# Patient Record
Sex: FEMALE | Race: WHITE | NOT HISPANIC OR LATINO | Employment: OTHER | URBAN - NONMETROPOLITAN AREA
[De-identification: names, ages, dates, MRNs, and addresses within clinical notes are randomized per-mention and may not be internally consistent; named-entity substitution may affect disease eponyms.]

---

## 2017-01-18 ENCOUNTER — PROCEDURE VISIT (OUTPATIENT)
Dept: OTOLARYNGOLOGY | Facility: CLINIC | Age: 27
End: 2017-01-18

## 2017-01-18 ENCOUNTER — OFFICE VISIT (OUTPATIENT)
Dept: OTOLARYNGOLOGY | Facility: CLINIC | Age: 27
End: 2017-01-18

## 2017-01-18 VITALS
HEIGHT: 61 IN | SYSTOLIC BLOOD PRESSURE: 112 MMHG | TEMPERATURE: 97.9 F | BODY MASS INDEX: 24.7 KG/M2 | WEIGHT: 130.8 LBS | DIASTOLIC BLOOD PRESSURE: 84 MMHG

## 2017-01-18 DIAGNOSIS — H90.72 MIXED HEARING LOSS OF LEFT EAR: Chronic | ICD-10-CM

## 2017-01-18 DIAGNOSIS — H90.8 MIXED HEARING LOSS, UNILATERAL: Primary | ICD-10-CM

## 2017-01-18 DIAGNOSIS — H65.22 LEFT CHRONIC SEROUS OTITIS MEDIA: Primary | ICD-10-CM

## 2017-01-18 DIAGNOSIS — H69.82 ETD (EUSTACHIAN TUBE DYSFUNCTION), LEFT: ICD-10-CM

## 2017-01-18 PROCEDURE — 99213 OFFICE O/P EST LOW 20 MIN: CPT | Performed by: PHYSICIAN ASSISTANT

## 2017-01-18 NOTE — PROGRESS NOTES
Patient Care Team:  Ancelmo Mcmahan DO as PCP - General (Internal Medicine)    Chief Complaint   Patient presents with   • Follow-up     The pt is f/u for tubes and is having intermittent ear pain        Subjective     Ellen Rowan is a 26 y.o. female who presents for evaluation.  HPI Comments: Patient presents status-post insertion of left T-tube. She was doing well, but over the last several days she has had intermittent left ear pain and fullness. No other symptoms or concerns at this time.      Review of Systems  Review of Systems   Constitutional: Negative for activity change, appetite change, chills, diaphoresis, fatigue, fever and unexpected weight change.   HENT: Positive for ear pain and hearing loss. Negative for congestion, dental problem, drooling, ear discharge, facial swelling, mouth sores, nosebleeds, postnasal drip, rhinorrhea, sinus pressure, sneezing, sore throat, tinnitus, trouble swallowing and voice change.    Eyes: Negative.    Respiratory: Negative.    Cardiovascular: Negative.    Gastrointestinal: Negative.    Endocrine: Negative.    Skin: Negative.    Allergic/Immunologic: Negative for environmental allergies, food allergies and immunocompromised state.   Neurological: Negative.    Hematological: Negative.    Psychiatric/Behavioral: Negative.        History  Past Medical History   Diagnosis Date   • Haynes's esophagus    • Cholesteatoma    • Chronic otitis media    • Conductive hearing loss    • DDD (degenerative disc disease), lumbar    • Dysautonomia    • Eustachian tube dysfunction    • Gastroparesis    • GERD (gastroesophageal reflux disease)    • Hiatal hernia    • HPV (human papilloma virus) infection    • Hypotension    • Irritable bowel syndrome    • Mastoiditis    • Nasal vestibulitis    • Neuropathy    • PONV (postoperative nausea and vomiting)    • POTS (postural orthostatic tachycardia syndrome)    • Scoliosis    • Tachycardia      Past Surgical History   Procedure  Laterality Date   • Adenoidectomy     • Facial reconstruction surgery  2005   • Knee surgery     • Mastoidectomy  10/01/2014   • Myringotomy w/ tubes     • Myringotomy w/ tubes Left 06/09/2014     10/1/14   • Pharyngeal flap  2007   • Sinus surgery Left 10/2014     Left incus   • Tonsillectomy     • [place] tooth extraction     • Cholecystectomy  09/04/2016   • Gastric stimulator implant surgery  2013     PT STATES INEFFECTIVE   • Jejunostomy     • Venous access device (port) insertion Left 03/25/2016     LOT# [id number] POWER PORT   • Laparoscopic tubal ligation     • Myringotomy w/ tubes Left 10/19/2016     Procedure: LEFT MYRINGOTOMY WITH INSERTION OF EAR TUBE with RIGHT EUA;  Surgeon: George Severino MD;  Location:  [facility] OR;  Service:    • Laparoscopic tubal ligation  04/2016   • Myringotomy w/ tubes Left 12/19/2016     Procedure: MYRINGOTOMY WITH INSERTION OF LEFT EAR TUBES, EXAM OF RIGHT EAR UNDER ANESTHESIA;  Surgeon: George Severino MD;  Location:  [facility] OR;  Service:      Family History   Problem Relation Age of Onset   • Diabetes Other    • Hypertension Other      Social History   Substance Use Topics   • Smoking status: Never Smoker   • Smokeless tobacco: None   • Alcohol use No       Current Outpatient Prescriptions:   •  clotrimazole (LOTRIMIN) 1 % cream, Apply 1 application topically 2 (Two) Times a Day. At feeding tube site, Disp: , Rfl:   •  diphenhydrAMINE (BENADRYL) 25 mg capsule, Take 25 mg by mouth Every 4 (Four) Hours As Needed for itching., Disp: , Rfl:   •  famotidine (PEPCID) 20-0.9 MG/50ML-%, Infuse 50 mL into a venous catheter 2 (Two) Times a Day. Via port, Disp: , Rfl:   •  gabapentin (NEURONTIN) 100 MG capsule, Take 100 mg by mouth 2 (Two) Times a Day., Disp: , Rfl:   •  HEPARIN LOCK FLUSH IV, Infuse  into a venous catheter Daily As Needed., Disp: , Rfl:   •  ibuprofen (ADVIL,MOTRIN) 800 MG tablet, Take 800 mg by mouth Every 6 (Six) Hours As Needed for mild pain (1-3) or moderate  pain (4-6)., Disp: , Rfl:   •  KCl in Dextrose-NaCl (DEXTROSE 5 % AND SODIUM CHLORIDE 0.45 % WITH KCL 20 MEQ/L) 20-5-0.45 MEQ/L-%-% infusion, Infuse 1,000 mL into a venous catheter Daily. 250 ML/HR OVER 4 HOURS DAILY, Disp: , Rfl:   •  promethazine (PHENERGAN) 25 MG/ML injection, Infuse 25 mg into a venous catheter Every 3 (Three) Hours As Needed., Disp: , Rfl:   •  Sodium Chloride Flush (SODIUM CHLORIDE 0.9 % FLUSH) 0.9 % solution, Infuse 10 mL into a venous catheter 6 (Six) Times a Day. Before and after each port infusion, Disp: , Rfl:   Allergies:  Iodinated diagnostic agents; Adhesive tape; Bactrim [sulfamethoxazole-trimethoprim]; Citrus; Metoclopramide; Nsaids; Tramadol hcl; and Zofran [ondansetron hcl]    Objective     Vital Signs  Temp:  [97.9 °F (36.6 °C)] 97.9 °F (36.6 °C)  BP: (112)/(84) 112/84    Physical Exam:  Physical Exam   Constitutional: Vital signs are normal. She appears well-developed and well-nourished. No distress.   HENT:   Head: Normocephalic and atraumatic.   Right Ear: External ear normal.   Left Ear: External ear normal.   Ears:    Eyes: Conjunctivae and EOM are normal. Pupils are equal, round, and reactive to light. No scleral icterus.   Pulmonary/Chest: Effort normal.   Neurological: She is alert. No cranial nerve deficit.   Skin: Skin is warm and dry. No rash noted. She is not diaphoretic. No erythema. No pallor.   Psychiatric: She has a normal mood and affect. Her behavior is normal. Judgment and thought content normal.   Vitals reviewed.      Results Review:   I reviewed the patient's new clinical results.  Procedure visit     1/18/2017  [facility]    Mixed hearing loss, unilateral   Dx    Progress Notes   Desiree Cazares (Audiologist) • • Audiology                Assessment/Plan     Problems Addressed this Visit        Nervous and Auditory    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)    Left chronic serous otitis media - Primary    ETD (eustachian tube dysfunction)           My findings and recommendations were discussed and questions were answered. T-tube cleared with suction and pick, advised to use ear drops to help keep the tube open. Follow-up as directed to discuss further options if tube is plugged again.    Return in about 4 weeks (around 2/15/2017) for Recheck left PE tube when Dr. Severino is in office.    PAPA Maciel  01/18/17  2:32 PM

## 2017-01-18 NOTE — MR AVS SNAPSHOT
Ellen Rowan   2017 2:30 PM   Office Visit    Dept Phone:  [phone]   Encounter #:  [id number]    Provider:  PAPA Maciel   Department:  [facility]                Your Full Care Plan              Your Updated Medication List          This list is accurate as of: 17  2:21 PM.  Always use your most recent med list.                clotrimazole 1 % cream   Commonly known as:  LOTRIMIN       dextrose 5 % and sodium chloride 0.45 % with KCl 20 mEq/L 20-5-0.45 MEQ/L-%-% infusion       diphenhydrAMINE 25 mg capsule   Commonly known as:  BENADRYL       famotidine 20-0.9 MG/50ML-%   Commonly known as:  PEPCID       gabapentin 100 MG capsule   Commonly known as:  NEURONTIN       HEPARIN LOCK FLUSH IV       ibuprofen 800 MG tablet   Commonly known as:  ADVIL,MOTRIN       promethazine 25 MG/ML injection   Commonly known as:  PHENERGAN       sodium chloride 0.9 % flush 0.9 % solution               Instructions     None    Patient Instructions History      Upcoming Appointments     Visit Type Date Time Department    AUDIO 2017  2:00 PM [facility]    FOLLOW UP 2017  2:30 PM [facility]    FOLLOW UP 2017  3:30 PM [facility] [place]      [facility] Signup     [facility] allows you to send messages to your doctor, view your test results, renew your prescriptions, schedule appointments, and more. To sign up, go to [employer] and click on the Sign Up Now link in the New User? box. Enter your OpenLabel Activation Code exactly as it appears below along with the last four digits of your Social Security Number and your Date of Birth () to complete the sign-up process. If you do not sign up before the expiration date, you must request a new code.    OpenLabel Activation Code: [email or web address]  Expires: 2017  2:16 PM    If you have questions, you can email [email or web address] or call [phone] to talk to our  "MyChart staff. Remember, MyChart is NOT to be used for urgent needs. For medical emergencies, dial 911.               Other Info from Your Visit           Your Appointments     2017  2:30 PM CST   Follow Up with PAPA Maciel   [facility] (--)    [address] [place] [address] Av   [address]  [facility] [address] [phone]           Arrive 15 minutes prior to appointment.            2017  3:30 PM CST   Follow Up with George Severino MD   [facility] (--)    [address] [place] [address] Av   [address]  [place] [address] [phone]           Arrive 15 minutes prior to appointment.              Allergies     Iodinated Diagnostic Agents  Anaphylaxis    Adhesive Tape Intolerance Itching    tegaderm is ok    Bactrim [Sulfamethoxazole-trimethoprim]  Nausea And Vomiting    Citrus  Nausea And Vomiting    And artificial    Metoclopramide  Nausea Only    Nsaids  GI Intolerance    Tramadol Hcl Intolerance Other (See Comments)    \"SLEEPS FOR 3 DAYS\" PER PT    Zofran [Ondansetron Hcl]  Hives      Reason for Visit     Follow-up The pt is f/u for tubes and is having intermittent ear pain      Vital Signs     Blood Pressure Temperature Height Weight Body Mass Index Smoking Status    112/84 (Patient Position: Sitting) 97.9 °F (36.6 °C) 61\" (154.9 cm) 130 lb 12.8 oz (59.3 kg) 24.71 kg/m2 Never Smoker        "

## 2017-02-09 ENCOUNTER — APPOINTMENT (OUTPATIENT)
Dept: PREADMISSION TESTING | Facility: HOSPITAL | Age: 27
End: 2017-02-09

## 2017-02-09 ENCOUNTER — OFFICE VISIT (OUTPATIENT)
Dept: OTOLARYNGOLOGY | Facility: CLINIC | Age: 27
End: 2017-02-09

## 2017-02-09 VITALS
DIASTOLIC BLOOD PRESSURE: 70 MMHG | WEIGHT: 131 LBS | HEIGHT: 60 IN | HEART RATE: 83 BPM | SYSTOLIC BLOOD PRESSURE: 111 MMHG | OXYGEN SATURATION: 98 % | BODY MASS INDEX: 25.72 KG/M2

## 2017-02-09 VITALS
HEART RATE: 80 BPM | HEIGHT: 61 IN | BODY MASS INDEX: 24.55 KG/M2 | DIASTOLIC BLOOD PRESSURE: 80 MMHG | WEIGHT: 130 LBS | TEMPERATURE: 97.5 F | SYSTOLIC BLOOD PRESSURE: 120 MMHG

## 2017-02-09 DIAGNOSIS — H90.72 MIXED HEARING LOSS OF LEFT EAR: Chronic | ICD-10-CM

## 2017-02-09 DIAGNOSIS — H65.22 LEFT CHRONIC SEROUS OTITIS MEDIA: Primary | ICD-10-CM

## 2017-02-09 DIAGNOSIS — H69.82 ETD (EUSTACHIAN TUBE DYSFUNCTION), LEFT: ICD-10-CM

## 2017-02-09 LAB
ANION GAP SERPL CALCULATED.3IONS-SCNC: 9 MMOL/L (ref 4–13)
BUN BLD-MCNC: 18 MG/DL (ref 5–21)
BUN/CREAT SERPL: 25 (ref 7–25)
CALCIUM SPEC-SCNC: 9.7 MG/DL (ref 8.4–10.4)
CHLORIDE SERPL-SCNC: 103 MMOL/L (ref 98–110)
CO2 SERPL-SCNC: 29 MMOL/L (ref 24–31)
CREAT BLD-MCNC: 0.72 MG/DL (ref 0.5–1.4)
DEPRECATED RDW RBC AUTO: 39.5 FL (ref 40–54)
ERYTHROCYTE [DISTWIDTH] IN BLOOD BY AUTOMATED COUNT: 12.9 % (ref 12–15)
GFR SERPL CREATININE-BSD FRML MDRD: 97 ML/MIN/1.73
GLUCOSE BLD-MCNC: 95 MG/DL (ref 70–100)
HCT VFR BLD AUTO: 40.3 % (ref 37–47)
HGB BLD-MCNC: 13.4 G/DL (ref 12–16)
MCH RBC QN AUTO: 28.2 PG (ref 28–32)
MCHC RBC AUTO-ENTMCNC: 33.3 G/DL (ref 33–36)
MCV RBC AUTO: 84.7 FL (ref 82–98)
PLATELET # BLD AUTO: 295 10*3/MM3 (ref 130–400)
PMV BLD AUTO: 9.1 FL (ref 6–12)
POTASSIUM BLD-SCNC: 3.6 MMOL/L (ref 3.5–5.3)
RBC # BLD AUTO: 4.76 10*6/MM3 (ref 4.2–5.4)
SODIUM BLD-SCNC: 141 MMOL/L (ref 135–145)
WBC NRBC COR # BLD: 9.8 10*3/MM3 (ref 4.8–10.8)

## 2017-02-09 PROCEDURE — 93005 ELECTROCARDIOGRAM TRACING: CPT

## 2017-02-09 PROCEDURE — 80048 BASIC METABOLIC PNL TOTAL CA: CPT | Performed by: OTOLARYNGOLOGY

## 2017-02-09 PROCEDURE — 36415 COLL VENOUS BLD VENIPUNCTURE: CPT

## 2017-02-09 PROCEDURE — 93010 ELECTROCARDIOGRAM REPORT: CPT | Performed by: INTERNAL MEDICINE

## 2017-02-09 PROCEDURE — 85027 COMPLETE CBC AUTOMATED: CPT | Performed by: OTOLARYNGOLOGY

## 2017-02-09 PROCEDURE — 99214 OFFICE O/P EST MOD 30 MIN: CPT | Performed by: PHYSICIAN ASSISTANT

## 2017-02-09 RX ORDER — ACETAMINOPHEN AND CODEINE PHOSPHATE 300; 30 MG/1; MG/1
TABLET ORAL
Refills: 0 | COMMUNITY
Start: 2017-02-02 | End: 2017-08-07 | Stop reason: ALTCHOICE

## 2017-02-09 NOTE — PROGRESS NOTES
YOB: 1990  Location: [facility]  Location Address: [address] [place] [address], [facility], [address] [place] [address]  Location Phone: [phone]    Chief Complaint   Patient presents with   • Follow-up     Left ear, tube, imbalance       History of Present Illness  Patient presents status-post MYRINGOTOMY WITH INSERTION OF LEFT EAR TUBES, EXAM OF RIGHT EAR UNDER ANESTHESIA on 16. She is not doing well today. She complains of bilateral ear pain, dizziness, falling and lightheadedness. She denies nasal congestion, nasal drainage and otorrhea      Past Medical History   Diagnosis Date   • Haynes's esophagus    • Cholesteatoma    • Chronic otitis media    • Conductive hearing loss    • DDD (degenerative disc disease), lumbar    • Dysautonomia    • Eustachian tube dysfunction    • Gastroparesis    • GERD (gastroesophageal reflux disease)    • Hiatal hernia    • HPV (human papilloma virus) infection    • Hypotension    • Irritable bowel syndrome    • Mastoiditis    • Nasal vestibulitis    • Neuropathy    • PONV (postoperative nausea and vomiting)    • POTS (postural orthostatic tachycardia syndrome)    • Scoliosis    • Tachycardia        Past Surgical History   Procedure Laterality Date   • Adenoidectomy     • Facial reconstruction surgery     • Knee surgery     • Mastoidectomy  10/01/2014   • Myringotomy w/ tubes     • Myringotomy w/ tubes Left 2014     10/1/14   • Pharyngeal flap     • Sinus surgery Left 10/2014     Left incus   • Tonsillectomy     • [place] tooth extraction     • Cholecystectomy  2016   • Gastric stimulator implant surgery       PT STATES INEFFECTIVE   • Jejunostomy     • Venous access device (port) insertion Left 2016     LOT# [id number] POWER PORT   • Laparoscopic tubal ligation     • Myringotomy w/ tubes Left 10/19/2016     Procedure: LEFT MYRINGOTOMY WITH INSERTION OF EAR TUBE with RIGHT EUA;  Surgeon: George Severino MD;  Location: [facility] OR;  Service:     • Laparoscopic tubal ligation  04/2016   • Myringotomy w/ tubes Left 12/19/2016     Procedure: MYRINGOTOMY WITH INSERTION OF LEFT EAR TUBES, EXAM OF RIGHT EAR UNDER ANESTHESIA;  Surgeon: George Severino MD;  Location: [facility] OR;  Service:          Current Outpatient Prescriptions:   •  acetaminophen-codeine (TYLENOL #3) 300-30 MG per tablet, TK 1 T PO BID PRN, Disp: , Rfl: 0  •  clotrimazole (LOTRIMIN) 1 % cream, Apply 1 application topically 2 (Two) Times a Day. At feeding tube site, Disp: , Rfl:   •  diphenhydrAMINE (BENADRYL) 25 mg capsule, Take 25 mg by mouth Every 4 (Four) Hours As Needed for itching., Disp: , Rfl:   •  famotidine (PEPCID) 20-0.9 MG/50ML-%, Infuse 50 mL into a venous catheter 2 (Two) Times a Day. Via port, Disp: , Rfl:   •  gabapentin (NEURONTIN) 100 MG capsule, Take 100 mg by mouth 2 (Two) Times a Day., Disp: , Rfl:   •  HEPARIN LOCK FLUSH IV, Infuse  into a venous catheter Daily As Needed., Disp: , Rfl:   •  ibuprofen (ADVIL,MOTRIN) 800 MG tablet, Take 800 mg by mouth Every 6 (Six) Hours As Needed for mild pain (1-3) or moderate pain (4-6)., Disp: , Rfl:   •  KCl in Dextrose-NaCl (DEXTROSE 5 % AND SODIUM CHLORIDE 0.45 % WITH KCL 20 MEQ/L) 20-5-0.45 MEQ/L-%-% infusion, Infuse 1,000 mL into a venous catheter Daily. 250 ML/HR OVER 4 HOURS DAILY, Disp: , Rfl:   •  promethazine (PHENERGAN) 25 MG/ML injection, Infuse 25 mg into a venous catheter Every 3 (Three) Hours As Needed., Disp: , Rfl:   •  Sodium Chloride Flush (SODIUM CHLORIDE 0.9 % FLUSH) 0.9 % solution, Infuse 10 mL into a venous catheter 6 (Six) Times a Day. Before and after each port infusion, Disp: , Rfl:     Iodides; Iodinated diagnostic agents; Adhesive tape; Bactrim [sulfamethoxazole-trimethoprim]; Citrus; Metoclopramide; Nsaids; Tramadol hcl; and Zofran [ondansetron hcl]    Family History   Problem Relation Age of Onset   • Diabetes Other    • Hypertension Other        Social History     Social History   • Marital status:  Single     Spouse name: N/A   • Number of children: N/A   • Years of education: N/A     Occupational History   • Not on file.     Social History Main Topics   • Smoking status: Never Smoker   • Smokeless tobacco: Not on file   • Alcohol use No   • Drug use: No   • Sexual activity: Defer     Other Topics Concern   • Not on file     Social History Narrative       Review of Systems   Constitutional: Negative.    HENT: Positive for ear pain.    Eyes: Negative.    Respiratory: Negative.    Cardiovascular: Negative.    Gastrointestinal: Negative.    Endocrine: Negative.    Genitourinary: Negative.    Musculoskeletal: Negative.    Skin: Negative.    Allergic/Immunologic: Negative.    Neurological: Positive for dizziness and light-headedness.   Hematological: Negative.    Psychiatric/Behavioral: Negative.        Vitals:    02/09/17 1346   BP: 120/80   Pulse: 80   Temp: 97.5 °F (36.4 °C)       Objective     Physical Exam  CONSTITUTIONAL: well nourished, alert, oriented, in no acute distress     COMMUNICATION AND VOICE: able to communicate normally, normal voice quality    HEAD: normocephalic, no lesions, atraumatic, no tenderness, no masses     FACE: appearance normal, no lesions, no tenderness, no deformities, facial motion symmetric    SALIVARY GLANDS: parotid glands with no tenderness, no swelling, no masses, submandibular glands with normal size, nontender    EYES: ocular motility normal, eyelids normal, orbits normal, no proptosis, conjunctiva normal , pupils equal, round     EARS:  Hearing: response to conversational voice normal bilaterally   External Ears: auricles without lesions  Otoscopic: tympanic membrane appearance normal on the right, fluid and dull on the left. T-tube in place but obstructed.  NOSE:  External Nose: structure normal, no tenderness on palpation, no nasal discharge, no lesions, no evidence of trauma, nostrils patent   Intranasal Exam: nasal mucosa normal, vestibule within normal limits, inferior  turbinate normal, nasal septum midline   Nasopharynx:     ORAL:  Lips: upper and lower lips without lesion   Teeth: dentition within normal limits for age   Gums: gingivae healthy   Oral Mucosa: oral mucosa normal, no mucosal lesions   Floor of Mouth: Warthin’s duct patent, mucosa normal  Tongue: lingual mucosa normal without lesions, normal tongue mobility   Palate: soft and hard palates with normal mucosa and structure  Oropharynx: oropharyngeal mucosa normal    HYPOPHARYNX:   LARYNX: epiglottis and arytenoid cartilage within normal limits, vocal cord mucosa normal with normal mobility     NECK: neck appearance normal, no mass,  noted without erythema or tenderness    THYROID: no overt thyromegaly, no tenderness, nodules or mass present on palpation, position midline     LYMPH NODES: no lymphadenopathy    CHEST/RESPIRATORY: respiratory effort normal, normal breath sounds     CARDIOVASCULAR: rate and rhythm normal, extremities without cyanosis or edema      NEUROLOGIC/PSYCHIATRIC: oriented to time, place and person, mood normal, affect appropriate, CN II-XII intact grossly    Assessment/Plan   Problems Addressed this Visit        Nervous and Auditory    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)    Left chronic serous otitis media - Primary    Relevant Orders    Case Request    ETD (eustachian tube dysfunction)    Relevant Orders    Case Request        MYRINGOTOMY WITH INSERTION OF EAR TUBE on left EAR EXAM UNDER ANESTHESIA right ear /REMOVAL OF myringotomy of tube of left ear (Left), EAR EXAM UNDER ANESTHESIA right ear /REMOVAL OF myringotomy of tubes (Left)  Orders Placed This Encounter   Procedures   • Obtain informed consent     Order Specific Question:   Informed consent given for:     Answer:   MYRINGOTOMY WITH INSERTION OF EAR TUBE on left EAR EXAM UNDER ANESTHESIA right ear /REMOVAL OF myringotomy of tube of left ear   • Follow Anesthesia Guidelines / Standing Orders     Standing Status:   Future   • Provide  instructions to patient on NPO status     Return for Follow-up post-operatively as directed.       Patient Instructions   MYRINGOTOMY TUBE INSERTION: The risks and benefits of myringotomy tube insertion were explained including but not limited to pain, aural fullness, bleeding, infection, risks of the anesthesia, persistent tympanic membrane perforation, chronic otorrhea, early and late extrusion, and the possibility for the need of reinsertion after extrusion. Alternatives were discussed. The patient/parents demonstrated understanding of these risks. Questions were asked appropriately answered.      Dr. Severino examined patient and agrees with plan.

## 2017-02-09 NOTE — DISCHARGE INSTRUCTIONS
DAY OF SURGERY INSTRUCTIONS        YOUR SURGEON: ESTELLE JUSTICE    PROCEDURE: MYRINGOTOMY WITH INSERTION OF EAR TUBE on left EAR EXAM UNDER ANESTHESIA right ear/REMOVAL OF MYRINGOTOMY of tube of left ear    DATE OF SURGERY: 2/13/17    ARRIVAL TIME: AS DIRECTED BY OFFICE    DAY OF SURGERY TAKE ONLY THESE MEDICATIONS: NONE            BEFORE YOU COME TO THE HOSPITAL  (Pre-op instructions)  • Do not eat, drink, smoke or chew gum after midnight the night before surgery.  This also includes no mints.  • Morning of surgery take only the medicines you have been instructed with a sip of water unless otherwise instructed  by your physician.  • Do not shave, wear makeup or dark nail polish.  • Remove all jewelry including rings.  • Leave anything you consider valuable at home.  • Leave your suitcase in the car until after your surgery.  • Bring the following with you if applicable:  o Picture ID and insurance, Medicare or Medicaid cards  o Co-pay/deductible required by insurance (cash, check, credit card)  o Medications (no narcotics) in original bottles (not a list) including all over-the-counter medications.  o Copy of advance directive, living will or power-of- documents if not brought to PAT  o CPAP or BIPAP mask and tubing  o Skin prep instruction sheet  o Relaxation aids (MP3 player, book, magazine)  • Confirm your arrival time with you surgeon the day before your surgery (surgery times are subject to change)  • On the day of surgery check in at registration located at the main entrance of the hospital.       Outpatient Surgery Guidelines, Adult  Outpatient procedures are those for which the person having the procedure is allowed to go home the same day as the procedure. Various procedures are done on an outpatient basis. You should follow some general guidelines if you will be having an outpatient procedure.  LET YOUR HEALTH CARE PROVIDER KNOW ABOUT:  · Any allergies you have.  · All medicines you are taking,  including vitamins, herbs, eye drops, creams, and over-the-counter medicines.  · Previous problems you or members of your family have had with the use of anesthetics.  · Any blood disorders you have.  · Previous surgeries you have had.  · Medical conditions you have.  RISKS AND COMPLICATIONS  Your health care provider will discuss possible risks and complications with you before surgery. Common risks and complications include:    · Problems due to the use of anesthetics.  · Blood loss and replacement (does not apply to minor surgical procedures).  · Temporary increase in pain due to surgery.  · Uncorrected pain or problems that the surgery was meant to correct.  · Infection.  · New damage.  BEFORE THE PROCEDURE  · Ask your health care provider about changing or stopping your regular medicines. You may need to stop taking certain medicines in the days or weeks before the procedure.  · Stop smoking at least 2 weeks before surgery. This lowers your risk for complications during and after surgery. Ask your health care provider for help with this if needed.  · Eat your usual meals and a light supper the day before surgery. Continue fluid intake. Do not drink alcohol.  · Do not eat or drink after midnight the night before your surgery.   · Arrange for someone to take you home and to stay with you for 24 hours after the procedure. Medicine given for your procedure may affect your ability to drive or to care for yourself.  · Call your health care provider's office if you develop an illness or problem that may prevent you from safely having your procedure.  AFTER THE PROCEDURE  After surgery, you will be taken to a recovery area, where your progress will be monitored. If there are no complications, you will be allowed to go home when you are awake, stable, and taking fluids well. You may have numbness around the surgical site. Healing will take some time. You will have tenderness at the surgical site and may have some  swelling and bruising. You may also have some nausea.  HOME CARE INSTRUCTIONS  · Do not drive for 24 hours, or as directed by your health care provider. Do not drive while taking prescription pain medicines.  · Do not drink alcohol for 24 hours.  · Do not make important decisions or sign legal documents for 24 hours.  · You may resume a normal diet and activities as directed.  · Do not lift anything heavier than 10 pounds (4.5 kg) or play contact sports until your health care provider says it is okay.  · Change your bandages (dressings) as directed.  · Only take over-the-counter or prescription medicines as directed by your health care provider.  · Follow up with your health care provider as directed.  SEEK MEDICAL CARE IF:  · You have increased bleeding (more than a small spot) from the surgical site.  · You have redness, swelling, or increasing pain in the wound.  · You see pus coming from the wound.  · You have a fever.  · You notice a bad smell coming from the wound or dressing.  · You feel lightheaded or faint.  · You develop a rash.  · You have trouble breathing.  · You develop allergies.  MAKE SURE YOU:  · Understand these instructions.  · Will watch your condition.  · Will get help right away if you are not doing well or get worse.     This information is not intended to replace advice given to you by your health care provider. Make sure you discuss any questions you have with your health care provider.     Document Released: 09/12/2002 Document Revised: 05/03/2016 Document Reviewed: 05/22/2014  [employer] Interactive Patient Education ©2016 [employer]       Fall Prevention in Hospitals, Adult  As a hospital patient, your condition and the treatments you receive can increase your risk for falls. Some additional risk factors for falls in a hospital include:  · Being in an unfamiliar environment.  · Being on bed rest.  · Your surgery.  · Taking certain medicines.  · Your tubing requirements, such as  intravenous (IV) therapy or catheters.  It is important that you learn how to decrease fall risks while at the hospital. Below are important tips that can help prevent falls.  SAFETY TIPS FOR PREVENTING FALLS  Talk about your risk of falling.  · Ask your health care provider why you are at risk for falling. Is it your medicine, illness, tubing placement, or something else?  · Make a plan with your health care provider to keep you safe from falls.  · Ask your health care provider or pharmacist about side effects of your medicines. Some medicines can make you dizzy or affect your coordination.  Ask for help.  · Ask for help before getting out of bed. You may need to press your call button.  · Ask for assistance in getting safely to the toilet.  · Ask for a walker or cane to be put at your bedside. Ask that most of the side rails on your bed be placed up before your health care provider leaves the room.  · Ask family or friends to sit with you.  · Ask for things that are out of your reach, such as your glasses, hearing aids, telephone, bedside table, or call button.  Follow these tips to avoid falling:  · Stay lying or seated, rather than standing, while waiting for help.  · Wear rubber-soled slippers or shoes whenever you walk in the hospital.  · Avoid quick, sudden movements.  ¨ Change positions slowly.  ¨ Sit on the side of your bed before standing.  ¨ Stand up slowly and wait before you start to walk.  · Let your health care provider know if there is a spill on the floor.  · Pay careful attention to the medical equipment, electrical cords, and tubes around you.  · When you need help, use your call button by your bed or in the bathroom. Wait for one of your health care providers to help you.  · If you feel dizzy or unsure of your footing, return to bed and wait for assistance.  · Avoid being distracted by the TV, telephone, or another person in your room.  · Do not lean or support yourself on rolling objects, such  as IV poles or bedside tables.     This information is not intended to replace advice given to you by your health care provider. Make sure you discuss any questions you have with your health care provider.     Document Released: 12/15/2001 Document Revised: 01/08/2016 Document Reviewed: 08/25/2013  [employer] Interactive Patient Education ©2016 [employer]       Surgical Site Infections FAQs  What is a Surgical Site Infection (SSI)?  A surgical site infection is an infection that occurs after surgery in the part of the body where the surgery took place. Most patients who have surgery do not develop an infection. However, infections develop in about 1 to 3 out of every 100 patients who have surgery.  Some of the common symptoms of a surgical site infection are:  · Redness and pain around the area where you had surgery  · Drainage of cloudy fluid from your surgical wound  · Fever  Can SSIs be treated?  Yes. Most surgical site infections can be treated with antibiotics. The antibiotic given to you depends on the bacteria (germs) causing the infection. Sometimes patients with SSIs also need another surgery to treat the infection.  What are some of the things that hospitals are doing to prevent SSIs?  To prevent SSIs, doctors, nurses, and other healthcare providers:  · Clean their hands and arms up to their elbows with an antiseptic agent just before the surgery.  · Clean their hands with soap and water or an alcohol-based hand rub before and after caring for each patient.  · May remove some of your hair immediately before your surgery using electric clippers if the hair is in the same area where the procedure will occur. They should not shave you with a razor.  · Wear special hair covers, masks, gowns, and gloves during surgery to keep the surgery area clean.  · Give you antibiotics before your surgery starts. In most cases, you should get antibiotics within 60 minutes before the surgery starts and the antibiotics  should be stopped within 24 hours after surgery.  · Clean the skin at the site of your surgery with a special soap that kills germs.  What can I do to help prevent SSIs?  Before your surgery:  · Tell your doctor about other medical problems you may have. Health problems such as allergies, diabetes, and obesity could affect your surgery and your treatment.  · Quit smoking. Patients who smoke get more infections. Talk to your doctor about how you can quit before your surgery.  · Do not shave near where you will have surgery. Shaving with a razor can irritate your skin and make it easier to develop an infection.  At the time of your surgery:  · Speak up if someone tries to shave you with a razor before surgery. Ask why you need to be shaved and talk with your surgeon if you have any concerns.  · Ask if you will get antibiotics before surgery.  After your surgery:  · Make sure that your healthcare providers clean their hands before examining you, either with soap and water or an alcohol-based hand rub.  · If you do not see your providers clean their hands, please ask them to do so.  · Family and friends who visit you should not touch the surgical wound or dressings.  · Family and friends should clean their hands with soap and water or an alcohol-based hand rub before and after visiting you. If you do not see them clean their hands, ask them to clean their hands.  What do I need to do when I go home from the hospital?  · Before you go home, your doctor or nurse should explain everything you need to know about taking care of your wound. Make sure you understand how to care for your wound before you leave the hospital.  · Always clean your hands before and after caring for your wound.  · Before you go home, make sure you know who to contact if you have questions or problems after you get home.  · If you have any symptoms of an infection, such as redness and pain at the surgery site, drainage, or fever, call your doctor  immediately.  If you have additional questions, please ask your doctor or nurse.  Developed and co-sponsored by The Society for Healthcare Epidemiology of [place] (SHEA); Infectious Diseases Society of [place] (IDSA); American Hospital Association; Association for Professionals in Infection Control and Epidemiology (APIC); Centers for Disease Control and Prevention (CDC); and The Joint Commission.     This information is not intended to replace advice given to you by your health care provider. Make sure you discuss any questions you have with your health care provider.     Document Released: 12/23/2014 Document Revised: 01/08/2016 Document Reviewed: 03/02/2016  [employer] Interactive Patient Education ©2016 [employer].

## 2017-02-09 NOTE — PATIENT INSTRUCTIONS
MYRINGOTOMY TUBE INSERTION: The risks and benefits of myringotomy tube insertion were explained including but not limited to pain, aural fullness, bleeding, infection, risks of the anesthesia, persistent tympanic membrane perforation, chronic otorrhea, early and late extrusion, and the possibility for the need of reinsertion after extrusion. Alternatives were discussed. The patient/parents demonstrated understanding of these risks. Questions were asked appropriately answered.

## 2017-02-12 NOTE — H&P (VIEW-ONLY)
YOB: 1990  Location: [facility]  Location Address: [address] [place] [address], [facility], [address] [place] [address]  Location Phone: [phone]    Chief Complaint   Patient presents with   • Follow-up     Left ear, tube, imbalance       History of Present Illness  Patient presents status-post MYRINGOTOMY WITH INSERTION OF LEFT EAR TUBES, EXAM OF RIGHT EAR UNDER ANESTHESIA on 16. She is not doing well today. She complains of bilateral ear pain, dizziness, falling and lightheadedness. She denies nasal congestion, nasal drainage and otorrhea      Past Medical History   Diagnosis Date   • Haynes's esophagus    • Cholesteatoma    • Chronic otitis media    • Conductive hearing loss    • DDD (degenerative disc disease), lumbar    • Dysautonomia    • Eustachian tube dysfunction    • Gastroparesis    • GERD (gastroesophageal reflux disease)    • Hiatal hernia    • HPV (human papilloma virus) infection    • Hypotension    • Irritable bowel syndrome    • Mastoiditis    • Nasal vestibulitis    • Neuropathy    • PONV (postoperative nausea and vomiting)    • POTS (postural orthostatic tachycardia syndrome)    • Scoliosis    • Tachycardia        Past Surgical History   Procedure Laterality Date   • Adenoidectomy     • Facial reconstruction surgery     • Knee surgery     • Mastoidectomy  10/01/2014   • Myringotomy w/ tubes     • Myringotomy w/ tubes Left 2014     10/1/14   • Pharyngeal flap     • Sinus surgery Left 10/2014     Left incus   • Tonsillectomy     • [place] tooth extraction     • Cholecystectomy  2016   • Gastric stimulator implant surgery       PT STATES INEFFECTIVE   • Jejunostomy     • Venous access device (port) insertion Left 2016     LOT# [id number] POWER PORT   • Laparoscopic tubal ligation     • Myringotomy w/ tubes Left 10/19/2016     Procedure: LEFT MYRINGOTOMY WITH INSERTION OF EAR TUBE with RIGHT EUA;  Surgeon: George Severino MD;  Location: [facility] OR;  Service:     • Laparoscopic tubal ligation  04/2016   • Myringotomy w/ tubes Left 12/19/2016     Procedure: MYRINGOTOMY WITH INSERTION OF LEFT EAR TUBES, EXAM OF RIGHT EAR UNDER ANESTHESIA;  Surgeon: George Severino MD;  Location: [facility] OR;  Service:          Current Outpatient Prescriptions:   •  acetaminophen-codeine (TYLENOL #3) 300-30 MG per tablet, TK 1 T PO BID PRN, Disp: , Rfl: 0  •  clotrimazole (LOTRIMIN) 1 % cream, Apply 1 application topically 2 (Two) Times a Day. At feeding tube site, Disp: , Rfl:   •  diphenhydrAMINE (BENADRYL) 25 mg capsule, Take 25 mg by mouth Every 4 (Four) Hours As Needed for itching., Disp: , Rfl:   •  famotidine (PEPCID) 20-0.9 MG/50ML-%, Infuse 50 mL into a venous catheter 2 (Two) Times a Day. Via port, Disp: , Rfl:   •  gabapentin (NEURONTIN) 100 MG capsule, Take 100 mg by mouth 2 (Two) Times a Day., Disp: , Rfl:   •  HEPARIN LOCK FLUSH IV, Infuse  into a venous catheter Daily As Needed., Disp: , Rfl:   •  ibuprofen (ADVIL,MOTRIN) 800 MG tablet, Take 800 mg by mouth Every 6 (Six) Hours As Needed for mild pain (1-3) or moderate pain (4-6)., Disp: , Rfl:   •  KCl in Dextrose-NaCl (DEXTROSE 5 % AND SODIUM CHLORIDE 0.45 % WITH KCL 20 MEQ/L) 20-5-0.45 MEQ/L-%-% infusion, Infuse 1,000 mL into a venous catheter Daily. 250 ML/HR OVER 4 HOURS DAILY, Disp: , Rfl:   •  promethazine (PHENERGAN) 25 MG/ML injection, Infuse 25 mg into a venous catheter Every 3 (Three) Hours As Needed., Disp: , Rfl:   •  Sodium Chloride Flush (SODIUM CHLORIDE 0.9 % FLUSH) 0.9 % solution, Infuse 10 mL into a venous catheter 6 (Six) Times a Day. Before and after each port infusion, Disp: , Rfl:     Iodides; Iodinated diagnostic agents; Adhesive tape; Bactrim [sulfamethoxazole-trimethoprim]; Citrus; Metoclopramide; Nsaids; Tramadol hcl; and Zofran [ondansetron hcl]    Family History   Problem Relation Age of Onset   • Diabetes Other    • Hypertension Other        Social History     Social History   • Marital status:  Single     Spouse name: N/A   • Number of children: N/A   • Years of education: N/A     Occupational History   • Not on file.     Social History Main Topics   • Smoking status: Never Smoker   • Smokeless tobacco: Not on file   • Alcohol use No   • Drug use: No   • Sexual activity: Defer     Other Topics Concern   • Not on file     Social History Narrative       Review of Systems   Constitutional: Negative.    HENT: Positive for ear pain.    Eyes: Negative.    Respiratory: Negative.    Cardiovascular: Negative.    Gastrointestinal: Negative.    Endocrine: Negative.    Genitourinary: Negative.    Musculoskeletal: Negative.    Skin: Negative.    Allergic/Immunologic: Negative.    Neurological: Positive for dizziness and light-headedness.   Hematological: Negative.    Psychiatric/Behavioral: Negative.        Vitals:    02/09/17 1346   BP: 120/80   Pulse: 80   Temp: 97.5 °F (36.4 °C)       Objective     Physical Exam  CONSTITUTIONAL: well nourished, alert, oriented, in no acute distress     COMMUNICATION AND VOICE: able to communicate normally, normal voice quality    HEAD: normocephalic, no lesions, atraumatic, no tenderness, no masses     FACE: appearance normal, no lesions, no tenderness, no deformities, facial motion symmetric    SALIVARY GLANDS: parotid glands with no tenderness, no swelling, no masses, submandibular glands with normal size, nontender    EYES: ocular motility normal, eyelids normal, orbits normal, no proptosis, conjunctiva normal , pupils equal, round     EARS:  Hearing: response to conversational voice normal bilaterally   External Ears: auricles without lesions  Otoscopic: tympanic membrane appearance normal on the right, fluid and dull on the left. T-tube in place but obstructed.  NOSE:  External Nose: structure normal, no tenderness on palpation, no nasal discharge, no lesions, no evidence of trauma, nostrils patent   Intranasal Exam: nasal mucosa normal, vestibule within normal limits, inferior  turbinate normal, nasal septum midline   Nasopharynx:     ORAL:  Lips: upper and lower lips without lesion   Teeth: dentition within normal limits for age   Gums: gingivae healthy   Oral Mucosa: oral mucosa normal, no mucosal lesions   Floor of Mouth: Warthin’s duct patent, mucosa normal  Tongue: lingual mucosa normal without lesions, normal tongue mobility   Palate: soft and hard palates with normal mucosa and structure  Oropharynx: oropharyngeal mucosa normal    HYPOPHARYNX:   LARYNX: epiglottis and arytenoid cartilage within normal limits, vocal cord mucosa normal with normal mobility     NECK: neck appearance normal, no mass,  noted without erythema or tenderness    THYROID: no overt thyromegaly, no tenderness, nodules or mass present on palpation, position midline     LYMPH NODES: no lymphadenopathy    CHEST/RESPIRATORY: respiratory effort normal, normal breath sounds     CARDIOVASCULAR: rate and rhythm normal, extremities without cyanosis or edema      NEUROLOGIC/PSYCHIATRIC: oriented to time, place and person, mood normal, affect appropriate, CN II-XII intact grossly    Assessment/Plan   Problems Addressed this Visit        Nervous and Auditory    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)    Left chronic serous otitis media - Primary    Relevant Orders    Case Request    ETD (eustachian tube dysfunction)    Relevant Orders    Case Request        MYRINGOTOMY WITH INSERTION OF EAR TUBE on left EAR EXAM UNDER ANESTHESIA right ear /REMOVAL OF myringotomy of tube of left ear (Left), EAR EXAM UNDER ANESTHESIA right ear /REMOVAL OF myringotomy of tubes (Left)  Orders Placed This Encounter   Procedures   • Obtain informed consent     Order Specific Question:   Informed consent given for:     Answer:   MYRINGOTOMY WITH INSERTION OF EAR TUBE on left EAR EXAM UNDER ANESTHESIA right ear /REMOVAL OF myringotomy of tube of left ear   • Follow Anesthesia Guidelines / Standing Orders     Standing Status:   Future   • Provide  instructions to patient on NPO status     Return for Follow-up post-operatively as directed.       Patient Instructions   MYRINGOTOMY TUBE INSERTION: The risks and benefits of myringotomy tube insertion were explained including but not limited to pain, aural fullness, bleeding, infection, risks of the anesthesia, persistent tympanic membrane perforation, chronic otorrhea, early and late extrusion, and the possibility for the need of reinsertion after extrusion. Alternatives were discussed. The patient/parents demonstrated understanding of these risks. Questions were asked appropriately answered.      Dr. Severino examined patient and agrees with plan.

## 2017-02-13 ENCOUNTER — ANESTHESIA EVENT (OUTPATIENT)
Dept: PERIOP | Facility: HOSPITAL | Age: 27
End: 2017-02-13

## 2017-02-13 ENCOUNTER — HOSPITAL ENCOUNTER (OUTPATIENT)
Facility: HOSPITAL | Age: 27
Setting detail: HOSPITAL OUTPATIENT SURGERY
Discharge: HOME | End: 2017-02-13
Attending: OTOLARYNGOLOGY | Admitting: OTOLARYNGOLOGY

## 2017-02-13 ENCOUNTER — ANESTHESIA (OUTPATIENT)
Dept: PERIOP | Facility: HOSPITAL | Age: 27
End: 2017-02-13

## 2017-02-13 VITALS
RESPIRATION RATE: 15 BRPM | SYSTOLIC BLOOD PRESSURE: 104 MMHG | OXYGEN SATURATION: 99 % | TEMPERATURE: 97.1 F | HEART RATE: 82 BPM | DIASTOLIC BLOOD PRESSURE: 65 MMHG

## 2017-02-13 LAB — B-HCG UR QL: NEGATIVE

## 2017-02-13 PROCEDURE — 25010000002 MIDAZOLAM PER 1 MG: Performed by: ANESTHESIOLOGY

## 2017-02-13 PROCEDURE — 25010000002 PROPOFOL 10 MG/ML EMULSION: Performed by: NURSE ANESTHETIST, CERTIFIED REGISTERED

## 2017-02-13 PROCEDURE — 81025 URINE PREGNANCY TEST: CPT | Performed by: OTOLARYNGOLOGY

## 2017-02-13 PROCEDURE — 92502 EAR AND THROAT EXAMINATION: CPT | Performed by: OTOLARYNGOLOGY

## 2017-02-13 PROCEDURE — 25010000002 KETOROLAC TROMETHAMINE PER 15 MG: Performed by: NURSE ANESTHETIST, CERTIFIED REGISTERED

## 2017-02-13 PROCEDURE — 25010000002 DEXAMETHASONE PER 1 MG: Performed by: ANESTHESIOLOGY

## 2017-02-13 PROCEDURE — 69436 CREATE EARDRUM OPENING: CPT | Performed by: OTOLARYNGOLOGY

## 2017-02-13 DEVICE — VENT TUBE 1025045 5PK PAPA NTCH/TAB 1.52
Type: IMPLANTABLE DEVICE | Status: FUNCTIONAL
Brand: PAPARELLA

## 2017-02-13 RX ORDER — FAMOTIDINE 10 MG/ML
20 INJECTION, SOLUTION INTRAVENOUS
Status: DISCONTINUED | OUTPATIENT
Start: 2017-02-13 | End: 2017-02-13 | Stop reason: HOSPADM

## 2017-02-13 RX ORDER — PROPOFOL 10 MG/ML
VIAL (ML) INTRAVENOUS AS NEEDED
Status: DISCONTINUED | OUTPATIENT
Start: 2017-02-13 | End: 2017-02-13 | Stop reason: SURG

## 2017-02-13 RX ORDER — NALOXONE HCL 0.4 MG/ML
0.04 VIAL (ML) INJECTION AS NEEDED
Status: DISCONTINUED | OUTPATIENT
Start: 2017-02-13 | End: 2017-02-13 | Stop reason: HOSPADM

## 2017-02-13 RX ORDER — FENTANYL CITRATE 50 UG/ML
25 INJECTION, SOLUTION INTRAMUSCULAR; INTRAVENOUS AS NEEDED
Status: DISCONTINUED | OUTPATIENT
Start: 2017-02-13 | End: 2017-02-13 | Stop reason: HOSPADM

## 2017-02-13 RX ORDER — CIPROFLOXACIN AND DEXAMETHASONE 3; 1 MG/ML; MG/ML
2 SUSPENSION/ DROPS AURICULAR (OTIC) 2 TIMES DAILY
Qty: 7.5 ML | Refills: 0 | Status: SHIPPED | OUTPATIENT
Start: 2017-02-13 | End: 2017-02-20

## 2017-02-13 RX ORDER — DEXAMETHASONE SODIUM PHOSPHATE 4 MG/ML
4 INJECTION, SOLUTION INTRA-ARTICULAR; INTRALESIONAL; INTRAMUSCULAR; INTRAVENOUS; SOFT TISSUE ONCE AS NEEDED
Status: COMPLETED | OUTPATIENT
Start: 2017-02-13 | End: 2017-02-13

## 2017-02-13 RX ORDER — KETOROLAC TROMETHAMINE 30 MG/ML
INJECTION, SOLUTION INTRAMUSCULAR; INTRAVENOUS AS NEEDED
Status: DISCONTINUED | OUTPATIENT
Start: 2017-02-13 | End: 2017-02-13 | Stop reason: SURG

## 2017-02-13 RX ORDER — HYDRALAZINE HYDROCHLORIDE 20 MG/ML
5 INJECTION INTRAMUSCULAR; INTRAVENOUS
Status: DISCONTINUED | OUTPATIENT
Start: 2017-02-13 | End: 2017-02-13 | Stop reason: HOSPADM

## 2017-02-13 RX ORDER — METOCLOPRAMIDE HYDROCHLORIDE 5 MG/ML
10 INJECTION INTRAMUSCULAR; INTRAVENOUS ONCE AS NEEDED
Status: DISCONTINUED | OUTPATIENT
Start: 2017-02-13 | End: 2017-02-13 | Stop reason: HOSPADM

## 2017-02-13 RX ORDER — SODIUM CHLORIDE 0.9 % (FLUSH) 0.9 %
1-10 SYRINGE (ML) INJECTION AS NEEDED
Status: DISCONTINUED | OUTPATIENT
Start: 2017-02-13 | End: 2017-02-13 | Stop reason: HOSPADM

## 2017-02-13 RX ORDER — METOCLOPRAMIDE HYDROCHLORIDE 5 MG/ML
5 INJECTION INTRAMUSCULAR; INTRAVENOUS
Status: DISCONTINUED | OUTPATIENT
Start: 2017-02-13 | End: 2017-02-13 | Stop reason: HOSPADM

## 2017-02-13 RX ORDER — MORPHINE SULFATE 2 MG/ML
2 INJECTION, SOLUTION INTRAMUSCULAR; INTRAVENOUS AS NEEDED
Status: DISCONTINUED | OUTPATIENT
Start: 2017-02-13 | End: 2017-02-13 | Stop reason: HOSPADM

## 2017-02-13 RX ORDER — FLUMAZENIL 0.1 MG/ML
0.2 INJECTION INTRAVENOUS AS NEEDED
Status: DISCONTINUED | OUTPATIENT
Start: 2017-02-13 | End: 2017-02-13 | Stop reason: HOSPADM

## 2017-02-13 RX ORDER — SODIUM CHLORIDE, SODIUM LACTATE, POTASSIUM CHLORIDE, CALCIUM CHLORIDE 600; 310; 30; 20 MG/100ML; MG/100ML; MG/100ML; MG/100ML
100 INJECTION, SOLUTION INTRAVENOUS CONTINUOUS
Status: DISCONTINUED | OUTPATIENT
Start: 2017-02-13 | End: 2017-02-13 | Stop reason: HOSPADM

## 2017-02-13 RX ORDER — MIDAZOLAM HYDROCHLORIDE 1 MG/ML
2 INJECTION INTRAMUSCULAR; INTRAVENOUS
Status: DISCONTINUED | OUTPATIENT
Start: 2017-02-13 | End: 2017-02-13 | Stop reason: HOSPADM

## 2017-02-13 RX ORDER — IPRATROPIUM BROMIDE AND ALBUTEROL SULFATE 2.5; .5 MG/3ML; MG/3ML
3 SOLUTION RESPIRATORY (INHALATION) ONCE AS NEEDED
Status: DISCONTINUED | OUTPATIENT
Start: 2017-02-13 | End: 2017-02-13 | Stop reason: HOSPADM

## 2017-02-13 RX ORDER — LABETALOL HYDROCHLORIDE 5 MG/ML
5 INJECTION, SOLUTION INTRAVENOUS
Status: DISCONTINUED | OUTPATIENT
Start: 2017-02-13 | End: 2017-02-13 | Stop reason: HOSPADM

## 2017-02-13 RX ORDER — CIPROFLOXACIN AND DEXAMETHASONE 3; 1 MG/ML; MG/ML
SUSPENSION/ DROPS AURICULAR (OTIC) AS NEEDED
Status: DISCONTINUED | OUTPATIENT
Start: 2017-02-13 | End: 2017-02-13 | Stop reason: HOSPADM

## 2017-02-13 RX ORDER — MIDAZOLAM HYDROCHLORIDE 1 MG/ML
1 INJECTION INTRAMUSCULAR; INTRAVENOUS
Status: DISCONTINUED | OUTPATIENT
Start: 2017-02-13 | End: 2017-02-13 | Stop reason: HOSPADM

## 2017-02-13 RX ORDER — MEPERIDINE HYDROCHLORIDE 25 MG/ML
12.5 INJECTION INTRAMUSCULAR; INTRAVENOUS; SUBCUTANEOUS
Status: DISCONTINUED | OUTPATIENT
Start: 2017-02-13 | End: 2017-02-13 | Stop reason: HOSPADM

## 2017-02-13 RX ADMIN — PROPOFOL 50 MG: 10 INJECTION, EMULSION INTRAVENOUS at 07:55

## 2017-02-13 RX ADMIN — PROPOFOL 50 MG: 10 INJECTION, EMULSION INTRAVENOUS at 07:58

## 2017-02-13 RX ADMIN — LIDOCAINE HYDROCHLORIDE 0.5 ML: 10 INJECTION, SOLUTION EPIDURAL; INFILTRATION; INTRACAUDAL; PERINEURAL at 07:24

## 2017-02-13 RX ADMIN — PROPOFOL 100 MG: 10 INJECTION, EMULSION INTRAVENOUS at 07:52

## 2017-02-13 RX ADMIN — KETOROLAC TROMETHAMINE 30 MG: 30 INJECTION, SOLUTION INTRAMUSCULAR at 07:48

## 2017-02-13 RX ADMIN — DEXAMETHASONE SODIUM PHOSPHATE 4 MG: 4 INJECTION, SOLUTION INTRA-ARTICULAR; INTRALESIONAL; INTRAMUSCULAR; INTRAVENOUS; SOFT TISSUE at 07:24

## 2017-02-13 RX ADMIN — SODIUM CHLORIDE, POTASSIUM CHLORIDE, SODIUM LACTATE AND CALCIUM CHLORIDE 100 ML/HR: 600; 310; 30; 20 INJECTION, SOLUTION INTRAVENOUS at 07:24

## 2017-02-13 RX ADMIN — MIDAZOLAM HYDROCHLORIDE 2 MG: 1 INJECTION, SOLUTION INTRAMUSCULAR; INTRAVENOUS at 07:23

## 2017-02-13 RX ADMIN — PROPOFOL 50 MG: 10 INJECTION, EMULSION INTRAVENOUS at 07:48

## 2017-02-13 RX ADMIN — FAMOTIDINE 20 MG: 10 INJECTION, SOLUTION INTRAVENOUS at 07:24

## 2017-02-13 NOTE — OP NOTE
[facility]  OPERATIVE REPORT    Ellen Rowan  2/13/2017    Pre-op Diagnosis:   Left chronic serous otitis media [H65.22]  ETD (eustachian tube dysfunction), left [H69.82]    Post-op Diagnosis:     Left chronic serous otitis media [H65.22]  ETD (eustachian tube dysfunction), left [H69.82]    Procedure/CPT® Codes:  LEFT MYRINGOTOMY WITH INSERTION OF EAR TUBE   RIGHT EAR EXAM UNDER ANESTHESIA     Surgeon(s):  George Severino MD    Anesthesia:   IV Sedation    Estimated Blood Loss:   None    Specimens:                * No specimens in log *      Drains:   None    Findings:  As below    Complications:  None    Procedure Description:  The patient was taken back to the operating room, placed in the supine position and under IV Sedation the patient was prepped and draped in the usual fashion.      A speculum was introduced in the left external auditory canal and visualization undertaken with the Leica operating microscope.  A moderate amount of cerumen was removed with a cerumen loop, a plugged T-tube removed without difficulty and an anterior inferior myringotomy incision was made with the myringotomy knife to enlarge the small perforation present.  A large mucoid effusion was suctioned from the middle ear space.  The middle ear mucosa appeared moderately edematous.  A Paparella II tube was placed into the myringotomy site without difficulty and Ciprodex Drops added to the external auditory canal.  A cotton ball was added to the meatus.  Attention was turned to the opposite ear where an intact TM was noted with a well ventilated middle ear space.    The procedure was subsequently terminated.  The patient tolerated the procedure well without complictions.   The patient subsequently was transported to the Post Anesthesia Care Unit in stable condition.       George Severino MD     Date: 2/13/2017  Time: 7:51 AM

## 2017-02-13 NOTE — PLAN OF CARE
Problem: Patient Care Overview (Adult)  Goal: Plan of Care Review  Outcome: Outcome(s) achieved Date Met:  02/13/17 02/13/17 0925   Coping/Psychosocial Response Interventions   Plan Of Care Reviewed With patient;caregiver   Patient Care Overview   Progress improving   Outcome Evaluation   Outcome Summary/Follow up Plan ready for dc         Problem: Perioperative Period (Adult)  Goal: Signs and Symptoms of Listed Potential Problems Will be Absent or Manageable (Perioperative Period)  Outcome: Outcome(s) achieved Date Met:  02/13/17 02/13/17 0925   Perioperative Period   Problems Assessed (Perioperative Period) all   Problems Present (Perioperative Period) none

## 2017-02-13 NOTE — ANESTHESIA PREPROCEDURE EVALUATION
Anesthesia Evaluation     Patient summary reviewed and Nursing notes reviewed   history of anesthetic complications (hypotension after surgery. ): PONV  NPO Status: > 8 hours   Airway   Mallampati: I  TM distance: <3 FB  Neck ROM: full  Dental - normal exam     Pulmonary - negative pulmonary ROS and normal exam   Cardiovascular - normal exam    ECG reviewed    (+) dysrhythmias Tachycardia,     ROS comment: POTS syndrome    Neuro/Psych- negative ROS  GI/Hepatic/Renal/Endo    (+)  hiatal hernia, GERD,     ROS Comment: Gastroparesis, has gastric stimulator implanted and J tube. Able to eat and drinks, supplements with tube feeds at night, did not do last night. Uses IV hydration, iv phenergan, pepcid, 1L with K and dextrose per day.     Musculoskeletal (-) negative ROS    Abdominal  - normal exam    Bowel sounds: normal.   Substance History - negative use     OB/GYN negative ob/gyn ROS         Other          Other Comment: Dysautonomia- postural orthostatic tachycardia syndrome. Reports problems with hypotension and tachcardia post-surgery.   Premature 22 weeks                            Anesthesia Plan    ASA 2     general     intravenous induction   Anesthetic plan and risks discussed with patient.

## 2017-03-13 ENCOUNTER — PROCEDURE VISIT (OUTPATIENT)
Dept: OTOLARYNGOLOGY | Facility: CLINIC | Age: 27
End: 2017-03-13

## 2017-03-13 ENCOUNTER — OFFICE VISIT (OUTPATIENT)
Dept: OTOLARYNGOLOGY | Facility: CLINIC | Age: 27
End: 2017-03-13

## 2017-03-13 VITALS
HEART RATE: 92 BPM | SYSTOLIC BLOOD PRESSURE: 131 MMHG | TEMPERATURE: 97.9 F | DIASTOLIC BLOOD PRESSURE: 94 MMHG | BODY MASS INDEX: 26.13 KG/M2 | HEIGHT: 61 IN | WEIGHT: 138.38 LBS

## 2017-03-13 DIAGNOSIS — H90.72 MIXED HEARING LOSS OF LEFT EAR: Primary | ICD-10-CM

## 2017-03-13 DIAGNOSIS — H65.32 CHRONIC MUCOID OTITIS MEDIA OF LEFT EAR: ICD-10-CM

## 2017-03-13 DIAGNOSIS — H69.82 ETD (EUSTACHIAN TUBE DYSFUNCTION), LEFT: Primary | ICD-10-CM

## 2017-03-13 DIAGNOSIS — H90.72 MIXED HEARING LOSS OF LEFT EAR: ICD-10-CM

## 2017-03-13 PROCEDURE — 99213 OFFICE O/P EST LOW 20 MIN: CPT | Performed by: NURSE PRACTITIONER

## 2017-03-13 NOTE — PROGRESS NOTES
YOB: 1990  Location: [facility]  Location Address: [address] [place] [address], [facility], [address] [place] [address]  Location Phone: [phone]    Chief Complaint   Patient presents with   • Ear Problem       History of Present Illness  Ellen Rowan is a 27 y.o. female.  Ellen Rowan is here for follow up of ENT complaints. The patient has had problems with otalgia  The symptoms are localized to the left ear. The patient has had moderate symptoms. The symptoms have been present for the last several weeks . The symptoms are aggravated by  no identifiable factors . The symptoms are improved by no identifieable factors.       Past Medical History   Diagnosis Date   • Anemia    • Haynes's esophagus    • Cholesteatoma    • Chronic otitis media    • Conductive hearing loss    • DDD (degenerative disc disease), lumbar    • Dysautonomia    • Eustachian tube dysfunction    • Gastroparesis    • GERD (gastroesophageal reflux disease)    • Hiatal hernia    • History of Holter monitoring      PT. HAS ON AT PRESENT- TO WEAR FOR 31 DAYS STARTED 2017   • HPV (human papilloma virus) infection    • Hypotension    • Irritable bowel syndrome    • Mastoiditis    • Nasal vestibulitis    • Neuropathy    • PONV (postoperative nausea and vomiting)    • POTS (postural orthostatic tachycardia syndrome)    • Scoliosis    • Tachycardia      PT. STATES IS WEARING HEART MONITOR FOR 31 DAYS DUE TO TACHYCARDIA- BEGAN 17       Past Surgical History   Procedure Laterality Date   • Adenoidectomy     • Facial reconstruction surgery     • Knee surgery     • Mastoidectomy  10/01/2014   • Myringotomy w/ tubes     • Myringotomy w/ tubes Left 2014     10/1/14   • Pharyngeal flap     • Sinus surgery Left 10/2014     Left incus   • Tonsillectomy     • Regan tooth extraction     • Cholecystectomy  2016   • Gastric stimulator implant surgery       PT STATES INEFFECTIVE   • Jejunostomy     • Venous access device  (port) insertion Left 03/25/2016     LOT# [id number] POWER PORT   • Myringotomy w/ tubes Left 10/19/2016     Procedure: LEFT MYRINGOTOMY WITH INSERTION OF EAR TUBE with RIGHT EUA;  Surgeon: George Severino MD;  Location: [facility] OR;  Service:    • Laparoscopic tubal ligation  04/2016   • Myringotomy w/ tubes Left 12/19/2016     Procedure: MYRINGOTOMY WITH INSERTION OF LEFT EAR TUBES, EXAM OF RIGHT EAR UNDER ANESTHESIA;  Surgeon: George Severino MD;  Location: [facility] OR;  Service:    • Myringotomy w/ tubes Bilateral 2/13/2017     Procedure: REMOVAL OF myringotomy of tube of left ear, MYRINGOTOMY WITH INSERTION OF EAR TUBE on left, EAR EXAM UNDER ANESTHESIA right ear ;  Surgeon: George Severino MD;  Location: [facility] OR;  Service:          Current Outpatient Prescriptions:   •  acetaminophen-codeine (TYLENOL #3) 300-30 MG per tablet, TK 1 T PO BID PRN, Disp: , Rfl: 0  •  clotrimazole (LOTRIMIN) 1 % cream, Apply 1 application topically 2 (Two) Times a Day. At feeding tube site, Disp: , Rfl:   •  diphenhydrAMINE (BENADRYL) 25 mg capsule, Take 25 mg by mouth Every 4 (Four) Hours As Needed for itching., Disp: , Rfl:   •  famotidine (PEPCID) 20-0.9 MG/50ML-%, Infuse 50 mL into a venous catheter 2 (Two) Times a Day. Via port, Disp: , Rfl:   •  gabapentin (NEURONTIN) 100 MG capsule, Take 100 mg by mouth 2 (Two) Times a Day., Disp: , Rfl:   •  HEPARIN LOCK FLUSH IV, Infuse  into a venous catheter Daily As Needed., Disp: , Rfl:   •  KCl in Dextrose-NaCl (DEXTROSE 5 % AND SODIUM CHLORIDE 0.45 % WITH KCL 20 MEQ/L) 20-5-0.45 MEQ/L-%-% infusion, Infuse 1,000 mL into a venous catheter Daily. 250 ML/HR OVER 4 HOURS DAILY, Disp: , Rfl:   •  promethazine (PHENERGAN) 25 MG/ML injection, Infuse 25 mg into a venous catheter Every 3 (Three) Hours As Needed., Disp: , Rfl:   •  Sodium Chloride Flush (SODIUM CHLORIDE 0.9 % FLUSH) 0.9 % solution, Infuse 10 mL into a venous catheter 6 (Six) Times a Day. Before and after each port  infusion, Disp: , Rfl:     Iodides; Iodinated diagnostic agents; Adhesive tape; Bactrim [sulfamethoxazole-trimethoprim]; Citrus; Metoclopramide; Nsaids; Tramadol hcl; and Zofran [ondansetron hcl]    Family History   Problem Relation Age of Onset   • Diabetes Other    • Hypertension Other        Social History     Social History   • Marital status: Single     Spouse name: N/A   • Number of children: N/A   • Years of education: N/A     Occupational History   • Not on file.     Social History Main Topics   • Smoking status: Never Smoker   • Smokeless tobacco: Not on file   • Alcohol use No   • Drug use: No   • Sexual activity: Defer     Other Topics Concern   • Not on file     Social History Narrative       Review of Systems   Constitutional: Negative.    HENT:        SEE HPI   Eyes: Negative.    Respiratory: Negative.    Cardiovascular: Negative.    Gastrointestinal: Negative.    Endocrine: Negative.    Genitourinary: Negative.    Musculoskeletal: Negative.    Skin: Negative.    Allergic/Immunologic: Negative.    Neurological: Negative.    Hematological: Negative.    Psychiatric/Behavioral: Negative.        Vitals:    03/13/17 0933   BP: 131/94   Pulse: 92   Temp: 97.9 °F (36.6 °C)       Objective     Physical Exam  CONSTITUTIONAL: well nourished, alert, oriented, in no acute distress     COMMUNICATION AND VOICE: able to communicate normally, normal voice quality    HEAD: normocephalic, no lesions, atraumatic, no tenderness, no masses     FACE: appearance normal, no lesions, no tenderness, no deformities, facial motion symmetric    SALIVARY GLANDS: parotid glands with no tenderness, no swelling, no masses, submandibular glands with normal size, nontender    EYES: ocular motility normal, eyelids normal, orbits normal, no proptosis, conjunctiva normal , pupils equal, round     EARS:  Hearing: response to conversational voice moderately impaired  External Ears: auricles without lesions  Otoscopic: left Tm with intact and  patent PET, right Tm intact, bilateral EACs clear    NOSE:  External Nose: structure normal, no tenderness on palpation, no nasal discharge, no lesions, no evidence of trauma, nostrils patent   Intranasal Exam: nasal mucosa normal, vestibule within normal limits, inferior turbinate normal, nasal septum midline     ORAL:  Lips: upper and lower lips without lesion   Teeth: dentition within normal limits for age   Gums: gingivae healthy   Oral Mucosa: oral mucosa normal, no mucosal lesions   Floor of Mouth: Warthin’s duct patent, mucosa normal  Tongue: lingual mucosa normal without lesions, normal tongue mobility   Palate: soft and hard palates with normal mucosa and structure  Oropharynx: oropharyngeal mucosa normal    NECK: neck appearance normal, no mass,  noted without erythema or tenderness    LYMPH NODES: no lymphadenopathy    CHEST/RESPIRATORY: respiratory effort normal,    CARDIOVASCULAR: , extremities without cyanosis or edema      NEUROLOGIC/PSYCHIATRIC: oriented to time, place and person, mood normal, affect appropriate, CN II-XII intact grossly    Assessment/Plan   Ellen was seen today for ear problem.    Diagnoses and all orders for this visit:    ETD (eustachian tube dysfunction), left    Mixed hearing loss of left ear    Chronic mucoid otitis media of left ear      * Surgery not found *  No orders of the defined types were placed in this encounter.    Return in about 4 months (around 7/13/2017).       Patient Instructions   Dry ear precautions

## 2017-03-15 ENCOUNTER — OFFICE VISIT (OUTPATIENT)
Dept: CARDIOLOGY | Age: 27
End: 2017-03-15
Payer: MEDICAID

## 2017-03-15 VITALS
WEIGHT: 137 LBS | DIASTOLIC BLOOD PRESSURE: 80 MMHG | HEIGHT: 61 IN | HEART RATE: 106 BPM | BODY MASS INDEX: 25.86 KG/M2 | SYSTOLIC BLOOD PRESSURE: 106 MMHG

## 2017-03-15 DIAGNOSIS — R00.0 TACHYCARDIA: Primary | ICD-10-CM

## 2017-03-15 DIAGNOSIS — G90.A POTS (POSTURAL ORTHOSTATIC TACHYCARDIA SYNDROME): ICD-10-CM

## 2017-03-15 DIAGNOSIS — R55 NEAR SYNCOPE: ICD-10-CM

## 2017-03-15 DIAGNOSIS — Z87.898 HISTORY OF SYNCOPE: ICD-10-CM

## 2017-03-15 PROCEDURE — 99214 OFFICE O/P EST MOD 30 MIN: CPT | Performed by: NURSE PRACTITIONER

## 2017-03-15 PROCEDURE — 93000 ELECTROCARDIOGRAM COMPLETE: CPT | Performed by: NURSE PRACTITIONER

## 2017-03-15 RX ORDER — PROMETHAZINE HYDROCHLORIDE 25 MG/ML
6.25 INJECTION, SOLUTION INTRAMUSCULAR; INTRAVENOUS EVERY 4 HOURS PRN
COMMUNITY
End: 2018-01-29

## 2017-03-15 RX ORDER — IBUPROFEN 800 MG/1
800 TABLET ORAL EVERY 6 HOURS PRN
COMMUNITY
End: 2021-05-08

## 2017-03-16 PROBLEM — R55 NEAR SYNCOPE: Status: ACTIVE | Noted: 2017-03-16

## 2017-03-16 PROBLEM — G90.A POTS (POSTURAL ORTHOSTATIC TACHYCARDIA SYNDROME): Status: ACTIVE | Noted: 2017-03-16

## 2017-03-16 PROBLEM — R00.0 TACHYCARDIA: Status: ACTIVE | Noted: 2017-03-16

## 2017-03-16 PROBLEM — Z87.898 HISTORY OF SYNCOPE: Status: ACTIVE | Noted: 2017-03-16

## 2017-03-20 ENCOUNTER — TELEPHONE (OUTPATIENT)
Dept: CARDIOLOGY | Age: 27
End: 2017-03-20

## 2017-03-21 ENCOUNTER — TELEPHONE (OUTPATIENT)
Dept: VASCULAR SURGERY | Age: 27
End: 2017-03-21

## 2017-03-21 RX ORDER — PREDNISONE 50 MG/1
TABLET ORAL
Qty: 3 TABLET | Refills: 0 | OUTPATIENT
Start: 2017-03-21 | End: 2017-04-28 | Stop reason: ALTCHOICE

## 2017-03-21 RX ORDER — DIPHENHYDRAMINE HCL 50 MG
CAPSULE ORAL
Qty: 1 CAPSULE | Refills: 0 | OUTPATIENT
Start: 2017-03-21 | End: 2021-05-08

## 2017-03-22 ENCOUNTER — PREP FOR PROCEDURE (OUTPATIENT)
Dept: VASCULAR SURGERY | Age: 27
End: 2017-03-22

## 2017-03-23 ENCOUNTER — HOSPITAL ENCOUNTER (OUTPATIENT)
Dept: INTERVENTIONAL RADIOLOGY/VASCULAR | Age: 27
Discharge: HOME | End: 2017-03-23
Payer: MEDICAID

## 2017-03-23 VITALS — HEART RATE: 114 BPM | RESPIRATION RATE: 22 BRPM | OXYGEN SATURATION: 96 %

## 2017-03-23 DIAGNOSIS — K58.9 IRRITABLE BOWEL SYNDROME, UNSPECIFIED TYPE: ICD-10-CM

## 2017-03-23 PROCEDURE — 6360000004 HC RX CONTRAST MEDICATION: Performed by: SURGERY

## 2017-03-23 PROCEDURE — 36569 INSJ PICC 5 YR+ W/O IMAGING: CPT | Performed by: SURGERY

## 2017-03-23 PROCEDURE — 6360000002 HC RX W HCPCS

## 2017-03-23 PROCEDURE — 36598 INJ W/FLUOR EVAL CV DEVICE: CPT | Performed by: SURGERY

## 2017-03-23 PROCEDURE — 2500000003 HC RX 250 WO HCPCS: Performed by: SURGERY

## 2017-03-23 PROCEDURE — 76937 US GUIDE VASCULAR ACCESS: CPT | Performed by: SURGERY

## 2017-03-23 PROCEDURE — 6360000002 HC RX W HCPCS: Performed by: SURGERY

## 2017-03-23 RX ORDER — ACETAMINOPHEN 325 MG/1
650 TABLET ORAL EVERY 4 HOURS PRN
Status: DISCONTINUED | OUTPATIENT
Start: 2017-03-23 | End: 2017-03-25 | Stop reason: HOSPADM

## 2017-03-23 RX ORDER — IODIXANOL 320 MG/ML
INJECTION, SOLUTION INTRAVASCULAR
Status: COMPLETED | OUTPATIENT
Start: 2017-03-23 | End: 2017-03-23

## 2017-03-23 RX ORDER — LIDOCAINE HYDROCHLORIDE 20 MG/ML
INJECTION, SOLUTION INFILTRATION; PERINEURAL
Status: COMPLETED | OUTPATIENT
Start: 2017-03-23 | End: 2017-03-23

## 2017-03-23 RX ORDER — HEPARIN SODIUM 5000 [USP'U]/ML
INJECTION, SOLUTION INTRAVENOUS; SUBCUTANEOUS
Status: COMPLETED | OUTPATIENT
Start: 2017-03-23 | End: 2017-03-23

## 2017-03-23 RX ORDER — DIPHENHYDRAMINE HYDROCHLORIDE 50 MG/ML
50 INJECTION INTRAMUSCULAR; INTRAVENOUS ONCE
Status: COMPLETED | OUTPATIENT
Start: 2017-03-23 | End: 2017-03-23

## 2017-03-23 RX ORDER — METHYLPREDNISOLONE SODIUM SUCCINATE 125 MG/2ML
250 INJECTION, POWDER, LYOPHILIZED, FOR SOLUTION INTRAMUSCULAR; INTRAVENOUS ONCE
Status: COMPLETED | OUTPATIENT
Start: 2017-03-23 | End: 2017-03-23

## 2017-03-23 RX ADMIN — DIPHENHYDRAMINE HYDROCHLORIDE 50 MG: 50 INJECTION, SOLUTION INTRAMUSCULAR; INTRAVENOUS at 07:16

## 2017-03-23 RX ADMIN — HEPARIN SODIUM 5000 UNITS: 5000 INJECTION, SOLUTION INTRAVENOUS; SUBCUTANEOUS at 07:36

## 2017-03-23 RX ADMIN — LIDOCAINE HYDROCHLORIDE 10 ML: 20 INJECTION, SOLUTION INFILTRATION; PERINEURAL at 07:46

## 2017-03-23 RX ADMIN — METHYLPREDNISOLONE SODIUM SUCCINATE 250 MG: 125 INJECTION, POWDER, FOR SOLUTION INTRAMUSCULAR; INTRAVENOUS at 07:16

## 2017-03-23 RX ADMIN — IODIXANOL 5 ML: 320 INJECTION, SOLUTION INTRAVASCULAR at 07:41

## 2017-03-24 PROCEDURE — 6360000002 HC RX W HCPCS

## 2017-03-30 ENCOUNTER — HOSPITAL ENCOUNTER (OUTPATIENT)
Dept: NON INVASIVE DIAGNOSTICS | Age: 27
Discharge: HOME | End: 2017-03-30
Payer: MEDICAID

## 2017-03-30 DIAGNOSIS — Z87.898 HISTORY OF SYNCOPE: ICD-10-CM

## 2017-03-30 DIAGNOSIS — R00.0 TACHYCARDIA: ICD-10-CM

## 2017-03-30 DIAGNOSIS — G90.A POTS (POSTURAL ORTHOSTATIC TACHYCARDIA SYNDROME): ICD-10-CM

## 2017-03-30 DIAGNOSIS — R55 NEAR SYNCOPE: ICD-10-CM

## 2017-03-30 PROCEDURE — 93306 TTE W/DOPPLER COMPLETE: CPT

## 2017-04-17 ENCOUNTER — TELEPHONE (OUTPATIENT)
Dept: CARDIOLOGY | Age: 27
End: 2017-04-17

## 2017-04-17 DIAGNOSIS — R00.0 TACHYCARDIA: Primary | ICD-10-CM

## 2017-04-21 ENCOUNTER — TELEPHONE (OUTPATIENT)
Dept: CARDIOLOGY | Age: 27
End: 2017-04-21

## 2017-04-28 ENCOUNTER — OFFICE VISIT (OUTPATIENT)
Dept: CARDIOLOGY | Age: 27
End: 2017-04-28
Payer: MEDICAID

## 2017-04-28 VITALS
HEIGHT: 61 IN | WEIGHT: 133 LBS | HEART RATE: 125 BPM | SYSTOLIC BLOOD PRESSURE: 118 MMHG | BODY MASS INDEX: 25.11 KG/M2 | DIASTOLIC BLOOD PRESSURE: 80 MMHG

## 2017-04-28 DIAGNOSIS — R55 NEAR SYNCOPE: Primary | ICD-10-CM

## 2017-04-28 DIAGNOSIS — G90.A POTS (POSTURAL ORTHOSTATIC TACHYCARDIA SYNDROME): ICD-10-CM

## 2017-04-28 PROCEDURE — 99245 OFF/OP CONSLTJ NEW/EST HI 55: CPT | Performed by: INTERNAL MEDICINE

## 2017-04-28 PROCEDURE — 93000 ELECTROCARDIOGRAM COMPLETE: CPT | Performed by: INTERNAL MEDICINE

## 2017-05-04 ENCOUNTER — TELEPHONE (OUTPATIENT)
Dept: VASCULAR SURGERY | Age: 27
End: 2017-05-04

## 2017-05-10 ENCOUNTER — OFFICE VISIT (OUTPATIENT)
Dept: VASCULAR SURGERY | Age: 27
End: 2017-05-10
Payer: MEDICAID

## 2017-05-10 VITALS
HEART RATE: 103 BPM | TEMPERATURE: 98.6 F | DIASTOLIC BLOOD PRESSURE: 87 MMHG | SYSTOLIC BLOOD PRESSURE: 124 MMHG | RESPIRATION RATE: 18 BRPM

## 2017-05-10 DIAGNOSIS — Z09 FOLLOW UP: Primary | ICD-10-CM

## 2017-05-10 PROCEDURE — 99212 OFFICE O/P EST SF 10 MIN: CPT | Performed by: PHYSICIAN ASSISTANT

## 2017-05-24 ENCOUNTER — TELEPHONE (OUTPATIENT)
Dept: VASCULAR SURGERY | Age: 27
End: 2017-05-24

## 2017-06-01 ENCOUNTER — OFFICE VISIT (OUTPATIENT)
Dept: VASCULAR SURGERY | Age: 27
End: 2017-06-01

## 2017-06-01 VITALS — DIASTOLIC BLOOD PRESSURE: 81 MMHG | TEMPERATURE: 98.2 F | HEART RATE: 121 BPM | SYSTOLIC BLOOD PRESSURE: 117 MMHG

## 2017-06-01 DIAGNOSIS — Z09 FOLLOW UP: Primary | ICD-10-CM

## 2017-06-01 PROCEDURE — 99024 POSTOP FOLLOW-UP VISIT: CPT | Performed by: PHYSICIAN ASSISTANT

## 2017-06-12 ENCOUNTER — TELEPHONE (OUTPATIENT)
Dept: VASCULAR SURGERY | Age: 27
End: 2017-06-12

## 2017-07-14 ENCOUNTER — TELEPHONE (OUTPATIENT)
Dept: VASCULAR SURGERY | Age: 27
End: 2017-07-14

## 2017-07-14 ENCOUNTER — PREP FOR PROCEDURE (OUTPATIENT)
Dept: VASCULAR SURGERY | Age: 27
End: 2017-07-14

## 2017-07-14 RX ORDER — SODIUM CHLORIDE 9 MG/ML
INJECTION, SOLUTION INTRAVENOUS CONTINUOUS
Status: CANCELLED | OUTPATIENT
Start: 2017-07-14

## 2017-07-14 RX ORDER — DIPHENHYDRAMINE HCL 50 MG
CAPSULE ORAL
Qty: 1 CAPSULE | Refills: 0 | OUTPATIENT
Start: 2017-07-14 | End: 2018-03-26

## 2017-07-14 RX ORDER — PREDNISONE 50 MG/1
TABLET ORAL
Qty: 3 TABLET | Refills: 0 | OUTPATIENT
Start: 2017-07-14 | End: 2018-01-29

## 2017-07-14 RX ORDER — SODIUM CHLORIDE 0.9 % (FLUSH) 0.9 %
10 SYRINGE (ML) INJECTION PRN
Status: CANCELLED | OUTPATIENT
Start: 2017-07-14

## 2017-07-17 ENCOUNTER — TELEPHONE (OUTPATIENT)
Dept: VASCULAR SURGERY | Age: 27
End: 2017-07-17

## 2017-08-07 ENCOUNTER — OFFICE VISIT (OUTPATIENT)
Dept: OTOLARYNGOLOGY | Facility: CLINIC | Age: 27
End: 2017-08-07

## 2017-08-07 VITALS
HEART RATE: 88 BPM | SYSTOLIC BLOOD PRESSURE: 141 MMHG | WEIGHT: 128.38 LBS | HEIGHT: 61 IN | TEMPERATURE: 97.3 F | DIASTOLIC BLOOD PRESSURE: 88 MMHG | BODY MASS INDEX: 24.24 KG/M2

## 2017-08-07 DIAGNOSIS — H69.82 ETD (EUSTACHIAN TUBE DYSFUNCTION), LEFT: Primary | ICD-10-CM

## 2017-08-07 DIAGNOSIS — H65.32 CHRONIC MUCOID OTITIS MEDIA OF LEFT EAR: ICD-10-CM

## 2017-08-07 DIAGNOSIS — B37.0 CANDIDA, ORAL: ICD-10-CM

## 2017-08-07 PROCEDURE — 99213 OFFICE O/P EST LOW 20 MIN: CPT | Performed by: NURSE PRACTITIONER

## 2017-08-07 RX ORDER — IBUPROFEN 800 MG/1
TABLET ORAL
COMMUNITY

## 2017-08-07 RX ORDER — DIPHENHYDRAMINE HCL 50 MG
CAPSULE ORAL
COMMUNITY
Start: 2017-03-21 | End: 2021-02-04

## 2017-08-07 RX ORDER — POTASSIUM CHLORIDE 2 MEQ/ML
INJECTION, SOLUTION, CONCENTRATE INTRAVENOUS
COMMUNITY
End: 2021-02-04

## 2017-08-07 NOTE — PROGRESS NOTES
YOB: 1990  Location: [facility]  Location Address: [address] [place] [address], [facility] [address], [address] [place] [address]  Location Phone: [phone]    Chief Complaint   Patient presents with   • Ear Problem       History of Present Illness  Ellen Rowan is a 27 y.o. female.  Ellen Rowan is here for follow up of ENT complaints. The patient has had problems with ear fullness and ear pressure  The symptoms are localized to the left ear. The patient has had moderate symptoms. The symptoms have been present for the last several weeks . The symptoms are aggravated by  no identifiable factors . The symptoms are improved by no identifieable factors.       Past Medical History:   Diagnosis Date   • Anemia    • Haynes's esophagus    • Cholesteatoma    • Chronic otitis media    • Conductive hearing loss    • DDD (degenerative disc disease), lumbar    • Dysautonomia    • Eustachian tube dysfunction    • Gastroparesis    • GERD (gastroesophageal reflux disease)    • Hiatal hernia    • History of Holter monitoring     PT. HAS ON AT PRESENT- TO WEAR FOR 31 DAYS STARTED 2017   • HPV (human papilloma virus) infection    • Hypotension    • Irritable bowel syndrome    • Mastoiditis    • Mixed hearing loss    • Nasal vestibulitis    • Neuropathy    • PONV (postoperative nausea and vomiting)    • POTS (postural orthostatic tachycardia syndrome)    • Scoliosis    • Tachycardia     PT. STATES IS WEARING HEART MONITOR FOR 31 DAYS DUE TO TACHYCARDIA- BEGAN 17       Past Surgical History:   Procedure Laterality Date   • ADENOIDECTOMY     • CHOLECYSTECTOMY  2016   • FACIAL RECONSTRUCTION SURGERY     • GASTRIC STIMULATOR IMPLANT SURGERY      PT STATES INEFFECTIVE   • GTUBE INSERTION     • JEJUNOSTOMY     • KNEE SURGERY     • LAPAROSCOPIC TUBAL LIGATION  2016   • MASTOIDECTOMY  10/01/2014   • MYRINGOTOMY W/ TUBES     • MYRINGOTOMY W/ TUBES Left 2014    10/1/14   • MYRINGOTOMY W/ TUBES Left 10/19/2016     Procedure: LEFT MYRINGOTOMY WITH INSERTION OF EAR TUBE with RIGHT EUA;  Surgeon: George Severino MD;  Location:  [facility] OR;  Service:    • MYRINGOTOMY W/ TUBES Left 12/19/2016    Procedure: MYRINGOTOMY WITH INSERTION OF LEFT EAR TUBES, EXAM OF RIGHT EAR UNDER ANESTHESIA;  Surgeon: George Severino MD;  Location:  [facility] OR;  Service:    • MYRINGOTOMY W/ TUBES Bilateral 2/13/2017    Procedure: REMOVAL OF myringotomy of tube of left ear, MYRINGOTOMY WITH INSERTION OF EAR TUBE on left, EAR EXAM UNDER ANESTHESIA right ear ;  Surgeon: George Severino MD;  Location:  [facility] OR;  Service:    • PHARYNGEAL FLAP  2007   • SINUS SURGERY Left 10/2014    Left incus   • TONSILLECTOMY     • VENOUS ACCESS DEVICE (PORT) INSERTION Left 03/25/2016    LOT# [id number] POWER PORT   • WISDOM TOOTH EXTRACTION           Current Outpatient Prescriptions:   •  diphenhydrAMINE (BENADRYL) 25 mg capsule, Take 25 mg by mouth Every 4 (Four) Hours As Needed for itching., Disp: , Rfl:   •  diphenhydrAMINE (BENADRYL) 50 MG capsule, Procedure scheduled for 3/23/17 at 7:30am. Benadryl 50mg one capsule by mouth 1 hour before procedure., Disp: , Rfl:   •  famotidine (PEPCID) 20-0.9 MG/50ML-%, Infuse 50 mL into a venous catheter 2 (Two) Times a Day. Via port, Disp: , Rfl:   •  HEPARIN LOCK FLUSH IV, Infuse  into a venous catheter Daily As Needed., Disp: , Rfl:   •  ibuprofen (ADVIL,MOTRIN) 800 MG tablet, Take  by mouth., Disp: , Rfl:   •  potassium chloride 2 MEQ/ML injection, Infuse  into a venous catheter., Disp: , Rfl:   •  promethazine (PHENERGAN) 25 MG/ML injection, Infuse 25 mg into a venous catheter Every 3 (Three) Hours As Needed., Disp: , Rfl:   •  Sodium Chloride Flush (SODIUM CHLORIDE 0.9 % FLUSH) 0.9 % solution, Infuse 10 mL into a venous catheter 6 (Six) Times a Day. Before and after each port infusion, Disp: , Rfl:   •  nystatin (MYCOSTATIN) [id number] UNIT/ML suspension, Take 5 mL by mouth 4 (Four) Times a Day., Disp: 200 mL, Rfl:  0    Iodides; Iodinated diagnostic agents; Adhesive tape; Bactrim [sulfamethoxazole-trimethoprim]; Citrus; Metoclopramide; Nsaids; Tramadol; Tramadol hcl; and Zofran [ondansetron hcl]    Family History   Problem Relation Age of Onset   • Diabetes Other    • Hypertension Other        Social History     Social History   • Marital status: Single     Spouse name: N/A   • Number of children: N/A   • Years of education: N/A     Occupational History   • Not on file.     Social History Main Topics   • Smoking status: Never Smoker   • Smokeless tobacco: Not on file   • Alcohol use No   • Drug use: No   • Sexual activity: Defer     Other Topics Concern   • Not on file     Social History Narrative       Review of Systems   Constitutional: Negative.    HENT:        SEE HPI   Eyes: Negative.    Respiratory: Negative.    Cardiovascular: Negative.    Gastrointestinal: Negative.    Endocrine: Negative.    Genitourinary: Negative.    Musculoskeletal: Negative.    Skin: Negative.    Allergic/Immunologic: Negative.    Neurological: Negative.    Hematological: Negative.    Psychiatric/Behavioral: Negative.        Vitals:    08/07/17 0932   BP: 141/88   Pulse: 88   Temp: 97.3 °F (36.3 °C)       Objective     Physical Exam  CONSTITUTIONAL: well nourished, alert, oriented, in no acute distress     COMMUNICATION AND VOICE: able to communicate normally, normal voice quality    HEAD: normocephalic, no lesions, atraumatic, no tenderness, no masses     FACE: appearance normal, no lesions, no tenderness, no deformities, facial motion symmetric    SALIVARY GLANDS: parotid glands with no tenderness, no swelling, no masses, submandibular glands with normal size, nontender    EYES: ocular motility normal, eyelids normal, orbits normal, no proptosis, conjunctiva normal , pupils equal, round     EARS:  Hearing: response to conversational voice moderately impaired  External Ears: auricles without lesions  Otoscopic: left Tm with intact with obstructed  PET cleared with suction, right Tm intact, bilateral EACs clear    NOSE:  External Nose: structure normal, no tenderness on palpation, no nasal discharge, no lesions, no evidence of trauma, nostrils patent   Intranasal Exam: nasal mucosa normal, vestibule within normal limits, inferior turbinate normal, nasal septum midline     ORAL:  Lips: upper and lower lips without lesion   Teeth: dentition within normal limits for age   Gums: gingivae healthy   Oral Mucosa: oral mucosa normal, no mucosal lesions   Floor of Mouth: Warthin’s duct patent, mucosa normal  Tongue: lingual mucosa normal without lesions, normal tongue mobility   Palate: soft and hard palates with normal mucosa and structure  Oropharynx: candida white patch to oropharynx    NECK: neck appearance normal, no mass,  noted without erythema or tenderness    LYMPH NODES: no lymphadenopathy    CHEST/RESPIRATORY: respiratory effort normal,    CARDIOVASCULAR: , extremities without cyanosis or edema      NEUROLOGIC/PSYCHIATRIC: oriented to time, place and person, mood normal, affect appropriate, CN II-XII intact grossly    Assessment/Plan   Ellen was seen today for ear problem.    Diagnoses and all orders for this visit:    ETD (eustachian tube dysfunction), left    Chronic mucoid otitis media of left ear    Candida, oral    Other orders  -     nystatin (MYCOSTATIN) [id number] UNIT/ML suspension; Take 5 mL by mouth 4 (Four) Times a Day.      * Surgery not found *  No orders of the defined types were placed in this encounter.    Return in about 6 months (around 2/7/2018).       Patient Instructions   Medical vs surgical options discussed    Call for problems or worsening symptoms

## 2017-09-11 ENCOUNTER — APPOINTMENT (OUTPATIENT)
Dept: GENERAL RADIOLOGY | Facility: HOSPITAL | Age: 27
End: 2017-09-11

## 2017-09-11 ENCOUNTER — HOSPITAL ENCOUNTER (EMERGENCY)
Facility: HOSPITAL | Age: 27
Discharge: HOME | End: 2017-09-11
Admitting: EMERGENCY MEDICINE

## 2017-09-11 VITALS
WEIGHT: 128 LBS | BODY MASS INDEX: 24.17 KG/M2 | RESPIRATION RATE: 16 BRPM | HEIGHT: 61 IN | SYSTOLIC BLOOD PRESSURE: 122 MMHG | DIASTOLIC BLOOD PRESSURE: 80 MMHG | OXYGEN SATURATION: 98 % | TEMPERATURE: 98 F | HEART RATE: 114 BPM

## 2017-09-11 DIAGNOSIS — G89.29 CHRONIC ABDOMINAL PAIN: ICD-10-CM

## 2017-09-11 DIAGNOSIS — R11.0 NAUSEA: Primary | ICD-10-CM

## 2017-09-11 DIAGNOSIS — R10.9 CHRONIC ABDOMINAL PAIN: ICD-10-CM

## 2017-09-11 LAB
ALBUMIN SERPL-MCNC: 4.7 G/DL (ref 3.5–5)
ALBUMIN/GLOB SERPL: 1.6 G/DL (ref 1.1–2.5)
ALP SERPL-CCNC: 74 U/L (ref 24–120)
ALT SERPL W P-5'-P-CCNC: 38 U/L (ref 0–54)
ANION GAP SERPL CALCULATED.3IONS-SCNC: 13 MMOL/L (ref 4–13)
AST SERPL-CCNC: 26 U/L (ref 7–45)
BACTERIA UR QL AUTO: ABNORMAL /HPF
BASOPHILS # BLD AUTO: 0.07 10*3/MM3 (ref 0–0.2)
BASOPHILS NFR BLD AUTO: 0.8 % (ref 0–2)
BILIRUB SERPL-MCNC: 0.6 MG/DL (ref 0.1–1)
BILIRUB UR QL STRIP: NEGATIVE
BUN BLD-MCNC: 14 MG/DL (ref 5–21)
BUN/CREAT SERPL: 17.9 (ref 7–25)
CALCIUM SPEC-SCNC: 9.6 MG/DL (ref 8.4–10.4)
CHLORIDE SERPL-SCNC: 107 MMOL/L (ref 98–110)
CLARITY UR: CLEAR
CO2 SERPL-SCNC: 22 MMOL/L (ref 24–31)
COLOR UR: ABNORMAL
CREAT BLD-MCNC: 0.78 MG/DL (ref 0.5–1.4)
DEPRECATED RDW RBC AUTO: 39.9 FL (ref 40–54)
EOSINOPHIL # BLD AUTO: 0.03 10*3/MM3 (ref 0–0.7)
EOSINOPHIL NFR BLD AUTO: 0.3 % (ref 0–4)
ERYTHROCYTE [DISTWIDTH] IN BLOOD BY AUTOMATED COUNT: 12.9 % (ref 12–15)
GFR SERPL CREATININE-BSD FRML MDRD: 89 ML/MIN/1.73
GLOBULIN UR ELPH-MCNC: 2.9 GM/DL
GLUCOSE BLD-MCNC: 106 MG/DL (ref 70–100)
GLUCOSE UR STRIP-MCNC: NEGATIVE MG/DL
HCG SERPL QL: NEGATIVE
HCT VFR BLD AUTO: 39.9 % (ref 37–47)
HGB BLD-MCNC: 13.1 G/DL (ref 12–16)
HGB UR QL STRIP.AUTO: NEGATIVE
HYALINE CASTS UR QL AUTO: ABNORMAL /LPF
IMM GRANULOCYTES # BLD: 0.03 10*3/MM3 (ref 0–0.03)
IMM GRANULOCYTES NFR BLD: 0.3 % (ref 0–5)
KETONES UR QL STRIP: NEGATIVE
LEUKOCYTE ESTERASE UR QL STRIP.AUTO: ABNORMAL
LYMPHOCYTES # BLD AUTO: 1.65 10*3/MM3 (ref 0.72–4.86)
LYMPHOCYTES NFR BLD AUTO: 18.2 % (ref 15–45)
MAGNESIUM SERPL-MCNC: 1.7 MG/DL (ref 1.4–2.2)
MCH RBC QN AUTO: 28 PG (ref 28–32)
MCHC RBC AUTO-ENTMCNC: 32.8 G/DL (ref 33–36)
MCV RBC AUTO: 85.3 FL (ref 82–98)
MONOCYTES # BLD AUTO: 0.55 10*3/MM3 (ref 0.19–1.3)
MONOCYTES NFR BLD AUTO: 6.1 % (ref 4–12)
NEUTROPHILS # BLD AUTO: 6.72 10*3/MM3 (ref 1.87–8.4)
NEUTROPHILS NFR BLD AUTO: 74.3 % (ref 39–78)
NITRITE UR QL STRIP: NEGATIVE
PH UR STRIP.AUTO: 6.5 [PH] (ref 5–8)
PLATELET # BLD AUTO: 348 10*3/MM3 (ref 130–400)
PMV BLD AUTO: 8.9 FL (ref 6–12)
POTASSIUM BLD-SCNC: 3.7 MMOL/L (ref 3.5–5.3)
PROT SERPL-MCNC: 7.6 G/DL (ref 6.3–8.7)
PROT UR QL STRIP: NEGATIVE
RBC # BLD AUTO: 4.68 10*6/MM3 (ref 4.2–5.4)
RBC # UR: ABNORMAL /HPF
REF LAB TEST METHOD: ABNORMAL
SODIUM BLD-SCNC: 142 MMOL/L (ref 135–145)
SP GR UR STRIP: 1.01 (ref 1–1.03)
SQUAMOUS #/AREA URNS HPF: ABNORMAL /HPF
UROBILINOGEN UR QL STRIP: ABNORMAL
WBC NRBC COR # BLD: 9.05 10*3/MM3 (ref 4.8–10.8)
WBC UR QL AUTO: ABNORMAL /HPF

## 2017-09-11 PROCEDURE — 99283 EMERGENCY DEPT VISIT LOW MDM: CPT

## 2017-09-11 PROCEDURE — 96360 HYDRATION IV INFUSION INIT: CPT

## 2017-09-11 PROCEDURE — 80053 COMPREHEN METABOLIC PANEL: CPT | Performed by: PHYSICIAN ASSISTANT

## 2017-09-11 PROCEDURE — 85025 COMPLETE CBC W/AUTO DIFF WBC: CPT | Performed by: PHYSICIAN ASSISTANT

## 2017-09-11 PROCEDURE — 84703 CHORIONIC GONADOTROPIN ASSAY: CPT | Performed by: PHYSICIAN ASSISTANT

## 2017-09-11 PROCEDURE — 83735 ASSAY OF MAGNESIUM: CPT | Performed by: PHYSICIAN ASSISTANT

## 2017-09-11 PROCEDURE — 87086 URINE CULTURE/COLONY COUNT: CPT | Performed by: PHYSICIAN ASSISTANT

## 2017-09-11 PROCEDURE — 74020 HC XR ABDOMEN FLAT & UPRIGHT: CPT

## 2017-09-11 PROCEDURE — 81001 URINALYSIS AUTO W/SCOPE: CPT | Performed by: PHYSICIAN ASSISTANT

## 2017-09-11 RX ADMIN — SODIUM CHLORIDE 1000 ML: 9 INJECTION, SOLUTION INTRAVENOUS at 11:15

## 2017-09-11 NOTE — ED PROVIDER NOTES
Subjective   Patient is a 27 y.o. female presenting with vomiting.   Vomiting   The primary symptoms include abdominal pain, nausea and vomiting. Primary symptoms do not include fever.   The illness does not include chills.     Patient is a 27-year-old  female presenting to the ED due to chief complaint of vomiting.  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 She has been seeing a specialist in [place] for this condition. She reports she had her J-tube replaced two months ago due to being blocked. She also had her G-tube placed for ventilation. She reports since she had the J-tube replaced she has been unable to tolerate her feedings through her J-tube. She complains of Nausea with dry heaving when she administers her feedings consistently for the past two months. She reports it is very rare that she actually vomits. Therefore, she has ceased using the feedings altogether. She has been seen in the ER in [place] 5 times over the past two weeks for the same symptoms. She was treated with IV fluids and phenergan each time. She was transferred from [place] to [place] by ambulance one week ago. She was told by the specialist she has been seeing that she would receive TPN while there, however there was reportedly some disagreement being physicians and she was not given the TPN. They gave her IV fluids and Phenergan and sent her home. She has been continuing to take Phenergan through her port with her last dose being 4 hours ago. She reports it has not helped with her nausea or dry heaving with feedings. She reports has been having to force herself to ingest fluids and food since July. She has been eating crackers with water orally. She complains of a 4lbs weight loss in 2 weeks. She denies any changes in her urinary output.  Per patient, her heartrate usually runs 100-120.    Review of Systems   Constitutional: Negative for chills, diaphoresis  "and fever.   HENT: Negative.    Eyes: Negative.    Respiratory: Negative.  Negative for cough, choking and shortness of breath.    Cardiovascular: Negative.  Negative for chest pain, palpitations and leg swelling.   Gastrointestinal: Positive for abdominal pain, nausea and vomiting.   Endocrine: Negative.    Genitourinary: Negative.    Musculoskeletal: Negative.    Skin: Negative.    Allergic/Immunologic: Negative.    Neurological: Negative.    Hematological: Negative.    Psychiatric/Behavioral: Negative.        Past Medical History:   Diagnosis Date   • Anemia    • Haynes's esophagus    • Cholesteatoma    • Chronic otitis media    • Conductive hearing loss    • DDD (degenerative disc disease), lumbar    • Dysautonomia    • Eustachian tube dysfunction    • Gastroparesis    • GERD (gastroesophageal reflux disease)    • Hiatal hernia    • History of Holter monitoring     PT. HAS ON AT PRESENT- TO WEAR FOR 31 DAYS STARTED 2/1/2017   • HPV (human papilloma virus) infection    • Hypotension    • Irritable bowel syndrome    • Mastoiditis    • Mixed hearing loss    • Nasal vestibulitis    • Neuropathy    • PONV (postoperative nausea and vomiting)    • POTS (postural orthostatic tachycardia syndrome)    • Scoliosis    • Tachycardia     PT. STATES IS WEARING HEART MONITOR FOR 31 DAYS DUE TO TACHYCARDIA- BEGAN 2/1/17       Allergies   Allergen Reactions   • Iodides Other (See Comments), Shortness Of Breath and Itching     IV 3000; SoA, dizziness  SoA, dizziness   • Iodinated Diagnostic Agents Anaphylaxis   • Adhesive Tape Itching     And tegaderm  tegaderm is ok   • Bactrim [Sulfamethoxazole-Trimethoprim] Nausea And Vomiting   • Citrus Nausea And Vomiting     And artificial  And artificial   • Metoclopramide Nausea Only   • Nsaids GI Intolerance   • Tramadol Other (See Comments)     Excessive sleeping x 3 days, pt takes 1/2 a pill.   • Tramadol Hcl Other (See Comments)     \"SLEEPS FOR 3 DAYS\" PER PT   • Zofran [Ondansetron " Hcl] Hives       Past Surgical History:   Procedure Laterality Date   • ADENOIDECTOMY     • CHOLECYSTECTOMY  09/04/2016   • FACIAL RECONSTRUCTION SURGERY  2005   • GASTRIC STIMULATOR IMPLANT SURGERY  2013    PT STATES INEFFECTIVE   • GTUBE INSERTION     • JEJUNOSTOMY     • KNEE SURGERY     • LAPAROSCOPIC TUBAL LIGATION  04/2016   • MASTOIDECTOMY  10/01/2014   • MYRINGOTOMY W/ TUBES     • MYRINGOTOMY W/ TUBES Left 06/09/2014    10/1/14   • MYRINGOTOMY W/ TUBES Left 10/19/2016    Procedure: LEFT MYRINGOTOMY WITH INSERTION OF EAR TUBE with RIGHT EUA;  Surgeon: George Severino MD;  Location: [facility];  Service:    • MYRINGOTOMY W/ TUBES Left 12/19/2016    Procedure: MYRINGOTOMY WITH INSERTION OF LEFT EAR TUBES, EXAM OF RIGHT EAR UNDER ANESTHESIA;  Surgeon: George Severino MD;  Location:  [facility] OR;  Service:    • MYRINGOTOMY W/ TUBES Bilateral 2/13/2017    Procedure: REMOVAL OF myringotomy of tube of left ear, MYRINGOTOMY WITH INSERTION OF EAR TUBE on left, EAR EXAM UNDER ANESTHESIA right ear ;  Surgeon: George Severino MD;  Location:  [facility] OR;  Service:    • PHARYNGEAL FLAP  2007   • SINUS SURGERY Left 10/2014    Left incus   • TONSILLECTOMY     • VENOUS ACCESS DEVICE (PORT) INSERTION Left 03/25/2016    LOT# [id number] POWER PORT   • WISDOM TOOTH EXTRACTION         Family History   Problem Relation Age of Onset   • Diabetes Other    • Hypertension Other        Social History     Social History   • Marital status: Single     Spouse name: N/A   • Number of children: N/A   • Years of education: N/A     Social History Main Topics   • Smoking status: Never Smoker   • Smokeless tobacco: Not on file   • Alcohol use No   • Drug use: No   • Sexual activity: Defer     Other Topics Concern   • Not on file     Social History Narrative       Prior to Admission medications    Medication Sig Start Date End Date Taking? Authorizing Provider   diphenhydrAMINE (BENADRYL) 25 mg capsule Take 25 mg by mouth Every 4 (Four) Hours  "As Needed for itching. 8/12/16   Historical Provider, MD   diphenhydrAMINE (BENADRYL) 50 MG capsule Procedure scheduled for 3/23/17 at 7:30am. Benadryl 50mg one capsule by mouth 1 hour before procedure. 3/21/17   Historical Provider, MD   famotidine (PEPCID) 20-0.9 MG/50ML-% Infuse 50 mL into a venous catheter 2 (Two) Times a Day. Via port 11/4/15   Historical Provider, MD   HEPARIN LOCK FLUSH IV Infuse  into a venous catheter Daily As Needed.    Historical Provider, MD   ibuprofen (ADVIL,MOTRIN) 800 MG tablet Take  by mouth.    Historical Provider, MD   nystatin (MYCOSTATIN) 435921 UNIT/ML suspension Take 5 mL by mouth 4 (Four) Times a Day. 8/7/17   LILLI Croft   potassium chloride 2 MEQ/ML injection Infuse  into a venous catheter.    Historical Provider, MD   promethazine (PHENERGAN) 25 MG/ML injection Infuse 25 mg into a venous catheter Every 3 (Three) Hours As Needed. 11/14/16   Historical Provider, MD   Sodium Chloride Flush (SODIUM CHLORIDE 0.9 % FLUSH) 0.9 % solution Infuse 10 mL into a venous catheter 6 (Six) Times a Day. Before and after each port infusion    Historical Provider, MD       Medications   sodium chloride 0.9 % bolus 1,000 mL (0 mL Intravenous Stopped 9/11/17 1231)       /80  Pulse 114  Temp 98 °F (36.7 °C) (Temporal Artery )   Resp 16  Ht 61\" (154.9 cm)  Wt 128 lb (58.1 kg)  SpO2 98%  BMI 24.19 kg/m2      Objective   Physical Exam   Constitutional: She is oriented to person, place, and time. She appears well-developed and well-nourished. No distress.   HENT:   Head: Normocephalic and atraumatic.   Mouth/Throat: Mucous membranes are dry.   Eyes: Conjunctivae and EOM are normal. Pupils are equal, round, and reactive to light.   Neck: Normal range of motion. Neck supple. No tracheal deviation present.   Cardiovascular: Regular rhythm, normal heart sounds and intact distal pulses.  Tachycardia present.    No murmur heard.  Pulmonary/Chest: Effort normal and breath sounds normal. "   Abdominal: Soft. She exhibits no distension and no mass. Bowel sounds are decreased. There is no tenderness. There is no rebound and no guarding.   Patient has diffuse abdominal pain not reproducible with palpation. Patient has mass in RLQ consistent with her gastric stimulator. She has a J- tube in place in LLQ.     Musculoskeletal: Normal range of motion. She exhibits no edema.   Neurological: She is alert and oriented to person, place, and time. She has normal reflexes.   Skin: Skin is warm and dry. She is not diaphoretic.   Psychiatric: She has a normal mood and affect. Her behavior is normal. Judgment and thought content normal.   Nursing note and vitals reviewed.      Procedures         Lab Results (last 24 hours)     Procedure Component Value Units Date/Time    CBC & Differential [[id number]] Collected:  09/11/17 1117    Specimen:  Blood Updated:  09/11/17 1126    Narrative:       The following orders were created for panel order CBC & Differential.  Procedure                               Abnormality         Status                     ---------                               -----------         ------                     CBC Auto Differential[[id number]]         Abnormal            Final result                 Please view results for these tests on the individual orders.    Comprehensive Metabolic Panel [[id number]]  (Abnormal) Collected:  09/11/17 1117    Specimen:  Blood Updated:  09/11/17 1144     Glucose 106 (H) mg/dL      BUN 14 mg/dL      Creatinine 0.78 mg/dL      Sodium 142 mmol/L      Potassium 3.7 mmol/L      Chloride 107 mmol/L      CO2 22.0 (L) mmol/L      Calcium 9.6 mg/dL      Total Protein 7.6 g/dL      Albumin 4.70 g/dL      ALT (SGPT) 38 U/L      AST (SGOT) 26 U/L      Alkaline Phosphatase 74 U/L      Total Bilirubin 0.6 mg/dL      eGFR Non African Amer 89 mL/min/1.73      Globulin 2.9 gm/dL      A/G Ratio 1.6 g/dL      BUN/Creatinine Ratio 17.9     Anion Gap 13.0 mmol/L     Magnesium [[id number]]   (Normal) Collected:  09/11/17 1117    Specimen:  Blood Updated:  09/11/17 1144     Magnesium 1.7 mg/dL     Urinalysis With / Culture If Indicated [[id number]]  (Abnormal) Collected:  09/11/17 1117    Specimen:  Urine from Urine, Clean Catch Updated:  09/11/17 1130     Color, UA [place] (A)     Appearance, UA Clear     pH, UA 6.5     Specific Gravity, UA 1.014     Glucose, UA Negative     Ketones, UA Negative     Bilirubin, UA Negative     Blood, UA Negative     Protein, UA Negative     Leuk Esterase, UA Small (1+) (A)     Nitrite, UA Negative     Urobilinogen, UA 0.2 E.U./dL    Narrative:       Dipstick results may be inaccurate due to color interference.    CBC Auto Differential [[id number]]  (Abnormal) Collected:  09/11/17 1117    Specimen:  Blood Updated:  09/11/17 1126     WBC 9.05 10*3/mm3      RBC 4.68 10*6/mm3      Hemoglobin 13.1 g/dL      Hematocrit 39.9 %      MCV 85.3 fL      MCH 28.0 pg      MCHC 32.8 (L) g/dL      RDW 12.9 %      RDW-SD 39.9 (L) fl      MPV 8.9 fL      Platelets 348 10*3/mm3      Neutrophil % 74.3 %      Lymphocyte % 18.2 %      Monocyte % 6.1 %      Eosinophil % 0.3 %      Basophil % 0.8 %      Immature Grans % 0.3 %      Neutrophils, Absolute 6.72 10*3/mm3      Lymphocytes, Absolute 1.65 10*3/mm3      Monocytes, Absolute 0.55 10*3/mm3      Eosinophils, Absolute 0.03 10*3/mm3      Basophils, Absolute 0.07 10*3/mm3      Immature Grans, Absolute 0.03 10*3/mm3     hCG, Serum, Qualitative [[id number]]  (Normal) Collected:  09/11/17 1117    Specimen:  Blood Updated:  09/11/17 1145     HCG Qualitative Negative    Urine Culture [[id number]] Collected:  09/11/17 1117    Specimen:  Urine from Urine, Clean Catch Updated:  09/11/17 1127    Urinalysis, Microscopic Only [[id number]]  (Abnormal) Collected:  09/11/17 1117    Specimen:  Urine from Urine, Clean Catch Updated:  09/11/17 1130     RBC, UA 3-5 (A) /HPF      WBC, UA 3-5 (A) /HPF      Bacteria, UA 1+ (A) /HPF      Squamous Epithelial Cells, UA 13-20  (A) /HPF      Hyaline Casts, UA 3-6 /LPF      Methodology Automated Microscopy          Xr Abdomen Flat & Upright    Result Date: 9/11/2017  Narrative: EXAMINATION: XR ABDOMEN FLAT AND UPRIGHT- 9/11/2017 12:18 PM CDT  HISTORY: Abdominal pain.  REPORT: Upright and supine views of the abdomen are compared with the previous study from 07/16/2013.  Contrast was with stool seen in the colon without distention. There is no evidence of bowel obstruction. Cholecystectomy clips are present. A percutaneous gastric tube is present. An electronic stimulator is now identified with leads extending along the greater curvature of the stomach. There is mild volume loss in the left base. There is mild dextroscoliosis of the lower thoracic spine.      Impression: Interval insertion of a neural stimulator with leads extending along the greater curvature of the stomach. No acute abnormalities identified. The percutaneous gastric tube appears to be in good position as before. This report was finalized on 09/11/2017 12:31 by Dr. Jose Miguel Hendrickson MD.      ED Course  ED Course        The patient has been educated she does not present with a medical emergency.  She has been advised follow-up with her primary care physician and specialists in [place].  She has been updated on test results.    MDM    Final diagnoses:   Nausea   Chronic abdominal pain          PPAA Bergman  09/11/17 2644

## 2017-09-13 LAB — BACTERIA SPEC AEROBE CULT: ABNORMAL

## 2017-09-14 NOTE — ED NOTES
"ED Call Back Questions    1. How are you doing since leaving the Emergency Department?    Feel better  2. Do you have any questions about your discharge instructions? No     3. Have you filled your new prescriptions yet? N/A  a. Do you have any questions about those medications? N/A    4. Were you able to make a follow-up appointment with the physician? Yes     5. Do you have a primary care physician? Yes   a. If No, would you like for me to set you up with one? N/A  i. If Yes, “I will have our ED  give you a call right back at this number to work with you on the best time for an appointment.”    6. We are always looking to get better at what we do. Do you have any suggestions for what we can do to be even better? N/A  a. If Yes, \"Thank you for sharing your concerns. I apologize. I will follow up with our manager and patient . Would you like someone to call you back?\" N/A    7. Is there anything else I can do for you? N/A  Visit was fine     Dale Ramos  09/14/17 1216    "

## 2018-01-15 ENCOUNTER — TELEPHONE (OUTPATIENT)
Dept: VASCULAR SURGERY | Age: 28
End: 2018-01-15

## 2018-01-15 RX ORDER — LIDOCAINE AND PRILOCAINE 25; 25 MG/G; MG/G
CREAM TOPICAL
Qty: 30 G | Refills: 0 | Status: CANCELLED | OUTPATIENT
Start: 2018-01-15

## 2018-01-29 ENCOUNTER — HOSPITAL ENCOUNTER (EMERGENCY)
Age: 28
Discharge: HOME | End: 2018-01-29
Attending: EMERGENCY MEDICINE
Payer: MEDICAID

## 2018-01-29 VITALS
SYSTOLIC BLOOD PRESSURE: 121 MMHG | BODY MASS INDEX: 25.68 KG/M2 | TEMPERATURE: 98.7 F | HEIGHT: 61 IN | WEIGHT: 136 LBS | RESPIRATION RATE: 17 BRPM | HEART RATE: 99 BPM | OXYGEN SATURATION: 96 % | DIASTOLIC BLOOD PRESSURE: 86 MMHG

## 2018-01-29 DIAGNOSIS — Z71.89 ENCOUNTER FOR OSTOMY CARE EDUCATION: Primary | ICD-10-CM

## 2018-01-29 PROCEDURE — 99282 EMERGENCY DEPT VISIT SF MDM: CPT

## 2018-01-29 PROCEDURE — 99283 EMERGENCY DEPT VISIT LOW MDM: CPT | Performed by: EMERGENCY MEDICINE

## 2018-01-29 RX ORDER — PROMETHAZINE HYDROCHLORIDE 25 MG/ML
25 INJECTION, SOLUTION INTRAMUSCULAR; INTRAVENOUS
COMMUNITY
Start: 2016-11-14 | End: 2021-07-09

## 2018-01-29 ASSESSMENT — ENCOUNTER SYMPTOMS
ABDOMINAL PAIN: 0
VOMITING: 0

## 2018-01-29 ASSESSMENT — PAIN DESCRIPTION - LOCATION: LOCATION: ABDOMEN

## 2018-01-29 ASSESSMENT — PAIN SCALES - GENERAL
PAINLEVEL_OUTOF10: 5
PAINLEVEL_OUTOF10: 6

## 2018-01-29 ASSESSMENT — PAIN DESCRIPTION - PAIN TYPE: TYPE: ACUTE PAIN

## 2018-01-30 NOTE — ED NOTES
Patient's J tube site dressing changed with abd pad and several 4x4 gauze sheets. Patient tolerated well. Patient given 3 packs of 4x4 gauze and abd pads for three dressing changes in next two days. Follow up with surgeon in 48 hours per MD order.      Dee Thompson RN  01/29/18 9315

## 2018-01-30 NOTE — ED PROVIDER NOTES
[facility]  eMERGENCY dEPARTMENT eNCOUnter      Pt Name: Vandana Bynum  MRN: [id number]  [place] 1990  Date of evaluation: 1/29/2018  Provider: Margaret Laureano MD    CHIEF COMPLAINT       Chief Complaint   Patient presents with   Albert Falter Tube Complications     Pt had j-tube purposefully removed today by her MD in [place]. States it has been leaking and had to change clothes several times today. HISTORY OF PRESENT ILLNESS   (Location/Symptom, Timing/Onset, Context/Setting, Quality, Duration, Modifying Factors, Severity)  Note limiting factors. Vandana Bynum is a 32 y.o. female who presents to the emergency department With J-tube complication. She states that she has a history of gastroparesis. The patient is a G-tube and a J-tube. The patient states that the J-tube was removed today in [place]. [place] and it leaked the whole way. Therefore she had a change close several times. The patient states that she is very concerned that its continued to leak out the ostomy hole now that the tube is removed. The history is provided by the patient. Nursing Notes were reviewed. REVIEW OF SYSTEMS    (2-9 systems for level 4, 10 or more for level 5)     Review of Systems   Constitutional: Negative for fever. Cardiovascular: Negative for chest pain. Gastrointestinal: Negative for abdominal pain and vomiting. Psychiatric/Behavioral: The patient is nervous/anxious. A complete review of systems was performed and is negative except as noted above in the HPI.        PAST MEDICAL HISTORY     Past Medical History:   Diagnosis Date    Anxiety and depression     Gastroparesis     Dr. Robby Cummins in [place] manages    GERD (gastroesophageal reflux disease)     Hiatal hernia     History of [place]os's esophagus     IBS (irritable bowel syndrome)     Jejunostomy tube present (HCC)     Neck pain     POTS (postural orthostatic tachycardia syndrome)     Premature Partners: Male     Other Topics Concern    None     Social History Narrative    None       SCREENINGS    [place] Coma Scale  Eye Opening: Spontaneous  Best Verbal Response: Oriented  Best Motor Response: Obeys commands  [place] Coma Scale Score: 15        PHYSICAL EXAM    (up to 7 for level 4, 8 or more for level 5)     ED Triage Vitals [01/29/18 2014]   BP Temp Temp Source Pulse Resp SpO2 Height Weight   (!) 120/91 98.7 °F (37.1 °C) Temporal 123 20 96 % 5' 1\" (1.549 m) 136 lb (61.7 kg)       Physical Exam   Constitutional: She is oriented to person, place, and time. She appears well-developed and well-nourished. Abdominal: Soft. There is no tenderness. There is a G-tube in place in the left upper quadrant, there is a J-tube ostomy that is leaking some serous fluid and drainage and some mild erythema around the actual ostomy itself. There is no pus there is no tenderness of the abdomen. Neurological: She is alert and oriented to person, place, and time. Psychiatric:   Anxious   Nursing note and vitals reviewed. DIAGNOSTIC RESULTS     EKG: All EKG's are interpreted by the Emergency Department Physician who either signs or Co-signs this chart in the absence of a cardiologist.        RADIOLOGY:   Non-plain film images such as CT, Ultrasound and MRI are read by the radiologist. Plain radiographic images are visualized and preliminarily interpreted by the emergency physician with the below findings:        Interpretation per the Radiologist below, if available at the time of this note:    No orders to display         ED BEDSIDE ULTRASOUND:   Performed by ED Physician - none    LABS:  Labs Reviewed - No data to display    All other labs were within normal range or not returned as of this dictation.     EMERGENCY DEPARTMENT COURSE and DIFFERENTIAL DIAGNOSIS/MDM:   Vitals:    Vitals:    01/29/18 2014 01/29/18 2101 01/29/18 2127 01/29/18 2133   BP: (!) 120/91 112/84 115/84 121/86   Pulse: 123 102 99    Resp: 20

## 2018-03-26 ENCOUNTER — OFFICE VISIT (OUTPATIENT)
Dept: GASTROENTEROLOGY | Age: 28
End: 2018-03-26
Payer: MEDICAID

## 2018-03-26 VITALS
HEIGHT: 61 IN | WEIGHT: 135 LBS | SYSTOLIC BLOOD PRESSURE: 100 MMHG | DIASTOLIC BLOOD PRESSURE: 60 MMHG | BODY MASS INDEX: 25.49 KG/M2 | HEART RATE: 134 BPM | OXYGEN SATURATION: 98 %

## 2018-03-26 DIAGNOSIS — K31.84 NONDIABETIC GASTROPARESIS: Primary | ICD-10-CM

## 2018-03-26 DIAGNOSIS — R11.2 NON-INTRACTABLE VOMITING WITH NAUSEA, UNSPECIFIED VOMITING TYPE: ICD-10-CM

## 2018-03-26 DIAGNOSIS — T85.598A FEEDING TUBE DYSFUNCTION, INITIAL ENCOUNTER: ICD-10-CM

## 2018-03-26 PROCEDURE — 1036F TOBACCO NON-USER: CPT | Performed by: INTERNAL MEDICINE

## 2018-03-26 PROCEDURE — G8427 DOCREV CUR MEDS BY ELIG CLIN: HCPCS | Performed by: INTERNAL MEDICINE

## 2018-03-26 PROCEDURE — G8417 CALC BMI ABV UP PARAM F/U: HCPCS | Performed by: INTERNAL MEDICINE

## 2018-03-26 PROCEDURE — G8482 FLU IMMUNIZE ORDER/ADMIN: HCPCS | Performed by: INTERNAL MEDICINE

## 2018-03-26 PROCEDURE — 99204 OFFICE O/P NEW MOD 45 MIN: CPT | Performed by: INTERNAL MEDICINE

## 2018-04-09 ENCOUNTER — TELEPHONE (OUTPATIENT)
Dept: OTOLARYNGOLOGY | Facility: CLINIC | Age: 28
End: 2018-04-09

## 2018-04-11 ENCOUNTER — OFFICE VISIT (OUTPATIENT)
Dept: OTOLARYNGOLOGY | Facility: CLINIC | Age: 28
End: 2018-04-11

## 2018-04-11 VITALS
WEIGHT: 134.5 LBS | SYSTOLIC BLOOD PRESSURE: 120 MMHG | HEIGHT: 61 IN | BODY MASS INDEX: 25.39 KG/M2 | DIASTOLIC BLOOD PRESSURE: 88 MMHG | TEMPERATURE: 98.9 F | HEART RATE: 128 BPM

## 2018-04-11 DIAGNOSIS — H90.72 MIXED CONDUCTIVE AND SENSORINEURAL HEARING LOSS OF LEFT EAR, UNSPECIFIED HEARING STATUS ON CONTRALATERAL SIDE: ICD-10-CM

## 2018-04-11 DIAGNOSIS — H69.82 ETD (EUSTACHIAN TUBE DYSFUNCTION), LEFT: Primary | ICD-10-CM

## 2018-04-11 DIAGNOSIS — H65.32 CHRONIC MUCOID OTITIS MEDIA OF LEFT EAR: ICD-10-CM

## 2018-04-11 PROCEDURE — 99214 OFFICE O/P EST MOD 30 MIN: CPT | Performed by: NURSE PRACTITIONER

## 2018-04-11 NOTE — PROGRESS NOTES
YOB: 1990  Location: [facility]  Location Address: [address] [place] [address], [facility], [address] [place] [address]  Location Phone: [phone]    Chief Complaint   Patient presents with   • Follow-up     ears, tube out       History of Present Illness  Ellen Rowan is a 28 y.o. female.  Ellen Rowan is here for evaluation of ENT complaints. The patient has had problems with fluid on the ear  The symptoms are localized to the left side. The patient has had moderate symptoms. The symptoms have been present for the last several weeks The symptoms are aggravated by  no identifiable factors. The symptoms are improved by no identifiable factors.  She reports her left ear tube fell out.  Had some mild left ear pain but nothing severe.  She denies grossly obvious hearing changes.         Past Medical History:   Diagnosis Date   • Anemia    • Haynes's esophagus    • Candidiasis    • Cholesteatoma    • Chronic otitis media    • Conductive hearing loss    • DDD (degenerative disc disease), lumbar    • Dysautonomia    • Eustachian tube dysfunction    • Gastroparesis    • GERD (gastroesophageal reflux disease)    • Hiatal hernia    • History of Holter monitoring     PT. HAS ON AT PRESENT- TO WEAR FOR 31 DAYS STARTED 2017   • HPV (human papilloma virus) infection    • Hypotension    • Irritable bowel syndrome    • Mastoiditis    • Mixed hearing loss    • Nasal vestibulitis    • Neuropathy    • PONV (postoperative nausea and vomiting)    • POTS (postural orthostatic tachycardia syndrome)    • Scoliosis    • Tachycardia     PT. STATES IS WEARING HEART MONITOR FOR 31 DAYS DUE TO TACHYCARDIA- BEGAN 17       Past Surgical History:   Procedure Laterality Date   • ADENOIDECTOMY     • CHOLECYSTECTOMY  2016   • FACIAL RECONSTRUCTION SURGERY     • GASTRIC STIMULATOR IMPLANT SURGERY      PT STATES INEFFECTIVE   • GTUBE INSERTION     • JEJUNOSTOMY     • KNEE SURGERY     • LAPAROSCOPIC TUBAL LIGATION   04/2016   • MASTOIDECTOMY  10/01/2014   • MYRINGOTOMY W/ TUBES     • MYRINGOTOMY W/ TUBES Left 06/09/2014    10/1/14   • MYRINGOTOMY W/ TUBES Left 10/19/2016    Procedure: LEFT MYRINGOTOMY WITH INSERTION OF EAR TUBE with RIGHT EUA;  Surgeon: George Severino MD;  Location:  [facility];  Service:    • MYRINGOTOMY W/ TUBES Left 12/19/2016    Procedure: MYRINGOTOMY WITH INSERTION OF LEFT EAR TUBES, EXAM OF RIGHT EAR UNDER ANESTHESIA;  Surgeon: George Severino MD;  Location:  [facility];  Service:    • MYRINGOTOMY W/ TUBES Bilateral 2/13/2017    Procedure: REMOVAL OF myringotomy of tube of left ear, MYRINGOTOMY WITH INSERTION OF EAR TUBE on left, EAR EXAM UNDER ANESTHESIA right ear ;  Surgeon: George Severino MD;  Location:  [facility];  Service:    • PHARYNGEAL FLAP  2007   • SINUS SURGERY Left 10/2014    Left incus   • TONSILLECTOMY     • VENOUS ACCESS DEVICE (PORT) INSERTION Left 03/25/2016    LOT# [id number] POWER PORT   • WISDOM TOOTH EXTRACTION         Outpatient Prescriptions Marked as Taking for the 4/11/18 encounter (Office Visit) with LILLI Croft   Medication Sig Dispense Refill   • diphenhydrAMINE (BENADRYL) 25 mg capsule Take 25 mg by mouth Every 4 (Four) Hours As Needed for itching.     • diphenhydrAMINE (BENADRYL) 50 MG capsule Procedure scheduled for 3/23/17 at 7:30am. Benadryl 50mg one capsule by mouth 1 hour before procedure.     • famotidine (PEPCID) 20-0.9 MG/50ML-% Infuse 50 mL into a venous catheter 2 (Two) Times a Day. Via port     • HEPARIN LOCK FLUSH IV Infuse  into a venous catheter Daily As Needed.     • ibuprofen (ADVIL,MOTRIN) 800 MG tablet Take  by mouth.     • nystatin (MYCOSTATIN) 908947 UNIT/ML suspension Take 5 mL by mouth 4 (Four) Times a Day. 200 mL 0   • potassium chloride 2 MEQ/ML injection Infuse  into a venous catheter.     • promethazine (PHENERGAN) 25 MG/ML injection Infuse 25 mg into a venous catheter Every 3 (Three) Hours As Needed.     • Sodium Chloride Flush (SODIUM  CHLORIDE 0.9 % FLUSH) 0.9 % solution Infuse 10 mL into a venous catheter 6 (Six) Times a Day. Before and after each port infusion         Iodides; Iodinated diagnostic agents; Adhesive tape; Bactrim [sulfamethoxazole-trimethoprim]; Citrus; Metoclopramide; Nsaids; Tramadol; Tramadol hcl; and Zofran [ondansetron hcl]    Family History   Problem Relation Age of Onset   • Diabetes Other    • Hypertension Other        Social History     Social History   • Marital status: Single     Spouse name: N/A   • Number of children: N/A   • Years of education: N/A     Occupational History   • Not on file.     Social History Main Topics   • Smoking status: Never Smoker   • Smokeless tobacco: Never Used   • Alcohol use No   • Drug use: No   • Sexual activity: Defer     Other Topics Concern   • Not on file     Social History Narrative   • No narrative on file       Review of Systems   Constitutional: Negative.    HENT:        SEE HPI   Eyes: Negative.    Respiratory: Negative.    Cardiovascular: Negative.    Gastrointestinal: Negative.    Endocrine: Negative.    Genitourinary: Negative.    Musculoskeletal: Negative.    Skin: Negative.    Allergic/Immunologic: Negative.    Neurological: Negative.    Hematological: Negative.    Psychiatric/Behavioral: Negative.        Vitals:    04/11/18 1502   BP: 120/88   Pulse: (!) 128   Temp: 98.9 °F (37.2 °C)       Body mass index is 25.41 kg/m².    Objective     Physical Exam  CONSTITUTIONAL: well nourished, alert, oriented, in no acute distress     COMMUNICATION AND VOICE: able to communicate normally, normal voice quality    HEAD: normocephalic, no lesions, atraumatic, no tenderness, no masses     FACE: appearance normal, no lesions, no tenderness, no deformities, facial motion symmetric    SALIVARY GLANDS: parotid glands with no tenderness, no swelling, no masses, submandibular glands with normal size, nontender    EYES: ocular motility normal, eyelids normal, orbits normal, no proptosis,  conjunctiva normal , pupils equal, round     EARS:  Hearing: response to conversational voice normal bilaterally   External Ears: auricles without lesions  Otoscopic: right TM normal, left TM with mild superior retraction Type C tympanogram, right Type A,  no lesions, no perforation, normal mobility, no fluid    NOSE:  External Nose: structure normal, no tenderness on palpation, no nasal discharge, no lesions, no evidence of trauma, nostrils patent   Intranasal Exam: nasal mucosa normal, vestibule within normal limits, inferior turbinate normal, nasal septum midline     ORAL:  Lips: upper and lower lips without lesion   Teeth: dentition within normal limits for age   Gums: gingivae healthy   Oral Mucosa: oral mucosa dry  Floor of Mouth: Warthin’s duct patent, mucosa normal  Tongue: lingual mucosa normal without lesions, normal tongue mobility   Palate: soft and hard palates with normal mucosa and structure  Oropharynx: oropharyngeal mucosa normal    NECK: neck appearance normal, no mass,  noted without erythema or tenderness    LYMPH NODES: no lymphadenopathy    CHEST/RESPIRATORY: respiratory effort normal,     CARDIOVASCULAR:  extremities without cyanosis or edema      NEUROLOGIC/PSYCHIATRIC: oriented to time, place and person, mood normal, affect appropriate, CN II-XII intact grossly    Assessment/Plan   Ellen was seen today for follow-up.    Diagnoses and all orders for this visit:    ETD (Eustachian tube dysfunction), left    Chronic mucoid otitis media of left ear    Mixed conductive and sensorineural hearing loss of left ear, unspecified hearing status on contralateral side      * Surgery not found *  No orders of the defined types were placed in this encounter.    Return in about 6 months (around 10/11/2018).       Patient Instructions   Medical vs surgical options discussed    Conservative management    Call for problems or worsening symptoms

## 2018-04-11 NOTE — PATIENT INSTRUCTIONS
Medical vs surgical options discussed    Conservative management    Call for problems or worsening symptoms

## 2018-05-13 ASSESSMENT — ENCOUNTER SYMPTOMS
BACK PAIN: 0
BLOOD IN STOOL: 0
TROUBLE SWALLOWING: 0
VOMITING: 0
COUGH: 0
RECTAL PAIN: 0
SORE THROAT: 0
EYES NEGATIVE: 1
ABDOMINAL PAIN: 1
SHORTNESS OF BREATH: 0
VOICE CHANGE: 0
DIARRHEA: 1
CHEST TIGHTNESS: 0
ALLERGIC/IMMUNOLOGIC NEGATIVE: 1
NAUSEA: 1
CONSTIPATION: 1

## 2018-05-30 ENCOUNTER — TELEPHONE (OUTPATIENT)
Dept: GASTROENTEROLOGY | Age: 28
End: 2018-05-30

## 2019-08-05 ENCOUNTER — TELEPHONE (OUTPATIENT)
Dept: VASCULAR SURGERY | Age: 29
End: 2019-08-05

## 2019-08-05 NOTE — TELEPHONE ENCOUNTER
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 I let the patient know that we would require a referral from the physician that would need to use the power port. Patient voiced understanding.  OB

## 2020-09-24 ENCOUNTER — TELEPHONE (OUTPATIENT)
Dept: CARDIOLOGY | Age: 30
End: 2020-09-24

## 2020-10-07 RX ORDER — WHEELCHAIR
EACH MISCELLANEOUS
Status: ON HOLD | COMMUNITY

## 2020-10-07 RX ORDER — DICYCLOMINE HYDROCHLORIDE 10 MG/1
10 CAPSULE ORAL EVERY 8 HOURS PRN
COMMUNITY
End: 2021-07-09

## 2020-10-07 RX ORDER — PROMETHAZINE HYDROCHLORIDE 25 MG/1
25 TABLET ORAL EVERY 4 HOURS PRN
COMMUNITY
End: 2021-05-08

## 2020-10-07 SDOH — HEALTH STABILITY: MENTAL HEALTH: HOW OFTEN DO YOU HAVE A DRINK CONTAINING ALCOHOL?: NEVER

## 2021-02-04 ENCOUNTER — OFFICE VISIT (OUTPATIENT)
Dept: CARDIOLOGY | Facility: CLINIC | Age: 31
End: 2021-02-04

## 2021-02-04 VITALS
BODY MASS INDEX: 24.17 KG/M2 | SYSTOLIC BLOOD PRESSURE: 80 MMHG | WEIGHT: 128 LBS | HEIGHT: 61 IN | HEART RATE: 121 BPM | DIASTOLIC BLOOD PRESSURE: 68 MMHG

## 2021-02-04 DIAGNOSIS — G90.A POTS (POSTURAL ORTHOSTATIC TACHYCARDIA SYNDROME): Primary | ICD-10-CM

## 2021-02-04 DIAGNOSIS — K31.84 GASTROPARESIS: ICD-10-CM

## 2021-02-04 DIAGNOSIS — Z93.4 JEJUNOSTOMY TUBE PRESENT (HCC): ICD-10-CM

## 2021-02-04 PROCEDURE — 99204 OFFICE O/P NEW MOD 45 MIN: CPT | Performed by: INTERNAL MEDICINE

## 2021-02-04 PROCEDURE — 93000 ELECTROCARDIOGRAM COMPLETE: CPT | Performed by: INTERNAL MEDICINE

## 2021-02-04 NOTE — PROGRESS NOTES
Subjective:     Encounter Date:02/04/2021      Patient ID: Ellen Rowan is a 31 y.o. female.    Chief Complaint:  History of Present Illness    This is a 31-year-old with a history of severe gastroparesis requiring jejunostomy tube and port placement for hydration and nutrition, postural orthostatic tachycardia, near syncope and syncope, acid reflux, who presents for evaluation of worsening near-syncope and syncope.    The patient reports that she has had issues with lightheadedness, near-syncope and syncope throughout her life.  She finally received a diagnosis of POTS in 2015.  It appears in the past she has been evaluated by different cardiologist in [place], [place].  Her last cardiac work-up appears to be an echocardiogram in 3/2017 that showed normal left ventricular systolic function wall motion with an EF of 60% and no significant valvular disease.  According to her last cardiac evaluation with Dr. Patel in 4/2017 the patient had been tried on propanolol and fludrocortisone in the past.  It was recommended that she try Corlanor at that time.  Staying adequately hydrated and getting into a supine position when feeling near syncopal.    The patient reports that following that visit she tried herself off the tube feeding and IV fluids for about a period of 2 years.  She reports that she generally thrive without the additional support.  She went ahead and had her port and jejunostomy tube replaced in 4/2020.  Over the last year she reports that she is had more issues with near syncope and syncope.  She has had about three or four episodes of syncope over the last year.  They usually occur when she is standing for a period of time.  She notes it occurs a lot while she is in [employer] shopping.  Usually when she feels like she is going to pass out she will get into a seated position.  It does not sound like she tries to get into a supine position when this occurs.  She administers 1 L of IV normal  saline on a daily basis.  She also receives feeding through her jejunostomy tube along with twenties milliliters of free water every couple of hours.  Regards to her oral intake she is limited by significant nausea.  She reports that she is unable to tolerate water in any form without getting nauseous.  She instead drinks a lot of iced tea and Dr. Pepper.  She also admits that she eats a lot of junk food.  She denies any palpitations, shortness of breath, lower extremity edema, or chest pain.  She reports that she tried working with a nutritionist when she was living in [place] but did not find that they were very helpful.  She has not looked into finding one in [place].    She reports that she did not tolerate the fludrocortisone.  She reports that it just made her feel crazy.  She does not recall what happened when she had been on beta-blockers in the past.  In fact she does not recall ever having been on beta-blockers.  She also does not recall ever having tried Corlanor.  She admits she has a lot of issues with taking oral medications.    Review of Systems   Constitution: Positive for malaise/fatigue.   HENT: Negative for hearing loss, hoarse voice, nosebleeds and sore throat.    Eyes: Negative for pain.   Cardiovascular: Positive for near-syncope and syncope. Negative for chest pain, claudication, cyanosis, dyspnea on exertion, irregular heartbeat, leg swelling, orthopnea, palpitations and paroxysmal nocturnal dyspnea.   Respiratory: Negative for shortness of breath and snoring.    Endocrine: Positive for cold intolerance and heat intolerance. Negative for polydipsia, polyphagia and polyuria.   Skin: Positive for itching. Negative for rash.   Musculoskeletal: Positive for joint pain and myalgias. Negative for arthritis, falls, joint swelling, muscle cramps and muscle weakness.   Gastrointestinal: Positive for abdominal pain, diarrhea and nausea. Negative for constipation, dysphagia, heartburn, hematemesis,  hematochezia, melena and vomiting.   Genitourinary: Negative for frequency, hematuria and hesitancy.   Neurological: Positive for excessive daytime sleepiness, dizziness, headaches, numbness and paresthesias. Negative for light-headedness and weakness.   Psychiatric/Behavioral: Negative for depression. The patient is nervous/anxious.          Current Outpatient Medications:   •  diphenhydrAMINE in sodium chloride 0.9 % 50 mL IVPB, Infuse  into a venous catheter 2 (two) times a day., Disp: , Rfl:   •  famotidine (PEPCID) 20-0.9 MG/50ML-%, Infuse 50 mL into a venous catheter 2 (Two) Times a Day. Via port, Disp: , Rfl:   •  HEPARIN LOCK FLUSH IV, Infuse  into a venous catheter Daily As Needed., Disp: , Rfl:   •  ibuprofen (ADVIL,MOTRIN) 800 MG tablet, Take  by mouth., Disp: , Rfl:   •  nystatin (MYCOSTATIN) 299201 UNIT/ML suspension, Take 5 mL by mouth 4 (Four) Times a Day., Disp: 200 mL, Rfl: 0  •  promethazine (PHENERGAN) 25 MG/ML injection, Infuse 25 mg into a venous catheter Every 3 (Three) Hours As Needed., Disp: , Rfl:   •  Sodium Chloride Flush (SODIUM CHLORIDE 0.9 % FLUSH) 0.9 % solution, Infuse 10 mL into a venous catheter 6 (Six) Times a Day. Before and after each port infusion, Disp: , Rfl:     Past Medical History:   Diagnosis Date   • Anemia    • Haynes's esophagus    • Candidiasis    • Cholesteatoma    • Chronic otitis media    • Conductive hearing loss    • DDD (degenerative disc disease), lumbar    • Dysautonomia (CMS/HCC)    • Eustachian tube dysfunction    • Falls    • Gastroparesis    • Gastroparesis     gastroparesis syndrome   • GERD (gastroesophageal reflux disease)    • Hiatal hernia    • History of Holter monitoring     PT. HAS ON AT PRESENT- TO WEAR FOR 31 DAYS STARTED 2/1/2017   • HPV (human papilloma virus) infection    • Hypotension    • Irritable bowel syndrome    • Mastoiditis    • Mixed hearing loss    • Nasal vestibulitis    • Neuropathy    • POTS (postural orthostatic tachycardia  syndrome)    • Scoliosis    • Tachycardia     PT. STATES IS WEARING HEART MONITOR FOR 31 DAYS DUE TO TACHYCARDIA- BEGAN 2/1/17       Past Surgical History:   Procedure Laterality Date   • ADENOIDECTOMY     • CHOLECYSTECTOMY  09/04/2016   • FACIAL RECONSTRUCTION SURGERY  2005   • GASTRIC STIMULATOR IMPLANT SURGERY  2013    PT STATES INEFFECTIVE   • GTUBE INSERTION     • JEJUNOSTOMY     • KNEE SURGERY     • LAPAROSCOPIC TUBAL LIGATION  04/2016   • MASTOIDECTOMY  10/01/2014   • MYRINGOTOMY W/ TUBES     • MYRINGOTOMY W/ TUBES Left 06/09/2014    10/1/14   • MYRINGOTOMY W/ TUBES Left 10/19/2016    Procedure: LEFT MYRINGOTOMY WITH INSERTION OF EAR TUBE with RIGHT EUA;  Surgeon: George Sveerino MD;  Location:  [facility];  Service:    • MYRINGOTOMY W/ TUBES Left 12/19/2016    Procedure: MYRINGOTOMY WITH INSERTION OF LEFT EAR TUBES, EXAM OF RIGHT EAR UNDER ANESTHESIA;  Surgeon: George Severino MD;  Location:  [facility];  Service:    • MYRINGOTOMY W/ TUBES Bilateral 2/13/2017    Procedure: REMOVAL OF myringotomy of tube of left ear, MYRINGOTOMY WITH INSERTION OF EAR TUBE on left, EAR EXAM UNDER ANESTHESIA right ear ;  Surgeon: George Severino MD;  Location:  [facility];  Service:    • PHARYNGEAL FLAP  2007   • SINUS SURGERY Left 10/2014    Left incus   • TONSILLECTOMY     • VENOUS ACCESS DEVICE (PORT) INSERTION Left 03/25/2016    LOT# [id number] POWER PORT   • WISDOM TOOTH EXTRACTION         Family History   Problem Relation Age of Onset   • Diabetes Other    • Hypertension Other    • Stroke Other         grandmother   • Endometriosis Mother    • Depression Father        Social History     Tobacco Use   • Smoking status: Never Smoker   • Smokeless tobacco: Never Used   Substance Use Topics   • Alcohol use: No   • Drug use: No         ECG 12 Lead    Date/Time: 2/4/2021 10:45 AM  Performed by: Sheila Pearson MD  Authorized by: Sheila Pearson MD   Comparison: compared with previous ECG   Comparison to previous ECG: Rate  "has increased  Rhythm: sinus tachycardia  T flattening: all                 Objective:     Visit Vitals  BP (!) 80/68   Pulse (!) 121   Ht 154.9 cm (61\")   Wt 58.1 kg (128 lb)   BMI 24.19 kg/m²         Constitutional:       Appearance: Normal appearance. Well-developed.   Eyes:      General: Lids are normal.      Conjunctiva/sclera: Conjunctivae normal.      Pupils: Pupils are equal, round, and reactive to light.   HENT:      Head: Normocephalic and atraumatic.   Neck:      Musculoskeletal: Full passive range of motion without pain, normal range of motion and neck supple.      Vascular: No carotid bruit or JVD.      Lymphadenopathy: No cervical adenopathy.   Pulmonary:      Effort: Pulmonary effort is normal.      Breath sounds: Normal breath sounds.   Cardiovascular:      Tachycardia present. Regular rhythm.      No gallop.   Pulses:     Radial: 2+ bilaterally.  Edema:     Peripheral edema absent.   Abdominal:      Palpations: Abdomen is soft.   Skin:     General: Skin is warm and dry.   Neurological:      Mental Status: Alert and oriented to person, place, and time.         Lab Review:       Assessment:          Diagnosis Plan   1. POTS (postural orthostatic tachycardia syndrome)     2. Gastroparesis     3. Jejunostomy tube present (CMS/[facility])            Plan:         1.  POTS.  I think we need to do better with lifestyle changes in order to reduce the frequency of her symptoms.  Because of her intolerance to oral medications I do not think this should be our first step at this point.  I recommended that she focus on protein intake when she does consume foods orally.  I also asked her to try increasing her free water intake to 40 mL every 2 hours in order to increase her hydration.  I also asked her to look into other water alternatives that she may be able to tolerate.  I asked her to get into a supine position and even raise her legs of possible when she is feeling near syncopal.  I also recommended that she look " into establishing her younger self with a nutritionist here to ensure she is getting adequate nutrition.  Patient reports that she has a follow-up appointment with her gastroenterologist later today I have asked her to discuss this with them.  If she is able to pursue these modifications that are any improvement then I think we could consider trying either midodrine or even Corlanor to help with her symptoms although I explained to her that they would not be able to reduce her symptoms without concurrent lifestyle triggers.  2.  Gastroparesis  3.  Jejunostomy tube    We will plan on seeing the patient back again in 3 months.

## 2021-03-23 NOTE — PLAN OF CARE
Problem: Perioperative Period (Adult)  Goal: Signs and Symptoms of Listed Potential Problems Will be Absent or Manageable (Perioperative Period)  Outcome: Ongoing (interventions implemented as appropriate)       (0) indicator not present

## 2021-05-08 ENCOUNTER — HOSPITAL ENCOUNTER (EMERGENCY)
Age: 31
Discharge: HOME | End: 2021-05-08
Attending: EMERGENCY MEDICINE
Payer: MEDICAID

## 2021-05-08 ENCOUNTER — APPOINTMENT (OUTPATIENT)
Dept: CT IMAGING | Age: 31
End: 2021-05-08
Payer: MEDICAID

## 2021-05-08 DIAGNOSIS — K31.84 GASTROPARESIS: Primary | ICD-10-CM

## 2021-05-08 DIAGNOSIS — R10.9 ABDOMINAL PAIN, UNSPECIFIED ABDOMINAL LOCATION: ICD-10-CM

## 2021-05-08 DIAGNOSIS — Z76.0 ENCOUNTER FOR MEDICATION REFILL: ICD-10-CM

## 2021-05-08 LAB
ALBUMIN SERPL-MCNC: 3.7 G/DL (ref 3.5–5.2)
ALP BLD-CCNC: 67 U/L (ref 35–104)
ALT SERPL-CCNC: 15 U/L (ref 5–33)
ANION GAP SERPL CALCULATED.3IONS-SCNC: 10 MMOL/L (ref 7–19)
AST SERPL-CCNC: 19 U/L (ref 5–32)
BACTERIA: NEGATIVE /HPF
BASOPHILS ABSOLUTE: 0.1 K/UL (ref 0–0.2)
BASOPHILS RELATIVE PERCENT: 0.9 % (ref 0–1)
BILIRUB SERPL-MCNC: <0.2 MG/DL (ref 0.2–1.2)
BILIRUBIN URINE: NEGATIVE
BLOOD, URINE: ABNORMAL
BUN BLDV-MCNC: 16 MG/DL (ref 6–20)
CALCIUM SERPL-MCNC: 9 MG/DL (ref 8.6–10)
CHLORIDE BLD-SCNC: 104 MMOL/L (ref 98–111)
CLARITY: ABNORMAL
CO2: 24 MMOL/L (ref 22–29)
COLOR: YELLOW
CREAT SERPL-MCNC: 0.6 MG/DL (ref 0.5–0.9)
CRYSTALS, UA: ABNORMAL /HPF
EOSINOPHILS ABSOLUTE: 0.1 K/UL (ref 0–0.6)
EOSINOPHILS RELATIVE PERCENT: 1 % (ref 0–5)
EPITHELIAL CELLS, UA: 3 /HPF (ref 0–5)
GFR AFRICAN AMERICAN: >59
GFR NON-AFRICAN AMERICAN: >60
GLUCOSE BLD-MCNC: 99 MG/DL (ref 74–109)
GLUCOSE URINE: NEGATIVE MG/DL
HCT VFR BLD CALC: 35.9 % (ref 37–47)
HEMOGLOBIN: 11.6 G/DL (ref 12–16)
HYALINE CASTS: 2 /HPF (ref 0–8)
IMMATURE GRANULOCYTES #: 0 K/UL
KETONES, URINE: NEGATIVE MG/DL
LEUKOCYTE ESTERASE, URINE: ABNORMAL
LIPASE: 73 U/L (ref 13–60)
LYMPHOCYTES ABSOLUTE: 2.8 K/UL (ref 1.1–4.5)
LYMPHOCYTES RELATIVE PERCENT: 29.2 % (ref 20–40)
MCH RBC QN AUTO: 29.4 PG (ref 27–31)
MCHC RBC AUTO-ENTMCNC: 32.3 G/DL (ref 33–37)
MCV RBC AUTO: 91.1 FL (ref 81–99)
MONOCYTES ABSOLUTE: 0.6 K/UL (ref 0–0.9)
MONOCYTES RELATIVE PERCENT: 5.7 % (ref 0–10)
NEUTROPHILS ABSOLUTE: 6 K/UL (ref 1.5–7.5)
NEUTROPHILS RELATIVE PERCENT: 62.8 % (ref 50–65)
NITRITE, URINE: NEGATIVE
PDW BLD-RTO: 12.7 % (ref 11.5–14.5)
PH UA: 7.5 (ref 5–8)
PLATELET # BLD: 324 K/UL (ref 130–400)
PMV BLD AUTO: 8.6 FL (ref 9.4–12.3)
POTASSIUM SERPL-SCNC: 3.6 MMOL/L (ref 3.5–5)
PROTEIN UA: NEGATIVE MG/DL
RBC # BLD: 3.94 M/UL (ref 4.2–5.4)
RBC UA: 1 /HPF (ref 0–4)
SODIUM BLD-SCNC: 138 MMOL/L (ref 136–145)
SPECIFIC GRAVITY UA: 1.02 (ref 1–1.03)
TOTAL PROTEIN: 6.5 G/DL (ref 6.6–8.7)
UROBILINOGEN, URINE: 0.2 E.U./DL
WBC # BLD: 9.6 K/UL (ref 4.8–10.8)
WBC UA: 4 /HPF (ref 0–5)

## 2021-05-08 PROCEDURE — 83690 ASSAY OF LIPASE: CPT

## 2021-05-08 PROCEDURE — 2580000003 HC RX 258: Performed by: EMERGENCY MEDICINE

## 2021-05-08 PROCEDURE — 80053 COMPREHEN METABOLIC PANEL: CPT

## 2021-05-08 PROCEDURE — 74176 CT ABD & PELVIS W/O CONTRAST: CPT

## 2021-05-08 PROCEDURE — 81001 URINALYSIS AUTO W/SCOPE: CPT

## 2021-05-08 PROCEDURE — 6360000002 HC RX W HCPCS: Performed by: EMERGENCY MEDICINE

## 2021-05-08 PROCEDURE — 85025 COMPLETE CBC W/AUTO DIFF WBC: CPT

## 2021-05-08 PROCEDURE — 36415 COLL VENOUS BLD VENIPUNCTURE: CPT

## 2021-05-08 PROCEDURE — 99284 EMERGENCY DEPT VISIT MOD MDM: CPT

## 2021-05-08 RX ORDER — IBUPROFEN 400 MG/1
500 TABLET ORAL EVERY 6 HOURS PRN
COMMUNITY
End: 2021-07-09

## 2021-05-08 RX ORDER — DIPHENHYDRAMINE HYDROCHLORIDE 50 MG/ML
25 INJECTION INTRAMUSCULAR; INTRAVENOUS ONCE
Status: COMPLETED | OUTPATIENT
Start: 2021-05-08 | End: 2021-05-08

## 2021-05-08 RX ORDER — 0.9 % SODIUM CHLORIDE 0.9 %
1000 INTRAVENOUS SOLUTION INTRAVENOUS DAILY
COMMUNITY
End: 2021-07-09

## 2021-05-08 RX ORDER — 0.9 % SODIUM CHLORIDE 0.9 %
1000 INTRAVENOUS SOLUTION INTRAVENOUS ONCE
Status: COMPLETED | OUTPATIENT
Start: 2021-05-08 | End: 2021-05-08

## 2021-05-08 RX ORDER — MORPHINE SULFATE 4 MG/ML
4 INJECTION, SOLUTION INTRAMUSCULAR; INTRAVENOUS ONCE
Status: COMPLETED | OUTPATIENT
Start: 2021-05-08 | End: 2021-05-08

## 2021-05-08 RX ORDER — PROMETHAZINE HYDROCHLORIDE 25 MG/ML
12.5 INJECTION, SOLUTION INTRAMUSCULAR; INTRAVENOUS ONCE
Status: COMPLETED | OUTPATIENT
Start: 2021-05-08 | End: 2021-05-08

## 2021-05-08 RX ADMIN — DIPHENHYDRAMINE HYDROCHLORIDE 25 MG: 50 INJECTION, SOLUTION INTRAMUSCULAR; INTRAVENOUS at 21:05

## 2021-05-08 RX ADMIN — PROMETHAZINE HYDROCHLORIDE 12.5 MG: 25 INJECTION INTRAMUSCULAR; INTRAVENOUS at 21:05

## 2021-05-08 RX ADMIN — MORPHINE SULFATE 4 MG: 4 INJECTION, SOLUTION INTRAMUSCULAR; INTRAVENOUS at 21:05

## 2021-05-08 RX ADMIN — SODIUM CHLORIDE 1000 ML: 9 INJECTION, SOLUTION INTRAVENOUS at 21:05

## 2021-05-08 ASSESSMENT — ENCOUNTER SYMPTOMS
RHINORRHEA: 0
BACK PAIN: 0
ABDOMINAL PAIN: 1
NAUSEA: 1
VOMITING: 1
SHORTNESS OF BREATH: 0
DIARRHEA: 0
SORE THROAT: 0

## 2021-05-08 ASSESSMENT — PAIN SCALES - GENERAL: PAINLEVEL_OUTOF10: 3

## 2021-05-08 ASSESSMENT — PAIN DESCRIPTION - DESCRIPTORS: DESCRIPTORS: ACHING

## 2021-05-09 VITALS
TEMPERATURE: 97.8 F | RESPIRATION RATE: 16 BRPM | DIASTOLIC BLOOD PRESSURE: 81 MMHG | SYSTOLIC BLOOD PRESSURE: 116 MMHG | WEIGHT: 120 LBS | HEIGHT: 61 IN | OXYGEN SATURATION: 98 % | HEART RATE: 77 BPM | BODY MASS INDEX: 22.66 KG/M2

## 2021-05-09 NOTE — ED TRIAGE NOTES
Patient arrive from [facility]. She has a [place] catheter in place for medication administration and IV fluids. Patient ran out of saline flushes this afternoon for her med administration.
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
## 2021-05-11 PROBLEM — M25.569 KNEE PAIN: Status: ACTIVE | Noted: 2021-05-11

## 2021-05-11 PROBLEM — G93.32 CHRONIC FATIGUE SYNDROME: Status: ACTIVE | Noted: 2017-01-24

## 2021-05-11 PROBLEM — M41.25 IDIOPATHIC SCOLIOSIS OF THORACOLUMBAR SPINE: Status: ACTIVE | Noted: 2017-03-22

## 2021-05-11 PROBLEM — E63.9 NUTRITIONAL DISORDER: Status: ACTIVE | Noted: 2020-02-18

## 2021-05-11 PROBLEM — G90.2 HORNER'S SYNDROME PUPIL: Status: ACTIVE | Noted: 2017-12-28

## 2021-05-11 PROBLEM — Z93.4 JEJUNOSTOMY TUBE PRESENT (HCC): Status: ACTIVE | Noted: 2021-02-04

## 2021-05-11 NOTE — CARE COORDINATION
SHAN reached out to Cheryl Spears RN at [facility]. Apparently, [facility] was able to work with Dr. Bradley Pathak and arrangement have been made for patient to have a separate refrigerator at [facility] for her medications and IVF.    Electronically signed by To Read RN on 5/11/2021 at 5:05 PM

## 2021-05-12 NOTE — CARE COORDINATION
Pt placed call to this SW re: need for IV antibiotics. See prior notes. Pt had an appointment yesterday that she did not show up for. Pt had another appointment today that she did not show up for. SW provided pt with the number to call to reschedule. SW informed pt of the importance of attending appointments. Pt was instructed to call this SW back if she has any issues.

## 2021-05-16 ENCOUNTER — HOSPITAL ENCOUNTER (EMERGENCY)
Age: 31
Discharge: HOME | End: 2021-05-17
Attending: EMERGENCY MEDICINE
Payer: MEDICAID

## 2021-05-16 DIAGNOSIS — K31.84 GASTROPARESIS: Primary | ICD-10-CM

## 2021-05-16 PROCEDURE — 96374 THER/PROPH/DIAG INJ IV PUSH: CPT

## 2021-05-16 PROCEDURE — 99283 EMERGENCY DEPT VISIT LOW MDM: CPT

## 2021-05-17 VITALS
SYSTOLIC BLOOD PRESSURE: 108 MMHG | RESPIRATION RATE: 16 BRPM | DIASTOLIC BLOOD PRESSURE: 68 MMHG | TEMPERATURE: 98.1 F | OXYGEN SATURATION: 98 % | HEART RATE: 68 BPM | WEIGHT: 125 LBS | HEIGHT: 61 IN | BODY MASS INDEX: 23.6 KG/M2

## 2021-05-17 LAB
ALBUMIN SERPL-MCNC: 4.2 G/DL (ref 3.5–5.2)
ALP BLD-CCNC: 74 U/L (ref 35–104)
ALT SERPL-CCNC: 24 U/L (ref 5–33)
ANION GAP SERPL CALCULATED.3IONS-SCNC: 9 MMOL/L (ref 7–19)
AST SERPL-CCNC: 22 U/L (ref 5–32)
BACTERIA: ABNORMAL /HPF
BASOPHILS ABSOLUTE: 0.1 K/UL (ref 0–0.2)
BASOPHILS RELATIVE PERCENT: 1.1 % (ref 0–1)
BILIRUB SERPL-MCNC: <0.2 MG/DL (ref 0.2–1.2)
BILIRUBIN URINE: NEGATIVE
BLOOD, URINE: ABNORMAL
BUN BLDV-MCNC: 18 MG/DL (ref 6–20)
CALCIUM SERPL-MCNC: 9 MG/DL (ref 8.6–10)
CHLORIDE BLD-SCNC: 104 MMOL/L (ref 98–111)
CLARITY: ABNORMAL
CO2: 26 MMOL/L (ref 22–29)
COLOR: YELLOW
CREAT SERPL-MCNC: 0.6 MG/DL (ref 0.5–0.9)
CRYSTALS, UA: ABNORMAL /HPF
EOSINOPHILS ABSOLUTE: 0.1 K/UL (ref 0–0.6)
EOSINOPHILS RELATIVE PERCENT: 1.2 % (ref 0–5)
EPITHELIAL CELLS, UA: 6 /HPF (ref 0–5)
GFR AFRICAN AMERICAN: >59
GFR NON-AFRICAN AMERICAN: >60
GLUCOSE BLD-MCNC: 128 MG/DL (ref 74–109)
GLUCOSE URINE: NEGATIVE MG/DL
HCT VFR BLD CALC: 34.5 % (ref 37–47)
HEMOGLOBIN: 11.8 G/DL (ref 12–16)
HYALINE CASTS: 31 /HPF (ref 0–8)
IMMATURE GRANULOCYTES #: 0 K/UL
KETONES, URINE: NEGATIVE MG/DL
LEUKOCYTE ESTERASE, URINE: ABNORMAL
LIPASE: 38 U/L (ref 13–60)
LYMPHOCYTES ABSOLUTE: 2.7 K/UL (ref 1.1–4.5)
LYMPHOCYTES RELATIVE PERCENT: 32.7 % (ref 20–40)
MCH RBC QN AUTO: 30 PG (ref 27–31)
MCHC RBC AUTO-ENTMCNC: 34.2 G/DL (ref 33–37)
MCV RBC AUTO: 87.8 FL (ref 81–99)
MONOCYTES ABSOLUTE: 0.6 K/UL (ref 0–0.9)
MONOCYTES RELATIVE PERCENT: 7.3 % (ref 0–10)
NEUTROPHILS ABSOLUTE: 4.7 K/UL (ref 1.5–7.5)
NEUTROPHILS RELATIVE PERCENT: 57.3 % (ref 50–65)
NITRITE, URINE: NEGATIVE
PDW BLD-RTO: 12.4 % (ref 11.5–14.5)
PH UA: 5.5 (ref 5–8)
PLATELET # BLD: 367 K/UL (ref 130–400)
PMV BLD AUTO: 9 FL (ref 9.4–12.3)
POTASSIUM SERPL-SCNC: 3.7 MMOL/L (ref 3.5–5)
PROTEIN UA: ABNORMAL MG/DL
RBC # BLD: 3.93 M/UL (ref 4.2–5.4)
RBC UA: 6 /HPF (ref 0–4)
SODIUM BLD-SCNC: 139 MMOL/L (ref 136–145)
SPECIFIC GRAVITY UA: 1.03 (ref 1–1.03)
TOTAL PROTEIN: 6.7 G/DL (ref 6.6–8.7)
UROBILINOGEN, URINE: 1 E.U./DL
WBC # BLD: 8.2 K/UL (ref 4.8–10.8)
WBC UA: 65 /HPF (ref 0–5)

## 2021-05-17 PROCEDURE — 85025 COMPLETE CBC W/AUTO DIFF WBC: CPT

## 2021-05-17 PROCEDURE — 83690 ASSAY OF LIPASE: CPT

## 2021-05-17 PROCEDURE — 6360000002 HC RX W HCPCS: Performed by: EMERGENCY MEDICINE

## 2021-05-17 PROCEDURE — 2580000003 HC RX 258: Performed by: EMERGENCY MEDICINE

## 2021-05-17 PROCEDURE — 81001 URINALYSIS AUTO W/SCOPE: CPT

## 2021-05-17 PROCEDURE — 87086 URINE CULTURE/COLONY COUNT: CPT

## 2021-05-17 PROCEDURE — 36415 COLL VENOUS BLD VENIPUNCTURE: CPT

## 2021-05-17 PROCEDURE — 80053 COMPREHEN METABOLIC PANEL: CPT

## 2021-05-17 RX ORDER — MORPHINE SULFATE 4 MG/ML
4 INJECTION, SOLUTION INTRAMUSCULAR; INTRAVENOUS ONCE
Status: DISCONTINUED | OUTPATIENT
Start: 2021-05-17 | End: 2021-05-17

## 2021-05-17 RX ORDER — 0.9 % SODIUM CHLORIDE 0.9 %
1000 INTRAVENOUS SOLUTION INTRAVENOUS ONCE
Status: COMPLETED | OUTPATIENT
Start: 2021-05-17 | End: 2021-05-17

## 2021-05-17 RX ORDER — PROMETHAZINE HYDROCHLORIDE 25 MG/ML
25 INJECTION, SOLUTION INTRAMUSCULAR; INTRAVENOUS ONCE
Status: COMPLETED | OUTPATIENT
Start: 2021-05-17 | End: 2021-05-17

## 2021-05-17 RX ORDER — DIPHENHYDRAMINE HYDROCHLORIDE 50 MG/ML
25 INJECTION INTRAMUSCULAR; INTRAVENOUS ONCE
Status: COMPLETED | OUTPATIENT
Start: 2021-05-17 | End: 2021-05-17

## 2021-05-17 RX ADMIN — SODIUM CHLORIDE 1000 ML: 9 INJECTION, SOLUTION INTRAVENOUS at 01:38

## 2021-05-17 RX ADMIN — PROMETHAZINE HYDROCHLORIDE 25 MG: 25 INJECTION INTRAMUSCULAR; INTRAVENOUS at 01:38

## 2021-05-17 RX ADMIN — DIPHENHYDRAMINE HYDROCHLORIDE 25 MG: 50 INJECTION, SOLUTION INTRAMUSCULAR; INTRAVENOUS at 01:38

## 2021-05-17 ASSESSMENT — ENCOUNTER SYMPTOMS
RHINORRHEA: 0
COUGH: 0
VOMITING: 0
BACK PAIN: 0
DIARRHEA: 0
ABDOMINAL PAIN: 1
SORE THROAT: 0
NAUSEA: 1
SHORTNESS OF BREATH: 0

## 2021-05-17 NOTE — ED PROVIDER NOTES
[facility]  eMERGENCY dEPARTMENT eNCOUnter      Pt Name: iNck Salomon  MRN: [id number]  [place] 1990  Date of evaluation: 5/16/2021  Provider: Yuliya Schaefer MD    [facility]       Chief Complaint   Patient presents with    Emesis     pt c/c nausea and dryheaving          HISTORY OF PRESENT ILLNESS   (Location/Symptom, Timing/Onset,Context/Setting, Quality, Duration, Modifying Factors, Severity)  Note limiting factors. Nick Salomon is a 32 y.o. female who presents to the emergency department for nausea and dry heaving. Patient has known history of gastroparesis and is followed at [facility]. She has a port in place which she typically medicates herself with antiemetics however she has been living at the [address] little over 2 weeks after splitting up with her ex. She states she tried to grab as much Phenergan before leaving the house but is having to ration it and also does not have another flushes. She has been calling the office in [place] but states she is not getting much response out of them. She states there is also some misunderstanding of the staff there thinking that the bedroom was not locked at the [address] She admits to some mild abdominal cramping but no different than usual.  She did have a recent laparoscopic hysterectomy at the end of April in [place] but is recovered well from this. Denies any fevers or chills. HPI    NursingNotes were reviewed. REVIEW OF SYSTEMS    (2-9 systems for level 4, 10 or more for level 5)     Review of Systems   Constitutional: Negative for chills and fever. HENT: Negative for rhinorrhea and sore throat. Respiratory: Negative for cough and shortness of breath. Cardiovascular: Negative for chest pain. Gastrointestinal: Positive for abdominal pain and nausea. Negative for diarrhea and vomiting. Genitourinary: Negative for dysuria, frequency and urgency.    Musculoskeletal: Negative for back pain and neck pain. Neurological: Negative for dizziness and headaches. All other systems reviewed and are negative.            PAST MEDICALHISTORY     Past Medical History:   Diagnosis Date    Allergic contact dermatitis due to adhesives     Allergic contact dermatitis due to plants, except food     Anemia complicating pregnancy, second trimester     Anemia complicating pregnancy, third trimester     Anxiety and depression     Anxiety disorder     unspecified    Bacteremia     Cellulitis of abdominal wall     Chronic fatigue, unspecified     Dysuria     Epigastric pain     Frequency of micturition     G tube feedings (HCC)     Gastroparesis     Dr. Anish Diggs in [place] manages    Gastroparesis     Gastrostomy malfunction ([facility] [place] [address]     Generalized abdominal pain     GERD (gastroesophageal reflux disease)     Heart murmur     Hiatal hernia     History of Hoyos's esophagus     Hypotension, unspecified     IBS (irritable bowel syndrome)     Insomnia, unspecified     Jejunostomy tube present (HCC)     Low back pain     Nausea with vomiting     unspecified    Neck pain     Orthostatic hypotension     POTS (postural orthostatic tachycardia syndrome)     Premature birth     delivered at 24-27 weeks    Scoliosis     Syncope and collapse     Tachycardia     Tachycardia, unspecified     Toxic gastroenteritis and colitis          SURGICAL HISTORY       Past Surgical History:   Procedure Laterality Date    ABDOMEN SURGERY  2014    gastric stimulator implanted    ADENOIDECTOMY      ADENOIDECTOMY  2007    CHOLECYSTECTOMY      CHOLECYSTECTOMY  2016    DENTAL SURGERY  2004    wisdom teeth    EAR SURGERY  2014    left    FACIAL SURGERY  2005    GASTRIC STIMULATOR IMPLANT SURGERY  11/2014    GASTROSTOMY TUBE PLACEMENT  04/07/2015    J tube-Removed in [place] 1/2018    GASTROSTOMY TUBE PLACEMENT      G tube 2017    HYSTERECTOMY      INSERTION / REMOVAL / REPLACEMENT VENOUS ACCESS CATHETER norethindrone-ethinyl estradiol (LOESTRIN FE 1/20) 1-20 MG-MCG per tablet norethindrone 1 mg-e. estradiol 20 mcg (24)-iron 75 mg (4) chew tablet   CHEW AND SWALLOW 1 TABLET BY MOUTH ONCE DAILYHistorical Med      Immune Globulin, Human,-ifas (PANZYGA) 1 GM/10ML SOLN Panzyga 10 % intravenous solutionHistorical Med      cetirizine (ZYRTEC) 10 MG tablet cetirizine 10 mg tabletHistorical Med      ciprofloxacin (CIPRO) 250 MG tablet ciprofloxacin 250 mg tabletHistorical Med      Ibuprofen-Acetaminophen 125-250 MG TABS Advil Dual Action 125 mg-250 mg tablet   Take 1 tablet every 8 hours by oral route for 30 days. Historical Med      KCl in Dextrose-NaCl (DEXTROSE 5% AND 0.45% NACL WITH KCL 20 MEQ) 20-5-0.45 MEQ/L-%-% infusion potassium chloride 20 mEq/L in dextrose 5 %-0.45 % sodium chloride IVHistorical Med      linaclotide (LINZESS) 145 MCG capsule Linzess 145 mcg capsule   TAKE 1 CAPSULE BY MOUTH ONCE DAILYHistorical Med      oxyCODONE-acetaminophen (PERCOCET) 5-325 MG per tablet oxycodone-acetaminophen 5 mg-325 mg tabletHistorical Med      silver nitrate applicators 99-86 % applicator silver nitrate applicators 75 %-74 % topical stick   APPLY 1 STRIP STICK TO GRANULATION TISSUE ONCE DAILY AS NEEDED, Historical Med      ibuprofen (ADVIL;MOTRIN) 400 MG tablet Take 500 mg by mouth every 6 hours as needed for PainHistorical Med      sodium chloride 0.9 % bolus Infuse 1,000 mLs intravenously daily With 20 meq of potassiumHistorical Med      !! HEPARIN LOCK FLUSH IV Infuse intravenouslyHistorical Med      famotidine (PEPCID) 20 MG/2ML injection Infuse 20 mg intravenously 2 times daily Indications: 2 milliliter(s) IVHistorical Med      dicyclomine (BENTYL) 10 MG capsule Take 10 mg by mouth every 8 hours as neededHistorical Med      Misc.  Devices [facility]) MISC Historical Med      Sodium Chloride (NORMAL SALINE FLUSH IJ) Inject as directed Indications: 2 liters infuse via portHistorical Med      diphenhydrAMINE HCl (BENADRYL ) Inject as directed Indications: 10mg/ml injection solutionHistorical Med      promethazine (PHENERGAN) 25 MG/ML injection Infuse 25 mg intravenouslyHistorical Med      !! HEPARIN LOCK FLUSH IV Infuse intravenouslyHistorical Med      famotidine (PEPCID) 20-0.9 MG/50ML-% Infuse 50 mLs intravenously 2 times daily, Disp-700 mL, R-24      Heparin Sod, Porcine, in D5W (HEPARIN, PORCINE,) 100 UNIT/ML SOLN infusion Called in per Dr. Allen Juarez to [address] in [facility]. Patient RX: Heparin 100u/ml Use 3ml after each use of port. #30 RFx2- per pharmacy these flushes only come in 5ml untis, Patient is to use 3ml and waste the remaining 2ml., Disp-3 mL, R-0       !! - Potential duplicate medications found. Please discuss with provider.           ALLERGIES     Iv dye [iodides], Iv dye [iodides], Adhesive tape, Fruit & vegetable daily [nutritional supplements], Metoprolol succinate [metoprolol], Nsaids, Orange fruit [citrus], Other, Reglan [metoclopramide], Reglan [metoclopramide], Tape [adhesive tape], Tramadol, Tramadol, Zofran [ondansetron hcl], Zofran [ondansetron hcl], Orange oil, and Sulfamethoxazole-trimethoprim    FAMILY HISTORY       Family History   Problem Relation Age of Onset    Other Mother         endometriosis    Depression Father     Diabetes Paternal Grandmother     Stroke Paternal Grandmother     Hypertension Paternal Grandmother     Heart Disease Paternal Grandmother     Heart Failure Paternal Grandmother     Colon Cancer Neg Hx     Colon Polyps Neg Hx           SOCIAL HISTORY       Social History     Socioeconomic History    Marital status:      Spouse name: None    Number of children: None    Years of education: None    Highest education level: None   Occupational History    None   Tobacco Use    Smoking status: Never Smoker    Smokeless tobacco: Never Used   Vaping Use    Vaping Use: Never used   Substance and Sexual Activity    Alcohol use: Never    Drug use: Never    Sexual activity: Yes     Partners: Male   Other Topics Concern    None   Social History Narrative    ** Merged History Encounter **          Social Determinants of Health     Financial Resource Strain:     Difficulty of Paying Living Expenses:    Food Insecurity:     Worried About Running Out of Food in the Last Year:     Ran Out of Food in the Last Year:    Transportation Needs:     Lack of Transportation (Medical):  Lack of Transportation (Non-Medical):    Physical Activity:     Days of Exercise per Week:     Minutes of Exercise per Session:    Stress:     Feeling of Stress :    Social Connections:     Frequency of Communication with Friends and Family:     Frequency of Social Gatherings with Friends and Family:     Attends Voodoo Services:     Active Member of Clubs or Organizations:     Attends Club or Organization Meetings:     Marital Status:    Intimate Partner Violence:     Fear of Current or Ex-Partner:     Emotionally Abused:     Physically Abused:     Sexually Abused:        SCREENINGS             PHYSICAL EXAM    (up to 7 for level 4, 8 or more for level 5)     ED Triage Vitals [05/16/21 2334]   BP Temp Temp Source Pulse Resp SpO2 Height Weight   116/81 97.3 °F (36.3 °C) Temporal 121 17 96 % 5' 1\" (1.549 m) 125 lb (56.7 kg)       Physical Exam  Vitals and nursing note reviewed. Constitutional:       General: She is not in acute distress. Appearance: Normal appearance. She is well-developed. She is not diaphoretic. HENT:      Head: Normocephalic and atraumatic. Right Ear: External ear normal.      Left Ear: External ear normal.   Eyes:      Conjunctiva/sclera: Conjunctivae normal.   Neck:      Trachea: No tracheal deviation. Cardiovascular:      Rate and Rhythm: Normal rate and regular rhythm. Heart sounds: Normal heart sounds. No murmur heard. Pulmonary:      Effort: No respiratory distress. Breath sounds: Normal breath sounds.  No wheezing or rales. Comments: Port to left upper chest  Abdominal:      Palpations: Abdomen is soft. There is no mass. Tenderness: There is no abdominal tenderness. Comments: Gastrostomy present no erythema    No abd pain on exam    Well healed laparoscopic incisions   Musculoskeletal:         General: Normal range of motion. Cervical back: Normal range of motion. Skin:     General: Skin is warm and dry. Neurological:      Mental Status: She is alert and oriented to person, place, and time. GCS: GCS eye subscore is 4. GCS verbal subscore is 5. GCS motor subscore is 6. DIAGNOSTIC RESULTS           No orders to display           LABS:  Labs Reviewed   CBC WITH AUTO DIFFERENTIAL - Abnormal; Notable for the following components:       Result Value    RBC 3.93 (*)     Hemoglobin 11.8 (*)     Hematocrit 34.5 (*)     MPV 9.0 (*)     Basophils % 1.1 (*)     All other components within normal limits   COMPREHENSIVE METABOLIC PANEL - Abnormal; Notable for the following components:    Glucose 128 (*)     All other components within normal limits   URINE RT REFLEX TO CULTURE - Abnormal; Notable for the following components:    Clarity, UA CLOUDY (*)     Blood, Urine MODERATE (*)     Protein, UA TRACE (*)     Leukocyte Esterase, Urine MODERATE (*)     All other components within normal limits   MICROSCOPIC URINALYSIS - Abnormal; Notable for the following components:    Bacteria, UA TRACE (*)     Crystals, UA NEG (*)     Hyaline Casts, UA 31 (*)     WBC, UA 65 (*)     RBC, UA 6 (*)     All other components within normal limits   CULTURE, URINE   LIPASE       All other labs were within normal range or not returned as of this dictation.     EMERGENCY DEPARTMENT COURSE and DIFFERENTIAL DIAGNOSIS/MDM:   Vitals:    Vitals:    05/16/21 2334 05/17/21 0144 05/17/21 0241 05/17/21 0329   BP: 116/81 115/78 116/81 108/68   Pulse: 121 119 76 68   Resp: 17 17 17 16   Temp: 97.3 °F (36.3 °C)   98.1 °F (36.7 °C)   TempSrc: Temporal      SpO2: 96% 98% 100% 98%   Weight: 125 lb (56.7 kg)      Height: 5' 1\" (1.549 m)          MDM  Number of Diagnoses or Management Options     Amount and/or Complexity of Data Reviewed  Clinical lab tests: ordered and reviewed      VSS, benign abd exam, hx of gastroparesis, hasnt been able to get as much of her usual meds due to recent leaving her spouse and staying at [facility] [address] now, reassuring labs, urine noted but no symptoms, culture pending,  pt feeling better now, stable for DC      CONSULTS:  None    PROCEDURES:  Unless otherwise noted below, none     Procedures    FINAL IMPRESSION      1.  Gastroparesis          DISPOSITION/PLAN   DISPOSITION Decision To Discharge 05/17/2021 03:12:02 AM      PATIENT REFERRED TO:  [facility] EM[facility]ENCY [facility]  Todd Johnson  [phone]    As needed, If symptoms worsen      DISCHARGE MEDICATIONS:  Discharge Medication List as of 5/17/2021  3:21 AM             (Please note that portions of this note were completed with a voice recognition program.  Efforts were made to edit thedictations but occasionally words are mis-transcribed.)    Anthony Duran MD (electronically signed)  Attending Emergency Physician        Benjamin English MD  05/17/21 1552

## 2021-05-19 ENCOUNTER — OFFICE VISIT (OUTPATIENT)
Dept: URGENT CARE | Age: 31
End: 2021-05-19
Payer: MEDICAID

## 2021-05-19 VITALS
RESPIRATION RATE: 20 BRPM | WEIGHT: 123.6 LBS | DIASTOLIC BLOOD PRESSURE: 90 MMHG | HEART RATE: 118 BPM | TEMPERATURE: 98.7 F | SYSTOLIC BLOOD PRESSURE: 134 MMHG | HEIGHT: 61 IN | OXYGEN SATURATION: 99 % | BODY MASS INDEX: 23.33 KG/M2

## 2021-05-19 DIAGNOSIS — N89.8 VAGINAL DISCHARGE: ICD-10-CM

## 2021-05-19 DIAGNOSIS — K31.84 GASTROPARESIS: Primary | ICD-10-CM

## 2021-05-19 DIAGNOSIS — N89.8 VAGINAL ITCHING: ICD-10-CM

## 2021-05-19 LAB — URINE CULTURE, ROUTINE: NORMAL

## 2021-05-19 PROCEDURE — 99203 OFFICE O/P NEW LOW 30 MIN: CPT | Performed by: NURSE PRACTITIONER

## 2021-05-19 PROCEDURE — 1036F TOBACCO NON-USER: CPT | Performed by: NURSE PRACTITIONER

## 2021-05-19 PROCEDURE — G8420 CALC BMI NORM PARAMETERS: HCPCS | Performed by: NURSE PRACTITIONER

## 2021-05-19 PROCEDURE — G8427 DOCREV CUR MEDS BY ELIG CLIN: HCPCS | Performed by: NURSE PRACTITIONER

## 2021-05-19 RX ORDER — FLUCONAZOLE 10 MG/ML
100 POWDER, FOR SUSPENSION ORAL DAILY
Qty: 15 ML | Refills: 0 | Status: SHIPPED | OUTPATIENT
Start: 2021-05-19 | End: 2021-05-19 | Stop reason: CLARIF

## 2021-05-19 RX ORDER — FLUCONAZOLE 10 MG/ML
POWDER, FOR SUSPENSION ORAL
Qty: 30 ML | Refills: 0 | Status: SHIPPED | OUTPATIENT
Start: 2021-05-19 | End: 2021-07-09

## 2021-05-19 ASSESSMENT — ENCOUNTER SYMPTOMS
NAUSEA: 1
DIARRHEA: 0
BACK PAIN: 0
VOMITING: 1
ABDOMINAL PAIN: 0

## 2021-05-19 ASSESSMENT — VISUAL ACUITY: OU: 1

## 2021-05-19 NOTE — PROGRESS NOTES
[facility]  [address] [address] [place] [address]  Dept: [phone]  Dept Fax: [phone]  Loc: [phone]    Zoya Duckworth is a 32 y.o. female who presents today for her medical conditions/complaintsas noted below. Zoya Duckworth is c/o of Vaginal Itching and Nausea        HPI:     Vaginal Itching  The patient's primary symptoms include genital itching and vaginal discharge. This is a new problem. The current episode started in the past 7 days. The problem occurs constantly. The problem has been unchanged. The patient is experiencing no pain. She is not pregnant. Associated symptoms include nausea and vomiting. Pertinent negatives include no abdominal pain, anorexia, back pain, chills, diarrhea, dysuria, fever, flank pain, frequency, rash or urgency. Associated symptoms comments: Pt has chronic gastroparesis. The vaginal discharge was thin and mucopurulent. There has been no bleeding. She has not been passing clots. Nothing aggravates the symptoms. She has tried nothing for the symptoms. The treatment provided no relief. She is not sexually active. Her menstrual history has been regular. Tom Rios has chronic gastroparesis and says she has been to ER twice recently for this. She has a port that she gives herself Phenergan injectable, but tells me her physician in [place] [address] send her in any medication because she is at the [facility] and she says they dont understand that her medication would be locked up. She is asking for oral phenergan to give thru her G-tube and tells me she is having a vaginal discharge and vaginal itching. She is not sexually active. She recently had a urine culture on 5-17-21 that showed only mixed kyra.   Past Medical History:   Diagnosis Date    Allergic contact dermatitis due to adhesives     Allergic contact dermatitis due to plants, except food     Anemia complicating pregnancy, second trimester     Anemia TUBAL LIGATION  2016    TUMOR REMOVAL Left     ear    TUNNELED VENOUS PORT PLACEMENT Right 05/29/15    TUNNELED VENOUS PORT PLACEMENT      UPPER GASTROINTESTINAL ENDOSCOPY  4-2-10    Dr Man Dietz ENDOSCOPY  9-25-13    Dr Nat Timmons  5/2015    Dr. Michelle Clements  5/29/2015 S    Ultrasound-guided cannulation, right internal jugular vein. Right internal jugular vein single-lumen bard powerport placement.  VASCULAR SURGERY  9/8/15 [facility]    Right internal jugular vein portograms. Revision of right internal jugular vein single lumen port.  VASCULAR SURGERY  11/3/15 [facility]    Ultrasound-guided cannulation, left upper arm basilic vein. Left upper arm basilic vein PICC line placement bard dual-lumen PICC line. Superior vena cava venograms.  VASCULAR SURGERY  03/23/2017    [facility]. Left IJV port angiogram.Ultrasound guided cannulation of right basilic vein,right upper extremity PICC line placement bard power PICC,dual lumen.     WISDOM TOOTH EXTRACTION         Family History   Problem Relation Age of Onset    Other Mother         endometriosis    Depression Father     Diabetes Paternal Grandmother     Stroke Paternal Grandmother     Hypertension Paternal Grandmother     Heart Disease Paternal Grandmother     Heart Failure Paternal Grandmother     Colon Cancer Neg Hx     Colon Polyps Neg Hx        Social History     Tobacco Use    Smoking status: Never Smoker    Smokeless tobacco: Never Used   Substance Use Topics    Alcohol use: Never      Current Outpatient Medications   Medication Sig Dispense Refill    fluconazole (DIFLUCAN) 10 MG/ML suspension 15 ml via G-tube today and repeat in 3 days 30 mL 0    sodium chloride 0.9 % SOLN 50 mL with diphenhydrAMINE 50 MG/ML SOLN 50 mg Infuse intravenously 2 times daily      EPINEPHrine (EPIPEN) 0.3 MG/0.3ML SOAJ injection epinephrine 0.3 mg/0.3 mL injection, auto-injector      Nutritional Supplements (JEVITY 1.5 ELLIOTT/FIBER PO) Jevity 1.5 Elliott 0.06 gram-1.5 kcal/mL oral liquid      norethindrone-ethinyl estradiol (LOESTRIN FE 1/20) 1-20 MG-MCG per tablet norethindrone 1 mg-e. estradiol 20 mcg (24)-iron 75 mg (4) chew tablet   CHEW AND SWALLOW 1 TABLET BY MOUTH ONCE DAILY      Immune Globulin, Human,-ifas (PANZYGA) 1 GM/10ML SOLN Panzyga 10 % intravenous solution      cetirizine (ZYRTEC) 10 MG tablet cetirizine 10 mg tablet      ciprofloxacin (CIPRO) 250 MG tablet ciprofloxacin 250 mg tablet      Ibuprofen-Acetaminophen 125-250 MG TABS Advil Dual Action 125 mg-250 mg tablet   Take 1 tablet every 8 hours by oral route for 30 days.  KCl in Dextrose-NaCl (DEXTROSE 5% AND 0.45% NACL WITH KCL 20 MEQ) 20-5-0.45 MEQ/L-%-% infusion potassium chloride 20 mEq/L in dextrose 5 %-0.45 % sodium chloride IV      linaclotide (LINZESS) 145 MCG capsule Linzess 145 mcg capsule   TAKE 1 CAPSULE BY MOUTH ONCE DAILY      oxyCODONE-acetaminophen (PERCOCET) 5-325 MG per tablet oxycodone-acetaminophen 5 mg-325 mg tablet      silver nitrate applicators 94-55 % applicator silver nitrate applicators 75 %-33 % topical stick   APPLY 1 STRIP STICK TO GRANULATION TISSUE ONCE DAILY AS NEEDED      ibuprofen (ADVIL;MOTRIN) 400 MG tablet Take 500 mg by mouth every 6 hours as needed for Pain      sodium chloride 0.9 % bolus Infuse 1,000 mLs intravenously daily With 20 meq of potassium      HEPARIN LOCK FLUSH IV Infuse intravenously      famotidine (PEPCID) 20 MG/2ML injection Infuse 20 mg intravenously 2 times daily Indications: 2 milliliter(s) IV      dicyclomine (BENTYL) 10 MG capsule Take 10 mg by mouth every 8 hours as needed      Misc.  Devices [facility]) MISC by Does not apply route Indications: custom wheel chair with smart drive      Sodium Chloride (NORMAL SALINE FLUSH IJ) Inject as directed Indications: 2 liters infuse via port      diphenhydrAMINE HCl (BENADRYL IJ) Inject as directed Indications: 10mg/ml injection solution      promethazine (PHENERGAN) 25 MG/ML injection Infuse 25 mg intravenously      HEPARIN LOCK FLUSH IV Infuse intravenously      famotidine (PEPCID) 20-0.9 MG/50ML-% Infuse 50 mLs intravenously 2 times daily (Patient taking differently: Infuse 40 mg intravenously 2 times daily ) 700 mL 24    Heparin Sod, Porcine, in D5W (HEPARIN, PORCINE,) 100 UNIT/ML SOLN infusion Called in per Dr. Annika Mckee to [address] in [facility]. Patient RX: Heparin 100u/ml Use 3ml after each use of port. #30 RFx2- per pharmacy these flushes only come in 5ml untis, Patient is to use 3ml and waste the remaining 2ml. 3 mL 0     No current facility-administered medications for this visit. Allergies   Allergen Reactions    Iv Dye [Iodides] Shortness Of Breath, Itching and Other (See Comments)     IV 3000; SoA, dizziness    Iv Dye [Iodides] Anaphylaxis     Iodine containing    Adhesive Tape Itching     And tegaderm    Fruit & Vegetable Daily [Nutritional Supplements]      Oranges-anything with orange color    Metoprolol Succinate [Metoprolol]      Excessive drowsiness/ Metoprolol tartrate-excessive drowsiness    Nsaids      Other reaction(s): GI Intolerance    Orange Fruit [Citrus]      And artificial    Other      Cloraprep    Reglan [Metoclopramide]     Reglan [Metoclopramide]      Nausea,vomiting    Tape [Adhesive Tape]      Adhesives-rash-itching    Tramadol Other (See Comments)     Excessive sleeping x 3 days, pt takes 1/2 a pill.     Tramadol      Excessive drowsiness    Zofran [Ondansetron Hcl] Hives    Zofran [Ondansetron Hcl] Hives    Orange Oil Nausea And Vomiting    Sulfamethoxazole-Trimethoprim Nausea And Vomiting       Health Maintenance   Topic Date Due    Hepatitis C screen  Never done    Varicella vaccine (1 of 2 - 2-dose childhood series) Never done    COVID-19 Vaccine (1) Never done    HIV screen  Never done    Cervical cancer screen  07/28/2029 (Originally 1/6/2017)   Dawson Rosenbaum Flu vaccine (Season Ended) 09/01/2021    DTaP/Tdap/Td vaccine (2 - Td) 07/03/2024    Hepatitis A vaccine  Aged Out    Hepatitis B vaccine  Aged Out    Hib vaccine  Aged Out    Meningococcal (ACWY) vaccine  Aged Out    Pneumococcal 0-64 years Vaccine  Aged Out       Subjective:     Review of Systems   Constitutional: Negative for activity change, appetite change, chills and fever. Cardiovascular: Negative. Gastrointestinal: Positive for nausea and vomiting. Negative for abdominal pain, anorexia and diarrhea. Chronic gastroparesis   Genitourinary: Positive for vaginal discharge. Negative for difficulty urinating, dysuria, flank pain, frequency, urgency and vaginal pain. Musculoskeletal: Negative for arthralgias, back pain and myalgias. Skin: Negative for rash.       :Objective      Physical Exam  Vitals and nursing note reviewed. Constitutional:       General: She is awake. She is not in acute distress. Appearance: Normal appearance. She is well-developed and normal weight. She is ill-appearing. Comments: Chronically ill appearing    HENT:      Head: Normocephalic. Right Ear: Hearing normal.      Left Ear: Hearing normal.      Nose: Nose normal.      Mouth/Throat:      Lips: Pink. Mouth: Mucous membranes are moist.   Eyes:      General: Vision grossly intact. Neck:      Trachea: Phonation normal.   Cardiovascular:      Rate and Rhythm: Regular rhythm. Tachycardia present. Heart sounds: Normal heart sounds, S1 normal and S2 normal. No murmur heard. No friction rub. No gallop. Pulmonary:      Effort: Pulmonary effort is normal. No respiratory distress. Breath sounds: Normal breath sounds and air entry. No wheezing, rhonchi or rales. Chest:       Abdominal:      General: Abdomen is flat. Bowel sounds are normal.      Palpations: Abdomen is soft. Tenderness: There is no abdominal tenderness. There is no right CVA tenderness or left CVA tenderness. repeat in 3 days     Dispense:  30 mL     Refill:  0        Patient Instructions     Plenty of fluids  Diflucan liquid through G-tube as directed  [facility]atherix sent to lab, we will call with results in 2-3 days  You will need to contact your PCP or GI physician in [place] for injectable phenergan  If symptoms worsen recommend you go to ER      Patient Education        Gastroparesis: Care Instructions  Overview     When you have gastroparesis, your stomach takes a lot longer to empty. This delay can cause belly pain, bloating, and belching. It also can cause hiccups, heartburn, nausea or vomiting. You may not feel like eating. These symptoms may come and go. They most often occur during and after meals. You may feel full after only a few bites of food. This condition occurs when the nerves or muscles to the stomach don't work properly. Diabetes is one of the most common causes of this nerve damage. Gastroparesis can make it harder to control your blood sugar levels. But keeping your blood sugar levels under control may help with your symptoms. Parkinson's disease, stroke, and some medicines can also cause this condition. Home treatment can often help. Follow-up care is a key part of your treatment and safety. Be sure to make and go to all appointments, and call your doctor if you are having problems. It's also a good idea to know your test results and keep a list of the medicines you take. How can you care for yourself at home? · Eat several small meals each day rather than three large meals. · Eat foods that are low in fiber and fat. · If your doctor suggests it, take medicines that help the stomach empty more quickly. These are called motility agents. When should you call for help? Call your doctor now or seek immediate medical care if:    · You are vomiting.     · You have new or worse belly pain.     · You have a fever.     · You cannot pass stools or gas.    Watch closely for changes in your health, and be sure to contact your doctor if you have any problems. Where can you learn more? Go to [email or web address] and sign in to your [employer] account. Enter M106 in the [facility] box to learn more about \"Gastroparesis: Care Instructions. \"     If you do not have an account, please click on the \"Sign Up Now\" link. Current as of: April 15, 2020               Content Version: 12.8  © 0009-1392 Healthwise, Incorporated. Care instructions adapted under license by [facility] ([facility]). If you have questions about a medical condition or this instruction, always ask your healthcare professional. [place] [address] any warranty or liability for your use of this information. Patient given educational materials- see patient instructions. Discussed use, benefit, and side effects of prescribedmedications. All patient questions answered. Pt voiced understanding.        Electronically signed by SHRUTHI Ruvalcaba CNP on 5/19/2021 at 11:49 AM

## 2021-05-19 NOTE — PATIENT INSTRUCTIONS
Plenty of fluids  Diflucan liquid through G-tube as directed  Diatherix sent to lab, we will call with results in 2-3 days  You will need to contact your PCP or GI physician in [place] for injectable phenergan  If symptoms worsen recommend you go to ER      Patient Education        Gastroparesis: Care Instructions  Overview     When you have gastroparesis, your stomach takes a lot longer to empty. This delay can cause belly pain, bloating, and belching. It also can cause hiccups, heartburn, nausea or vomiting. You may not feel like eating. These symptoms may come and go. They most often occur during and after meals. You may feel full after only a few bites of food. This condition occurs when the nerves or muscles to the stomach don't work properly. Diabetes is one of the most common causes of this nerve damage. Gastroparesis can make it harder to control your blood sugar levels. But keeping your blood sugar levels under control may help with your symptoms. Parkinson's disease, stroke, and some medicines can also cause this condition. Home treatment can often help. Follow-up care is a key part of your treatment and safety. Be sure to make and go to all appointments, and call your doctor if you are having problems. It's also a good idea to know your test results and keep a list of the medicines you take. How can you care for yourself at home? · Eat several small meals each day rather than three large meals. · Eat foods that are low in fiber and fat. · If your doctor suggests it, take medicines that help the stomach empty more quickly. These are called motility agents. When should you call for help? Call your doctor now or seek immediate medical care if:    · You are vomiting.     · You have new or worse belly pain.     · You have a fever.     · You cannot pass stools or gas. Watch closely for changes in your health, and be sure to contact your doctor if you have any problems.   Where can you learn more?  Go to [email or web address] and sign in to your [employer] account. Enter M106 in the [facility] box to learn more about \"Gastroparesis: Care Instructions. \"     If you do not have an account, please click on the \"Sign Up Now\" link. Current as of: April 15, 2020               Content Version: 12.8  © 2006-2021 Healthwise, [facility]. Care instructions adapted under license by [facility] ([facility]). If you have questions about a medical condition or this instruction, always ask your healthcare professional. [address] any warranty or liability for your use of this information.

## 2021-05-20 ENCOUNTER — TELEPHONE (OUTPATIENT)
Dept: ADMINISTRATIVE | Age: 31
End: 2021-05-20

## 2021-05-22 DIAGNOSIS — N34.1 NONGONOCOCCAL URETHRITIS DUE TO UREAPLASMA UREALYTICUM: ICD-10-CM

## 2021-05-22 DIAGNOSIS — A49.3 NONGONOCOCCAL URETHRITIS DUE TO UREAPLASMA UREALYTICUM: ICD-10-CM

## 2021-05-22 DIAGNOSIS — N34.1 NONGONOCOCCAL URETHRITIS DUE TO UREAPLASMA UREALYTICUM: Primary | ICD-10-CM

## 2021-05-22 DIAGNOSIS — A49.3 NONGONOCOCCAL URETHRITIS DUE TO UREAPLASMA UREALYTICUM: Primary | ICD-10-CM

## 2021-05-22 RX ORDER — DOXYCYCLINE 25 MG/5ML
100 POWDER, FOR SUSPENSION ORAL 2 TIMES DAILY
Qty: 400 ML | Refills: 0 | Status: SHIPPED | OUTPATIENT
Start: 2021-05-22 | End: 2021-06-01

## 2021-05-22 RX ORDER — DOXYCYCLINE 25 MG/5ML
100 POWDER, FOR SUSPENSION ORAL 2 TIMES DAILY
Qty: 400 ML | Refills: 0 | Status: SHIPPED | OUTPATIENT
Start: 2021-05-22 | End: 2021-05-22 | Stop reason: SDUPTHER

## 2021-05-22 NOTE — PROGRESS NOTES
Diatherix results came back. She has candida glabrata. It looks like she has been treated with diflucan for this. It also looks like she takes medication through her G tube. I am sending in oral liquid doxycycline to take care of the mycoplasma and ureaplasma in her urine as well.

## 2021-05-27 ENCOUNTER — HOSPITAL ENCOUNTER (EMERGENCY)
Age: 31
Discharge: HOME | End: 2021-05-27
Attending: EMERGENCY MEDICINE
Payer: MEDICAID

## 2021-05-27 VITALS
TEMPERATURE: 98 F | BODY MASS INDEX: 23.03 KG/M2 | OXYGEN SATURATION: 98 % | DIASTOLIC BLOOD PRESSURE: 69 MMHG | HEART RATE: 95 BPM | WEIGHT: 122 LBS | HEIGHT: 61 IN | SYSTOLIC BLOOD PRESSURE: 106 MMHG | RESPIRATION RATE: 20 BRPM

## 2021-05-27 DIAGNOSIS — K94.13 MALFUNCTIONING JEJUNOSTOMY TUBE (HCC): Primary | ICD-10-CM

## 2021-05-27 LAB
ALBUMIN SERPL-MCNC: 4.2 G/DL (ref 3.5–5.2)
ALP BLD-CCNC: 79 U/L (ref 35–104)
ALT SERPL-CCNC: 50 U/L (ref 5–33)
ANION GAP SERPL CALCULATED.3IONS-SCNC: 13 MMOL/L (ref 7–19)
AST SERPL-CCNC: 39 U/L (ref 5–32)
BASOPHILS ABSOLUTE: 0.1 K/UL (ref 0–0.2)
BASOPHILS RELATIVE PERCENT: 0.9 % (ref 0–1)
BILIRUB SERPL-MCNC: 0.4 MG/DL (ref 0.2–1.2)
BUN BLDV-MCNC: 15 MG/DL (ref 6–20)
CALCIUM SERPL-MCNC: 9 MG/DL (ref 8.6–10)
CHLORIDE BLD-SCNC: 102 MMOL/L (ref 98–111)
CO2: 22 MMOL/L (ref 22–29)
CREAT SERPL-MCNC: 0.6 MG/DL (ref 0.5–0.9)
EOSINOPHILS ABSOLUTE: 0.1 K/UL (ref 0–0.6)
EOSINOPHILS RELATIVE PERCENT: 0.9 % (ref 0–5)
GFR AFRICAN AMERICAN: >59
GFR NON-AFRICAN AMERICAN: >60
GLUCOSE BLD-MCNC: 96 MG/DL (ref 74–109)
HCT VFR BLD CALC: 35.6 % (ref 37–47)
HEMOGLOBIN: 12.2 G/DL (ref 12–16)
IMMATURE GRANULOCYTES #: 0 K/UL
LYMPHOCYTES ABSOLUTE: 1.8 K/UL (ref 1.1–4.5)
LYMPHOCYTES RELATIVE PERCENT: 25.6 % (ref 20–40)
MCH RBC QN AUTO: 29.7 PG (ref 27–31)
MCHC RBC AUTO-ENTMCNC: 34.3 G/DL (ref 33–37)
MCV RBC AUTO: 86.6 FL (ref 81–99)
MONOCYTES ABSOLUTE: 0.5 K/UL (ref 0–0.9)
MONOCYTES RELATIVE PERCENT: 7.6 % (ref 0–10)
NEUTROPHILS ABSOLUTE: 4.5 K/UL (ref 1.5–7.5)
NEUTROPHILS RELATIVE PERCENT: 64.9 % (ref 50–65)
PDW BLD-RTO: 12.8 % (ref 11.5–14.5)
PLATELET # BLD: 303 K/UL (ref 130–400)
PMV BLD AUTO: 8.7 FL (ref 9.4–12.3)
POTASSIUM SERPL-SCNC: 4.1 MMOL/L (ref 3.5–5)
RBC # BLD: 4.11 M/UL (ref 4.2–5.4)
SODIUM BLD-SCNC: 137 MMOL/L (ref 136–145)
TOTAL PROTEIN: 7.1 G/DL (ref 6.6–8.7)
WBC # BLD: 6.9 K/UL (ref 4.8–10.8)

## 2021-05-27 PROCEDURE — 85025 COMPLETE CBC W/AUTO DIFF WBC: CPT

## 2021-05-27 PROCEDURE — 96374 THER/PROPH/DIAG INJ IV PUSH: CPT

## 2021-05-27 PROCEDURE — 36415 COLL VENOUS BLD VENIPUNCTURE: CPT

## 2021-05-27 PROCEDURE — 80053 COMPREHEN METABOLIC PANEL: CPT

## 2021-05-27 PROCEDURE — 96375 TX/PRO/DX INJ NEW DRUG ADDON: CPT

## 2021-05-27 PROCEDURE — 99282 EMERGENCY DEPT VISIT SF MDM: CPT

## 2021-05-27 PROCEDURE — 2580000003 HC RX 258: Performed by: EMERGENCY MEDICINE

## 2021-05-27 PROCEDURE — 6360000002 HC RX W HCPCS: Performed by: EMERGENCY MEDICINE

## 2021-05-27 RX ORDER — DIPHENHYDRAMINE HYDROCHLORIDE 50 MG/ML
25 INJECTION INTRAMUSCULAR; INTRAVENOUS ONCE
Status: COMPLETED | OUTPATIENT
Start: 2021-05-27 | End: 2021-05-27

## 2021-05-27 RX ORDER — SODIUM CHLORIDE, SODIUM LACTATE, POTASSIUM CHLORIDE, AND CALCIUM CHLORIDE .6; .31; .03; .02 G/100ML; G/100ML; G/100ML; G/100ML
1000 INJECTION, SOLUTION INTRAVENOUS ONCE
Status: COMPLETED | OUTPATIENT
Start: 2021-05-27 | End: 2021-05-27

## 2021-05-27 RX ORDER — PROMETHAZINE HYDROCHLORIDE 25 MG/ML
25 INJECTION, SOLUTION INTRAMUSCULAR; INTRAVENOUS ONCE
Status: COMPLETED | OUTPATIENT
Start: 2021-05-27 | End: 2021-05-27

## 2021-05-27 RX ADMIN — SODIUM CHLORIDE, POTASSIUM CHLORIDE, SODIUM LACTATE AND CALCIUM CHLORIDE 1000 ML: 600; 310; 30; 20 INJECTION, SOLUTION INTRAVENOUS at 12:36

## 2021-05-27 RX ADMIN — PROMETHAZINE HYDROCHLORIDE 25 MG: 25 INJECTION INTRAMUSCULAR; INTRAVENOUS at 12:36

## 2021-05-27 RX ADMIN — DIPHENHYDRAMINE HYDROCHLORIDE 25 MG: 50 INJECTION, SOLUTION INTRAMUSCULAR; INTRAVENOUS at 13:28

## 2021-05-27 ASSESSMENT — ENCOUNTER SYMPTOMS
ABDOMINAL PAIN: 1
NAUSEA: 1
SHORTNESS OF BREATH: 0
ABDOMINAL DISTENTION: 0
COUGH: 0
VOMITING: 0

## 2021-05-27 ASSESSMENT — PAIN SCALES - GENERAL: PAINLEVEL_OUTOF10: 6

## 2021-05-27 NOTE — ED PROVIDER NOTES
[address] [facility] EMERGENCY DEPT  eMERGENCY dEPARTMENT eNCOUnter      Pt Name: Rohit Samayoa  MRN: [id number]  [place] 1990  Date of evaluation: 5/27/2021  Provider: Jacqueline Gutierrez MD    [address] [place] [address]       Chief Complaint   Patient presents with    J Tube Complications     clogged          HISTORY OF PRESENT ILLNESS   (Location/Symptom, Timing/Onset,Context/Setting, Quality, Duration, Modifying Factors, Severity)  Note limiting factors. Rohit Samayoa is a 32 y.o. female who presents to the emergency department for evaluation regarding possible clogged J-tube. Patient has a prior history of gastroparesis and states that for about the past 3 days she is not been able to flush anything through her J-tube. States that she uses it for tube feedings and she is concerned that she might be dehydrated. She has not really had any vomiting but does relate she has had some nausea. Patient denies any fevers or chills. She does follow with a gastroenterologist at the [facility]. The symptoms are described as mild in severity and without relieving factors. HPI    NursingNotes were reviewed. REVIEW OF SYSTEMS    (2-9 systems for level 4, 10 or more for level 5)     Review of Systems   Constitutional: Negative for fever. Respiratory: Negative for cough and shortness of breath. Cardiovascular: Negative for chest pain. Gastrointestinal: Positive for abdominal pain and nausea. Negative for abdominal distention and vomiting. Neurological: Negative for dizziness. All other systems reviewed and are negative.            PAST MEDICALHISTORY     Past Medical History:   Diagnosis Date    Allergic contact dermatitis due to adhesives     Allergic contact dermatitis due to plants, except food     Anemia complicating pregnancy, second trimester     Anemia complicating pregnancy, third trimester     Anxiety and depression     Anxiety disorder     unspecified    Bacteremia     Cellulitis of abdominal wall     Chronic fatigue, unspecified     Dysuria     Epigastric pain     Frequency of micturition     G tube feedings (HCC)     Gastroparesis     Dr. Anish Diggs in [place] manages    Gastroparesis     Gastrostomy malfunction ([address]     Generalized abdominal pain     GERD (gastroesophageal reflux disease)     Heart murmur     Hiatal hernia     History of Hoyos's esophagus     Hypotension, unspecified     IBS (irritable bowel syndrome)     Insomnia, unspecified     Jejunostomy tube present (HCC)     Low back pain     Nausea with vomiting     unspecified    Neck pain     Orthostatic hypotension     POTS (postural orthostatic tachycardia syndrome)     Premature birth     delivered at 24-27 weeks    Scoliosis     Syncope and collapse     Tachycardia     Tachycardia, unspecified     Toxic gastroenteritis and colitis          SURGICAL HISTORY       Past Surgical History:   Procedure Laterality Date    ABDOMEN SURGERY  2014    gastric stimulator implanted    ADENOIDECTOMY      ADENOIDECTOMY  2007    CHOLECYSTECTOMY      CHOLECYSTECTOMY  2016    DENTAL SURGERY  2004    wisdom teeth    EAR SURGERY  2014    left    FACIAL SURGERY  2005    GASTRIC STIMULATOR IMPLANT SURGERY  11/2014    GASTROSTOMY TUBE PLACEMENT  04/07/2015    J tube-Removed in [place] 1/2018    GASTROSTOMY TUBE PLACEMENT      G tube 2017    HYSTERECTOMY      INSERTION / REMOVAL / REPLACEMENT VENOUS ACCESS CATHETER N/A 3/25/2016    REMOVAL OF PORT AND PLACEMENT OF NEW PORT; REMOVAL OF PICC LINE  performed by Marleni Quintero MD at [address] [place] [address]  2014    left knee    KNEE SURGERY  2015    right knee    MANDIBLE SURGERY      double jaw    PHARYNGECTOMY      PORT SURGERY      port present 3-25-16    TONSILLECTOMY      TONSILLECTOMY  1997    TUBAL LIGATION      TUBAL LIGATION  2016    TUMOR REMOVAL Left     ear    TUNNELED VENOUS PORT PLACEMENT Right 05/29/15    TUNNELED VENOUS PORT PLACEMENT      UPPER GASTROINTESTINAL ENDOSCOPY  4-2-10    Dr Bhupendra Robins ENDOSCOPY  9-25-13    Dr Jose Collier  5/2015    Dr. Nadege Izaguirre  5/29/2015 S    Ultrasound-guided cannulation, right internal jugular vein. Right internal jugular vein single-lumen bard powerport placement.  VASCULAR SURGERY  9/8/15 [facility]    Right internal jugular vein portograms. Revision of right internal jugular vein single lumen port.  VASCULAR SURGERY  11/3/15 [facility]    Ultrasound-guided cannulation, left upper arm basilic vein. Left upper arm basilic vein PICC line placement bard dual-lumen PICC line. Superior vena cava venograms.  VASCULAR SURGERY  03/23/2017    [facility]. Left IJV port angiogram.Ultrasound guided cannulation of right basilic vein,right upper extremity PICC line placement bard power PICC,dual lumen.     WISDOM TOOTH EXTRACTION           CURRENT MEDICATIONS     Discharge Medication List as of 5/27/2021  2:09 PM      CONTINUE these medications which have NOT CHANGED    Details   diphenhydrAMINE (BENADRYL) 50 MG/ML injection Historical Med      famotidine (PEPCID) 40 MG/4ML injection Historical Med      sodium chloride flush 0.9 % injection Historical Med      doxycycline Monohydrate (VIBRAMYCIN) 25 MG/5ML SUSR suspension Take 20 mLs by mouth 2 times daily for 10 days, Disp-400 mL, R-0Normal      fluconazole (DIFLUCAN) 10 MG/ML suspension 15 ml via G-tube today and repeat in 3 days, Disp-30 mL, R-0Normal      sodium chloride 0.9 % SOLN 50 mL with diphenhydrAMINE 50 MG/ML SOLN 50 mg Infuse intravenously 2 times dailyHistorical Med      EPINEPHrine (EPIPEN) 0.3 MG/0.3ML SOAJ injection epinephrine 0.3 mg/0.3 mL injection, auto-injectorHistorical Med      Nutritional Supplements (JEVITY 1.5 SARAH/FIBER PO) Jevity 1.5 Sarah 0.06 gram-1.5 kcal/mL oral liquidHistorical Med      norethindrone-ethinyl estradiol (LOESTRIN FE 1/20) 1-20 MG-MCG per tablet norethindrone 1 mg-e. estradiol 20 mcg (24)-iron 75 mg (4) chew tablet   CHEW AND SWALLOW 1 TABLET BY MOUTH ONCE DAILYHistorical Med      Immune Globulin, Human,-ifas (PANZYGA) 1 GM/10ML SOLN Panzyga 10 % intravenous solutionHistorical Med      cetirizine (ZYRTEC) 10 MG tablet cetirizine 10 mg tabletHistorical Med      ciprofloxacin (CIPRO) 250 MG tablet ciprofloxacin 250 mg tabletHistorical Med      Ibuprofen-Acetaminophen 125-250 MG TABS Advil Dual Action 125 mg-250 mg tablet   Take 1 tablet every 8 hours by oral route for 30 days. Historical Med      KCl in Dextrose-NaCl (DEXTROSE 5% AND 0.45% NACL WITH KCL 20 MEQ) 20-5-0.45 MEQ/L-%-% infusion potassium chloride 20 mEq/L in dextrose 5 %-0.45 % sodium chloride IVHistorical Med      linaclotide (LINZESS) 145 MCG capsule Linzess 145 mcg capsule   TAKE 1 CAPSULE BY MOUTH ONCE DAILYHistorical Med      oxyCODONE-acetaminophen (PERCOCET) 5-325 MG per tablet oxycodone-acetaminophen 5 mg-325 mg tabletHistorical Med      silver nitrate applicators 35-38 % applicator silver nitrate applicators 75 %-20 % topical stick   APPLY 1 STRIP STICK TO GRANULATION TISSUE ONCE DAILY AS NEEDED, Historical Med      ibuprofen (ADVIL;MOTRIN) 400 MG tablet Take 500 mg by mouth every 6 hours as needed for PainHistorical Med      sodium chloride 0.9 % bolus Infuse 1,000 mLs intravenously daily With 20 meq of potassiumHistorical Med      !! HEPARIN LOCK FLUSH IV Infuse intravenouslyHistorical Med      dicyclomine (BENTYL) 10 MG capsule Take 10 mg by mouth every 8 hours as neededHistorical Med      Misc.  Devices [facility]) MISC Historical Med      Sodium Chloride (NORMAL SALINE FLUSH IJ) Inject as directed Indications: 2 liters infuse via portHistorical Med      promethazine (PHENERGAN) 25 MG/ML injection Infuse 25 mg intravenouslyHistorical Med      !! HEPARIN LOCK FLUSH IV Infuse intravenouslyHistorical Med      Heparin Sod, Porcine, in D5W (HEPARIN, PORCINE,) 100 UNIT/ML SOLN infusion Called in per  Johnny Dawson to [facility] in [address] [place] [address]. Patient RX: Heparin 100u/ml Use 3ml after each use of port. #30 RFx2- per pharmacy these flushes only come in 5ml untis, Patient is to use 3ml and waste the remaining 2ml., Disp-3 mL, R-0       !! - Potential duplicate medications found. Please discuss with provider.           ALLERGIES     Iv dye [iodides], Iv dye [iodides], Adhesive tape, Fruit & vegetable daily [nutritional supplements], Metoprolol succinate [metoprolol], Nsaids, Orange fruit [citrus], Other, Reglan [metoclopramide], Reglan [metoclopramide], Tape [adhesive tape], Tramadol, Tramadol, Zofran [ondansetron hcl], Zofran [ondansetron hcl], Orange oil, and Sulfamethoxazole-trimethoprim    FAMILY HISTORY       Family History   Problem Relation Age of Onset    Other Mother         endometriosis    Depression Father     Diabetes Paternal Grandmother     Stroke Paternal Grandmother     Hypertension Paternal Grandmother     Heart Disease Paternal Grandmother     Heart Failure Paternal Grandmother     Colon Cancer Neg Hx     Colon Polyps Neg Hx           SOCIAL HISTORY       Social History     Socioeconomic History    Marital status:      Spouse name: None    Number of children: None    Years of education: None    Highest education level: None   Occupational History    None   Tobacco Use    Smoking status: Never Smoker    Smokeless tobacco: Never Used   Vaping Use    Vaping Use: Never used   Substance and Sexual Activity    Alcohol use: Never    Drug use: Never    Sexual activity: Yes     Partners: Male   Other Topics Concern    None   Social History Narrative    ** Merged History Encounter **          Social Determinants of Health     Financial Resource Strain:     Difficulty of Paying Living Expenses:    Food Insecurity:     Worried About Running Out of Food in the Last Year:     Ran Out of Food in the Last Year:    Transportation Needs:     Lack of Transportation (Medical):  Lack of Transportation (Non-Medical):    Physical Activity:     Days of Exercise per Week:     Minutes of Exercise per Session:    Stress:     Feeling of Stress :    Social Connections:     Frequency of Communication with Friends and Family:     Frequency of Social Gatherings with Friends and Family:     Attends [employer] Services:     Active Member of Clubs or Organizations:     Attends Club or Organization Meetings:     Marital Status:    Intimate Partner Violence:     Fear of Current or Ex-Partner:     Emotionally Abused:     Physically Abused:     Sexually Abused:        SCREENINGS             PHYSICAL EXAM    (up to 7 for level 4, 8 or more for level 5)     ED Triage Vitals [05/27/21 1020]   BP Temp Temp src Pulse Resp SpO2 Height Weight   102/79 98 °F (36.7 °C) -- 115 20 97 % 5' 1\" (1.549 m) 122 lb (55.3 kg)       Physical Exam  Vitals and nursing note reviewed. HENT:      Head: Atraumatic. Mouth/Throat:      Mouth: Mucous membranes are not dry. Eyes:      General: No scleral icterus. Pupils: Pupils are equal, round, and reactive to light. Neck:      Trachea: No tracheal deviation. Cardiovascular:      Rate and Rhythm: Normal rate and regular rhythm. Heart sounds: Normal heart sounds. No murmur heard. Pulmonary:      Effort: Pulmonary effort is normal. No respiratory distress. Breath sounds: Normal breath sounds. No stridor. Abdominal:      General: There is no distension. Palpations: Abdomen is soft. Tenderness: There is no abdominal tenderness. There is no guarding. Musculoskeletal:      Right lower leg: No edema. Left lower leg: No edema. Skin:     Capillary Refill: Capillary refill takes less than 2 seconds. Coloration: Skin is not pale. Findings: No rash. Neurological:      Mental Status: She is alert and oriented to person, place, and time. Psychiatric:         Behavior: Behavior is cooperative.          DIAGNOSTIC RESULTS         LABS:  Labs Reviewed   COMPREHENSIVE METABOLIC PANEL - Abnormal; Notable for the following components:       Result Value    ALT 50 (*)     AST 39 (*)     All other components within normal limits   CBC WITH AUTO DIFFERENTIAL - Abnormal; Notable for the following components:    RBC 4.11 (*)     Hematocrit 35.6 (*)     MPV 8.7 (*)     All other components within normal limits       All other labs were within normal range or not returned as of this dictation. EMERGENCY DEPARTMENT COURSE and DIFFERENTIAL DIAGNOSIS/MDM:   Vitals:    Vitals:    05/27/21 1020 05/27/21 1240   BP: 102/79 106/69   Pulse: 115 95   Resp: 20 20   Temp: 98 °F (36.7 °C) 98 °F (36.7 °C)   SpO2: 97% 98%   Weight: 122 lb (55.3 kg)    Height: 5' 1\" (1.549 m)        MDM    Reassessment    RN flushed patient's J-tube without difficulty. PROCEDURES:  Unless otherwise noted below, none     Procedures    FINAL IMPRESSION      1.  Malfunctioning jejunostomy tube [facility])          DISPOSITION/PLAN   DISPOSITION Decision To Discharge 05/27/2021 01:51:37 PM      PATIENT REFERRED TO:  Delicia Norton MD  [address] [facility]  [phone]            DISCHARGE MEDICATIONS:  Discharge Medication List as of 5/27/2021  2:09 PM             (Please note that portions of this note were completed with a voice recognition program.  Efforts were made to edit thedictations but occasionally words are mis-transcribed.)    Benedicto Miles MD (electronically signed)  Attending Emergency Physician         Benedicto Miles MD  05/30/21 0418

## 2021-06-06 ENCOUNTER — HOSPITAL ENCOUNTER (EMERGENCY)
Age: 31
Discharge: HOME | End: 2021-06-06
Payer: MEDICAID

## 2021-06-06 ENCOUNTER — APPOINTMENT (OUTPATIENT)
Dept: CT IMAGING | Age: 31
End: 2021-06-06
Payer: MEDICAID

## 2021-06-06 VITALS
SYSTOLIC BLOOD PRESSURE: 87 MMHG | DIASTOLIC BLOOD PRESSURE: 46 MMHG | WEIGHT: 122 LBS | BODY MASS INDEX: 23.03 KG/M2 | RESPIRATION RATE: 18 BRPM | HEART RATE: 92 BPM | HEIGHT: 61 IN | TEMPERATURE: 98.4 F | OXYGEN SATURATION: 98 %

## 2021-06-06 DIAGNOSIS — R10.12 LEFT UPPER QUADRANT ABDOMINAL PAIN: Primary | ICD-10-CM

## 2021-06-06 DIAGNOSIS — N83.202 CYST OF LEFT OVARY: ICD-10-CM

## 2021-06-06 LAB
ALBUMIN SERPL-MCNC: 4 G/DL (ref 3.5–5.2)
ALP BLD-CCNC: 71 U/L (ref 35–104)
ALT SERPL-CCNC: 29 U/L (ref 5–33)
ANION GAP SERPL CALCULATED.3IONS-SCNC: 12 MMOL/L (ref 7–19)
AST SERPL-CCNC: 22 U/L (ref 5–32)
BACTERIA: NEGATIVE /HPF
BASOPHILS ABSOLUTE: 0 K/UL (ref 0–0.2)
BASOPHILS RELATIVE PERCENT: 0.9 % (ref 0–1)
BILIRUB SERPL-MCNC: <0.2 MG/DL (ref 0.2–1.2)
BILIRUBIN URINE: NEGATIVE
BLOOD, URINE: NEGATIVE
BUN BLDV-MCNC: 18 MG/DL (ref 6–20)
CALCIUM SERPL-MCNC: 8.8 MG/DL (ref 8.6–10)
CHLORIDE BLD-SCNC: 102 MMOL/L (ref 98–111)
CLARITY: CLEAR
CO2: 23 MMOL/L (ref 22–29)
COLOR: YELLOW
CREAT SERPL-MCNC: 0.6 MG/DL (ref 0.5–0.9)
CRYSTALS, UA: ABNORMAL /HPF
EOSINOPHILS ABSOLUTE: 0 K/UL (ref 0–0.6)
EOSINOPHILS RELATIVE PERCENT: 0.4 % (ref 0–5)
EPITHELIAL CELLS, UA: 5 /HPF (ref 0–5)
GFR AFRICAN AMERICAN: >59
GFR NON-AFRICAN AMERICAN: >60
GLUCOSE BLD-MCNC: 102 MG/DL (ref 74–109)
GLUCOSE URINE: NEGATIVE MG/DL
HCT VFR BLD CALC: 34.1 % (ref 37–47)
HEMOGLOBIN: 11.6 G/DL (ref 12–16)
HYALINE CASTS: 4 /HPF (ref 0–8)
IMMATURE GRANULOCYTES #: 0 K/UL
KETONES, URINE: 40 MG/DL
LEUKOCYTE ESTERASE, URINE: ABNORMAL
LYMPHOCYTES ABSOLUTE: 1.5 K/UL (ref 1.1–4.5)
LYMPHOCYTES RELATIVE PERCENT: 32.5 % (ref 20–40)
MCH RBC QN AUTO: 29.6 PG (ref 27–31)
MCHC RBC AUTO-ENTMCNC: 34 G/DL (ref 33–37)
MCV RBC AUTO: 87 FL (ref 81–99)
MONOCYTES ABSOLUTE: 0.4 K/UL (ref 0–0.9)
MONOCYTES RELATIVE PERCENT: 8.7 % (ref 0–10)
NEUTROPHILS ABSOLUTE: 2.7 K/UL (ref 1.5–7.5)
NEUTROPHILS RELATIVE PERCENT: 57.3 % (ref 50–65)
NITRITE, URINE: NEGATIVE
PDW BLD-RTO: 12.8 % (ref 11.5–14.5)
PH UA: 6 (ref 5–8)
PLATELET # BLD: 274 K/UL (ref 130–400)
PMV BLD AUTO: 9.2 FL (ref 9.4–12.3)
POTASSIUM REFLEX MAGNESIUM: 3.8 MMOL/L (ref 3.5–5)
PROTEIN UA: 30 MG/DL
RBC # BLD: 3.92 M/UL (ref 4.2–5.4)
RBC UA: 2 /HPF (ref 0–4)
SODIUM BLD-SCNC: 137 MMOL/L (ref 136–145)
SPECIFIC GRAVITY UA: 1.02 (ref 1–1.03)
TOTAL PROTEIN: 6.7 G/DL (ref 6.6–8.7)
UROBILINOGEN, URINE: 1 E.U./DL
WBC # BLD: 4.6 K/UL (ref 4.8–10.8)
WBC UA: 5 /HPF (ref 0–5)

## 2021-06-06 PROCEDURE — 80053 COMPREHEN METABOLIC PANEL: CPT

## 2021-06-06 PROCEDURE — 96375 TX/PRO/DX INJ NEW DRUG ADDON: CPT

## 2021-06-06 PROCEDURE — 2580000003 HC RX 258: Performed by: NURSE PRACTITIONER

## 2021-06-06 PROCEDURE — 2500000003 HC RX 250 WO HCPCS: Performed by: NURSE PRACTITIONER

## 2021-06-06 PROCEDURE — 81001 URINALYSIS AUTO W/SCOPE: CPT

## 2021-06-06 PROCEDURE — 74176 CT ABD & PELVIS W/O CONTRAST: CPT

## 2021-06-06 PROCEDURE — 96374 THER/PROPH/DIAG INJ IV PUSH: CPT

## 2021-06-06 PROCEDURE — 85025 COMPLETE CBC W/AUTO DIFF WBC: CPT

## 2021-06-06 PROCEDURE — 6360000002 HC RX W HCPCS: Performed by: NURSE PRACTITIONER

## 2021-06-06 PROCEDURE — 36415 COLL VENOUS BLD VENIPUNCTURE: CPT

## 2021-06-06 PROCEDURE — 99283 EMERGENCY DEPT VISIT LOW MDM: CPT

## 2021-06-06 RX ORDER — PROMETHAZINE HYDROCHLORIDE 25 MG/ML
25 INJECTION, SOLUTION INTRAMUSCULAR; INTRAVENOUS ONCE
Status: COMPLETED | OUTPATIENT
Start: 2021-06-06 | End: 2021-06-06

## 2021-06-06 RX ORDER — DIPHENHYDRAMINE HYDROCHLORIDE 50 MG/ML
25 INJECTION INTRAMUSCULAR; INTRAVENOUS ONCE
Status: COMPLETED | OUTPATIENT
Start: 2021-06-06 | End: 2021-06-06

## 2021-06-06 RX ORDER — MORPHINE SULFATE 4 MG/ML
2 INJECTION, SOLUTION INTRAMUSCULAR; INTRAVENOUS ONCE
Status: COMPLETED | OUTPATIENT
Start: 2021-06-06 | End: 2021-06-06

## 2021-06-06 RX ORDER — SODIUM CHLORIDE, SODIUM LACTATE, POTASSIUM CHLORIDE, CALCIUM CHLORIDE 600; 310; 30; 20 MG/100ML; MG/100ML; MG/100ML; MG/100ML
1000 INJECTION, SOLUTION INTRAVENOUS ONCE
Status: COMPLETED | OUTPATIENT
Start: 2021-06-06 | End: 2021-06-06

## 2021-06-06 RX ADMIN — FAMOTIDINE 20 MG: 10 INJECTION, SOLUTION INTRAVENOUS at 20:25

## 2021-06-06 RX ADMIN — DIPHENHYDRAMINE HYDROCHLORIDE 25 MG: 50 INJECTION INTRAMUSCULAR; INTRAVENOUS at 20:25

## 2021-06-06 RX ADMIN — MORPHINE SULFATE 2 MG: 4 INJECTION, SOLUTION INTRAMUSCULAR; INTRAVENOUS at 20:25

## 2021-06-06 RX ADMIN — SODIUM CHLORIDE, POTASSIUM CHLORIDE, SODIUM LACTATE AND CALCIUM CHLORIDE 1000 ML: 600; 310; 30; 20 INJECTION, SOLUTION INTRAVENOUS at 20:24

## 2021-06-06 RX ADMIN — PROMETHAZINE HYDROCHLORIDE 25 MG: 25 INJECTION INTRAMUSCULAR; INTRAVENOUS at 20:25

## 2021-06-06 ASSESSMENT — PAIN SCALES - GENERAL
PAINLEVEL_OUTOF10: 9
PAINLEVEL_OUTOF10: 8

## 2021-06-06 ASSESSMENT — ENCOUNTER SYMPTOMS
NAUSEA: 1
ABDOMINAL PAIN: 1

## 2021-06-07 NOTE — ED PROVIDER NOTES
abdominal pain     GERD (gastroesophageal reflux disease)     Heart murmur     Hiatal hernia     History of Hoyos's esophagus     Hypotension, unspecified     IBS (irritable bowel syndrome)     Insomnia, unspecified     Jejunostomy tube present (HCC)     Low back pain     Nausea with vomiting     unspecified    Neck pain     Orthostatic hypotension     POTS (postural orthostatic tachycardia syndrome)     Premature birth     delivered at 24-27 weeks    Scoliosis     Syncope and collapse     Tachycardia     Tachycardia, unspecified     Toxic gastroenteritis and colitis          SURGICAL HISTORY       Past Surgical History:   Procedure Laterality Date    ABDOMEN SURGERY  2014    gastric stimulator implanted    ADENOIDECTOMY      ADENOIDECTOMY  2007    CHOLECYSTECTOMY      CHOLECYSTECTOMY  2016    DENTAL SURGERY  2004    wisdom teeth    EAR SURGERY  2014    left    FACIAL SURGERY  2005    GASTRIC STIMULATOR IMPLANT SURGERY  11/2014    GASTROSTOMY TUBE PLACEMENT  04/07/2015    J tube-Removed in [place] 1/2018    GASTROSTOMY TUBE PLACEMENT      G tube 2017    HYSTERECTOMY      INSERTION / REMOVAL / REPLACEMENT VENOUS ACCESS CATHETER N/A 3/25/2016    REMOVAL OF PORT AND PLACEMENT OF NEW PORT; REMOVAL OF PICC LINE  performed by Sabra Prince MD at [address] [place] [address]  2014    left knee    KNEE SURGERY  2015    right knee    MANDIBLE SURGERY      double jaw    PHARYNGECTOMY      PORT SURGERY      port present 3-25-16    TONSILLECTOMY      TONSILLECTOMY  1997    TUBAL LIGATION      TUBAL LIGATION  2016    TUMOR REMOVAL Left     ear    TUNNELED VENOUS PORT PLACEMENT Right 05/29/15    TUNNELED VENOUS PORT PLACEMENT      UPPER GASTROINTESTINAL ENDOSCOPY  4-2-10    Dr Shepherd Abo ENDOSCOPY  9-25-13    Dr Mira Napoles  5/2015    Dr. Boyce Foot  5/29/2015 [facility]    Ultrasound-guided cannulation, right internal jugular vein. Right internal jugular vein single-lumen bard powerport placement.  VASCULAR SURGERY  9/8/15 S    Right internal jugular vein portograms. Revision of right internal jugular vein single lumen port.  VASCULAR SURGERY  11/3/15 S    Ultrasound-guided cannulation, left upper arm basilic vein. Left upper arm basilic vein PICC line placement bard dual-lumen PICC line. Superior vena cava venograms.  VASCULAR SURGERY  03/23/2017    [facility]. Left IJV port angiogram.Ultrasound guided cannulation of right basilic vein,right upper extremity PICC line placement bard power PICC,dual lumen.     WISDOM TOOTH EXTRACTION           CURRENT MEDICATIONS       Discharge Medication List as of 6/6/2021 10:53 PM      CONTINUE these medications which have NOT CHANGED    Details   diphenhydrAMINE (BENADRYL) 50 MG/ML injection Historical Med      famotidine (PEPCID) 40 MG/4ML injection Historical Med      sodium chloride flush 0.9 % injection Historical Med      fluconazole (DIFLUCAN) 10 MG/ML suspension 15 ml via G-tube today and repeat in 3 days, Disp-30 mL, R-0Normal      sodium chloride 0.9 % SOLN 50 mL with diphenhydrAMINE 50 MG/ML SOLN 50 mg Infuse intravenously 2 times dailyHistorical Med      EPINEPHrine (EPIPEN) 0.3 MG/0.3ML SOAJ injection epinephrine 0.3 mg/0.3 mL injection, auto-injectorHistorical Med      Nutritional Supplements (JEVITY 1.5 ELLIOTT/FIBER PO) Jevity 1.5 Elliott 0.06 gram-1.5 kcal/mL oral liquidHistorical Med      norethindrone-ethinyl estradiol (LOESTRIN FE 1/20) 1-20 MG-MCG per tablet norethindrone 1 mg-e. estradiol 20 mcg (24)-iron 75 mg (4) chew tablet   CHEW AND SWALLOW 1 TABLET BY MOUTH ONCE DAILYHistorical Med      Immune Globulin, Human,-ifas (PANZYGA) 1 GM/10ML SOLN Panzyga 10 % intravenous solutionHistorical Med      cetirizine (ZYRTEC) 10 MG tablet cetirizine 10 mg tabletHistorical Med      ciprofloxacin (CIPRO) 250 MG tablet ciprofloxacin 250 mg tabletHistorical Med Ibuprofen-Acetaminophen 125-250 MG TABS Advil Dual Action 125 mg-250 mg tablet   Take 1 tablet every 8 hours by oral route for 30 days. Historical Med      KCl in Dextrose-NaCl (DEXTROSE 5% AND 0.45% NACL WITH KCL 20 MEQ) 20-5-0.45 MEQ/L-%-% infusion potassium chloride 20 mEq/L in dextrose 5 %-0.45 % sodium chloride IVHistorical Med      linaclotide (LINZESS) 145 MCG capsule Linzess 145 mcg capsule   TAKE 1 CAPSULE BY MOUTH ONCE DAILYHistorical Med      oxyCODONE-acetaminophen (PERCOCET) 5-325 MG per tablet oxycodone-acetaminophen 5 mg-325 mg tabletHistorical Med      silver nitrate applicators 26-85 % applicator silver nitrate applicators 75 %-21 % topical stick   APPLY 1 STRIP STICK TO GRANULATION TISSUE ONCE DAILY AS NEEDED, Historical Med      ibuprofen (ADVIL;MOTRIN) 400 MG tablet Take 500 mg by mouth every 6 hours as needed for PainHistorical Med      sodium chloride 0.9 % bolus Infuse 1,000 mLs intravenously daily With 20 meq of potassiumHistorical Med      !! HEPARIN LOCK FLUSH IV Infuse intravenouslyHistorical Med      dicyclomine (BENTYL) 10 MG capsule Take 10 mg by mouth every 8 hours as neededHistorical Med      Misc. Devices [facility] MISC Historical Med      Sodium Chloride (NORMAL SALINE FLUSH IJ) Inject as directed Indications: 2 liters infuse via portHistorical Med      promethazine (PHENERGAN) 25 MG/ML injection Infuse 25 mg intravenouslyHistorical Med      !! HEPARIN LOCK FLUSH IV Infuse intravenouslyHistorical Med      Heparin Sod, Porcine, in D5W (HEPARIN, PORCINE,) 100 UNIT/ML SOLN infusion Called in per Dr. Ijeoma Araya to [address] Patient RX: Heparin 100u/ml Use 3ml after each use of port. #30 RFx2- per pharmacy these flushes only come in 5ml untis, Patient is to use 3ml and waste the remaining 2ml., Disp-3 mL, R-0       !! - Potential duplicate medications found. Please discuss with provider.           ALLERGIES     Iv dye [iodides], Iv dye [iodides], Adhesive tape, Fruit & vegetable daily [nutritional supplements], Metoprolol succinate [metoprolol], Nsaids, Orange fruit [citrus], Other, Reglan [metoclopramide], Reglan [metoclopramide], Tape [adhesive tape], Tramadol, Tramadol, Zofran [ondansetron hcl], Zofran [ondansetron hcl], Orange oil, and Sulfamethoxazole-trimethoprim    FAMILY HISTORY       Family History   Problem Relation Age of Onset    Other Mother         endometriosis    Depression Father     Diabetes Paternal Grandmother     Stroke Paternal Grandmother     Hypertension Paternal Grandmother     Heart Disease Paternal Grandmother     Heart Failure Paternal Grandmother     Colon Cancer Neg Hx     Colon Polyps Neg Hx           SOCIAL HISTORY       Social History     Socioeconomic History    Marital status:      Spouse name: Not on file    Number of children: Not on file    Years of education: Not on file    Highest education level: Not on file   Occupational History    Not on file   Tobacco Use    Smoking status: Never Smoker    Smokeless tobacco: Never Used   Vaping Use    Vaping Use: Never used   Substance and Sexual Activity    Alcohol use: Never    Drug use: Never    Sexual activity: Yes     Partners: Male   Other Topics Concern    Not on file   Social History Narrative    ** Merged History Encounter **          Social Determinants of Health     Financial Resource Strain:     Difficulty of Paying Living Expenses:    Food Insecurity:     Worried About Running Out of Food in the Last Year:     [address] in the Last Year:    Transportation Needs:     Lack of Transportation (Medical):      Lack of Transportation (Non-Medical):    Physical Activity:     Days of Exercise per Week:     Minutes of Exercise per Session:    Stress:     Feeling of Stress :    Social Connections:     Frequency of Communication with Friends and Family:     Frequency of Social Gatherings with Friends and Family:     Attends [employer] Services:     Active Member of Clubs or Organizations:     Attends Club or Organization Meetings:     Marital Status:    Intimate Partner Violence:     Fear of Current or Ex-Partner:     Emotionally Abused:     Physically Abused:     Sexually Abused:        SCREENINGS    Leandro Coma Scale  Eye Opening: Spontaneous  Best Verbal Response: Oriented  Best Motor Response: Obeys commands  Transfer Coma Scale Score: 15        PHYSICAL EXAM    (up to 7 for level 4, 8 or more for level 5)     ED Triage Vitals [06/06/21 1937]   BP Temp Temp src Pulse Resp SpO2 Height Weight   (!) 125/97 98.4 °F (36.9 °C) -- 67 18 97 % 5' 1\" (1.549 m) 122 lb (55.3 kg)       Physical Exam  Vitals reviewed. Constitutional:       Comments: Nontoxic, well hydrated appearing 32 yr old female   HENT:      Head: Normocephalic. Right Ear: External ear normal.      Left Ear: External ear normal.   Eyes:      Conjunctiva/sclera: Conjunctivae normal.      Pupils: Pupils are equal, round, and reactive to light. Cardiovascular:      Rate and Rhythm: Normal rate and regular rhythm. Heart sounds: Normal heart sounds. Pulmonary:      Effort: Pulmonary effort is normal.      Breath sounds: Normal breath sounds. Abdominal:      General: Bowel sounds are normal.      Palpations: Abdomen is soft. Tenderness: There is abdominal tenderness (Jtube LUQ) in the left upper quadrant. Musculoskeletal:         General: Normal range of motion. Cervical back: Normal range of motion. Skin:     General: Skin is warm and dry. Neurological:      Mental Status: She is alert and oriented to person, place, and time.          DIAGNOSTIC RESULTS     EKG: All EKG's are interpreted by the Emergency Department Physician who either signs or Co-signs this chart in the absence of acardiologist.        RADIOLOGY:   Non-plain film images such as CT, Ultrasound andMRI are read by the radiologist. Plain radiographic images are visualized and preliminarily interpreted by the emergency physician with the below findings:        Interpretation per the Radiologist below, if available at the time of this note:    CT ABDOMEN PELVIS WO CONTRAST Additional Contrast? None   Final Result   1. Prior cholecystectomy and hysterectomy. 2. Food material in the stomach. Gastric stimulator wires are noted. There is a jejunostomy tube. 3. There is no evidence of bowel obstruction. There is moderate stool   in the colon. 4. There is a 4 cm left ovarian cyst.   5. Other nonacute findings, as discussed above. Signed by Dr Valentin Collado on 6/6/2021 8:45 PM            ED BEDSIDE ULTRASOUND:   Performed by ED Physician - none    LABS:  Labs Reviewed   CBC WITH AUTO DIFFERENTIAL - Abnormal; Notable for the following components:       Result Value    WBC 4.6 (*)     RBC 3.92 (*)     Hemoglobin 11.6 (*)     Hematocrit 34.1 (*)     MPV 9.2 (*)     All other components within normal limits   URINE RT REFLEX TO CULTURE - Abnormal; Notable for the following components:    Ketones, Urine 40 (*)     Protein, UA 30 (*)     Leukocyte Esterase, Urine SMALL (*)     All other components within normal limits   MICROSCOPIC URINALYSIS - Abnormal; Notable for the following components:    Bacteria, UA NEGATIVE (*)     Crystals, UA NEG (*)     All other components within normal limits   COMPREHENSIVE METABOLIC PANEL W/ REFLEX TO MG FOR LOW K       All other labs were within normal range or not returned as of this dictation. RE-ASSESSMENT           EMERGENCY DEPARTMENT COURSE and DIFFERENTIALDIAGNOSIS/MDM:   Vitals:    Vitals:    06/06/21 1937 06/06/21 2301   BP: (!) 125/97 (!) 87/46   Pulse: 67 92   Resp: 18 18   Temp: 98.4 °F (36.9 °C)    SpO2: 97% 98%   Weight: 122 lb (55.3 kg)    Height: 5' 1\" (1.549 m)        MDM      CONSULTS:  None    PROCEDURES:  Unless otherwise notedbelow, none     Procedures    FINAL IMPRESSION     1. Left upper quadrant abdominal pain    2.  Cyst of left ovary sodium chloride flush 0.9 % injection     Wheelchair Misc         * This list has 4 medication(s) that are the same as other medications prescribed for you. Read the directions carefully, and ask your doctor or other care provider to review them with you.                   (Pleasenote that portions of this note were completed with a voice recognition program.  Efforts were made to edit the dictations but occasionally words are mis-transcribed.)              Isabella Reynolds, SHRUTHI  06/07/21 0207

## 2021-06-16 ENCOUNTER — TELEPHONE (OUTPATIENT)
Dept: ADMINISTRATIVE | Age: 31
End: 2021-06-16

## 2021-06-16 NOTE — TELEPHONE ENCOUNTER
Spoke with patient to inform her that Dr Jacqueline Phelps is not accepting new patients at this time. I asked her would she rather see a female provider she states it doesn't matter but she hasn't seen Dr Lupe Miguel yet. I informed her that I would leave her on Dr Lupe Miguel schedule for 7/9/21 @ 140. She verbalized understanding and said ok.

## 2021-06-16 NOTE — TELEPHONE ENCOUNTER
Gabriele Rosales called to request appointment with Carolynn Foss. She has scheduled appt on 7/9 new patient with Dr Michelle Richardson but would like to switch. If approved, please call her to schedule @ [phone]. Thank you.

## 2021-06-22 ENCOUNTER — HOSPITAL ENCOUNTER (EMERGENCY)
Age: 31
Discharge: HOME | End: 2021-06-22
Payer: MEDICAID

## 2021-06-22 VITALS
RESPIRATION RATE: 18 BRPM | BODY MASS INDEX: 23.05 KG/M2 | HEART RATE: 87 BPM | WEIGHT: 122 LBS | DIASTOLIC BLOOD PRESSURE: 70 MMHG | OXYGEN SATURATION: 97 % | SYSTOLIC BLOOD PRESSURE: 103 MMHG | TEMPERATURE: 98.4 F

## 2021-06-22 DIAGNOSIS — R10.84 GENERALIZED ABDOMINAL PAIN: ICD-10-CM

## 2021-06-22 DIAGNOSIS — T82.514A HICKMAN CATHETER DYSFUNCTION, INITIAL ENCOUNTER (HCC): Primary | ICD-10-CM

## 2021-06-22 LAB
ALBUMIN SERPL-MCNC: 4.2 G/DL (ref 3.5–5.2)
ALP BLD-CCNC: 65 U/L (ref 35–104)
ALT SERPL-CCNC: 15 U/L (ref 5–33)
ANION GAP SERPL CALCULATED.3IONS-SCNC: 10 MMOL/L (ref 7–19)
AST SERPL-CCNC: 20 U/L (ref 5–32)
BASOPHILS ABSOLUTE: 0.1 K/UL (ref 0–0.2)
BASOPHILS RELATIVE PERCENT: 0.7 % (ref 0–1)
BILIRUB SERPL-MCNC: <0.2 MG/DL (ref 0.2–1.2)
BUN BLDV-MCNC: 14 MG/DL (ref 6–20)
CALCIUM SERPL-MCNC: 9.3 MG/DL (ref 8.6–10)
CHLORIDE BLD-SCNC: 103 MMOL/L (ref 98–111)
CO2: 26 MMOL/L (ref 22–29)
CREAT SERPL-MCNC: 0.6 MG/DL (ref 0.5–0.9)
EOSINOPHILS ABSOLUTE: 0 K/UL (ref 0–0.6)
EOSINOPHILS RELATIVE PERCENT: 0.4 % (ref 0–5)
GFR AFRICAN AMERICAN: >59
GFR NON-AFRICAN AMERICAN: >60
GLUCOSE BLD-MCNC: 118 MG/DL (ref 74–109)
HCT VFR BLD CALC: 36.6 % (ref 37–47)
HEMOGLOBIN: 12.3 G/DL (ref 12–16)
IMMATURE GRANULOCYTES #: 0.1 K/UL
LIPASE: 43 U/L (ref 13–60)
LYMPHOCYTES ABSOLUTE: 1.6 K/UL (ref 1.1–4.5)
LYMPHOCYTES RELATIVE PERCENT: 21 % (ref 20–40)
MCH RBC QN AUTO: 29.2 PG (ref 27–31)
MCHC RBC AUTO-ENTMCNC: 33.6 G/DL (ref 33–37)
MCV RBC AUTO: 86.9 FL (ref 81–99)
MONOCYTES ABSOLUTE: 0.4 K/UL (ref 0–0.9)
MONOCYTES RELATIVE PERCENT: 5.6 % (ref 0–10)
NEUTROPHILS ABSOLUTE: 5.5 K/UL (ref 1.5–7.5)
NEUTROPHILS RELATIVE PERCENT: 71.6 % (ref 50–65)
PDW BLD-RTO: 13 % (ref 11.5–14.5)
PLATELET # BLD: 320 K/UL (ref 130–400)
PMV BLD AUTO: 8.9 FL (ref 9.4–12.3)
POTASSIUM REFLEX MAGNESIUM: 4.4 MMOL/L (ref 3.5–5)
RBC # BLD: 4.21 M/UL (ref 4.2–5.4)
SODIUM BLD-SCNC: 139 MMOL/L (ref 136–145)
TOTAL PROTEIN: 6.9 G/DL (ref 6.6–8.7)
WBC # BLD: 7.6 K/UL (ref 4.8–10.8)

## 2021-06-22 PROCEDURE — 83690 ASSAY OF LIPASE: CPT

## 2021-06-22 PROCEDURE — 96361 HYDRATE IV INFUSION ADD-ON: CPT

## 2021-06-22 PROCEDURE — 80053 COMPREHEN METABOLIC PANEL: CPT

## 2021-06-22 PROCEDURE — 99283 EMERGENCY DEPT VISIT LOW MDM: CPT

## 2021-06-22 PROCEDURE — 36415 COLL VENOUS BLD VENIPUNCTURE: CPT

## 2021-06-22 PROCEDURE — 96374 THER/PROPH/DIAG INJ IV PUSH: CPT

## 2021-06-22 PROCEDURE — 85025 COMPLETE CBC W/AUTO DIFF WBC: CPT

## 2021-06-22 PROCEDURE — 6360000002 HC RX W HCPCS: Performed by: NURSE PRACTITIONER

## 2021-06-22 PROCEDURE — 2580000003 HC RX 258: Performed by: NURSE PRACTITIONER

## 2021-06-22 RX ORDER — 0.9 % SODIUM CHLORIDE 0.9 %
1000 INTRAVENOUS SOLUTION INTRAVENOUS ONCE
Status: COMPLETED | OUTPATIENT
Start: 2021-06-22 | End: 2021-06-22

## 2021-06-22 RX ORDER — PROMETHAZINE HYDROCHLORIDE 25 MG/ML
25 INJECTION, SOLUTION INTRAMUSCULAR; INTRAVENOUS ONCE
Status: COMPLETED | OUTPATIENT
Start: 2021-06-22 | End: 2021-06-22

## 2021-06-22 RX ADMIN — ALTEPLASE 1 MG: 2.2 INJECTION, POWDER, LYOPHILIZED, FOR SOLUTION INTRAVENOUS at 11:59

## 2021-06-22 RX ADMIN — PROMETHAZINE HYDROCHLORIDE 25 MG: 25 INJECTION INTRAMUSCULAR; INTRAVENOUS at 11:56

## 2021-06-22 RX ADMIN — SODIUM CHLORIDE 1000 ML: 9 INJECTION, SOLUTION INTRAVENOUS at 11:56

## 2021-06-22 RX ADMIN — ALTEPLASE 2 MG: 2.2 INJECTION, POWDER, LYOPHILIZED, FOR SOLUTION INTRAVENOUS at 11:07

## 2021-06-22 ASSESSMENT — PAIN DESCRIPTION - LOCATION: LOCATION: ABDOMEN

## 2021-06-22 ASSESSMENT — ENCOUNTER SYMPTOMS
NAUSEA: 1
ABDOMINAL PAIN: 1
SHORTNESS OF BREATH: 0

## 2021-06-22 ASSESSMENT — PAIN SCALES - GENERAL: PAINLEVEL_OUTOF10: 7

## 2021-06-22 ASSESSMENT — PAIN DESCRIPTION - PAIN TYPE: TYPE: ACUTE PAIN

## 2021-06-22 NOTE — ED PROVIDER NOTES
depression     Anxiety disorder     unspecified    Bacteremia     Cellulitis of abdominal wall     Chronic fatigue, unspecified     Dysuria     Epigastric pain     Frequency of micturition     G tube feedings (HCC)     Gastroparesis     Dr. Tete Love in [place] manages    Gastroparesis     Gastrostomy malfunction ([address]     Generalized abdominal pain     GERD (gastroesophageal reflux disease)     Heart murmur     Hiatal hernia     History of Hoyos's esophagus     Hypotension, unspecified     IBS (irritable bowel syndrome)     Insomnia, unspecified     Jejunostomy tube present (HCC)     Low back pain     Nausea with vomiting     unspecified    Neck pain     Orthostatic hypotension     POTS (postural orthostatic tachycardia syndrome)     Premature birth     delivered at 24-27 weeks    Scoliosis     Syncope and collapse     Tachycardia     Tachycardia, unspecified     Toxic gastroenteritis and colitis          SURGICAL HISTORY       Past Surgical History:   Procedure Laterality Date    ABDOMEN SURGERY  2014    gastric stimulator implanted    ADENOIDECTOMY      ADENOIDECTOMY  2007    CHOLECYSTECTOMY      CHOLECYSTECTOMY  2016    DENTAL SURGERY  2004    wisdom teeth    EAR SURGERY  2014    left    FACIAL SURGERY  2005    GASTRIC STIMULATOR IMPLANT SURGERY  11/2014    GASTROSTOMY TUBE PLACEMENT  04/07/2015    J tube-Removed in [place] 1/2018    GASTROSTOMY TUBE PLACEMENT      G tube 2017    HYSTERECTOMY      INSERTION / REMOVAL / REPLACEMENT VENOUS ACCESS CATHETER N/A 3/25/2016    REMOVAL OF PORT AND PLACEMENT OF NEW PORT; REMOVAL OF PICC LINE  performed by Serina Tao MD at [facility]  2014    left knee    KNEE SURGERY  2015    right knee    MANDIBLE SURGERY      double jaw    PHARYNGECTOMY      PORT SURGERY      port present 3-25-16    TONSILLECTOMY      TONSILLECTOMY  1997    TUBAL LIGATION      TUBAL LIGATION  2016    TUMOR REMOVAL Left     ear    TUNNELED VENOUS PORT PLACEMENT Right 05/29/15    TUNNELED VENOUS PORT PLACEMENT      UPPER GASTROINTESTINAL ENDOSCOPY  4-2-10    Dr Sampson Daily ENDOSCOPY  9-25-13    Dr Baird March 5/2015    Dr. Hector Proper  5/29/2015 [facility]    Ultrasound-guided cannulation, right internal jugular vein. Right internal jugular vein single-lumen bard powerport placement.  VASCULAR SURGERY  9/8/15 [facility]    Right internal jugular vein portograms. Revision of right internal jugular vein single lumen port.  VASCULAR SURGERY  11/3/15 S    Ultrasound-guided cannulation, left upper arm basilic vein. Left upper arm basilic vein PICC line placement bard dual-lumen PICC line. Superior vena cava venograms.  VASCULAR SURGERY  03/23/2017    [facility]. Left IJV port angiogram.Ultrasound guided cannulation of right basilic vein,right upper extremity PICC line placement bard power PICC,dual lumen.     WISDOM TOOTH EXTRACTION           CURRENT MEDICATIONS       Discharge Medication List as of 6/22/2021  1:44 PM      CONTINUE these medications which have NOT CHANGED    Details   diphenhydrAMINE (BENADRYL) 50 MG/ML injection Historical Med      famotidine (PEPCID) 40 MG/4ML injection Historical Med      sodium chloride flush 0.9 % injection Historical Med      fluconazole (DIFLUCAN) 10 MG/ML suspension 15 ml via G-tube today and repeat in 3 days, Disp-30 mL, R-0Normal      sodium chloride 0.9 % SOLN 50 mL with diphenhydrAMINE 50 MG/ML SOLN 50 mg Infuse intravenously 2 times dailyHistorical Med      EPINEPHrine (EPIPEN) 0.3 MG/0.3ML SOAJ injection epinephrine 0.3 mg/0.3 mL injection, auto-injectorHistorical Med      Nutritional Supplements (JEVITY 1.5 ELLIOTT/FIBER PO) Jevity 1.5 Elliott 0.06 gram-1.5 kcal/mL oral liquidHistorical Med      norethindrone-ethinyl estradiol (LOESTRIN FE 1/20) 1-20 MG-MCG per tablet norethindrone 1 mg-e. estradiol 20 mcg (24)-iron 75 mg Carmen. Patient RX: Heparin 100u/ml Use 3ml after each use of port. #30 RFx2- per pharmacy these flushes only come in 5ml untis, Patient is to use 3ml and waste the remaining 2ml., Disp-3 mL, R-0       !! - Potential duplicate medications found. Please discuss with provider. ALLERGIES     Iv dye [iodides], Iv dye [iodides], Adhesive tape, Fruit & vegetable daily [nutritional supplements], Metoprolol succinate [metoprolol], Nsaids, Orange fruit [citrus], Other, Reglan [metoclopramide], Reglan [metoclopramide], Tape [adhesive tape], Tramadol, Tramadol, Zofran [ondansetron hcl], Zofran [ondansetron hcl], Orange oil, and Sulfamethoxazole-trimethoprim    FAMILY HISTORY       Family History   Problem Relation Age of Onset    Other Mother         endometriosis    Depression Father     Diabetes Paternal Grandmother     Stroke Paternal Grandmother     Hypertension Paternal Grandmother     Heart Disease Paternal Grandmother     Heart Failure Paternal Grandmother     Colon Cancer Neg Hx     Colon Polyps Neg Hx           SOCIAL HISTORY       Social History     Socioeconomic History    Marital status:      Spouse name: None    Number of children: None    Years of education: None    Highest education level: None   Occupational History    None   Tobacco Use    Smoking status: Never Smoker    Smokeless tobacco: Never Used   Vaping Use    Vaping Use: Never used   Substance and Sexual Activity    Alcohol use: Never    Drug use: Never    Sexual activity: Yes     Partners: Male   Other Topics Concern    None   Social History Narrative    ** Merged History Encounter **          Social Determinants of Health     Financial Resource Strain:     Difficulty of Paying Living Expenses:    Food Insecurity:     Worried About Running Out of Food in the Last Year:     Ran Out of Food in the Last Year:    Transportation Needs:     Lack of Transportation (Medical):      Lack of Transportation (Non-Medical):    Physical Activity:     Days of Exercise per Week:     Minutes of Exercise per Session:    Stress:     Feeling of Stress :    Social Connections:     Frequency of Communication with Friends and Family:     Frequency of Social Gatherings with Friends and Family:     Attends Jainism Services:     Active Member of Clubs or Organizations:     Attends Club or Organization Meetings:     Marital Status:    Intimate Partner Violence:     Fear of Current or Ex-Partner:     Emotionally Abused:     Physically Abused:     Sexually Abused:        SCREENINGS             PHYSICAL EXAM    (up to 7 for level 4, 8 or more for level 5)     ED Triage Vitals [06/22/21 0925]   BP Temp Temp src Pulse Resp SpO2 Height Weight   139/85 98.4 °F (36.9 °C) -- 114 18 100 % -- 122 lb (55.3 kg)       Physical Exam  Vitals reviewed. HENT:      Head: Normocephalic. Right Ear: External ear normal.      Left Ear: External ear normal.   Eyes:      Conjunctiva/sclera: Conjunctivae normal.      Pupils: Pupils are equal, round, and reactive to light. Cardiovascular:      Rate and Rhythm: Normal rate and regular rhythm. Heart sounds: Normal heart sounds. Pulmonary:      Effort: Pulmonary effort is normal.      Breath sounds: Normal breath sounds. Abdominal:      General: Abdomen is flat. Bowel sounds are normal.      Palpations: Abdomen is soft. Comments: Left side of abdomen with button type feeding tube   Musculoskeletal:         General: Normal range of motion. Cervical back: Normal range of motion. Skin:     General: Skin is warm and dry. Neurological:      Mental Status: She is alert and oriented to person, place, and time.          DIAGNOSTIC RESULTS     EKG: All EKG's are interpreted by the Emergency Department Physician who either signs or Co-signs this chart in the absence of acardiologist.        RADIOLOGY:   Non-plain film images such as CT, Ultrasound andMRI are read by the radiologist. Tyler Malo radiographic images are visualized and preliminarily interpreted by the emergency physician with the below findings:        Interpretation per the Radiologist below, if available at the time of this note:    No orders to display         ED BEDSIDE ULTRASOUND:   Performed by ED Physician - none    LABS:  Labs Reviewed   CBC WITH AUTO DIFFERENTIAL - Abnormal; Notable for the following components:       Result Value    Hematocrit 36.6 (*)     MPV 8.9 (*)     Neutrophils % 71.6 (*)     All other components within normal limits   COMPREHENSIVE METABOLIC PANEL W/ REFLEX TO MG FOR LOW K - Abnormal; Notable for the following components:    Glucose 118 (*)     All other components within normal limits   LIPASE       All other labs were within normal range or not returned as of this dictation. RE-ASSESSMENT     Pt has had alteplase (Cathflo) infection with Munguia cath reportedly now functioning correctly. Pt remains in no distress at discharge. EMERGENCY DEPARTMENT COURSE and DIFFERENTIALDIAGNOSIS/MDM:   Vitals:    Vitals:    06/22/21 0925 06/22/21 1109   BP: 139/85 103/70   Pulse: 114 87   Resp: 18 18   Temp: 98.4 °F (36.9 °C)    SpO2: 100% 97%   Weight: 122 lb (55.3 kg)        MDM      CONSULTS:  None    PROCEDURES:  Unless otherwise notedbelow, none     Procedures    FINAL IMPRESSION     1. Munguia catheter dysfunction, initial encounter ([facility] [address]    2.  Generalized abdominal pain          DISPOSITION/PLAN   DISPOSITION Decision To Discharge 06/22/2021 01:43:54 PM      PATIENT REFERRED TO:  Oli Coffman MD  [id number] [facility]  [phone]    Schedule an appointment as soon as possible for a visit   As needed      DISCHARGE MEDICATIONS:       Discharge Medication List as of 6/22/2021  1:44 PM          (Pleasenote that portions of this note were completed with a voice recognition program.  Efforts were made to edit the dictations but occasionally words are mis-transcribed.)              Austyn Kramer, APRN  06/22/21 4405

## 2021-06-22 NOTE — ED TRIAGE NOTES
Pt here with abd pain following a J tube change friday.  Pt also states that her Munguia is not giving blood return since Thursday

## 2021-07-08 PROBLEM — Z86.19 HISTORY OF INFECTION DUE TO HUMAN PAPILLOMA VIRUS (HPV): Status: ACTIVE | Noted: 2021-05-24

## 2021-07-08 PROBLEM — K44.9 HIATAL HERNIA: Status: ACTIVE | Noted: 2021-05-24

## 2021-07-08 PROBLEM — G44.009 CLUSTER HEADACHE: Status: ACTIVE | Noted: 2021-05-24

## 2021-07-08 PROBLEM — E55.9 VITAMIN D DEFICIENCY: Status: ACTIVE | Noted: 2018-01-29

## 2021-07-08 PROBLEM — Z90.710 HISTORY OF TOTAL HYSTERECTOMY: Status: ACTIVE | Noted: 2021-05-24

## 2021-07-08 PROBLEM — D80.1 COMMON VARIABLE AGAMMAGLOBULINEMIA (HCC): Status: ACTIVE | Noted: 2021-02-28

## 2021-07-08 PROBLEM — M35.9 AUTOIMMUNE DISEASE (HCC): Status: ACTIVE | Noted: 2021-02-28

## 2021-07-08 RX ORDER — SCOLOPAMINE TRANSDERMAL SYSTEM 1 MG/1
1 PATCH, EXTENDED RELEASE TRANSDERMAL
COMMUNITY
Start: 2021-07-07 | End: 2022-04-27

## 2021-07-08 RX ORDER — DOXYCYCLINE 25 MG/5ML
POWDER, FOR SUSPENSION ORAL
COMMUNITY
End: 2021-07-09

## 2021-07-08 RX ORDER — FLUDROCORTISONE ACETATE 0.1 MG/1
1 TABLET ORAL DAILY
COMMUNITY
Start: 2020-09-03 | End: 2021-07-09

## 2021-07-08 RX ORDER — GRANISETRON 3.1 MG/24H
1 PATCH TRANSDERMAL DAILY
COMMUNITY
Start: 2021-07-08 | End: 2021-08-07

## 2021-07-09 ENCOUNTER — OFFICE VISIT (OUTPATIENT)
Dept: FAMILY MEDICINE CLINIC | Age: 31
End: 2021-07-09
Payer: MEDICAID

## 2021-07-09 VITALS
HEIGHT: 61 IN | SYSTOLIC BLOOD PRESSURE: 106 MMHG | HEART RATE: 115 BPM | TEMPERATURE: 97 F | WEIGHT: 122 LBS | DIASTOLIC BLOOD PRESSURE: 66 MMHG | BODY MASS INDEX: 23.03 KG/M2 | OXYGEN SATURATION: 99 %

## 2021-07-09 DIAGNOSIS — R11.2 NON-INTRACTABLE VOMITING WITH NAUSEA, UNSPECIFIED VOMITING TYPE: Primary | ICD-10-CM

## 2021-07-09 DIAGNOSIS — G90.A POTS (POSTURAL ORTHOSTATIC TACHYCARDIA SYNDROME): ICD-10-CM

## 2021-07-09 DIAGNOSIS — H69.80 DYSFUNCTION OF EUSTACHIAN TUBE, UNSPECIFIED LATERALITY: ICD-10-CM

## 2021-07-09 DIAGNOSIS — H90.72 MIXED CONDUCTIVE AND SENSORINEURAL HEARING LOSS OF LEFT EAR, UNSPECIFIED HEARING STATUS ON CONTRALATERAL SIDE: ICD-10-CM

## 2021-07-09 DIAGNOSIS — K31.84 GASTROPARESIS: ICD-10-CM

## 2021-07-09 DIAGNOSIS — H65.22 CHRONIC SEROUS OTITIS MEDIA OF LEFT EAR: ICD-10-CM

## 2021-07-09 PROCEDURE — 99214 OFFICE O/P EST MOD 30 MIN: CPT | Performed by: FAMILY MEDICINE

## 2021-07-09 RX ORDER — DIPHENHYDRAMINE HYDROCHLORIDE 50 MG/ML
25 INJECTION INTRAMUSCULAR; INTRAVENOUS 2 TIMES DAILY PRN
COMMUNITY
End: 2021-07-09

## 2021-07-09 RX ORDER — PROCHLORPERAZINE EDISYLATE 5 MG/ML
10 INJECTION INTRAMUSCULAR; INTRAVENOUS EVERY 6 HOURS PRN
Qty: 240 ML | Refills: 0 | Status: SHIPPED | OUTPATIENT
Start: 2021-07-09 | End: 2022-04-27

## 2021-07-09 RX ORDER — HEPARIN SODIUM (PORCINE) LOCK FLUSH IV SOLN 100 UNIT/ML 100 UNIT/ML
100 SOLUTION INTRAVENOUS 2 TIMES DAILY
COMMUNITY
End: 2022-05-18 | Stop reason: SDUPTHER

## 2021-07-09 RX ORDER — PROMETHAZINE HYDROCHLORIDE 25 MG/ML
25 INJECTION, SOLUTION INTRAMUSCULAR; INTRAVENOUS
COMMUNITY
End: 2022-05-18 | Stop reason: SDUPTHER

## 2021-07-09 RX ORDER — ALTEPLASE 2.2 MG/2ML
INJECTION, POWDER, LYOPHILIZED, FOR SOLUTION INTRAVENOUS
COMMUNITY
Start: 2021-06-22 | End: 2021-07-09

## 2021-07-09 RX ORDER — SODIUM CHLORIDE 9 MG/ML
INJECTION INTRAVENOUS
COMMUNITY
Start: 2021-06-18 | End: 2021-07-09

## 2021-07-09 RX ORDER — SODIUM CHLORIDE 0.9 % (FLUSH) 0.9 %
5-40 SYRINGE (ML) INJECTION PRN
Status: ON HOLD | COMMUNITY

## 2021-07-09 RX ORDER — DICYCLOMINE HYDROCHLORIDE 10 MG/1
10 CAPSULE ORAL 2 TIMES DAILY
Status: ON HOLD | COMMUNITY

## 2021-07-09 RX ORDER — PROCHLORPERAZINE EDISYLATE 5 MG/ML
10 INJECTION INTRAMUSCULAR; INTRAVENOUS EVERY 6 HOURS PRN
Qty: 240 ML | Refills: 0 | Status: SHIPPED | OUTPATIENT
Start: 2021-07-09 | End: 2021-07-09

## 2021-07-09 ASSESSMENT — PATIENT HEALTH QUESTIONNAIRE - PHQ9
SUM OF ALL RESPONSES TO PHQ QUESTIONS 1-9: 0
2. FEELING DOWN, DEPRESSED OR HOPELESS: 0
1. LITTLE INTEREST OR PLEASURE IN DOING THINGS: 0
SUM OF ALL RESPONSES TO PHQ9 QUESTIONS 1 & 2: 0
SUM OF ALL RESPONSES TO PHQ QUESTIONS 1-9: 0
SUM OF ALL RESPONSES TO PHQ QUESTIONS 1-9: 0

## 2021-07-09 NOTE — PROGRESS NOTES
[address] [address]  [address] [address]  Dept: [phone]  Dept Fax: [phone]  Loc: [phone]    Subjective:     Delio Whalen is a 32 y.o. female who presents today for her medical conditions/complaints as noted below. Delio Whalen is c/o of [facility] (extreme nausea )        HPI:   Patient presents to [facility]. She was previously seen by Dr. Michele Connor. She has a rather complex medical history including gastroparesis for which she has a gastric stimulator \"but the batteries are dead\" and Munguia catheter. She is followed at the GI motility clinic in [facility], records reviewed. She has been to the ED frequently over the last 6 weeks or so, she states she was living at a women shelter at the time and they \"would not give me any of my supplies\" to maintain her catheter. She is now living on her own. Her main concern today is severe nausea and vomiting. She states she had allergic reaction to Zofran. She takes Phenergan through her IV, and was most recently prescribed granisetron it appears; but it has not yet been approved by her insurance he states. She was previously on Compazine with some effect, would like to try this again. She states all medications for her IV have to go through [facility] infusion in [facility], she provided me with the phone number, will fax prescription. Next appointment with her GI specialist is on August 12. He has 2 other concerns today. She is requesting referrals to ENT Dr. Parmjit Garcia who she has seen previously for her ears, and the neuro muscular clinic at [facility] for her POTS. Review shows she has [facility] previous [facility] but she states they \"did not do anything. \"      PHQ Scores 7/9/2021   PHQ2 Score 0   PHQ9 Score 0     Interpretation of Total Score Depression Severity: 1-4 = Minimal depression, 5-9 = Mild depression, 10-14 = Moderate depression, 15-19 = Moderately severe depression, 20-27 = Severe depression     No flowsheet data found. Interpretation of ROSALVA-7 score: 5-9 = mild anxiety, 10-14 = moderate anxiety, 15+ = severe anxiety. Recommend referral to behavioral health for scores 10 or greater.      Past Medical History:   Diagnosis Date    Allergic contact dermatitis due to adhesives     Allergic contact dermatitis due to plants, except food     Anemia complicating pregnancy, second trimester     Anemia complicating pregnancy, third trimester     Anxiety and depression     Anxiety disorder     unspecified    Bacteremia     Cellulitis of abdominal wall     Chronic fatigue, unspecified     Dysuria     Epigastric pain     Frequency of micturition     G tube feedings (HCC)     Gastroparesis     Dr. Indira Foster in [place] manages    Gastroparesis     Gastrostomy malfunction ([address]     Generalized abdominal pain     GERD (gastroesophageal reflux disease)     Heart murmur     Hiatal hernia     History of Hoyos's esophagus     Hypotension, unspecified     IBS (irritable bowel syndrome)     Insomnia, unspecified     Jejunostomy tube present (HCC)     Low back pain     Nausea with vomiting     unspecified    Neck pain     Orthostatic hypotension     POTS (postural orthostatic tachycardia syndrome)     Premature birth     delivered at 24-27 weeks    Scoliosis     Syncope and collapse     Tachycardia     Tachycardia, unspecified     Toxic gastroenteritis and colitis      Past Surgical History:   Procedure Laterality Date    ABDOMEN SURGERY  2014    gastric stimulator implanted    ADENOIDECTOMY      ADENOIDECTOMY  2007    CHOLECYSTECTOMY      CHOLECYSTECTOMY  2016    DENTAL SURGERY  2004    wisdom teeth    EAR SURGERY  2014    left    FACIAL SURGERY  2005    GASTRIC STIMULATOR IMPLANT SURGERY  11/2014    GASTROSTOMY TUBE PLACEMENT  04/07/2015    J tube-Removed in [place] 1/2018   [facility] G tube 2017    HYSTERECTOMY      INSERTION / REMOVAL / REPLACEMENT VENOUS ACCESS CATHETER N/A 3/25/2016    REMOVAL OF PORT AND PLACEMENT OF NEW PORT; REMOVAL OF PICC LINE  performed by Malia Nguyen MD at [address] [place] [address]  2014    left knee    KNEE SURGERY  2015    right knee    MANDIBLE SURGERY      double jaw    PHARYNGECTOMY      PORT SURGERY      port present 3-25-16    TONSILLECTOMY      TONSILLECTOMY  1997    TUBAL LIGATION      TUBAL LIGATION  2016    TUMOR REMOVAL Left     ear    TUNNELED VENOUS PORT PLACEMENT Right 05/29/15    TUNNELED VENOUS PORT PLACEMENT      UPPER GASTROINTESTINAL ENDOSCOPY  4-2-10    Dr Teto Mederos ENDOSCOPY  9-25-13    Dr Jayme Lee  5/2015    Dr. Valerio Common  5/29/2015 [facility]    Ultrasound-guided cannulation, right internal jugular vein. Right internal jugular vein single-lumen bard powerport placement.  VASCULAR SURGERY  9/8/15 [facility]    Right internal jugular vein portograms. Revision of right internal jugular vein single lumen port.  VASCULAR SURGERY  11/3/15 [facility]    Ultrasound-guided cannulation, left upper arm basilic vein. Left upper arm basilic vein PICC line placement bard dual-lumen PICC line. Superior vena cava venograms.  VASCULAR SURGERY  03/23/2017    [facility]. Left IJV port angiogram.Ultrasound guided cannulation of right basilic vein,right upper extremity PICC line placement bard power PICC,dual lumen.     WISDOM TOOTH EXTRACTION         Family History   Problem Relation Age of Onset    Other Mother         endometriosis    Depression Father     Diabetes Paternal Grandmother     Stroke Paternal Grandmother     Hypertension Paternal Grandmother     Heart Disease Paternal Grandmother     Heart Failure Paternal Grandmother     Colon Cancer Neg Hx     Colon Polyps Neg Hx        Social History     Tobacco Use    Smoking status: Never Smoker    Smokeless tobacco: Never Used   Substance Use Topics    Alcohol use: Never      Current Outpatient Medications   Medication Sig Dispense Refill    dicyclomine (BENTYL) 10 MG capsule Take 10 mg by mouth 4 times daily (before meals and nightly)      linaclotide (LINZESS) 145 MCG capsule Take 145 mcg by mouth every morning (before breakfast)      famotidine (PEPCID) 20 MG/2ML injection Infuse 20 mg intravenously 2 times daily      promethazine (PHENERGAN) 25 MG/ML injection Infuse 25 mg intravenously every 3 hours as needed      sodium chloride flush 0.9 % injection Infuse 5-40 mLs intravenously 2 times daily      heparin flush 100 UNIT/ML injection 100 Units by Intracatheter route 2 times daily      prochlorperazine (COMPAZINE) 10 MG/2ML SOLN injection Infuse 2 mLs intravenously every 6 hours as needed (nausea and vomiting) 240 mL 0    granisetron (SANCUSO) 3.1 MG/24HR PTCH Place 1 patch onto the skin daily      scopolamine (TRANSDERM-SCOP) transdermal patch Place 1 patch onto the skin      sodium chloride 0.9 % SOLN 50 mL with diphenhydrAMINE 50 MG/ML SOLN 50 mg Infuse intravenously 2 times daily      Nutritional Supplements (JEVITY 1.5 ELLIOTT/FIBER PO) Jevity 1.5 Elliott 0.06 gram-1.5 kcal/mL oral liquid      Misc. Devices [facility]) MISC by Does not apply route Indications: custom wheel chair with smart drive       No current facility-administered medications for this visit. Allergies   Allergen Reactions    Iv Dye [Iodides] Shortness Of Breath, Itching and Other (See Comments)     IV 3000;  SoA, dizziness    Iv Dye [Iodides] Anaphylaxis     Iodine containing    Adhesive Tape Itching     And tegaderm    Chlorhexidine      Other reaction(s): ITCHING    Fruit & Vegetable Daily [Nutritional Supplements]      Oranges-anything with orange color    Metoprolol Succinate [Metoprolol]      Excessive drowsiness/ Metoprolol tartrate-excessive drowsiness    Nsaids      Other reaction(s): GI Intolerance    Orange Fruit [Citrus]      And artificial    Other      Cloraprep    Reglan [Metoclopramide]     Reglan [Metoclopramide]      Nausea,vomiting    Tape [employer] Tape]      Adhesives-rash-itching    Tramadol Other (See Comments)     Excessive sleeping x 3 days, pt takes 1/2 a pill.  Tramadol      Excessive drowsiness    Zofran [Ondansetron Hcl] Hives    Zofran [Ondansetron Hcl] Hives    Orange Oil Nausea And Vomiting    Sulfamethoxazole-Trimethoprim Nausea And Vomiting       Review of Systems   Constitutional: Negative for chills and fever. HENT: Negative for facial swelling and mouth sores. Eyes: Negative for discharge and itching. Respiratory: Negative for apnea and stridor. Cardiovascular: Negative for chest pain and palpitations. Gastrointestinal: Positive for nausea and vomiting. Endocrine: Negative for cold intolerance and heat intolerance. Genitourinary: Negative for frequency and urgency. Musculoskeletal: Negative for arthralgias and back pain. Skin: Negative for color change and rash. See HPI for visit specific review of symptoms. All others negative      Objective:   /66   Pulse 115   Temp 97 °F (36.1 °C)   Ht 5' 1\" (1.549 m)   Wt 122 lb (55.3 kg)   LMP 01/14/2018 (Exact Date)   SpO2 99%   BMI 23.05 kg/m²   Physical Exam  Constitutional:       Appearance: She is well-developed. HENT:      Head: Normocephalic and atraumatic. Right Ear: External ear normal.      Left Ear: External ear normal.   Eyes:      Conjunctiva/sclera: Conjunctivae normal.      Pupils: Pupils are equal, round, and reactive to light. Cardiovascular:      Rate and Rhythm: Normal rate and regular rhythm. Heart sounds: Normal heart sounds. Pulmonary:      Effort: Pulmonary effort is normal. No respiratory distress. Breath sounds: Normal breath sounds. Chest:       Abdominal:      General: Bowel sounds are normal. There is no distension.       Palpations: Abdomen is soft.      Tenderness: There is no abdominal tenderness. Musculoskeletal:         General: Normal range of motion. Cervical back: Normal range of motion and neck supple. Skin:     General: Skin is warm. Capillary Refill: Capillary refill takes less than 2 seconds. Findings: No rash. Neurological:      Mental Status: She is alert and oriented to person, place, and time. Cranial Nerves: No cranial nerve deficit. Psychiatric:         Thought Content:  Thought content normal.           Lab Review   Recent Results (from the past 672 hour(s))   CBC Auto Differential    Collection Time: 06/22/21 11:54 AM   Result Value Ref Range    WBC 7.6 4.8 - 10.8 K/uL    RBC 4.21 4.20 - 5.40 M/uL    Hemoglobin 12.3 12.0 - 16.0 g/dL    Hematocrit 36.6 (L) 37.0 - 47.0 %    MCV 86.9 81.0 - 99.0 fL    MCH 29.2 27.0 - 31.0 pg    MCHC 33.6 33.0 - 37.0 g/dL    RDW 13.0 11.5 - 14.5 %    Platelets 680 646 - 033 K/uL    MPV 8.9 (L) 9.4 - 12.3 fL    Neutrophils % 71.6 (H) 50.0 - 65.0 %    Lymphocytes % 21.0 20.0 - 40.0 %    Monocytes % 5.6 0.0 - 10.0 %    Eosinophils % 0.4 0.0 - 5.0 %    Basophils % 0.7 0.0 - 1.0 %    Neutrophils Absolute 5.5 1.5 - 7.5 K/uL    Immature Granulocytes # 0.1 K/uL    Lymphocytes Absolute 1.6 1.1 - 4.5 K/uL    Monocytes Absolute 0.40 0.00 - 0.90 K/uL    Eosinophils Absolute 0.00 0.00 - 0.60 K/uL    Basophils Absolute 0.10 0.00 - 0.20 K/uL   Comprehensive Metabolic Panel w/ Reflex to MG    Collection Time: 06/22/21 11:54 AM   Result Value Ref Range    Sodium 139 136 - 145 mmol/L    Potassium reflex Magnesium 4.4 3.5 - 5.0 mmol/L    Chloride 103 98 - 111 mmol/L    CO2 26 22 - 29 mmol/L    Anion Gap 10 7 - 19 mmol/L    Glucose 118 (H) 74 - 109 mg/dL    BUN 14 6 - 20 mg/dL    CREATININE 0.6 0.5 - 0.9 mg/dL    GFR Non-African American >60 >60    GFR African American >59 >59    Calcium 9.3 8.6 - 10.0 mg/dL    Total Protein 6.9 6.6 - 8.7 g/dL    Albumin 4.2 3.5 - 5.2 g/dL    Total Bilirubin <0.2 0.2 - 1.2 mg/dL    Alkaline Phosphatase 65 35 - 104 U/L    ALT 15 5 - 33 U/L    AST 20 5 - 32 U/L   Lipase    Collection Time: 06/22/21 11:54 AM   Result Value Ref Range    Lipase 43 13 - 60 U/L               Assessment & Plan: The following diagnoses and conditions are stable with no further orders unless indicated:    Patient Active Problem List    Diagnosis Date Noted    Cluster headache 05/24/2021    Hiatal hernia 05/24/2021    History of infection due to human papilloma virus (HPV) 05/24/2021    History of total hysterectomy 05/24/2021    Knee pain 05/11/2021    Autoimmune disease ([facility] 02/28/2021    Common variable agammaglobulinemia ([facility] 02/28/2021    Jejunostomy tube present ([facility] 02/04/2021    Nutritional disorder 02/18/2020    Vitamin D deficiency 01/29/2018    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    Idiopathic scoliosis of thoracolumbar spine 03/22/2017    History of syncope 03/16/2017    POTS (postural orthostatic tachycardia syndrome) 03/16/2017    Tachycardia 03/16/2017    Near syncope 03/16/2017    Chronic fatigue syndrome 01/24/2017    ETD (eustachian tube dysfunction) 10/04/2016    Mixed hearing loss of left ear 09/09/2016    Poor venous access 05/27/2015    Learning disability 08/25/2014    Nausea & vomiting 03/11/2014    Constipation 03/11/2014    Gastroparesis 03/10/2014    Insomnia 03/10/2014    Irritable bowel syndrome 01/06/2014    Pelvic pain in female 01/06/2014    Dysmenorrhea 01/06/2014    Family history of endometriosis 01/06/2014    NSAID long-term use 11/06/2013    Heartburn 11/06/2013    Anxiety disorder 09/27/2013    Chronic serous otitis media of left ear 09/23/2013    Disc herniation 09/23/2013    Neck pain 09/23/2013    Back pain 09/23/2013    Scoliosis 09/23/2013    Hiccups 09/18/2013    Barretts esophagus 09/18/2013    Belching 09/18/2013        Charla Shen was seen today for establish care.     Diagnoses and all orders for this visit:    Non-intractable vomiting with nausea, unspecified vomiting type  -     Discontinue: prochlorperazine (COMPAZINE) 10 MG/2ML SOLN injection; Infuse 2 mLs intravenously every 6 hours as needed (nausea and vomiting)  -     prochlorperazine (COMPAZINE) 10 MG/2ML SOLN injection; Infuse 2 mLs intravenously every 6 hours as needed (nausea and vomiting)    Gastroparesis    POTS (postural orthostatic tachycardia syndrome)  -     External Referral To Neurology    Chronic serous otitis media of left ear  -     External Referral To ENT    Mixed conductive and sensorineural hearing loss of left ear, unspecified hearing status on contralateral side  -     External Referral To ENT    Dysfunction of Eustachian tube, unspecified laterality  -     External Referral To ENT      Over 50% of the total visit time of 45 minutes was spent on counseling and/or coordination of care of   1. Non-intractable vomiting with nausea, unspecified vomiting type    2. Gastroparesis    3. POTS (postural orthostatic tachycardia syndrome)    4. Chronic serous otitis media of left ear    5. Mixed conductive and sensorineural hearing loss of left ear, unspecified hearing status on contralateral side    6. Dysfunction of Eustachian tube, unspecified laterality         Health Maintenance   Topic Date Due    Pneumococcal 0-64 years Vaccine (1 of 4 - PCV13) Never done    COVID-19 Vaccine (1) Never done    Flu vaccine (1) 09/01/2021    DTaP/Tdap/Td vaccine (2 - Td or Tdap) 07/03/2024    Hepatitis A vaccine  Aged Out    Hepatitis B vaccine  Aged Out    Hib vaccine  Aged Out    Meningococcal (ACWY) vaccine  Aged Out    Varicella vaccine  Discontinued    Hepatitis C screen  Discontinued    HIV screen  Discontinued         Return in about 6 months (around 1/9/2022) for physical, in office. Discussed use, benefit, and side effects of prescribed medications. All patient questions answered. Pt voiced understanding.   Reviewed health maintenance. Instructed to continue current medications, diet and exercise. Patient agreedwith treatment plan. Follow up as directed. Old records reviewed, where available.     Baltazar Carter MD    Note:  dictated using Dragon software

## 2021-07-10 ASSESSMENT — ENCOUNTER SYMPTOMS
COLOR CHANGE: 0
BACK PAIN: 0
NAUSEA: 1
FACIAL SWELLING: 0
APNEA: 0
STRIDOR: 0
VOMITING: 1
EYE DISCHARGE: 0
EYE ITCHING: 0

## 2021-07-15 ENCOUNTER — TELEPHONE (OUTPATIENT)
Dept: FAMILY MEDICINE CLINIC | Age: 31
End: 2021-07-15

## 2021-07-19 ENCOUNTER — TELEPHONE (OUTPATIENT)
Dept: FAMILY MEDICINE CLINIC | Age: 31
End: 2021-07-19

## 2021-07-21 ENCOUNTER — OFFICE VISIT (OUTPATIENT)
Dept: URGENT CARE | Age: 31
End: 2021-07-21
Payer: MEDICAID

## 2021-07-21 VITALS
TEMPERATURE: 97.5 F | RESPIRATION RATE: 20 BRPM | WEIGHT: 118.6 LBS | BODY MASS INDEX: 22.39 KG/M2 | HEART RATE: 127 BPM | HEIGHT: 61 IN | SYSTOLIC BLOOD PRESSURE: 94 MMHG | OXYGEN SATURATION: 99 % | DIASTOLIC BLOOD PRESSURE: 81 MMHG

## 2021-07-21 DIAGNOSIS — K94.22 SKIN INFECTION AT GASTROSTOMY TUBE SITE (HCC): Primary | ICD-10-CM

## 2021-07-21 DIAGNOSIS — L08.9 SKIN INFECTION AT GASTROSTOMY TUBE SITE (HCC): Primary | ICD-10-CM

## 2021-07-21 PROCEDURE — 99213 OFFICE O/P EST LOW 20 MIN: CPT | Performed by: NURSE PRACTITIONER

## 2021-07-21 RX ORDER — CLINDAMYCIN PALMITATE HYDROCHLORIDE 75 MG/5ML
300 SOLUTION ORAL 3 TIMES DAILY
Qty: 600 ML | Refills: 0 | Status: SHIPPED | OUTPATIENT
Start: 2021-07-21 | End: 2021-07-31

## 2021-07-21 NOTE — PROGRESS NOTES
[address]  [address] [address] [place] [address]  Dept: [phone]  Dept Fax: [phone]  Loc: [phone]    Diya Nielsen is a 32 y.o. female who presents today for her medical conditions/complaintsas noted below. Diya Nielsen is c/o of Other (j tube infected or stoma in left side of abdomen)        HPI:     HPI  Harley Salas presents today with a complaint of infection at her stoma at the site of her J tube. Home health nurse advised her to get check out. It is draining. No fever. This was placed this month. Has had G tubes and J tubes. Has not had infection of stoma before. History MRSA.   Past Medical History:   Diagnosis Date    Allergic contact dermatitis due to adhesives     Allergic contact dermatitis due to plants, except food     Anemia complicating pregnancy, second trimester     Anemia complicating pregnancy, third trimester     Anxiety and depression     Anxiety disorder     unspecified    Bacteremia     Cellulitis of abdominal wall     Chronic fatigue, unspecified     Dysuria     Epigastric pain     Frequency of micturition     G tube feedings (HCC)     Gastroparesis     Dr. Nicolas Lux in [place] manages    Gastroparesis     Gastrostomy malfunction ([address]     Generalized abdominal pain     GERD (gastroesophageal reflux disease)     Heart murmur     Hiatal hernia     History of Hoyos's esophagus     Hypotension, unspecified     IBS (irritable bowel syndrome)     Insomnia, unspecified     Jejunostomy tube present (HCC)     Low back pain     Nausea with vomiting     unspecified    Neck pain     Orthostatic hypotension     POTS (postural orthostatic tachycardia syndrome)     Premature birth     delivered at 24-27 weeks    Scoliosis     Syncope and collapse     Tachycardia     Tachycardia, unspecified     Toxic gastroenteritis and colitis      Past Surgical History:   Procedure Laterality Date    ABDOMEN SURGERY 2014    gastric stimulator implanted    ADENOIDECTOMY      ADENOIDECTOMY  2007    CHOLECYSTECTOMY      CHOLECYSTECTOMY  2016   Shyanne Me DENTAL SURGERY  2004    wisdom teeth    EAR SURGERY  2014    left    FACIAL SURGERY  2005    GASTRIC STIMULATOR IMPLANT SURGERY  11/2014    GASTROSTOMY TUBE PLACEMENT  04/07/2015    J tube-Removed in [place] 1/2018    GASTROSTOMY TUBE PLACEMENT      G tube 2017    HYSTERECTOMY      INSERTION / REMOVAL / REPLACEMENT VENOUS ACCESS CATHETER N/A 3/25/2016    REMOVAL OF PORT AND PLACEMENT OF NEW PORT; REMOVAL OF PICC LINE  performed by Amanda Oliver MD at [address] [place] [address]  2014    left knee    KNEE SURGERY  2015    right knee    MANDIBLE SURGERY      double jaw    PHARYNGECTOMY      PORT SURGERY      port present 3-25-16    TONSILLECTOMY      TONSILLECTOMY  1997    TUBAL LIGATION      TUBAL LIGATION  2016    TUMOR REMOVAL Left     ear    TUNNELED VENOUS PORT PLACEMENT Right 05/29/15    TUNNELED VENOUS PORT PLACEMENT      UPPER GASTROINTESTINAL ENDOSCOPY  4-2-10    Dr Israel Olson ENDOSCOPY  9-25-13    Dr Krystle Trujillo  5/2015    Dr. Sandra Lund  5/29/2015 S    Ultrasound-guided cannulation, right internal jugular vein. Right internal jugular vein single-lumen bard powerport placement.  VASCULAR SURGERY  9/8/15 [facility]    Right internal jugular vein portograms. Revision of right internal jugular vein single lumen port.  VASCULAR SURGERY  11/3/15 [facility]    Ultrasound-guided cannulation, left upper arm basilic vein. Left upper arm basilic vein PICC line placement bard dual-lumen PICC line. Superior vena cava venograms.  VASCULAR SURGERY  03/23/2017    [facility]. Left IJV port angiogram.Ultrasound guided cannulation of right basilic vein,right upper extremity PICC line placement bard power PICC,dual lumen.     WISDOM TOOTH EXTRACTION         Family History   Problem Relation Age of Onset    Other Mother         endometriosis    Depression Father     Diabetes Paternal Grandmother     Stroke Paternal Grandmother     Hypertension Paternal Grandmother     Heart Disease Paternal Grandmother     Heart Failure Paternal Grandmother     Colon Cancer Neg Hx     Colon Polyps Neg Hx        Social History     Tobacco Use    Smoking status: Never Smoker    Smokeless tobacco: Never Used   Substance Use Topics    Alcohol use: Never      Current Outpatient Medications   Medication Sig Dispense Refill    clindamycin (CLEOCIN) 75 MG/5ML solution 20 mLs by Per G Tube route 3 times daily for 10 days 600 mL 0    dicyclomine (BENTYL) 10 MG capsule Take 10 mg by mouth 4 times daily (before meals and nightly)      linaclotide (LINZESS) 145 MCG capsule Take 145 mcg by mouth every morning (before breakfast)      famotidine (PEPCID) 20 MG/2ML injection Infuse 20 mg intravenously 2 times daily      promethazine (PHENERGAN) 25 MG/ML injection Infuse 25 mg intravenously every 3 hours as needed      sodium chloride flush 0.9 % injection Infuse 5-40 mLs intravenously 2 times daily      heparin flush 100 UNIT/ML injection 100 Units by Intracatheter route 2 times daily      prochlorperazine (COMPAZINE) 10 MG/2ML SOLN injection Infuse 2 mLs intravenously every 6 hours as needed (nausea and vomiting) 240 mL 0    granisetron (SANCUSO) 3.1 MG/24HR PTCH Place 1 patch onto the skin daily      scopolamine (TRANSDERM-SCOP) transdermal patch Place 1 patch onto the skin      sodium chloride 0.9 % SOLN 50 mL with diphenhydrAMINE 50 MG/ML SOLN 50 mg Infuse intravenously 2 times daily      Nutritional Supplements (JEVITY 1.5 ELLIOTT/FIBER PO) Jevity 1.5 Elliott 0.06 gram-1.5 kcal/mL oral liquid      Misc. Devices [facility]) MISC by Does not apply route Indications: custom wheel chair with smart drive       No current facility-administered medications for this visit.      Allergies   Allergen Reactions    Iv Dye [Iodides] Shortness Of Breath, Itching and Other (See Comments)     IV 3000; SoA, dizziness    Iv Dye [Iodides] Anaphylaxis     Iodine containing    Adhesive Tape Itching     And tegaderm    Chlorhexidine      Other reaction(s): ITCHING    Fruit & Vegetable Daily [Nutritional Supplements]      Oranges-anything with orange color    Metoprolol Succinate [Metoprolol]      Excessive drowsiness/ Metoprolol tartrate-excessive drowsiness    Nsaids      Other reaction(s): GI Intolerance    Orange Fruit [Citrus]      And artificial    Other      Cloraprep    Reglan [Metoclopramide]     Reglan [Metoclopramide]      Nausea,vomiting    Tape [Adhesive Tape]      Adhesives-rash-itching    Tramadol Other (See Comments)     Excessive sleeping x 3 days, pt takes 1/2 a pill.  Tramadol      Excessive drowsiness    Zofran [Ondansetron Hcl] Hives    Zofran [Ondansetron Hcl] Hives    Orange Oil Nausea And Vomiting    Sulfamethoxazole-Trimethoprim Nausea And Vomiting       Health Maintenance   Topic Date Due    Pneumococcal 0-64 years Vaccine (1 of 4 - PCV13) Never done    COVID-19 Vaccine (1) Never done    Flu vaccine (1) 09/01/2021    DTaP/Tdap/Td vaccine (2 - Td or Tdap) 07/03/2024    Hepatitis A vaccine  Aged Out    Hepatitis B vaccine  Aged Out    Hib vaccine  Aged Out    Meningococcal (ACWY) vaccine  Aged Out    Varicella vaccine  Discontinued    Hepatitis C screen  Discontinued    HIV screen  Discontinued       Subjective:     Review of Systems   Skin:        Redness and drainage at stoma site          :Objective      Physical Exam  Vitals and nursing note reviewed. Constitutional:       Appearance: Normal appearance. She is not ill-appearing. HENT:      Head: Normocephalic and atraumatic. Eyes:      Conjunctiva/sclera: Conjunctivae normal.      Pupils: Pupils are equal, round, and reactive to light. Skin:     General: Skin is warm and dry.           Neurological:      Mental Status: She is alert and oriented to person, place, and time. Psychiatric:         Mood and Affect: Mood normal.         Behavior: Behavior normal.       BP 94/81   Pulse 127   Temp 97.5 °F (36.4 °C)   Resp 20   Ht 5' 1\" (1.549 m)   Wt 118 lb 9.6 oz (53.8 kg)   LMP 2018 (Exact Date)   SpO2 99%   BMI 22.41 kg/m²     :Assessment       Diagnosis Orders   1. Skin infection at gastrostomy tube site [facility])  Culture, Wound    clindamycin (CLEOCIN) 75 MG/5ML solution       :Plan    Bactrim listed as allergy. Will treated with clindamycin for MRSA coverage. Wound culture and we will call with results. Pt to go to ER with worsening redness, swelling, fever, and worsening drainage. She voiced understanding. Orders Placed This Encounter   Procedures    Culture, Wound       No follow-ups on file. Orders Placed This Encounter   Medications    clindamycin (CLEOCIN) 75 MG/5ML solution     Si mLs by Per G Tube route 3 times daily for 10 days     Dispense:  600 mL     Refill:  0       Patient given educational materials- see patient instructions. Discussed use, benefit, and side effects of prescribedmedications. All patient questions answered. Pt voiced understanding. There are no Patient Instructions on file for this visit.       Electronically signed by SHRUTHI Isaac on 2021 at 7:22 PM

## 2021-07-24 LAB
GRAM STAIN RESULT: ABNORMAL
ORGANISM: ABNORMAL
WOUND/ABSCESS: ABNORMAL
WOUND/ABSCESS: ABNORMAL

## 2021-09-10 ENCOUNTER — OFFICE VISIT (OUTPATIENT)
Dept: OTOLARYNGOLOGY | Facility: CLINIC | Age: 31
End: 2021-09-10

## 2021-09-10 ENCOUNTER — PROCEDURE VISIT (OUTPATIENT)
Dept: OTOLARYNGOLOGY | Facility: CLINIC | Age: 31
End: 2021-09-10

## 2021-09-10 VITALS
BODY MASS INDEX: 21.9 KG/M2 | WEIGHT: 116 LBS | HEART RATE: 120 BPM | SYSTOLIC BLOOD PRESSURE: 104 MMHG | HEIGHT: 61 IN | TEMPERATURE: 98.2 F | DIASTOLIC BLOOD PRESSURE: 80 MMHG

## 2021-09-10 DIAGNOSIS — Z90.89 H/O MASTOIDECTOMY: ICD-10-CM

## 2021-09-10 DIAGNOSIS — Z93.4 JEJUNOSTOMY TUBE PRESENT (HCC): ICD-10-CM

## 2021-09-10 DIAGNOSIS — K31.84 GASTROPARESIS: ICD-10-CM

## 2021-09-10 DIAGNOSIS — H69.83 DYSFUNCTION OF BOTH EUSTACHIAN TUBES: ICD-10-CM

## 2021-09-10 DIAGNOSIS — H90.6 MIXED CONDUCTIVE AND SENSORINEURAL HEARING LOSS OF BOTH EARS: Primary | ICD-10-CM

## 2021-09-10 DIAGNOSIS — G90.A POTS (POSTURAL ORTHOSTATIC TACHYCARDIA SYNDROME): ICD-10-CM

## 2021-09-10 DIAGNOSIS — H90.72 MIXED CONDUCTIVE AND SENSORINEURAL HEARING LOSS OF LEFT EAR, UNSPECIFIED HEARING STATUS ON CONTRALATERAL SIDE: ICD-10-CM

## 2021-09-10 DIAGNOSIS — T50.8X5S ALLERGIC REACTION TO CONTRAST MATERIAL, SEQUELA: Primary | ICD-10-CM

## 2021-09-10 PROCEDURE — 92550 TYMPANOMETRY & REFLEX THRESH: CPT

## 2021-09-10 PROCEDURE — 99203 OFFICE O/P NEW LOW 30 MIN: CPT | Performed by: EMERGENCY MEDICINE

## 2021-09-10 PROCEDURE — 92557 COMPREHENSIVE HEARING TEST: CPT

## 2021-09-10 RX ORDER — DIPHENHYDRAMINE HYDROCHLORIDE 50 MG/ML
50 INJECTION INTRAMUSCULAR; INTRAVENOUS ONCE
Status: SHIPPED | OUTPATIENT
Start: 2021-09-10

## 2021-09-10 NOTE — PATIENT INSTRUCTIONS
CONTACT INFORMATION:  The main office phone number is [phone]. For emergencies after hours and on weekends, this number will convert over to our answering service and the on call provider will answer. Please try to keep non emergent phone calls/ questions to office hours 9am-5pm Monday through Friday.     [employer]Guide  As an alternative, you can sign up and use the Epic MyChart system for more direct and quicker access for non emergent questions/ problems.  [facility] [employer]Guide allows you to send messages to your doctor, view your test results, renew your prescriptions, schedule appointments, and more. To sign up, go to [employer] and click on the Sign Up Now link in the New User? box. Enter your FashionGuide Activation Code exactly as it appears below along with the last four digits of your Social Security Number and your Date of Birth () to complete the sign-up process. If you do not sign up before the expiration date, you must request a new code.    FashionGuide Activation Code: Activation code not generated  Current FashionGuide Status: Active    If you have questions, you can email [email or web address] or call [phone] to talk to our FashionGuide staff. Remember, FashionGuide is NOT to be used for urgent needs. For medical emergencies, dial 911.

## 2021-09-10 NOTE — PROGRESS NOTES
AUDIOMETRIC EVALUATION      Name:  Ellen Burrell  :  1990  Age:  31 y.o.  Date of Evaluation:  09/10/2021       History:  Reason for visit:  Ms. Burrell is seen today at the request of Myranda Lee DO for an evaluation of hearing. Patient's previous audio was in 2017. Pt reports she was a micropremie. She stated she has good hearing in the right ear, but not in the left ear. She states she has a profound hearing loss in the left ear and has history of many tubes. Pt reports Dr. Severino did a mastoidectomy in the left ear in . She was seeing an ENT in [place]. They said she has two fluid cysts in her left ear.     Tinnitus:  no both ears  Dizziness:  yes - patient has POTS  Noise Exposure: no  Aural Fullness:  no both ears  Otalgia:  Left ear once in a while  Family history of hearing loss:  no      EVALUATION        RESULTS:     Otoscopic Evaluation:       Bilateral: tympanic membrane visualized    Tympanometry:       Immittance Measures: 226 Hz       Right Ear: Type A- normal       Left Ear: Type C- negative pressure         Acoustic Reflexes (Ipsilateral):         Right Ear: Could not maintain seal.       Left Ear: Absent at all frequencies tested     Test technique:  Conventional Audiometry via inserts    Reliability:  good    Pure Tone Audiometry:         Right Ear: normal sloping to mild mixed hearing loss.       Left Ear: Profound relatively flat mixed hearing loss         Speech Audiometry:        Right Ear: Speech Reception Threshold (SRT) was obtained at 20 dBHL                        Word Recognition scores were excellent or within normal limits (90 - 100%) in noise (S/N=20 dB), when words were presented at 60 dBHL.        Left Ear:   Speech Awareness Threshold (SAT) was observed at 95 dBHL with masking in right ear.                       Could not test word recognition.      IMPRESSIONS:  Tympanometry showed normal middle ear pressure and static compliance, for the right  ear. Tympanometry showed significant negative middle ear pressure in the presence of normal static compliance, consistent with Eustachian tube dysfunction or middle ear pathology, for the left ear.  Pure tone thresholds show borderline normal hearing in the right ear, with a conductive component at 500 Hz and a sensorineural component at 2kHz. Pure tone threshold show a profound, relatively flat mixed hearing loss. Results suggest abnormal middle and inner ear function bilaterally. There were significant changes from audio in 2017. Patient was counseled with regard to the findings.      Diagnosis:   1. Mixed conductive and sensorineural hearing loss of both ears    2. Dysfunction of both eustachian tubes         RECOMMENDATIONS/PLAN:  Follow-up recommendations per LILLI De La Cruz AUD  Licensed Audiologist

## 2021-09-10 NOTE — PROGRESS NOTES
LILLI Linder     Chief Complaint   Patient presents with   • Ear Problem        HPI   Ellen Burrell is a  31 y.o. female who presents with a very complex medical history.  She had a mastoid surgery per Dr. Severino in 2014.  She moved to [place] and was gollowed by ENT there.  In 2018, she had a CT of the IACs performed which showed recurrence of cysts that she had previously had removed.  She reports the hearing in her left is ear is declining and she would like to further discuss BAHA with Dr. Severino if possible.         Past History:   Past Medical History:   Diagnosis Date   • Anemia    • Haynes's esophagus    • Candidiasis    • Cholesteatoma    • Chronic otitis media    • Conductive hearing loss    • DDD (degenerative disc disease), lumbar    • Dysautonomia (CMS/HCC)    • Eustachian tube dysfunction    • Falls    • Gastroparesis    • Gastroparesis     gastroparesis syndrome   • GERD (gastroesophageal reflux disease)    • Hiatal hernia    • History of Holter monitoring     PT. HAS ON AT PRESENT- TO WEAR FOR 31 DAYS STARTED 2/1/2017   • HPV (human papilloma virus) infection    • Hypotension    • Irritable bowel syndrome    • Mastoiditis    • Mixed hearing loss    • Nasal vestibulitis    • Neuropathy    • POTS (postural orthostatic tachycardia syndrome)    • S/P partial hysterectomy    • Scoliosis    • Tachycardia     PT. STATES IS WEARING HEART MONITOR FOR 31 DAYS DUE TO TACHYCARDIA- BEGAN 2/1/17     Past Surgical History:   Procedure Laterality Date   • ADENOIDECTOMY     • CHOLECYSTECTOMY  09/04/2016   • FACIAL RECONSTRUCTION SURGERY  2005   • GASTRIC STIMULATOR IMPLANT SURGERY  2013    PT STATES INEFFECTIVE   • GTUBE INSERTION     • JEJUNOSTOMY     • KNEE SURGERY     • LAPAROSCOPIC TUBAL LIGATION  04/2016   • MASTOIDECTOMY  10/01/2014   • MYRINGOTOMY W/ TUBES     • MYRINGOTOMY W/ TUBES Left 06/09/2014    10/1/14   • MYRINGOTOMY W/ TUBES Left 10/19/2016    Procedure: LEFT MYRINGOTOMY WITH  INSERTION OF EAR TUBE with RIGHT EUA;  Surgeon: George Severino MD;  Location:  [facility] OR;  Service:    • MYRINGOTOMY W/ TUBES Left 12/19/2016    Procedure: MYRINGOTOMY WITH INSERTION OF LEFT EAR TUBES, EXAM OF RIGHT EAR UNDER ANESTHESIA;  Surgeon: George Severino MD;  Location:  [facility] OR;  Service:    • MYRINGOTOMY W/ TUBES Bilateral 2/13/2017    Procedure: REMOVAL OF myringotomy of tube of left ear, MYRINGOTOMY WITH INSERTION OF EAR TUBE on left, EAR EXAM UNDER ANESTHESIA right ear ;  Surgeon: George Severino MD;  Location:  [facility] OR;  Service:    • PHARYNGEAL FLAP  2007   • SINUS SURGERY Left 10/2014    Left incus   • TONSILLECTOMY     • VENOUS ACCESS DEVICE (PORT) INSERTION Left 03/25/2016    LOT# [id number] POWER PORT   • WISDOM TOOTH EXTRACTION       Family History   Problem Relation Age of Onset   • Diabetes Other    • Hypertension Other    • Stroke Other         grandmother   • Endometriosis Mother    • Depression Father      Social History     Tobacco Use   • Smoking status: Never Smoker   • Smokeless tobacco: Never Used   Substance Use Topics   • Alcohol use: No   • Drug use: No     Outpatient Medications Marked as Taking for the 9/10/21 encounter (Office Visit) with Angeles Larsen APRN   Medication Sig Dispense Refill   • famotidine (PEPCID) 20-0.9 MG/50ML-% Infuse 50 mL into a venous catheter 2 (Two) Times a Day. Via port     • HEPARIN LOCK FLUSH IV Infuse  into a venous catheter Daily As Needed.     • ibuprofen (ADVIL,MOTRIN) 800 MG tablet Take  by mouth.     • promethazine (PHENERGAN) 25 MG/ML injection Infuse 25 mg into a venous catheter Every 3 (Three) Hours As Needed.     • Sodium Chloride Flush (SODIUM CHLORIDE 0.9 % FLUSH) 0.9 % solution Infuse 10 mL into a venous catheter 6 (Six) Times a Day. Before and after each port infusion     • [DISCONTINUED] diphenhydrAMINE in sodium chloride 0.9 % 50 mL IVPB Infuse  into a venous catheter 2 (two) times a day.       Current  Facility-Administered Medications for the 9/10/21 encounter (Office Visit) with Angeles Larsen APRN   Medication Dose Route Frequency Provider Last Rate Last Admin   • diphenhydrAMINE (BENADRYL) injection 50 mg  50 mg Intravenous Once Angeles Larsen APRN       • methylPREDNISolone sodium succinate (SOLU-Medrol) 40 mg in sodium chloride 0.9 % 100 mL IVPB  40 mg Intravenous Q4H Angeles Larsen APRN         Allergies:  Iodides, Iodinated diagnostic agents, Adhesive tape, Bactrim [sulfamethoxazole-trimethoprim], Citrus, Metoclopramide, Nsaids, Tramadol, Tramadol hcl, Zofran [ondansetron hcl], and Chlorhexidine gluconate        Vital Signs:   Temp:  [98.2 °F (36.8 °C)] 98.2 °F (36.8 °C)  Heart Rate:  [120] 120  BP: (104)/(80) 104/80    Physical Exam  Constitutional:       Appearance: She is ill-appearing.   HENT:      Head: Normocephalic.      Right Ear: Hearing, tympanic membrane, ear canal and external ear normal.      Left Ear: Ear canal and external ear normal. Decreased hearing noted. Tympanic membrane is retracted. Tympanic membrane has decreased mobility.   Cardiovascular:      Rate and Rhythm: Tachycardia present.   Pulmonary:      Effort: Pulmonary effort is normal.   Chest:       Neurological:      Mental Status: She is alert.                        Assessment:    1. Allergic reaction to contrast material, sequela    2. H/O mastoidectomy    3. Mixed conductive and sensorineural hearing loss of left ear, unspecified hearing status on contralateral side    4. POTS (postural orthostatic tachycardia syndrome)    5. Dysfunction of both eustachian tubes    6. Gastroparesis    7. Jejunostomy tube present (CMS/[facility])               Plan:        We will obtain CT and have patient follow up with Dr. Severino to discuss surgical need and/or BAHA evaluation.     New Medications Ordered This Visit   Medications   • methylPREDNISolone sodium succinate (SOLU-Medrol) 40 mg in sodium chloride 0.9 % 100 mL IVPB   •  diphenhydrAMINE (BENADRYL) injection 50 mg     Orders Placed This Encounter   Procedures   • CT Temporal Bones With & Without Contrast       Return for Follow up with Dr. Severino.      LILLI Linder  09/10/21  10:31 CDT

## 2021-09-14 ENCOUNTER — TELEPHONE (OUTPATIENT)
Dept: OTOLARYNGOLOGY | Facility: CLINIC | Age: 31
End: 2021-09-14

## 2021-09-14 RX ORDER — AZELASTINE 1 MG/ML
2 SPRAY, METERED NASAL 2 TIMES DAILY
Qty: 30 ML | Refills: 5 | Status: SHIPPED | OUTPATIENT
Start: 2021-09-14

## 2021-09-14 RX ORDER — FLUTICASONE PROPIONATE 50 MCG
2 SPRAY, SUSPENSION (ML) NASAL DAILY
Qty: 16 G | Refills: 5 | Status: SHIPPED | OUTPATIENT
Start: 2021-09-14

## 2021-09-14 NOTE — TELEPHONE ENCOUNTER
----- Message from LILLI Linder sent at 9/13/2021  5:18 PM CDT -----  Regarding: RE:  Yes absolutely!  ----- Message -----  From: Magnolia Her RN  Sent: 9/13/2021   5:16 PM CDT  To: LILLI Linder    Can she have flonase and astelin for her allergies or something else better. She thinks it is her cat

## 2021-09-21 ENCOUNTER — OFFICE VISIT (OUTPATIENT)
Dept: FAMILY MEDICINE CLINIC | Facility: CLINIC | Age: 31
End: 2021-09-21

## 2021-09-21 VITALS
DIASTOLIC BLOOD PRESSURE: 88 MMHG | WEIGHT: 114 LBS | BODY MASS INDEX: 21.52 KG/M2 | TEMPERATURE: 98.6 F | HEIGHT: 61 IN | SYSTOLIC BLOOD PRESSURE: 133 MMHG | HEART RATE: 105 BPM | OXYGEN SATURATION: 99 %

## 2021-09-21 DIAGNOSIS — M99.05 SOMATIC DYSFUNCTION OF PELVIC REGION: ICD-10-CM

## 2021-09-21 DIAGNOSIS — M99.00 SOMATIC DYSFUNCTION OF HEAD REGION: ICD-10-CM

## 2021-09-21 DIAGNOSIS — M99.09 SEGMENTAL AND SOMATIC DYSFUNCTION OF ABDOMEN AND OTHER REGIONS: ICD-10-CM

## 2021-09-21 DIAGNOSIS — K31.84 GASTROPARESIS: ICD-10-CM

## 2021-09-21 DIAGNOSIS — M99.08 SEGMENTAL AND SOMATIC DYSFUNCTION OF RIB CAGE: ICD-10-CM

## 2021-09-21 DIAGNOSIS — G90.A POTS (POSTURAL ORTHOSTATIC TACHYCARDIA SYNDROME): Primary | ICD-10-CM

## 2021-09-21 DIAGNOSIS — M99.06 SOMATIC DYSFUNCTION OF LOWER EXTREMITY: ICD-10-CM

## 2021-09-21 DIAGNOSIS — M99.01 SOMATIC DYSFUNCTION OF SPINE, CERVICAL: ICD-10-CM

## 2021-09-21 DIAGNOSIS — Z93.4 JEJUNOSTOMY TUBE PRESENT (HCC): ICD-10-CM

## 2021-09-21 DIAGNOSIS — H69.83 DYSFUNCTION OF BOTH EUSTACHIAN TUBES: ICD-10-CM

## 2021-09-21 DIAGNOSIS — M99.04 SOMATIC DYSFUNCTION OF SPINE, SACRAL: ICD-10-CM

## 2021-09-21 DIAGNOSIS — M99.02 SOMATIC DYSFUNCTION OF SPINE, THORACIC: ICD-10-CM

## 2021-09-21 PROCEDURE — 99203 OFFICE O/P NEW LOW 30 MIN: CPT | Performed by: FAMILY MEDICINE

## 2021-09-21 PROCEDURE — 98928 OSTEOPATH MANJ 7-8 REGIONS: CPT | Performed by: FAMILY MEDICINE

## 2021-09-21 RX ORDER — DIPHENHYDRAMINE HYDROCHLORIDE 50 MG/ML
INJECTION INTRAMUSCULAR; INTRAVENOUS
COMMUNITY
Start: 2021-09-07

## 2021-09-21 RX ORDER — ALTEPLASE 2.2 MG/2ML
INJECTION, POWDER, LYOPHILIZED, FOR SOLUTION INTRAVENOUS
COMMUNITY
Start: 2021-06-22

## 2021-09-21 RX ORDER — DEXTROSE, SODIUM CHLORIDE, AND POTASSIUM CHLORIDE 5; .45; .15 G/100ML; G/100ML; G/100ML
INJECTION INTRAVENOUS
COMMUNITY

## 2021-09-21 RX ORDER — OXYCODONE AND ACETAMINOPHEN 10; 325 MG/1; MG/1
1 TABLET ORAL EVERY 6 HOURS
COMMUNITY
Start: 2021-08-09 | End: 2021-09-21

## 2021-09-21 RX ORDER — IMMUNE GLOBULIN INTRAVENOUS (HUMAN) 100 MG/ML
SOLUTION INTRAVENOUS
COMMUNITY
Start: 2021-08-24

## 2021-09-21 RX ORDER — SODIUM CHLORIDE 9 MG/ML
INJECTION, SOLUTION INTRAVENOUS
COMMUNITY
Start: 2021-08-24

## 2021-09-21 RX ORDER — DOXYCYCLINE 25 MG/5ML
POWDER, FOR SUSPENSION ORAL
COMMUNITY
End: 2021-09-21

## 2021-09-24 DIAGNOSIS — H90.72 MIXED CONDUCTIVE AND SENSORINEURAL HEARING LOSS OF LEFT EAR, UNSPECIFIED HEARING STATUS ON CONTRALATERAL SIDE: Primary | ICD-10-CM

## 2021-09-30 ENCOUNTER — HOSPITAL ENCOUNTER (OUTPATIENT)
Dept: CT IMAGING | Facility: HOSPITAL | Age: 31
Discharge: HOME | End: 2021-09-30
Admitting: EMERGENCY MEDICINE

## 2021-09-30 DIAGNOSIS — H90.72 MIXED CONDUCTIVE AND SENSORINEURAL HEARING LOSS OF LEFT EAR, UNSPECIFIED HEARING STATUS ON CONTRALATERAL SIDE: ICD-10-CM

## 2021-09-30 PROCEDURE — 70480 CT ORBIT/EAR/FOSSA W/O DYE: CPT

## 2021-10-01 ENCOUNTER — TELEPHONE (OUTPATIENT)
Dept: OTOLARYNGOLOGY | Facility: CLINIC | Age: 31
End: 2021-10-01

## 2021-10-01 DIAGNOSIS — H71.90 CHOLESTEATOMA, UNSPECIFIED LATERALITY: Primary | ICD-10-CM

## 2021-10-01 NOTE — TELEPHONE ENCOUNTER
Patient had a recent CT and has a cholesteatoma. Dr. Severino would like patient to see Dr. Geraldo Jeffrey at the [facility]. I have spoken with patient and she is agreeable. Since she is [employer], she will need the pcp to provide referral for audiology and Dr. Jeffrey. The phone number to Dr. Jeffrey's office is [phone] if you do not care to do that for patient. Thank you.

## 2021-10-07 NOTE — PATIENT INSTRUCTIONS
What is Osteopathic Medicine  Osteopathic medicine provides all of the benefits of modern medicine including prescription drugs, surgery, and the use of technology to diagnose disease and evaluate injury. It also offers the added benefit of hands-on diagnosis and treatment through a system of therapy known as osteopathic manipulative medicine. Osteopathic medicine emphasizes helping each person achieve a high level of wellness by focusing on health promotion and disease prevention. ([email or web address])     What is a DO  Doctor's of Osteopathy (DO) are fully trained physicians, capable of practicing the entire scope of medicine. In addition to conventional medical practice, DO’s are trained to use their hands to palpate (feel) tissue function and dysfunction. Gentle manipulative techniques are applied, restoring optimal function (motion).     4 Principles define Osteopathic Medicine  • The body is a fully integrated being of body, mind and spirit  • The body is capable of self-regulation, self-healing, and health maintenance  • Structure and function are interrelated  • Rational treatment is based upon an understanding of the basic principles of body unity, self-regulation, and the relationship of structure and function     Osteopathic Manipulative Medicine (OMM)   OMM encompasses a wide range of techniques addressing problems in joints, ligaments, muscles, tendons, and fascia (tissue surrounding muscles and other organs) that may cause pain or interfere with the body’s function. When there are restrictions within the structure of the body it does not function properly, often times causing pain. Using different techniques (listed below) the restrictions in the body are relieved so the body can function at optimal health. Patients often experience a sense of deep relaxation, tingling, fluid flows and relief of pain. These changes may be experienced immediately as they occur or later after the treatment. OMM may be referred to  as Osteopathic Manipulative Treatment (OMT).     Common Treatment Modalities  Osteopathy in the Cranial Field- a system of treatment that utilizes the intrinsic motion of the cranial and neurological system to treat the whole body     Myofascial Release - used to treat restrictions of muscle and fascia     Counterstrain - focused on specific tender points on the body that are held in a position of comfort for 90 seconds, after which the tenderness is relieved     Muscle Energy - uses the relaxation after a muscle is contracted to stretch muscles and increase range of motion     Balanced Ligamentous Tension - ligaments or joints are placed into a state of balanced until the tension is relieved     Facilitated Positional Release - patient’s spine is placed at neutral position while the isolated segment for treatment is placed at ease. Compression or traction is then added to release the tension in the muscle, fascia, and/or joints     High Velocity Low Amplitude (HVLA)- use of fast, short thrusts through restricted joints; a technique with which most people are familiar (also known as the “cracking” or “popping” technique)     Who would benefit  Osteopathy treats the patient, not the disease. Our intention is to find and restore health as well as structural integrity and fluid continuity.      Some of the problems that typically respond to osteopathic treatment:  · SOMATIC PAIN  · Back, neck, and joint pain  · Sciatica  · Headaches/Migraines  · Temporal Mandibular Joint Dysfunction (TMJ)     · TRAUMATIC INJURIES  · Head trauma  · Post Concussion Syndrome  · Overuse Syndromes  · Whiplash Syndromes     · CHRONIC CONDITIONS UNRESPONSIVE TO CONVENTIAL TREATMENT  · Neurologic disorders  · Gastrointestinal disorders  · Genitourinary disorders  · Respiratory Disorders     · WOMEN DURING PREGNANCY can be made more comfortable, their labor and delivery eased considerably by providing freedom to the ligamentous support of the  uterus and pelvis.     ADDITIONAL RESOURCES  www.osteopathic.org  www.osteodoc.com  http://www.do[employer].com/aboutosteopathy/research/ (Research articles)  Book: Dr. Dyer’s Touch of Life by Ancelmo Dyer DO     Information gathered from osteodoc[employer],  aacom.org, A Brief Guide to Osteopathic Medicine.

## 2021-10-21 ENCOUNTER — OFFICE VISIT (OUTPATIENT)
Dept: FAMILY MEDICINE CLINIC | Facility: CLINIC | Age: 31
End: 2021-10-21

## 2021-10-21 VITALS
HEIGHT: 61 IN | HEART RATE: 99 BPM | WEIGHT: 113 LBS | TEMPERATURE: 97.9 F | DIASTOLIC BLOOD PRESSURE: 76 MMHG | OXYGEN SATURATION: 99 % | SYSTOLIC BLOOD PRESSURE: 124 MMHG | BODY MASS INDEX: 21.34 KG/M2

## 2021-10-21 DIAGNOSIS — M99.04 SOMATIC DYSFUNCTION OF SPINE, SACRAL: ICD-10-CM

## 2021-10-21 DIAGNOSIS — M99.02 SOMATIC DYSFUNCTION OF SPINE, THORACIC: ICD-10-CM

## 2021-10-21 DIAGNOSIS — M99.00 SOMATIC DYSFUNCTION OF HEAD REGION: ICD-10-CM

## 2021-10-21 DIAGNOSIS — G90.A POTS (POSTURAL ORTHOSTATIC TACHYCARDIA SYNDROME): Primary | ICD-10-CM

## 2021-10-21 DIAGNOSIS — K31.84 GASTROPARESIS: ICD-10-CM

## 2021-10-21 DIAGNOSIS — M99.05 SOMATIC DYSFUNCTION OF PELVIC REGION: ICD-10-CM

## 2021-10-21 PROCEDURE — 98926 OSTEOPATH MANJ 3-4 REGIONS: CPT | Performed by: FAMILY MEDICINE

## 2021-10-21 NOTE — PROGRESS NOTES
"Chief Complaint  Follow-up (OMT today, current pain 3/10)    Subjective          Ellen Burrell presents to [facility]  History of Present Illness  Pain improved since last visit  Some worsening POTS with social stressors over the last week, working on better diet. Otherwise feels well    Objective   Vital Signs:   /76 (BP Location: Left arm, Patient Position: Sitting, Cuff Size: Adult)   Pulse 99   Temp 97.9 °F (36.6 °C) (Temporal)   Ht 154.9 cm (61\")   Wt 51.3 kg (113 lb)   SpO2 99%   BMI 21.35 kg/m²     Physical Exam  Vitals and nursing note reviewed.   Constitutional:       General: She is not in acute distress.     Appearance: She is not diaphoretic.   HENT:      Head: Normocephalic and atraumatic.      Nose: Nose normal.   Eyes:      General: No scleral icterus.        Right eye: No discharge.         Left eye: No discharge.      Conjunctiva/sclera: Conjunctivae normal.   Neck:      Trachea: No tracheal deviation.   Pulmonary:      Effort: Pulmonary effort is normal.   Skin:     General: Skin is warm and dry.      Coloration: Skin is not pale.   Neurological:      Mental Status: She is alert and oriented to person, place, and time.   Psychiatric:         Behavior: Behavior normal.         Thought Content: Thought content normal.         Judgment: Judgment normal.      Osteopathic Structural Exam  Procedure Note for Osteopathic Manipulative Treatment    Pre-procedure diagnoses: Somatic dysfunctions as listed below.  Consent: Oral consent given for Osteopathic Treatment  Post-procedure diagnoses: same  Complications of procedure: none, patient tolerated procedure well    The evaluation including the history, physical exam and the management decisions, indicate than an appropriate intervention on this date of service is osteopathic manipulative treatment. Oral permission for the osteopathic procedure was obtained. The following treatment methods and the responses " for each body region are listed below.        Region Somatic Dysfunction Severity OMT technique Response      Head OA ESrRL Moderate Osteopathy in the cranial field Improved      Thoracic  Thoracic inlet FSrRr Moderate  Balanced ligamentous tension  Improved       Sacral B/l SI compression Moderate Balanced ligamentous tension Improved   Pelvic B/l posterior rotation Moderate Balanced ligamentous tension Improved       Result Review :                 Assessment and Plan    Diagnoses and all orders for this visit:    1. POTS (postural orthostatic tachycardia syndrome) (Primary)    2. Gastroparesis    3. Somatic dysfunction of head region    4. Somatic dysfunction of spine, thoracic    5. Somatic dysfunction of spine, sacral    6. Somatic dysfunction of pelvic region    OMT to balance autonomic tone, improve fascial symmetry and respiratory/circulatory/lymphatic compliance  F/u 4 weeks

## 2021-12-07 ENCOUNTER — APPOINTMENT (OUTPATIENT)
Dept: ONCOLOGY | Facility: HOSPITAL | Age: 31
End: 2021-12-07

## 2021-12-08 ENCOUNTER — LAB (OUTPATIENT)
Dept: LAB | Facility: HOSPITAL | Age: 31
End: 2021-12-08

## 2021-12-08 ENCOUNTER — TRANSCRIBE ORDERS (OUTPATIENT)
Dept: ADMINISTRATIVE | Facility: HOSPITAL | Age: 31
End: 2021-12-08

## 2021-12-08 ENCOUNTER — INFUSION (OUTPATIENT)
Dept: ONCOLOGY | Facility: HOSPITAL | Age: 31
End: 2021-12-08

## 2021-12-08 DIAGNOSIS — I87.8 POOR VENOUS ACCESS: ICD-10-CM

## 2021-12-08 DIAGNOSIS — E63.9 NUTRITIONAL DEFICIENCY DISORDER: Primary | ICD-10-CM

## 2021-12-08 LAB
25(OH)D3 SERPL-MCNC: 7.3 NG/ML
FOLATE SERPL-MCNC: 4.83 NG/ML (ref 4.78–24.2)
MAGNESIUM SERPL-MCNC: 1.8 MG/DL (ref 1.6–2.6)
VIT B12 BLD-MCNC: 286 PG/ML (ref 211–946)

## 2021-12-08 PROCEDURE — 84590 ASSAY OF VITAMIN A: CPT

## 2021-12-08 PROCEDURE — 84630 ASSAY OF ZINC: CPT

## 2021-12-08 PROCEDURE — 82746 ASSAY OF FOLIC ACID SERUM: CPT

## 2021-12-08 PROCEDURE — 36592 COLLECT BLOOD FROM PICC: CPT

## 2021-12-08 PROCEDURE — 82180 ASSAY OF ASCORBIC ACID: CPT

## 2021-12-08 PROCEDURE — 84252 ASSAY OF VITAMIN B-2: CPT

## 2021-12-08 PROCEDURE — 84207 ASSAY OF VITAMIN B-6: CPT

## 2021-12-08 PROCEDURE — 36591 DRAW BLOOD OFF VENOUS DEVICE: CPT

## 2021-12-08 PROCEDURE — 82306 VITAMIN D 25 HYDROXY: CPT

## 2021-12-08 PROCEDURE — 84597 ASSAY OF VITAMIN K: CPT

## 2021-12-08 PROCEDURE — 36415 COLL VENOUS BLD VENIPUNCTURE: CPT

## 2021-12-08 PROCEDURE — 83735 ASSAY OF MAGNESIUM: CPT

## 2021-12-08 PROCEDURE — 84425 ASSAY OF VITAMIN B-1: CPT

## 2021-12-08 PROCEDURE — 82607 VITAMIN B-12: CPT

## 2021-12-08 PROCEDURE — 84591 ASSAY OF NOS VITAMIN: CPT

## 2021-12-08 PROCEDURE — 84446 ASSAY OF VITAMIN E: CPT

## 2021-12-08 NOTE — PROGRESS NOTES
0863 Home health unable to drawn labs due to time sensitive, labs drawn via Hernandez L chest due to poor venous access flushed prior to with saline, 8 cc blood wasted, labs drawn and flushed with 20 cc NS and line capped. Patient to have her IGG at home and dressing changed today per home health. Patient does flushes routinely at home per home health instructions. Patient tolerated without problems.Nirmala HILARIO

## 2021-12-10 LAB — ZINC SERPL-MCNC: 70 UG/DL (ref 44–115)

## 2021-12-12 LAB — VIT C SERPL-MCNC: 0.1 MG/DL (ref 0.4–2)

## 2021-12-13 LAB
A-TOCOPHEROL VIT E SERPL-MCNC: 10.6 MG/L (ref 5.9–19.4)
GAMMA TOCOPHEROL SERPL-MCNC: 1.4 MG/L (ref 0.7–4.9)
VIT A SERPL-MCNC: 35.6 UG/DL (ref 18.9–57.3)
VIT B1 BLD-SCNC: 81.3 NMOL/L (ref 66.5–200)

## 2021-12-14 LAB
PHYTONADIONE SERPL-MCNC: 0.5 NG/ML (ref 0.1–2.2)
VIT B2 BLD-MCNC: 215 UG/L (ref 137–370)
VIT B6 SERPL-MCNC: 2.9 UG/L (ref 2–32.8)

## 2021-12-17 LAB — BIOTIN SERPL-MCNC: <0.05 NG/ML (ref 0.05–0.83)

## 2022-02-21 ENCOUNTER — TELEPHONE (OUTPATIENT)
Dept: OTOLARYNGOLOGY | Facility: CLINIC | Age: 32
End: 2022-02-21

## 2022-02-21 NOTE — TELEPHONE ENCOUNTER
Caller: TOMMY LOPEZ    Relationship to patient: SELF    Best call back number: [phone]    Patient is needing: PT CALLED HUB AND REQUESTED TO SPEAK WITH DR. JUSTICE NURSE/HOLLIS. SAID SHE'S HAVING EAR PAIN AND WOULD LIKE TO SEE HIM AND NOT SOMEONE IN [place]. CAN SOMEONE PLEASE CALL TO DISCUSS.     HUB UNABLE TO WARM TRANSFER.

## 2022-02-28 ENCOUNTER — HOSPITAL ENCOUNTER (EMERGENCY)
Age: 32
Discharge: HOME | End: 2022-02-28
Payer: MEDICAID

## 2022-02-28 ENCOUNTER — APPOINTMENT (OUTPATIENT)
Dept: GENERAL RADIOLOGY | Age: 32
End: 2022-02-28
Payer: MEDICAID

## 2022-02-28 VITALS
SYSTOLIC BLOOD PRESSURE: 129 MMHG | TEMPERATURE: 98.6 F | HEART RATE: 117 BPM | RESPIRATION RATE: 18 BRPM | DIASTOLIC BLOOD PRESSURE: 80 MMHG | OXYGEN SATURATION: 97 %

## 2022-02-28 DIAGNOSIS — M25.562 ACUTE PAIN OF LEFT KNEE: Primary | ICD-10-CM

## 2022-02-28 PROCEDURE — 99284 EMERGENCY DEPT VISIT MOD MDM: CPT

## 2022-02-28 PROCEDURE — 73560 X-RAY EXAM OF KNEE 1 OR 2: CPT

## 2022-02-28 ASSESSMENT — ENCOUNTER SYMPTOMS
VOMITING: 0
ABDOMINAL PAIN: 0
NAUSEA: 0
BACK PAIN: 0
COUGH: 0
SHORTNESS OF BREATH: 0

## 2022-02-28 NOTE — ED PROVIDER NOTES
[facility] EMERGENCY DEPT  EMERGENCY DEPARTMENT ENCOUNTER      Pt Name: All Christine  MRN: [id number]  S[place]gffarnaz 1990  Date of evaluation: 2/28/2022  Provider: SHRUTHI Gleason [id number]       Chief Complaint   Patient presents with    Knee Pain         HISTORY OF PRESENT ILLNESS   (Location/Symptom, Timing/Onset, Context/Setting, Quality, Duration, Modifying Factors, Severity)  Note limiting factors. All Christine is a 28 y.o. female who presents to the emergency department who comes to ER with concern for left knee pain after hitting it on coffee table several days ago. Since incident feels like knee has been giving out on her. She uses a power chair and walker r/t complications/falls from her POTS and feels like the knee giving out will cause her to fall more. HPI    Nursing Notes were reviewed. REVIEW OF SYSTEMS    (2-9 systems for level 4, 10 or more for level 5)     Review of Systems   Constitutional: Negative for chills and fever. HENT: Negative for congestion. Respiratory: Negative for cough and shortness of breath. Cardiovascular: Negative for chest pain and palpitations. Gastrointestinal: Negative for abdominal pain, nausea and vomiting. Musculoskeletal: Negative for back pain, joint swelling and neck pain. Skin: Positive for wound (bruise left lateral knee). Neurological: Negative for dizziness, syncope, weakness, light-headedness and headaches. Except as noted above the remainder of the review of systems was reviewed and negative.        PAST MEDICAL HISTORY     Past Medical History:   Diagnosis Date    Allergic contact dermatitis due to adhesives     Allergic contact dermatitis due to plants, except food     Anemia complicating pregnancy, second trimester     Anemia complicating pregnancy, third trimester     Anxiety and depression     Anxiety disorder     unspecified    Bacteremia     Cellulitis of abdominal wall     Chronic fatigue, unspecified     Dysuria     Epigastric pain     Frequency of micturition     G tube feedings (HCC)     Gastroparesis     Dr. Adair Prieto in [place] manages    Gastroparesis     Gastrostomy malfunction ([address]     Generalized abdominal pain     GERD (gastroesophageal reflux disease)     Heart murmur     Hiatal hernia     History of Hoyos's esophagus     Hypotension, unspecified     IBS (irritable bowel syndrome)     Insomnia, unspecified     Jejunostomy tube present (HCC)     Low back pain     Nausea with vomiting     unspecified    Neck pain     Orthostatic hypotension     POTS (postural orthostatic tachycardia syndrome)     Premature birth     delivered at 24-27 weeks    Scoliosis     Syncope and collapse     Tachycardia     Tachycardia, unspecified     Toxic gastroenteritis and colitis          SURGICAL HISTORY       Past Surgical History:   Procedure Laterality Date    ABDOMEN SURGERY  2014    gastric stimulator implanted    ADENOIDECTOMY      ADENOIDECTOMY  2007    CHOLECYSTECTOMY      CHOLECYSTECTOMY  2016    DENTAL SURGERY  2004    wisdom teeth    EAR SURGERY  2014    left    FACIAL SURGERY  2005    GASTRIC STIMULATOR IMPLANT SURGERY  11/2014    GASTROSTOMY TUBE PLACEMENT  04/07/2015    J tube-Removed in [place] 1/2018    GASTROSTOMY TUBE PLACEMENT      G tube 2017    HYSTERECTOMY      INSERTION / REMOVAL / REPLACEMENT VENOUS ACCESS CATHETER N/A 3/25/2016    REMOVAL OF PORT AND PLACEMENT OF NEW PORT; REMOVAL OF PICC LINE  performed by Quan Hankins MD at [address] [place] [address]  2014    left knee    KNEE SURGERY  2015    right knee    MANDIBLE SURGERY      double jaw    PHARYNGECTOMY      PORT SURGERY      port present 3-25-16    TONSILLECTOMY      TONSILLECTOMY  1997    TUBAL LIGATION      TUBAL LIGATION  2016    TUMOR REMOVAL Left     ear    TUNNELED VENOUS PORT PLACEMENT Right 05/29/15    TUNNELED VENOUS PORT PLACEMENT      UPPER GASTROINTESTINAL ENDOSCOPY  4-2-10    Dr Marianne Lopez ENDOSCOPY  9-25-13    Dr Rafiq Brooks  5/2015    Dr. Bimal Chacko  5/29/2015 [facility]    Ultrasound-guided cannulation, right internal jugular vein. Right internal jugular vein single-lumen bard powerport placement.  VASCULAR SURGERY  9/8/15 [facility]    Right internal jugular vein portograms. Revision of right internal jugular vein single lumen port.  VASCULAR SURGERY  11/3/15 [facility]    Ultrasound-guided cannulation, left upper arm basilic vein. Left upper arm basilic vein PICC line placement bard dual-lumen PICC line. Superior vena cava venograms.  VASCULAR SURGERY  03/23/2017    [facility]. Left IJV port angiogram.Ultrasound guided cannulation of right basilic vein,right upper extremity PICC line placement bard power PICC,dual lumen.  WISDOM TOOTH EXTRACTION           CURRENT MEDICATIONS       Previous Medications    DICYCLOMINE (BENTYL) 10 MG CAPSULE    Take 10 mg by mouth 4 times daily (before meals and nightly)    FAMOTIDINE (PEPCID) 20 MG/2ML INJECTION    Infuse 20 mg intravenously 2 times daily    HEPARIN FLUSH 100 UNIT/ML INJECTION    100 Units by Intracatheter route 2 times daily    LINACLOTIDE (LINZESS) 145 MCG CAPSULE    Take 145 mcg by mouth every morning (before breakfast)    MISC.  DEVICES (WHEELCHAIR) MISC    by Does not apply route Indications: custom wheel chair with smart drive    NUTRITIONAL SUPPLEMENTS (JEVITY 1.5 SARAH/FIBER PO)    Jevity 1.5 Sarah 0.06 gram-1.5 kcal/mL oral liquid    PROCHLORPERAZINE (COMPAZINE) 10 MG/2ML SOLN INJECTION    Infuse 2 mLs intravenously every 6 hours as needed (nausea and vomiting)    PROMETHAZINE (PHENERGAN) 25 MG/ML INJECTION    Infuse 25 mg intravenously every 3 hours as needed    SCOPOLAMINE (TRANSDERM-SCOP) TRANSDERMAL PATCH    Place 1 patch onto the skin    SODIUM CHLORIDE 0.9 % SOLN 50 ML WITH DIPHENHYDRAMINE 50 MG/ML SOLN 50 MG    Infuse intravenously 2 times daily    SODIUM CHLORIDE FLUSH 0.9 % INJECTION    Infuse 5-40 mLs intravenously 2 times daily       ALLERGIES     Iv dye [iodides], Iv dye [iodides], Adhesive tape, Chlorhexidine, Fruit & vegetable daily [nutritional supplements], Metoprolol succinate [metoprolol], Nsaids, Orange fruit [citrus], Other, Reglan [metoclopramide], Reglan [metoclopramide], Tape [adhesive tape], Tramadol, Tramadol, Zofran [ondansetron hcl], Zofran [ondansetron hcl], Orange oil, and Sulfamethoxazole-trimethoprim    FAMILY HISTORY       Family History   Problem Relation Age of Onset    Other Mother         endometriosis    Depression Father     Diabetes Paternal Grandmother     Stroke Paternal Grandmother     Hypertension Paternal Grandmother     Heart Disease Paternal Grandmother     Heart Failure Paternal Grandmother     Colon Cancer Neg Hx     Colon Polyps Neg Hx           SOCIAL HISTORY       Social History     Socioeconomic History    Marital status:      Spouse name: Not on file    Number of children: Not on file    Years of education: Not on file    Highest education level: Not on file   Occupational History    Not on file   Tobacco Use    Smoking status: Never Smoker    Smokeless tobacco: Never Used   Vaping Use    Vaping Use: Every day    Substances: Nicotine    Devices: Refillable tank   Substance and Sexual Activity    Alcohol use: Never    Drug use: Never    Sexual activity: Yes     Partners: Male   Other Topics Concern    Not on file   Social History Narrative    ** Merged History Encounter **          Social Determinants of Health     Financial Resource Strain:     Difficulty of Paying Living Expenses: Not on file   Food Insecurity:     Worried About Running Out of Food in the Last Year: Not on file    Sixto of Food in the Last Year: Not on file   Transportation Needs:     Lack of Transportation (Medical): Not on file    Lack of Transportation (Non-Medical):  Not on file   Physical Activity:     Days of Exercise per Week: Not on file    Minutes of Exercise per Session: Not on file   Stress:     Feeling of Stress : Not on file   Social Connections:     Frequency of Communication with Friends and Family: Not on file    Frequency of Social Gatherings with Friends and Family: Not on file    Attends Yarsanism Services: Not on file    Active Member of [address] [place] [address] [employer] or Organizations: Not on file    Attends Club or Organization Meetings: Not on file    Marital Status: Not on file   Intimate Partner Violence:     Fear of Current or Ex-Partner: Not on file    Emotionally Abused: Not on file    Physically Abused: Not on file    Sexually Abused: Not on file   Housing Stability:     Unable to Pay for Housing in the Last Year: Not on file    Number of [place] in the Last Year: Not on file    Unstable Housing in the Last Year: Not on file       SCREENINGS         [place] Coma Scale  Eye Opening: Spontaneous  Best Verbal Response: Oriented  Best Motor Response: Obeys commands  Leandro Coma Scale Score: 15                     CIWA Assessment  BP: 129/80  Pulse: 117                 PHYSICAL EXAM    (up to 7 for level 4, 8 or more for level 5)     ED Triage Vitals [02/28/22 1242]   BP Temp Temp Source Pulse Resp SpO2 Height Weight   98/70 98.6 °F (37 °C) Oral 121 16 95 % -- --       Physical Exam  Vitals reviewed. Constitutional:       General: She is not in acute distress. Appearance: Normal appearance. She is not ill-appearing or diaphoretic. HENT:      Head: Normocephalic and atraumatic. Nose: Nose normal.      Mouth/Throat:      Mouth: Mucous membranes are moist.      Pharynx: Oropharynx is clear. Eyes:      Extraocular Movements: Extraocular movements intact. Conjunctiva/sclera: Conjunctivae normal.      Pupils: Pupils are equal, round, and reactive to light. Cardiovascular:      Rate and Rhythm: Normal rate and regular rhythm. Pulses: Normal pulses.       Heart sounds: Normal heart sounds. Pulmonary:      Effort: Pulmonary effort is normal.      Breath sounds: Normal breath sounds. Abdominal:      General: Bowel sounds are normal. There is no distension. Palpations: Abdomen is soft. Tenderness: There is no abdominal tenderness. There is no right CVA tenderness, left CVA tenderness or guarding. Musculoskeletal:      Cervical back: Neck supple. No tenderness. Left knee: Ecchymosis present. No swelling, deformity, effusion, erythema, lacerations, bony tenderness or crepitus. Normal range of motion. Tenderness present. Normal alignment. Legs:       Comments: Old bruise at site noted above. Tenderness generalized. Distal neurovascular intact   Neurological:      Mental Status: She is alert. DIAGNOSTIC RESULTS     EKG: All EKG's are interpreted by the Emergency Department Physician who either signs or Co-signs this chart in the absence of a cardiologist.      RADIOLOGY:   Non-plain film images such as CT, Ultrasound and MRI are read by the radiologist. Plain radiographic images are visualized and preliminarily interpreted by the emergency physician with the below findings:      Interpretation per the Radiologist below, if available at the time of this note:    XR KNEE LEFT (1-2 VIEWS)   Final Result   Impression:    1. No acute osseous injury or malalignment at the knee. Signed by Dr Michelle Boyle            ED BEDSIDE ULTRASOUND:   Performed by ED Physician - none    LABS:  Labs Reviewed - No data to display    All other labs were within normal range or not returned as of this dictation. EMERGENCY DEPARTMENT COURSE and DIFFERENTIAL DIAGNOSIS/MDM:   Vitals:    Vitals:    02/28/22 1242 02/28/22 1308   BP: 98/70 129/80   Pulse: 121 117   Resp: 16 18   Temp: 98.6 °F (37 °C)    TempSrc: Oral    SpO2: 95% 97%         MDM      REASSESSMENT      Well appearing, nontoxic. Knee exam with contusion present.  Xray normal. Fitted for knee immobilizer and counseled to treatment plan, follow up and return parameters. CRITICAL CARE TIME       CONSULTS:  None    PROCEDURES:  Unless otherwise noted below, none     Procedures         FINAL IMPRESSION      1. Acute pain of left knee          DISPOSITION/PLAN   DISPOSITION Decision To Discharge 02/28/2022 01:54:27 PM      PATIENT REFERRED TO:  Cherie Moreno MD  [address]  [phone]    Call in 1 day        DISCHARGE MEDICATIONS:  New Prescriptions    No medications on file     Controlled Substances Monitoring:     No flowsheet data found.     (Please note that portions of this note were completed with a voice recognition program.  Efforts were made to edit the dictations but occasionally words are mis-transcribed.)    SHRUTHI Rodarte CNP (electronically signed)  Attending Emergency Physician         SHRUTHI Rodarte CNP  02/28/22 4732

## 2022-04-07 ENCOUNTER — HOSPITAL ENCOUNTER (EMERGENCY)
Age: 32
Discharge: HOME | End: 2022-04-07
Attending: EMERGENCY MEDICINE
Payer: MEDICAID

## 2022-04-07 VITALS
OXYGEN SATURATION: 95 % | TEMPERATURE: 98.2 F | DIASTOLIC BLOOD PRESSURE: 90 MMHG | HEART RATE: 104 BPM | SYSTOLIC BLOOD PRESSURE: 119 MMHG | RESPIRATION RATE: 18 BRPM

## 2022-04-07 DIAGNOSIS — Z78.9 PROBLEM WITH VASCULAR ACCESS: Primary | ICD-10-CM

## 2022-04-07 PROCEDURE — 6360000002 HC RX W HCPCS: Performed by: EMERGENCY MEDICINE

## 2022-04-07 PROCEDURE — 99284 EMERGENCY DEPT VISIT MOD MDM: CPT

## 2022-04-07 RX ADMIN — ALTEPLASE 1 MG: 2.2 INJECTION, POWDER, LYOPHILIZED, FOR SOLUTION INTRAVENOUS at 12:34

## 2022-04-07 ASSESSMENT — ENCOUNTER SYMPTOMS
BLOOD IN STOOL: 0
SINUS PRESSURE: 0
VOMITING: 0
SORE THROAT: 0
CONSTIPATION: 0
NAUSEA: 0
CHOKING: 0
DIARRHEA: 0
EYE DISCHARGE: 0
APNEA: 0
SHORTNESS OF BREATH: 0
FACIAL SWELLING: 0
VOICE CHANGE: 0

## 2022-04-07 NOTE — ED NOTES
ASSESSMENT:    PT ALERT/ORIENTED X4. PUPILS EQUAL/REACTIVE    SKIN:  WARM/DRY PINK CAPILLARY REFILL < 2SECS    CARDIAC:  S1 S2 NOTED     LUNGS: CLEAR UPPER AND LOWER LOBES, RESPIRATIONS EVEN/UNLABORED     ABDOMEN: BOWEL SOUNDS NOTED UPPER AND LOWER QUADRANTS                     SOFT AND NONTENDER. EXTREMITIES:  BILATERAL DP AND PT AND NO EDEMA NOTED. NO DISTRESS NOTED. SIDE RAILS UP AND CALL LIGHT IN REACH. WILL CONTINUE TO MONITOR.      Jen Corona RN  04/07/22 0821

## 2022-04-07 NOTE — ED NOTES
Port flushed after [employer]. Good blood return noted. Patient denies needs at this time. Will continue to monitor.      Beau Mullins RN  04/07/22 5506

## 2022-04-07 NOTE — ED PROVIDER NOTES
[facility] EMERGENCY DEPT  eMERGENCY dEPARTMENT eNCOUnter      Pt Name: Royal Abel  MRN: [id number]  [place] 1990  Date of evaluation: 4/7/2022  Provider: Papi Espinal MD    CHIEF COMPLAINT       Chief Complaint   Patient presents with    Vascular Access Problem     sent by [facility] nurse for cath pierre for central line         HISTORY OF PRESENT ILLNESS   (Location/Symptom, Timing/Onset,Context/Setting, Quality, Duration, Modifying Factors, Severity)  Note limiting factors. Royal Abel is a 28 y.o. female who presents to the emergency department problems with vascular access. 29-year-old female TPN dependent has a Groshong or Munguia catheter access. The past 2 days having difficulty with infusion and it will not draw. Home health unable to remedy the problem the patient is here to receive TPA in her catheter. She denies any acute need other than needs her catheter functioning so she can continue her medications and TPN. The history is provided by the patient. NursingNotes were reviewed. REVIEW OF SYSTEMS    (2-9 systems for level 4, 10 or more for level 5)     Review of Systems   Constitutional: Negative for chills and fever. HENT: Negative for congestion, drooling, facial swelling, nosebleeds, sinus pressure, sore throat and voice change. Eyes: Negative for discharge. Respiratory: Negative for apnea, choking and shortness of breath. Cardiovascular: Negative for chest pain and leg swelling. Gastrointestinal: Negative for blood in stool, constipation, diarrhea, nausea and vomiting. Genitourinary: Negative for dysuria and enuresis. Musculoskeletal: Negative for joint swelling. Skin: Negative for rash and wound. Neurological: Negative for seizures and syncope. Psychiatric/Behavioral: Negative for behavioral problems, hallucinations and suicidal ideas. All other systems reviewed and are negative.       A complete review of systems was performed and is negative except as noted above in the HPI.        PAST MEDICAL HISTORY     Past Medical History:   Diagnosis Date    Allergic contact dermatitis due to adhesives     Allergic contact dermatitis due to plants, except food     Anemia complicating pregnancy, second trimester     Anemia complicating pregnancy, third trimester     Anxiety and depression     Anxiety disorder     unspecified    Bacteremia     Cellulitis of abdominal wall     Chronic fatigue, unspecified     Dysuria     Epigastric pain     Frequency of micturition     G tube feedings (HCC)     Gastroparesis     Dr. Ilda Enrique in [place] manages    Gastroparesis     Gastrostomy malfunction ([address]     Generalized abdominal pain     GERD (gastroesophageal reflux disease)     Heart murmur     Hiatal hernia     History of Hoyos's esophagus     Hypotension, unspecified     IBS (irritable bowel syndrome)     Insomnia, unspecified     Jejunostomy tube present (HCC)     Low back pain     Nausea with vomiting     unspecified    Neck pain     Orthostatic hypotension     POTS (postural orthostatic tachycardia syndrome)     Premature birth     delivered at 24-27 weeks    Scoliosis     Syncope and collapse     Tachycardia     Tachycardia, unspecified     Toxic gastroenteritis and colitis          SURGICAL HISTORY       Past Surgical History:   Procedure Laterality Date    ABDOMEN SURGERY  2014    gastric stimulator implanted    ADENOIDECTOMY      ADENOIDECTOMY  2007    CHOLECYSTECTOMY      CHOLECYSTECTOMY  2016    DENTAL SURGERY  2004    wisdom teeth    EAR SURGERY  2014    left    FACIAL SURGERY  2005    GASTRIC STIMULATOR IMPLANT SURGERY  11/2014    GASTROSTOMY TUBE PLACEMENT  04/07/2015    J tube-Removed in [place] 1/2018    GASTROSTOMY TUBE PLACEMENT      G tube 2017    HYSTERECTOMY      INSERTION / REMOVAL / REPLACEMENT VENOUS ACCESS CATHETER N/A 3/25/2016    REMOVAL OF PORT AND PLACEMENT OF NEW PORT; REMOVAL OF PICC LINE  performed by Maegan Cross MD at [facility]  2014    left knee    KNEE SURGERY  2015    right knee    MANDIBLE SURGERY      double jaw    PHARYNGECTOMY      PORT SURGERY      port present 3-25-16    TONSILLECTOMY      TONSILLECTOMY  1997    TUBAL LIGATION      TUBAL LIGATION  2016    TUMOR REMOVAL Left     ear    TUNNELED VENOUS PORT PLACEMENT Right 05/29/15    TUNNELED VENOUS PORT PLACEMENT      UPPER GASTROINTESTINAL ENDOSCOPY  4-2-10    Dr Genaro Sánchez ENDOSCOPY  9-25-13    Dr Hannah Alvarenga  5/2015    Dr. Evelio Chacon  5/29/2015 [facility]    Ultrasound-guided cannulation, right internal jugular vein. Right internal jugular vein single-lumen bard powerport placement.  VASCULAR SURGERY  9/8/15 [facility]    Right internal jugular vein portograms. Revision of right internal jugular vein single lumen port.  VASCULAR SURGERY  11/3/15 [facility]    Ultrasound-guided cannulation, left upper arm basilic vein. Left upper arm basilic vein PICC line placement bard dual-lumen PICC line. Superior vena cava venograms.  VASCULAR SURGERY  03/23/2017    [facility]. Left IJV port angiogram.Ultrasound guided cannulation of right basilic vein,right upper extremity PICC line placement bard power PICC,dual lumen.  WISDOM TOOTH EXTRACTION           CURRENT MEDICATIONS       Previous Medications    DICYCLOMINE (BENTYL) 10 MG CAPSULE    Take 10 mg by mouth 4 times daily (before meals and nightly)    FAMOTIDINE (PEPCID) 20 MG/2ML INJECTION    Infuse 20 mg intravenously 2 times daily    HEPARIN FLUSH 100 UNIT/ML INJECTION    100 Units by Intracatheter route 2 times daily    LINACLOTIDE (LINZESS) 145 MCG CAPSULE    Take 145 mcg by mouth every morning (before breakfast)    MISC.  DEVICES (WHEELCHAIR) MISC    by Does not apply route Indications: custom wheel chair with smart drive    NUTRITIONAL SUPPLEMENTS (JEVITY 1.5 SARAH/FIBER PO)    Jevity 1.5 Sarah 0.06 gram-1.5 kcal/mL oral liquid    PROCHLORPERAZINE (COMPAZINE) 10 MG/2ML SOLN INJECTION    Infuse 2 mLs intravenously every 6 hours as needed (nausea and vomiting)    PROMETHAZINE (PHENERGAN) 25 MG/ML INJECTION    Infuse 25 mg intravenously every 3 hours as needed    SCOPOLAMINE (TRANSDERM-SCOP) TRANSDERMAL PATCH    Place 1 patch onto the skin    SODIUM CHLORIDE 0.9 % SOLN 50 ML WITH DIPHENHYDRAMINE 50 MG/ML SOLN 50 MG    Infuse intravenously 2 times daily    SODIUM CHLORIDE FLUSH 0.9 % INJECTION    Infuse 5-40 mLs intravenously 2 times daily       ALLERGIES     Iv dye [iodides], Iv dye [iodides], Adhesive tape, Chlorhexidine, Fruit & vegetable daily [nutritional supplements], Metoprolol succinate [metoprolol], Nsaids, Orange fruit [citrus], Other, Reglan [metoclopramide], Reglan [metoclopramide], Tape [adhesive tape], Tramadol, Tramadol, Zofran [ondansetron hcl], Zofran [ondansetron hcl], Orange oil, and Sulfamethoxazole-trimethoprim    FAMILY HISTORY       Family History   Problem Relation Age of Onset    Other Mother         endometriosis    Depression Father     Diabetes Paternal Grandmother     Stroke Paternal Grandmother     Hypertension Paternal Grandmother     Heart Disease Paternal Grandmother     Heart Failure Paternal Grandmother     Colon Cancer Neg Hx     Colon Polyps Neg Hx           SOCIAL HISTORY       Social History     Socioeconomic History    Marital status:      Spouse name: None    Number of children: None    Years of education: None    Highest education level: None   Occupational History    None   Tobacco Use    Smoking status: Never Smoker    Smokeless tobacco: Never Used   Vaping Use    Vaping Use: Every day    Substances: Nicotine    Devices: Refillable tank   Substance and Sexual Activity    Alcohol use: Never    Drug use: Never    Sexual activity: Yes     Partners: Male   Other Topics Concern    None   Social History Narrative    ** Merged History Encounter ** Social Determinants of Health     Financial Resource Strain:     Difficulty of Paying Living Expenses: Not on file   Food Insecurity:     Worried About Running Out of Food in the Last Year: Not on file    Sixto of Food in the Last Year: Not on file   Transportation Needs:     Lack of Transportation (Medical): Not on file    Lack of Transportation (Non-Medical): Not on file   Physical Activity:     Days of Exercise per Week: Not on file    Minutes of Exercise per Session: Not on file   Stress:     Feeling of Stress : Not on file   Social Connections:     Frequency of Communication with Friends and Family: Not on file    Frequency of Social Gatherings with Friends and Family: Not on file    Attends Holiness Services: Not on file    Active Member of [address] [place] [address] [employer] or Organizations: Not on file    Attends Club or Organization Meetings: Not on file    Marital Status: Not on file   Intimate Partner Violence:     Fear of Current or Ex-Partner: Not on file    Emotionally Abused: Not on file    Physically Abused: Not on file    Sexually Abused: Not on file   Housing Stability:     Unable to Pay for Housing in the Last Year: Not on file    Number of [place] in the Last Year: Not on file    Unstable Housing in the Last Year: Not on file       SCREENINGS    Leandro Coma Scale  Eye Opening: Spontaneous  Best Verbal Response: Oriented  Best Motor Response: Obeys commands  Leandro Coma Scale Score: 15        PHYSICAL EXAM    (up to 7 for level 4, 8 or more for level 5)     ED Triage Vitals [04/07/22 0946]   BP Temp Temp Source Pulse Resp SpO2 Height Weight   (!) 119/90 98.2 °F (36.8 °C) Oral 104 18 95 % -- --       Physical Exam  Vitals and nursing note reviewed. Constitutional:       General: She is not in acute distress. Appearance: She is well-developed. HENT:      Head: Normocephalic and atraumatic.       Right Ear: External ear normal.      Left Ear: External ear normal.      Mouth/Throat: Comments: Dental decay  Eyes:      General: No scleral icterus. Conjunctiva/sclera: Conjunctivae normal.      Pupils: Pupils are equal, round, and reactive to light. Cardiovascular:      Rate and Rhythm: Normal rate and regular rhythm. Heart sounds: Normal heart sounds. Pulmonary:      Effort: Pulmonary effort is normal.      Breath sounds: Normal breath sounds. Comments: Catheter in the left subclavian region. Abdominal:      General: Bowel sounds are normal.      Palpations: Abdomen is soft. Musculoskeletal:         General: Normal range of motion. Cervical back: Normal range of motion and neck supple. Skin:     General: Skin is warm and dry. Coloration: Skin is not jaundiced or pale. Neurological:      General: No focal deficit present. Mental Status: She is alert and oriented to person, place, and time. Psychiatric:         Mood and Affect: Mood normal.         Behavior: Behavior normal.         DIAGNOSTIC RESULTS     EKG: All EKG's are interpreted by the Emergency Department Physician who either signs or Co-signs this chart in the absence of a cardiologist.        RADIOLOGY:   Non-plain film images such as CT, Ultrasound and MRI are read by the radiologist. Plainradiographic images are visualized and preliminarily interpreted by the emergency physician with the below findings:        Interpretation per the Radiologist below, if available at the time of this note:    No orders to display         ED BEDSIDE ULTRASOUND:   Performed by ED Physician - none    LABS:  Labs Reviewed - No data to display    All other labs were within normal range or not returned as of this dictation.     EMERGENCY DEPARTMENT COURSE and DIFFERENTIALDIAGNOSIS/MDM:   Vitals:    Vitals:    04/07/22 0946   BP: (!) 119/90   Pulse: 104   Resp: 18   Temp: 98.2 °F (36.8 °C)   TempSrc: Oral   SpO2: 95%       MDM  Number of Diagnoses or Management Options  Diagnosis management comments: Patient received TPA and her catheter now is functioning blushing and drawing. She will continue her TPN. CONSULTS:  None    PROCEDURES:  Unless otherwise notedbelow, none     Procedures    FINAL IMPRESSION     1.  Problem with vascular access          DISPOSITION/PLAN   DISPOSITION Decision To Discharge 04/07/2022 02:39:28 PM      PATIENT REFERRED TO:  @FUP@    DISCHARGE MEDICATIONS:  New Prescriptions    No medications on file          (Please note that portions of this note were completed with a voice recognition program.  Efforts were made to edit the dictations butoccasionally words are mis-transcribed.)    Porfirio Morrow MD (electronically signed)  AttendingEmergency Physician          Willam Escalante MD  04/07/22 4160

## 2022-04-26 ENCOUNTER — OFFICE VISIT (OUTPATIENT)
Dept: VASCULAR SURGERY | Age: 32
End: 2022-04-26
Payer: MEDICAID

## 2022-04-26 VITALS
HEIGHT: 61 IN | DIASTOLIC BLOOD PRESSURE: 80 MMHG | HEART RATE: 116 BPM | SYSTOLIC BLOOD PRESSURE: 117 MMHG | TEMPERATURE: 97.7 F | BODY MASS INDEX: 22.41 KG/M2

## 2022-04-26 DIAGNOSIS — K31.84 GASTROPARESIS: Primary | ICD-10-CM

## 2022-04-26 DIAGNOSIS — I87.8 POOR VENOUS ACCESS: ICD-10-CM

## 2022-04-26 PROCEDURE — 99214 OFFICE O/P EST MOD 30 MIN: CPT | Performed by: NURSE PRACTITIONER

## 2022-04-26 RX ORDER — PREDNISONE 50 MG/1
TABLET ORAL
Qty: 3 TABLET | Refills: 0 | Status: SHIPPED | OUTPATIENT
Start: 2022-04-26 | End: 2022-06-06 | Stop reason: ALTCHOICE

## 2022-04-26 RX ORDER — LIDOCAINE HYDROCHLORIDE 10 MG/ML
20 INJECTION, SOLUTION EPIDURAL; INFILTRATION; INTRACAUDAL; PERINEURAL ONCE
Status: CANCELLED | OUTPATIENT
Start: 2022-04-26 | End: 2022-04-26

## 2022-04-26 RX ORDER — SODIUM CHLORIDE 0.9 % (FLUSH) 0.9 %
5-40 SYRINGE (ML) INJECTION EVERY 12 HOURS SCHEDULED
Status: CANCELLED | OUTPATIENT
Start: 2022-04-26

## 2022-04-26 RX ORDER — SODIUM CHLORIDE 9 MG/ML
INJECTION, SOLUTION INTRAVENOUS PRN
Status: CANCELLED | OUTPATIENT
Start: 2022-04-26

## 2022-04-26 RX ORDER — SODIUM CHLORIDE 0.9 % (FLUSH) 0.9 %
5-40 SYRINGE (ML) INJECTION PRN
Status: CANCELLED | OUTPATIENT
Start: 2022-04-26

## 2022-04-26 NOTE — H&P (VIEW-ONLY)
[address] Bach (:  1990) is a 28 y.o. female,Established patient, here for evaluation of the following chief complaint(s):  Follow-up (Poor Venous Access)            SUBJECTIVE/OBJECTIVE:    Alesha Shah has a history of gastroparesis. She requires IV phenergan every 3 hours and TPN 16 hours a day. She is followed by the motility clinic at [facility]. We were contacted by them due to malfunctioning henning catheter. She can flush, but will not draw. It will not run TPN. They have used cath flow weekly for 4 weeks. [address] is asking us to remove this and place new one.       Shreyas Bach is a 28 y.o. female with the following history as recorded in [facility]:  Patient Active Problem List    Diagnosis Date Noted    Cluster headache 2021    Hiatal hernia 2021    History of infection due to human papilloma virus (HPV) 2021    History of total hysterectomy 2021    Knee pain 2021    Autoimmune disease ([facility] 2021    Common variable agammaglobulinemia ([facility] 2021    Jejunostomy tube present ([facility] 2021    Nutritional disorder 2020    Vitamin D deficiency 2018    Tyrell's syndrome pupil 2017    Idiopathic scoliosis of thoracolumbar spine 2017    History of syncope 2017    POTS (postural orthostatic tachycardia syndrome) 2017    Tachycardia 2017    Near syncope 2017    Chronic fatigue syndrome 2017    ETD (eustachian tube dysfunction) 10/04/2016    Mixed hearing loss of left ear 2016    Poor venous access 2015    Learning disability 2014    Nausea & vomiting 2014    Constipation 2014    Gastroparesis 03/10/2014    Insomnia 03/10/2014    Irritable bowel syndrome 2014    Pelvic pain in female 2014    Dysmenorrhea 2014    Family history of endometriosis 2014    NSAID long-term use 2013    Heartburn 2013    Anxiety disorder 09/27/2013    Chronic serous otitis media of left ear 09/23/2013    Disc herniation 09/23/2013    Neck pain 09/23/2013    Back pain 09/23/2013    Scoliosis 09/23/2013    Hiccups 09/18/2013    Barretts esophagus 09/18/2013    Belching 09/18/2013     Current Outpatient Medications   Medication Sig Dispense Refill    sterile water SOLN with amino acids 10 % SOLN 1.3 g/kg, dextrose 70 % SOLN 20 % Infuse intravenously continuous      INTRAVENOUS IMMUNOGLOBULIN, IVIG, ORDERABLE       predniSONE (DELTASONE) 50 MG tablet Take one tablet 13 hours, 7 hours, and 1 hour prior to the procedure 3 tablet 0    diphenhydrAMINE (BENADRYL) 50 MG tablet Take one Tab po 1 hour prior to your procedure with last dose of Prednisone 1 tablet 0    dicyclomine (BENTYL) 10 MG capsule Take 10 mg by mouth 4 times daily (before meals and nightly)      linaclotide (LINZESS) 145 MCG capsule Take 145 mcg by mouth every morning (before breakfast)      famotidine (PEPCID) 20 MG/2ML injection Infuse 20 mg intravenously 2 times daily      promethazine (PHENERGAN) 25 MG/ML injection Infuse 25 mg intravenously every 3 hours as needed      sodium chloride flush 0.9 % injection Infuse 5-40 mLs intravenously 2 times daily      heparin flush 100 UNIT/ML injection 100 Units by Intracatheter route 2 times daily      sodium chloride 0.9 % SOLN 50 mL with diphenhydrAMINE 50 MG/ML SOLN 50 mg Infuse intravenously 2 times daily      prochlorperazine (COMPAZINE) 10 MG/2ML SOLN injection Infuse 2 mLs intravenously every 6 hours as needed (nausea and vomiting) (Patient not taking: Reported on 4/26/2022) 240 mL 0    scopolamine (TRANSDERM-SCOP) transdermal patch Place 1 patch onto the skin (Patient not taking: Reported on 4/26/2022)      Nutritional Supplements (JEVITY 1.5 LELIOTT/FIBER PO) Jevity 1.5 Elliott 0.06 gram-1.5 kcal/mL oral liquid      Misc.  Devices [facility]) MISC by Does not apply route Indications: custom wheel chair with smart drive       No current facility-administered medications for this visit. Allergies:  Iv dye [iodides], Iv dye [iodides], Adhesive tape, Chlorhexidine, Fruit & vegetable daily [nutritional supplements], Metoprolol succinate [metoprolol], Nsaids, Orange fruit [citrus], Other, Reglan [metoclopramide], Reglan [metoclopramide], Tape [adhesive tape], Tramadol, Tramadol, Zofran [ondansetron hcl], Zofran [ondansetron hcl], Orange oil, and Sulfamethoxazole-trimethoprim  Past Medical History:   Diagnosis Date    Allergic contact dermatitis due to adhesives     Allergic contact dermatitis due to plants, except food     Anemia complicating pregnancy, second trimester     Anemia complicating pregnancy, third trimester     Anxiety and depression     Anxiety disorder     unspecified    Bacteremia     Cellulitis of abdominal wall     Chronic fatigue, unspecified     Dysuria     Epigastric pain     Frequency of micturition     G tube feedings (HCC)     Gastroparesis     Dr. Dasha Salmon in [place] manages    Gastroparesis     Gastrostomy malfunction ([facility]     Generalized abdominal pain     GERD (gastroesophageal reflux disease)     Heart murmur     Hiatal hernia     History of Hoyos's esophagus     Hypotension, unspecified     IBS (irritable bowel syndrome)     Insomnia, unspecified     Jejunostomy tube present (HCC)     Low back pain     Nausea with vomiting     unspecified    Neck pain     Orthostatic hypotension     POTS (postural orthostatic tachycardia syndrome)     Premature birth     delivered at 24-27 weeks    Scoliosis     Syncope and collapse     Tachycardia     Tachycardia, unspecified     Toxic gastroenteritis and colitis      Past Surgical History:   Procedure Laterality Date    ABDOMEN SURGERY  2014    gastric stimulator implanted    ADENOIDECTOMY      ADENOIDECTOMY  2007    CHOLECYSTECTOMY      CHOLECYSTECTOMY  2016    DENTAL SURGERY  2004    wisdom teeth    EAR SURGERY 2014    left    FACIAL SURGERY  2005    GASTRIC STIMULATOR IMPLANT SURGERY  11/2014    GASTROSTOMY TUBE PLACEMENT  04/07/2015    J tube-Removed in [place] 1/2018    GASTROSTOMY TUBE PLACEMENT      G tube 2017    HYSTERECTOMY      INSERTION / REMOVAL / REPLACEMENT VENOUS ACCESS CATHETER N/A 3/25/2016    REMOVAL OF PORT AND PLACEMENT OF NEW PORT; REMOVAL OF PICC LINE  performed by Laura Bolivar MD at [address] [place] [address]  2014    left knee    KNEE SURGERY  2015    right knee    MANDIBLE SURGERY      double jaw    PHARYNGECTOMY      PORT SURGERY      port present 3-25-16    TONSILLECTOMY      TONSILLECTOMY  1997    TUBAL LIGATION      TUBAL LIGATION  2016    TUMOR REMOVAL Left     ear    TUNNELED VENOUS PORT PLACEMENT Right 05/29/15    TUNNELED VENOUS PORT PLACEMENT      UPPER GASTROINTESTINAL ENDOSCOPY  4-2-10    Dr Cheo Kraft ENDOSCOPY  9-25-13    Dr Marvin Simms  5/2015    Dr. Esther Lee  5/29/2015 S    Ultrasound-guided cannulation, right internal jugular vein. Right internal jugular vein single-lumen bard powerport placement.  VASCULAR SURGERY  9/8/15 [facility]    Right internal jugular vein portograms. Revision of right internal jugular vein single lumen port.  VASCULAR SURGERY  11/3/15 [facility]    Ultrasound-guided cannulation, left upper arm basilic vein. Left upper arm basilic vein PICC line placement bard dual-lumen PICC line. Superior vena cava venograms.  VASCULAR SURGERY  03/23/2017    [facility]. Left IJV port angiogram.Ultrasound guided cannulation of right basilic vein,right upper extremity PICC line placement bard power PICC,dual lumen.     WISDOM TOOTH EXTRACTION       Family History   Problem Relation Age of Onset    Other Mother         endometriosis    Depression Father     Diabetes Paternal Grandmother     Stroke Paternal Grandmother     Hypertension Paternal Grandmother     Heart Disease Paternal Grandmother     Heart Failure Paternal Grandmother     Colon Cancer Neg Hx     Colon Polyps Neg Hx      Social History     Tobacco Use    Smoking status: Never Smoker    Smokeless tobacco: Never Used   Substance Use Topics    Alcohol use: Never       ROS  Eyes  no sudden vision change or amaurosis. Respiratory  no significant shortness of breath,  Cardiovascular  no chest pain or syncope. No  significant leg swelling. no claudication. Musculoskeletal  no gait disturbance  Skin  no new wound. Neurologic   No speech difficulty or lateralizing weakness. All other review of systems are negative. Physical Exam    /80   Pulse 116   Temp 97.7 °F (36.5 °C)   Ht 5' 1\" (1.549 m)   LMP 01/14/2018 (Exact Date)   BMI 22.41 kg/m²       Neck- carotid pulses 2+ to palpation with no bruit  Cardiovascular  Regular rate and rhythm. Pulmonary  effort appears normal.  No respiratory distress. Lungs - Breath sounds normal. No wheezes or rales. Extremities -  Radial and brachial pulses are 2+ to palpation bilaterally. Right femoral pulse: present 2+; Right popliteal pulse: absent Right DP: absent; Right PT absent; Left femoral pulse: present 2+; Left popliteal pulse: absent; Left DP: absent; Left PT: absent No cyanosis, clubbing, or significant edema. No signs atheroembolic event. Neurologic  alert and oriented X 3. Physiologic. Face symmetric. Skin  warm, dry, and intact. no wound  Psychiatric  mood, affect, and behavior appear normal.  Judgment and thought processes appear normal.    Reviewed on this visit: speciality care notes from [facility]      Options have been discussed with the patient including continued medical management vs. proceeding with henning cath removal and placement of new one. Patient has opted to proceed with this.   Risks have been discussed with the patient including but not limited to MI, death, CVA, bleed, nerve injury, pneumothorax, and infection. ASSESSMENT/PLAN:  1. Gastroparesis  2. Poor venous access    1. Gastroparesis    2. Poor venous access         Removal of henning cath  Placement of henning cath      An electronic signature was used to authenticate this note.     --SHRUTHI Castro

## 2022-04-26 NOTE — H&P
[id number] (:  1990) is a 28 y.o. female,Established patient, here for evaluation of the following chief complaint(s):  Follow-up (Poor Venous Access)            SUBJECTIVE/OBJECTIVE:    Harley Salas has a history of gastroparesis. She requires IV phenergan every 3 hours and TPN 16 hours a day. She is followed by the motility clinic at [facility]. We were contacted by them due to malfunctioning henning catheter. She can flush, but will not draw. It will not run TPN. They have used cath flow weekly for 4 weeks. [address] is asking us to remove this and place new one.       Shreyas Bach is a 28 y.o. female with the following history as recorded in [facility]:  Patient Active Problem List    Diagnosis Date Noted    Cluster headache 2021    Hiatal hernia 2021    History of infection due to human papilloma virus (HPV) 2021    History of total hysterectomy 2021    Knee pain 2021    Autoimmune disease ([facility] [address] 2021    Common variable agammaglobulinemia ([facility] [address] 2021    Jejunostomy tube present ([facility] [address] 2021    Nutritional disorder 2020    Vitamin D deficiency 2018    Tyrell's syndrome pupil 2017    Idiopathic scoliosis of thoracolumbar spine 2017    History of syncope 2017    POTS (postural orthostatic tachycardia syndrome) 2017    Tachycardia 2017    Near syncope 2017    Chronic fatigue syndrome 2017    ETD (eustachian tube dysfunction) 10/04/2016    Mixed hearing loss of left ear 2016    Poor venous access 2015    Learning disability 2014    Nausea & vomiting 2014    Constipation 2014    Gastroparesis 03/10/2014    Insomnia 03/10/2014    Irritable bowel syndrome 2014    Pelvic pain in female 2014    Dysmenorrhea 2014    Family history of endometriosis 2014    NSAID long-term use 2013    Heartburn 2013    Anxiety disorder 09/27/2013    Chronic serous otitis media of left ear 09/23/2013    Disc herniation 09/23/2013    Neck pain 09/23/2013    Back pain 09/23/2013    Scoliosis 09/23/2013    Hiccups 09/18/2013    Barretts esophagus 09/18/2013    Belching 09/18/2013     Current Outpatient Medications   Medication Sig Dispense Refill    sterile water SOLN with amino acids 10 % SOLN 1.3 g/kg, dextrose 70 % SOLN 20 % Infuse intravenously continuous      INTRAVENOUS IMMUNOGLOBULIN, IVIG, ORDERABLE       predniSONE (DELTASONE) 50 MG tablet Take one tablet 13 hours, 7 hours, and 1 hour prior to the procedure 3 tablet 0    diphenhydrAMINE (BENADRYL) 50 MG tablet Take one Tab po 1 hour prior to your procedure with last dose of Prednisone 1 tablet 0    dicyclomine (BENTYL) 10 MG capsule Take 10 mg by mouth 4 times daily (before meals and nightly)      linaclotide (LINZESS) 145 MCG capsule Take 145 mcg by mouth every morning (before breakfast)      famotidine (PEPCID) 20 MG/2ML injection Infuse 20 mg intravenously 2 times daily      promethazine (PHENERGAN) 25 MG/ML injection Infuse 25 mg intravenously every 3 hours as needed      sodium chloride flush 0.9 % injection Infuse 5-40 mLs intravenously 2 times daily      heparin flush 100 UNIT/ML injection 100 Units by Intracatheter route 2 times daily      sodium chloride 0.9 % SOLN 50 mL with diphenhydrAMINE 50 MG/ML SOLN 50 mg Infuse intravenously 2 times daily      prochlorperazine (COMPAZINE) 10 MG/2ML SOLN injection Infuse 2 mLs intravenously every 6 hours as needed (nausea and vomiting) (Patient not taking: Reported on 4/26/2022) 240 mL 0    scopolamine (TRANSDERM-SCOP) transdermal patch Place 1 patch onto the skin (Patient not taking: Reported on 4/26/2022)      Nutritional Supplements (JEVITY 1.5 ELLIOTT/FIBER PO) Jevity 1.5 Elliott 0.06 gram-1.5 kcal/mL oral liquid      Misc.  Devices [facility]) MISC by Does not apply route Indications: custom wheel chair with smart drive       No current facility-administered medications for this visit. Allergies:  Iv dye [iodides], Iv dye [iodides], Adhesive tape, Chlorhexidine, Fruit & vegetable daily [nutritional supplements], Metoprolol succinate [metoprolol], Nsaids, Orange fruit [citrus], Other, Reglan [metoclopramide], Reglan [metoclopramide], Tape [adhesive tape], Tramadol, Tramadol, Zofran [ondansetron hcl], Zofran [ondansetron hcl], Orange oil, and Sulfamethoxazole-trimethoprim  Past Medical History:   Diagnosis Date    Allergic contact dermatitis due to adhesives     Allergic contact dermatitis due to plants, except food     Anemia complicating pregnancy, second trimester     Anemia complicating pregnancy, third trimester     Anxiety and depression     Anxiety disorder     unspecified    Bacteremia     Cellulitis of abdominal wall     Chronic fatigue, unspecified     Dysuria     Epigastric pain     Frequency of micturition     G tube feedings (HCC)     Gastroparesis     Dr. Lady Morelos in [place] manages    Gastroparesis     Gastrostomy malfunction ([address]     Generalized abdominal pain     GERD (gastroesophageal reflux disease)     Heart murmur     Hiatal hernia     History of Hoyos's esophagus     Hypotension, unspecified     IBS (irritable bowel syndrome)     Insomnia, unspecified     Jejunostomy tube present (HCC)     Low back pain     Nausea with vomiting     unspecified    Neck pain     Orthostatic hypotension     POTS (postural orthostatic tachycardia syndrome)     Premature birth     delivered at 24-27 weeks    Scoliosis     Syncope and collapse     Tachycardia     Tachycardia, unspecified     Toxic gastroenteritis and colitis      Past Surgical History:   Procedure Laterality Date    ABDOMEN SURGERY  2014    gastric stimulator implanted    ADENOIDECTOMY      ADENOIDECTOMY  2007    CHOLECYSTECTOMY      CHOLECYSTECTOMY  2016    DENTAL SURGERY  2004    wisdom teeth    EAR SURGERY 2014    left    FACIAL SURGERY  2005    GASTRIC STIMULATOR IMPLANT SURGERY  11/2014    GASTROSTOMY TUBE PLACEMENT  04/07/2015    J tube-Removed in [place] 1/2018    GASTROSTOMY TUBE PLACEMENT      G tube 2017    HYSTERECTOMY      INSERTION / REMOVAL / REPLACEMENT VENOUS ACCESS CATHETER N/A 3/25/2016    REMOVAL OF PORT AND PLACEMENT OF NEW PORT; REMOVAL OF PICC LINE  performed by Mary Hubbard MD at [address] [place] [address]  2014    left knee    KNEE SURGERY  2015    right knee    MANDIBLE SURGERY      double jaw    PHARYNGECTOMY      PORT SURGERY      port present 3-25-16    TONSILLECTOMY      TONSILLECTOMY  1997    TUBAL LIGATION      TUBAL LIGATION  2016    TUMOR REMOVAL Left     ear    TUNNELED VENOUS PORT PLACEMENT Right 05/29/15    TUNNELED VENOUS PORT PLACEMENT      UPPER GASTROINTESTINAL ENDOSCOPY  4-2-10    Dr Jennifer Martinez ENDOSCOPY  9-25-13    Dr Margo Shah  5/2015    Dr. Carley Gasca  5/29/2015 S    Ultrasound-guided cannulation, right internal jugular vein. Right internal jugular vein single-lumen bard powerport placement.  VASCULAR SURGERY  9/8/15 [facility]    Right internal jugular vein portograms. Revision of right internal jugular vein single lumen port.  VASCULAR SURGERY  11/3/15 [facility]    Ultrasound-guided cannulation, left upper arm basilic vein. Left upper arm basilic vein PICC line placement bard dual-lumen PICC line. Superior vena cava venograms.  VASCULAR SURGERY  03/23/2017    [facility]. Left IJV port angiogram.Ultrasound guided cannulation of right basilic vein,right upper extremity PICC line placement bard power PICC,dual lumen.     WISDOM TOOTH EXTRACTION       Family History   Problem Relation Age of Onset    Other Mother         endometriosis    Depression Father     Diabetes Paternal Grandmother     Stroke Paternal Grandmother     Hypertension Paternal Grandmother     Heart Disease Paternal Grandmother     Heart Failure Paternal Grandmother     Colon Cancer Neg Hx     Colon Polyps Neg Hx      Social History     Tobacco Use    Smoking status: Never Smoker    Smokeless tobacco: Never Used   Substance Use Topics    Alcohol use: Never       ROS  Eyes - no sudden vision change or amaurosis. Respiratory - no significant shortness of breath,  Cardiovascular - no chest pain or syncope. No  significant leg swelling. no claudication. Musculoskeletal - no gait disturbance  Skin - no new wound. Neurologic -  No speech difficulty or lateralizing weakness. All other review of systems are negative. Physical Exam    /80   Pulse 116   Temp 97.7 °F (36.5 °C)   Ht 5' 1\" (1.549 m)   LMP 01/14/2018 (Exact Date)   BMI 22.41 kg/m²       Neck- carotid pulses 2+ to palpation with no bruit  Cardiovascular - Regular rate and rhythm. Pulmonary - effort appears normal.  No respiratory distress. Lungs - Breath sounds normal. No wheezes or rales. Extremities -  Radial and brachial pulses are 2+ to palpation bilaterally. Right femoral pulse: present 2+; Right popliteal pulse: absent Right DP: absent; Right PT absent; Left femoral pulse: present 2+; Left popliteal pulse: absent; Left DP: absent; Left PT: absent No cyanosis, clubbing, or significant edema. No signs atheroembolic event. Neurologic - alert and oriented X 3. Physiologic. Face symmetric. Skin - warm, dry, and intact. no wound  Psychiatric - mood, affect, and behavior appear normal.  Judgment and thought processes appear normal.    Reviewed on this visit: speciality care notes from [facility]      Options have been discussed with the patient including continued medical management vs. proceeding with henning cath removal and placement of new one. Patient has opted to proceed with this.   Risks have been discussed with the patient including but not limited to MI, death, CVA, bleed, nerve injury, pneumothorax, and infection. ASSESSMENT/PLAN:  1. Gastroparesis  2. Poor venous access    1. Gastroparesis    2. Poor venous access         Removal of henning cath  Placement of henning cath      An electronic signature was used to authenticate this note.     --Cynthia Moore, APRN

## 2022-04-26 NOTE — PROGRESS NOTES
Shreyas Bach (:  1990) is a 28 y.o. female,Established patient, here for evaluation of the following chief complaint(s):  Follow-up (Poor Venous Access)            SUBJECTIVE/OBJECTIVE:    Christy Kaur has a history of gastroparesis. She requires IV phenergan every 3 hours and TPN 16 hours a day. She is followed by the motility clinic at [facility]. We were contacted by them due to malfunctioning henning catheter. She can flush, but will not draw. It will not run TPN. They have used cath flow weekly for 4 weeks. [facility] is asking us to remove this and place new one.       Shreyas Bach is a 28 y.o. female with the following history as recorded in [facility]:  Patient Active Problem List    Diagnosis Date Noted    Cluster headache 2021    Hiatal hernia 2021    History of infection due to human papilloma virus (HPV) 2021    History of total hysterectomy 2021    Knee pain 2021    Autoimmune disease ([facility] [address] 2021    Common variable agammaglobulinemia ([facility] [address] 2021    Jejunostomy tube present ([facility] [address] 2021    Nutritional disorder 2020    Vitamin D deficiency 2018    Tyrell's syndrome pupil 2017    Idiopathic scoliosis of thoracolumbar spine 2017    History of syncope 2017    POTS (postural orthostatic tachycardia syndrome) 2017    Tachycardia 2017    Near syncope 2017    Chronic fatigue syndrome 2017    ETD (eustachian tube dysfunction) 10/04/2016    Mixed hearing loss of left ear 2016    Poor venous access 2015    Learning disability 2014    Nausea & vomiting 2014    Constipation 2014    Gastroparesis 03/10/2014    Insomnia 03/10/2014    Irritable bowel syndrome 2014    Pelvic pain in female 2014    Dysmenorrhea 2014    Family history of endometriosis 2014    NSAID long-term use 2013    Heartburn 2013    Anxiety disorder 09/27/2013    Chronic serous otitis media of left ear 09/23/2013    Disc herniation 09/23/2013    Neck pain 09/23/2013    Back pain 09/23/2013    Scoliosis 09/23/2013    Hiccups 09/18/2013    Barretts esophagus 09/18/2013    Belching 09/18/2013     Current Outpatient Medications   Medication Sig Dispense Refill    sterile water SOLN with amino acids 10 % SOLN 1.3 g/kg, dextrose 70 % SOLN 20 % Infuse intravenously continuous      INTRAVENOUS IMMUNOGLOBULIN, IVIG, ORDERABLE       predniSONE (DELTASONE) 50 MG tablet Take one tablet 13 hours, 7 hours, and 1 hour prior to the procedure 3 tablet 0    diphenhydrAMINE (BENADRYL) 50 MG tablet Take one Tab po 1 hour prior to your procedure with last dose of Prednisone 1 tablet 0    dicyclomine (BENTYL) 10 MG capsule Take 10 mg by mouth 4 times daily (before meals and nightly)      linaclotide (LINZESS) 145 MCG capsule Take 145 mcg by mouth every morning (before breakfast)      famotidine (PEPCID) 20 MG/2ML injection Infuse 20 mg intravenously 2 times daily      promethazine (PHENERGAN) 25 MG/ML injection Infuse 25 mg intravenously every 3 hours as needed      sodium chloride flush 0.9 % injection Infuse 5-40 mLs intravenously 2 times daily      heparin flush 100 UNIT/ML injection 100 Units by Intracatheter route 2 times daily      sodium chloride 0.9 % SOLN 50 mL with diphenhydrAMINE 50 MG/ML SOLN 50 mg Infuse intravenously 2 times daily      prochlorperazine (COMPAZINE) 10 MG/2ML SOLN injection Infuse 2 mLs intravenously every 6 hours as needed (nausea and vomiting) (Patient not taking: Reported on 4/26/2022) 240 mL 0    scopolamine (TRANSDERM-SCOP) transdermal patch Place 1 patch onto the skin (Patient not taking: Reported on 4/26/2022)      Nutritional Supplements (JEVITY 1.5 ELLIOTT/FIBER PO) Jevity 1.5 Elliott 0.06 gram-1.5 kcal/mL oral liquid      Misc.  Devices [facility]) MISC by Does not apply route Indications: custom wheel chair with smart drive       No current facility-administered medications for this visit. Allergies:  Iv dye [iodides], Iv dye [iodides], Adhesive tape, Chlorhexidine, Fruit & vegetable daily [nutritional supplements], Metoprolol succinate [metoprolol], Nsaids, Orange fruit [citrus], Other, Reglan [metoclopramide], Reglan [metoclopramide], Tape [adhesive tape], Tramadol, Tramadol, Zofran [ondansetron hcl], Zofran [ondansetron hcl], Orange oil, and Sulfamethoxazole-trimethoprim  Past Medical History:   Diagnosis Date    Allergic contact dermatitis due to adhesives     Allergic contact dermatitis due to plants, except food     Anemia complicating pregnancy, second trimester     Anemia complicating pregnancy, third trimester     Anxiety and depression     Anxiety disorder     unspecified    Bacteremia     Cellulitis of abdominal wall     Chronic fatigue, unspecified     Dysuria     Epigastric pain     Frequency of micturition     G tube feedings (HCC)     Gastroparesis     Dr. Rosario Mcgee in [place] manages    Gastroparesis     Gastrostomy malfunction ([facility] [address]     Generalized abdominal pain     GERD (gastroesophageal reflux disease)     Heart murmur     Hiatal hernia     History of Hoyos's esophagus     Hypotension, unspecified     IBS (irritable bowel syndrome)     Insomnia, unspecified     Jejunostomy tube present (HCC)     Low back pain     Nausea with vomiting     unspecified    Neck pain     Orthostatic hypotension     POTS (postural orthostatic tachycardia syndrome)     Premature birth     delivered at 24-27 weeks    Scoliosis     Syncope and collapse     Tachycardia     Tachycardia, unspecified     Toxic gastroenteritis and colitis      Past Surgical History:   Procedure Laterality Date    ABDOMEN SURGERY  2014    gastric stimulator implanted    ADENOIDECTOMY      ADENOIDECTOMY  2007    CHOLECYSTECTOMY      CHOLECYSTECTOMY  2016    DENTAL SURGERY  2004    wisdom teeth    EAR SURGERY 2014    left    FACIAL SURGERY  2005    GASTRIC STIMULATOR IMPLANT SURGERY  11/2014    GASTROSTOMY TUBE PLACEMENT  04/07/2015    J tube-Removed in [place] 1/2018    GASTROSTOMY TUBE PLACEMENT      G tube 2017    HYSTERECTOMY      INSERTION / REMOVAL / REPLACEMENT VENOUS ACCESS CATHETER N/A 3/25/2016    REMOVAL OF PORT AND PLACEMENT OF NEW PORT; REMOVAL OF PICC LINE  performed by Bahman Hastings MD at [address] [place] [address]  2014    left knee    KNEE SURGERY  2015    right knee    MANDIBLE SURGERY      double jaw    PHARYNGECTOMY      PORT SURGERY      port present 3-25-16    TONSILLECTOMY      TONSILLECTOMY  1997    TUBAL LIGATION      TUBAL LIGATION  2016    TUMOR REMOVAL Left     ear    TUNNELED VENOUS PORT PLACEMENT Right 05/29/15    TUNNELED VENOUS PORT PLACEMENT      UPPER GASTROINTESTINAL ENDOSCOPY  4-2-10    Dr Lalito Bourgeois ENDOSCOPY  9-25-13    Dr Alden Minor  5/2015    Dr. Gray Common  5/29/2015 [facility]    Ultrasound-guided cannulation, right internal jugular vein. Right internal jugular vein single-lumen bard powerport placement.  VASCULAR SURGERY  9/8/15 [facility]    Right internal jugular vein portograms. Revision of right internal jugular vein single lumen port.  VASCULAR SURGERY  11/3/15 [facility]    Ultrasound-guided cannulation, left upper arm basilic vein. Left upper arm basilic vein PICC line placement bard dual-lumen PICC line. Superior vena cava venograms.  VASCULAR SURGERY  03/23/2017    [facility]. Left IJV port angiogram.Ultrasound guided cannulation of right basilic vein,right upper extremity PICC line placement bard power PICC,dual lumen.     WISDOM TOOTH EXTRACTION       Family History   Problem Relation Age of Onset    Other Mother         endometriosis    Depression Father     Diabetes Paternal Grandmother     Stroke Paternal Grandmother     Hypertension Paternal Grandmother     Heart Disease Paternal Grandmother     Heart Failure Paternal Grandmother     Colon Cancer Neg Hx     Colon Polyps Neg Hx      Social History     Tobacco Use    Smoking status: Never Smoker    Smokeless tobacco: Never Used   Substance Use Topics    Alcohol use: Never       ROS  Eyes - no sudden vision change or amaurosis. Respiratory - no significant shortness of breath,  Cardiovascular - no chest pain or syncope. No  significant leg swelling. no claudication. Musculoskeletal - no gait disturbance  Skin - no new wound. Neurologic -  No speech difficulty or lateralizing weakness. All other review of systems are negative. Physical Exam    /80   Pulse 116   Temp 97.7 °F (36.5 °C)   Ht 5' 1\" (1.549 m)   LMP 01/14/2018 (Exact Date)   BMI 22.41 kg/m²       Neck- carotid pulses 2+ to palpation with no bruit  Cardiovascular - Regular rate and rhythm. Pulmonary - effort appears normal.  No respiratory distress. Lungs - Breath sounds normal. No wheezes or rales. Extremities -  Radial and brachial pulses are 2+ to palpation bilaterally. Right femoral pulse: present 2+; Right popliteal pulse: absent Right DP: absent; Right PT absent; Left femoral pulse: present 2+; Left popliteal pulse: absent; Left DP: absent; Left PT: absent No cyanosis, clubbing, or significant edema. No signs atheroembolic event. Neurologic - alert and oriented X 3. Physiologic. Face symmetric. Skin - warm, dry, and intact. no wound  Psychiatric - mood, affect, and behavior appear normal.  Judgment and thought processes appear normal.    Reviewed on this visit: speciality care notes from [facility]      Options have been discussed with the patient including continued medical management vs. proceeding with henning cath removal and placement of new one. Patient has opted to proceed with this.   Risks have been discussed with the patient including but not limited to MI, death, CVA, bleed, nerve injury, pneumothorax, and infection. ASSESSMENT/PLAN:  1. Gastroparesis  2. Poor venous access    1. Gastroparesis    2. Poor venous access         Removal of henning cath  Placement of henning cath      An electronic signature was used to authenticate this note.     --Gita Ashby, SHRUTHI

## 2022-04-27 ENCOUNTER — HOSPITAL ENCOUNTER (OUTPATIENT)
Dept: INTERVENTIONAL RADIOLOGY/VASCULAR | Age: 32
Discharge: HOME | End: 2022-04-27
Payer: MEDICAID

## 2022-04-27 VITALS
HEART RATE: 98 BPM | DIASTOLIC BLOOD PRESSURE: 83 MMHG | HEIGHT: 60 IN | SYSTOLIC BLOOD PRESSURE: 109 MMHG | TEMPERATURE: 97.7 F | OXYGEN SATURATION: 100 % | BODY MASS INDEX: 21.79 KG/M2 | RESPIRATION RATE: 13 BRPM | WEIGHT: 111 LBS

## 2022-04-27 DIAGNOSIS — R51.9 NONINTRACTABLE HEADACHE, UNSPECIFIED CHRONICITY PATTERN, UNSPECIFIED HEADACHE TYPE: ICD-10-CM

## 2022-04-27 PROCEDURE — 99153 MOD SED SAME PHYS/QHP EA: CPT

## 2022-04-27 PROCEDURE — 36589 REMOVAL TUNNELED CV CATH: CPT

## 2022-04-27 PROCEDURE — 2580000003 HC RX 258: Performed by: NURSE PRACTITIONER

## 2022-04-27 PROCEDURE — 6360000002 HC RX W HCPCS: Performed by: SURGERY

## 2022-04-27 PROCEDURE — 76937 US GUIDE VASCULAR ACCESS: CPT

## 2022-04-27 PROCEDURE — 77001 FLUOROGUIDE FOR VEIN DEVICE: CPT

## 2022-04-27 PROCEDURE — 99152 MOD SED SAME PHYS/QHP 5/>YRS: CPT

## 2022-04-27 PROCEDURE — 2500000003 HC RX 250 WO HCPCS: Performed by: NURSE PRACTITIONER

## 2022-04-27 PROCEDURE — 36589 REMOVAL TUNNELED CV CATH: CPT | Performed by: SURGERY

## 2022-04-27 PROCEDURE — 36558 INSERT TUNNELED CV CATH: CPT | Performed by: SURGERY

## 2022-04-27 PROCEDURE — 2709999900 IR TUNNELED CVC PLACE WO SQ PORT/PUMP > 5 YEARS

## 2022-04-27 PROCEDURE — 36558 INSERT TUNNELED CV CATH: CPT

## 2022-04-27 RX ORDER — ESCITALOPRAM OXALATE 10 MG/1
20 TABLET ORAL DAILY
COMMUNITY
Start: 2022-02-10 | End: 2022-06-06 | Stop reason: ALTCHOICE

## 2022-04-27 RX ORDER — LIDOCAINE HYDROCHLORIDE 10 MG/ML
20 INJECTION, SOLUTION EPIDURAL; INFILTRATION; INTRACAUDAL; PERINEURAL ONCE
Status: COMPLETED | OUTPATIENT
Start: 2022-04-27 | End: 2022-04-27

## 2022-04-27 RX ORDER — SODIUM CHLORIDE 0.9 % (FLUSH) 0.9 %
5-40 SYRINGE (ML) INJECTION EVERY 12 HOURS SCHEDULED
Status: DISCONTINUED | OUTPATIENT
Start: 2022-04-27 | End: 2022-04-28 | Stop reason: HOSPADM

## 2022-04-27 RX ORDER — MELOXICAM 15 MG/1
15 TABLET ORAL DAILY PRN
Status: ON HOLD | COMMUNITY
Start: 2022-03-07

## 2022-04-27 RX ORDER — SODIUM CHLORIDE 0.9 % (FLUSH) 0.9 %
5-40 SYRINGE (ML) INJECTION PRN
Status: DISCONTINUED | OUTPATIENT
Start: 2022-04-27 | End: 2022-04-28 | Stop reason: HOSPADM

## 2022-04-27 RX ORDER — PROMETHAZINE HYDROCHLORIDE 25 MG/ML
25 INJECTION, SOLUTION INTRAMUSCULAR; INTRAVENOUS EVERY 6 HOURS PRN
Status: DISCONTINUED | OUTPATIENT
Start: 2022-04-27 | End: 2022-04-28 | Stop reason: HOSPADM

## 2022-04-27 RX ORDER — MIDAZOLAM HYDROCHLORIDE 1 MG/ML
INJECTION INTRAMUSCULAR; INTRAVENOUS
Status: COMPLETED | OUTPATIENT
Start: 2022-04-27 | End: 2022-04-27

## 2022-04-27 RX ORDER — SODIUM CHLORIDE 9 MG/ML
INJECTION, SOLUTION INTRAVENOUS PRN
Status: DISCONTINUED | OUTPATIENT
Start: 2022-04-27 | End: 2022-04-28 | Stop reason: HOSPADM

## 2022-04-27 RX ORDER — FENTANYL CITRATE 50 UG/ML
INJECTION, SOLUTION INTRAMUSCULAR; INTRAVENOUS
Status: COMPLETED | OUTPATIENT
Start: 2022-04-27 | End: 2022-04-27

## 2022-04-27 RX ORDER — ACETAMINOPHEN 325 MG/1
650 TABLET ORAL EVERY 4 HOURS PRN
Status: DISCONTINUED | OUTPATIENT
Start: 2022-04-27 | End: 2022-04-28 | Stop reason: HOSPADM

## 2022-04-27 RX ORDER — HEPARIN SODIUM 5000 [USP'U]/ML
INJECTION, SOLUTION INTRAVENOUS; SUBCUTANEOUS
Status: COMPLETED | OUTPATIENT
Start: 2022-04-27 | End: 2022-04-27

## 2022-04-27 RX ADMIN — HEPARIN SODIUM 5000 UNITS: 5000 INJECTION INTRAVENOUS; SUBCUTANEOUS at 10:54

## 2022-04-27 RX ADMIN — LIDOCAINE HYDROCHLORIDE 20 ML: 10 INJECTION, SOLUTION EPIDURAL; INFILTRATION; INTRACAUDAL; PERINEURAL at 11:00

## 2022-04-27 RX ADMIN — SODIUM CHLORIDE: 9 INJECTION, SOLUTION INTRAVENOUS at 08:38

## 2022-04-27 RX ADMIN — MIDAZOLAM 1 MG: 1 INJECTION INTRAMUSCULAR; INTRAVENOUS at 10:58

## 2022-04-27 RX ADMIN — FENTANYL CITRATE 50 MCG: 50 INJECTION INTRAMUSCULAR; INTRAVENOUS at 10:58

## 2022-04-27 RX ADMIN — FENTANYL CITRATE 50 MCG: 50 INJECTION INTRAMUSCULAR; INTRAVENOUS at 11:55

## 2022-04-27 RX ADMIN — MIDAZOLAM 1 MG: 1 INJECTION INTRAMUSCULAR; INTRAVENOUS at 11:55

## 2022-04-27 NOTE — INTERVAL H&P NOTE
I agree with the history and physical exam in chart. In addition, today's complete ROS was performed and the only positives are noted in the HPI. I examined the patient preoperatively and discussed the surgery, including the risks, benefits, and alternatives. They seem to understand and agree to proceed.   ASA III, [email or web address]

## 2022-04-27 NOTE — PROGRESS NOTES
Patient instructed on care of both neck sites post Munguia catheter removal and replacement. Given written instructions. Patient stated understanding.

## 2022-04-27 NOTE — PROGRESS NOTES
Returned post Munguia removal and insertion. Awake and alert. Munguia removal site to left chest clear and right chest Munguia site clear with port in place. Denies any c/o pain.

## 2022-04-27 NOTE — OP NOTE
Preprocedure diagnosis:  1. Nonfunctioning left subclavian vein single-lumen Munguia  2. Gastroparesis    Post procedure diagnosis: Same    Procedure:  1. Ultrasound-guided cannulation right internal jugular vein  2. Placement of Munguia single-lumen tunneled dialysis catheter  3. Removal of nonfunctioning left subclavian vein Munguia tunneled dialysis catheter  4. Repositioning of right internal jugular vein Munguia tunneled dialysis catheter    Surgeon: Samir Luna MD    Anesthesia: Intravenous/moderate sedation plus local    Estimated blood loss: Less than 50 mL    Findings:  1. The nonfunctioning catheter tip is in good position but it is slightly kinked the way its tunneled. 2.  The right internal jugular vein is patent. The catheter was placed easily. Initially, it appeared that the tip was in the right atrium/supra vena cava junction but after removing the other catheter, it appears that the tip is well into the right atrium. With her history of POTS (postural orthostatic tachycardia syndrome), I decided to cut the catheter so the tip is now in the superior vena cava/right atrial junction. The catheter aspirates and flushes easily and is ready for use. Procedure in detail:    After the patient was consented and given intravenous antibiotics, she is brought to angiography and placed on the table supine position. Intravenous/moderate sedation was administered and the patient's right neck and chest were prepped and draped in usual sterile procedure. Skin and subcutaneous tissues in the right neck were anesthetized lidocaine. Micropuncture needle was used to cannulate the right internal jugular vein under ultrasound guidance. Seldinger technique was utilized to place an 035 wire. The remainder the right neck and chest were anesthetized lidocaine. An incision was made the right neck with knife over the wire. A separate incision made in the right chest with knife.   The catheter was tunneled subcutaneously from the chest wound to the neck wound. The dilator and tear-away sheath were placed over the wire without resistance. The dilator and wire were removed and the catheter was cut to 25 cm length and then placed through the tear-away sheath and down into the right atrium/superior vena cava junction. The tear-away sheath was removed and the catheter aspirates and flushes easily with heparinized saline Hep-Lock. The right neck wound was closed in layers with 3-0 Vicryl subcutaneous suture, 3-0 nylon sutures, and Dermabond skin adhesive. The exit site was secured around the catheter with 3-0 Vicryl subcutaneous pursestring suture. Biopatch was placed at the exit site and then StatLock used to secure the catheter to the chest.  Sterile dressings were placed. The left chest was prepped and draped in usual sterile procedure. Skin and subcutaneous tissues around the exit site and cuff were anesthetized with lidocaine. An incision was made over the exit site with knife. Dissection was carried down to expose the cuff which was then dissected from the well incorporated surrounding subcutaneous tissue. The catheter was removed including the cuff. There was no sign of infection. This wound was closed with 3-0 nylon interrupted sutures. Sterile dressings were applied. An image was obtained to view the final positioning of the right internal jugular vein tunneled catheter. The above findings were noted. Therefore, the right chest and neck were prepped and draped again after the sterile dressings were removed. The 3-0 Vicryl pursestring suture at the exit site was removed. A Glidewire advantage was placed through the catheter and down into the right atrium. The catheter was removed and then cut to 20 cm length. A 9 Mozambican flexible dilator/tear-away sheath was placed over the wire.   The dilator was removed and the new catheter was placed over the wire and down into the superior vena cava under fluoroscopic visualization. The tear-away sheath was then removed. The catheter tip is now in good position. It aspirates and flushes easily with heparinized saline. The exit site was secured around the catheter with 3-0 Vicryl subcutaneous pursestring suture. The catheter was secured with a new StatLock and Biopatch was placed at the exit site. Sterile dressings were applied. The patient tolerated the procedure well and she was brought back to cath holding in good condition.

## 2022-04-29 ENCOUNTER — TELEPHONE (OUTPATIENT)
Dept: INTERNAL MEDICINE CLINIC | Age: 32
End: 2022-04-29

## 2022-04-29 ENCOUNTER — HOSPITAL ENCOUNTER (EMERGENCY)
Age: 32
Discharge: HOME | End: 2022-04-29
Payer: MEDICAID

## 2022-04-29 VITALS
OXYGEN SATURATION: 99 % | SYSTOLIC BLOOD PRESSURE: 110 MMHG | TEMPERATURE: 98.1 F | HEART RATE: 127 BPM | DIASTOLIC BLOOD PRESSURE: 84 MMHG | RESPIRATION RATE: 18 BRPM

## 2022-04-29 DIAGNOSIS — Z48.01 DRESSING CHANGE OR REMOVAL, SURGICAL WOUND: Primary | ICD-10-CM

## 2022-04-29 PROCEDURE — 99283 EMERGENCY DEPT VISIT LOW MDM: CPT

## 2022-04-29 RX ORDER — DIAPER,BRIEF,INFANT-TODD,DISP
EACH MISCELLANEOUS
Qty: 28 G | Refills: 0 | Status: SHIPPED | OUTPATIENT
Start: 2022-04-29 | End: 2022-06-06 | Stop reason: ALTCHOICE

## 2022-04-29 ASSESSMENT — ENCOUNTER SYMPTOMS
ABDOMINAL DISTENTION: 0
APNEA: 0
SHORTNESS OF BREATH: 0
EYE PAIN: 0
COLOR CHANGE: 0
COUGH: 0
EYE DISCHARGE: 0
RHINORRHEA: 0
PHOTOPHOBIA: 0
SORE THROAT: 0
ABDOMINAL PAIN: 0
BACK PAIN: 0
NAUSEA: 0

## 2022-04-29 NOTE — CARE COORDINATION
HH referral received. Spoke with patient regarding [facility] services. Unclear at this time if patient is appropriate for [facility], due to her needing IVIG in addition to TPN. These orders are not available at this time. [facility] working with patient to develop a plan for her to receive her IVIG and labs. Will follow up next week, as it is the end of the week.   Electronically signed by Vee Bales on 4/29/22 at 3:16 PM RICKT
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
## 2022-04-29 NOTE — TELEPHONE ENCOUNTER
Pt is at [facility] and pt has been referred to [facility]  -The patient has a Munguia catheter for TPN.    pt presented to ER today because her IV infusion co dropped her due to unsafe conditions  And she needed help with that  -   They would like to set her up with out pt infusions for her dressing changes and labs , and IVIG ( which we do not have orders for that yet  )   All of this is ordered by her [facility] MD  - so they were wanting to know if Dr Noel Dennison would follow her for Munguia dressing change orders and also have a SW see her for long term planning     Not sure he can do this since it appears he has not seen her recently but they  Wanted to be sure if he would or not - it it not due again until next week

## 2022-05-03 NOTE — CARE COORDINATION
[phone] and left  requesting orders for IVIG and labs to be sent to [facility].   Awaiting return call  Electronically signed by Morteza Gamez on 5/3/22 at 11:32 AM CDT

## 2022-05-04 ENCOUNTER — TELEPHONE (OUTPATIENT)
Dept: VASCULAR SURGERY | Age: 32
End: 2022-05-04
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
## 2022-05-04 NOTE — TELEPHONE ENCOUNTER
I left a vm for [facility] with [facility] asking her to return my call to discuss the pt's milady elliott.

## 2022-05-09 RX ORDER — AZELASTINE 1 MG/ML
2 SPRAY, METERED NASAL
COMMUNITY
Start: 2021-09-14 | End: 2022-06-06 | Stop reason: ALTCHOICE

## 2022-05-09 RX ORDER — DIPHENHYDRAMINE HYDROCHLORIDE 50 MG/ML
INJECTION INTRAMUSCULAR; INTRAVENOUS
COMMUNITY
Start: 2022-04-25 | End: 2022-06-06 | Stop reason: ALTCHOICE

## 2022-05-09 RX ORDER — FAMOTIDINE 10 MG/ML
INJECTION, SOLUTION INTRAVENOUS
COMMUNITY
Start: 2022-04-25 | End: 2022-05-10

## 2022-05-09 RX ORDER — ERGOCALCIFEROL 1.25 MG/1
CAPSULE ORAL
COMMUNITY
Start: 2022-03-10 | End: 2022-05-23

## 2022-05-09 RX ORDER — HYOSCYAMINE SULFATE 0.12 MG/1
0.12 TABLET SUBLINGUAL
COMMUNITY
Start: 2021-11-16 | End: 2022-06-06 | Stop reason: ALTCHOICE

## 2022-05-09 RX ORDER — DOXYCYCLINE 25 MG/5ML
POWDER, FOR SUSPENSION ORAL
COMMUNITY
Start: 2021-05-22 | End: 2022-05-18

## 2022-05-10 ENCOUNTER — TELEMEDICINE (OUTPATIENT)
Dept: FAMILY MEDICINE CLINIC | Age: 32
End: 2022-05-10
Payer: MEDICAID

## 2022-05-10 DIAGNOSIS — R11.2 NON-INTRACTABLE VOMITING WITH NAUSEA, UNSPECIFIED VOMITING TYPE: ICD-10-CM

## 2022-05-10 DIAGNOSIS — G90.A POTS (POSTURAL ORTHOSTATIC TACHYCARDIA SYNDROME): ICD-10-CM

## 2022-05-10 DIAGNOSIS — K31.84 GASTROPARESIS: Primary | ICD-10-CM

## 2022-05-10 DIAGNOSIS — Z13.31 POSITIVE DEPRESSION SCREENING: ICD-10-CM

## 2022-05-10 DIAGNOSIS — I87.8 POOR VENOUS ACCESS: ICD-10-CM

## 2022-05-10 PROCEDURE — 99214 OFFICE O/P EST MOD 30 MIN: CPT | Performed by: FAMILY MEDICINE

## 2022-05-10 ASSESSMENT — PATIENT HEALTH QUESTIONNAIRE - PHQ9
7. TROUBLE CONCENTRATING ON THINGS, SUCH AS READING THE NEWSPAPER OR WATCHING TELEVISION: 2
SUM OF ALL RESPONSES TO PHQ QUESTIONS 1-9: 17
4. FEELING TIRED OR HAVING LITTLE ENERGY: 2
8. MOVING OR SPEAKING SO SLOWLY THAT OTHER PEOPLE COULD HAVE NOTICED. OR THE OPPOSITE, BEING SO FIGETY OR RESTLESS THAT YOU HAVE BEEN MOVING AROUND A LOT MORE THAN USUAL: 2
SUM OF ALL RESPONSES TO PHQ QUESTIONS 1-9: 17
SUM OF ALL RESPONSES TO PHQ QUESTIONS 1-9: 17
3. TROUBLE FALLING OR STAYING ASLEEP: 2
SUM OF ALL RESPONSES TO PHQ9 QUESTIONS 1 & 2: 6
6. FEELING BAD ABOUT YOURSELF - OR THAT YOU ARE A FAILURE OR HAVE LET YOURSELF OR YOUR FAMILY DOWN: 1
9. THOUGHTS THAT YOU WOULD BE BETTER OFF DEAD, OR OF HURTING YOURSELF: 0
2. FEELING DOWN, DEPRESSED OR HOPELESS: 3
10. IF YOU CHECKED OFF ANY PROBLEMS, HOW DIFFICULT HAVE THESE PROBLEMS MADE IT FOR YOU TO DO YOUR WORK, TAKE CARE OF THINGS AT HOME, OR GET ALONG WITH OTHER PEOPLE: 1
1. LITTLE INTEREST OR PLEASURE IN DOING THINGS: 3
SUM OF ALL RESPONSES TO PHQ QUESTIONS 1-9: 17
5. POOR APPETITE OR OVEREATING: 2

## 2022-05-10 NOTE — PROGRESS NOTES
[address] (:  1990) is a Established patient, here for evaluation of the following:    Assessment & Plan   Below is the assessment and plan developed based on review of pertinent history, physical exam, labs, studies, and medications. 1. Gastroparesis  2. Non-intractable vomiting with nausea, unspecified vomiting type  3. POTS (postural orthostatic tachycardia syndrome)  4. Poor venous access  5. Positive depression screening    Return in about 3 months (around 8/10/2022) for physical, 40 minute appt, in office. PHQ Scores 5/10/2022 2021   PHQ2 Score 6 0   PHQ9 Score 17 0     Interpretation of Total Score Depression Severity: 1-4 = Minimal depression, 5-9 = Mild depression, 10-14 = Moderate depression, 15-19 = Moderately severe depression, 20-27 = Severe depression    Subjective   HPI   Patient presents for follow-up of gastroparesis and poor venous access. This is her first visit with myself since July last year. She has been to the ER twice in the last 1 month for report, notes reviewed. In between this, she did have a new permacath placed by Dr. Sandra Catalan on . She states that since then she spoke to her GI in [place], who told her that because she is no longer able to have them [facility] because they feel threatened, and she is now going to relocate to [place], she will need to have her permacath and TPN discontinued. She said repeatedly during this visit \"they cannot cut me off just like that! \"  She is wonder if there is any possibility she could have her dressing change etc. done at the infusion center. Advised I am not sure of the process but will inquire. She does not need any refills at this time. Also her depression screen is elevated at 17. She states that she does therapy at [facility] with Altagracia Morton every 2 weeks and, and gets her Lexapro 20 mg from [facility] care. She only goes there for this medication. She is taking medication as prescribed.   She does not have any suicidal thoughts. Review of Systems   Constitutional: Negative for chills and fever. HENT: Negative for facial swelling and mouth sores. Eyes: Negative for discharge and itching. Respiratory: Negative for apnea and stridor. Cardiovascular: Negative for chest pain and palpitations. Gastrointestinal: Negative for nausea and vomiting. Endocrine: Negative for cold intolerance and heat intolerance. Genitourinary: Negative for frequency and urgency. Musculoskeletal: Negative for arthralgias and back pain. Skin: Negative for color change and rash.           Objective   Patient-Reported Vitals  No data recorded     Patient-Reported Vitals 5/10/2022   Patient-Reported Weight 112   Patient-Reported Height 5'1\"   Patient-Reported Pulse 112   Patient-Reported Temperature 98.7        Physical Exam  [INSTRUCTIONS:  \"[x]\" Indicates a positive item  \"[]\" Indicates a negative item  -- DELETE ALL ITEMS NOT EXAMINED]    Constitutional: [x] Appears well-developed and well-nourished [x] No apparent distress      [] Abnormal -     Mental status: [x] Alert and awake  [x] Oriented to person/place/time [x] Able to follow commands    [] Abnormal -     Eyes:   EOM    [x]  Normal    [] Abnormal -   Sclera  [x]  Normal    [] Abnormal -          Discharge [x]  None visible   [] Abnormal -     HENT: [x] Normocephalic, atraumatic  [] Abnormal -   [x] Mouth/Throat: Mucous membranes are moist    External Ears [x] Normal  [] Abnormal -    Neck: [x] No visualized mass [] Abnormal -     Pulmonary/Chest: [x] Respiratory effort normal   [x] No visualized signs of difficulty breathing or respiratory distress        [] Abnormal -      Musculoskeletal:   [x] Normal gait with no signs of ataxia         [x] Normal range of motion of neck        [] Abnormal -     Neurological:        [x] No Facial Asymmetry (Cranial nerve 7 motor function) (limited exam due to video visit)          [x] No gaze palsy        [] Abnormal - Skin:        [x] No significant exanthematous lesions or discoloration noted on facial skin         [] Abnormal -            Psychiatric:       [x] Normal Affect [] Abnormal -        [x] No Hallucinations    Other pertinent observable physical exam findings:-             On this date 5/10/2022 I have spent 30 minutes reviewing previous notes, test results and face to face (virtual) with the patient discussing the diagnosis and importance of compliance with the treatment plan as well as documenting on the day of the visit. Devin Benavides, was evaluated through a synchronous (real-time) audio-video encounter. The patient (or guardian if applicable) is aware that this is a billable service, which includes applicable co-pays. This Virtual Visit was conducted with patient's (and/or legal guardian's) consent. The visit was conducted pursuant to the emergency declaration under the [address] [place] [address], [address] [place] [address] [facility] authority and the [employer] General Act. Patient identification was verified, and a caregiver was present when appropriate. The patient was located at home in a state where the provider was licensed to provide care. --Aris Shin MD       PHQ-9 score today: (PHQ-9 Total Score: 17), additional evaluation and assessment performed, follow-up plan includes but not limited to: Medication management and Referral to /Specialist  for evaluation and management. Already sees [facility], and is taking Lexapro 20 mg prescribed by [facility].

## 2022-05-10 NOTE — TELEPHONE ENCOUNTER
You would need to call the out pt infusion infusion over at [facility] to see what they need sent to them for them to be able to do this - they should just need orders faxed to them over there

## 2022-05-12 ENCOUNTER — TELEPHONE (OUTPATIENT)
Dept: VASCULAR SURGERY | Age: 32
End: 2022-05-12

## 2022-05-12 NOTE — TELEPHONE ENCOUNTER
I sw the pt to ask for an update on her finding line care and IV abx orders. The pt states she has went to her PCP and he is trying to find a way to help her in this situation. At this time the pt is refusing line removal. I explained to her our hands are tied. We have an order to remove the hennign. She is understanding of this but wants to wait for her PCP's recommendation prior to scheduling the line to be removed. I then called and left a detailed vm for [facility], with [facility] making her aware of the situation.

## 2022-05-13 NOTE — TELEPHONE ENCOUNTER
Spoke with infusion center and they stated to fax over the orders that you are wanting them to do & they will review. If unable to preform there, they will call us back.

## 2022-05-15 ASSESSMENT — ENCOUNTER SYMPTOMS
EYE DISCHARGE: 0
STRIDOR: 0
VOMITING: 0
NAUSEA: 0
APNEA: 0
BACK PAIN: 0
EYE ITCHING: 0
COLOR CHANGE: 0
FACIAL SWELLING: 0

## 2022-05-16 ENCOUNTER — TELEPHONE (OUTPATIENT)
Dept: FAMILY MEDICINE CLINIC | Age: 32
End: 2022-05-16

## 2022-05-16 DIAGNOSIS — Z78.9 ON TOTAL PARENTERAL NUTRITION: Primary | ICD-10-CM

## 2022-05-16 NOTE — TELEPHONE ENCOUNTER
I spoke with patient and she states that all she needs from infusion center are orders for TPN labs & dressing changes. Can you place these & I will get them faxed?   Thank you

## 2022-05-18 ENCOUNTER — PATIENT MESSAGE (OUTPATIENT)
Dept: FAMILY MEDICINE CLINIC | Age: 32
End: 2022-05-18

## 2022-05-18 DIAGNOSIS — Z78.9 ON TOTAL PARENTERAL NUTRITION: Primary | ICD-10-CM

## 2022-05-18 DIAGNOSIS — K31.84 GASTROPARESIS: Primary | ICD-10-CM

## 2022-05-18 DIAGNOSIS — I87.8 POOR VENOUS ACCESS: ICD-10-CM

## 2022-05-18 DIAGNOSIS — K31.84 GASTROPARESIS: ICD-10-CM

## 2022-05-18 DIAGNOSIS — R11.2 NON-INTRACTABLE VOMITING WITH NAUSEA, UNSPECIFIED VOMITING TYPE: ICD-10-CM

## 2022-05-18 RX ORDER — HEPARIN SODIUM (PORCINE) LOCK FLUSH IV SOLN 100 UNIT/ML 100 UNIT/ML
SOLUTION INTRAVENOUS
Qty: 15 ML | Refills: 2 | Status: SHIPPED | OUTPATIENT
Start: 2022-05-18 | End: 2022-05-24 | Stop reason: SDUPTHER

## 2022-05-18 RX ORDER — PROMETHAZINE HYDROCHLORIDE 25 MG/ML
INJECTION, SOLUTION INTRAMUSCULAR; INTRAVENOUS
Qty: 10 ML | Refills: 2 | Status: SHIPPED | OUTPATIENT
Start: 2022-05-18 | End: 2022-05-24 | Stop reason: SDUPTHER

## 2022-05-18 RX ORDER — DIPHENHYDRAMINE HYDROCHLORIDE 50 MG/ML
50 INJECTION INTRAMUSCULAR; INTRAVENOUS EVERY 6 HOURS PRN
Qty: 10 ML | Refills: 0 | Status: SHIPPED | OUTPATIENT
Start: 2022-05-18 | End: 2022-05-18

## 2022-05-18 RX ORDER — FAMOTIDINE 20 MG/50ML
20 INJECTION, SOLUTION INTRAVENOUS EVERY 12 HOURS SCHEDULED
Qty: 3000 ML | Refills: 1 | Status: SHIPPED | OUTPATIENT
Start: 2022-05-18 | End: 2022-05-24 | Stop reason: SDUPTHER

## 2022-05-18 RX ORDER — DIPHENHYDRAMINE HYDROCHLORIDE 50 MG/ML
50 INJECTION INTRAMUSCULAR; INTRAVENOUS EVERY 6 HOURS PRN
Qty: 10 ML | Refills: 2 | Status: SHIPPED | OUTPATIENT
Start: 2022-05-18 | End: 2022-05-24 | Stop reason: SDUPTHER

## 2022-05-18 RX ORDER — HEPARIN SODIUM (PORCINE) LOCK FLUSH IV SOLN 100 UNIT/ML 100 UNIT/ML
SOLUTION INTRAVENOUS
Qty: 15 ML | Refills: 2 | Status: SHIPPED | OUTPATIENT
Start: 2022-05-18 | End: 2022-05-18

## 2022-05-18 RX ORDER — PROMETHAZINE HYDROCHLORIDE 25 MG/ML
INJECTION, SOLUTION INTRAMUSCULAR; INTRAVENOUS
Qty: 10 ML | Refills: 2 | Status: SHIPPED | OUTPATIENT
Start: 2022-05-18 | End: 2022-05-18

## 2022-05-19 ENCOUNTER — HOSPITAL ENCOUNTER (OUTPATIENT)
Dept: INFUSION THERAPY | Age: 32
Setting detail: INFUSION SERIES
Discharge: HOME | End: 2022-05-19
Payer: MEDICAID

## 2022-05-19 ENCOUNTER — CARE COORDINATION (OUTPATIENT)
Dept: CARE COORDINATION | Age: 32
End: 2022-05-19

## 2022-05-19 VITALS
BODY MASS INDEX: 24.75 KG/M2 | RESPIRATION RATE: 17 BRPM | DIASTOLIC BLOOD PRESSURE: 64 MMHG | TEMPERATURE: 96.4 F | SYSTOLIC BLOOD PRESSURE: 96 MMHG | HEIGHT: 60 IN | OXYGEN SATURATION: 99 % | HEART RATE: 118 BPM | WEIGHT: 126.06 LBS

## 2022-05-19 DIAGNOSIS — Z45.2 ENCOUNTER FOR CARE RELATED TO VASCULAR ACCESS PORT: Primary | ICD-10-CM

## 2022-05-19 DIAGNOSIS — Z78.9 ON TOTAL PARENTERAL NUTRITION: ICD-10-CM

## 2022-05-19 LAB
ALBUMIN SERPL-MCNC: 4.1 G/DL (ref 3.5–5.2)
ALP BLD-CCNC: 75 U/L (ref 35–104)
ALT SERPL-CCNC: 17 U/L (ref 5–33)
ANION GAP SERPL CALCULATED.3IONS-SCNC: 12 MMOL/L (ref 7–19)
APTT: 26.4 SEC (ref 26–36.2)
AST SERPL-CCNC: 21 U/L (ref 5–32)
BASOPHILS ABSOLUTE: 0.1 K/UL (ref 0–0.2)
BASOPHILS RELATIVE PERCENT: 0.8 % (ref 0–1)
BILIRUB SERPL-MCNC: <0.2 MG/DL (ref 0.2–1.2)
BUN BLDV-MCNC: 13 MG/DL (ref 6–20)
CALCIUM SERPL-MCNC: 8.8 MG/DL (ref 8.6–10)
CHLORIDE BLD-SCNC: 104 MMOL/L (ref 98–111)
CO2: 23 MMOL/L (ref 22–29)
CREAT SERPL-MCNC: 0.7 MG/DL (ref 0.5–0.9)
EOSINOPHILS ABSOLUTE: 0.1 K/UL (ref 0–0.6)
EOSINOPHILS RELATIVE PERCENT: 0.6 % (ref 0–5)
GFR AFRICAN AMERICAN: >59
GFR NON-AFRICAN AMERICAN: >60
GLUCOSE BLD-MCNC: 91 MG/DL (ref 74–109)
HCT VFR BLD CALC: 32.5 % (ref 37–47)
HEMOGLOBIN: 10.3 G/DL (ref 12–16)
IMMATURE GRANULOCYTES #: 0 K/UL
INR BLD: 0.98 (ref 0.88–1.18)
LYMPHOCYTES ABSOLUTE: 2.1 K/UL (ref 1.1–4.5)
LYMPHOCYTES RELATIVE PERCENT: 20.4 % (ref 20–40)
MAGNESIUM: 1.7 MG/DL (ref 1.6–2.6)
MCH RBC QN AUTO: 26.6 PG (ref 27–31)
MCHC RBC AUTO-ENTMCNC: 31.7 G/DL (ref 33–37)
MCV RBC AUTO: 84 FL (ref 81–99)
MONOCYTES ABSOLUTE: 0.6 K/UL (ref 0–0.9)
MONOCYTES RELATIVE PERCENT: 5.7 % (ref 0–10)
NEUTROPHILS ABSOLUTE: 7.3 K/UL (ref 1.5–7.5)
NEUTROPHILS RELATIVE PERCENT: 72.1 % (ref 50–65)
PDW BLD-RTO: 14.6 % (ref 11.5–14.5)
PHOSPHORUS: 4.8 MG/DL (ref 2.5–4.5)
PLATELET # BLD: 326 K/UL (ref 130–400)
PMV BLD AUTO: 9.4 FL (ref 9.4–12.3)
POTASSIUM SERPL-SCNC: 4.5 MMOL/L (ref 3.5–5)
PROTHROMBIN TIME: 12.9 SEC (ref 12–14.6)
RBC # BLD: 3.87 M/UL (ref 4.2–5.4)
SODIUM BLD-SCNC: 139 MMOL/L (ref 136–145)
TOTAL PROTEIN: 6.7 G/DL (ref 6.6–8.7)
TRIGL SERPL-MCNC: 146 MG/DL (ref 0–149)
WBC # BLD: 10.1 K/UL (ref 4.8–10.8)

## 2022-05-19 PROCEDURE — 85025 COMPLETE CBC W/AUTO DIFF WBC: CPT

## 2022-05-19 PROCEDURE — 36593 DECLOT VASCULAR DEVICE: CPT

## 2022-05-19 PROCEDURE — 84478 ASSAY OF TRIGLYCERIDES: CPT

## 2022-05-19 PROCEDURE — 84100 ASSAY OF PHOSPHORUS: CPT

## 2022-05-19 PROCEDURE — 84466 ASSAY OF TRANSFERRIN: CPT

## 2022-05-19 PROCEDURE — 80053 COMPREHEN METABOLIC PANEL: CPT

## 2022-05-19 PROCEDURE — 85730 THROMBOPLASTIN TIME PARTIAL: CPT

## 2022-05-19 PROCEDURE — 85610 PROTHROMBIN TIME: CPT

## 2022-05-19 PROCEDURE — 83735 ASSAY OF MAGNESIUM: CPT

## 2022-05-19 PROCEDURE — 2580000003 HC RX 258: Performed by: FAMILY MEDICINE

## 2022-05-19 PROCEDURE — 6360000002 HC RX W HCPCS: Performed by: FAMILY MEDICINE

## 2022-05-19 PROCEDURE — 36592 COLLECT BLOOD FROM PICC: CPT

## 2022-05-19 RX ORDER — SODIUM CHLORIDE 0.9 % (FLUSH) 0.9 %
5-40 SYRINGE (ML) INJECTION PRN
Status: CANCELLED | OUTPATIENT
Start: 2022-05-26

## 2022-05-19 RX ORDER — SODIUM CHLORIDE 0.9 % (FLUSH) 0.9 %
5-40 SYRINGE (ML) INJECTION PRN
Status: CANCELLED | OUTPATIENT
Start: 2022-05-19

## 2022-05-19 RX ORDER — SODIUM CHLORIDE 0.9 % (FLUSH) 0.9 %
5-40 SYRINGE (ML) INJECTION PRN
Status: DISCONTINUED | OUTPATIENT
Start: 2022-05-19 | End: 2022-05-20 | Stop reason: HOSPADM

## 2022-05-19 RX ADMIN — SODIUM CHLORIDE, PRESERVATIVE FREE 30 ML: 5 INJECTION INTRAVENOUS at 16:20

## 2022-05-19 RX ADMIN — WATER 2 MG: 1 INJECTION INTRAMUSCULAR; INTRAVENOUS; SUBCUTANEOUS at 15:39

## 2022-05-19 NOTE — PROGRESS NOTES
PT ARRIVED FOR LAB DRAW AND ELLISON DRESSING CHANGE. ELLISON WITH BLOOD TINGED RETURN. DRESSING CHANGED USING STERILE TECHNIQUE. STILL WITH ONLY BLOOD TINGED RETURN. ACTIVASE 2MG INSTILLED IN CATHETER. 30 MINUTES AFTER ACTIVASE INSTILLED, EXCELLENT BLOOD RETURN NOTED. LABS DRAWN AS ORDERED. LINE FLUSHED WELL.

## 2022-05-19 NOTE — CARE COORDINATION
AUTHOR: Stas Kyle Health Coach [facility]) / Community Health Worker (CHW)    Dutch Benítez with [facility] Therapy Department called to state the patient needs transportation assistance for today. She asked if the Physician Patient [address] would pay for today's transportation. This HC stated it would. Collected patient information. Told Jayne this HC will call [employer] Howard to arrange the transportation, and this [facility]. will call the patient to let her know. Jayne verbalized understanding. Submitted by Stas Kyle Health Coach [facility]) / Community Health Worker (CHW)    Telephoned Liya Delcid with [employer] [facility]. Arranged for pick-up at 14:15 for the patient's 15:00 treatment. Cost is $2 per mile plus $1 service fee on total.    Evangeline Binning between patient's home and the [facility] is 4.2 miles. Rounded it to 5 miles. Will email a Round-Trip Voucher to Liya Delcid for a total of $21.00. Submitted by Sats Kyle Health Coach [facility]) / Community Health Worker (CHW)    Telephoned the patient. Informed her of transportation arrangement. Told her Lashonda Cintrono will be there at 14:15. Confirmed patient has [employer] phone number. Provided her with this HC's phone.number. Asked her to contact this [facility] if she has to cancel her trip. She verbalized understanding. Submitted by Stas Kyle Health Coach [facility]) / Community Health Worker (CHW)    Telephoned Jayne to let her know transportation has been arranged, and the voucher has been emailed to Nick Cortez, owner of [employer]. Submitted by Stas Kyle Health Coach [facility]) / Community Health Worker (CHW)      Voucher emailed to Liya Delcid. EMAIL / VOUCHER:    PATIENT:  JUAN CARLOS CATALAN  ADDRESS:  [address] [place] [address], [address]    This is a round-trip voucher. Please see below. Patient needs to be picked-up at 2:15pm this afternoon, and taken to the [facility] at [facility].  She will need to be picked-up after her medical treatment and returned home. Thank you. Have a great day. BROOKLYN Hatch, [address], [facility] Management Team  [address] [place] [address], [address] [place] [address] S, [facility] [place], [address] [place] [address]  Dylon Romano:  [phone]  C:  [phone]  F:  [phone]  Email:  [email or web address]    Visit [email or web address]        From: [email or web address]   Sent: Thursday, May 19, 2022 9:16 AM  To: [email or web address]  Subject: Patient Cochran Form submitted    Transportation    Reason for Assistance: PATIENT NEEDS ROUND TRIP SERVICE FOR A MEDICAL TREATMENT AT [facility]. PATIENT UNABLE TO AFFORD TAXI TRANSPORT. Date: 2022  Referring Provider: [place] [address] Practice Name: Brayden Mathews  Patient MRN: [id number]  [facility]  Patient : [phone]  Address:  [facility] Nn: [address]: [place]  Zip: [address]  Patient Phone Number: [phone]  [facility] Abebe Elaine    Associate Name : Kevin Floyd Title : Health   Department : [id number] [address] [place] [address] [employer]  Phone : [phone]  Date Of Transportation: [facility],   Vendor Representative: Imelda Troy  Transportation Provider: Noman Rajput  [phone]  Pick-Up Location: Patients Home  Destination: * THIS IS A ROUND TRIP VOUCHER. Pick-up Patient's Home; take to the [facility]; return patient home after treatment. Thank you. Mileage: 4.2 / Round Up 5 for one direction. Total mileage is 10 miles. Fare Amount (Max $50): 21.00 ($20 for round-trip plus $1 Service Fee.)  How does this request support our mission & encourage others to donate?: Providing service to a patient who does not have financial means to get to the hospital for medical treatment.      Submitted by Lucas Lagunas, Health  [facility]) / Community Health Worker (CHW)

## 2022-05-20 ENCOUNTER — TELEPHONE (OUTPATIENT)
Dept: VASCULAR SURGERY | Age: 32
End: 2022-05-20

## 2022-05-20 NOTE — TELEPHONE ENCOUNTER
From: Jovani Pinto  Sent: 5/20/2022 6:47 AM CDT  To: [facility] Clinical Staff  Subject: Question    Would it be ok to see him

## 2022-05-20 NOTE — TELEPHONE ENCOUNTER
Radha Pulliam called requesting return call from Myrna Oneill in regards to stitches and port placement.  Please return her call anytime @ [phone]    Thank you

## 2022-05-20 NOTE — TELEPHONE ENCOUNTER
Spoke with patient to discuss that when she originally called she was needing a line removed. She is now going to another physician Dr. Royal Pelletier and he has her doing infusion therapy every Thursday here at [facility] at 3:00 p.m. She is going to follow up with Dr. Saurav Ruth to have her stitches removed but needs a double lemon and is wanting to have that done by Dr. Denise Bravo. I told her I would speak with Gabrielle San on Monday and we would get back with her regarding her upcoming procedure.

## 2022-05-21 LAB — TRANSFERRIN: 265 MG/DL (ref 200–360)

## 2022-05-23 ENCOUNTER — TELEPHONE (OUTPATIENT)
Dept: FAMILY MEDICINE CLINIC | Age: 32
End: 2022-05-23

## 2022-05-23 ENCOUNTER — TELEPHONE (OUTPATIENT)
Dept: VASCULAR SURGERY | Age: 32
End: 2022-05-23

## 2022-05-23 NOTE — TELEPHONE ENCOUNTER
Meghan Dinero, she is asking about henning line orders & IVIG. That is stuff that GI would need to address, correct?

## 2022-05-23 NOTE — TELEPHONE ENCOUNTER
Per patient:  I was getting 56 iv phenagren a week and i was getting 30 iv Benydral a week a few years back when [facility] in [facility] past     [facility] has also called and left a message about this. Please advise?

## 2022-05-24 ENCOUNTER — TELEMEDICINE (OUTPATIENT)
Dept: FAMILY MEDICINE CLINIC | Age: 32
End: 2022-05-24
Payer: MEDICAID

## 2022-05-24 DIAGNOSIS — I87.8 POOR VENOUS ACCESS: ICD-10-CM

## 2022-05-24 DIAGNOSIS — Z78.9 ON TOTAL PARENTERAL NUTRITION: Primary | ICD-10-CM

## 2022-05-24 DIAGNOSIS — K31.84 GASTROPARESIS: ICD-10-CM

## 2022-05-24 PROCEDURE — 99214 OFFICE O/P EST MOD 30 MIN: CPT | Performed by: FAMILY MEDICINE

## 2022-05-24 RX ORDER — PROMETHAZINE HYDROCHLORIDE 25 MG/ML
INJECTION, SOLUTION INTRAMUSCULAR; INTRAVENOUS
Qty: 100 ML | Refills: 2 | Status: SHIPPED | OUTPATIENT
Start: 2022-05-24 | End: 2022-05-24 | Stop reason: SDUPTHER

## 2022-05-24 RX ORDER — FAMOTIDINE 20 MG/50ML
20 INJECTION, SOLUTION INTRAVENOUS EVERY 12 HOURS SCHEDULED
Qty: 3000 ML | Refills: 1 | Status: SHIPPED | OUTPATIENT
Start: 2022-05-24 | End: 2022-06-06 | Stop reason: ALTCHOICE

## 2022-05-24 RX ORDER — DIPHENHYDRAMINE HYDROCHLORIDE 50 MG/ML
50 INJECTION INTRAMUSCULAR; INTRAVENOUS EVERY 4 HOURS
Qty: 100 ML | Refills: 2 | Status: SHIPPED | OUTPATIENT
Start: 2022-05-24 | End: 2022-06-06 | Stop reason: ALTCHOICE

## 2022-05-24 RX ORDER — PROMETHAZINE HYDROCHLORIDE 25 MG/ML
INJECTION, SOLUTION INTRAMUSCULAR; INTRAVENOUS
Qty: 100 ML | Refills: 2 | Status: ON HOLD | OUTPATIENT
Start: 2022-05-24

## 2022-05-24 RX ORDER — HEPARIN SODIUM (PORCINE) LOCK FLUSH IV SOLN 100 UNIT/ML 100 UNIT/ML
SOLUTION INTRAVENOUS
Qty: 15 ML | Refills: 2 | Status: ON HOLD | OUTPATIENT
Start: 2022-05-24

## 2022-05-24 ASSESSMENT — ENCOUNTER SYMPTOMS
EYE ITCHING: 0
BACK PAIN: 0
APNEA: 0
VOMITING: 0
NAUSEA: 0
EYE DISCHARGE: 0
COLOR CHANGE: 0
STRIDOR: 0
FACIAL SWELLING: 0

## 2022-05-24 NOTE — PROGRESS NOTES
[address] Pari Bach (:  1990) is a Established patient, here for evaluation of the following:    Assessment & Plan   Below is the assessment and plan developed based on review of pertinent history, physical exam, labs, studies, and medications. 1. On total parenteral nutrition  -     TPN WITH LIPIDS, OUTPATIENT ONLY,; TPN PER DAWNA 1-2 LITERS  ADMINISTER TPN VIA IV ACCESS OVER 16 HOURS AS DIRECTED, Disp-1150 mL, R-5Print  2. Poor venous access  -     diphenhydrAMINE (BENADRYL) 50 MG/ML injection; Infuse 1 mL intravenously every 4 hours, Disp-100 mL, R-2Print  -     famotidine (PEPCID) 20-0.9 MG/50ML-% SOLN; Infuse 50 mLs intravenously every 12 hours, Disp-3000 mL, R-1Print  -     heparin flush 100 UNIT/ML injection; 100 Units by Intracatheter route 2 times daily, Disp-15 mL, R-2Print  -     promethazine (PHENERGAN) 25 MG/ML injection; Infuse 25 mg intravenously every 3 hours, Disp-100 mL, R-2Print  3. Gastroparesis    Return for already scheduled follow-up. Subjective   HPI   All Evom[employer]t messages reviewed, patient states she is needing all IV medications printed faxed to [facility]. She is using previously and they have \"better supply\" then [facility]. She is also requesting a letter for her landlord, for specific apartment, will leave letter at , she has appointments Thursday here with vascular surgery and to do labs. She is to establish with GI here in the next 2 weeks. Review of Systems   Constitutional: Negative for chills and fever. HENT: Negative for facial swelling and mouth sores. Eyes: Negative for discharge and itching. Respiratory: Negative for apnea and stridor. Cardiovascular: Negative for chest pain and palpitations. Gastrointestinal: Negative for nausea and vomiting. Endocrine: Negative for cold intolerance and heat intolerance. Genitourinary: Negative for frequency and urgency. Musculoskeletal: Negative for arthralgias and back pain.    Skin: includes applicable co-pays. This Virtual Visit was conducted with patient's (and/or legal guardian's) consent. The visit was conducted pursuant to the emergency declaration under the [address] [place] [address] authority and the [employer] General Act. Patient identification was verified, and a caregiver was present when appropriate. The patient was located at home in a state where the provider was licensed to provide care.        --Adonay Vogel MD

## 2022-05-24 NOTE — LETTER
[address] [place] [address]  Phone: [phone]  Fax: [phone]    Hannah Garcia MD        May 24, 2022     Patient: Santos Hays   YOB: 1990   Date of Visit: 5/24/2022       To Whom It May Concern: It is my medical opinion that Santos Hays requires a 2 bedroom unit, open concept, preferably a lower floor. She has a lot of medications and medical equipment that require extra space for her catheter/TPN.      Sincerely,        Hannah Garcia MD

## 2022-05-26 ENCOUNTER — NURSE ONLY (OUTPATIENT)
Dept: VASCULAR SURGERY | Age: 32
End: 2022-05-26

## 2022-05-26 ENCOUNTER — TELEPHONE (OUTPATIENT)
Dept: FAMILY MEDICINE CLINIC | Age: 32
End: 2022-05-26

## 2022-05-26 ENCOUNTER — HOSPITAL ENCOUNTER (OUTPATIENT)
Dept: INFUSION THERAPY | Age: 32
Setting detail: INFUSION SERIES
Discharge: HOME | End: 2022-05-26
Payer: MEDICAID

## 2022-05-26 ENCOUNTER — TELEPHONE (OUTPATIENT)
Dept: INFUSION THERAPY | Age: 32
End: 2022-05-26

## 2022-05-26 VITALS
OXYGEN SATURATION: 100 % | RESPIRATION RATE: 20 BRPM | SYSTOLIC BLOOD PRESSURE: 115 MMHG | DIASTOLIC BLOOD PRESSURE: 75 MMHG | HEART RATE: 102 BPM | TEMPERATURE: 97.7 F

## 2022-05-26 DIAGNOSIS — Z78.9 ON TOTAL PARENTERAL NUTRITION: ICD-10-CM

## 2022-05-26 LAB
ALBUMIN SERPL-MCNC: 4.1 G/DL (ref 3.5–5.2)
ALP BLD-CCNC: 80 U/L (ref 35–104)
ALT SERPL-CCNC: 16 U/L (ref 5–33)
ANION GAP SERPL CALCULATED.3IONS-SCNC: 9 MMOL/L (ref 7–19)
APTT: 25.8 SEC (ref 26–36.2)
AST SERPL-CCNC: 16 U/L (ref 5–32)
BASOPHILS ABSOLUTE: 0.1 K/UL (ref 0–0.2)
BASOPHILS RELATIVE PERCENT: 1 % (ref 0–1)
BILIRUB SERPL-MCNC: <0.2 MG/DL (ref 0.2–1.2)
BUN BLDV-MCNC: 9 MG/DL (ref 6–20)
CALCIUM SERPL-MCNC: 8.5 MG/DL (ref 8.6–10)
CHLORIDE BLD-SCNC: 105 MMOL/L (ref 98–111)
CO2: 24 MMOL/L (ref 22–29)
CREAT SERPL-MCNC: 0.6 MG/DL (ref 0.5–0.9)
EOSINOPHILS ABSOLUTE: 0.1 K/UL (ref 0–0.6)
EOSINOPHILS RELATIVE PERCENT: 0.9 % (ref 0–5)
GFR AFRICAN AMERICAN: >59
GFR NON-AFRICAN AMERICAN: >60
GLUCOSE BLD-MCNC: 113 MG/DL (ref 74–109)
HCT VFR BLD CALC: 33 % (ref 37–47)
HEMOGLOBIN: 10.2 G/DL (ref 12–16)
IMMATURE GRANULOCYTES #: 0 K/UL
INR BLD: 0.99 (ref 0.88–1.18)
LYMPHOCYTES ABSOLUTE: 2.1 K/UL (ref 1.1–4.5)
LYMPHOCYTES RELATIVE PERCENT: 23.1 % (ref 20–40)
MAGNESIUM: 1.9 MG/DL (ref 1.6–2.6)
MCH RBC QN AUTO: 26.5 PG (ref 27–31)
MCHC RBC AUTO-ENTMCNC: 30.9 G/DL (ref 33–37)
MCV RBC AUTO: 85.7 FL (ref 81–99)
MONOCYTES ABSOLUTE: 0.4 K/UL (ref 0–0.9)
MONOCYTES RELATIVE PERCENT: 4.5 % (ref 0–10)
NEUTROPHILS ABSOLUTE: 6.3 K/UL (ref 1.5–7.5)
NEUTROPHILS RELATIVE PERCENT: 70.1 % (ref 50–65)
PDW BLD-RTO: 15 % (ref 11.5–14.5)
PHOSPHORUS: 2.1 MG/DL (ref 2.5–4.5)
PLATELET # BLD: 360 K/UL (ref 130–400)
PMV BLD AUTO: 9.4 FL (ref 9.4–12.3)
POTASSIUM SERPL-SCNC: 4.4 MMOL/L (ref 3.5–5)
PROTHROMBIN TIME: 13 SEC (ref 12–14.6)
RBC # BLD: 3.85 M/UL (ref 4.2–5.4)
SODIUM BLD-SCNC: 138 MMOL/L (ref 136–145)
TOTAL PROTEIN: 6.4 G/DL (ref 6.6–8.7)
TRIGL SERPL-MCNC: 212 MG/DL (ref 0–149)
WBC # BLD: 9.1 K/UL (ref 4.8–10.8)

## 2022-05-26 PROCEDURE — 84478 ASSAY OF TRIGLYCERIDES: CPT

## 2022-05-26 PROCEDURE — 83735 ASSAY OF MAGNESIUM: CPT

## 2022-05-26 PROCEDURE — 85610 PROTHROMBIN TIME: CPT

## 2022-05-26 PROCEDURE — 85025 COMPLETE CBC W/AUTO DIFF WBC: CPT

## 2022-05-26 PROCEDURE — 84466 ASSAY OF TRANSFERRIN: CPT

## 2022-05-26 PROCEDURE — 84100 ASSAY OF PHOSPHORUS: CPT

## 2022-05-26 PROCEDURE — 80053 COMPREHEN METABOLIC PANEL: CPT

## 2022-05-26 PROCEDURE — 85730 THROMBOPLASTIN TIME PARTIAL: CPT

## 2022-05-26 NOTE — PROGRESS NOTES
Pt came to the office today for suture removal after henning removal.The suture was clean, dry,and intact. All suture was removed without issues. Pt was advised to contact the office with any signs or sx of infection. Pt voiced understanding and is aware.

## 2022-05-26 NOTE — TELEPHONE ENCOUNTER
[facility] nurse from [facility] called with an update. He states he was changing the dressing on the patient and noticed she had some crusting that was yellow/dark brown at the insertion site. He states she did not complain of any tenderness or soreness and there was no redness. He stated that he knew she has sensitivity/allergy to adhesives, but did not think it was related to that. He would like to know if we need to do a follow up with the patient for evaluation. Please advise.

## 2022-05-26 NOTE — PROGRESS NOTES
[facility] Vascular Access Team:  Consult Note    Eddie Pedro  Room/bed info not found   Admitted - 5/26/2022  2:22 PM  Admission Diagnosis -   Encounter for adjustment and management of vascular access device [Z45.2]    Attending Physician - No att. providers found  Ordering Physician - Nandini Arrieta MD    Active LDAs -        REASON FOR CONSULT:   [facility] vascular access team consulted for Right Henning Line Dressing Change. INDICATIONS:  Dressing due to be changed for weekly scheduled change. FINDINGS:  Vascular Access RN changed patient's henning dressing while patient was in [address]. Noted that there was a significant amount of crusting at insertion site and that the site was reddened internally (able to visualize subcutaneous tissue around insertion point). Dressing changed per facility policy/procedure and also following patient's specific requests to only use isopropyl alcohol swabs, SorbaView dressing, and no CHG or CHG Biopatch. Patient also required statlock to be placed over bottom portion of SorbaView dressing and the top portion to overlap. Site cleansed thoroughly and dressing fully adhered with all edges flush to skin. Attempted to call patient's PCP and left  for callback about patient's line status and concerns related to crusting.      Past Medical History:   Diagnosis Date    Allergic contact dermatitis due to adhesives     Allergic contact dermatitis due to plants, except food     Anemia complicating pregnancy, second trimester     Anemia complicating pregnancy, third trimester     Anxiety and depression     Anxiety disorder     unspecified    Bacteremia     Cellulitis of abdominal wall     Chronic fatigue, unspecified     Dysuria     Encounter for care related to vascular access port 5/19/2022    Epigastric pain     Frequency of micturition     G tube feedings (HCC)     Gastroparesis     Dr. Bud Fall in [place] manages    Gastroparesis     Gastrostomy malfunction ([facility] [address]    

## 2022-05-26 NOTE — TELEPHONE ENCOUNTER
Contacted Dr. Sera Barnard's office and spoke with [place]. Notified her of this nurse's findings of yellow/dark brown crusting to insertion site as dressing change was being performed. Also informed her that the insertion site appeared to be widened and could see dermal layer next to catheter. Patient denied any soreness to site and no obvious redness noted to skin around insertion site.

## 2022-05-27 ENCOUNTER — TELEPHONE (OUTPATIENT)
Dept: FAMILY MEDICINE CLINIC | Age: 32
End: 2022-05-27

## 2022-05-27 NOTE — TELEPHONE ENCOUNTER
Patient left voicemail saying the pic nurse said the incision site was widened and could see the dermal layer next to the catheter. There was no active drainage. It is just crusted around the site. Patient states it working fine, flushes and has blood return. She wanted to know if there was anything that needs to be done about this.

## 2022-05-28 LAB — TRANSFERRIN: 249 MG/DL (ref 200–360)

## 2022-06-02 ENCOUNTER — HOSPITAL ENCOUNTER (OUTPATIENT)
Dept: INFUSION THERAPY | Age: 32
Setting detail: INFUSION SERIES
Discharge: HOME | End: 2022-06-02
Payer: MEDICAID

## 2022-06-02 VITALS
HEART RATE: 104 BPM | RESPIRATION RATE: 20 BRPM | OXYGEN SATURATION: 100 % | DIASTOLIC BLOOD PRESSURE: 80 MMHG | SYSTOLIC BLOOD PRESSURE: 104 MMHG | TEMPERATURE: 98.2 F

## 2022-06-02 DIAGNOSIS — Z78.9 ON TOTAL PARENTERAL NUTRITION: Primary | ICD-10-CM

## 2022-06-02 DIAGNOSIS — Z45.2 ENCOUNTER FOR CARE RELATED TO VASCULAR ACCESS PORT: ICD-10-CM

## 2022-06-02 LAB
ALBUMIN SERPL-MCNC: 4.1 G/DL (ref 3.5–5.2)
ALP BLD-CCNC: 79 U/L (ref 35–104)
ALT SERPL-CCNC: 17 U/L (ref 5–33)
ANION GAP SERPL CALCULATED.3IONS-SCNC: 12 MMOL/L (ref 7–19)
APTT: 26.4 SEC (ref 26–36.2)
AST SERPL-CCNC: 18 U/L (ref 5–32)
BASOPHILS ABSOLUTE: 0.1 K/UL (ref 0–0.2)
BASOPHILS RELATIVE PERCENT: 1 % (ref 0–1)
BILIRUB SERPL-MCNC: <0.2 MG/DL (ref 0.2–1.2)
BUN BLDV-MCNC: 10 MG/DL (ref 6–20)
CALCIUM SERPL-MCNC: 8.4 MG/DL (ref 8.6–10)
CHLORIDE BLD-SCNC: 106 MMOL/L (ref 98–111)
CO2: 21 MMOL/L (ref 22–29)
CREAT SERPL-MCNC: 0.5 MG/DL (ref 0.5–0.9)
EOSINOPHILS ABSOLUTE: 0 K/UL (ref 0–0.6)
EOSINOPHILS RELATIVE PERCENT: 0.6 % (ref 0–5)
GFR AFRICAN AMERICAN: >59
GFR NON-AFRICAN AMERICAN: >60
GLUCOSE BLD-MCNC: 91 MG/DL (ref 74–109)
HCT VFR BLD CALC: 31.9 % (ref 37–47)
HEMOGLOBIN: 10 G/DL (ref 12–16)
IMMATURE GRANULOCYTES #: 0 K/UL
INR BLD: 0.97 (ref 0.88–1.18)
LYMPHOCYTES ABSOLUTE: 1.9 K/UL (ref 1.1–4.5)
LYMPHOCYTES RELATIVE PERCENT: 27.4 % (ref 20–40)
MAGNESIUM: 1.7 MG/DL (ref 1.6–2.6)
MCH RBC QN AUTO: 26.3 PG (ref 27–31)
MCHC RBC AUTO-ENTMCNC: 31.3 G/DL (ref 33–37)
MCV RBC AUTO: 83.9 FL (ref 81–99)
MONOCYTES ABSOLUTE: 0.5 K/UL (ref 0–0.9)
MONOCYTES RELATIVE PERCENT: 6.8 % (ref 0–10)
NEUTROPHILS ABSOLUTE: 4.5 K/UL (ref 1.5–7.5)
NEUTROPHILS RELATIVE PERCENT: 63.9 % (ref 50–65)
PDW BLD-RTO: 14.6 % (ref 11.5–14.5)
PHOSPHORUS: 2.6 MG/DL (ref 2.5–4.5)
PLATELET # BLD: 352 K/UL (ref 130–400)
PMV BLD AUTO: 9 FL (ref 9.4–12.3)
POTASSIUM SERPL-SCNC: 3.5 MMOL/L (ref 3.5–5)
PROTHROMBIN TIME: 12.8 SEC (ref 12–14.6)
RBC # BLD: 3.8 M/UL (ref 4.2–5.4)
SODIUM BLD-SCNC: 139 MMOL/L (ref 136–145)
TOTAL PROTEIN: 6.8 G/DL (ref 6.6–8.7)
TRIGL SERPL-MCNC: 117 MG/DL (ref 0–149)
WBC # BLD: 7 K/UL (ref 4.8–10.8)

## 2022-06-02 PROCEDURE — 85730 THROMBOPLASTIN TIME PARTIAL: CPT

## 2022-06-02 PROCEDURE — 2580000003 HC RX 258: Performed by: FAMILY MEDICINE

## 2022-06-02 PROCEDURE — 84100 ASSAY OF PHOSPHORUS: CPT

## 2022-06-02 PROCEDURE — 85025 COMPLETE CBC W/AUTO DIFF WBC: CPT

## 2022-06-02 PROCEDURE — 80053 COMPREHEN METABOLIC PANEL: CPT

## 2022-06-02 PROCEDURE — 85610 PROTHROMBIN TIME: CPT

## 2022-06-02 PROCEDURE — 84466 ASSAY OF TRANSFERRIN: CPT

## 2022-06-02 PROCEDURE — 83735 ASSAY OF MAGNESIUM: CPT

## 2022-06-02 PROCEDURE — 36592 COLLECT BLOOD FROM PICC: CPT

## 2022-06-02 PROCEDURE — 84478 ASSAY OF TRIGLYCERIDES: CPT

## 2022-06-02 RX ORDER — SODIUM CHLORIDE 0.9 % (FLUSH) 0.9 %
5-40 SYRINGE (ML) INJECTION PRN
Status: CANCELLED | OUTPATIENT
Start: 2022-06-09

## 2022-06-02 RX ORDER — SODIUM CHLORIDE 0.9 % (FLUSH) 0.9 %
5-40 SYRINGE (ML) INJECTION PRN
Status: DISCONTINUED | OUTPATIENT
Start: 2022-06-02 | End: 2022-06-03 | Stop reason: HOSPADM

## 2022-06-02 RX ADMIN — SODIUM CHLORIDE, PRESERVATIVE FREE 20 ML: 5 INJECTION INTRAVENOUS at 15:10

## 2022-06-02 NOTE — PROGRESS NOTES
[facility] Vascular Access Team:  Consult Note    Paul Forbes  Room/bed info not found   Admitted - 6/2/2022  2:33 PM  Admission Diagnosis -   Encounter for adjustment and management of vascular access device [Z45.2]    Attending Physician - No att. providers found  Ordering Physician - Nikkie Cardona MD    Active LDAs -   CVC Single Lumen 04/26/22 Right Subclavian (Active)   Number of days: 37       REASON FOR CONSULT:   [facility] vascular access team consulted for Right Munguia Line Dressing Change. INDICATIONS:  Weekly scheduled dressing change. FINDINGS:  Changed patient's Munguia dressing following policy/procedure. Patient tolerated well. Changed with alcohol and sorbaview dressing per patient instructions. No CHG used and no Biopatch per request. Noted that there was new crusting to insertion site, but no signs of redness or swelling.      Past Medical History:   Diagnosis Date    Allergic contact dermatitis due to adhesives     Allergic contact dermatitis due to plants, except food     Anemia complicating pregnancy, second trimester     Anemia complicating pregnancy, third trimester     Anxiety and depression     Anxiety disorder     unspecified    Bacteremia     Cellulitis of abdominal wall     Chronic fatigue, unspecified     Dysuria     Encounter for care related to vascular access port 5/19/2022    Epigastric pain     Frequency of micturition     G tube feedings (HCC)     Gastroparesis     Dr. Henri Kaye in [place] manages    Gastroparesis     Gastrostomy malfunction ([facility] [address]     Generalized abdominal pain     GERD (gastroesophageal reflux disease)     Heart murmur     Hiatal hernia     History of Hoyos's esophagus     History of blood transfusion     Hypotension, unspecified     IBS (irritable bowel syndrome)     Insomnia, unspecified     Jejunostomy tube present (HCC)     Low back pain     Nausea with vomiting     unspecified    Neck pain     Neuropathy     Orthostatic hypotension     POTS (postural orthostatic tachycardia syndrome)     Premature birth     delivered at 24-27 weeks    Scoliosis     Syncope and collapse     Tachycardia     Tachycardia, unspecified     Toxic gastroenteritis and colitis        No results for input(s): BC, BLOODCULT2, WBC, PLT, CREATININE in the last 72 hours. Invalid input(s): GFR NON-    IMPRESSION/PLAN:  1. Next scheduled dressing change 6/9/2022. Dressing changed today. Thank you for your time and consult.      Electronically Signed By: Criss Vale RN on 6/2/2022 at 3:11 PM

## 2022-06-02 NOTE — PROGRESS NOTES
LABS DRAWN AND SENT TO LAB ALONG WITH ELLISON DRESSING CHANGE DISCHARGE INSTRUCTIONS GIVEN TO PT WITH HIGHLIGHTED FUTURE APPOINTMENT

## 2022-06-03 ENCOUNTER — TELEPHONE (OUTPATIENT)
Dept: INFUSION THERAPY | Age: 32
End: 2022-06-03

## 2022-06-03 NOTE — TELEPHONE ENCOUNTER
Pt phoned [facility] stating that bottom part of henning dressing is \"coming off\". States she cannot come in today for new dressing. Pt states she will cover current dressing with a new dressing that she has at home. Offered to make appointment for Monday, but she states she will \"probably be in the hospital receiving TPN\"  Pt states she will call [facility] back on Monday  for new dressing change if she is not admitted to hospital.  Informed pt that site does not need to be exposed. Pt states understanding.

## 2022-06-05 LAB — TRANSFERRIN: 259 MG/DL (ref 200–360)

## 2022-06-06 ENCOUNTER — OFFICE VISIT (OUTPATIENT)
Dept: GASTROENTEROLOGY | Age: 32
End: 2022-06-06
Payer: MEDICAID

## 2022-06-06 ENCOUNTER — TELEPHONE (OUTPATIENT)
Dept: FAMILY MEDICINE CLINIC | Age: 32
End: 2022-06-06

## 2022-06-06 ENCOUNTER — TELEPHONE (OUTPATIENT)
Dept: GASTROENTEROLOGY | Age: 32
End: 2022-06-06

## 2022-06-06 VITALS
SYSTOLIC BLOOD PRESSURE: 112 MMHG | DIASTOLIC BLOOD PRESSURE: 62 MMHG | HEART RATE: 134 BPM | BODY MASS INDEX: 25.05 KG/M2 | OXYGEN SATURATION: 98 % | HEIGHT: 60 IN | WEIGHT: 127.6 LBS

## 2022-06-06 DIAGNOSIS — K31.84 GASTROPARESIS: Primary | ICD-10-CM

## 2022-06-06 DIAGNOSIS — I87.8 POOR VENOUS ACCESS: ICD-10-CM

## 2022-06-06 PROCEDURE — 99214 OFFICE O/P EST MOD 30 MIN: CPT | Performed by: NURSE PRACTITIONER

## 2022-06-06 ASSESSMENT — ENCOUNTER SYMPTOMS
TROUBLE SWALLOWING: 0
CONSTIPATION: 0
ABDOMINAL DISTENTION: 0
BLOOD IN STOOL: 0
ANAL BLEEDING: 0
DIARRHEA: 0
VOMITING: 0
SHORTNESS OF BREATH: 0
CHOKING: 0
COUGH: 0
RECTAL PAIN: 0

## 2022-06-06 NOTE — TELEPHONE ENCOUNTER
Patient came by and said [facility] needs you to call them and clarify dose amount for the infusion IV meds. She is out of meds but says you know that. Please call patient @ [phone].

## 2022-06-06 NOTE — TELEPHONE ENCOUNTER
----- Message from MEENA CONTRERAS [facility] sent at 6/6/2022  9:59 AM CDT -----  Regarding: checkout note 6/6/22  Refer to [address]  Vascular referral

## 2022-06-06 NOTE — PROGRESS NOTES
Subjective:     Patient ID: Kyrie Lucas is a 28 y.o. female  PCP: Rosie Tsang MD  Referring Provider: Wing George MD    HPI  Patient presents to the office today with the following complaints: New Patient      Pt seen today for hx gastroparesis. Previously followed by Dr. Jensen Jeffers in [place] since 2014. \"I am done with them. \"  Pt states she was receiving TPN, IVIG, and all medications through IV with home health agency. She states she has not had any TPN or IVIG since March. She states she has had issues with a neighbor and tells me the home health agency will not return due to it not being a safe environment. She states [facility] told her to call when home situation got better, but all treatments were stopped. She is requesting our office now handle the TPN and IVIG infusions. Pt has J tube. She is requesting us admit her to begin the TPN. She can tolerate po foods, \"I can only eat junk foods. \"  She reports being weak and frequent falls. Pt has single lumen Munguia in place and requesting referral to Vascular for placement of double or triple lumen. Last EGD 2015 - Dr. Kristina Le    Assessment:     1. Gastroparesis    2. Poor venous access            Plan:   - Long discussion with pt regarding gastroparesis as a complex disease and needs to follow with tertiary care center. I do not feel comfortable following her for TPN and IVIG as this needs to be handled by motility specialists.     - Due to pt not wanting to go to [facility], I will place referral to [facility] for evaluation  - Referral placed to Vascular at pt request as well      Orders  Orders Placed This Encounter   Procedures    External Referral To Gastroenterology     Referral Priority:   Routine     Referral Type:   Eval and Treat     Referral Reason:   Specialty Services Required     Requested Specialty:   Gastroenterology     Number of Visits Requested:   [facility] Vascular Surgery, [place]     Referral Priority:   Routine Referral Type:   Eval and Treat     Referral Reason:   Specialty Services Required     Requested Specialty:   Vascular Surgery     Number of Visits Requested:   1     Medications  No orders of the defined types were placed in this encounter.         Patient History:     Past Medical History:   Diagnosis Date    Allergic contact dermatitis due to adhesives     Allergic contact dermatitis due to plants, except food     Anemia complicating pregnancy, second trimester     Anemia complicating pregnancy, third trimester     Anxiety and depression     Anxiety disorder     unspecified    Bacteremia     Cellulitis of abdominal wall     Chronic fatigue, unspecified     Dysuria     Encounter for care related to vascular access port 5/19/2022    Epigastric pain     Frequency of micturition     G tube feedings (HCC)     Gastroparesis     Dr. Rosario Mcgee in [place] manages    Gastroparesis     Gastrostomy malfunction ([facility] [address]     Generalized abdominal pain     GERD (gastroesophageal reflux disease)     Heart murmur     Hiatal hernia     History of Hoyos's esophagus     History of blood transfusion     Hypotension, unspecified     IBS (irritable bowel syndrome)     Insomnia, unspecified     Jejunostomy tube present (HCC)     Low back pain     Nausea with vomiting     unspecified    Neck pain     Neuropathy     Orthostatic hypotension     POTS (postural orthostatic tachycardia syndrome)     Premature birth     delivered at 24-27 weeks    Scoliosis     Syncope and collapse     Tachycardia     Tachycardia, unspecified     Toxic gastroenteritis and colitis        Past Surgical History:   Procedure Laterality Date    ABDOMEN SURGERY  2014    gastric stimulator implanted    ADENOIDECTOMY      ADENOIDECTOMY  2007    CHOLECYSTECTOMY      CHOLECYSTECTOMY  2016    DENTAL SURGERY  2004    wisdom teeth    EAR SURGERY  2014    left    FACIAL SURGERY  2005    GASTRIC STIMULATOR IMPLANT SURGERY  11/2014  GASTROSTOMY TUBE PLACEMENT  04/07/2015    J tube-Removed in [place] 1/2018    GASTROSTOMY TUBE PLACEMENT      G tube 2017    HYSTERECTOMY      INSERTION / REMOVAL / REPLACEMENT VENOUS ACCESS CATHETER N/A 03/25/2016    REMOVAL OF PORT AND PLACEMENT OF NEW PORT; REMOVAL OF PICC LINE  performed by Aslheigh Alvarado MD at [address] [place] [address]  2014    left knee    KNEE SURGERY  2015    right knee    MANDIBLE SURGERY      double jaw    PHARYNGECTOMY      PORT SURGERY      port present 3-25-16    TONSILLECTOMY      TONSILLECTOMY  1997    TUBAL LIGATION      TUBAL LIGATION  2016    TUMOR REMOVAL Left     ear    TUNNELED VENOUS PORT PLACEMENT Right 05/29/2015    TUNNELED VENOUS PORT PLACEMENT      UPPER GASTROINTESTINAL ENDOSCOPY  04/02/2010    Dr Kebede Lanes ENDOSCOPY  09/25/2013    Dr Luciana Duncan  05/2015    Dr. Dena Villar  5/29/2015 [facility]    Ultrasound-guided cannulation, right internal jugular vein. Right internal jugular vein single-lumen bard powerport placement.  VASCULAR SURGERY  9/8/15 [facility]    Right internal jugular vein portograms. Revision of right internal jugular vein single lumen port.  VASCULAR SURGERY  11/3/15 S    Ultrasound-guided cannulation, left upper arm basilic vein. Left upper arm basilic vein PICC line placement bard dual-lumen PICC line. Superior vena cava venograms.  VASCULAR SURGERY  03/23/2017    [facility]. Left IJV port angiogram.Ultrasound guided cannulation of right basilic vein,right upper extremity PICC line placement bard power PICC,dual lumen.  VASCULAR SURGERY  04/27/2022    Ultrasound-guided cannulation right internal jugular vein. Placement of Munguia single-lumen tunneled dialysis catheter. Removal of nonfunctioning left subclavian vein Munguia tunneled dialysis catheter. Repositioning of right internal juglular vein Munguia tunneled dialylsis catheter.  Perfomed by  Chza Sosa at [facility] EXTRACTION         Family History   Problem Relation Age of Onset    Other Mother         endometriosis    Depression Father     Diabetes Paternal Grandmother     Stroke Paternal Grandmother     Hypertension Paternal Grandmother     Heart Disease Paternal Grandmother     Heart Failure Paternal Grandmother     Colon Cancer Neg Hx     Colon Polyps Neg Hx     Esophageal Cancer Neg Hx     Liver Cancer Neg Hx     Rectal Cancer Neg Hx     Stomach Cancer Neg Hx        Social History     Socioeconomic History    Marital status: Single     Spouse name: None    Number of children: None    Years of education: None    Highest education level: None   Occupational History    None   Tobacco Use    Smoking status: Never Smoker    Smokeless tobacco: Never Used    Tobacco comment: Vape    Vaping Use    Vaping Use: Every day    Substances: Nicotine    Devices: Refillable tank   Substance and Sexual Activity    Alcohol use: Never    Drug use: Never    Sexual activity: Yes     Partners: Male   Other Topics Concern    None   Social History Narrative    ** Merged History Encounter **          Social Determinants of Health     Financial Resource Strain:     Difficulty of Paying Living Expenses: Not on file   Food Insecurity:     Worried About Running Out of Food in the Last Year: Not on file    Sixto of Food in the Last Year: Not on file   Transportation Needs:     Lack of Transportation (Medical): Not on file    Lack of Transportation (Non-Medical):  Not on file   Physical Activity:     Days of Exercise per Week: Not on file    Minutes of Exercise per Session: Not on file   Stress:     Feeling of Stress : Not on file   Social Connections:     Frequency of Communication with Friends and Family: Not on file    Frequency of Social Gatherings with Friends and Family: Not on file    Attends Catholic Services: Not on file   [employer] of Clubs or Organizations: Not on file    Attends Club or Organization Meetings: Not on file    Marital Status: Not on file   Intimate Partner Violence:     Fear of Current or Ex-Partner: Not on file    Emotionally Abused: Not on file    Physically Abused: Not on file    Sexually Abused: Not on file   Housing Stability:     Unable to Pay for Housing in the Last Year: Not on file    Number of Carmen in the Last Year: Not on file    Unstable Housing in the Last Year: Not on file       Current Outpatient Medications   Medication Sig Dispense Refill    heparin flush 100 UNIT/ML injection 100 Units by Intracatheter route 2 times daily 15 mL 2    promethazine (PHENERGAN) 25 MG/ML injection Infuse 25 mg intravenously every 3 hours 100 mL 2    meloxicam (MOBIC) 15 MG tablet as needed       dicyclomine (BENTYL) 10 MG capsule Take 10 mg by mouth in the morning and at bedtime       linaclotide (LINZESS) 145 MCG capsule Take 145 mcg by mouth every morning (before breakfast)      sodium chloride flush 0.9 % injection Infuse 5-40 mLs intravenously as needed 6-12 times daily      Misc. Devices [facility]) MISC by Does not apply route Indications: custom wheel chair with smart drive       No current facility-administered medications for this visit. Allergies   Allergen Reactions    Iv Dye [Iodides] Shortness Of Breath, Itching and Other (See Comments)     IV 3000;  SoA, dizziness    Iv Dye [Iodides] Anaphylaxis     Iodine containing    Adhesive Tape Itching     And tegaderm    Bee Pollen Hives     Oranges-anything with orange color  Oranges-anything with orange color  Oranges-anything with orange color  Oranges-anything with orange color  Oranges-anything with orange color      Chlorhexidine      Other reaction(s): ITCHING    Fruit & Vegetable Daily [Nutritional Supplements]      Oranges-anything with orange color    Metoprolol Succinate [Metoprolol]      Excessive drowsiness/ Metoprolol tartrate-excessive drowsiness    Nsaids      Other reaction(s): GI Intolerance    Orange Itching and Other (See Comments)     Other reaction(s): Vomiting    Orange Fruit [Citrus]      And artificial    Other      Cloraprep    Reglan [Metoclopramide]     Reglan [Metoclopramide]      Nausea,vomiting    Tape [Adhesive Tape]      Adhesives-rash-itching    Tramadol Other (See Comments)     Excessive sleeping x 3 days, pt takes 1/2 a pill.  Tramadol      Excessive drowsiness    Zofran [Ondansetron Hcl] Hives    Zofran [Ondansetron Hcl] Hives    Chlorhexidine Gluconate Rash    Orange Oil Nausea And Vomiting    Sulfamethoxazole-Trimethoprim Nausea And Vomiting       Review of Systems   Constitutional: Positive for fatigue. Negative for activity change, appetite change, fever and unexpected weight change. HENT: Negative for trouble swallowing. Respiratory: Negative for cough, choking and shortness of breath. Cardiovascular: Negative for chest pain. Gastrointestinal: Positive for abdominal pain and nausea. Negative for abdominal distention, anal bleeding, blood in stool, constipation, diarrhea, rectal pain and vomiting. Allergic/Immunologic: Negative for food allergies. All other systems reviewed and are negative. Objective:     /62   Pulse (!) 134   Ht 5' (1.524 m)   Wt 127 lb 9.6 oz (57.9 kg)   LMP 01/14/2018 (Exact Date)   SpO2 98%   BMI 24.92 kg/m²     Physical Exam  Vitals reviewed. Constitutional:       General: She is not in acute distress. Appearance: She is well-developed. HENT:      Head: Normocephalic and atraumatic. Right Ear: External ear normal.      Left Ear: External ear normal.      Nose: Nose normal.      Comments: Mask on     Mouth/Throat:      Comments: Mask on  Eyes:      Conjunctiva/sclera: Conjunctivae normal.      Pupils: Pupils are equal, round, and reactive to light. Cardiovascular:      Rate and Rhythm: Normal rate. Heart sounds: No murmur heard.   No friction rub. No gallop. Pulmonary:      Effort: Pulmonary effort is normal. No respiratory distress. Abdominal:      General: There is no distension. Palpations: Abdomen is soft. Musculoskeletal:         General: Normal range of motion. Cervical back: Normal range of motion and neck supple. Skin:     General: Skin is warm and dry. Findings: No rash. Nails: There is no clubbing. Neurological:      Mental Status: She is alert and oriented to person, place, and time. Gait: Gait abnormal (uses walker). Psychiatric:         Behavior: Behavior normal.         Thought Content:  Thought content normal.

## 2022-06-06 NOTE — TELEPHONE ENCOUNTER
I spoke with Sandie Reid at [facility] who states that he does not feel comfortable dispensing phenergan IV for q3 hours as it can cause tissue damage. I gave verbal ok to change to q6 hours. Patient is aware of this change as well. Sandie Reid, pharmacist, would like to know how long you plan to prescribe these IV medications for her. He states that he likes to have these things on file for patients.   Please advise

## 2022-06-06 NOTE — TELEPHONE ENCOUNTER
06-07-22 [facility] [phone]  Spoke with Prashant Eduardo Aug 15th 840 am   Dr Chad Burgess Gastroenterology   [address]nd [address]     Dx Gastroparesis     Faxed records       Called and notified patient of apt

## 2022-06-07 ASSESSMENT — ENCOUNTER SYMPTOMS
NAUSEA: 1
ABDOMINAL PAIN: 1

## 2022-06-07 NOTE — TELEPHONE ENCOUNTER
Noted. Patient has appt scheduled today for med discussion. I did speak with [facility] multiple times yesterday in regards to medications. There is another TE reflecting this. Again, they will not prescribe TPN without patient having [place] or being admitted to hospital.  As known [place] will not see her & patient refused to go to ER for admittance for TPN.

## 2022-06-13 ENCOUNTER — HOSPITAL ENCOUNTER (OUTPATIENT)
Dept: INFUSION THERAPY | Age: 32
Setting detail: INFUSION SERIES
Discharge: HOME | End: 2022-06-13
Payer: MEDICAID

## 2022-06-13 VITALS
SYSTOLIC BLOOD PRESSURE: 100 MMHG | RESPIRATION RATE: 18 BRPM | OXYGEN SATURATION: 98 % | HEART RATE: 123 BPM | DIASTOLIC BLOOD PRESSURE: 78 MMHG | TEMPERATURE: 98 F

## 2022-06-13 DIAGNOSIS — Z78.9 ON TOTAL PARENTERAL NUTRITION: ICD-10-CM

## 2022-06-13 DIAGNOSIS — Z45.2 ENCOUNTER FOR CARE RELATED TO VASCULAR ACCESS PORT: Primary | ICD-10-CM

## 2022-06-13 LAB
ALBUMIN SERPL-MCNC: 4.3 G/DL (ref 3.5–5.2)
ALP BLD-CCNC: 98 U/L (ref 35–104)
ALT SERPL-CCNC: 14 U/L (ref 5–33)
ANION GAP SERPL CALCULATED.3IONS-SCNC: 11 MMOL/L (ref 7–19)
APTT: 26.9 SEC (ref 26–36.2)
AST SERPL-CCNC: 18 U/L (ref 5–32)
BASOPHILS ABSOLUTE: 0.1 K/UL (ref 0–0.2)
BASOPHILS RELATIVE PERCENT: 0.7 % (ref 0–1)
BILIRUB SERPL-MCNC: <0.2 MG/DL (ref 0.2–1.2)
BUN BLDV-MCNC: 15 MG/DL (ref 6–20)
CALCIUM SERPL-MCNC: 9.1 MG/DL (ref 8.6–10)
CHLORIDE BLD-SCNC: 104 MMOL/L (ref 98–111)
CO2: 25 MMOL/L (ref 22–29)
CREAT SERPL-MCNC: 0.9 MG/DL (ref 0.5–0.9)
EOSINOPHILS ABSOLUTE: 0 K/UL (ref 0–0.6)
EOSINOPHILS RELATIVE PERCENT: 0.4 % (ref 0–5)
GFR AFRICAN AMERICAN: >59
GFR NON-AFRICAN AMERICAN: >60
GLUCOSE BLD-MCNC: 112 MG/DL (ref 74–109)
HCT VFR BLD CALC: 36.3 % (ref 37–47)
HEMOGLOBIN: 11.2 G/DL (ref 12–16)
IMMATURE GRANULOCYTES #: 0 K/UL
INR BLD: 0.95 (ref 0.88–1.18)
LYMPHOCYTES ABSOLUTE: 1.8 K/UL (ref 1.1–4.5)
LYMPHOCYTES RELATIVE PERCENT: 17.2 % (ref 20–40)
MAGNESIUM: 1.7 MG/DL (ref 1.6–2.6)
MCH RBC QN AUTO: 25.9 PG (ref 27–31)
MCHC RBC AUTO-ENTMCNC: 30.9 G/DL (ref 33–37)
MCV RBC AUTO: 83.8 FL (ref 81–99)
MONOCYTES ABSOLUTE: 0.5 K/UL (ref 0–0.9)
MONOCYTES RELATIVE PERCENT: 4.8 % (ref 0–10)
NEUTROPHILS ABSOLUTE: 8.1 K/UL (ref 1.5–7.5)
NEUTROPHILS RELATIVE PERCENT: 76.5 % (ref 50–65)
PDW BLD-RTO: 14.4 % (ref 11.5–14.5)
PHOSPHORUS: 2.8 MG/DL (ref 2.5–4.5)
PLATELET # BLD: 353 K/UL (ref 130–400)
PMV BLD AUTO: 8.7 FL (ref 9.4–12.3)
POTASSIUM SERPL-SCNC: 4 MMOL/L (ref 3.5–5)
PROTHROMBIN TIME: 12.6 SEC (ref 12–14.6)
RBC # BLD: 4.33 M/UL (ref 4.2–5.4)
SODIUM BLD-SCNC: 140 MMOL/L (ref 136–145)
TOTAL PROTEIN: 7.2 G/DL (ref 6.6–8.7)
TRIGL SERPL-MCNC: 170 MG/DL (ref 0–149)
WBC # BLD: 10.6 K/UL (ref 4.8–10.8)

## 2022-06-13 PROCEDURE — 2580000003 HC RX 258: Performed by: FAMILY MEDICINE

## 2022-06-13 PROCEDURE — 85730 THROMBOPLASTIN TIME PARTIAL: CPT

## 2022-06-13 PROCEDURE — 80053 COMPREHEN METABOLIC PANEL: CPT

## 2022-06-13 PROCEDURE — 85025 COMPLETE CBC W/AUTO DIFF WBC: CPT

## 2022-06-13 PROCEDURE — 99211 OFF/OP EST MAY X REQ PHY/QHP: CPT

## 2022-06-13 PROCEDURE — 84100 ASSAY OF PHOSPHORUS: CPT

## 2022-06-13 PROCEDURE — 84478 ASSAY OF TRIGLYCERIDES: CPT

## 2022-06-13 PROCEDURE — 84466 ASSAY OF TRANSFERRIN: CPT

## 2022-06-13 PROCEDURE — 83735 ASSAY OF MAGNESIUM: CPT

## 2022-06-13 PROCEDURE — 36592 COLLECT BLOOD FROM PICC: CPT

## 2022-06-13 PROCEDURE — 85610 PROTHROMBIN TIME: CPT

## 2022-06-13 RX ORDER — SODIUM CHLORIDE 0.9 % (FLUSH) 0.9 %
5-40 SYRINGE (ML) INJECTION PRN
Status: CANCELLED | OUTPATIENT
Start: 2022-06-16

## 2022-06-13 RX ORDER — SODIUM CHLORIDE 0.9 % (FLUSH) 0.9 %
5-40 SYRINGE (ML) INJECTION PRN
Status: DISCONTINUED | OUTPATIENT
Start: 2022-06-13 | End: 2022-06-14 | Stop reason: HOSPADM

## 2022-06-13 RX ADMIN — Medication 40 ML: at 14:47

## 2022-06-15 LAB — TRANSFERRIN: 274 MG/DL (ref 200–360)

## 2022-06-16 ENCOUNTER — HOSPITAL ENCOUNTER (OUTPATIENT)
Dept: INFUSION THERAPY | Age: 32
Setting detail: INFUSION SERIES
Discharge: HOME | End: 2022-06-16
Payer: MEDICAID

## 2022-06-16 ENCOUNTER — OFFICE VISIT (OUTPATIENT)
Dept: VASCULAR SURGERY | Age: 32
End: 2022-06-16
Payer: MEDICAID

## 2022-06-16 VITALS
DIASTOLIC BLOOD PRESSURE: 60 MMHG | HEART RATE: 113 BPM | OXYGEN SATURATION: 99 % | TEMPERATURE: 98 F | SYSTOLIC BLOOD PRESSURE: 80 MMHG

## 2022-06-16 VITALS
HEART RATE: 108 BPM | SYSTOLIC BLOOD PRESSURE: 83 MMHG | RESPIRATION RATE: 18 BRPM | TEMPERATURE: 97.4 F | DIASTOLIC BLOOD PRESSURE: 63 MMHG

## 2022-06-16 DIAGNOSIS — Z45.2 ENCOUNTER FOR CARE RELATED TO VASCULAR ACCESS PORT: Primary | ICD-10-CM

## 2022-06-16 DIAGNOSIS — K31.84 GASTROPARESIS: ICD-10-CM

## 2022-06-16 DIAGNOSIS — I87.8 POOR VENOUS ACCESS: Primary | ICD-10-CM

## 2022-06-16 PROCEDURE — 2580000003 HC RX 258: Performed by: FAMILY MEDICINE

## 2022-06-16 PROCEDURE — 6360000002 HC RX W HCPCS: Performed by: FAMILY MEDICINE

## 2022-06-16 PROCEDURE — 36593 DECLOT VASCULAR DEVICE: CPT

## 2022-06-16 PROCEDURE — 99212 OFFICE O/P EST SF 10 MIN: CPT | Performed by: NURSE PRACTITIONER

## 2022-06-16 RX ORDER — SODIUM CHLORIDE 0.9 % (FLUSH) 0.9 %
5-40 SYRINGE (ML) INJECTION PRN
Status: CANCELLED | OUTPATIENT
Start: 2022-06-23

## 2022-06-16 RX ORDER — SODIUM CHLORIDE 0.9 % (FLUSH) 0.9 %
5-40 SYRINGE (ML) INJECTION PRN
Status: DISCONTINUED | OUTPATIENT
Start: 2022-06-16 | End: 2022-06-17 | Stop reason: HOSPADM

## 2022-06-16 RX ADMIN — WATER 2 MG: 1 INJECTION INTRAMUSCULAR; INTRAVENOUS; SUBCUTANEOUS at 15:44

## 2022-06-16 NOTE — PROGRESS NOTES
Shreyas Bach (:  1990) is a 28 y.o. female,Established patient, here for evaluation of the following chief complaint(s):  Follow-up (Poor venous access - possible replacement. )            SUBJECTIVE/OBJECTIVE:    Alesha Shah has a history of gastroparesis. She was followed in [place]. We were asked by them to place [facility]. Then, apparently, home health discharged her ? Due to a neighbor. Then [place] dismissed her. She has seen her pcp and is getting her fluids through them but her tpn has not yet been ordered. She came to us because she doesn't want line on right side because of difficulty with this. She also wants dual lumen for when she gets tpn it is 16 hours a day and she runs lipids seperate.   She has a history of innumerable lines on both sides chest.      Shreyas Bach is a 28 y.o. female with the following history as [facility] in [facility]:  Patient Active Problem List    Diagnosis Date Noted    Encounter for care related to vascular access port 2022    Nonintractable headache     Cluster headache 2021    Hiatal hernia 2021    History of infection due to human papilloma virus (HPV) 2021    History of total hysterectomy 2021    Knee pain 2021    Autoimmune disease ([facility] [address] 2021    Common variable agammaglobulinemia ([facility] [address] 2021    Jejunostomy tube present ([facility] [address] 2021    Nutritional disorder 2020    Vitamin D deficiency 2018    Tyrell's syndrome pupil 2017    Idiopathic scoliosis of thoracolumbar spine 2017    History of syncope 2017    POTS (postural orthostatic tachycardia syndrome) 2017    Tachycardia 2017    Near syncope 2017    Chronic fatigue syndrome 2017    ETD (eustachian tube dysfunction) 10/04/2016    Mixed hearing loss of left ear 2016    Poor venous access 2015    Learning disability 2014    Nausea & vomiting 2014    Constipation 03/11/2014    Gastroparesis 03/10/2014    Insomnia 03/10/2014    Irritable bowel syndrome 01/06/2014    Pelvic pain in female 01/06/2014    Dysmenorrhea 01/06/2014    Family history of endometriosis 01/06/2014    NSAID long-term use 11/06/2013    Heartburn 11/06/2013    Anxiety disorder 09/27/2013    Chronic serous otitis media of left ear 09/23/2013    Disc herniation 09/23/2013    Neck pain 09/23/2013    Back pain 09/23/2013    Scoliosis 09/23/2013    Hiccups 09/18/2013    Barretts esophagus 09/18/2013    Belching 09/18/2013     Current Outpatient Medications   Medication Sig Dispense Refill    heparin flush 100 UNIT/ML injection 100 Units by Intracatheter route 2 times daily 15 mL 2    promethazine (PHENERGAN) 25 MG/ML injection Infuse 25 mg intravenously every 3 hours 100 mL 2    meloxicam (MOBIC) 15 MG tablet as needed       dicyclomine (BENTYL) 10 MG capsule Take 10 mg by mouth in the morning and at bedtime       linaclotide (LINZESS) 145 MCG capsule Take 145 mcg by mouth every morning (before breakfast)      sodium chloride flush 0.9 % injection Infuse 5-40 mLs intravenously as needed 6-12 times daily      Misc. Devices [facility]) MISC by Does not apply route Indications: custom wheel chair with smart drive       No current facility-administered medications for this visit. Facility-Administered Medications Ordered in Other Visits   Medication Dose Route Frequency Provider Last Rate Last Admin    sodium chloride flush 0.9 % injection 5-40 mL  5-40 mL IntraVENous PRN Lluvia Santillan MD        alteplase (CATHFLO) 2 mg in sterile water 2 mL injection  2 mg IntraCATHeter PRN Lluvia Santillan MD   2 mg at 06/16/22 1544     Allergies:  Iv dye [iodides], Iv dye [iodides], Adhesive tape, Bee pollen, Chlorhexidine, Fruit & vegetable daily [nutritional supplements], Metoprolol succinate [metoprolol], Nsaids, Orange, Orange fruit [citrus], Other, Reglan [metoclopramide], Reglan [metoclopramide], Tape Bhumi Naomie tape], Tramadol, Tramadol, Zofran [ondansetron hcl], Zofran [ondansetron hcl], Chlorhexidine gluconate, Orange oil, and Sulfamethoxazole-trimethoprim  Past Medical History:   Diagnosis Date    Allergic contact dermatitis due to adhesives     Allergic contact dermatitis due to plants, except food     Anemia complicating pregnancy, second trimester     Anemia complicating pregnancy, third trimester     Anxiety and depression     Anxiety disorder     unspecified    Bacteremia     Cellulitis of abdominal wall     Chronic fatigue, unspecified     Dysuria     Encounter for care related to vascular access port 5/19/2022    Epigastric pain     Frequency of micturition     G tube feedings (HCC)     Gastroparesis     Dr. Ilda Enrique in [place] manages    Gastroparesis     Gastrostomy malfunction ([facility] [address]     Generalized abdominal pain     GERD (gastroesophageal reflux disease)     Heart murmur     Hiatal hernia     History of Hoyos's esophagus     History of blood transfusion     Hypotension, unspecified     IBS (irritable bowel syndrome)     Insomnia, unspecified     Jejunostomy tube present (HCC)     Low back pain     Nausea with vomiting     unspecified    Neck pain     Neuropathy     Orthostatic hypotension     POTS (postural orthostatic tachycardia syndrome)     Premature birth     delivered at 24-27 weeks    Scoliosis     Syncope and collapse     Tachycardia     Tachycardia, unspecified     Toxic gastroenteritis and colitis      Past Surgical History:   Procedure Laterality Date    ABDOMEN SURGERY  2014    gastric stimulator implanted    ADENOIDECTOMY      ADENOIDECTOMY  2007    CHOLECYSTECTOMY      CHOLECYSTECTOMY  2016    DENTAL SURGERY  2004    wisdom teeth    EAR SURGERY  2014    left    FACIAL SURGERY  2005    GASTRIC STIMULATOR IMPLANT SURGERY  11/2014    GASTROSTOMY TUBE PLACEMENT  04/07/2015    J tube-Removed in [place] 1/2018    GASTROSTOMY TUBE PLACEMENT      G tube 2017    HYSTERECTOMY (CERVIX STATUS UNKNOWN)      INSERTION / REMOVAL / REPLACEMENT VENOUS ACCESS CATHETER N/A 03/25/2016    REMOVAL OF PORT AND PLACEMENT OF NEW PORT; REMOVAL OF PICC LINE  performed by Melody Ruelas MD at [address] [place] [address]  2014    left knee    KNEE SURGERY  2015    right knee    MANDIBLE SURGERY      double jaw    PHARYNGECTOMY      PORT SURGERY      port present 3-25-16    TONSILLECTOMY      TONSILLECTOMY  1997    TUBAL LIGATION      TUBAL LIGATION  2016    TUMOR REMOVAL Left     ear    TUNNELED VENOUS PORT PLACEMENT Right 05/29/2015    TUNNELED VENOUS PORT PLACEMENT      UPPER GASTROINTESTINAL ENDOSCOPY  04/02/2010    Dr Debra Vasquez ENDOSCOPY  09/25/2013    Dr Shubham Mendez  05/2015    Dr. Sudeep Monterroso  5/29/2015 hospitals    Ultrasound-guided cannulation, right internal jugular vein. Right internal jugular vein single-lumen bard powerport placement.  VASCULAR SURGERY  9/8/15 [facility]    Right internal jugular vein portograms. Revision of right internal jugular vein single lumen port.  VASCULAR SURGERY  11/3/15 S    Ultrasound-guided cannulation, left upper arm basilic vein. Left upper arm basilic vein PICC line placement bard dual-lumen PICC line. Superior vena cava venograms.  VASCULAR SURGERY  03/23/2017    [facility]. Left IJV port angiogram.Ultrasound guided cannulation of right basilic vein,right upper extremity PICC line placement bard power PICC,dual lumen.  VASCULAR SURGERY  04/27/2022    Ultrasound-guided cannulation right internal jugular vein. Placement of Munguia single-lumen tunneled dialysis catheter. Removal of nonfunctioning left subclavian vein Munguia tunneled dialysis catheter. Repositioning of right internal juglular vein Munguia tunneled dialylsis catheter. Perfomed by Dr. Joey Youssef at [facility]

## 2022-06-16 NOTE — PROGRESS NOTES
Patient here because [facility] is not drawing blood. Flushes with no difficulty but no blood return. Activase line to attempt to get blood return.

## 2022-06-20 ENCOUNTER — HOSPITAL ENCOUNTER (OUTPATIENT)
Dept: INFUSION THERAPY | Age: 32
Setting detail: INFUSION SERIES
Discharge: HOME | End: 2022-06-20
Payer: MEDICAID

## 2022-06-20 DIAGNOSIS — Z78.9 ON TOTAL PARENTERAL NUTRITION: ICD-10-CM

## 2022-06-20 LAB
ALBUMIN SERPL-MCNC: 4.2 G/DL (ref 3.5–5.2)
ALP BLD-CCNC: 85 U/L (ref 35–104)
ALT SERPL-CCNC: 12 U/L (ref 5–33)
ANION GAP SERPL CALCULATED.3IONS-SCNC: 12 MMOL/L (ref 7–19)
APTT: 28.3 SEC (ref 26–36.2)
AST SERPL-CCNC: 16 U/L (ref 5–32)
BASOPHILS ABSOLUTE: 0.1 K/UL (ref 0–0.2)
BASOPHILS RELATIVE PERCENT: 0.6 % (ref 0–1)
BILIRUB SERPL-MCNC: <0.2 MG/DL (ref 0.2–1.2)
BUN BLDV-MCNC: 8 MG/DL (ref 6–20)
CALCIUM SERPL-MCNC: 9.3 MG/DL (ref 8.6–10)
CHLORIDE BLD-SCNC: 105 MMOL/L (ref 98–111)
CO2: 23 MMOL/L (ref 22–29)
CREAT SERPL-MCNC: 0.7 MG/DL (ref 0.5–0.9)
EOSINOPHILS ABSOLUTE: 0 K/UL (ref 0–0.6)
EOSINOPHILS RELATIVE PERCENT: 0.3 % (ref 0–5)
GFR AFRICAN AMERICAN: >59
GFR NON-AFRICAN AMERICAN: >60
GLUCOSE BLD-MCNC: 81 MG/DL (ref 74–109)
HCT VFR BLD CALC: 36.5 % (ref 37–47)
HEMOGLOBIN: 11.1 G/DL (ref 12–16)
IMMATURE GRANULOCYTES #: 0 K/UL
INR BLD: 0.94 (ref 0.88–1.18)
LYMPHOCYTES ABSOLUTE: 1.7 K/UL (ref 1.1–4.5)
LYMPHOCYTES RELATIVE PERCENT: 17.7 % (ref 20–40)
MAGNESIUM: 1.8 MG/DL (ref 1.6–2.6)
MCH RBC QN AUTO: 25.4 PG (ref 27–31)
MCHC RBC AUTO-ENTMCNC: 30.4 G/DL (ref 33–37)
MCV RBC AUTO: 83.5 FL (ref 81–99)
MONOCYTES ABSOLUTE: 0.5 K/UL (ref 0–0.9)
MONOCYTES RELATIVE PERCENT: 5.4 % (ref 0–10)
NEUTROPHILS ABSOLUTE: 7.2 K/UL (ref 1.5–7.5)
NEUTROPHILS RELATIVE PERCENT: 75.6 % (ref 50–65)
PDW BLD-RTO: 14.6 % (ref 11.5–14.5)
PHOSPHORUS: 3.1 MG/DL (ref 2.5–4.5)
PLATELET # BLD: 396 K/UL (ref 130–400)
PMV BLD AUTO: 9.1 FL (ref 9.4–12.3)
POTASSIUM SERPL-SCNC: 4.1 MMOL/L (ref 3.5–5)
PROTHROMBIN TIME: 12.5 SEC (ref 12–14.6)
RBC # BLD: 4.37 M/UL (ref 4.2–5.4)
SODIUM BLD-SCNC: 140 MMOL/L (ref 136–145)
TOTAL PROTEIN: 7 G/DL (ref 6.6–8.7)
TRIGL SERPL-MCNC: 118 MG/DL (ref 0–149)
WBC # BLD: 9.6 K/UL (ref 4.8–10.8)

## 2022-06-20 PROCEDURE — 85730 THROMBOPLASTIN TIME PARTIAL: CPT

## 2022-06-20 PROCEDURE — 84466 ASSAY OF TRANSFERRIN: CPT

## 2022-06-20 PROCEDURE — 85610 PROTHROMBIN TIME: CPT

## 2022-06-20 PROCEDURE — 84478 ASSAY OF TRIGLYCERIDES: CPT

## 2022-06-20 PROCEDURE — 83735 ASSAY OF MAGNESIUM: CPT

## 2022-06-20 PROCEDURE — 99211 OFF/OP EST MAY X REQ PHY/QHP: CPT

## 2022-06-20 PROCEDURE — 84100 ASSAY OF PHOSPHORUS: CPT

## 2022-06-20 PROCEDURE — 80053 COMPREHEN METABOLIC PANEL: CPT

## 2022-06-20 PROCEDURE — 85025 COMPLETE CBC W/AUTO DIFF WBC: CPT

## 2022-06-23 LAB — TRANSFERRIN: 253 MG/DL (ref 200–360)

## 2022-06-27 ENCOUNTER — HOSPITAL ENCOUNTER (OUTPATIENT)
Dept: INFUSION THERAPY | Age: 32
Setting detail: INFUSION SERIES
Discharge: HOME | End: 2022-06-27
Payer: MEDICAID

## 2022-06-27 DIAGNOSIS — Z45.2 ENCOUNTER FOR CARE RELATED TO VASCULAR ACCESS PORT: Primary | ICD-10-CM

## 2022-06-27 DIAGNOSIS — Z78.9 ON TOTAL PARENTERAL NUTRITION: ICD-10-CM

## 2022-06-27 LAB
ALBUMIN SERPL-MCNC: 4.8 G/DL (ref 3.5–5.2)
ALP BLD-CCNC: 98 U/L (ref 35–104)
ALT SERPL-CCNC: 15 U/L (ref 5–33)
ANION GAP SERPL CALCULATED.3IONS-SCNC: 17 MMOL/L (ref 7–19)
APTT: 27.1 SEC (ref 26–36.2)
AST SERPL-CCNC: 20 U/L (ref 5–32)
BASOPHILS ABSOLUTE: 0.1 K/UL (ref 0–0.2)
BASOPHILS RELATIVE PERCENT: 0.9 % (ref 0–1)
BILIRUB SERPL-MCNC: 0.3 MG/DL (ref 0.2–1.2)
BUN BLDV-MCNC: 12 MG/DL (ref 6–20)
CALCIUM SERPL-MCNC: 9.3 MG/DL (ref 8.6–10)
CHLORIDE BLD-SCNC: 98 MMOL/L (ref 98–111)
CO2: 21 MMOL/L (ref 22–29)
CREAT SERPL-MCNC: 0.8 MG/DL (ref 0.5–0.9)
EOSINOPHILS ABSOLUTE: 0 K/UL (ref 0–0.6)
EOSINOPHILS RELATIVE PERCENT: 0.1 % (ref 0–5)
GFR AFRICAN AMERICAN: >59
GFR NON-AFRICAN AMERICAN: >60
GLUCOSE BLD-MCNC: 96 MG/DL (ref 74–109)
HCT VFR BLD CALC: 41.4 % (ref 37–47)
HEMOGLOBIN: 12.8 G/DL (ref 12–16)
IMMATURE GRANULOCYTES #: 0.1 K/UL
INR BLD: 0.96 (ref 0.88–1.18)
LYMPHOCYTES ABSOLUTE: 1.9 K/UL (ref 1.1–4.5)
LYMPHOCYTES RELATIVE PERCENT: 16.8 % (ref 20–40)
MAGNESIUM: 2.2 MG/DL (ref 1.6–2.6)
MCH RBC QN AUTO: 26.1 PG (ref 27–31)
MCHC RBC AUTO-ENTMCNC: 30.9 G/DL (ref 33–37)
MCV RBC AUTO: 84.3 FL (ref 81–99)
MONOCYTES ABSOLUTE: 0.4 K/UL (ref 0–0.9)
MONOCYTES RELATIVE PERCENT: 3.7 % (ref 0–10)
NEUTROPHILS ABSOLUTE: 8.8 K/UL (ref 1.5–7.5)
NEUTROPHILS RELATIVE PERCENT: 78.1 % (ref 50–65)
PDW BLD-RTO: 14.7 % (ref 11.5–14.5)
PHOSPHORUS: 2.4 MG/DL (ref 2.5–4.5)
PLATELET # BLD: 412 K/UL (ref 130–400)
PMV BLD AUTO: 9 FL (ref 9.4–12.3)
POTASSIUM SERPL-SCNC: 3.9 MMOL/L (ref 3.5–5)
PROTHROMBIN TIME: 12.7 SEC (ref 12–14.6)
RBC # BLD: 4.91 M/UL (ref 4.2–5.4)
SODIUM BLD-SCNC: 136 MMOL/L (ref 136–145)
TOTAL PROTEIN: 8.1 G/DL (ref 6.6–8.7)
TRIGL SERPL-MCNC: 174 MG/DL (ref 0–149)
WBC # BLD: 11.2 K/UL (ref 4.8–10.8)

## 2022-06-27 PROCEDURE — 84478 ASSAY OF TRIGLYCERIDES: CPT

## 2022-06-27 PROCEDURE — 85730 THROMBOPLASTIN TIME PARTIAL: CPT

## 2022-06-27 PROCEDURE — 84100 ASSAY OF PHOSPHORUS: CPT

## 2022-06-27 PROCEDURE — 83735 ASSAY OF MAGNESIUM: CPT

## 2022-06-27 PROCEDURE — 85610 PROTHROMBIN TIME: CPT

## 2022-06-27 PROCEDURE — 36592 COLLECT BLOOD FROM PICC: CPT

## 2022-06-27 PROCEDURE — 85025 COMPLETE CBC W/AUTO DIFF WBC: CPT

## 2022-06-27 PROCEDURE — 84466 ASSAY OF TRANSFERRIN: CPT

## 2022-06-27 PROCEDURE — 80053 COMPREHEN METABOLIC PANEL: CPT

## 2022-06-27 PROCEDURE — 99211 OFF/OP EST MAY X REQ PHY/QHP: CPT

## 2022-06-27 RX ORDER — SODIUM CHLORIDE 0.9 % (FLUSH) 0.9 %
5-40 SYRINGE (ML) INJECTION PRN
Status: DISCONTINUED | OUTPATIENT
Start: 2022-06-27 | End: 2022-06-28 | Stop reason: HOSPADM

## 2022-06-27 RX ORDER — SODIUM CHLORIDE 0.9 % (FLUSH) 0.9 %
5-40 SYRINGE (ML) INJECTION PRN
Status: CANCELLED | OUTPATIENT
Start: 2022-06-30

## 2022-06-29 LAB — TRANSFERRIN: 289 MG/DL (ref 200–360)

## 2022-06-30 ENCOUNTER — TELEPHONE (OUTPATIENT)
Dept: FAMILY MEDICINE CLINIC | Age: 32
End: 2022-06-30

## 2022-06-30 RX ORDER — PREDNISONE 50 MG/1
TABLET ORAL
Qty: 3 TABLET | Refills: 0 | Status: SHIPPED | OUTPATIENT
Start: 2022-06-30 | End: 2022-07-26 | Stop reason: HOSPADM

## 2022-06-30 NOTE — TELEPHONE ENCOUNTER
Carolyn Martinez called from the pharmacy. He wanted to know if Dr. Shandra Irving was going to continue to fill the following medications: Phenergan, Benadryl, and Famotidine. Please Adaise.

## 2022-07-01 NOTE — TELEPHONE ENCOUNTER
Divine Caldwell MD  [address] [facility] [address] Staff 11 hours ago (8:59 PM)     SM    Only till August 15 (sees [facility] then)    Routing comment      Left message with pharmacy staff Leah Arzola. Let him know that Dr. Divine Caldwell will fill medications until August 15th.

## 2022-07-05 ENCOUNTER — HOSPITAL ENCOUNTER (OUTPATIENT)
Dept: INFUSION THERAPY | Age: 32
Setting detail: INFUSION SERIES
Discharge: HOME | End: 2022-07-05
Payer: MEDICAID

## 2022-07-05 VITALS
RESPIRATION RATE: 17 BRPM | DIASTOLIC BLOOD PRESSURE: 83 MMHG | SYSTOLIC BLOOD PRESSURE: 107 MMHG | HEART RATE: 104 BPM | OXYGEN SATURATION: 100 % | TEMPERATURE: 97.6 F

## 2022-07-05 DIAGNOSIS — Z45.2 ENCOUNTER FOR CARE RELATED TO VASCULAR ACCESS PORT: Primary | ICD-10-CM

## 2022-07-05 DIAGNOSIS — Z78.9 ON TOTAL PARENTERAL NUTRITION: ICD-10-CM

## 2022-07-05 LAB
ALBUMIN SERPL-MCNC: 4.4 G/DL (ref 3.5–5.2)
ALP BLD-CCNC: 94 U/L (ref 35–104)
ALT SERPL-CCNC: 14 U/L (ref 5–33)
ANION GAP SERPL CALCULATED.3IONS-SCNC: 12 MMOL/L (ref 7–19)
AST SERPL-CCNC: 17 U/L (ref 5–32)
BASOPHILS ABSOLUTE: 0.1 K/UL (ref 0–0.2)
BASOPHILS RELATIVE PERCENT: 0.7 % (ref 0–1)
BILIRUB SERPL-MCNC: 0.3 MG/DL (ref 0.2–1.2)
BUN BLDV-MCNC: 8 MG/DL (ref 6–20)
CALCIUM SERPL-MCNC: 9 MG/DL (ref 8.6–10)
CHLORIDE BLD-SCNC: 106 MMOL/L (ref 98–111)
CO2: 22 MMOL/L (ref 22–29)
CREAT SERPL-MCNC: 0.9 MG/DL (ref 0.5–0.9)
EOSINOPHILS ABSOLUTE: 0 K/UL (ref 0–0.6)
EOSINOPHILS RELATIVE PERCENT: 0.3 % (ref 0–5)
GFR AFRICAN AMERICAN: >59
GFR NON-AFRICAN AMERICAN: >60
GLUCOSE BLD-MCNC: 101 MG/DL (ref 74–109)
HCT VFR BLD CALC: 34.9 % (ref 37–47)
HEMOGLOBIN: 11.1 G/DL (ref 12–16)
IMMATURE GRANULOCYTES #: 0 K/UL
LYMPHOCYTES ABSOLUTE: 1.9 K/UL (ref 1.1–4.5)
LYMPHOCYTES RELATIVE PERCENT: 20.4 % (ref 20–40)
MAGNESIUM: 1.8 MG/DL (ref 1.6–2.6)
MCH RBC QN AUTO: 26.2 PG (ref 27–31)
MCHC RBC AUTO-ENTMCNC: 31.8 G/DL (ref 33–37)
MCV RBC AUTO: 82.3 FL (ref 81–99)
MONOCYTES ABSOLUTE: 0.4 K/UL (ref 0–0.9)
MONOCYTES RELATIVE PERCENT: 4.5 % (ref 0–10)
NEUTROPHILS ABSOLUTE: 6.9 K/UL (ref 1.5–7.5)
NEUTROPHILS RELATIVE PERCENT: 73.8 % (ref 50–65)
PDW BLD-RTO: 15 % (ref 11.5–14.5)
PHOSPHORUS: 3.2 MG/DL (ref 2.5–4.5)
PLATELET # BLD: 346 K/UL (ref 130–400)
PMV BLD AUTO: 10 FL (ref 9.4–12.3)
POTASSIUM SERPL-SCNC: 3.8 MMOL/L (ref 3.5–5)
RBC # BLD: 4.24 M/UL (ref 4.2–5.4)
REASON FOR REJECTION: NORMAL
REJECTED TEST: NORMAL
SODIUM BLD-SCNC: 140 MMOL/L (ref 136–145)
TOTAL PROTEIN: 7.1 G/DL (ref 6.6–8.7)
TRIGL SERPL-MCNC: 173 MG/DL (ref 0–149)
WBC # BLD: 9.3 K/UL (ref 4.8–10.8)

## 2022-07-05 PROCEDURE — 2580000003 HC RX 258: Performed by: FAMILY MEDICINE

## 2022-07-05 PROCEDURE — 85025 COMPLETE CBC W/AUTO DIFF WBC: CPT

## 2022-07-05 PROCEDURE — 80053 COMPREHEN METABOLIC PANEL: CPT

## 2022-07-05 PROCEDURE — 84478 ASSAY OF TRIGLYCERIDES: CPT

## 2022-07-05 PROCEDURE — 84100 ASSAY OF PHOSPHORUS: CPT

## 2022-07-05 PROCEDURE — 84466 ASSAY OF TRANSFERRIN: CPT

## 2022-07-05 PROCEDURE — 96523 IRRIG DRUG DELIVERY DEVICE: CPT

## 2022-07-05 PROCEDURE — 6360000002 HC RX W HCPCS: Performed by: FAMILY MEDICINE

## 2022-07-05 PROCEDURE — 36592 COLLECT BLOOD FROM PICC: CPT

## 2022-07-05 PROCEDURE — 83735 ASSAY OF MAGNESIUM: CPT

## 2022-07-05 RX ORDER — SODIUM CHLORIDE 0.9 % (FLUSH) 0.9 %
5-40 SYRINGE (ML) INJECTION PRN
Status: CANCELLED | OUTPATIENT
Start: 2022-07-07

## 2022-07-05 RX ORDER — SODIUM CHLORIDE 0.9 % (FLUSH) 0.9 %
5-40 SYRINGE (ML) INJECTION PRN
Status: DISCONTINUED | OUTPATIENT
Start: 2022-07-05 | End: 2022-07-06 | Stop reason: HOSPADM

## 2022-07-05 RX ADMIN — WATER 2 MG: 1 INJECTION INTRAMUSCULAR; INTRAVENOUS; SUBCUTANEOUS at 14:50

## 2022-07-05 RX ADMIN — SODIUM CHLORIDE, PRESERVATIVE FREE 20 ML: 5 INJECTION INTRAVENOUS at 15:35

## 2022-07-07 LAB — TRANSFERRIN: 260 MG/DL (ref 200–360)

## 2022-07-12 RX ORDER — SODIUM CHLORIDE 0.9 % (FLUSH) 0.9 %
5-40 SYRINGE (ML) INJECTION PRN
Status: CANCELLED | OUTPATIENT
Start: 2022-07-12

## 2022-07-12 RX ORDER — SODIUM CHLORIDE 9 MG/ML
INJECTION, SOLUTION INTRAVENOUS PRN
Status: CANCELLED | OUTPATIENT
Start: 2022-07-12

## 2022-07-12 RX ORDER — SODIUM CHLORIDE 0.9 % (FLUSH) 0.9 %
5-40 SYRINGE (ML) INJECTION EVERY 12 HOURS SCHEDULED
Status: CANCELLED | OUTPATIENT
Start: 2022-07-12

## 2022-07-12 NOTE — H&P
Shreyas Bach (:  1990) is a 28 y.o. female,Established patient, here for evaluation of the following chief complaint(s):  Follow-up (Poor venous access - possible replacement. )            SUBJECTIVE/OBJECTIVE:    Zane Honeycutt has a history of gastroparesis. She was followed in [place]. We were asked by them to place henning. Then, apparently, home health discharged her ? Due to a neighbor. Then [place] dismissed her. She has seen her pcp and is getting her fluids through them but her tpn has not yet been ordered. She came to us because she doesn't want line on right side because of difficulty with this. She also wants dual lumen for when she gets tpn it is 16 hours a day and she runs lipids seperate.   She has a history of innumerable lines on both sides chest.      Shreyas Bach is a 28 y.o. female with the following history as recorded in [facility]:  Patient Active Problem List    Diagnosis Date Noted    Encounter for care related to vascular access port 2022    Nonintractable headache     Cluster headache 2021    Hiatal hernia 2021    History of infection due to human papilloma virus (HPV) 2021    History of total hysterectomy 2021    Knee pain 2021    Autoimmune disease ([facility] 2021    Common variable agammaglobulinemia ([facility] 2021    Jejunostomy tube present ([facility] 2021    Nutritional disorder 2020    Vitamin D deficiency 2018    Tyrell's syndrome pupil 2017    Idiopathic scoliosis of thoracolumbar spine 2017    History of syncope 2017    POTS (postural orthostatic tachycardia syndrome) 2017    Tachycardia 2017    Near syncope 2017    Chronic fatigue syndrome 2017    ETD (eustachian tube dysfunction) 10/04/2016    Mixed hearing loss of left ear 2016    Poor venous access 2015    Learning disability 2014    Nausea & vomiting 2014    Constipation 03/11/2014    Gastroparesis 03/10/2014    Insomnia 03/10/2014    Irritable bowel syndrome 01/06/2014    Pelvic pain in female 01/06/2014    Dysmenorrhea 01/06/2014    Family history of endometriosis 01/06/2014    NSAID long-term use 11/06/2013    Heartburn 11/06/2013    Anxiety disorder 09/27/2013    Chronic serous otitis media of left ear 09/23/2013    Disc herniation 09/23/2013    Neck pain 09/23/2013    Back pain 09/23/2013    Scoliosis 09/23/2013    Hiccups 09/18/2013    Barretts esophagus 09/18/2013    Belching 09/18/2013     Current Outpatient Medications   Medication Sig Dispense Refill    heparin flush 100 UNIT/ML injection 100 Units by Intracatheter route 2 times daily 15 mL 2    promethazine (PHENERGAN) 25 MG/ML injection Infuse 25 mg intravenously every 3 hours 100 mL 2    meloxicam (MOBIC) 15 MG tablet as needed       dicyclomine (BENTYL) 10 MG capsule Take 10 mg by mouth in the morning and at bedtime       linaclotide (LINZESS) 145 MCG capsule Take 145 mcg by mouth every morning (before breakfast)      sodium chloride flush 0.9 % injection Infuse 5-40 mLs intravenously as needed 6-12 times daily      Misc. Devices [facility]) MISC by Does not apply route Indications: custom wheel chair with smart drive       No current facility-administered medications for this visit. Facility-Administered Medications Ordered in Other Visits   Medication Dose Route Frequency Provider Last Rate Last Admin    sodium chloride flush 0.9 % injection 5-40 mL  5-40 mL IntraVENous PRN Iain Cuadra MD        alteplase (CATHFLO) 2 mg in sterile water 2 mL injection  2 mg IntraCATHeter PRN Iain Cuadra MD   2 mg at 06/16/22 1544     Allergies:  Iv dye [iodides], Iv dye [iodides], Adhesive tape, Bee pollen, Chlorhexidine, Fruit & vegetable daily [nutritional supplements], Metoprolol succinate [metoprolol], Nsaids, Orange, Orange fruit [citrus], Other, Reglan [metoclopramide], Reglan [metoclopramide], Tape Clarnce Pata tape], Tramadol, Tramadol, Zofran [ondansetron hcl], Zofran [ondansetron hcl], Chlorhexidine gluconate, Orange oil, and Sulfamethoxazole-trimethoprim  Past Medical History:   Diagnosis Date    Allergic contact dermatitis due to adhesives     Allergic contact dermatitis due to plants, except food     Anemia complicating pregnancy, second trimester     Anemia complicating pregnancy, third trimester     Anxiety and depression     Anxiety disorder     unspecified    Bacteremia     Cellulitis of abdominal wall     Chronic fatigue, unspecified     Dysuria     Encounter for care related to vascular access port 5/19/2022    Epigastric pain     Frequency of micturition     G tube feedings (HCC)     Gastroparesis     Dr. Camila Dunlap in [place] manages    Gastroparesis     Gastrostomy malfunction ([facility]     Generalized abdominal pain     GERD (gastroesophageal reflux disease)     Heart murmur     Hiatal hernia     History of Hoyos's esophagus     History of blood transfusion     Hypotension, unspecified     IBS (irritable bowel syndrome)     Insomnia, unspecified     Jejunostomy tube present (HCC)     Low back pain     Nausea with vomiting     unspecified    Neck pain     Neuropathy     Orthostatic hypotension     POTS (postural orthostatic tachycardia syndrome)     Premature birth     delivered at 24-27 weeks    Scoliosis     Syncope and collapse     Tachycardia     Tachycardia, unspecified     Toxic gastroenteritis and colitis      Past Surgical History:   Procedure Laterality Date    ABDOMEN SURGERY  2014    gastric stimulator implanted    ADENOIDECTOMY      ADENOIDECTOMY  2007    CHOLECYSTECTOMY      CHOLECYSTECTOMY  2016    DENTAL SURGERY  2004    wisdom teeth    EAR SURGERY  2014    left    FACIAL SURGERY  2005    GASTRIC STIMULATOR IMPLANT SURGERY  11/2014    GASTROSTOMY TUBE PLACEMENT  04/07/2015    J tube-Removed in [place] 1/2018    GASTROSTOMY TUBE PLACEMENT      G tube 2017    HYSTERECTOMY (CERVIX STATUS UNKNOWN)      INSERTION / REMOVAL / REPLACEMENT VENOUS ACCESS CATHETER N/A 03/25/2016    REMOVAL OF PORT AND PLACEMENT OF NEW PORT; REMOVAL OF PICC LINE  performed by Asencion Essex, MD at [address] [place] [address]  2014    left knee    KNEE SURGERY  2015    right knee    MANDIBLE SURGERY      double jaw    PHARYNGECTOMY      PORT SURGERY      port present 3-25-16    TONSILLECTOMY      TONSILLECTOMY  1997    TUBAL LIGATION      TUBAL LIGATION  2016    TUMOR REMOVAL Left     ear    TUNNELED VENOUS PORT PLACEMENT Right 05/29/2015    TUNNELED VENOUS PORT PLACEMENT      UPPER GASTROINTESTINAL ENDOSCOPY  04/02/2010    Dr Mckinley Rubio ENDOSCOPY  09/25/2013    Dr Lobo Baugh  05/2015    Dr. Sylvie Montana  5/29/2015 [facility]    Ultrasound-guided cannulation, right internal jugular vein. Right internal jugular vein single-lumen bard powerport placement.  VASCULAR SURGERY  9/8/15 [facility]    Right internal jugular vein portograms. Revision of right internal jugular vein single lumen port.  VASCULAR SURGERY  11/3/15 S    Ultrasound-guided cannulation, left upper arm basilic vein. Left upper arm basilic vein PICC line placement bard dual-lumen PICC line. Superior vena cava venograms.  VASCULAR SURGERY  03/23/2017    [facility]. Left IJV port angiogram.Ultrasound guided cannulation of right basilic vein,right upper extremity PICC line placement bard power PICC,dual lumen.  VASCULAR SURGERY  04/27/2022    Ultrasound-guided cannulation right internal jugular vein. Placement of Munguia single-lumen tunneled dialysis catheter. Removal of nonfunctioning left subclavian vein Munguia tunneled dialysis catheter. Repositioning of right internal juglular vein Munguia tunneled dialylsis catheter. Perfomed by Dr. Agnieszka Youssef at [facility] Family History   Problem Relation Age of Onset    Other Mother         endometriosis    Depression Father     Diabetes Paternal Grandmother     Stroke Paternal Grandmother     Hypertension Paternal Grandmother     Heart Disease Paternal Grandmother     Heart Failure Paternal Grandmother     Colon Cancer Neg Hx     Colon Polyps Neg Hx     Esophageal Cancer Neg Hx     Liver Cancer Neg Hx     Rectal Cancer Neg Hx     Stomach Cancer Neg Hx      Social History     Tobacco Use    Smoking status: Never Smoker    Smokeless tobacco: Never Used    Tobacco comment: Vape    Substance Use Topics    Alcohol use: Never       ROS  Eyes  no sudden vision change or amaurosis. Respiratory  no significant shortness of breath,  Cardiovascular  no chest pain or syncope. No  significant leg swelling. no claudication. Musculoskeletal  no gait disturbance  Skin  no new wound. Neurologic   No speech difficulty or lateralizing weakness. All other review of systems are negative. Physical Exam    BP 80/60 (Site: Right Upper Arm, Position: Sitting, Cuff Size: Medium Adult)   Pulse (!) 113   Temp 98 °F (36.7 °C)   LMP 01/14/2018 (Exact Date)   SpO2 99%       Neck- carotid pulses 2+ to palpation with no bruit  Cardiovascular  Regular rate and rhythm. Pulmonary  effort appears normal.  No respiratory distress. Lungs - Breath sounds normal. No wheezes or rales. Extremities -  Radial and brachial pulses are 2+ to palpation bilaterally. Right femoral pulse: present 2+; Right popliteal pulse: absent Right DP: absent; Right PT absent; Left femoral pulse: present 2+; Left popliteal pulse: absent; Left DP: absent; Left PT: absent No cyanosis, clubbing, or significant edema. No signs atheroembolic event. Neurologic  alert and oriented X 3. Physiologic. Face symmetric. Skin  warm, dry, and intact.   no wound  Psychiatric  mood, affect, and behavior appear normal.  Judgment and thought processes appear normal.        Reviewed on this visit: pcp notes          ASSESSMENT/PLAN:  1. Poor venous access  2. Gastroparesis    1. Poor venous access    2. Gastroparesis           As we discussed with her, she needs to find someone to regulate her tpn before we consider changing a munguia line to dual when at present she does not have anyone whom has ordered it     addendum - she is now with [facility]. They have requested double lumen Munguia for TPN and IVIG on left side     An electronic signature was used to authenticate this note.     --Gabriele Martinez, APRN

## 2022-07-13 ENCOUNTER — HOSPITAL ENCOUNTER (OUTPATIENT)
Dept: INFUSION THERAPY | Age: 32
Setting detail: INFUSION SERIES
Discharge: HOME | End: 2022-07-13
Payer: MEDICAID

## 2022-07-13 ENCOUNTER — HOSPITAL ENCOUNTER (OUTPATIENT)
Dept: INTERVENTIONAL RADIOLOGY/VASCULAR | Age: 32
Discharge: HOME | End: 2022-07-13
Payer: MEDICAID

## 2022-07-13 VITALS
WEIGHT: 121 LBS | BODY MASS INDEX: 22.26 KG/M2 | HEART RATE: 116 BPM | DIASTOLIC BLOOD PRESSURE: 81 MMHG | HEIGHT: 62 IN | OXYGEN SATURATION: 100 % | SYSTOLIC BLOOD PRESSURE: 108 MMHG | RESPIRATION RATE: 21 BRPM | TEMPERATURE: 97.4 F

## 2022-07-13 DIAGNOSIS — Z78.9 ON TOTAL PARENTERAL NUTRITION: Primary | ICD-10-CM

## 2022-07-13 DIAGNOSIS — Z45.2 ENCOUNTER FOR CARE RELATED TO VASCULAR ACCESS PORT: ICD-10-CM

## 2022-07-13 LAB
ALBUMIN SERPL-MCNC: 4.3 G/DL (ref 3.5–5.2)
ALP BLD-CCNC: 88 U/L (ref 35–104)
ALT SERPL-CCNC: 10 U/L (ref 5–33)
ANION GAP SERPL CALCULATED.3IONS-SCNC: 10 MMOL/L (ref 7–19)
APTT: 24.9 SEC (ref 26–36.2)
AST SERPL-CCNC: 18 U/L (ref 5–32)
BASOPHILS ABSOLUTE: 0.1 K/UL (ref 0–0.2)
BASOPHILS RELATIVE PERCENT: 0.8 % (ref 0–1)
BILIRUB SERPL-MCNC: 0.3 MG/DL (ref 0.2–1.2)
BUN BLDV-MCNC: 10 MG/DL (ref 6–20)
CALCIUM SERPL-MCNC: 9.2 MG/DL (ref 8.6–10)
CHLORIDE BLD-SCNC: 105 MMOL/L (ref 98–111)
CO2: 25 MMOL/L (ref 22–29)
CREAT SERPL-MCNC: 0.7 MG/DL (ref 0.5–0.9)
EOSINOPHILS ABSOLUTE: 0 K/UL (ref 0–0.6)
EOSINOPHILS RELATIVE PERCENT: 0.2 % (ref 0–5)
GFR AFRICAN AMERICAN: >59
GFR NON-AFRICAN AMERICAN: >60
GLUCOSE BLD-MCNC: 88 MG/DL (ref 74–109)
HCT VFR BLD CALC: 35.1 % (ref 37–47)
HEMOGLOBIN: 10.9 G/DL (ref 12–16)
IMMATURE GRANULOCYTES #: 0.1 K/UL
INR BLD: 0.97 (ref 0.88–1.18)
LYMPHOCYTES ABSOLUTE: 1.7 K/UL (ref 1.1–4.5)
LYMPHOCYTES RELATIVE PERCENT: 18.8 % (ref 20–40)
MAGNESIUM: 1.9 MG/DL (ref 1.6–2.6)
MCH RBC QN AUTO: 25.7 PG (ref 27–31)
MCHC RBC AUTO-ENTMCNC: 31.1 G/DL (ref 33–37)
MCV RBC AUTO: 82.8 FL (ref 81–99)
MONOCYTES ABSOLUTE: 0.4 K/UL (ref 0–0.9)
MONOCYTES RELATIVE PERCENT: 4.8 % (ref 0–10)
NEUTROPHILS ABSOLUTE: 6.6 K/UL (ref 1.5–7.5)
NEUTROPHILS RELATIVE PERCENT: 74.8 % (ref 50–65)
PDW BLD-RTO: 14.9 % (ref 11.5–14.5)
PHOSPHORUS: 3.5 MG/DL (ref 2.5–4.5)
PLATELET # BLD: 358 K/UL (ref 130–400)
PMV BLD AUTO: 8.5 FL (ref 9.4–12.3)
POTASSIUM SERPL-SCNC: 3.9 MMOL/L (ref 3.5–5)
PROTHROMBIN TIME: 12.8 SEC (ref 12–14.6)
RBC # BLD: 4.24 M/UL (ref 4.2–5.4)
SODIUM BLD-SCNC: 140 MMOL/L (ref 136–145)
TOTAL PROTEIN: 7 G/DL (ref 6.6–8.7)
TRIGL SERPL-MCNC: 144 MG/DL (ref 0–149)
WBC # BLD: 8.8 K/UL (ref 4.8–10.8)

## 2022-07-13 PROCEDURE — 85025 COMPLETE CBC W/AUTO DIFF WBC: CPT

## 2022-07-13 PROCEDURE — 36593 DECLOT VASCULAR DEVICE: CPT

## 2022-07-13 PROCEDURE — 99211 OFF/OP EST MAY X REQ PHY/QHP: CPT

## 2022-07-13 PROCEDURE — 84100 ASSAY OF PHOSPHORUS: CPT

## 2022-07-13 PROCEDURE — 6360000002 HC RX W HCPCS: Performed by: FAMILY MEDICINE

## 2022-07-13 PROCEDURE — 85610 PROTHROMBIN TIME: CPT

## 2022-07-13 PROCEDURE — 85730 THROMBOPLASTIN TIME PARTIAL: CPT

## 2022-07-13 PROCEDURE — 2580000003 HC RX 258: Performed by: NURSE PRACTITIONER

## 2022-07-13 PROCEDURE — 83735 ASSAY OF MAGNESIUM: CPT

## 2022-07-13 PROCEDURE — 2580000003 HC RX 258: Performed by: FAMILY MEDICINE

## 2022-07-13 PROCEDURE — 84466 ASSAY OF TRANSFERRIN: CPT

## 2022-07-13 PROCEDURE — 80053 COMPREHEN METABOLIC PANEL: CPT

## 2022-07-13 PROCEDURE — 84478 ASSAY OF TRIGLYCERIDES: CPT

## 2022-07-13 PROCEDURE — 36592 COLLECT BLOOD FROM PICC: CPT

## 2022-07-13 RX ORDER — SODIUM CHLORIDE 0.9 % (FLUSH) 0.9 %
5-40 SYRINGE (ML) INJECTION PRN
Status: CANCELLED | OUTPATIENT
Start: 2022-07-14

## 2022-07-13 RX ORDER — SODIUM CHLORIDE 9 MG/ML
INJECTION, SOLUTION INTRAVENOUS PRN
Status: DISCONTINUED | OUTPATIENT
Start: 2022-07-13 | End: 2022-07-15 | Stop reason: HOSPADM

## 2022-07-13 RX ORDER — SODIUM CHLORIDE 0.9 % (FLUSH) 0.9 %
5-40 SYRINGE (ML) INJECTION PRN
Status: DISCONTINUED | OUTPATIENT
Start: 2022-07-13 | End: 2022-07-14 | Stop reason: HOSPADM

## 2022-07-13 RX ORDER — SODIUM CHLORIDE 0.9 % (FLUSH) 0.9 %
5-40 SYRINGE (ML) INJECTION PRN
Status: DISCONTINUED | OUTPATIENT
Start: 2022-07-13 | End: 2022-07-15 | Stop reason: HOSPADM

## 2022-07-13 RX ORDER — SODIUM CHLORIDE 0.9 % (FLUSH) 0.9 %
5-40 SYRINGE (ML) INJECTION EVERY 12 HOURS SCHEDULED
Status: DISCONTINUED | OUTPATIENT
Start: 2022-07-13 | End: 2022-07-15 | Stop reason: HOSPADM

## 2022-07-13 RX ADMIN — ALTEPLASE 2 MG: 2.2 INJECTION, POWDER, LYOPHILIZED, FOR SOLUTION INTRAVENOUS at 10:11

## 2022-07-13 RX ADMIN — SODIUM CHLORIDE 15 ML/HR: 9 INJECTION, SOLUTION INTRAVENOUS at 09:28

## 2022-07-13 NOTE — PROGRESS NOTES
Pt arrived for Munguia replacement. After checking pt in & starting IV, we received a call from [facility] does not have a dual Munguia, only a single. Procedure canceled for today. The office will call pt to reschedule when correct Munguia catheter received. Pt voiced understandings. Offered w/c to front door. Pt stated she will use her rollator and she will go to outpt lab to have weekly lab draw.

## 2022-07-15 LAB — TRANSFERRIN: 247 MG/DL (ref 200–360)

## 2022-07-19 RX ORDER — PREDNISONE 50 MG/1
TABLET ORAL
Qty: 3 TABLET | Refills: 0 | Status: SHIPPED | OUTPATIENT
Start: 2022-07-25 | End: 2022-07-26

## 2022-07-20 ENCOUNTER — HOSPITAL ENCOUNTER (OUTPATIENT)
Dept: INFUSION THERAPY | Age: 32
Setting detail: INFUSION SERIES
Discharge: HOME | End: 2022-07-20
Payer: MEDICAID

## 2022-07-20 VITALS
DIASTOLIC BLOOD PRESSURE: 63 MMHG | SYSTOLIC BLOOD PRESSURE: 92 MMHG | RESPIRATION RATE: 18 BRPM | TEMPERATURE: 98.1 F | OXYGEN SATURATION: 98 % | HEART RATE: 122 BPM

## 2022-07-20 DIAGNOSIS — Z45.2 ENCOUNTER FOR CARE RELATED TO VASCULAR ACCESS PORT: ICD-10-CM

## 2022-07-20 DIAGNOSIS — R30.0 DYSURIA: ICD-10-CM

## 2022-07-20 DIAGNOSIS — Z78.9 ON TOTAL PARENTERAL NUTRITION: Primary | ICD-10-CM

## 2022-07-20 LAB
ALBUMIN SERPL-MCNC: 4.2 G/DL (ref 3.5–5.2)
ALP BLD-CCNC: 92 U/L (ref 35–104)
ALT SERPL-CCNC: 13 U/L (ref 5–33)
ANION GAP SERPL CALCULATED.3IONS-SCNC: 10 MMOL/L (ref 7–19)
APTT: 32.4 SEC (ref 26–36.2)
AST SERPL-CCNC: 19 U/L (ref 5–32)
BACTERIA: ABNORMAL /HPF
BASOPHILS ABSOLUTE: 0.1 K/UL (ref 0–0.2)
BASOPHILS RELATIVE PERCENT: 0.8 % (ref 0–1)
BILIRUB SERPL-MCNC: <0.2 MG/DL (ref 0.2–1.2)
BILIRUBIN URINE: NEGATIVE
BLOOD, URINE: NEGATIVE
BUN BLDV-MCNC: 13 MG/DL (ref 6–20)
CALCIUM SERPL-MCNC: 9.4 MG/DL (ref 8.6–10)
CHLORIDE BLD-SCNC: 103 MMOL/L (ref 98–111)
CLARITY: CLEAR
CO2: 23 MMOL/L (ref 22–29)
COLOR: YELLOW
CREAT SERPL-MCNC: 0.8 MG/DL (ref 0.5–0.9)
CRYSTALS, UA: ABNORMAL /HPF
EOSINOPHILS ABSOLUTE: 0.1 K/UL (ref 0–0.6)
EOSINOPHILS RELATIVE PERCENT: 0.7 % (ref 0–5)
EPITHELIAL CELLS, UA: 5 /HPF (ref 0–5)
GFR AFRICAN AMERICAN: >59
GFR NON-AFRICAN AMERICAN: >60
GLUCOSE BLD-MCNC: 117 MG/DL (ref 74–109)
GLUCOSE URINE: NEGATIVE MG/DL
HCT VFR BLD CALC: 35.5 % (ref 37–47)
HEMOGLOBIN: 11 G/DL (ref 12–16)
HYALINE CASTS: 5 /HPF (ref 0–8)
IMMATURE GRANULOCYTES #: 0 K/UL
INR BLD: 0.92 (ref 0.88–1.18)
KETONES, URINE: ABNORMAL MG/DL
LEUKOCYTE ESTERASE, URINE: ABNORMAL
LYMPHOCYTES ABSOLUTE: 2.3 K/UL (ref 1.1–4.5)
LYMPHOCYTES RELATIVE PERCENT: 22.8 % (ref 20–40)
MAGNESIUM: 1.7 MG/DL (ref 1.6–2.6)
MCH RBC QN AUTO: 26.3 PG (ref 27–31)
MCHC RBC AUTO-ENTMCNC: 31 G/DL (ref 33–37)
MCV RBC AUTO: 84.9 FL (ref 81–99)
MONOCYTES ABSOLUTE: 0.5 K/UL (ref 0–0.9)
MONOCYTES RELATIVE PERCENT: 5.3 % (ref 0–10)
NEUTROPHILS ABSOLUTE: 7 K/UL (ref 1.5–7.5)
NEUTROPHILS RELATIVE PERCENT: 70 % (ref 50–65)
NITRITE, URINE: NEGATIVE
PDW BLD-RTO: 15.7 % (ref 11.5–14.5)
PH UA: 7 (ref 5–8)
PHOSPHORUS: 2.9 MG/DL (ref 2.5–4.5)
PLATELET # BLD: 390 K/UL (ref 130–400)
PMV BLD AUTO: 9.2 FL (ref 9.4–12.3)
POTASSIUM SERPL-SCNC: 4.5 MMOL/L (ref 3.5–5)
PROTEIN UA: ABNORMAL MG/DL
PROTHROMBIN TIME: 12.2 SEC (ref 12–14.6)
RBC # BLD: 4.18 M/UL (ref 4.2–5.4)
RBC UA: 1 /HPF (ref 0–4)
SODIUM BLD-SCNC: 136 MMOL/L (ref 136–145)
SPECIFIC GRAVITY UA: 1.02 (ref 1–1.03)
TOTAL PROTEIN: 7 G/DL (ref 6.6–8.7)
TRIGL SERPL-MCNC: 204 MG/DL (ref 0–149)
UROBILINOGEN, URINE: 1 E.U./DL
WBC # BLD: 9.9 K/UL (ref 4.8–10.8)
WBC UA: 13 /HPF (ref 0–5)

## 2022-07-20 PROCEDURE — 99211 OFF/OP EST MAY X REQ PHY/QHP: CPT

## 2022-07-20 PROCEDURE — 84466 ASSAY OF TRANSFERRIN: CPT

## 2022-07-20 PROCEDURE — 85610 PROTHROMBIN TIME: CPT

## 2022-07-20 PROCEDURE — 2580000003 HC RX 258: Performed by: FAMILY MEDICINE

## 2022-07-20 PROCEDURE — 85025 COMPLETE CBC W/AUTO DIFF WBC: CPT

## 2022-07-20 PROCEDURE — 84100 ASSAY OF PHOSPHORUS: CPT

## 2022-07-20 PROCEDURE — 87086 URINE CULTURE/COLONY COUNT: CPT

## 2022-07-20 PROCEDURE — 83735 ASSAY OF MAGNESIUM: CPT

## 2022-07-20 PROCEDURE — 81001 URINALYSIS AUTO W/SCOPE: CPT

## 2022-07-20 PROCEDURE — 36592 COLLECT BLOOD FROM PICC: CPT

## 2022-07-20 PROCEDURE — 85730 THROMBOPLASTIN TIME PARTIAL: CPT

## 2022-07-20 PROCEDURE — 84478 ASSAY OF TRIGLYCERIDES: CPT

## 2022-07-20 PROCEDURE — 80053 COMPREHEN METABOLIC PANEL: CPT

## 2022-07-20 RX ORDER — SODIUM CHLORIDE 0.9 % (FLUSH) 0.9 %
5-40 SYRINGE (ML) INJECTION PRN
Status: CANCELLED | OUTPATIENT
Start: 2022-07-21

## 2022-07-20 RX ORDER — SODIUM CHLORIDE 0.9 % (FLUSH) 0.9 %
5-40 SYRINGE (ML) INJECTION PRN
Status: DISCONTINUED | OUTPATIENT
Start: 2022-07-20 | End: 2022-07-21 | Stop reason: HOSPADM

## 2022-07-20 RX ADMIN — SODIUM CHLORIDE, PRESERVATIVE FREE 20 ML: 5 INJECTION INTRAVENOUS at 14:45

## 2022-07-21 RX ORDER — SODIUM CHLORIDE 0.9 % (FLUSH) 0.9 %
5-40 SYRINGE (ML) INJECTION EVERY 12 HOURS SCHEDULED
Status: CANCELLED | OUTPATIENT
Start: 2022-07-21

## 2022-07-21 RX ORDER — SODIUM CHLORIDE 9 MG/ML
INJECTION, SOLUTION INTRAVENOUS PRN
Status: CANCELLED | OUTPATIENT
Start: 2022-07-21

## 2022-07-21 RX ORDER — SODIUM CHLORIDE 0.9 % (FLUSH) 0.9 %
5-40 SYRINGE (ML) INJECTION PRN
Status: CANCELLED | OUTPATIENT
Start: 2022-07-21

## 2022-07-21 RX ORDER — CLONIDINE HYDROCHLORIDE 0.1 MG/1
0.1 TABLET ORAL PRN
Status: CANCELLED | OUTPATIENT
Start: 2022-07-21

## 2022-07-22 DIAGNOSIS — B96.89 BV (BACTERIAL VAGINOSIS): Primary | ICD-10-CM

## 2022-07-22 DIAGNOSIS — N76.0 BV (BACTERIAL VAGINOSIS): Primary | ICD-10-CM

## 2022-07-22 LAB
ORGANISM: ABNORMAL
TRANSFERRIN: 260 MG/DL (ref 200–360)
URINE CULTURE, ROUTINE: ABNORMAL
URINE CULTURE, ROUTINE: ABNORMAL

## 2022-07-22 RX ORDER — METRONIDAZOLE 500 MG/1
500 TABLET ORAL 2 TIMES DAILY
Qty: 14 TABLET | Refills: 0 | Status: SHIPPED | OUTPATIENT
Start: 2022-07-22 | End: 2022-07-29

## 2022-07-26 ENCOUNTER — HOSPITAL ENCOUNTER (OUTPATIENT)
Dept: INFUSION THERAPY | Age: 32
Setting detail: INFUSION SERIES
Discharge: HOME | End: 2022-07-26
Payer: MEDICAID

## 2022-07-26 ENCOUNTER — HOSPITAL ENCOUNTER (OUTPATIENT)
Dept: INTERVENTIONAL RADIOLOGY/VASCULAR | Age: 32
Discharge: HOME | End: 2022-07-26
Payer: MEDICAID

## 2022-07-26 VITALS
BODY MASS INDEX: 22.63 KG/M2 | TEMPERATURE: 97 F | WEIGHT: 123 LBS | DIASTOLIC BLOOD PRESSURE: 62 MMHG | OXYGEN SATURATION: 100 % | HEIGHT: 62 IN | HEART RATE: 95 BPM | RESPIRATION RATE: 18 BRPM | SYSTOLIC BLOOD PRESSURE: 110 MMHG

## 2022-07-26 VITALS
SYSTOLIC BLOOD PRESSURE: 99 MMHG | OXYGEN SATURATION: 99 % | DIASTOLIC BLOOD PRESSURE: 75 MMHG | TEMPERATURE: 97.6 F | HEART RATE: 101 BPM | RESPIRATION RATE: 18 BRPM

## 2022-07-26 DIAGNOSIS — Z45.2 ENCOUNTER FOR CARE RELATED TO VASCULAR ACCESS PORT: Primary | ICD-10-CM

## 2022-07-26 DIAGNOSIS — K31.84 GASTROPARESIS: ICD-10-CM

## 2022-07-26 DIAGNOSIS — Z78.9 ON TOTAL PARENTERAL NUTRITION: ICD-10-CM

## 2022-07-26 LAB
ALBUMIN SERPL-MCNC: 4.4 G/DL (ref 3.5–5.2)
ALP BLD-CCNC: 84 U/L (ref 35–104)
ALT SERPL-CCNC: 16 U/L (ref 5–33)
ANION GAP SERPL CALCULATED.3IONS-SCNC: 11 MMOL/L (ref 7–19)
APTT: 25.5 SEC (ref 26–36.2)
AST SERPL-CCNC: 18 U/L (ref 5–32)
BASOPHILS ABSOLUTE: 0 K/UL (ref 0–0.2)
BASOPHILS RELATIVE PERCENT: 0.2 % (ref 0–1)
BILIRUB SERPL-MCNC: <0.2 MG/DL (ref 0.2–1.2)
BUN BLDV-MCNC: 12 MG/DL (ref 6–20)
CALCIUM SERPL-MCNC: 9.1 MG/DL (ref 8.6–10)
CHLORIDE BLD-SCNC: 102 MMOL/L (ref 98–111)
CO2: 21 MMOL/L (ref 22–29)
CREAT SERPL-MCNC: 0.7 MG/DL (ref 0.5–0.9)
EOSINOPHILS ABSOLUTE: 0 K/UL (ref 0–0.6)
EOSINOPHILS RELATIVE PERCENT: 0.1 % (ref 0–5)
GFR AFRICAN AMERICAN: >59
GFR NON-AFRICAN AMERICAN: >60
GLUCOSE BLD-MCNC: 133 MG/DL (ref 74–109)
HCT VFR BLD CALC: 33.4 % (ref 37–47)
HEMOGLOBIN: 10.7 G/DL (ref 12–16)
IMMATURE GRANULOCYTES #: 0.1 K/UL
INR BLD: 1.01 (ref 0.88–1.18)
LYMPHOCYTES ABSOLUTE: 1.3 K/UL (ref 1.1–4.5)
LYMPHOCYTES RELATIVE PERCENT: 6.5 % (ref 20–40)
MAGNESIUM: 1.8 MG/DL (ref 1.6–2.6)
MCH RBC QN AUTO: 26.4 PG (ref 27–31)
MCHC RBC AUTO-ENTMCNC: 32 G/DL (ref 33–37)
MCV RBC AUTO: 82.5 FL (ref 81–99)
MONOCYTES ABSOLUTE: 0.7 K/UL (ref 0–0.9)
MONOCYTES RELATIVE PERCENT: 3.6 % (ref 0–10)
NEUTROPHILS ABSOLUTE: 17.3 K/UL (ref 1.5–7.5)
NEUTROPHILS RELATIVE PERCENT: 88.9 % (ref 50–65)
PDW BLD-RTO: 15.4 % (ref 11.5–14.5)
PHOSPHORUS: 2.8 MG/DL (ref 2.5–4.5)
PLATELET # BLD: 489 K/UL (ref 130–400)
PMV BLD AUTO: 9.3 FL (ref 9.4–12.3)
POTASSIUM SERPL-SCNC: 3.6 MMOL/L (ref 3.5–5)
PROTHROMBIN TIME: 13.2 SEC (ref 12–14.6)
RBC # BLD: 4.05 M/UL (ref 4.2–5.4)
SODIUM BLD-SCNC: 134 MMOL/L (ref 136–145)
TOTAL PROTEIN: 7.2 G/DL (ref 6.6–8.7)
TRIGL SERPL-MCNC: 119 MG/DL (ref 0–149)
WBC # BLD: 19.5 K/UL (ref 4.8–10.8)

## 2022-07-26 PROCEDURE — 85610 PROTHROMBIN TIME: CPT

## 2022-07-26 PROCEDURE — 99211 OFF/OP EST MAY X REQ PHY/QHP: CPT

## 2022-07-26 PROCEDURE — 6360000002 HC RX W HCPCS: Performed by: SURGERY

## 2022-07-26 PROCEDURE — 2500000003 HC RX 250 WO HCPCS: Performed by: SURGERY

## 2022-07-26 PROCEDURE — 84466 ASSAY OF TRANSFERRIN: CPT

## 2022-07-26 PROCEDURE — 36589 REMOVAL TUNNELED CV CATH: CPT | Performed by: SURGERY

## 2022-07-26 PROCEDURE — C1751 CATH, INF, PER/CENT/MIDLINE: HCPCS

## 2022-07-26 PROCEDURE — 99152 MOD SED SAME PHYS/QHP 5/>YRS: CPT

## 2022-07-26 PROCEDURE — 84100 ASSAY OF PHOSPHORUS: CPT

## 2022-07-26 PROCEDURE — 36589 REMOVAL TUNNELED CV CATH: CPT

## 2022-07-26 PROCEDURE — 85730 THROMBOPLASTIN TIME PARTIAL: CPT

## 2022-07-26 PROCEDURE — 76937 US GUIDE VASCULAR ACCESS: CPT

## 2022-07-26 PROCEDURE — 36558 INSERT TUNNELED CV CATH: CPT | Performed by: SURGERY

## 2022-07-26 PROCEDURE — 36592 COLLECT BLOOD FROM PICC: CPT

## 2022-07-26 PROCEDURE — 36558 INSERT TUNNELED CV CATH: CPT

## 2022-07-26 PROCEDURE — 2580000003 HC RX 258: Performed by: NURSE PRACTITIONER

## 2022-07-26 PROCEDURE — 99153 MOD SED SAME PHYS/QHP EA: CPT

## 2022-07-26 PROCEDURE — 80053 COMPREHEN METABOLIC PANEL: CPT

## 2022-07-26 PROCEDURE — 85025 COMPLETE CBC W/AUTO DIFF WBC: CPT

## 2022-07-26 PROCEDURE — 6360000002 HC RX W HCPCS: Performed by: NURSE PRACTITIONER

## 2022-07-26 PROCEDURE — 83735 ASSAY OF MAGNESIUM: CPT

## 2022-07-26 PROCEDURE — 84478 ASSAY OF TRIGLYCERIDES: CPT

## 2022-07-26 PROCEDURE — 77001 FLUOROGUIDE FOR VEIN DEVICE: CPT

## 2022-07-26 RX ORDER — CLONIDINE HYDROCHLORIDE 0.1 MG/1
0.1 TABLET ORAL PRN
Status: DISCONTINUED | OUTPATIENT
Start: 2022-07-26 | End: 2022-07-28 | Stop reason: HOSPADM

## 2022-07-26 RX ORDER — DIPHENHYDRAMINE HYDROCHLORIDE 25 MG/1
CAPSULE ORAL
Status: ON HOLD | COMMUNITY
Start: 2022-06-30

## 2022-07-26 RX ORDER — FENTANYL CITRATE 50 UG/ML
INJECTION, SOLUTION INTRAMUSCULAR; INTRAVENOUS
Status: COMPLETED | OUTPATIENT
Start: 2022-07-26 | End: 2022-07-26

## 2022-07-26 RX ORDER — SODIUM CHLORIDE 0.9 % (FLUSH) 0.9 %
5-40 SYRINGE (ML) INJECTION PRN
Status: DISCONTINUED | OUTPATIENT
Start: 2022-07-26 | End: 2022-07-28 | Stop reason: HOSPADM

## 2022-07-26 RX ORDER — HEPARIN SODIUM 5000 [USP'U]/ML
INJECTION, SOLUTION INTRAVENOUS; SUBCUTANEOUS
Status: COMPLETED | OUTPATIENT
Start: 2022-07-26 | End: 2022-07-26

## 2022-07-26 RX ORDER — MIDAZOLAM HYDROCHLORIDE 1 MG/ML
INJECTION INTRAMUSCULAR; INTRAVENOUS
Status: COMPLETED | OUTPATIENT
Start: 2022-07-26 | End: 2022-07-26

## 2022-07-26 RX ORDER — SODIUM CHLORIDE 0.9 % (FLUSH) 0.9 %
5-40 SYRINGE (ML) INJECTION EVERY 12 HOURS SCHEDULED
Status: DISCONTINUED | OUTPATIENT
Start: 2022-07-26 | End: 2022-07-28 | Stop reason: HOSPADM

## 2022-07-26 RX ORDER — SODIUM CHLORIDE 9 MG/ML
INJECTION, SOLUTION INTRAVENOUS PRN
Status: DISCONTINUED | OUTPATIENT
Start: 2022-07-26 | End: 2022-07-28 | Stop reason: HOSPADM

## 2022-07-26 RX ORDER — SODIUM CHLORIDE 0.9 % (FLUSH) 0.9 %
5-40 SYRINGE (ML) INJECTION PRN
Status: DISCONTINUED | OUTPATIENT
Start: 2022-07-26 | End: 2022-07-27 | Stop reason: HOSPADM

## 2022-07-26 RX ORDER — SODIUM CHLORIDE 0.9 % (FLUSH) 0.9 %
5-40 SYRINGE (ML) INJECTION PRN
OUTPATIENT
Start: 2022-07-28

## 2022-07-26 RX ORDER — ENOXAPARIN SODIUM 100 MG/ML
40 INJECTION SUBCUTANEOUS DAILY
Status: DISCONTINUED | OUTPATIENT
Start: 2022-07-27 | End: 2022-07-26

## 2022-07-26 RX ORDER — SODIUM CHLORIDE 0.9 % (FLUSH) 0.9 %
5-40 SYRINGE (ML) INJECTION PRN
Status: DISCONTINUED | OUTPATIENT
Start: 2022-07-26 | End: 2022-07-26 | Stop reason: SDUPTHER

## 2022-07-26 RX ORDER — LIDOCAINE HYDROCHLORIDE 20 MG/ML
INJECTION, SOLUTION EPIDURAL; INFILTRATION; INTRACAUDAL; PERINEURAL
Status: COMPLETED | OUTPATIENT
Start: 2022-07-26 | End: 2022-07-26

## 2022-07-26 RX ADMIN — MIDAZOLAM 1 MG: 1 INJECTION INTRAMUSCULAR; INTRAVENOUS at 11:47

## 2022-07-26 RX ADMIN — LIDOCAINE HYDROCHLORIDE 5 ML: 20 INJECTION, SOLUTION EPIDURAL; INFILTRATION; INTRACAUDAL; PERINEURAL at 11:48

## 2022-07-26 RX ADMIN — FENTANYL CITRATE 50 MCG: 50 INJECTION INTRAMUSCULAR; INTRAVENOUS at 12:04

## 2022-07-26 RX ADMIN — FENTANYL CITRATE 25 MCG: 50 INJECTION INTRAMUSCULAR; INTRAVENOUS at 11:50

## 2022-07-26 RX ADMIN — CEFAZOLIN SODIUM 1000 MG: 1 INJECTION, POWDER, FOR SOLUTION INTRAMUSCULAR; INTRAVENOUS at 11:23

## 2022-07-26 RX ADMIN — HEPARIN SODIUM 5000 UNITS: 5000 INJECTION INTRAVENOUS; SUBCUTANEOUS at 11:48

## 2022-07-26 RX ADMIN — MIDAZOLAM 0.5 MG: 1 INJECTION INTRAMUSCULAR; INTRAVENOUS at 12:01

## 2022-07-26 RX ADMIN — SODIUM CHLORIDE: 9 INJECTION, SOLUTION INTRAVENOUS at 09:44

## 2022-07-26 RX ADMIN — MIDAZOLAM 0.5 MG: 1 INJECTION INTRAMUSCULAR; INTRAVENOUS at 11:50

## 2022-07-26 RX ADMIN — FENTANYL CITRATE 25 MCG: 50 INJECTION INTRAMUSCULAR; INTRAVENOUS at 11:47

## 2022-07-26 NOTE — DISCHARGE INSTRUCTIONS
Tunneled Catheter: What to Expect at [facility]. had a procedure to give you a tunneled catheter. The catheter is a soft, flexible tube that runs under your skin, usually from a vein in your chest or neck to a large vein near your heart. You may have it for weeks, months, orlonger. You will now be able to get medicine, blood, nutrients, or other fluids withmore comfort. You will not be poked with a needle every time. You can use the catheter right away. You will be shown how to use it and how tocare for it. Your doctor will tell you how to care for the incision at the insertion site. (It's usually on the neck.) It may have stitches, strips of tape, or a gauze dressing. Your doctor will tell you when the stitches will be removed. The strips of tape will fall off in 3 to 5 days. The gauze dressing can be removedafter 2 days. Your doctor will tell you how to care for the incision on your chest where the catheter is. It will likely have a clear or gauze dressing on it. A clear dressing usually needs to be changed about 2 days after the procedure and then once a week. A gauze dressing needs to be changed 2 or 3 times a week. Also,change the dressing right away if it becomes wet, loose, or dirty. There may be a small ring, or cuff, under the skin on the catheter. This helpshold the catheter in place. This care sheet gives you a general idea about how long it will take for you to recover. But each person recovers at a different pace. Follow the steps belowto feel better as quickly as possible. How can you care for yourself at home? Activity    Talk to your doctor about what activities you can do. You may not be able to do sports or exercises that use the upper body, such as tennis or weight lifting. Avoid arm and upper body movements that may pull on the catheter. These movements include heavy weight lifting and vigorous use of your arms.      You will probably need to take 1 day off from work and will be able to return to normal activities shortly after. This depends on the type of work you do, why you have the catheter, and how you feel. Ask your doctor when you can drive again. Pay special attention when pulling your seat belt across your chest so it doesn't pull out the catheter. It's okay if the seat belt lays over the catheter. You may shower 24 to 48 hours after surgery, if your doctor okays it. Cover the area and catheter so they don't get wet. Pat the cut (incision) dry. Don't go swimming. Medicines    Your doctor will tell you if and when you can restart your medicines. He or she will also give you instructions about taking any new medicines. If you take aspirin or some other blood thinner, ask your doctor if and when to start taking it again. Make sure that you understand exactly what your doctor wants you to do. Take pain medicines exactly as directed. If the doctor gave you a prescription medicine for pain, take it as prescribed. If you are not taking a prescription pain medicine, ask your doctor if you can take an over-the-counter medicine. Do not take two or more pain medicines at the same time unless the doctor told you to. Many pain medicines have acetaminophen, which is Tylenol. Too much acetaminophen (Tylenol) can be harmful. If you think your pain medicine is making you sick to your stomach: Take your medicine after meals (unless your doctor has told you not to). Ask your doctor for a different pain medicine. Incision care    Your doctor will tell you how to care for the incision at the insertion site. (It's usually on your neck.) It may have stitches, strips of tape, or a gauze dressing. Your doctor will tell you when the stitches will be removed. The strips of tape will fall off in 3 to 5 days. The gauze dressing can be removed after 2 days. Your doctor will tell you how to care for the incision on your chest where the catheter is.  It will likely have a clear or gauze dressing on it. A clear dressing usually needs to be changed about 2 days after the procedure and then once a week. A gauze dressing needs to be changed 2 or 3 times a week. Also, change the dressing right away if it becomes wet, loose, or dirty. Other instructions    Go to all appointments to flush the line. This keeps it open. A nurse or other health professional will flush the line. Do not wear jewelry, such as necklaces, that can catch on the catheter. If the catheter breaks, follow the instructions your doctor gave you. If you have no instructions, clamp or tie off the catheter. Then, see a doctor as soon as possible. To help prevent infection, take a shower instead of a bath. Do not go swimming with the catheter. Try to keep the area dry. When you shower, cover the area with waterproof material, such as plastic wrap. Never touch the open end of the catheter if the cap is off. Never use scissors, knives, pins, or other sharp objects near the catheter or other tubing. If your catheter has a clamp, keep it clamped when you are not using it. Fasten or tape the catheter to your body to prevent pulling or dangling. Avoid clothing that rubs or pulls on your catheter. Avoid bending or crimping your catheter. Always wash your hands before you touch your catheter. Wear loose clothing over the catheter for the first 10 to 14 days. When getting dressed, be careful not to pull on the catheter. Follow-up care is a key part of your treatment and safety. Be sure to make and go to all appointments, and call your doctor if you are having problems. It's also a good idea to know your test results and keep alist of the medicines you take. When should you call for help? Call 911 anytime you think you may need emergency care. For example, call if:    You passed out (lost consciousness). You have severe trouble breathing.      You have sudden chest pain and shortness of breath, or you cough up blood. You have a fast or uneven pulse. Call your doctor now or seek immediate medical care if:    You have signs of infection, such as: Increased pain, swelling, warmth, or redness. Red streaks leading from the area. Pus or blood draining from the area. A fever. You have swelling in your face, chest, neck, or arm on the side where the catheter is. You have signs of a blood clot, such as bulging veins near the catheter. Your catheter is leaking, cracked, or clogged. You feel resistance when you inject medicine or fluids into your catheter. Your catheter is out of place. This may happen after severe coughing or vomiting, or if you pull on the catheter. You have chest pain or shortness of breath. Watch closely for changes in your health, and be sure to contact your doctor if:    You have any concerns about your catheter. Where can you learn more? Go to https://[employer]. org and sign in to your [employer] account. Enter D346 in the [employer] box to learn more about \"Tunneled Catheter: What to Expect at Home. \"     If you do not have an account, please click on the \"Sign Up Now\" link. Current as of: March 9, 2022               Content Version: 13.3  © 2006-2022 [employer]. Care instructions adapted under license by [facility] [employer] [facility] ([facility]). If you have questions about a medical condition or this instruction, always ask your healthcare professional. [place] [address] any warranty or liability for your use of this information.

## 2022-07-26 NOTE — H&P
Gastroparesis 03/10/2014    Insomnia 03/10/2014    Irritable bowel syndrome 01/06/2014    Pelvic pain in female 01/06/2014    Dysmenorrhea 01/06/2014    Family history of endometriosis 01/06/2014    NSAID long-term use 11/06/2013    Heartburn 11/06/2013    Anxiety disorder 09/27/2013    Chronic serous otitis media of left ear 09/23/2013    Disc herniation 09/23/2013    Neck pain 09/23/2013    Back pain 09/23/2013    Scoliosis 09/23/2013    Hiccups 09/18/2013    Barretts esophagus 09/18/2013    Belching 09/18/2013     Current Outpatient Medications   Medication Sig Dispense Refill    heparin flush 100 UNIT/ML injection 100 Units by Intracatheter route 2 times daily 15 mL 2    promethazine (PHENERGAN) 25 MG/ML injection Infuse 25 mg intravenously every 3 hours 100 mL 2    meloxicam (MOBIC) 15 MG tablet as needed       dicyclomine (BENTYL) 10 MG capsule Take 10 mg by mouth in the morning and at bedtime       linaclotide (LINZESS) 145 MCG capsule Take 145 mcg by mouth every morning (before breakfast)      sodium chloride flush 0.9 % injection Infuse 5-40 mLs intravenously as needed 6-12 times daily      Misc. Devices [facility]) MISC by Does not apply route Indications: custom wheel chair with smart drive       No current facility-administered medications for this visit. Facility-Administered Medications Ordered in Other Visits   Medication Dose Route Frequency Provider Last Rate Last Admin    sodium chloride flush 0.9 % injection 5-40 mL  5-40 mL IntraVENous PRN Sherita Rodriguez MD        alteplase (CATHFLO) 2 mg in sterile water 2 mL injection  2 mg IntraCATHeter PRN Sherita Rodriguez MD   2 mg at 06/16/22 1544     Allergies:  Iv dye [iodides], Iv dye [iodides], Adhesive tape, Bee pollen, Chlorhexidine, Fruit & vegetable daily [nutritional supplements], Metoprolol succinate [metoprolol], Nsaids, Orange, Orange fruit [citrus], Other, Reglan [metoclopramide], Reglan [metoclopramide], Tape [adhesive tape], Tramadol, Tramadol, Zofran [ondansetron hcl], Zofran [ondansetron hcl], Chlorhexidine gluconate, Orange oil, and Sulfamethoxazole-trimethoprim  Past Medical History:   Diagnosis Date    Allergic contact dermatitis due to adhesives     Allergic contact dermatitis due to plants, except food     Anemia complicating pregnancy, second trimester     Anemia complicating pregnancy, third trimester     Anxiety and depression     Anxiety disorder     unspecified    Bacteremia     Cellulitis of abdominal wall     Chronic fatigue, unspecified     Dysuria     Encounter for care related to vascular access port 5/19/2022    Epigastric pain     Frequency of micturition     G tube feedings (HCC)     Gastroparesis     Dr. Ady Gray in [place] manages    Gastroparesis     Gastrostomy malfunction ([facility] [address]     Generalized abdominal pain     GERD (gastroesophageal reflux disease)     Heart murmur     Hiatal hernia     History of Hoyos's esophagus     History of blood transfusion     Hypotension, unspecified     IBS (irritable bowel syndrome)     Insomnia, unspecified     Jejunostomy tube present (HCC)     Low back pain     Nausea with vomiting     unspecified    Neck pain     Neuropathy     Orthostatic hypotension     POTS (postural orthostatic tachycardia syndrome)     Premature birth     delivered at 24-27 weeks    Scoliosis     Syncope and collapse     Tachycardia     Tachycardia, unspecified     Toxic gastroenteritis and colitis      Past Surgical History:   Procedure Laterality Date    ABDOMEN SURGERY  2014    gastric stimulator implanted    ADENOIDECTOMY      ADENOIDECTOMY  2007    CHOLECYSTECTOMY      CHOLECYSTECTOMY  2016    DENTAL SURGERY  2004    wisdom teeth    EAR SURGERY  2014    left    FACIAL SURGERY  2005    GASTRIC STIMULATOR IMPLANT SURGERY  11/2014    GASTROSTOMY TUBE PLACEMENT  04/07/2015    J tube-Removed in [place] 1/2018    GASTROSTOMY TUBE PLACEMENT      G tube 2017    HYSTERECTOMY (CERVIX STATUS UNKNOWN)      INSERTION / REMOVAL / REPLACEMENT VENOUS ACCESS CATHETER N/A 03/25/2016    REMOVAL OF PORT AND PLACEMENT OF NEW PORT; REMOVAL OF PICC LINE  performed by Iris Banegas MD at [facility]  2014    left knee    KNEE SURGERY  2015    right knee    MANDIBLE SURGERY      double jaw    PHARYNGECTOMY      PORT SURGERY      port present 3-25-16    TONSILLECTOMY      TONSILLECTOMY  1997    TUBAL LIGATION      TUBAL LIGATION  2016    TUMOR REMOVAL Left     ear    TUNNELED VENOUS PORT PLACEMENT Right 05/29/2015    TUNNELED VENOUS PORT PLACEMENT      UPPER GASTROINTESTINAL ENDOSCOPY  04/02/2010    Dr Olinda Mahoney ENDOSCOPY  09/25/2013    Dr Willa Cabot  05/2015    Dr. Mccray  5/29/2015 [facility]    Ultrasound-guided cannulation, right internal jugular vein. Right internal jugular vein single-lumen bard powerport placement. VASCULAR SURGERY  9/8/15 S    Right internal jugular vein portograms. Revision of right internal jugular vein single lumen port. VASCULAR SURGERY  11/3/15 S    Ultrasound-guided cannulation, left upper arm basilic vein. Left upper arm basilic vein PICC line placement bard dual-lumen PICC line. Superior vena cava venograms. VASCULAR SURGERY  03/23/2017    SJS. Left IJV port angiogram.Ultrasound guided cannulation of right basilic vein,right upper extremity PICC line placement bard power PICC,dual lumen. VASCULAR SURGERY  04/27/2022    Ultrasound-guided cannulation right internal jugular vein. Placement of Munguia single-lumen tunneled dialysis catheter. Removal of nonfunctioning left subclavian vein Munguia tunneled dialysis catheter. Repositioning of right internal juglular vein Munguia tunneled dialylsis catheter. Perfomed by Dr. Kev Mercado.  Negrito Love at [address] [place] [address] EXTRACTION       Family History   Problem Relation Age of Onset    Other Mother         endometriosis    Depression Father     Diabetes Paternal Grandmother     Stroke Paternal Grandmother     Hypertension Paternal Grandmother     Heart Disease Paternal Grandmother     Heart Failure Paternal Grandmother     Colon Cancer Neg Hx     Colon Polyps Neg Hx     Esophageal Cancer Neg Hx     Liver Cancer Neg Hx     Rectal Cancer Neg Hx     Stomach Cancer Neg Hx      Social History     Tobacco Use    Smoking status: Never Smoker    Smokeless tobacco: Never Used    Tobacco comment: Vape    Substance Use Topics    Alcohol use: Never       ROS  Eyes - no sudden vision change or amaurosis. Respiratory - no significant shortness of breath,  Cardiovascular - no chest pain or syncope. No  significant leg swelling. no claudication. Musculoskeletal - no gait disturbance  Skin - no new wound. Neurologic -  No speech difficulty or lateralizing weakness. All other review of systems are negative. Physical Exam    BP 80/60 (Site: Right Upper Arm, Position: Sitting, Cuff Size: Medium Adult)   Pulse (!) 113   Temp 98 °F (36.7 °C)   LMP 01/14/2018 (Exact Date)   SpO2 99%       Neck- carotid pulses 2+ to palpation with no bruit  Cardiovascular - Regular rate and rhythm. Pulmonary - effort appears normal.  No respiratory distress. Lungs - Breath sounds normal. No wheezes or rales. Extremities -  Radial and brachial pulses are 2+ to palpation bilaterally. Right femoral pulse: present 2+; Right popliteal pulse: absent Right DP: absent; Right PT absent; Left femoral pulse: present 2+; Left popliteal pulse: absent; Left DP: absent; Left PT: absent No cyanosis, clubbing, or significant edema. No signs atheroembolic event. Neurologic - alert and oriented X 3. Physiologic. Face symmetric. Skin - warm, dry, and intact. no wound  Psychiatric - mood, affect, and behavior appear normal.  Judgment and thought processes appear normal.        Reviewed on this visit: pcp notes          ASSESSMENT/PLAN:  1. Poor venous access  2. Gastroparesis    1.  Poor venous access 2. Gastroparesis           As we discussed with her, she needs to find someone to regulate her tpn before we consider changing a munguia line to dual when at present she does not have anyone whom has ordered it     addendum - she is now with [facility] motility clinic. They have requested double lumen Munguia for TPN and IVIG on left side     An electronic signature was used to authenticate this note.     --Fatmata Jacob, APRN

## 2022-07-26 NOTE — PROGRESS NOTES
Discharge instructions reviewed with patient and patient states understanding. Insertion/removal sites c/d/I.  Patient discharged from cath holding with all belongings and AVS.  Electronically signed by Bonnie Ellis RN on 7/26/2022 at 2:16 PM

## 2022-07-26 NOTE — PROGRESS NOTES
Patient stopped by Wendy Juan today after placement of left internal jugular dual lumen Munguia catheter by Dr Lakia Maldonado. Patient was scheduled to come to [facility] for lab draw tomorrow but  states dressing coming loose and requested it be changed. Dressing changed per protocol. Labs drawn as ordered.

## 2022-07-26 NOTE — INTERVAL H&P NOTE
Update History & Physical    The patient's History and Physical of July 26, 2022 was reviewed with the patient and I examined the patient. There was no change. The surgical site was confirmed by the patient and me. Plan: The risks, benefits, expected outcome, and alternative to the recommended procedure have been discussed with the patient. Patient understands and wants to proceed with the procedure.      ASA III, Mallampati 3      Electronically signed by Christine Johnson DO on 7/26/2022 at 12:38 PM

## 2022-07-26 NOTE — OP NOTE
Patient Name: Alisson Granado   YOB: 1990   MRN: [id number]     Procedure Date: 7/26/2022     Pre Op Diagnosis: poor venous access    Post Op Diagnosis: same    Procedure: Placement of left internal jugular dual-lumen Munguia. Length 28 cm. Removal of right internal jugular single-lumen Munguia. Anesthesia: Local with Moderate Sedation    Estimated Blood Loss: Less than 50 ml    Complications: None    Implants: 28cm dual -lumen Munguia    Procedure Details: Patient was brought to the angiogram suite and placed supine position. Her neck and right-sided catheter and upper chest were prepped and draped with alcohol in sterile fashion. Of note patient is allergic to chlorhexidine. A timeout performed. Under continuous ultrasound guidance left internal jugular vein was accessed using sterile micropuncture technique. The wire was placed, however it tends to go into the from left to right innominate. The micropuncture sheath was then placed and using glide advantage wire it was maneuvered down into the IVC. Then catheter was placed in the chest and marked at the exit site. The injection was made using local anesthetic in the prospective tunnel. Then a stab was made to pedro the exit site. Then using number the catheter was tunneled from the exit to the entrance site at the IJ. Then peel-away sheath was placed over the wire, the wire and the dilator were removed and the catheter was floated inside. The tip was in the proximal IVC as it appeared that the previous catheter with went into the atrium. Catheter was flushed with good flushing and blood return. The entrance site was sutured using 4-0 Monocryl. The catheter was secured to chest wall using StatLock and dressing. Next, our attention was turned to the right IJ cath removal.  Using Metzenbaum scissors and hemostat the cuff was dissected and the catheter was removed.   Completion x-ray showed that the right IJ catheter was removed in its entirety and left IJ catheter in good position. Manual pressure held until hemostasis. Sterile dressing was placed. Patient tolerated procedure well was transferred back to CV PACU in stable condition. Findings: Tip of the dual-lumen Munguia in the proximal IVC.     Disposition:aroused from sedation, and taken to the recovery room in a stable condition    Jake Cotton DO  7/26/2022 12:40 PM

## 2022-07-27 ENCOUNTER — APPOINTMENT (OUTPATIENT)
Dept: INFUSION THERAPY | Age: 32
End: 2022-07-27
Payer: MEDICAID

## 2022-07-28 LAB — TRANSFERRIN: 273 MG/DL (ref 200–360)

## 2022-08-17 ENCOUNTER — TELEMEDICINE (OUTPATIENT)
Dept: FAMILY MEDICINE CLINIC | Age: 32
End: 2022-08-17
Payer: MEDICAID

## 2022-08-17 DIAGNOSIS — F41.9 ANXIETY AND DEPRESSION: ICD-10-CM

## 2022-08-17 DIAGNOSIS — F32.A ANXIETY AND DEPRESSION: ICD-10-CM

## 2022-08-17 DIAGNOSIS — M41.9 SCOLIOSIS, UNSPECIFIED SCOLIOSIS TYPE, UNSPECIFIED SPINAL REGION: ICD-10-CM

## 2022-08-17 DIAGNOSIS — M54.50 CHRONIC LOW BACK PAIN, UNSPECIFIED BACK PAIN LATERALITY, UNSPECIFIED WHETHER SCIATICA PRESENT: ICD-10-CM

## 2022-08-17 DIAGNOSIS — I87.8 POOR VENOUS ACCESS: Primary | ICD-10-CM

## 2022-08-17 DIAGNOSIS — K31.84 GASTROPARESIS: ICD-10-CM

## 2022-08-17 DIAGNOSIS — G89.29 CHRONIC LOW BACK PAIN, UNSPECIFIED BACK PAIN LATERALITY, UNSPECIFIED WHETHER SCIATICA PRESENT: ICD-10-CM

## 2022-08-17 DIAGNOSIS — F43.10 PTSD (POST-TRAUMATIC STRESS DISORDER): ICD-10-CM

## 2022-08-17 PROCEDURE — 99214 OFFICE O/P EST MOD 30 MIN: CPT | Performed by: FAMILY MEDICINE

## 2022-08-17 RX ORDER — IMMUNE GLOBULIN 100 MG/ML
SOLUTION INTRAVENOUS
Status: ON HOLD | COMMUNITY
Start: 2022-07-25

## 2022-08-17 RX ORDER — DIPHENHYDRAMINE HYDROCHLORIDE 50 MG/ML
INJECTION INTRAMUSCULAR; INTRAVENOUS
Status: ON HOLD | COMMUNITY
Start: 2022-08-09

## 2022-08-17 RX ORDER — SODIUM CHLORIDE 9 MG/ML
INJECTION, SOLUTION INTRAVENOUS
Status: ON HOLD | COMMUNITY
Start: 2022-07-25

## 2022-08-17 ASSESSMENT — ENCOUNTER SYMPTOMS
STRIDOR: 0
FACIAL SWELLING: 0
EYE DISCHARGE: 0
BACK PAIN: 1
EYE ITCHING: 0
VOMITING: 0
COLOR CHANGE: 0
NAUSEA: 0
APNEA: 0

## 2022-08-17 NOTE — PROGRESS NOTES
[address] (:  1990) is a Established patient, here for evaluation of the following:    Assessment & Plan   Below is the assessment and plan developed based on review of pertinent history, physical exam, labs, studies, and medications. 1. Poor venous access  2. Gastroparesis  -     External Referral To Pain Clinic  3. Anxiety and depression  -     External Referral To Psychiatry  4. PTSD (post-traumatic stress disorder)  -     External Referral To Psychiatry  5. Scoliosis, unspecified scoliosis type, unspecified spinal region  -     External Referral To Pain Clinic  6. Chronic low back pain, unspecified back pain laterality, unspecified whether sciatica present  -     External Referral To Pain Clinic  Return in about 6 months (around 2023) for gastroparesis ([facility]), 40 minute appt, office or virtual visit, per patient preference. Subjective   HPI  Patient presents for follow-up of gastroparesis. Since her last visit with me, she has had double-lumen placed and has re-established with the motility clinic at [facility], notes reviewed, and they are writing her IV prescriptions once again. She is grateful for my assistance however in the interim. Main concerns today are referral to psychiatry and pain management. She states that she has seen multiple therapists at [facility], and would prefer to go to [place] [id number] where hopefully she can keep the same therapist, as well as have them manage her medications. She is also requesting referral to pain management. She states her friend also has gastroparesis, and sees Bereket at [facility] and would like to be referred there also. No concerns otherwise. Review of Systems   Constitutional:  Negative for chills and fever. HENT:  Negative for facial swelling and mouth sores. Eyes:  Negative for discharge and itching. Respiratory:  Negative for apnea and stridor. Cardiovascular:  Negative for chest pain and palpitations.    Gastrointestinal: Negative for nausea and vomiting. Endocrine: Negative for cold intolerance and heat intolerance. Genitourinary:  Negative for frequency and urgency. Musculoskeletal:  Positive for back pain. Negative for arthralgias. Skin:  Negative for color change and rash. Psychiatric/Behavioral:  Positive for dysphoric mood. The patient is nervous/anxious. Objective   Patient-Reported Vitals  No data recorded     Physical Exam  [INSTRUCTIONS:  \"[x]\" Indicates a positive item  \"[]\" Indicates a negative item  -- DELETE ALL ITEMS NOT EXAMINED]    Constitutional: [x] Appears well-developed and well-nourished [x] No apparent distress      [] Abnormal -     Mental status: [x] Alert and awake  [x] Oriented to person/place/time [x] Able to follow commands    [] Abnormal -     Eyes:   EOM    [x]  Normal    [] Abnormal -   Sclera  [x]  Normal    [] Abnormal -          Discharge [x]  None visible   [] Abnormal -     HENT: [x] Normocephalic, atraumatic  [] Abnormal -   [x] Mouth/Throat: Mucous membranes are moist    External Ears [x] Normal  [] Abnormal -    Neck: [x] No visualized mass [] Abnormal -     Pulmonary/Chest: [x] Respiratory effort normal   [x] No visualized signs of difficulty breathing or respiratory distress        [] Abnormal -      Musculoskeletal:   [x] Normal gait with no signs of ataxia         [x] Normal range of motion of neck        [] Abnormal -     Neurological:        [x] No Facial Asymmetry (Cranial nerve 7 motor function) (limited exam due to video visit)          [x] No gaze palsy        [] Abnormal -          Skin:        [x] No significant exanthematous lesions or discoloration noted on facial skin         [] Abnormal -            Psychiatric:       [x] Normal Affect [] Abnormal -        [x] No Hallucinations    Other pertinent observable physical exam findings:-             Devin Benavides, was evaluated through a synchronous (real-time) audio-video encounter.  The patient (or guardian if applicable) is aware that this is a billable service, which includes applicable co-pays. This Virtual Visit was conducted with patient's (and/or legal guardian's) consent. The visit was conducted pursuant to the emergency declaration under the [address] waiver authority and the [employer] General Act. Patient identification was verified, and a caregiver was present when appropriate. The patient was located at Home: [address] [place] [address].    Provider was located at [facility] ([address] [place], [place] [address]): Justin Centeno [facility], [address] MD Marcio

## 2022-09-06 ENCOUNTER — TRANSCRIBE ORDERS (OUTPATIENT)
Dept: ADMINISTRATIVE | Facility: HOSPITAL | Age: 32
End: 2022-09-06

## 2022-09-06 ENCOUNTER — HOSPITAL ENCOUNTER (OUTPATIENT)
Dept: GENERAL RADIOLOGY | Facility: HOSPITAL | Age: 32
Discharge: HOME | End: 2022-09-06
Admitting: ANESTHESIOLOGY

## 2022-09-06 DIAGNOSIS — G89.29 OTHER CHRONIC PAIN: Primary | ICD-10-CM

## 2022-09-06 DIAGNOSIS — G89.29 OTHER CHRONIC PAIN: ICD-10-CM

## 2022-09-06 PROCEDURE — 72110 X-RAY EXAM L-2 SPINE 4/>VWS: CPT

## 2022-09-06 PROCEDURE — 73562 X-RAY EXAM OF KNEE 3: CPT

## 2022-09-13 NOTE — TELEPHONE ENCOUNTER
Home sleep study ordered. Will order new machine through [facility] if TRACY is noted on home study. Old CPAP is from 2015.   The pt called to sw me regarding her need for a dual lumen mediport. Dr. Kurt Arnold has taken over line care so she is able to proceed with her TPA. The pt states she wants to stay with our practice and be scheduled with Dr. Rosangela Casillas for her port placement. I explained to her we will need an updated referral from [facility] with [facility] requesting the dual lumen, as well as updating the request with Dr. Roberta Coe name. Once I have the order I will proceed with scheduling. Per the pt's request I then scheduled her to have her sutures removed from her previous surgery on Thur 5/26/2022 at 1400. The pt voiced understanding and is aware of this appt.

## 2022-09-23 ENCOUNTER — HOSPITAL ENCOUNTER (INPATIENT)
Age: 32
LOS: 5 days | Discharge: HOME HEALTH | DRG: 607 | End: 2022-09-28
Attending: HOSPITALIST | Admitting: SURGERY
Payer: MEDICAID

## 2022-09-23 DIAGNOSIS — T82.7XXA: Primary | ICD-10-CM

## 2022-09-23 PROBLEM — B88.8 INFESTATION BY BED BUG: Status: ACTIVE | Noted: 2022-09-23

## 2022-09-23 LAB
ALBUMIN SERPL-MCNC: 4 G/DL (ref 3.5–5.2)
ALP BLD-CCNC: 75 U/L (ref 35–104)
ALT SERPL-CCNC: 17 U/L (ref 5–33)
ANION GAP SERPL CALCULATED.3IONS-SCNC: 11 MMOL/L (ref 7–19)
AST SERPL-CCNC: 24 U/L (ref 5–32)
BASOPHILS ABSOLUTE: 0.1 K/UL (ref 0–0.2)
BASOPHILS RELATIVE PERCENT: 0.9 % (ref 0–1)
BILIRUB SERPL-MCNC: <0.2 MG/DL (ref 0.2–1.2)
BUN BLDV-MCNC: 9 MG/DL (ref 6–20)
CALCIUM SERPL-MCNC: 8.6 MG/DL (ref 8.6–10)
CHLORIDE BLD-SCNC: 102 MMOL/L (ref 98–111)
CO2: 21 MMOL/L (ref 22–29)
CREAT SERPL-MCNC: 0.7 MG/DL (ref 0.5–0.9)
EOSINOPHILS ABSOLUTE: 0.1 K/UL (ref 0–0.6)
EOSINOPHILS RELATIVE PERCENT: 1.4 % (ref 0–5)
GFR AFRICAN AMERICAN: >59
GFR NON-AFRICAN AMERICAN: >60
GLUCOSE BLD-MCNC: 92 MG/DL (ref 74–109)
HCT VFR BLD CALC: 33.2 % (ref 37–47)
HEMOGLOBIN: 10 G/DL (ref 12–16)
IMMATURE GRANULOCYTES #: 0 K/UL
LACTIC ACID: 0.9 MMOL/L (ref 0.5–1.9)
LYMPHOCYTES ABSOLUTE: 1.8 K/UL (ref 1.1–4.5)
LYMPHOCYTES RELATIVE PERCENT: 19.7 % (ref 20–40)
MCH RBC QN AUTO: 25.3 PG (ref 27–31)
MCHC RBC AUTO-ENTMCNC: 30.1 G/DL (ref 33–37)
MCV RBC AUTO: 84.1 FL (ref 81–99)
MONOCYTES ABSOLUTE: 0.4 K/UL (ref 0–0.9)
MONOCYTES RELATIVE PERCENT: 4 % (ref 0–10)
NEUTROPHILS ABSOLUTE: 6.8 K/UL (ref 1.5–7.5)
NEUTROPHILS RELATIVE PERCENT: 73.6 % (ref 50–65)
PDW BLD-RTO: 15.3 % (ref 11.5–14.5)
PLATELET # BLD: 329 K/UL (ref 130–400)
PMV BLD AUTO: 9.1 FL (ref 9.4–12.3)
POTASSIUM REFLEX MAGNESIUM: 4.2 MMOL/L (ref 3.5–5)
RBC # BLD: 3.95 M/UL (ref 4.2–5.4)
SARS-COV-2, NAAT: NOT DETECTED
SODIUM BLD-SCNC: 134 MMOL/L (ref 136–145)
TOTAL PROTEIN: 8.1 G/DL (ref 6.6–8.7)
WBC # BLD: 9.2 K/UL (ref 4.8–10.8)

## 2022-09-23 PROCEDURE — 6360000002 HC RX W HCPCS

## 2022-09-23 PROCEDURE — 99285 EMERGENCY DEPT VISIT HI MDM: CPT

## 2022-09-23 PROCEDURE — 85025 COMPLETE CBC W/AUTO DIFF WBC: CPT

## 2022-09-23 PROCEDURE — 36415 COLL VENOUS BLD VENIPUNCTURE: CPT

## 2022-09-23 PROCEDURE — 2500000003 HC RX 250 WO HCPCS

## 2022-09-23 PROCEDURE — 83605 ASSAY OF LACTIC ACID: CPT

## 2022-09-23 PROCEDURE — 87635 SARS-COV-2 COVID-19 AMP PRB: CPT

## 2022-09-23 PROCEDURE — 87040 BLOOD CULTURE FOR BACTERIA: CPT

## 2022-09-23 PROCEDURE — 6360000002 HC RX W HCPCS: Performed by: PHYSICIAN ASSISTANT

## 2022-09-23 PROCEDURE — 1210000000 HC MED SURG R&B

## 2022-09-23 PROCEDURE — 96365 THER/PROPH/DIAG IV INF INIT: CPT

## 2022-09-23 PROCEDURE — 2580000003 HC RX 258: Performed by: PHYSICIAN ASSISTANT

## 2022-09-23 PROCEDURE — 80053 COMPREHEN METABOLIC PANEL: CPT

## 2022-09-23 PROCEDURE — 6370000000 HC RX 637 (ALT 250 FOR IP)

## 2022-09-23 PROCEDURE — 2580000003 HC RX 258

## 2022-09-23 RX ORDER — PROMETHAZINE HYDROCHLORIDE 25 MG/ML
6.25 INJECTION, SOLUTION INTRAMUSCULAR; INTRAVENOUS EVERY 6 HOURS PRN
Status: DISCONTINUED | OUTPATIENT
Start: 2022-09-23 | End: 2022-09-28 | Stop reason: HOSPADM

## 2022-09-23 RX ORDER — SODIUM CHLORIDE 0.9 % (FLUSH) 0.9 %
5-40 SYRINGE (ML) INJECTION EVERY 12 HOURS SCHEDULED
Status: DISCONTINUED | OUTPATIENT
Start: 2022-09-23 | End: 2022-09-28 | Stop reason: HOSPADM

## 2022-09-23 RX ORDER — SODIUM CHLORIDE 9 MG/ML
INJECTION, SOLUTION INTRAVENOUS PRN
Status: DISCONTINUED | OUTPATIENT
Start: 2022-09-23 | End: 2022-09-28 | Stop reason: HOSPADM

## 2022-09-23 RX ORDER — ACETAMINOPHEN 650 MG/1
650 SUPPOSITORY RECTAL EVERY 6 HOURS PRN
Status: DISCONTINUED | OUTPATIENT
Start: 2022-09-23 | End: 2022-09-28 | Stop reason: HOSPADM

## 2022-09-23 RX ORDER — ACETAMINOPHEN 325 MG/1
650 TABLET ORAL EVERY 6 HOURS PRN
Status: DISCONTINUED | OUTPATIENT
Start: 2022-09-23 | End: 2022-09-28 | Stop reason: HOSPADM

## 2022-09-23 RX ORDER — PROMETHAZINE HYDROCHLORIDE 25 MG/1
12.5 TABLET ORAL EVERY 6 HOURS PRN
Status: DISCONTINUED | OUTPATIENT
Start: 2022-09-23 | End: 2022-09-28 | Stop reason: HOSPADM

## 2022-09-23 RX ORDER — SODIUM CHLORIDE 0.9 % (FLUSH) 0.9 %
5-40 SYRINGE (ML) INJECTION PRN
Status: DISCONTINUED | OUTPATIENT
Start: 2022-09-23 | End: 2022-09-28 | Stop reason: HOSPADM

## 2022-09-23 RX ORDER — TRIAMCINOLONE ACETONIDE 1 MG/G
CREAM TOPICAL 2 TIMES DAILY
Status: DISCONTINUED | OUTPATIENT
Start: 2022-09-23 | End: 2022-09-28 | Stop reason: HOSPADM

## 2022-09-23 RX ORDER — DIPHENHYDRAMINE HCL 25 MG
25 TABLET ORAL EVERY 6 HOURS PRN
Status: DISCONTINUED | OUTPATIENT
Start: 2022-09-23 | End: 2022-09-28 | Stop reason: HOSPADM

## 2022-09-23 RX ORDER — POLYETHYLENE GLYCOL 3350 17 G/17G
17 POWDER, FOR SOLUTION ORAL DAILY PRN
Status: DISCONTINUED | OUTPATIENT
Start: 2022-09-23 | End: 2022-09-28 | Stop reason: HOSPADM

## 2022-09-23 RX ORDER — ENOXAPARIN SODIUM 100 MG/ML
40 INJECTION SUBCUTANEOUS DAILY
Status: DISCONTINUED | OUTPATIENT
Start: 2022-09-23 | End: 2022-09-28 | Stop reason: HOSPADM

## 2022-09-23 RX ORDER — DICYCLOMINE HYDROCHLORIDE 10 MG/1
10 CAPSULE ORAL 2 TIMES DAILY
Status: DISCONTINUED | OUTPATIENT
Start: 2022-09-23 | End: 2022-09-28 | Stop reason: HOSPADM

## 2022-09-23 RX ADMIN — SODIUM CHLORIDE, PRESERVATIVE FREE 20 MG: 5 INJECTION INTRAVENOUS at 22:41

## 2022-09-23 RX ADMIN — TRIAMCINOLONE ACETONIDE: 1 CREAM TOPICAL at 22:14

## 2022-09-23 RX ADMIN — CEFEPIME 2000 MG: 2 INJECTION, POWDER, FOR SOLUTION INTRAVENOUS at 19:58

## 2022-09-23 RX ADMIN — PROMETHAZINE HYDROCHLORIDE 12.5 MG: 25 TABLET ORAL at 21:52

## 2022-09-23 RX ADMIN — DICYCLOMINE HYDROCHLORIDE 10 MG: 10 CAPSULE ORAL at 22:14

## 2022-09-23 RX ADMIN — ENOXAPARIN SODIUM 40 MG: 100 INJECTION SUBCUTANEOUS at 21:52

## 2022-09-23 RX ADMIN — Medication 1000 MG: at 21:03

## 2022-09-23 ASSESSMENT — ENCOUNTER SYMPTOMS
BACK PAIN: 0
RHINORRHEA: 0
EYE PAIN: 0
SHORTNESS OF BREATH: 0
PHOTOPHOBIA: 0
ABDOMINAL DISTENTION: 0
EYE DISCHARGE: 0
RESPIRATORY NEGATIVE: 1
NAUSEA: 0
GASTROINTESTINAL NEGATIVE: 1
APNEA: 0
COUGH: 0
ABDOMINAL PAIN: 0
SORE THROAT: 0
COLOR CHANGE: 0

## 2022-09-23 ASSESSMENT — PAIN - FUNCTIONAL ASSESSMENT: PAIN_FUNCTIONAL_ASSESSMENT: NONE - DENIES PAIN

## 2022-09-23 ASSESSMENT — PAIN SCALES - GENERAL: PAINLEVEL_OUTOF10: 0

## 2022-09-23 NOTE — ED PROVIDER NOTES
[facility] 4 ONCOLOGY UNIT  eMERGENCYdEPARTMENT eNCOUnter      Pt Name: Danielle Ziegler  MRN: [id number]  [place] 1990  Date of evaluation: 9/23/2022  Provider:SUMMER Medina    CHIEF COMPLAINT       Chief Complaint   Patient presents with    Vascular Access Problem     Pt has henning catheter in place thinks it if infected. Small amount of redness noted around site. HISTORY OF PRESENT ILLNESS  (Location/Symptom, Timing/Onset, Context/Setting, Quality, Duration, Modifying Factors, Severity.)   Danielle Ziegler is a 28 y.o. female who presents to the emergency department with complaints of concern for vascular access infection she gets home health TPN IVIG noticed warmth and discharge with henning catheter last night around 5p. Denies fever or chills. Has known bed bugs. She doesn't think her apartment is medically appropriate to go home to. Dr John Quinones placed this cath. [facility]    Nursing Notes were reviewed and I agree. REVIEW OF SYSTEMS    (2-9 systems for level 4, 10 or more for level 5)     Review of Systems   Constitutional:  Negative for activity change, appetite change, chills and fever. HENT:  Negative for congestion, postnasal drip, rhinorrhea and sore throat. Eyes:  Negative for photophobia, pain, discharge and visual disturbance. Respiratory:  Negative for apnea, cough and shortness of breath. Cardiovascular:  Negative for chest pain and leg swelling. Gastrointestinal:  Negative for abdominal distention, abdominal pain and nausea. Genitourinary:  Negative for vaginal bleeding. Musculoskeletal:  Negative for arthralgias, back pain, joint swelling, neck pain and neck stiffness. Skin:  Positive for wound. Negative for color change and rash. Neurological:  Negative for dizziness, syncope, facial asymmetry and headaches. Hematological:  Negative for adenopathy. Does not bruise/bleed easily. Psychiatric/Behavioral:  Negative for agitation, behavioral problems and confusion. Except as noted above the remainder of the review of systems was reviewed and negative.        PAST MEDICAL HISTORY     Past Medical History:   Diagnosis Date    Allergic contact dermatitis due to adhesives     Allergic contact dermatitis due to plants, except food     Anemia complicating pregnancy, second trimester     Anemia complicating pregnancy, third trimester     Anxiety and depression     Anxiety disorder     unspecified    Bacteremia     Cellulitis of abdominal wall     Chronic fatigue, unspecified     Dysuria     Encounter for care related to vascular access port 5/19/2022    Epigastric pain     Frequency of micturition     G tube feedings (HCC)     Gastroparesis     Dr. Luisito Cordova in [place] manages    Gastroparesis     Gastrostomy malfunction [facility])     Generalized abdominal pain     GERD (gastroesophageal reflux disease)     Heart murmur     Hiatal hernia     History of Hoyos's esophagus     History of blood transfusion     Hypotension, unspecified     IBS (irritable bowel syndrome)     Insomnia, unspecified     Jejunostomy tube present (HCC)     Low back pain     Nausea with vomiting     unspecified    Neck pain     Neuropathy     Orthostatic hypotension     POTS (postural orthostatic tachycardia syndrome)     Premature birth     delivered at 24-27 weeks    Scoliosis     Syncope and collapse     Tachycardia     Tachycardia, unspecified     Toxic gastroenteritis and colitis          SURGICAL HISTORY       Past Surgical History:   Procedure Laterality Date    ABDOMEN SURGERY  2014    gastric stimulator implanted    ADENOIDECTOMY      ADENOIDECTOMY  2007    CHOLECYSTECTOMY      CHOLECYSTECTOMY  2016    DENTAL SURGERY  2004    wisdom teeth    EAR SURGERY  2014    left    FACIAL SURGERY  2005    GASTRIC STIMULATOR IMPLANT SURGERY  11/2014    GASTROSTOMY TUBE PLACEMENT  04/07/2015    J tube-Removed in [place] 1/2018    GASTROSTOMY TUBE PLACEMENT      G tube 2017    HYSTERECTOMY (CERVIX STATUS UNKNOWN) INSERTION / REMOVAL / REPLACEMENT VENOUS ACCESS CATHETER N/A 03/25/2016    REMOVAL OF PORT AND PLACEMENT OF NEW PORT; REMOVAL OF PICC LINE  performed by Yessenia Muir MD at [address] [employer] [facility]  2014    left knee    KNEE SURGERY  2015    right knee    MANDIBLE SURGERY      double jaw    PHARYNGECTOMY      PORT SURGERY      port present 3-25-16    TONSILLECTOMY      TONSILLECTOMY  1997    TUBAL LIGATION      TUBAL LIGATION  2016    TUMOR REMOVAL Left     ear    TUNNELED VENOUS PORT PLACEMENT Right 05/29/2015    TUNNELED VENOUS PORT PLACEMENT      UPPER GASTROINTESTINAL ENDOSCOPY  04/02/2010    Dr Li Cordial ENDOSCOPY  09/25/2013    Dr Janell Muñoz  05/2015    Dr. Justus Hdz  5/29/2015 S    Ultrasound-guided cannulation, right internal jugular vein. Right internal jugular vein single-lumen bard powerport placement. VASCULAR SURGERY  9/8/15 [facility]    Right internal jugular vein portograms. Revision of right internal jugular vein single lumen port. VASCULAR SURGERY  11/3/15 S    Ultrasound-guided cannulation, left upper arm basilic vein. Left upper arm basilic vein PICC line placement bard dual-lumen PICC line. Superior vena cava venograms. VASCULAR SURGERY  03/23/2017    [facility]. Left IJV port angiogram.Ultrasound guided cannulation of right basilic vein,right upper extremity PICC line placement bard power PICC,dual lumen. VASCULAR SURGERY  04/27/2022    Ultrasound-guided cannulation right internal jugular vein. Placement of Tripp single-lumen tunneled dialysis catheter. Removal of nonfunctioning left subclavian vein Tripp tunneled dialysis catheter. Repositioning of right internal juglular vein Tripp tunneled dialylsis catheter. Perfomed by Dr. Soniya Manjarrez.  Lupe Reynolds at [address] [place] [address] EXTRACTION           CURRENT MEDICATIONS       Current Discharge Medication List        CONTINUE these medications which have NOT CHANGED    Details   sodium chloride 0.9 % infusion       OCTAGAM 20 GM/200ML SOLN solution       diphenhydrAMINE (BENADRYL) 50 MG/ML injection       BANOPHEN 25 MG capsule TAKE ONE CAPSULE BY MOUTH 1 HOUR PRIOR TO YOUR PROCEDURE WITH LAST DOSE OF PREDNISONE      Famotidine (PEPCID IV) Infuse 20 mg intravenously in the morning and at bedtime      heparin flush 100 UNIT/ML injection 100 Units by Intracatheter route 2 times daily  Qty: 15 mL, Refills: 2    Associated Diagnoses: Poor venous access      promethazine (PHENERGAN) 25 MG/ML injection Infuse 25 mg intravenously every 3 hours  Qty: 100 mL, Refills: 2    Associated Diagnoses: Poor venous access      meloxicam (MOBIC) 15 MG tablet Take 15 mg by mouth daily as needed      dicyclomine (BENTYL) 10 MG capsule Take 10 mg by mouth in the morning and at bedtime       linaclotide (LINZESS) 145 MCG capsule Take 145 mcg by mouth every morning (before breakfast)      sodium chloride flush 0.9 % injection Infuse 5-40 mLs intravenously as needed 6-12 times daily      Misc.  Devices [facility]) MISC by Does not apply route Indications: custom wheel chair with smart drive             ALLERGIES     Iv dye [iodides], Iv dye [iodides], Adhesive tape, Bee pollen, Chlorhexidine, Fruit & vegetable daily [nutritional supplements], Metoprolol succinate [metoprolol], Nsaids, Orange, Orange fruit [citrus], Other, Reglan [metoclopramide], Tramadol, Zofran [ondansetron hcl], Orange oil, and Sulfamethoxazole-trimethoprim    FAMILY HISTORY       Family History   Problem Relation Age of Onset    Other Mother         endometriosis    Depression Father     Diabetes Paternal Grandmother     Stroke Paternal Grandmother     Hypertension Paternal Grandmother     Heart Disease Paternal Grandmother     Heart Failure Paternal Grandmother     Colon Cancer Neg Hx     Colon Polyps Neg Hx     Esophageal Cancer Neg Hx     Liver Cancer Neg Hx     Rectal Cancer Neg Hx     Stomach Cancer Neg Hx SOCIAL HISTORY       Social History     Socioeconomic History    Marital status: Single   Tobacco Use    Smoking status: Never    Smokeless tobacco: Never    Tobacco comments:     Vape    Vaping Use    Vaping Use: Some days    Substances: Nicotine    Devices: Refillable tank   Substance and Sexual Activity    Alcohol use: Never    Drug use: Never    Sexual activity: Yes     Partners: Male   Social History Narrative    ** Merged History Encounter **            SCREENINGS    Leandro Coma Scale  Eye Opening: Spontaneous  Best Verbal Response: Oriented  Best Motor Response: Obeys commands  Wilder Coma Scale Score: 15      PHYSICAL EXAM    (up to 7 forlevel 4, 8 or more for level 5)     ED Triage Vitals   BP Temp Temp src Heart Rate Resp SpO2 Height Weight   09/23/22 1417 09/23/22 1415 -- 09/23/22 1415 09/23/22 1415 09/23/22 1415 -- 09/23/22 1415   118/68 97.8 °F (36.6 °C)  (!) 109 14 98 %  125 lb (56.7 kg)       Physical Exam  Vitals and nursing note reviewed. Constitutional:       General: She is not in acute distress. Appearance: She is well-developed. She is not diaphoretic. HENT:      Head: Normocephalic and atraumatic. Right Ear: Tympanic membrane, ear canal and external ear normal.      Left Ear: Tympanic membrane, ear canal and external ear normal.      Nose: Nose normal.      Mouth/Throat:      Mouth: Mucous membranes are moist.      Pharynx: No oropharyngeal exudate. Eyes:      General:         Right eye: No discharge. Left eye: No discharge. Pupils: Pupils are equal, round, and reactive to light. Neck:      Thyroid: No thyromegaly. Cardiovascular:      Rate and Rhythm: Normal rate and regular rhythm. Heart sounds: Normal heart sounds. No murmur heard. No friction rub. Pulmonary:      Effort: Pulmonary effort is normal. No respiratory distress. Breath sounds: Normal breath sounds. No stridor. No wheezing.    Abdominal:      General: Bowel sounds are normal. There is no distension. Palpations: Abdomen is soft. Tenderness: There is no abdominal tenderness. Musculoskeletal:         General: Normal range of motion. Cervical back: Normal range of motion and neck supple. Skin:     General: Skin is warm and dry. Capillary Refill: Capillary refill takes less than 2 seconds. Findings: No rash. Neurological:      Mental Status: She is alert and oriented to person, place, and time. Cranial Nerves: No cranial nerve deficit. Sensory: No sensory deficit. Coordination: Coordination normal.   Psychiatric:         Mood and Affect: Mood normal.         Behavior: Behavior normal.         Thought Content:  Thought content normal.         Judgment: Judgment normal.         DIAGNOSTIC RESULTS     RADIOLOGY:   Non-plain film images such as CT, Ultrasound and MRI are read by the radiologist. Plain radiographic images are visualized and preliminarilyinterpreted by Yazmin Vaughan MD with the below findings:      Interpretation per the Radiologist below, if available at the time of this note:    No orders to display       LABS:  Labs Reviewed   CBC WITH AUTO DIFFERENTIAL - Abnormal; Notable for the following components:       Result Value    RBC 3.95 (*)     Hemoglobin 10.0 (*)     Hematocrit 33.2 (*)     MCH 25.3 (*)     MCHC 30.1 (*)     RDW 15.3 (*)     MPV 9.1 (*)     Neutrophils % 73.6 (*)     Lymphocytes % 19.7 (*)     All other components within normal limits   COMPREHENSIVE METABOLIC PANEL W/ REFLEX TO MG FOR LOW K - Abnormal; Notable for the following components:    Sodium 134 (*)     CO2 21 (*)     All other components within normal limits   CBC WITH AUTO DIFFERENTIAL - Abnormal; Notable for the following components:    RBC 4.08 (*)     Hemoglobin 10.5 (*)     Hematocrit 35.5 (*)     MCH 25.7 (*)     MCHC 29.6 (*)     RDW 15.3 (*)     Neutrophils % 80.0 (*)     Lymphocytes % 13.5 (*)     Neutrophils Absolute 8.0 (*)     All other components within normal limits   COMPREHENSIVE METABOLIC PANEL W/ REFLEX TO MG FOR LOW K - Abnormal; Notable for the following components:    Sodium 135 (*)     CO2 16 (*)     Glucose 66 (*)     All other components within normal limits   COVID-19, RAPID   CULTURE, BLOOD 1   CULTURE, BLOOD 2   LACTIC ACID   POCT GLUCOSE       All other labs were within normal range or notreturned as of this dictation. RE-ASSESSMENT        EMERGENCY DEPARTMENT COURSE and DIFFERENTIAL DIAGNOSIS/MDM:   Vitals:    Vitals:    09/24/22 0730 09/24/22 0830 09/24/22 1205 09/24/22 1620   BP: (!) 93/59   103/78   Pulse: 82   99   Resp: 20   20   Temp: 97.9 °F (36.6 °C)   97.3 °F (36.3 °C)   TempSrc: Temporal   Temporal   SpO2: 100%   100%   Weight:  125 lb (56.7 kg)     Height:  5' 2\" (1.575 m) 5' 2\" (1.575 m)          MDM  Concern for infection of the catheter have ordered IV antibiotics I spoke with Dr. Simone Beckford agreeable to consult. Admit to medicine with ID consult. PROCEDURES:    Procedures      FINAL IMPRESSION      1. Infection of vascular catheter, initial encounter [facility])          [address] Admitted 09/23/2022 08:44:50 PM      PATIENT REFERRED TO:  No follow-up provider specified.     DISCHARGE MEDICATIONS:  Current Discharge Medication List          (Please note that portions of this note were completed with a voice recognition program.  Efforts were made to edit the dictations but occasionallywords are mis-transcribed.)    Maury Borja, [address] [place] [address]  09/24/22 [id number]

## 2022-09-24 LAB
ALBUMIN SERPL-MCNC: 4.4 G/DL (ref 3.5–5.2)
ALP BLD-CCNC: 75 U/L (ref 35–104)
ALT SERPL-CCNC: 18 U/L (ref 5–33)
ANION GAP SERPL CALCULATED.3IONS-SCNC: 15 MMOL/L (ref 7–19)
AST SERPL-CCNC: 31 U/L (ref 5–32)
BASOPHILS ABSOLUTE: 0.1 K/UL (ref 0–0.2)
BASOPHILS RELATIVE PERCENT: 0.8 % (ref 0–1)
BILIRUB SERPL-MCNC: 0.3 MG/DL (ref 0.2–1.2)
BUN BLDV-MCNC: 9 MG/DL (ref 6–20)
CALCIUM SERPL-MCNC: 8.8 MG/DL (ref 8.6–10)
CHLORIDE BLD-SCNC: 104 MMOL/L (ref 98–111)
CO2: 16 MMOL/L (ref 22–29)
CREAT SERPL-MCNC: 0.7 MG/DL (ref 0.5–0.9)
EOSINOPHILS ABSOLUTE: 0.1 K/UL (ref 0–0.6)
EOSINOPHILS RELATIVE PERCENT: 1.2 % (ref 0–5)
GFR AFRICAN AMERICAN: >59
GFR NON-AFRICAN AMERICAN: >60
GLUCOSE BLD-MCNC: 66 MG/DL (ref 74–109)
GLUCOSE BLD-MCNC: 83 MG/DL (ref 70–99)
HCT VFR BLD CALC: 35.5 % (ref 37–47)
HEMOGLOBIN: 10.5 G/DL (ref 12–16)
IMMATURE GRANULOCYTES #: 0 K/UL
LYMPHOCYTES ABSOLUTE: 1.4 K/UL (ref 1.1–4.5)
LYMPHOCYTES RELATIVE PERCENT: 13.5 % (ref 20–40)
MCH RBC QN AUTO: 25.7 PG (ref 27–31)
MCHC RBC AUTO-ENTMCNC: 29.6 G/DL (ref 33–37)
MCV RBC AUTO: 87 FL (ref 81–99)
MONOCYTES ABSOLUTE: 0.4 K/UL (ref 0–0.9)
MONOCYTES RELATIVE PERCENT: 4.1 % (ref 0–10)
NEUTROPHILS ABSOLUTE: 8 K/UL (ref 1.5–7.5)
NEUTROPHILS RELATIVE PERCENT: 80 % (ref 50–65)
PDW BLD-RTO: 15.3 % (ref 11.5–14.5)
PERFORMED ON: NORMAL
PLATELET # BLD: 253 K/UL (ref 130–400)
PMV BLD AUTO: 9.9 FL (ref 9.4–12.3)
POTASSIUM REFLEX MAGNESIUM: 4.1 MMOL/L (ref 3.5–5)
RBC # BLD: 4.08 M/UL (ref 4.2–5.4)
SODIUM BLD-SCNC: 135 MMOL/L (ref 136–145)
TOTAL PROTEIN: 7.6 G/DL (ref 6.6–8.7)
WBC # BLD: 10 K/UL (ref 4.8–10.8)

## 2022-09-24 PROCEDURE — 2500000003 HC RX 250 WO HCPCS

## 2022-09-24 PROCEDURE — 1210000000 HC MED SURG R&B

## 2022-09-24 PROCEDURE — 2580000003 HC RX 258

## 2022-09-24 PROCEDURE — 85025 COMPLETE CBC W/AUTO DIFF WBC: CPT

## 2022-09-24 PROCEDURE — 6360000002 HC RX W HCPCS

## 2022-09-24 PROCEDURE — 2580000003 HC RX 258: Performed by: PHYSICIAN ASSISTANT

## 2022-09-24 PROCEDURE — 36415 COLL VENOUS BLD VENIPUNCTURE: CPT

## 2022-09-24 PROCEDURE — 6370000000 HC RX 637 (ALT 250 FOR IP)

## 2022-09-24 PROCEDURE — 82947 ASSAY GLUCOSE BLOOD QUANT: CPT

## 2022-09-24 PROCEDURE — 6360000002 HC RX W HCPCS: Performed by: PHYSICIAN ASSISTANT

## 2022-09-24 PROCEDURE — 2580000003 HC RX 258: Performed by: STUDENT IN AN ORGANIZED HEALTH CARE EDUCATION/TRAINING PROGRAM

## 2022-09-24 PROCEDURE — 80053 COMPREHEN METABOLIC PANEL: CPT

## 2022-09-24 RX ORDER — DEXTROSE AND SODIUM CHLORIDE 5; .9 G/100ML; G/100ML
INJECTION, SOLUTION INTRAVENOUS CONTINUOUS
Status: DISCONTINUED | OUTPATIENT
Start: 2022-09-24 | End: 2022-09-28 | Stop reason: HOSPADM

## 2022-09-24 RX ADMIN — ENOXAPARIN SODIUM 40 MG: 100 INJECTION SUBCUTANEOUS at 18:06

## 2022-09-24 RX ADMIN — CEFEPIME 2000 MG: 2 INJECTION, POWDER, FOR SOLUTION INTRAVENOUS at 21:58

## 2022-09-24 RX ADMIN — DEXTROSE AND SODIUM CHLORIDE: 5; 900 INJECTION, SOLUTION INTRAVENOUS at 09:28

## 2022-09-24 RX ADMIN — TRIAMCINOLONE ACETONIDE: 1 CREAM TOPICAL at 09:00

## 2022-09-24 RX ADMIN — TRIAMCINOLONE ACETONIDE: 1 CREAM TOPICAL at 22:05

## 2022-09-24 RX ADMIN — Medication 1000 MG: at 21:58

## 2022-09-24 RX ADMIN — SODIUM CHLORIDE, PRESERVATIVE FREE 20 MG: 5 INJECTION INTRAVENOUS at 21:47

## 2022-09-24 RX ADMIN — Medication 1000 MG: at 10:21

## 2022-09-24 RX ADMIN — SODIUM CHLORIDE, PRESERVATIVE FREE 20 MG: 5 INJECTION INTRAVENOUS at 09:25

## 2022-09-24 RX ADMIN — CEFEPIME 2000 MG: 2 INJECTION, POWDER, FOR SOLUTION INTRAVENOUS at 09:35

## 2022-09-24 RX ADMIN — SODIUM CHLORIDE, PRESERVATIVE FREE 10 ML: 5 INJECTION INTRAVENOUS at 09:36

## 2022-09-24 RX ADMIN — DICYCLOMINE HYDROCHLORIDE 10 MG: 10 CAPSULE ORAL at 21:57

## 2022-09-24 RX ADMIN — DICYCLOMINE HYDROCHLORIDE 10 MG: 10 CAPSULE ORAL at 09:25

## 2022-09-24 ASSESSMENT — ENCOUNTER SYMPTOMS
NAUSEA: 1
COUGH: 0
TROUBLE SWALLOWING: 0
SHORTNESS OF BREATH: 0
SORE THROAT: 0
BLOOD IN STOOL: 0
CONSTIPATION: 0
WHEEZING: 0
SINUS PAIN: 0
ABDOMINAL PAIN: 0
DIARRHEA: 0
ABDOMINAL DISTENTION: 0
VOMITING: 0

## 2022-09-24 ASSESSMENT — PAIN SCALES - GENERAL
PAINLEVEL_OUTOF10: 0

## 2022-09-24 NOTE — ED NOTES
Called Infectious Disease Nurse and left a voicemail about treat options for bed bugs.      Kevin Adler RN  09/23/22 1971

## 2022-09-24 NOTE — H&P
[id number] [facility]      Hospitalist - History & Physical      PCP: Vijay Matthews MD    Date of Admission: 9/23/2022    Date of Service: 9/23/2022    Chief Complaint: Vascular access problem    History Of Present Illness: The patient is a 28 y.o. female who presented to [facility] ER with PMH anxiety, depression, gastroparesis, GERD, Hoyos's esophagus, POTS, complaining of vascular access problem. Patient currently sees [facility] clinic for IVIG and TPN due to her gastroparesis. Patient was seen by vascular surgery and had double-lumen Munguia placed on left internal jugular on 7/26. She presents today due to concern for infection. Patient noted discharge and warmth from Munguia catheter that started last night. She denies fever, chills, nausea, vomiting, diarrhea, abdominal pain, and shortness of breath. In addition does have bedbugs. Work-up in ER WBC 9.2, Hgb 10, HCT 33.2.   Patient is to be admitted to the hospitalist service with consultation to vascular surgery due to infected Munguia catheter  Past Medical History:        Diagnosis Date    Allergic contact dermatitis due to adhesives     Allergic contact dermatitis due to plants, except food     Anemia complicating pregnancy, second trimester     Anemia complicating pregnancy, third trimester     Anxiety and depression     Anxiety disorder     unspecified    Bacteremia     Cellulitis of abdominal wall     Chronic fatigue, unspecified     Dysuria     Encounter for care related to vascular access port 5/19/2022    Epigastric pain     Frequency of micturition     G tube feedings (HCC)     Gastroparesis     Dr. Ana Cristina Holloway in [place] manages    Gastroparesis     Gastrostomy malfunction [facility])     Generalized abdominal pain     GERD (gastroesophageal reflux disease)     Heart murmur     Hiatal hernia     History of Hoyos's esophagus     History of blood transfusion     Hypotension, unspecified     IBS (irritable bowel syndrome)     Insomnia, unspecified Jejunostomy tube present ([facility] [address]     Low back pain     Nausea with vomiting     unspecified    Neck pain     Neuropathy     Orthostatic hypotension     POTS (postural orthostatic tachycardia syndrome)     Premature birth     delivered at 24-27 weeks    Scoliosis     Syncope and collapse     Tachycardia     Tachycardia, unspecified     Toxic gastroenteritis and colitis        Past Surgical History:        Procedure Laterality Date    ABDOMEN SURGERY  2014    gastric stimulator implanted    ADENOIDECTOMY      ADENOIDECTOMY  2007    CHOLECYSTECTOMY      CHOLECYSTECTOMY  2016    DENTAL SURGERY  2004    wisdom teeth    EAR SURGERY  2014    left    FACIAL SURGERY  2005    GASTRIC STIMULATOR IMPLANT SURGERY  11/2014    GASTROSTOMY TUBE PLACEMENT  04/07/2015    J tube-Removed in [place] 1/2018    [address]      G tube 2017    HYSTERECTOMY (CERVIX STATUS UNKNOWN)      INSERTION / REMOVAL / REPLACEMENT VENOUS ACCESS CATHETER N/A 03/25/2016    REMOVAL OF PORT AND PLACEMENT OF NEW PORT; REMOVAL OF PICC LINE  performed by Yessenia Muir MD at [address] [place] [address]  2014    left knee    KNEE SURGERY  2015    right knee    MANDIBLE SURGERY      double jaw    PHARYNGECTOMY      PORT SURGERY      port present 3-25-16    TONSILLECTOMY      TONSILLECTOMY  1997    TUBAL LIGATION      TUBAL LIGATION  2016    TUMOR REMOVAL Left     ear    TUNNELED VENOUS PORT PLACEMENT Right 05/29/2015    TUNNELED VENOUS PORT PLACEMENT      UPPER GASTROINTESTINAL ENDOSCOPY  04/02/2010    Dr Li Cordial ENDOSCOPY  09/25/2013    Dr Philomena Adam  05/2015    Dr. Justus Hdz  5/29/2015 \[facility]\""    Ultrasound-guided cannulation, right internal jugular vein. Right internal jugular vein single-lumen bard powerport placement. VASCULAR SURGERY  9/8/15 \[facility]\""    Right internal jugular vein portograms. Revision of right internal jugular vein single lumen port. VASCULAR SURGERY  11/3/15 S    Ultrasound-guided cannulation, left upper arm basilic vein. Left upper arm basilic vein PICC line placement bard dual-lumen PICC line. Superior vena cava venograms. VASCULAR SURGERY  03/23/2017    S. Left IJV port angiogram.Ultrasound guided cannulation of right basilic vein,right upper extremity PICC line placement bard power PICC,dual lumen. VASCULAR SURGERY  04/27/2022    Ultrasound-guided cannulation right internal jugular vein. Placement of Munguia single-lumen tunneled dialysis catheter. Removal of nonfunctioning left subclavian vein Munguia tunneled dialysis catheter. Repositioning of right internal juglular vein Munguia tunneled dialylsis catheter. Perfomed by Dr. Kelby Sandoval. Susanne Beasley at [address] [place] [address] Medications:  Prior to Admission medications    Medication Sig Start Date End Date Taking?  Authorizing Provider   sodium chloride 0.9 % infusion  7/25/22   Sailaja العراقي MD   OCTAGAM 20 GM/200ML SOLN solution  7/25/22   Sailaja العراقي MD   diphenhydrAMINE (BENADRYL) 50 MG/ML injection  8/9/22   Sailaja العراقي MD   BANOPHEN 25 MG capsule TAKE ONE CAPSULE BY MOUTH 1 HOUR PRIOR TO YOUR PROCEDURE WITH LAST DOSE OF PREDNISONE 6/30/22   Historical Provider, MD   Famotidine (PEPCID IV) Infuse 20 mg intravenously in the morning and at bedtime    Historical Provider, MD   heparin flush 100 UNIT/ML injection 100 Units by Intracatheter route 2 times daily 5/24/22   Pearl Reyes MD   promethazine (PHENERGAN) 25 MG/ML injection Infuse 25 mg intravenously every 3 hours 5/24/22   Pearl Reyes MD   meloxicam (MOBIC) 15 MG tablet Take 15 mg by mouth daily as needed 3/7/22   Historical Provider, MD   dicyclomine (BENTYL) 10 MG capsule Take 10 mg by mouth in the morning and at bedtime     Historical Provider, MD   linaclotide (LINZESS) 145 MCG capsule Take 145 mcg by mouth every morning (before breakfast)    Historical Provider, MD   sodium chloride flush 0.9 % injection Infuse 5-40 mLs intravenously as needed 6-12 times daily    Historical Provider, MD   Misc. Devices [facility]) MISC by Does not apply route Indications: custom wheel chair with smart drive    Historical Provider, MD       Allergies: Iv dye [iodides], Iv dye [iodides], Adhesive tape, Bee pollen, Chlorhexidine, Fruit & vegetable daily [nutritional supplements], Metoprolol succinate [metoprolol], Nsaids, Orange, Orange fruit [citrus], Other, Reglan [metoclopramide], Tramadol, Zofran [ondansetron hcl], Orange oil, and Sulfamethoxazole-trimethoprim    Social History:    The patient currently lives home  Tobacco:   reports that she has never smoked. She has never used smokeless tobacco.  Alcohol:   reports no history of alcohol use. Illicit Drugs: denies    Family History:      Problem Relation Age of Onset    Other Mother         endometriosis    Depression Father     Diabetes Paternal Grandmother     Stroke Paternal Grandmother     Hypertension Paternal Grandmother     Heart Disease Paternal Grandmother     Heart Failure Paternal Grandmother     Colon Cancer Neg Hx     Colon Polyps Neg Hx     Esophageal Cancer Neg Hx     Liver Cancer Neg Hx     Rectal Cancer Neg Hx     Stomach Cancer Neg Hx        Review of Systems:   Review of Systems   Constitutional:  Positive for activity change, appetite change and fatigue. HENT: Negative. Respiratory: Negative. Gastrointestinal: Negative. Genitourinary: Negative. Musculoskeletal:  Positive for arthralgias, gait problem and myalgias. Skin:  Positive for wound (drainage from vascular access). Neurological:  Positive for weakness. 14 point review of systems is negative except as specifically addressed above. Physical Examination:  /68   Pulse (!) 109   Temp 97.8 °F (36.6 °C)   Resp 14   Wt 125 lb (56.7 kg)   LMP 01/14/2018 (Exact Date)   SpO2 98%   BMI 22.86 kg/m²   Physical Exam  Vitals and nursing note reviewed. Constitutional:       General: She is not in acute distress. Appearance: She is ill-appearing (chronically). Comments: Unkempt     HENT:      Mouth/Throat:      Mouth: Mucous membranes are dry. Pharynx: Oropharynx is clear. Eyes:      Extraocular Movements: Extraocular movements intact. Conjunctiva/sclera: Conjunctivae normal.      Pupils: Pupils are equal, round, and reactive to light. Cardiovascular:      Rate and Rhythm: Normal rate and regular rhythm. Pulses: Normal pulses. Heart sounds: Normal heart sounds. No murmur heard. Pulmonary:      Effort: Pulmonary effort is normal. No respiratory distress. Breath sounds: Normal breath sounds. Chest:      Chest wall: No tenderness. Abdominal:      General: Bowel sounds are normal. There is no distension. Palpations: Abdomen is soft. Tenderness: There is no abdominal tenderness. There is no guarding or rebound. Musculoskeletal:         General: No swelling. Normal range of motion. Cervical back: Normal range of motion and neck supple. No rigidity or tenderness. Right lower leg: No edema. Left lower leg: No edema. Skin:     General: Skin is warm and dry. Capillary Refill: Capillary refill takes less than 2 seconds. Comments: Left chest Munguia catheter erythematous base   Neurological:      General: No focal deficit present. Mental Status: She is alert and oriented to person, place, and time. Cranial Nerves: No cranial nerve deficit. Motor: Weakness present.    Psychiatric:         Mood and Affect: Mood normal.         Behavior: Behavior normal.        Diagnostic Data:  CBC:  Recent Labs     09/23/22 1902   WBC 9.2   HGB 10.0*   HCT 33.2*        BMP:  Recent Labs     09/23/22 1902   *   K 4.2      CO2 21*   BUN 9   CREATININE 0.7   CALCIUM 8.6     Recent Labs     09/23/22 1902   AST 24   ALT 17   BILITOT <0.2   ALKPHOS 75       Urinalysis:  Lab Results Component Value Date/Time    NITRU Negative 07/20/2022 02:40 PM    WBCUA 13 07/20/2022 02:40 PM    BACTERIA 1+ 07/20/2022 02:40 PM    RBCUA 1 07/20/2022 02:40 PM    RBCUA 0 03/23/2014 05:45 PM    BLOODU Negative 07/20/2022 02:40 PM    SPECGRAV 1.023 07/20/2022 02:40 PM    GLUCOSEU Negative 07/20/2022 02:40 PM         Assessment/Plan:  Principal Problem:    Infection of vascular catheter, initial encounter    -Consultation to vascular surgery   -Consultation to infectious disease   -Follow-up blood cultures   -IV antibiotics cefepime and vancomycin   -Daily labs   -NPO at midnight  Active Problems:    POTS (postural orthostatic tachycardia syndrome)   -Fall precautions   -Bed alarm on   Infestation of bedbugs   -Shower prior to arrival to floor   -Kenalog twice daily   -Education on hygiene    Autoimmune disease   Gastroparesis    DVT prophylaxis Lovenox    Signed:  SHRUTHI Henson - CNP, 9/23/2022 8:45 PM

## 2022-09-24 NOTE — PLAN OF CARE
Problem: Pain  Goal: Verbalizes/displays adequate comfort level or baseline comfort level  Outcome: Progressing     Problem: Safety - Adult  Goal: Free from fall injury  Outcome: Progressing     Problem: Nutrition Deficit:  Goal: Optimize nutritional status  Outcome: Progressing     Problem: Neurosensory - Adult  Goal: Achieves stable or improved neurological status  Outcome: Progressing  Goal: Absence of seizures  Outcome: Progressing  Goal: Remains free of injury related to seizures activity  Outcome: Progressing  Goal: Achieves maximal functionality and self care  Outcome: Progressing     Problem: Respiratory - Adult  Goal: Achieves optimal ventilation and oxygenation  Outcome: Progressing     Problem: Cardiovascular - Adult  Goal: Maintains optimal cardiac output and hemodynamic stability  Outcome: Progressing  Goal: Absence of cardiac dysrhythmias or at baseline  Outcome: Progressing     Problem: Skin/Tissue Integrity - Adult  Goal: Skin integrity remains intact  Outcome: Progressing  Goal: Incisions, wounds, or drain sites healing without S/S of infection  Outcome: Progressing  Goal: Oral mucous membranes remain intact  Outcome: Progressing     Problem: Musculoskeletal - Adult  Goal: Return mobility to safest level of function  Outcome: Progressing  Goal: Maintain proper alignment of affected body part  Outcome: Progressing  Goal: Return ADL status to a safe level of function  Outcome: Progressing     Problem: Gastrointestinal - Adult  Goal: Minimal or absence of nausea and vomiting  Outcome: Progressing  Goal: Maintains or returns to baseline bowel function  Outcome: Progressing  Goal: Maintains adequate nutritional intake  Outcome: Progressing  Goal: Establish and maintain optimal ostomy function  Outcome: Progressing     Problem: Genitourinary - Adult  Goal: Absence of urinary retention  Outcome: Progressing  Goal: Urinary catheter remains patent  Outcome: Progressing     Problem: Infection - Adult  Goal: Absence of infection at discharge  Outcome: Progressing  Goal: Absence of infection during hospitalization  Outcome: Progressing  Goal: Absence of fever/infection during anticipated neutropenic period  Outcome: Progressing     Problem: Metabolic/Fluid and Electrolytes - Adult  Goal: Electrolytes maintained within normal limits  Outcome: Progressing  Goal: Hemodynamic stability and optimal renal function maintained  Outcome: Progressing  Goal: Glucose maintained within prescribed range  Outcome: Progressing     Problem: Hematologic - Adult  Goal: Maintains hematologic stability  Outcome: Progressing     Problem: Chronic Conditions and Co-morbidities  Goal: Patient's chronic conditions and co-morbidity symptoms are monitored and maintained or improved  Outcome: Progressing     Problem: Discharge Planning  Goal: Discharge to home or other facility with appropriate resources  Outcome: Progressing     Problem: ABCDS Injury Assessment  Goal: Absence of physical injury  Outcome: Progressing

## 2022-09-24 NOTE — PROGRESS NOTES
[facility] Pharmacokinetic Monitoring Service - Vancomycin     Irene Guevara is a 28 y.o. female starting on vancomycin therapy for Skin/Soft Tissue Infection. Pharmacy consulted by Raudel Tracy for monitoring and adjustment. Target Concentration: Goal AUC/WILFRIDO 400-600 mg*hr/L    Additional Antimicrobials: Maxipime    Pertinent Laboratory Values: Wt Readings from Last 1 Encounters:   09/23/22 125 lb (56.7 kg)     Temp Readings from Last 1 Encounters:   09/23/22 97 °F (36.1 °C)     Estimated Creatinine Clearance: 91 mL/min (based on SCr of 0.7 mg/dL). Recent Labs     09/23/22  1902   CREATININE 0.7   WBC 9.2     Procalcitonin: N/A    Pertinent Cultures:  No current cultures at this time  Culture Date Source Results        MRSA Nasal Swab: N/A. Non-respiratory infection.     Plan:  Dosing recommendations based on Bayesian software  Start vancomycin 1000mg Q12hr  Anticipated AUC of 495 and trough concentration of 14.2 at steady state  Renal labs as indicated   Vancomycin concentration ordered for 09/25 @ 0800   Pharmacy will continue to monitor patient and adjust therapy as indicated    Thank you for the consult,  Mario Kellogg [facility]  9/23/2022 11:48 PM

## 2022-09-24 NOTE — PROGRESS NOTES
[facility]      Progress Note    Patient:  Lawrence Gray  YOB: 1990  Date of Service: 9/24/2022  MRN: [id number]   Acct: [de-identified]   Primary Care Physician: Teri Manzo MD  Advance Directive: Full Code  Admit Date: 9/23/2022       Hospital Day: 1    Portions of this note have been copied forward, however, updated to reflect the most current clinical status of this patient. CHIEF COMPLAINT vascular access problem    SUBJECTIVE:  Ms. Horace Manley was resting comfortably in bed this morning. Reported nausea at baseline. Denies shortness of breath or chest pain. Denied fever, chills, vomiting, or diarrhea. CUMULATIVE HOSPITAL COURSE:   The patient is a 77-year-old female with past medical history of anxiety, depression, gastroparesis, GERD, Hoyos's esophagus, and POTS who presented to [address] [place] [address] ED with complaints of vascular access problem. Reported following-year-old female new to the clinic for IVIG and TPN due to gastroparesis. Patient was seen by vascular surgery and had a double lumen Munguia catheter placed on left internal IJ on 7/26/2022. Presented to ED with concerns for catheter infection. Reported noticing discharge and warmth that started night prior to admission. Denied fever, chills, shortness of breath, or chest pain. In addition patient did report bedbugs at home. Work-up in ED revealed unremarkable chemistry, WBC 9.2, Hgb 10. Patient was admitted to hospital medicine for infected Munguia catheter with vascular surgery consultation. Patient was noted to have glucose of 66 this morning. D5 drip initiated. Review of Systems   Constitutional:  Negative for chills, diaphoresis, fatigue and fever. HENT:  Negative for congestion, ear pain, sinus pain, sore throat and trouble swallowing. Eyes:  Negative for visual disturbance. Respiratory:  Negative for cough, shortness of breath and wheezing.     Cardiovascular:  Negative for chest pain, palpitations and leg swelling. Gastrointestinal:  Positive for nausea. Negative for abdominal distention, abdominal pain, blood in stool, constipation, diarrhea and vomiting. Endocrine: Negative for cold intolerance and heat intolerance. Genitourinary:  Negative for difficulty urinating, flank pain, frequency and urgency. Musculoskeletal:  Negative for arthralgias and myalgias. Neurological:  Negative for dizziness, syncope, weakness, light-headedness, numbness and headaches. Hematological:  Does not bruise/bleed easily. Psychiatric/Behavioral:  Negative for agitation, confusion and dysphoric mood. Objective:   VITALS:  BP (!) 93/59   Pulse 82   Temp 97.9 °F (36.6 °C) (Temporal)   Resp 20   Ht 5' 2\" (1.575 m)   Wt 125 lb (56.7 kg)   LMP 01/14/2018 (Exact Date)   SpO2 100%   BMI 22.86 kg/m²   24HR INTAKE/OUTPUT:    Intake/Output Summary (Last 24 hours) at 9/24/2022 1350  Last data filed at 9/24/2022 5020  Gross per 24 hour   Intake 298.36 ml   Output --   Net 298.36 ml         Physical Exam  Constitutional:       General: She is not in acute distress. Appearance: Normal appearance. She is ill-appearing (Chronic). She is not toxic-appearing or diaphoretic. HENT:      Head: Normocephalic and atraumatic. Right Ear: External ear normal.      Left Ear: External ear normal.      Nose: Nose normal. No congestion or rhinorrhea. Mouth/Throat:      Mouth: Mucous membranes are moist.      Pharynx: Oropharynx is clear. Eyes:      General: No scleral icterus. Extraocular Movements: Extraocular movements intact. Conjunctiva/sclera: Conjunctivae normal.   Cardiovascular:      Rate and Rhythm: Normal rate and regular rhythm. Pulses: Normal pulses. Heart sounds: Normal heart sounds. No murmur heard. No friction rub. No gallop. Pulmonary:      Effort: Pulmonary effort is normal. No respiratory distress. Breath sounds: Normal breath sounds. No wheezing, rhonchi or rales. Abdominal:      General: Abdomen is flat. Bowel sounds are normal. There is no distension. Palpations: Abdomen is soft. Tenderness: There is no abdominal tenderness. Musculoskeletal:         General: No swelling. Normal range of motion. Cervical back: Normal range of motion and neck supple. Right lower leg: No edema. Left lower leg: No edema. Skin:     General: Skin is warm and dry. Coloration: Skin is pale. Skin is not jaundiced. Findings: Erythema present. No lesion or rash. Comments: Left chest Munguia catheter in place, erythematous noted at site   Neurological:      General: No focal deficit present. Mental Status: She is alert and oriented to person, place, and time. Mental status is at baseline. Cranial Nerves: No cranial nerve deficit. Sensory: No sensory deficit. Motor: No weakness. Psychiatric:         Mood and Affect: Mood normal.         Behavior: Behavior normal.         Thought Content:  Thought content normal.         Judgment: Judgment normal.          Medications:      dextrose 5 % and 0.9 % NaCl 100 mL/hr at 09/24/22 0928    sodium chloride        cefepime  2,000 mg IntraVENous Q12H    vancomycin  1,000 mg IntraVENous Q12H    vancomycin (VANCOCIN) intermittent dosing (placeholder)   Other RX Placeholder    sodium chloride flush  5-40 mL IntraVENous 2 times per day    enoxaparin  40 mg SubCUTAneous Daily    dicyclomine  10 mg Oral BID    linaclotide  145 mcg Oral QAM AC    famotidine (PEPCID) injection  20 mg IntraVENous BID    triamcinolone   Topical BID     sodium chloride flush, sodium chloride, polyethylene glycol, acetaminophen **OR** acetaminophen, promethazine, promethazine, diphenhydrAMINE  Diet NPO     Lab and other Data:     Recent Labs     09/23/22  1902 09/24/22  0116   WBC 9.2 10.0   HGB 10.0* 10.5*    253     Recent Labs     09/23/22  1902 09/24/22  0116   * 135*   K 4.2 4.1    104   CO2 21* 16*   BUN 9 9   CREATININE 0.7 0.7   GLUCOSE 92 66*     Recent Labs     09/23/22  1902 09/24/22  0116   AST 24 31   ALT 17 18   BILITOT <0.2 0.3   ALKPHOS 75 75       RAD:   No results found. Micro:    COVID-19, Rapid [[id number]] Collected: 09/23/22 1950   Order Status: Completed Specimen: Nasopharyngeal Swab Updated: 09/23/22 2020    SARS-CoV-2, NAAT Not Detected       Assessment/Plan   Principal Problem:    Infection of vascular catheter, initial encounter ([address]  Active Problems:    Infestation by bed bug    Gastroparesis    POTS (postural orthostatic tachycardia syndrome)    Autoimmune disease ([address]  Resolved Problems:    * No resolved hospital problems. *      Principal Problem:    Infection of vascular catheter, initial encounter [facility])-    - Vascular surgery consultation pending   - ID consultation pending    - Continue IV cefepime and vancomycin   - Monitor labs closely   - Follow blood cultures         Active Problems:   POTS (postural orthostatic tachycardia syndrome)-    - Fall precautions   - Bed alarm      Infestation by bed bug-   - noted       Gastroparesis-    -Noted       Autoimmune disease ([address]-    - noted             Antibiotic: Vancomycin and cefepime    DVT Prophylaxis: Lovenox    GI prophylaxis: Pepcid    Discharge planning: TBD      Further Orders per Clinical course/attending. Electronically signed by SHRUTHI Gustafson CNP on 9/24/2022 at 1:50 PM       EMR Dragon/Transcription disclaimer:   Much of this encounter note is an electronic transcription/translation of spoken language to printed text.  The electronic translation of spoken language may permit erroneous, or at times, nonsensical words or phrases to be inadvertently transcribed; although attempts have made to review the note for such errors, some may still exist.

## 2022-09-24 NOTE — ED NOTES
Pt showered and kenalog cream applied.  Pt belongings double bagged     Madina Kaye, FELIBERTO  09/23/22 0573

## 2022-09-24 NOTE — PROGRESS NOTES
Comprehensive Nutrition Assessment    Type and Reason for Visit:  Positive Nutrition Screen    Nutrition Recommendations/Plan:   Recommendations for nutrition support (enteral or parenteral) available per MD consult. NTN will follow POC and goals while inpatient. Malnutrition Assessment:  Malnutrition Status: At risk for malnutrition (Comment) (09/24/22 1212)    Context:  Chronic Illness     Findings of the 6 clinical characteristics of malnutrition:  Energy Intake:  75% or less estimated energy requirements for 1 month or longer  Weight Loss:  Unable to assess (Previous OP note in June 2022: wt 127lb.)     Body Fat Loss:  Unable to assess     Muscle Mass Loss:  Unable to assess    Fluid Accumulation:  Unable to assess     Strength:  Not Performed    Nutrition Assessment:    Pt chronically nutritionally compromised AEB gastroparesis with J-tube, left double lumen Munguia (7/26/22), hx of IV meds and TPN, and suboptimal intake >/= 3 months  based on Chart Reviews. At risk for further compromise due to possible infection in Munguia. Will recommend nutrition support if/when medically appropriate. Nutrition Related Findings:    J-tube, Left IJ dual lumen 7/26; removed right IJ single lumen 7/26 Wound Type: None       Current Nutrition Intake & Therapies:    Average Meal Intake: NPO  Average Supplements Intake: None Ordered  Diet NPO    Anthropometric Measures:  Height: 5' 2\" (157.5 cm)  Ideal Body Weight (IBW): 110 lbs (50 kg)    Admission Body Weight: 125 lb (56.7 kg)  Current Body Weight: 125 lb (56.7 kg), 113.6 % IBW.     Current BMI (kg/m2): 22.9   Weight Adjustment For: No Adjustment   BMI Categories: Normal Weight (BMI 22.0 to 24.9) age over 72    Estimated Daily Nutrient Needs:  Energy Requirements Based On: Kcal/kg  Weight Used for Energy Requirements: Admission  Energy (kcal/day): 1985  Weight Used for Protein Requirements: Admission  Protein (g/day): 85  Method Used for Fluid Requirements: 1

## 2022-09-24 NOTE — CARE COORDINATION
SW met with pt at bedside re: dc needs / placement. Pt states she lives alone in an apartment complex. States she has concerns with returning as it is infested with bed begs. Pt states it has been this way since August. Pt states she has an agency [facility] that comes to her home weekly to assist with dressings. Pt states it is difficult for her to live at home with this being her only assistance. Informed pt about placement. She states she has a cat and would only be agreeable if the cat can come with her. Pt states this is her emotional support animal, however, states she does not have any paperwork stating such. Pt was provided with SNF choice list and states she will think about potential placement. Asked pt who would care for the cat while she is hospitalized. She states she is concerned about this as she has no one. States she does not know her landlords number. Asked pt if this SW could contact apartment complex, animal control, etc for assistance. She was agreeable to this. Placed call to apartment complex - no answer. Placed call to [place] - no answer. Placed call to [facility] for assistance. [place] [employer] stated they would get in touch with Cordell Wilson. Spoke with Animal Control who states they are able to care for cat at pts home while pt is hospitalized. Pts key is here with her. Will get evans from pt and meet Animal Control with it. Pt appreciative and agreeable to this. Animal Control was informed pt has bed bugs. Animal Control was provided pts contact information and states they will call pt for additional information. **Animal Control called this SW and stated they took pts cat to [facility] until dc. Animal Control delivered keys back to hospital. This SW returned them to pt.

## 2022-09-24 NOTE — PROGRESS NOTES
Melania Muir arrived to room # 1   Presented with: CVC INFECTION  Mental Status: Patient is oriented, alert, coherent, logical, thought processes intact, and able to concentrate and follow conversation. Vitals:    09/24/22 0730   BP: (!) 93/59   Pulse: 82   Resp: 20   Temp: 97.9 °F (36.6 °C)   SpO2: 100%     Patient safety contract and falls prevention contract reviewed with patient Yes. Oriented Patient to room. Call light within reach. Yes.   Needs, issues or concerns expressed at this time: no.      Electronically signed by Nickie Stokes RN on 9/24/2022 at 8:37 AM

## 2022-09-24 NOTE — CONSULTS
INFECTIOUS DISEASES CONSULT NOTE    Patient:  Lata Gold 28 y.o. female  ROOM # [unfilled]  YOB: 1990  MRN: [id number]  [facility]:  [id number]  Admit date: 9/23/2022   Admitting Physician: Kristopher Cesar MD  Primary Care Physician: Rebeka Caputo MD  REFERRING PROVIDER: No ref. provider found    Reason for Consultation: Concern for infected catheter    History of Present Illness/Chief Complaint: Pleasant 26-year-old woman. She had double-lumen Munguia placed late July by vascular surgery. She indicates she receives nutritional support at home due to gastroparesis. She indicates there was an area of some opening and slight skin breakdown in the region of the catheter entry site. She indicates there was some drainage. She does not report significant surrounding erythema. She has not had fevers or chills. She does not report other localizing signs of infection. She has had previous intravenous catheters. For what she describes I think they were removed due to function problems but not infection. Infectious disease asked to evaluate and offer recommendations. Current Scheduled Medications:    cefepime  2,000 mg IntraVENous Q12H    vancomycin  1,000 mg IntraVENous Q12H    vancomycin (VANCOCIN) intermittent dosing (placeholder)   Other RX Placeholder    sodium chloride flush  5-40 mL IntraVENous 2 times per day    enoxaparin  40 mg SubCUTAneous Daily    dicyclomine  10 mg Oral BID    linaclotide  145 mcg Oral QAM AC    famotidine (PEPCID) injection  20 mg IntraVENous BID    triamcinolone   Topical BID     Current PRN Medications:  sodium chloride flush, sodium chloride, polyethylene glycol, acetaminophen **OR** acetaminophen, promethazine, promethazine, diphenhydrAMINE    Allergies: Allergies   Allergen Reactions    Iv Dye [Iodides] Shortness Of Breath, Itching and Other (See Comments)     IV 3000;  SoA, dizziness    Iv Dye [Iodides] Anaphylaxis     Iodine containing    Adhesive Tape Itching And tegaderm    Bee Pollen Hives     Oranges-anything with orange color  Oranges-anything with orange color  Oranges-anything with orange color  Oranges-anything with orange color  Oranges-anything with orange color      Chlorhexidine      Other reaction(s): ITCHING    Fruit & Vegetable Daily [Nutritional Supplements]      Oranges-anything with orange color    Metoprolol Succinate [Metoprolol]      Excessive drowsiness/ Metoprolol tartrate-excessive drowsiness    Nsaids      Other reaction(s): GI Intolerance    Baker Itching and Other (See Comments)     Other reaction(s): Vomiting    Orange Fruit [Citrus]      And artificial    Other      Cloraprep    Reglan [Metoclopramide]      Nausea,vomiting    Tramadol Other (See Comments)     Excessive sleeping x 3 days, pt takes 1/2 a pill. Zofran [Ondansetron Hcl] Hives    Orange Oil Nausea And Vomiting    Sulfamethoxazole-Trimethoprim Nausea And Vomiting     Past Medical History: Anxiety/depression. Gastroparesis. She reports an autoimmune disorder. Previous jejunostomy tube. [facility]. Scoliosis. Irritable bowel syndrome. Hoyos's esophagus. Past Surgical History: Cholecystectomy. Gastric stimulator placement. Gastrostomy tube placement. Hysterectomy. Prior venous access device placement. Mandibular surgery. Tubal ligation. Social History: She reports no tobacco, alcohol, or illicit drug use. Family History: Depression. Diabetes. Stroke. Hypertension. Exposure History: No close contacts have been ill    Review of Systems: No cough or shortness of breath. No nausea or vomiting. No diarrhea. No urinary symptoms. Vital Signs:  /78   Pulse 99   Temp 97.3 °F (36.3 °C) (Temporal)   Resp 20   Ht 5' 2\" (1.575 m)   Wt 125 lb (56.7 kg)   LMP 2018 (Exact Date)   SpO2 100%   BMI 22.86 kg/m²  Temp (24hrs), Av.7 °F (36.5 °C), Min:97 °F (36.1 °C), Max:98.6 °F (37 °C)    Physical Exam:   Vital signs reviewed.   Alert, pleasant, no distress  Munguia catheter site left upper chest with about a 6 x 7 mm area of slight breakdown of the skin in conjunction with the catheter entry site. There is minimal amount of exudate at the surface. No significant surrounding erythema. She does report that there was some prior drainage. Heart without murmur. Lungs clear without crackles  Abdomen is soft and nontender    Lab Results:  CBC:   Recent Labs     09/23/22  1902 09/24/22  0116   WBC 9.2 10.0   HGB 10.0* 10.5*   HCT 33.2* 35.5*    253   LYMPHOPCT 19.7* 13.5*   MONOPCT 4.0 4.1     CMP:   Recent Labs     09/23/22  1902 09/24/22  0116   * 135*   K 4.2 4.1    104   CO2 21* 16*   BUN 9 9   CREATININE 0.7 0.7   CALCIUM 8.6 8.8   BILITOT <0.2 0.3   ALKPHOS 75 75   ALT 17 18   AST 24 31   GLUCOSE 92 66*     Culture: Blood cultures x2 done yesterday no growth to date    Impression:   Possible infection or area of skin breakdown at Munguia catheter insertion site. Does not seem to have any symptoms to suggest bloodstream infection. Exam does not show significant localizing findings to suggest progressive infection. Feel any issues at present appear to be primarily superficial and right in the location of the entry site.     Recommendations:    Await culture results  Continue local care  Continue empiric antibiotic treatment at present  Review with vascular surgery  Continue to follow    Shasta Goldmann, MD  09/24/22  5:38 PM

## 2022-09-25 LAB
ALBUMIN SERPL-MCNC: 3.6 G/DL (ref 3.5–5.2)
ALP BLD-CCNC: 68 U/L (ref 35–104)
ALT SERPL-CCNC: 16 U/L (ref 5–33)
ANION GAP SERPL CALCULATED.3IONS-SCNC: 10 MMOL/L (ref 7–19)
AST SERPL-CCNC: 21 U/L (ref 5–32)
BASOPHILS ABSOLUTE: 0.1 K/UL (ref 0–0.2)
BASOPHILS RELATIVE PERCENT: 1.4 % (ref 0–1)
BILIRUB SERPL-MCNC: <0.2 MG/DL (ref 0.2–1.2)
BUN BLDV-MCNC: 9 MG/DL (ref 6–20)
CALCIUM SERPL-MCNC: 8.1 MG/DL (ref 8.6–10)
CHLORIDE BLD-SCNC: 105 MMOL/L (ref 98–111)
CO2: 18 MMOL/L (ref 22–29)
CREAT SERPL-MCNC: 0.7 MG/DL (ref 0.5–0.9)
EOSINOPHILS ABSOLUTE: 0.1 K/UL (ref 0–0.6)
EOSINOPHILS RELATIVE PERCENT: 2.3 % (ref 0–5)
GFR AFRICAN AMERICAN: >59
GFR NON-AFRICAN AMERICAN: >60
GLUCOSE BLD-MCNC: 94 MG/DL (ref 74–109)
HCT VFR BLD CALC: 32.2 % (ref 37–47)
HEMOGLOBIN: 9.4 G/DL (ref 12–16)
IMMATURE GRANULOCYTES #: 0 K/UL
LYMPHOCYTES ABSOLUTE: 1.5 K/UL (ref 1.1–4.5)
LYMPHOCYTES RELATIVE PERCENT: 29.9 % (ref 20–40)
MAGNESIUM: 2 MG/DL (ref 1.6–2.6)
MCH RBC QN AUTO: 25.1 PG (ref 27–31)
MCHC RBC AUTO-ENTMCNC: 29.2 G/DL (ref 33–37)
MCV RBC AUTO: 85.9 FL (ref 81–99)
MONOCYTES ABSOLUTE: 0.5 K/UL (ref 0–0.9)
MONOCYTES RELATIVE PERCENT: 10 % (ref 0–10)
NEUTROPHILS ABSOLUTE: 2.7 K/UL (ref 1.5–7.5)
NEUTROPHILS RELATIVE PERCENT: 56.2 % (ref 50–65)
PDW BLD-RTO: 15.1 % (ref 11.5–14.5)
PLATELET # BLD: 295 K/UL (ref 130–400)
PMV BLD AUTO: 9 FL (ref 9.4–12.3)
POTASSIUM REFLEX MAGNESIUM: 3.5 MMOL/L (ref 3.5–5)
RBC # BLD: 3.75 M/UL (ref 4.2–5.4)
SODIUM BLD-SCNC: 133 MMOL/L (ref 136–145)
TOTAL PROTEIN: 7 G/DL (ref 6.6–8.7)
VANCOMYCIN TROUGH: 19.6 UG/ML (ref 10–20)
WBC # BLD: 4.9 K/UL (ref 4.8–10.8)

## 2022-09-25 PROCEDURE — 2580000003 HC RX 258

## 2022-09-25 PROCEDURE — 1210000000 HC MED SURG R&B

## 2022-09-25 PROCEDURE — 83735 ASSAY OF MAGNESIUM: CPT

## 2022-09-25 PROCEDURE — 6360000002 HC RX W HCPCS: Performed by: PHYSICIAN ASSISTANT

## 2022-09-25 PROCEDURE — 6360000002 HC RX W HCPCS

## 2022-09-25 PROCEDURE — 80053 COMPREHEN METABOLIC PANEL: CPT

## 2022-09-25 PROCEDURE — 85025 COMPLETE CBC W/AUTO DIFF WBC: CPT

## 2022-09-25 PROCEDURE — 2580000003 HC RX 258: Performed by: PHYSICIAN ASSISTANT

## 2022-09-25 PROCEDURE — 2580000003 HC RX 258: Performed by: STUDENT IN AN ORGANIZED HEALTH CARE EDUCATION/TRAINING PROGRAM

## 2022-09-25 PROCEDURE — 36415 COLL VENOUS BLD VENIPUNCTURE: CPT

## 2022-09-25 PROCEDURE — 80202 ASSAY OF VANCOMYCIN: CPT

## 2022-09-25 PROCEDURE — 87040 BLOOD CULTURE FOR BACTERIA: CPT

## 2022-09-25 PROCEDURE — 2500000003 HC RX 250 WO HCPCS

## 2022-09-25 PROCEDURE — 6360000002 HC RX W HCPCS: Performed by: SURGERY

## 2022-09-25 PROCEDURE — 87150 DNA/RNA AMPLIFIED PROBE: CPT

## 2022-09-25 PROCEDURE — 6370000000 HC RX 637 (ALT 250 FOR IP)

## 2022-09-25 RX ORDER — HEPARIN SODIUM (PORCINE) LOCK FLUSH IV SOLN 100 UNIT/ML 100 UNIT/ML
300 SOLUTION INTRAVENOUS PRN
Status: DISCONTINUED | OUTPATIENT
Start: 2022-09-25 | End: 2022-09-28 | Stop reason: HOSPADM

## 2022-09-25 RX ADMIN — LINACLOTIDE 145 MCG: 145 CAPSULE, GELATIN COATED ORAL at 06:30

## 2022-09-25 RX ADMIN — CEFEPIME 2000 MG: 2 INJECTION, POWDER, FOR SOLUTION INTRAVENOUS at 08:00

## 2022-09-25 RX ADMIN — DEXTROSE AND SODIUM CHLORIDE: 5; 900 INJECTION, SOLUTION INTRAVENOUS at 15:42

## 2022-09-25 RX ADMIN — Medication 1000 MG: at 09:18

## 2022-09-25 RX ADMIN — TRIAMCINOLONE ACETONIDE: 1 CREAM TOPICAL at 10:10

## 2022-09-25 RX ADMIN — DICYCLOMINE HYDROCHLORIDE 10 MG: 10 CAPSULE ORAL at 20:39

## 2022-09-25 RX ADMIN — Medication 10 ML: at 13:30

## 2022-09-25 RX ADMIN — HEPARIN 300 UNITS: 100 SYRINGE at 13:30

## 2022-09-25 RX ADMIN — SODIUM CHLORIDE, PRESERVATIVE FREE 10 ML: 5 INJECTION INTRAVENOUS at 09:59

## 2022-09-25 RX ADMIN — DIPHENHYDRAMINE HYDROCHLORIDE 25 MG: 25 TABLET ORAL at 10:04

## 2022-09-25 RX ADMIN — SODIUM CHLORIDE, PRESERVATIVE FREE 20 MG: 5 INJECTION INTRAVENOUS at 20:39

## 2022-09-25 RX ADMIN — SODIUM CHLORIDE, PRESERVATIVE FREE 20 MG: 5 INJECTION INTRAVENOUS at 09:40

## 2022-09-25 RX ADMIN — ENOXAPARIN SODIUM 40 MG: 100 INJECTION SUBCUTANEOUS at 17:28

## 2022-09-25 RX ADMIN — CEFEPIME 2000 MG: 2 INJECTION, POWDER, FOR SOLUTION INTRAVENOUS at 20:39

## 2022-09-25 RX ADMIN — DEXTROSE AND SODIUM CHLORIDE: 5; 900 INJECTION, SOLUTION INTRAVENOUS at 03:36

## 2022-09-25 RX ADMIN — DICYCLOMINE HYDROCHLORIDE 10 MG: 10 CAPSULE ORAL at 09:40

## 2022-09-25 ASSESSMENT — ENCOUNTER SYMPTOMS
CONSTIPATION: 0
SORE THROAT: 0
BLOOD IN STOOL: 0
SHORTNESS OF BREATH: 0
ABDOMINAL DISTENTION: 0
SINUS PAIN: 0
COUGH: 0
DIARRHEA: 0
ABDOMINAL PAIN: 0
VOMITING: 0
TROUBLE SWALLOWING: 0
WHEEZING: 0
NAUSEA: 1

## 2022-09-25 ASSESSMENT — PAIN SCALES - GENERAL: PAINLEVEL_OUTOF10: 0

## 2022-09-25 NOTE — PROGRESS NOTES
[facility]      [facility] Note    Patient:  Lawrence Gray  YOB: 1990  Date of Service: 9/25/2022  MRN: [id number]   Acct: [de-identified]   Primary Care Physician: Teri Manzo MD  Advance Directive: Full Code  Admit Date: 9/23/2022       Hospital Day: 2    Portions of this note have been copied forward, however, updated to reflect the most current clinical status of this patient. CHIEF COMPLAINT vascular access problem    SUBJECTIVE:  Ms. Horace Manley was resting in bed this morning. Reported itching to BLE, stating secondary to bed bug. Reported nausea at baseline. CUMULATIVE HOSPITAL COURSE:   The patient is a 77-year-old female with past medical history of anxiety, depression, gastroparesis, GERD, Hoyos's esophagus, and POTS who presented to [address] [place] [address] ED with complaints of vascular access problem. Reported following [facility] motility clinic for IVIG and TPN due to gastroparesis. Patient was seen by vascular surgery and had a double lumen Munguia catheter placed on left internal IJ on 7/26/2022. Presented to ED with concerns for catheter infection. Reported noticing discharge and warmth that started night prior to admission. Denied fever, chills, shortness of breath, or chest pain. In addition patient did report bedbugs at home. Work-up in ED revealed unremarkable chemistry, WBC 9.2, Hgb 10. Patient was admitted to hospital medicine for infected Munguia catheter with vascular surgery consultation. Patient was noted to have glucose of 66 on morning of 9/24/22. D5 drip initiated. ID recommended continuing current empiric antibiotics, follow blood cultures, continue local care. Vascular surgery recommended blood cultures from catheter lumens. Review of Systems   Constitutional:  Negative for chills, diaphoresis, fatigue and fever. HENT:  Negative for congestion, ear pain, sinus pain, sore throat and trouble swallowing. Eyes:  Negative for visual disturbance.    Respiratory: Negative for cough, shortness of breath and wheezing. Cardiovascular:  Negative for chest pain, palpitations and leg swelling. Gastrointestinal:  Positive for nausea. Negative for abdominal distention, abdominal pain, blood in stool, constipation, diarrhea and vomiting. Endocrine: Negative for cold intolerance and heat intolerance. Genitourinary:  Negative for difficulty urinating, flank pain, frequency and urgency. Musculoskeletal:  Negative for arthralgias and myalgias. Neurological:  Negative for dizziness, syncope, weakness, light-headedness, numbness and headaches. Hematological:  Does not bruise/bleed easily. Psychiatric/Behavioral:  Negative for agitation, confusion and dysphoric mood. Objective:   VITALS:  BP 99/62   Pulse 80   Temp 98.6 °F (37 °C) (Temporal)   Resp 18   Ht 5' 2\" (1.575 m)   Wt 125 lb (56.7 kg)   LMP 01/14/2018 (Exact Date)   SpO2 98%   BMI 22.86 kg/m²   24HR INTAKE/OUTPUT:    Intake/Output Summary (Last 24 hours) at 9/25/2022 1432  Last data filed at 9/25/2022 1145  Gross per 24 hour   Intake 1213.54 ml   Output --   Net 1213.54 ml           Physical Exam  Constitutional:       General: She is not in acute distress. Appearance: Normal appearance. She is ill-appearing (Chronic). She is not toxic-appearing or diaphoretic. HENT:      Head: Normocephalic and atraumatic. Right Ear: External ear normal.      Left Ear: External ear normal.      Nose: Nose normal. No congestion or rhinorrhea. Mouth/Throat:      Mouth: Mucous membranes are moist.      Pharynx: Oropharynx is clear. Eyes:      General: No scleral icterus. Extraocular Movements: Extraocular movements intact. Conjunctiva/sclera: Conjunctivae normal.   Cardiovascular:      Rate and Rhythm: Normal rate and regular rhythm. Pulses: Normal pulses. Heart sounds: Normal heart sounds. No murmur heard. No friction rub. No gallop.    Pulmonary:      Effort: Pulmonary effort is normal. No respiratory distress. Breath sounds: Normal breath sounds. No wheezing, rhonchi or rales. Abdominal:      General: Abdomen is flat. Bowel sounds are normal. There is no distension. Palpations: Abdomen is soft. Tenderness: There is no abdominal tenderness. Musculoskeletal:         General: No swelling. Normal range of motion. Cervical back: Normal range of motion and neck supple. Right lower leg: No edema. Left lower leg: No edema. Skin:     General: Skin is warm and dry. Coloration: Skin is pale. Skin is not jaundiced. Findings: Erythema present. No lesion or rash. Comments: Left chest Munguia catheter in place, erythematous noted at site   Neurological:      General: No focal deficit present. Mental Status: She is alert and oriented to person, place, and time. Mental status is at baseline. Cranial Nerves: No cranial nerve deficit. Sensory: No sensory deficit. Motor: No weakness. Psychiatric:         Mood and Affect: Mood normal.         Behavior: Behavior normal.         Thought Content: Thought content normal.         Judgment: Judgment normal.          Medications:      dextrose 5 % and 0.9 % NaCl 100 mL/hr at 09/25/22 0336    sodium chloride        [START ON 9/26/2022] vancomycin  750 mg IntraVENous Q12H    cefepime  2,000 mg IntraVENous Q12H    vancomycin (VANCOCIN) intermittent dosing (placeholder)   Other RX Placeholder    sodium chloride flush  5-40 mL IntraVENous 2 times per day    enoxaparin  40 mg SubCUTAneous Daily    dicyclomine  10 mg Oral BID    linaclotide  145 mcg Oral QAM AC    famotidine (PEPCID) injection  20 mg IntraVENous BID    triamcinolone   Topical BID     heparin flush, sodium chloride flush, sodium chloride, polyethylene glycol, acetaminophen **OR** acetaminophen, promethazine, promethazine, diphenhydrAMINE  ADULT DIET;  Regular     Lab and other Data:     Recent Labs     09/23/22  1902 09/24/22  0116 09/25/22  0322   WBC 9.2 10.0 4.9   HGB 10.0* 10.5* 9.4*    253 295       Recent Labs     09/23/22 1902 09/24/22  0116 09/25/22  0322   * 135* 133*   K 4.2 4.1 3.5    104 105   CO2 21* 16* 18*   BUN 9 9 9   CREATININE 0.7 0.7 0.7   GLUCOSE 92 66* 94       Recent Labs     09/23/22 1902 09/24/22  0116 09/25/22  0322   AST 24 31 21   ALT 17 18 16   BILITOT <0.2 0.3 <0.2   ALKPHOS 75 75 68         RAD:   No results found. Micro:    COVID-19, Rapid [[id number]] Collected: 09/23/22 1950   Order Status: Completed Specimen: Nasopharyngeal Swab Updated: 09/23/22 2020    SARS-CoV-2, NAAT Not Detected       Assessment/Plan   Principal Problem:    Infection of vascular catheter, initial encounter ([facility]  Active Problems:    Infestation by bed bug    Gastroparesis    POTS (postural orthostatic tachycardia syndrome)    Autoimmune disease ([facility]  Resolved Problems:    * No resolved hospital problems. *      Principal Problem:    Infection of vascular catheter, initial encounter [facility])-    - Vascular surgery consulted     - Recommended blood cultures from catheter lumens    - ID following     - Recommended continuing current empiric antibiotics, follow blood cultures, and local care     - Continue IV cefepime and vancomycin   - Monitor labs closely   - Blood cultures- NGTD   - Follow repeat blood cultures from catheter lumens        Active Problems:   POTS (postural orthostatic tachycardia syndrome)-    - Fall precautions   - Bed alarm              Antibiotic: Vancomycin and cefepime    DVT Prophylaxis: Lovenox    GI prophylaxis: Pepcid    Discharge planning: TBD      Further Orders per Clinical course/attending. Electronically signed by SHRUTHI Mckenna CNP on 9/25/2022 at 2:32 PM       EMR Dragon/Transcription disclaimer:   Much of this encounter note is an electronic transcription/translation of spoken language to printed text.  The electronic translation of spoken language may permit erroneous, or at times, nonsensical words or phrases to be inadvertently transcribed; although attempts have made to review the note for such errors, some may still exist.

## 2022-09-25 NOTE — PROGRESS NOTES
[address] [facility] Pharmacokinetic Monitoring Service - Vancomycin    Consulting Provider: SUMMER Gonzalez   Indication: SSTI  Target Concentration: Goal AUC/WILFRIDO 400-600 mg*hr/L  Day of Therapy: 3  Additional Antimicrobials: Cefepime    Pertinent Laboratory Values: Wt Readings from Last 1 Encounters:   09/24/22 125 lb (56.7 kg)     Temp Readings from Last 1 Encounters:   09/25/22 98.6 °F (37 °C) (Temporal)     Estimated Creatinine Clearance: 91 mL/min (based on SCr of 0.7 mg/dL). Recent Labs     09/24/22  0116 09/25/22  0322   CREATININE 0.7 0.7   WBC 10.0 4.9       Pertinent Cultures:  Culture Date Source Results   09/23/22 Blood Sent   09/25/22 Blood Sent   MRSA Nasal Swab: N/A. Non-respiratory infection.     Recent vancomycin administrations                     vancomycin (VANCOCIN) 1000 mg in dextrose 5% 250 mL IVPB (mg) 1,000 mg New Bag 09/25/22 0918     1,000 mg New Bag 09/24/22 2158     1,000 mg New Bag  1021     1,000 mg New Bag 09/23/22 2103                    Assessment:  Date/Time Current Dose Concentration Timing of Concentration (h) AUC   09/25/22  0812 1000 mg 19.6 10 h 14 m 655   Note: Serum concentrations collected for AUC dosing may appear elevated if collected in close proximity to the dose administered, this is not necessarily an indication of toxicity    Plan:  Current dosing regimen is supra-therapeutic  \"Decrease dose to 750 mg IV q12h  Repeat vancomycin concentration ordered for 9/27 @ [place] [id number] will continue to monitor patient and adjust therapy as indicated    Thank you for the consult,  Vladimir Nuñez RPH, BCPS  9/25/2022 11:37 AM

## 2022-09-25 NOTE — PROGRESS NOTES
Infectious Diseases Progress Note    Patient:  Alexsandra Harris  YOB: 1990  MRN: [id number]   Admit date: 9/23/2022   Admitting Physician: Brooklynn Esparza MD  Primary Care Physician: Tracie Ware MD    Chief Complaint/Interval History: She is without fever. She is hemodynamically stable. No new complaints. In/Out    Intake/Output Summary (Last 24 hours) at 9/25/2022 1355  Last data filed at 9/25/2022 1145  Gross per 24 hour   Intake 1213.54 ml   Output --   Net 1213.54 ml     Allergies: Allergies   Allergen Reactions    Iv Dye [Iodides] Shortness Of Breath, Itching and Other (See Comments)     IV 3000; SoA, dizziness    Iv Dye [Iodides] Anaphylaxis     Iodine containing    Adhesive Tape Itching     And tegaderm    Bee Pollen Hives     Oranges-anything with orange color  Oranges-anything with orange color  Oranges-anything with orange color  Oranges-anything with orange color  Oranges-anything with orange color      Chlorhexidine      Other reaction(s): ITCHING    Fruit & Vegetable Daily [Nutritional Supplements]      Oranges-anything with orange color    Metoprolol Succinate [Metoprolol]      Excessive drowsiness/ Metoprolol tartrate-excessive drowsiness    Nsaids      Other reaction(s): GI Intolerance    [place] Itching and Other (See Comments)     Other reaction(s): Vomiting    Orange Fruit [Citrus]      And artificial    Other      Cloraprep    Reglan [Metoclopramide]      Nausea,vomiting    Tramadol Other (See Comments)     Excessive sleeping x 3 days, pt takes 1/2 a pill.     Zofran [Ondansetron Hcl] Hives    Orange Oil Nausea And Vomiting    Sulfamethoxazole-Trimethoprim Nausea And Vomiting     Current Meds: [START ON 9/26/2022] vancomycin (VANCOCIN) 750 mg in dextrose 5 % 250 mL IVPB, Q12H  heparin flush 100 UNIT/ML injection 300 Units, PRN  dextrose 5 % and 0.9 % sodium chloride infusion, Continuous  cefepime (MAXIPIME) 2,000 mg in sodium chloride 0.9 % 50 mL IVPB ([id number]), Q12H  vancomycin (VANCOCIN) intermittent dosing (placeholder), RX Placeholder  sodium chloride flush 0.9 % injection 5-40 mL, 2 times per day  sodium chloride flush 0.9 % injection 5-40 mL, PRN  0.9 % sodium chloride infusion, PRN  enoxaparin (LOVENOX) injection 40 mg, Daily  polyethylene glycol (GLYCOLAX) packet 17 g, Daily PRN  acetaminophen (TYLENOL) tablet 650 mg, Q6H PRN   Or  acetaminophen (TYLENOL) suppository 650 mg, Q6H PRN  promethazine (PHENERGAN) tablet 12.5 mg, Q6H PRN  dicyclomine (BENTYL) capsule 10 mg, BID  linaclotide (LINZESS) capsule 145 mcg, QAM AC  promethazine (PHENERGAN) injection 6.25 mg, Q6H PRN  diphenhydrAMINE (BENADRYL) tablet 25 mg, Q6H PRN  famotidine (PEPCID) 20 mg in sodium chloride (PF) 10 mL injection, BID  triamcinolone (KENALOG) 0.1 % cream, BID      Review of Systems see HPI    VitalSigns:  BP 99/62   Pulse 80   Temp 98.6 °F (37 °C) (Temporal)   Resp 18   Ht 5' 2\" (1.575 m)   Wt 125 lb (56.7 kg)   LMP 01/14/2018 (Exact Date)   SpO2 98%   BMI 22.86 kg/m²      Physical Exam  Line/IV site: No erythema, warmth, induration, or tenderness. At Munguia catheter site there is a little bit of skin breakdown superficially. Measures about 8 x 6 mm. No purulence. No real crusting. No surrounding erythema. Lab Results:  CBC:   Recent Labs     09/23/22 1902 09/24/22  0116 09/25/22  0322   WBC 9.2 10.0 4.9   HGB 10.0* 10.5* 9.4*    253 295     BMP:  Recent Labs     09/23/22 1902 09/24/22  0116 09/25/22  0322   * 135* 133*   K 4.2 4.1 3.5    104 105   CO2 21* 16* 18*   BUN 9 9 9   CREATININE 0.7 0.7 0.7   GLUCOSE 92 66* 94     CultureResults:  Blood culture September 23, 2022-no growth  Blood cultures September 25, 2022-pending    Radiology: None    Additional Studies Reviewed:  None    Impression:  She has some superficial skin breakdown and a small area just superior to the Munguia entry site. Does not have tenderness along the tunnel.   There was no purulence. No crusting. No surrounding erythema. She has no systemic symptoms to suggest infection. Recommendations:  Explained that with a little bit of skin breakdown at the Munguia site, there is a risk of development of infection. At present no purulence or systemic symptoms. If blood cultures remain negative which I suspect they will, feel we can discontinue intravenous antibiotic treatment. If there are progressive findings at the Munguia entry site or new findings to suggest infection she may need Munguia catheter removal.  If findings stabilize or improve, may be reasonable to monitor and give her a short course of oral antibiotic treatment.   Vascular surgery evaluating    Obinna Segovia MD

## 2022-09-25 NOTE — PROGRESS NOTES
Patient seen and examined. Blood cultures remain negative. Recommend blood culture from catheter lumens.   Full consult to follow

## 2022-09-25 NOTE — PLAN OF CARE
Problem: Pain  Goal: Verbalizes/displays adequate comfort level or baseline comfort level  Outcome: Progressing  Flowsheets (Taken 9/24/2022 2315)  Verbalizes/displays adequate comfort level or baseline comfort level:   Encourage patient to monitor pain and request assistance   Assess pain using appropriate pain scale   Administer analgesics based on type and severity of pain and evaluate response   Notify Licensed Independent Practitioner if interventions unsuccessful or patient reports new pain   Consider cultural and social influences on pain and pain management   Implement non-pharmacological measures as appropriate and evaluate response     Problem: Safety - Adult  Goal: Free from fall injury  Outcome: Progressing  Flowsheets (Taken 9/24/2022 2334)  Free From Fall Injury: Instruct family/caregiver on patient safety     Problem: Nutrition Deficit:  Goal: Optimize nutritional status  Outcome: Progressing  Flowsheets (Taken 9/24/2022 1205 by Elisa Black, MS, RD, LD)  Nutrient intake appropriate for improving, restoring, or maintaining nutritional needs: Assess nutritional status and recommend course of action     Problem: Neurosensory - Adult  Goal: Achieves stable or improved neurological status  Outcome: Progressing  Flowsheets  Taken 9/24/2022 2323  Achieves stable or improved neurological status: Assess for and report changes in neurological status  Taken 9/24/2022 2245  Achieves stable or improved neurological status: Assess for and report changes in neurological status  Goal: Absence of seizures  Outcome: Progressing  Flowsheets  Taken 9/24/2022 2323  Absence of seizures:   Monitor for seizure activity.   If seizure occurs, document type and location of movements and any associated apnea   If seizure occurs, turn head to side and suction secretions as needed   Administer anticonvulsants as ordered   Support airway/breathing, administer oxygen as needed   Diagnostic studies as ordered  Taken 9/24/2022 2245  Absence of seizures:   Monitor for seizure activity.   If seizure occurs, document type and location of movements and any associated apnea   If seizure occurs, turn head to side and suction secretions as needed   Administer anticonvulsants as ordered   Support airway/breathing, administer oxygen as needed   Diagnostic studies as ordered  Goal: Remains free of injury related to seizures activity  Outcome: Progressing  Flowsheets  Taken 9/24/2022 2323  Remains free of injury related to seizure activity:   Maintain airway, patient safety  and administer oxygen as ordered   Monitor patient for seizure activity, document and report duration and description of seizure to Licensed Independent Practitioner   If seizure occurs, turn patient to side and suction secretions as needed   Reorient patient post seizure   Seizure pads on all 4 side rails   Instruct patient/family to notify RN of any seizure activity   Instruct patient/family to call for assistance with activity based on assessment  Taken 9/24/2022 2245  Remains free of injury related to seizure activity:   Maintain airway, patient safety  and administer oxygen as ordered   Monitor patient for seizure activity, document and report duration and description of seizure to Licensed Independent Practitioner   If seizure occurs, turn patient to side and suction secretions as needed   Reorient patient post seizure   Seizure pads on all 4 side rails   Instruct patient/family to notify RN of any seizure activity   Instruct patient/family to call for assistance with activity based on assessment  Goal: Achieves maximal functionality and self care  Outcome: Progressing  Flowsheets  Taken 9/24/2022 2323  Achieves maximal functionality and self care:   Monitor swallowing and airway patency with patient fatigue and changes in neurological status   Encourage and assist patient to increase activity and self care with guidance from physical therapy/occupational therapy Encourage visually impaired, hearing impaired and aphasic patients to use assistive/communication devices  Taken 9/24/2022 2245  Achieves maximal functionality and self care:   Monitor swallowing and airway patency with patient fatigue and changes in neurological status   Encourage and assist patient to increase activity and self care with guidance from physical therapy/occupational therapy   Encourage visually impaired, hearing impaired and aphasic patients to use assistive/communication devices     Problem: Respiratory - Adult  Goal: Achieves optimal ventilation and oxygenation  Outcome: Progressing  Flowsheets  Taken 9/24/2022 2323  Achieves optimal ventilation and oxygenation:   Assess for changes in respiratory status   Assess for changes in mentation and behavior   Position to facilitate oxygenation and minimize respiratory effort   Oxygen supplementation based on oxygen saturation or arterial blood gases   Initiate smoking cessation protocol as indicated   Encourage broncho-pulmonary hygiene including cough, deep breathe, incentive spirometry   Assess the need for suctioning and aspirate as needed   Assess and instruct to report shortness of breath or any respiratory difficulty   Respiratory therapy support as indicated  Taken 9/24/2022 2245  Achieves optimal ventilation and oxygenation:   Assess for changes in respiratory status   Position to facilitate oxygenation and minimize respiratory effort   Assess for changes in mentation and behavior   Oxygen supplementation based on oxygen saturation or arterial blood gases   Initiate smoking cessation protocol as indicated   Encourage broncho-pulmonary hygiene including cough, deep breathe, incentive spirometry   Assess the need for suctioning and aspirate as needed   Assess and instruct to report shortness of breath or any respiratory difficulty   Respiratory therapy support as indicated     Problem: Cardiovascular - Adult  Goal: Maintains optimal cardiac output and hemodynamic stability  Outcome: Progressing  Flowsheets  Taken 9/24/2022 2323  Maintains optimal cardiac output and hemodynamic stability:   Monitor blood pressure and heart rate   Monitor urine output and notify Licensed Independent Practitioner for values outside of normal range  Taken 9/24/2022 2245  Maintains optimal cardiac output and hemodynamic stability:   Monitor blood pressure and heart rate   Monitor urine output and notify Licensed Independent Practitioner for values outside of normal range  Goal: Absence of cardiac dysrhythmias or at baseline  Outcome: Progressing  Flowsheets  Taken 9/24/2022 2323  Absence of cardiac dysrhythmias or at baseline: Monitor cardiac rate and rhythm  Taken 9/24/2022 2245  Absence of cardiac dysrhythmias or at baseline: Monitor cardiac rate and rhythm     Problem: Skin/Tissue Integrity - Adult  Goal: Skin integrity remains intact  Outcome: Progressing  Flowsheets  Taken 9/24/2022 2323  Skin Integrity Remains Intact:   Monitor for areas of redness and/or skin breakdown   Every 4-6 hours minimum: Change oxygen saturation probe site   Every 4-6 hours: If on nasal continuous positive airway pressure, respiratory therapy assesses nares and determine need for appliance change or resting period  Taken 9/24/2022 2245  Skin Integrity Remains Intact:   Monitor for areas of redness and/or skin breakdown   Every 4-6 hours minimum: Change oxygen saturation probe site   Every 4-6 hours: If on nasal continuous positive airway pressure, respiratory therapy assesses nares and determine need for appliance change or resting period  Goal: Incisions, wounds, or drain sites healing without S/S of infection  Outcome: Progressing  Flowsheets  Taken 9/24/2022 2323  Incisions, Wounds, or Drain Sites Healing Without Sign and Symptoms of Infection: ADMISSION and DAILY: Assess and document risk factors for pressure ulcer development  Taken 9/24/2022 2245  Incisions, Wounds, or Drain Sites Healing Without Sign and Symptoms of Infection: ADMISSION and DAILY: Assess and document risk factors for pressure ulcer development  Goal: Oral mucous membranes remain intact  Outcome: Progressing  Flowsheets  Taken 9/24/2022 2323  Oral Mucous Membranes Remain Intact:   Assess oral mucosa and hygiene practices   Implement preventative oral hygiene regimen   Implement oral medicated treatments as ordered  Taken 9/24/2022 2245  Oral Mucous Membranes Remain Intact:   Assess oral mucosa and hygiene practices   Implement preventative oral hygiene regimen   Implement oral medicated treatments as ordered     Problem: Musculoskeletal - Adult  Goal: Return mobility to safest level of function  Outcome: Progressing  Flowsheets (Taken 9/24/2022 2323)  Return Mobility to Safest Level of Function:   Assess patient stability and activity tolerance for standing, transferring and ambulating with or without assistive devices   Assist with transfers and ambulation using safe patient handling equipment as needed  Goal: Maintain proper alignment of affected body part  Outcome: Progressing  Flowsheets (Taken 9/24/2022 2323)  Maintain proper alignment of affected body part: Support and protect limb and body alignment per provider's orders  Goal: Return ADL status to a safe level of function  Outcome: Progressing  Flowsheets (Taken 9/24/2022 2323)  Return ADL Status to a Safe Level of Function: Assess activities of daily living deficits and provide assistive devices as needed     Problem: Gastrointestinal - Adult  Goal: Minimal or absence of nausea and vomiting  Outcome: Progressing  Flowsheets  Taken 9/24/2022 2323  Minimal or absence of nausea and vomiting: Administer IV fluids as ordered to ensure adequate hydration  Taken 9/24/2022 2245  Minimal or absence of nausea and vomiting: Administer IV fluids as ordered to ensure adequate hydration  Goal: Maintains or returns to baseline bowel function  Outcome: Progressing  Flowsheets  Taken 9/24/2022 2323  Maintains or returns to baseline bowel function: Assess bowel function  Taken 9/24/2022 2245  Maintains or returns to baseline bowel function: Assess bowel function  Goal: Maintains adequate nutritional intake  Outcome: Progressing  Flowsheets  Taken 9/24/2022 2323  Maintains adequate nutritional intake: (pt is npo)   Monitor intake and output, weight and lab values   Monitor percentage of each meal consumed  Taken 9/24/2022 2245  Maintains adequate nutritional intake: (pt is npo)   Monitor percentage of each meal consumed   Monitor intake and output, weight and lab values  Goal: Establish and maintain optimal ostomy function  Outcome: Progressing  Flowsheets  Taken 9/24/2022 2323  Establish and maintain optimal ostomy function: Monitor output from ostomies  Taken 9/24/2022 2245  Establish and maintain optimal ostomy function:   Monitor output from ostomies   Administer IV fluids and TPN as ordered     Problem: Genitourinary - Adult  Goal: Absence of urinary retention  Outcome: Progressing  Flowsheets  Taken 9/24/2022 2323  Absence of urinary retention: Assess patients ability to void and empty bladder  Taken 9/24/2022 2245  Absence of urinary retention:   Assess patients ability to void and empty bladder   Monitor intake/output and perform bladder scan as needed  Goal: Urinary catheter remains patent  Outcome: Progressing  Flowsheets  Taken 9/24/2022 2323  Urinary catheter remains patent: (no calvillo) Assess need for a larger catheter size or a 3-way catheter for continuous bladder irrigation  Taken 9/24/2022 2245  Urinary catheter remains patent: (no calvillo) Assess patency of urinary catheter     Problem: Infection - Adult  Goal: Absence of infection at discharge  Outcome: Progressing  Flowsheets (Taken 9/24/2022 2323)  Absence of infection at discharge:   Assess and monitor for signs and symptoms of infection   Monitor lab/diagnostic results   Monitor all insertion sites i.e., indwelling lines, tubes and drains   Monitor endotracheal (as able) and nasal secretions for changes in amount and color   Instruct and encourage patient and family to use good hand hygiene technique   Identify and instruct in appropriate isolation precautions for identified infection/condition  Goal: Absence of infection during hospitalization  Outcome: Progressing  Flowsheets (Taken 9/24/2022 2323)  Absence of infection during hospitalization:   Assess and monitor for signs and symptoms of infection   Monitor lab/diagnostic results   Monitor all insertion sites i.e., indwelling lines, tubes and drains   Monitor endotracheal (as able) and nasal secretions for changes in amount and color   Instruct and encourage patient and family to use good hand hygiene technique   Identify and instruct in appropriate isolation precautions for identified infection/condition  Goal: Absence of fever/infection during anticipated neutropenic period  Outcome: Progressing  Flowsheets (Taken 9/24/2022 2323)  Absence of fever/infection during anticipated neutropenic period: Monitor white blood cell count     Problem: Metabolic/Fluid and Electrolytes - Adult  Goal: Electrolytes maintained within normal limits  Outcome: Progressing  Flowsheets (Taken 9/24/2022 2323)  Electrolytes maintained within normal limits:   Monitor labs and assess patient for signs and symptoms of electrolyte imbalances   Administer electrolyte replacement as ordered  Goal: Hemodynamic stability and optimal renal function maintained  Outcome: Progressing  Flowsheets (Taken 9/24/2022 2323)  Hemodynamic stability and optimal renal function maintained:   Monitor labs and assess for signs and symptoms of volume excess or deficit   Monitor intake, output and patient weight  Goal: Glucose maintained within prescribed range  Outcome: Progressing  Flowsheets (Taken 9/24/2022 2323)  Glucose maintained within prescribed range:   Monitor blood glucose as ordered   Assess for signs and symptoms of hyperglycemia and hypoglycemia   Administer ordered medications to maintain glucose within target range     Problem: Hematologic - Adult  Goal: Maintains hematologic stability  Outcome: Progressing  Flowsheets (Taken 9/24/2022 2323)  Maintains hematologic stability: Assess for signs and symptoms of bleeding or hemorrhage     Problem: Chronic Conditions and Co-morbidities  Goal: Patient's chronic conditions and co-morbidity symptoms are monitored and maintained or improved  Outcome: Progressing  Flowsheets (Taken 9/24/2022 2323)  Care Plan - Patient's Chronic Conditions and Co-Morbidity Symptoms are Monitored and Maintained or Improved:   Monitor and assess patient's chronic conditions and comorbid symptoms for stability, deterioration, or improvement   Collaborate with multidisciplinary team to address chronic and comorbid conditions and prevent exacerbation or deterioration   Update acute care plan with appropriate goals if chronic or comorbid symptoms are exacerbated and prevent overall improvement and discharge     Problem: Discharge Planning  Goal: Discharge to home or other facility with appropriate resources  Outcome: Progressing  Flowsheets (Taken 9/24/2022 2323)  Discharge to home or other facility with appropriate resources:   Identify barriers to discharge with patient and caregiver   Arrange for needed discharge resources and transportation as appropriate   Identify discharge learning needs (meds, wound care, etc)   Refer to discharge planning if patient needs post-hospital services based on physician order or complex needs related to functional status, cognitive ability or social support system     Problem: ABCDS Injury Assessment  Goal: Absence of physical injury  Outcome: Progressing  Flowsheets (Taken 9/24/2022 2334)  Absence of Physical Injury: Implement safety measures based on patient assessment

## 2022-09-26 PROBLEM — L08.9 SOFT TISSUE INFECTION: Status: ACTIVE | Noted: 2022-09-26

## 2022-09-26 LAB
ALBUMIN SERPL-MCNC: 3.4 G/DL (ref 3.5–5.2)
ALP BLD-CCNC: 63 U/L (ref 35–104)
ALT SERPL-CCNC: 14 U/L (ref 5–33)
ANION GAP SERPL CALCULATED.3IONS-SCNC: 12 MMOL/L (ref 7–19)
ANISOCYTOSIS: ABNORMAL
AST SERPL-CCNC: 20 U/L (ref 5–32)
BASOPHILS ABSOLUTE: 0.1 K/UL (ref 0–0.2)
BASOPHILS RELATIVE PERCENT: 1 % (ref 0–1)
BILIRUB SERPL-MCNC: <0.2 MG/DL (ref 0.2–1.2)
BUN BLDV-MCNC: 8 MG/DL (ref 6–20)
CALCIUM SERPL-MCNC: 8.3 MG/DL (ref 8.6–10)
CHLORIDE BLD-SCNC: 107 MMOL/L (ref 98–111)
CO2: 17 MMOL/L (ref 22–29)
CREAT SERPL-MCNC: 0.7 MG/DL (ref 0.5–0.9)
EOSINOPHILS ABSOLUTE: 0.1 K/UL (ref 0–0.6)
EOSINOPHILS RELATIVE PERCENT: 1.9 % (ref 0–5)
GFR AFRICAN AMERICAN: >59
GFR NON-AFRICAN AMERICAN: >60
GLUCOSE BLD-MCNC: 115 MG/DL (ref 74–109)
HCT VFR BLD CALC: 30.7 % (ref 37–47)
HEMOGLOBIN: 8.6 G/DL (ref 12–16)
HYPOCHROMIA: ABNORMAL
IMMATURE GRANULOCYTES #: 0 K/UL
LYMPHOCYTES ABSOLUTE: 1.7 K/UL (ref 1.1–4.5)
LYMPHOCYTES RELATIVE PERCENT: 32.9 % (ref 20–40)
MCH RBC QN AUTO: 24.9 PG (ref 27–31)
MCHC RBC AUTO-ENTMCNC: 28 G/DL (ref 33–37)
MCV RBC AUTO: 88.7 FL (ref 81–99)
MONOCYTES ABSOLUTE: 0.6 K/UL (ref 0–0.9)
MONOCYTES RELATIVE PERCENT: 12.1 % (ref 0–10)
NEUTROPHILS ABSOLUTE: 2.7 K/UL (ref 1.5–7.5)
NEUTROPHILS RELATIVE PERCENT: 51.7 % (ref 50–65)
PDW BLD-RTO: 15.5 % (ref 11.5–14.5)
PLATELET # BLD: 282 K/UL (ref 130–400)
PMV BLD AUTO: 9 FL (ref 9.4–12.3)
POLYCHROMASIA: ABNORMAL
POTASSIUM REFLEX MAGNESIUM: 4.3 MMOL/L (ref 3.5–5)
RBC # BLD: 3.46 M/UL (ref 4.2–5.4)
SODIUM BLD-SCNC: 136 MMOL/L (ref 136–145)
TOTAL PROTEIN: 6.2 G/DL (ref 6.6–8.7)
WBC # BLD: 5.2 K/UL (ref 4.8–10.8)

## 2022-09-26 PROCEDURE — 6370000000 HC RX 637 (ALT 250 FOR IP)

## 2022-09-26 PROCEDURE — 36415 COLL VENOUS BLD VENIPUNCTURE: CPT

## 2022-09-26 PROCEDURE — 6360000002 HC RX W HCPCS: Performed by: NURSE PRACTITIONER

## 2022-09-26 PROCEDURE — 85025 COMPLETE CBC W/AUTO DIFF WBC: CPT

## 2022-09-26 PROCEDURE — 2580000003 HC RX 258: Performed by: PHYSICIAN ASSISTANT

## 2022-09-26 PROCEDURE — 2500000003 HC RX 250 WO HCPCS

## 2022-09-26 PROCEDURE — 80053 COMPREHEN METABOLIC PANEL: CPT

## 2022-09-26 PROCEDURE — 1210000000 HC MED SURG R&B

## 2022-09-26 PROCEDURE — 6360000002 HC RX W HCPCS

## 2022-09-26 PROCEDURE — 6360000002 HC RX W HCPCS: Performed by: PHYSICIAN ASSISTANT

## 2022-09-26 PROCEDURE — 2580000003 HC RX 258: Performed by: NURSE PRACTITIONER

## 2022-09-26 PROCEDURE — 2580000003 HC RX 258: Performed by: STUDENT IN AN ORGANIZED HEALTH CARE EDUCATION/TRAINING PROGRAM

## 2022-09-26 PROCEDURE — 2580000003 HC RX 258

## 2022-09-26 PROCEDURE — 99222 1ST HOSP IP/OBS MODERATE 55: CPT | Performed by: NURSE PRACTITIONER

## 2022-09-26 PROCEDURE — 6370000000 HC RX 637 (ALT 250 FOR IP): Performed by: INTERNAL MEDICINE

## 2022-09-26 RX ORDER — DOXYCYCLINE HYCLATE 100 MG/1
100 CAPSULE ORAL EVERY 12 HOURS SCHEDULED
Status: DISCONTINUED | OUTPATIENT
Start: 2022-09-26 | End: 2022-09-28 | Stop reason: HOSPADM

## 2022-09-26 RX ORDER — FAMOTIDINE 20 MG/1
20 TABLET, FILM COATED ORAL 2 TIMES DAILY
Status: DISCONTINUED | OUTPATIENT
Start: 2022-09-26 | End: 2022-09-28 | Stop reason: HOSPADM

## 2022-09-26 RX ADMIN — TRIAMCINOLONE ACETONIDE: 1 CREAM TOPICAL at 21:10

## 2022-09-26 RX ADMIN — DEXTROSE AND SODIUM CHLORIDE: 5; 900 INJECTION, SOLUTION INTRAVENOUS at 08:16

## 2022-09-26 RX ADMIN — CEFEPIME 2000 MG: 2 INJECTION, POWDER, FOR SOLUTION INTRAVENOUS at 09:05

## 2022-09-26 RX ADMIN — SODIUM CHLORIDE, PRESERVATIVE FREE 10 ML: 5 INJECTION INTRAVENOUS at 20:58

## 2022-09-26 RX ADMIN — DICYCLOMINE HYDROCHLORIDE 10 MG: 10 CAPSULE ORAL at 20:58

## 2022-09-26 RX ADMIN — DOXYCYCLINE HYCLATE 100 MG: 100 CAPSULE ORAL at 20:58

## 2022-09-26 RX ADMIN — VANCOMYCIN HYDROCHLORIDE 750 MG: 750 INJECTION, POWDER, LYOPHILIZED, FOR SOLUTION INTRAVENOUS at 14:21

## 2022-09-26 RX ADMIN — VANCOMYCIN HYDROCHLORIDE 750 MG: 750 INJECTION, POWDER, LYOPHILIZED, FOR SOLUTION INTRAVENOUS at 00:29

## 2022-09-26 RX ADMIN — DIPHENHYDRAMINE HYDROCHLORIDE 25 MG: 25 TABLET ORAL at 00:28

## 2022-09-26 RX ADMIN — DEXTROSE AND SODIUM CHLORIDE: 5; 900 INJECTION, SOLUTION INTRAVENOUS at 14:20

## 2022-09-26 RX ADMIN — DICYCLOMINE HYDROCHLORIDE 10 MG: 10 CAPSULE ORAL at 07:52

## 2022-09-26 RX ADMIN — SODIUM CHLORIDE, PRESERVATIVE FREE 10 ML: 5 INJECTION INTRAVENOUS at 07:58

## 2022-09-26 RX ADMIN — SODIUM CHLORIDE, PRESERVATIVE FREE 20 MG: 5 INJECTION INTRAVENOUS at 07:52

## 2022-09-26 RX ADMIN — TRIAMCINOLONE ACETONIDE: 1 CREAM TOPICAL at 07:58

## 2022-09-26 RX ADMIN — ENOXAPARIN SODIUM 40 MG: 100 INJECTION SUBCUTANEOUS at 18:14

## 2022-09-26 RX ADMIN — FAMOTIDINE 20 MG: 20 TABLET ORAL at 20:58

## 2022-09-26 ASSESSMENT — ENCOUNTER SYMPTOMS
SORE THROAT: 0
CONSTIPATION: 0
VOMITING: 0
DIARRHEA: 0
SHORTNESS OF BREATH: 0
NAUSEA: 1
CHEST TIGHTNESS: 0
COLOR CHANGE: 0
ABDOMINAL PAIN: 0

## 2022-09-26 ASSESSMENT — PAIN SCALES - GENERAL: PAINLEVEL_OUTOF10: 1

## 2022-09-26 ASSESSMENT — PAIN - FUNCTIONAL ASSESSMENT: PAIN_FUNCTIONAL_ASSESSMENT: ACTIVITIES ARE NOT PREVENTED

## 2022-09-26 ASSESSMENT — PAIN DESCRIPTION - DESCRIPTORS: DESCRIPTORS: CRAMPING

## 2022-09-26 NOTE — CARE COORDINATION
09/26/22 1724   Service Assessment   Patient Orientation Alert and Oriented   Cognition Alert   History Provided By Patient   Primary [address]  Other (Comment)  (stated no real support locally; has a nurse that visits from [facility] re: her Tripp cath dressing changes and TPN labs;)   Patient's Healthcare Decision Maker is: Legal Next of Kin   PCP Verified by CM Yes   Last Visit to PCP Within last 3 months   Prior Functional Level Independent in ADLs/IADLs  (stated to use cab or [employer] service to get to/from appts or grocery shopping.)   Current Functional Level Independent in ADLs/IADLs   Can patient return to prior living arrangement Unknown at present  (working on this; currently stating to have bedbugs at her apt)   Ability to make needs known: Good   Family able to assist with home care needs: No   Financial Resources  Resources Psychiatric Treatment;Transportation;ECF/Home Care  (has nurse once per week from [facility] re; TPN and her Tripp cath dressing changes and labs)   CM/SW Referral Other (see comment)  (issues with bedbugs, TPN Pt; needs in-home services)   Social/Functional History   Lives With Alone   Type of Home Apartment  ([address])   Home Layout One level  (elevator to basement for laundry and to exit building)   Home Access Elevator   Bathroom Shower/Tub Tub/Shower unit   Bathroom Toilet Standard   Bathroom Equipment Grab bars around toilet;Hand-held shower;Grab bars in shower   Bathroom Accessibility Not accessible  (she noted she sits in the tub and uses a water pitcher to bathe and wash hair; grab bars, but not truly accessible)   Home Equipment Rollator; [employer]  (has w/c with electric device to power for longer distances)   Receives Help From Other (comment)  (stated mostly just nurse that comes for her TPN)   ADL [address]  (could use assistance with some items)   Ambulation Assistance Independent   Transfer Assistance Independent   Active  No   Occupation On disability   Type of Occupation stated she receives SSI   Discharge Planning   Type of Residence Apartment   Living Arrangements Alone   Current Services Prior To Admission Durable Medical Equipment;Home [address]; Other (Comment)  (in-home services)   DME Ordered? No   Potential Assistance Purchasing Medications No   Type of Home Care Services Nursing Services; Aide Services   Patient expects to be discharged to: Apartment   One/Two Story Residence One story   History of falls?  0   Services At/After Discharge   Transition of Care Consult (CM Consult) Other  (issues with bedbugs and apt;)   Mode of Transport at Discharge Other (see comment)  ([employer])   Confirm Follow Up Transport Self   Electronically signed by KENNEDI Gallardo on 9/26/2022 at 5:38 PM

## 2022-09-26 NOTE — CONSULTS
Vascular Surgery  Consultation     Ms. Yeung is a 28year old female who suffers from gastroparesis. She was seen in June of this year for evaluation for placement of Munguia catheter for IVF/TPN. At that time she had Munguia catheter in right IJ, but wished for it to be changed to left side. Patient underwent removed right IJ Munguia catheter with placement of left IJ Munguia catheter on 07/26/2022. She presented to the ER on 09/23/2022 with concern of infection at left IJ Munguia catheter insertion. Stated she noticed discharge and warmth at the insertion site. Work-up in the ER, patient had normal WBC. She was noted to be anemic. Peripheral blood cultures were collected. She was started on empiric antibiotics. ID was consulted, patient was seen by Dr. Jaime Vyas. No definite line infection. Blood culture obtained via Munguia catheter on 09/25/2022.      Vascular surgery consulted for possible need of Munguia catheter removal.     Lab Results   Component Value Date    CREATININE 0.7 09/26/2022    BUN 8 09/26/2022     09/26/2022    K 4.3 09/26/2022     09/26/2022    CO2 17 (L) 09/26/2022       Sameer Meza is a 28 y.o. female with the following history reviewed and recorded in [facility]:  Patient Active Problem List    Diagnosis Date Noted    Soft tissue infection 09/26/2022     Priority: Medium    Infection of vascular catheter, initial encounter ([facility] 09/23/2022     Priority: Medium    Infestation by bed bug 09/23/2022     Priority: Medium    Encounter for care related to vascular access port 05/19/2022     Priority: Medium    Nonintractable headache      Priority: Medium    Cluster headache 05/24/2021    Hiatal hernia 05/24/2021    History of infection due to human papilloma virus (HPV) 05/24/2021    History of total hysterectomy 05/24/2021    Knee pain 05/11/2021    Autoimmune disease ([facility] 02/28/2021    Common variable agammaglobulinemia ([facility] 02/28/2021    [facility] 02/04/2021    Nutritional disorder 02/18/2020    Vitamin D deficiency 01/29/2018    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    Idiopathic scoliosis of thoracolumbar spine 03/22/2017    History of syncope 03/16/2017    POTS (postural orthostatic tachycardia syndrome) 03/16/2017    Tachycardia 03/16/2017    Near syncope 03/16/2017    Chronic fatigue syndrome 01/24/2017    ETD (eustachian tube dysfunction) 10/04/2016    Mixed hearing loss of left ear 09/09/2016    Poor venous access 05/27/2015    Learning disability 08/25/2014    Nausea & vomiting 03/11/2014    Constipation 03/11/2014    Gastroparesis 03/10/2014    Insomnia 03/10/2014    Irritable bowel syndrome 01/06/2014    Pelvic pain in female 01/06/2014    Dysmenorrhea 01/06/2014    Family history of endometriosis 01/06/2014    NSAID long-term use 11/06/2013    Heartburn 11/06/2013    Anxiety disorder 09/27/2013    Chronic serous otitis media of left ear 09/23/2013    Disc herniation 09/23/2013    Neck pain 09/23/2013    Back pain 09/23/2013    Scoliosis 09/23/2013    Hiccups 09/18/2013    Barretts esophagus 09/18/2013    Belching 09/18/2013     Current Facility-Administered Medications   Medication Dose Route Frequency Provider Last Rate Last Admin    vancomycin (VANCOCIN) 750 mg in dextrose 5 % 250 mL IVPB  750 mg IntraVENous Q12H Malia Mooney APRN - CNP   Stopped at 09/26/22 0129    heparin flush 100 UNIT/ML injection 300 Units  300 Units IntraCATHeter PRN Parker Hdez DO   300 Units at 09/25/22 1330    dextrose 5 % and 0.9 % sodium chloride infusion   IntraVENous Continuous Reece Martines  mL/hr at 09/26/22 0816 New Bag at 09/26/22 0816    cefepime (MAXIPIME) 2,000 mg in sodium chloride 0.9 % 50 mL IVPB ([id number])  2,000 mg IntraVENous Q12H SUMMER Paulino   Stopped at 09/26/22 0935    vancomycin (VANCOCIN) intermittent dosing (placeholder)   Other RX Placeholder SUMMER Paulino        sodium chloride flush 0.9 % injection 5-40 mL 5-40 mL IntraVENous 2 times per day Faina Pulling, APRN - CNP   10 mL at 09/26/22 0758    sodium chloride flush 0.9 % injection 5-40 mL  5-40 mL IntraVENous PRN Faina Pulling, APRN - CNP   10 mL at 09/25/22 1330    0.9 % sodium chloride infusion   IntraVENous PRN Faina Pulling, APRN - CNP        enoxaparin (LOVENOX) injection 40 mg  40 mg SubCUTAneous Daily Faina Pulling, APRN - CNP   40 mg at 09/25/22 1728    polyethylene glycol (GLYCOLAX) packet 17 g  17 g Oral Daily PRN Faina Pulling, APRN - CNP        acetaminophen (TYLENOL) tablet 650 mg  650 mg Oral Q6H PRN Faina Pulling, APRN - CNP        Or    acetaminophen (TYLENOL) suppository 650 mg  650 mg Rectal Q6H PRN Faina Pulling, APRN - CNP        promethazine (PHENERGAN) tablet 12.5 mg  12.5 mg Oral Q6H PRN Faina Pulling, APRN - CNP   12.5 mg at 09/23/22 2152    dicyclomine (BENTYL) capsule 10 mg  10 mg Oral BID Faina Pulling, APRN - CNP   10 mg at 09/26/22 4696    linaclotide (LINZESS) capsule 145 mcg  145 mcg Oral QAM AC Faina Pulling, APRN - CNP   145 mcg at 09/25/22 0630    promethazine (PHENERGAN) injection 6.25 mg  6.25 mg IntraMUSCular Q6H PRN Faina Pulling, APRN - CNP        diphenhydrAMINE (BENADRYL) tablet 25 mg  25 mg Oral Q6H PRN Faina Pulling, APRN - CNP   25 mg at 09/26/22 0028    famotidine (PEPCID) 20 mg in sodium chloride (PF) 10 mL injection  20 mg IntraVENous BID Faina Pulling, APRN - CNP   20 mg at 09/26/22 0752    triamcinolone (KENALOG) 0.1 % cream   Topical BID Faina Pulling, APRN - CNP   Given at 09/26/22 7353     Allergies:  Iv dye [iodides], Iv dye [iodides], Adhesive tape, Bee pollen, Chlorhexidine, Fruit & vegetable daily [nutritional supplements], Metoprolol succinate [metoprolol], Nsaids, Orange, Orange fruit [citrus], Other, Reglan [metoclopramide], Tramadol, Zofran [ondansetron hcl], Orange oil, and Sulfamethoxazole-trimethoprim  Past Medical History:   Diagnosis Date    Allergic contact dermatitis due to adhesives Allergic contact dermatitis due to plants, except food     Anemia complicating pregnancy, second trimester     Anemia complicating pregnancy, third trimester     Anxiety and depression     Anxiety disorder     unspecified    Bacteremia     Cellulitis of abdominal wall     Chronic fatigue, unspecified     Dysuria     Encounter for care related to vascular access port 5/19/2022    Epigastric pain     Frequency of micturition     G tube feedings (HCC)     Gastroparesis     Dr. Jovani Richards in [place] manages    Gastroparesis     Gastrostomy malfunction [facility])     Generalized abdominal pain     GERD (gastroesophageal reflux disease)     Heart murmur     Hiatal hernia     History of Hoyos's esophagus     History of blood transfusion     Hypotension, unspecified     IBS (irritable bowel syndrome)     Insomnia, unspecified     Jejunostomy tube present (HCC)     Low back pain     Nausea with vomiting     unspecified    Neck pain     Neuropathy     Orthostatic hypotension     POTS (postural orthostatic tachycardia syndrome)     Premature birth     delivered at 24-27 weeks    Scoliosis     Syncope and collapse     Tachycardia     Tachycardia, unspecified     Toxic gastroenteritis and colitis      Past Surgical History:   Procedure Laterality Date    ABDOMEN SURGERY  2014    gastric stimulator implanted    ADENOIDECTOMY      ADENOIDECTOMY  2007    CHOLECYSTECTOMY      CHOLECYSTECTOMY  2016    DENTAL SURGERY  2004    wisdom teeth    EAR SURGERY  2014    left    FACIAL SURGERY  2005    GASTRIC STIMULATOR IMPLANT SURGERY  11/2014    GASTROSTOMY TUBE PLACEMENT  04/07/2015    J tube-Removed in [place] 1/2018    GASTROSTOMY TUBE PLACEMENT      G tube 2017    HYSTERECTOMY (CERVIX STATUS UNKNOWN)      INSERTION / REMOVAL / REPLACEMENT VENOUS ACCESS CATHETER N/A 03/25/2016    REMOVAL OF PORT AND PLACEMENT OF NEW PORT; REMOVAL OF PICC LINE  performed by Dennis Littlejohn MD at [facility]  2014    left knee KNEE SURGERY  2015    right knee    MANDIBLE SURGERY      double jaw    PHARYNGECTOMY      PORT SURGERY      port present 3-25-16    TONSILLECTOMY      TONSILLECTOMY  1997    TUBAL LIGATION      TUBAL LIGATION  2016    TUMOR REMOVAL Left     ear    TUNNELED VENOUS PORT PLACEMENT Right 05/29/2015    TUNNELED VENOUS PORT PLACEMENT      UPPER GASTROINTESTINAL ENDOSCOPY  04/02/2010    Dr Hermann Serrano ENDOSCOPY  09/25/2013    Dr Riya Buckner  05/2015    Dr. Emma Barron  5/29/2015 [facility]    Ultrasound-guided cannulation, right internal jugular vein. Right internal jugular vein single-lumen bard powerport placement. VASCULAR SURGERY  9/8/15 [facility]    Right internal jugular vein portograms. Revision of right internal jugular vein single lumen port. VASCULAR SURGERY  11/3/15 [facility]    Ultrasound-guided cannulation, left upper arm basilic vein. Left upper arm basilic vein PICC line placement bard dual-lumen PICC line. Superior vena cava venograms. VASCULAR SURGERY  03/23/2017    [facility]. Left IJV port angiogram.Ultrasound guided cannulation of right basilic vein,right upper extremity PICC line placement bard power PICC,dual lumen. VASCULAR SURGERY  04/27/2022    Ultrasound-guided cannulation right internal jugular vein. Placement of Munguia single-lumen tunneled dialysis catheter. Removal of nonfunctioning left subclavian vein Munguia tunneled dialysis catheter. Repositioning of right internal juglular vein Munguia tunneled dialylsis catheter. Perfomed by Dr. Flako Lamb.  [facility] at [address] [place] [address] EXTRACTION       Family History   Problem Relation Age of Onset    Other Mother         endometriosis    Depression Father     Diabetes Paternal Grandmother     Stroke Paternal Grandmother     Hypertension Paternal Grandmother     Heart Disease Paternal Grandmother     Heart Failure Paternal Grandmother     Colon Cancer Neg Hx     Colon Polyps Neg Hx     Esophageal Cancer Neg Hx     Liver Cancer Neg Hx     Rectal Cancer Neg Hx     Stomach Cancer Neg Hx      Social History     Tobacco Use    Smoking status: Never    Smokeless tobacco: Never    Tobacco comments:     Vape    Substance Use Topics    Alcohol use: Never       Old records have been obtained from the referring provider. These records have been reviewed and summarized. Review of Systems    Constitutional - No significant activity change, appetite change, or unexpected weight change. No fever or chills. No diaphoresis or significant fatigue. HENT - No significant rhinorrhea or epistaxis. No tinnitus or significant hearing loss. Eyes - No sudden vision change or amaurosis. Respiratory - No significant shortness of breath, wheezing, or stridor. No apnea, cough, or chest tightness associated with shortness of breath. Cardiovascular - No chest pain, syncope, or significant dizziness. No palpitations or significant leg swelling. No claudication. Gastrointestinal - No abdominal swelling or pain. No blood in stool. No severe constipation, diarrhea, or vomiting. Chronic nausea. Genitourinary - No dysuria, frequency, or urgency. No flank pain or hematuria. Musculoskeletal - No back pain, gait disturbance, or myalgia. Skin - No color change, rash, pallor, or new wound. Neurologic - No dizziness, facial asymmetry, or light headedness. No seizures. No speech difficulty or lateralizing weakness. Hematologic - No easy bruising or excessive bleeding. Psychiatric - No severe anxiety or nervousness. No confusion. All other review of systems are negative. Physical Exam    /70   Pulse 88   Temp 97.2 °F (36.2 °C) (Temporal)   Resp 20   Ht 5' 2\" (1.575 m)   Wt 129 lb (58.5 kg)   LMP 01/14/2018 (Exact Date)   SpO2 100%   BMI 23.59 kg/m²     Constitutional - Chronically ill-appearing female. No diaphoresis or acute distress. HEENT - Normocephalic. Atraumatic. JENNIE. Neck - Supple.  ROM appears normal, no tracheal deviation. No JVD. Respiratory - Clear bilateral breath sounds without wheezes or rhonchi. Cardiovascular - Regular rate and rhythm. Heart sounds are normal.  No murmur, rub, or gallop. Abdomen - Soft, non-tender with active bowel sounds. Extremities - No cyanosis or clubbing. + BLE edema. Neurologic - Alert and oriented X 3. Grossly intact. Skin - Warm, dry, and intact. No rash, erythema, or pallor. Munguia catheter, left IJ with dressing dry and intact. Area of skin breakdown near insertion site. No erythema. Psychiatric - mood, affect, and behavior appear normal.  Judgment and thought processes appear normal.    Assessment    Superficial Skin Breakdown at Munguia Insertion Site - no obvious infection note. WBC normal. She is afebrile. BC drawn from Munguia Catheter pending. Gastroparesis      Plan    Will monitor BC, if + from Munguia catheter will proceed with removal. If negative, no surgical intervention planned.        Cleo Lombard, APRN-BC

## 2022-09-26 NOTE — PLAN OF CARE
Problem: Pain  Goal: Verbalizes/displays adequate comfort level or baseline comfort level  9/26/2022 1831 by Edouard Carranza RN  Outcome: Progressing  9/26/2022 0527 by Radha Ramos RN  Outcome: Progressing     Problem: Safety - Adult  Goal: Free from fall injury  9/26/2022 1831 by Edouard Carranza RN  Outcome: Progressing  Flowsheets (Taken 9/26/2022 0816)  Free From Fall Injury: Instruct family/caregiver on patient safety  9/26/2022 0527 by Radha Ramos RN  Outcome: Progressing     Problem: Nutrition Deficit:  Goal: Optimize nutritional status  9/26/2022 1831 by Edouard Carranza RN  Outcome: Progressing  9/26/2022 0527 by Radha Ramos RN  Outcome: Progressing     Problem: Neurosensory - Adult  Goal: Achieves stable or improved neurological status  9/26/2022 1831 by Edouard Carranza RN  Outcome: Progressing  Flowsheets (Taken 9/26/2022 0816)  Achieves stable or improved neurological status: Assess for and report changes in neurological status  9/26/2022 0527 by Radha Ramos RN  Outcome: Progressing  Goal: Absence of seizures  9/26/2022 1831 by Edouard Carranza RN  Outcome: Progressing  Flowsheets (Taken 9/26/2022 0816)  Absence of seizures: Monitor for seizure activity.   If seizure occurs, document type and location of movements and any associated apnea  9/26/2022 0527 by Radha Ramos RN  Outcome: Progressing  Goal: Remains free of injury related to seizures activity  9/26/2022 1831 by Edouard Carranza RN  Outcome: Progressing  Flowsheets (Taken 9/26/2022 0816)  Remains free of injury related to seizure activity: Maintain airway, patient safety  and administer oxygen as ordered  9/26/2022 0527 by Radha Ramos RN  Outcome: Progressing  Goal: Achieves maximal functionality and self care  9/26/2022 1831 by Edouard Carranza RN  Outcome: Progressing  Flowsheets (Taken 9/26/2022 0816)  Achieves maximal functionality and self care: Monitor swallowing and airway patency with patient fatigue and changes in neurological status  9/26/2022 0527 by Wilma Avalos RN  Outcome: Progressing     Problem: Respiratory - Adult  Goal: Achieves optimal ventilation and oxygenation  9/26/2022 1831 by Gabe Rosario RN  Outcome: Progressing  Flowsheets (Taken 9/26/2022 0816)  Achieves optimal ventilation and oxygenation: Assess for changes in respiratory status  9/26/2022 0527 by Wilma Avalos RN  Outcome: Progressing     Problem: Cardiovascular - Adult  Goal: Maintains optimal cardiac output and hemodynamic stability  9/26/2022 1831 by Gabe Rosario RN  Outcome: Progressing  Flowsheets (Taken 9/26/2022 0816)  Maintains optimal cardiac output and hemodynamic stability: Monitor blood pressure and heart rate  9/26/2022 0527 by Wilma Avalos RN  Outcome: Progressing  Goal: Absence of cardiac dysrhythmias or at baseline  9/26/2022 1831 by Gabe Rosario RN  Outcome: Progressing  Flowsheets (Taken 9/26/2022 0816)  Absence of cardiac dysrhythmias or at baseline: Monitor cardiac rate and rhythm  9/26/2022 0527 by Wilma Avalos RN  Outcome: Progressing     Problem: Skin/Tissue Integrity - Adult  Goal: Skin integrity remains intact  9/26/2022 1831 by Gabe Rosario RN  Outcome: Progressing  Flowsheets (Taken 9/26/2022 0816)  Skin Integrity Remains Intact: Monitor for areas of redness and/or skin breakdown  9/26/2022 0527 by Wilma Avalos RN  Outcome: Progressing  Goal: Incisions, wounds, or drain sites healing without S/S of infection  9/26/2022 1831 by Gabe Rosario RN  Outcome: Progressing  Flowsheets (Taken 9/26/2022 0816)  Incisions, Wounds, or Drain Sites Healing Without Sign and Symptoms of Infection: ADMISSION and DAILY: Assess and document risk factors for pressure ulcer development  9/26/2022 0527 by Wilma Avalos RN  Outcome: Progressing  Goal: Oral mucous membranes remain intact  9/26/2022 1831 by Gabe Rosario RN  Outcome: Progressing  Flowsheets (Taken 9/26/2022 0816)  Oral Mucous Membranes Remain Intact: Assess oral mucosa and hygiene practices  9/26/2022 0527 by Fadumo Yates RN  Outcome: Progressing     Problem: Musculoskeletal - Adult  Goal: Return mobility to safest level of function  9/26/2022 1831 by Jose Luis Andrade RN  Outcome: Progressing  Flowsheets (Taken 9/26/2022 0816)  Return Mobility to Safest Level of Function: Assess patient stability and activity tolerance for standing, transferring and ambulating with or without assistive devices  9/26/2022 0527 by Fadumo Yates RN  Outcome: Progressing  Goal: Maintain proper alignment of affected body part  9/26/2022 1831 by Jose Luis Andrade RN  Outcome: Progressing  Flowsheets (Taken 9/26/2022 0816)  Maintain proper alignment of affected body part: Support and protect limb and body alignment per provider's orders  9/26/2022 0527 by Fadumo Yates RN  Outcome: Progressing  Goal: Return ADL status to a safe level of function  9/26/2022 1831 by Jose Luis Andrade RN  Outcome: Progressing  Flowsheets (Taken 9/26/2022 0816)  Return ADL Status to a Safe Level of Function: Administer medication as ordered  9/26/2022 0527 by Fadumo Yates RN  Outcome: Progressing     Problem: Gastrointestinal - Adult  Goal: Minimal or absence of nausea and vomiting  9/26/2022 1831 by Jose Luis Andrade RN  Outcome: Progressing  Flowsheets (Taken 9/26/2022 0816)  Minimal or absence of nausea and vomiting: Administer IV fluids as ordered to ensure adequate hydration  9/26/2022 0527 by Fadumo Yates RN  Outcome: Progressing  Goal: Maintains or returns to baseline bowel function  9/26/2022 1831 by Jose Luis Andrade RN  Outcome: Progressing  Flowsheets (Taken 9/26/2022 0816)  Maintains or returns to baseline bowel function: Assess bowel function  9/26/2022 0527 by Fadumo Yates RN  Outcome: Progressing  Goal: Maintains adequate nutritional intake  9/26/2022 1831 by Jose Luis Andrade RN  Outcome: Progressing  Flowsheets (Taken 9/26/2022 0816)  Maintains adequate nutritional intake: Monitor percentage of each meal 9/26/2022 0816)  Electrolytes maintained within normal limits: Monitor labs and assess patient for signs and symptoms of electrolyte imbalances  9/26/2022 0527 by Shelly Steele RN  Outcome: Progressing  Goal: Hemodynamic stability and optimal renal function maintained  9/26/2022 1831 by Raiza Becker RN  Outcome: Progressing  Flowsheets (Taken 9/26/2022 0816)  Hemodynamic stability and optimal renal function maintained: Monitor labs and assess for signs and symptoms of volume excess or deficit  9/26/2022 0527 by Shelly Steele RN  Outcome: Progressing  Goal: Glucose maintained within prescribed range  9/26/2022 1831 by Raiza Becker RN  Outcome: Progressing  Flowsheets (Taken 9/26/2022 0816)  Glucose maintained within prescribed range: Monitor blood glucose as ordered  9/26/2022 0527 by Shelly Steele RN  Outcome: Progressing     Problem: Hematologic - Adult  Goal: Maintains hematologic stability  9/26/2022 1831 by Raiza Becker RN  Outcome: Progressing  Flowsheets (Taken 9/26/2022 0816)  Maintains hematologic stability: Assess for signs and symptoms of bleeding or hemorrhage  9/26/2022 0527 by Shelly Steele RN  Outcome: Progressing     Problem: Chronic Conditions and Co-morbidities  Goal: Patient's chronic conditions and co-morbidity symptoms are monitored and maintained or improved  9/26/2022 1831 by Raiza Becker RN  Outcome: Progressing  Flowsheets (Taken 9/26/2022 0816)  Care Plan - Patient's Chronic Conditions and Co-Morbidity Symptoms are Monitored and Maintained or Improved: Monitor and assess patient's chronic conditions and comorbid symptoms for stability, deterioration, or improvement  9/26/2022 0527 by Shelly Steele RN  Outcome: Progressing     Problem: Discharge Planning  Goal: Discharge to home or other facility with appropriate resources  9/26/2022 1831 by Raiza Becker RN  Outcome: Progressing  Flowsheets (Taken 9/26/2022 0816)  Discharge to home or other facility with appropriate resources: Identify barriers to discharge with patient and caregiver  9/26/2022 0527 by Tonny Green RN  Outcome: Progressing     Problem: ABCDS Injury Assessment  Goal: Absence of physical injury  9/26/2022 1831 by Malick Vasquez RN  Outcome: Progressing  Flowsheets (Taken 9/26/2022 0816)  Absence of Physical Injury: Implement safety measures based on patient assessment  9/26/2022 0527 by Tonny Green RN  Outcome: Progressing

## 2022-09-26 NOTE — CARE COORDINATION
Patient Contact Information:    [address] [facility]  [address] [facility]0 [phone] (home)   Telephone Information:   Mobile [phone]     Above information verified? [x]   Yes  []   No      Emergency Contacts: friend that lives in [place]; has same dx and they are supportive emotionally to each other. Extended Emergency Contact Information  Primary Emergency Contact: [facility], [address] Phone: [phone]  Relation: Other   needed? No      Have you been vaccinated for COVID-19 (SARS-CoV-2)? []   Yes  [x]   No                   If so, when? Which :         []   Pfizer-BioNTech  []   Moderna  []   [employer]  []   Other:         Pharmacy:    [address] [place] [address], 740 [facility] 200 [facility]  Phone: [phone] Fax: [phone]    [facility]. [facility], [facility],4Th Floor [address] [facility] [phone] Gonzalez Mckeon [phone]  [address] [place] [address]  Phone: [phone] Fax: [phone]    [address] [place] [address]. [address], [address] Gonzalez Mckeon [phone]  [address] [facility]  Phone: [phone] Fax: [phone]          Patient Deficits:    []   Yes   [x]   No    If yes:    []   Confusion/Memory  []   Visual  []   Motor/Sensory         []   Right arm         []   Right leg         []   Left arm         []   Left leg  []   Language/Speech         []   Aphasia         []   Dysarthria         []   Swallow         [place] Coma Scale  Eye Opening: Spontaneous  Best Verbal Response: Oriented  Best Motor Response: Obeys commands  [place] Coma Scale Score: 15      Very lengthy visit with Pt re: d/c planning and needs; Pt lives at the [facility] (subsidized housing); income is a little over 700/mo; rent is approx 265/mo;  Pt currently on TPN, IVF;IVIG; has nurse one time per week from [facility] to come change dressing to her Munguia cath; and [facility] is supplier to her TPN/supplies, etc.,     Pt stated her apt is \"infested\" with bed bugs; she stated the bed in her room is uncomfortable, so she normally sleeps on the couch, but now she can't do that either b/c of the bugs; she stated she has contacted mgt at [employer] apts, they indicated that they can spray/treat her apt this week, but all items have to be bagged and cleaned area; she stated she is not able to do this and has no one that can help  her; she stated she is in a 1bedroom apt; but it is not \"open-concept\" so she indicated she doesn't really have room for all of her medical supplies and adaptive devices in her current place; would like a 2 bedroom and is on their waiting list;     Pt indicated her main issue health-wise is her gastroparesis and need for TPN; also hx of J-tube for feeds=stated she still has her tube; Pt stated she also faces eviction due to 500.00 in prior unpaid rent; she stated that [facility] stated they could help her with this but they still need a bill/charges emailed to them and that [employer] apts still haven't done this; Pt noted she has also reached out to the [place] [address] office to try and obtain assistance with in-home services in the form of an aide/hsk a few days per week, but hasn't really had any follow-up in this area;     SW also f/u with her re: ED Jj seeing her this weekend and discussing short-term rehab/placement; she indicated she thought about it and doesn't think she needs that at this point in time. Pt also noted she doesn't have anywhere to go while the apts spray/treat her apt for bedbugs; asked about family; she stated her family \"doesn't understand\" re: her dx; so, she can't depend on them.  She is also aware her cat is being boarded by the [employer] while she is hospitalized;     SW advised can call [facility] [employer] apts to inquire about tx schedule and if there is another apt she can move into even if temporarily; will also ask about charges that can be emailed to [facility] re: help with back rent; we can also call [facility] office to see if she can be connected with them re: in-home services; and call APS re: assist with any other needs as she is a disabled person that needs help with her belongings and bedbugs, etc.,   SW following and attempting to help with Pt's needs    Electronically signed by Genaro Dong [facility] on 9/26/2022 at 5:22 PM

## 2022-09-26 NOTE — PROGRESS NOTES
[facility] Hospitalists      Patient:  Cata Anderson  YOB: 1990  Date of Service: 9/26/2022  MRN: [id number]   Acct: [de-identified]   Primary Care Physician: Nikko Hanks MD  Advance Directive: Full Code  Admit Date: 9/23/2022       Hospital Day: 3  Portions of this note have been copied forward, however, changed to reflect the most current clinical status of this patient. CHIEF COMPLAINT vascular access problem    SUBJECTIVE: Patient continues to have itching of her bilateral lower extremities. Patient reports nausea but reports it is no worse than her baseline and she has been able to tolerate her meals. Patient denies any pain in her line insertion site. Patient reports the area around the insertion site of her Munguia looks much better today. CUMULATIVE HOSPITAL COURSE:  The patient is a 40-year-old female with past medical history of anxiety, depression, gastroparesis, GERD, Hoyos's esophagus, and POTS who presented to [address] [place] [address] ED with complaints of vascular access problem. Patient follows with [facility] motility clinic for IVIG and TPN due to gastroparesis. Patient was seen by vascular surgery and had left internal IJ double lumen Munguia catheter placed on 7/26/2022. Patient reported noticing discharge and warmth that started on the night prior to admission. Patient denied fever, chills, shortness of breath, chest pain. Patient reported home health has been completing her dressing changes and assisting with her TPN. Patient also reported having bedbug infestation at home. ED work-up fairly unremarkable with WBC of 9.2 and hemoglobin of 10. Patient was admitted to the hospitalist service for infection of the vascular catheter. ID was consulted and recommended continuing empiric antibiotic coverage and following blood cultures pending repeat cultures depends course of action. Original blood cultures drawn on admission with no growth to date.   Vascular surgery recommended obtaining blood cultures from Munguia catheter. Blood cultures drawn on 9/25/2022 with no growth at the 24-hour pedro. Patient has discussed living situation with social work and social work consulted to assist in discharge planning. If blood cultures remain no growth at the 48-hour pedro likely home tomorrow. Review of Systems:   Review of Systems   Constitutional:  Negative for appetite change, chills, fatigue and fever. HENT:  Negative for sneezing and sore throat. Respiratory:  Negative for chest tightness and shortness of breath. Cardiovascular:  Negative for chest pain, palpitations and leg swelling. Gastrointestinal:  Positive for nausea. Negative for abdominal pain, constipation, diarrhea and vomiting. Genitourinary:  Negative for difficulty urinating, flank pain, hematuria and urgency. Musculoskeletal:  Negative for arthralgias and myalgias. Skin:  Negative for color change. Patient reports area around Munguia insertion has greatly improved   Neurological:  Negative for dizziness, tremors and headaches. Psychiatric/Behavioral:  Negative for agitation and confusion. 14 point review of systems is negative except as specifically addressed above. Objective:   VITALS:  /70   Pulse 88   Temp 97.2 °F (36.2 °C) (Temporal)   Resp 20   Ht 5' 2\" (1.575 m)   Wt 129 lb (58.5 kg)   LMP 01/14/2018 (Exact Date)   SpO2 100%   BMI 23.59 kg/m²   24HR INTAKE/OUTPUT:    Intake/Output Summary (Last 24 hours) at 9/26/2022 1631  Last data filed at 9/26/2022 1420  Gross per 24 hour   Intake 600 ml   Output --   Net 600 ml       Physical Exam  Vitals and nursing note reviewed. Constitutional:       Appearance: She is not ill-appearing. HENT:      Mouth/Throat:      Mouth: Mucous membranes are moist.      Pharynx: Oropharynx is clear. Eyes:      Pupils: Pupils are equal, round, and reactive to light. Cardiovascular:      Rate and Rhythm: Normal rate and regular rhythm.       Pulses: Normal pulses. Heart sounds: Normal heart sounds. Pulmonary:      Effort: Pulmonary effort is normal.      Breath sounds: Normal breath sounds. Abdominal:      General: Abdomen is flat. Bowel sounds are normal. There is no distension. Palpations: Abdomen is soft. Tenderness: There is no abdominal tenderness. There is no guarding. Musculoskeletal:         General: Normal range of motion. Right lower leg: No edema. Left lower leg: No edema. Skin:     General: Skin is warm and dry. Capillary Refill: Capillary refill takes less than 2 seconds. Comments: Munguia's catheter to the left upper chest, dressing intact, slight amount of dried drainage under dressing   Neurological:      General: No focal deficit present. Mental Status: She is alert and oriented to person, place, and time. Medications:      dextrose 5 % and 0.9 % NaCl 100 mL/hr at 09/26/22 1420    sodium chloride        famotidine  20 mg Oral BID    vancomycin  750 mg IntraVENous Q12H    cefepime  2,000 mg IntraVENous Q12H    vancomycin (VANCOCIN) intermittent dosing (placeholder)   Other RX Placeholder    sodium chloride flush  5-40 mL IntraVENous 2 times per day    enoxaparin  40 mg SubCUTAneous Daily    dicyclomine  10 mg Oral BID    linaclotide  145 mcg Oral QAM AC    triamcinolone   Topical BID     heparin flush, sodium chloride flush, sodium chloride, polyethylene glycol, acetaminophen **OR** acetaminophen, promethazine, promethazine, diphenhydrAMINE  ADULT DIET;  Regular     Lab and other Data:     Recent Labs     09/24/22  0116 09/25/22  0322 09/26/22  0352   WBC 10.0 4.9 5.2   HGB 10.5* 9.4* 8.6*    295 282     Recent Labs     09/24/22  0116 09/25/22  0322 09/26/22  0352   * 133* 136   K 4.1 3.5 4.3    105 107   CO2 16* 18* 17*   BUN 9 9 8   CREATININE 0.7 0.7 0.7   GLUCOSE 66* 94 115*     Recent Labs     09/24/22  0116 09/25/22  0322 09/26/22  0352   AST 31 21 20   ALT 18 16 14 BILITOT 0.3 <0.2 <0.2   ALKPHOS 75 68 63     Troponin T: No results for input(s): TROPONINI in the last 72 hours. Pro-BNP: No results for input(s): BNP in the last 72 hours. INR: No results for input(s): INR in the last 72 hours. UA:No results for input(s): NITRITE, COLORU, PHUR, LABCAST, WBCUA, RBCUA, MUCUS, TRICHOMONAS, YEAST, BACTERIA, CLARITYU, SPECGRAV, LEUKOCYTESUR, UROBILINOGEN, BILIRUBINUR, BLOODU, GLUCOSEU, AMORPHOUS in the last 72 hours. Invalid input(s): Ayo Saunas  A1C: No results for input(s): LABA1C in the last 72 hours. ABG:No results for input(s): PHART, ADF2SLG, PO2ART, LGS3LEL, BEART, HGBAE, E1LRIMZH, CARBOXHGBART in the last 72 hours. RAD:   No results found. Micro:   Component 9/23/22 1902    Blood Culture, Routine No Growth to date. Any change in status will be called. Component 9/23/22 1915    Culture, Blood 2 No Growth to date. Any change in status will be called. Component 9/25/22 1328    Blood Culture, Routine No Growth to date. Any change in status will be called. Component 9/25/22 1328    Culture, Blood 2 No Growth to date. Any change in status will be called. Assessment/Plan   Principal Problem:    Infection of vascular catheter, initial encounter ([facility] / Soft tissue infection   -follow cultures   -ID on board   -vascular on board   -continue empiric abx coverage at this time    Active Problems:    Infestation by bed bug   -patient bathed and placed in new room and belongings bagged per protocol    -SW to assist in DC planning      Gastroparesis   -continue current home medications      POTS (postural orthostatic tachycardia syndrome) / Autoimmune disease ([facility]   -noted     Resolved Problems:    * No resolved hospital problems.  *      Antibiotic: vancomycin and cefepime     DVT Prophylaxis:SHRUTHI Murphy - CNP, 9/26/2022 4:31 PM

## 2022-09-26 NOTE — PROGRESS NOTES
Infectious Diseases Progress Note    Patient:  Dayron Ocampo  YOB: 1990  MRN: [id number]   Admit date: 9/23/2022   Admitting Physician: Fatou Johnson MD  Primary Care Physician: Vijay Matthews MD    Chief Complaint/Interval History: She is without fever. No new localizing symptoms. In/Out    Intake/Output Summary (Last 24 hours) at 9/26/2022 0731  Last data filed at 9/26/2022 0425  Gross per 24 hour   Intake 120 ml   Output --   Net 120 ml     Allergies: Allergies   Allergen Reactions    Iv Dye [Iodides] Shortness Of Breath, Itching and Other (See Comments)     IV 3000; SoA, dizziness    Iv Dye [Iodides] Anaphylaxis     Iodine containing    Adhesive Tape Itching     And tegaderm    Bee Pollen Hives     Oranges-anything with orange color  Oranges-anything with orange color  Oranges-anything with orange color  Oranges-anything with orange color  Oranges-anything with orange color      Chlorhexidine      Other reaction(s): ITCHING    Fruit & Vegetable Daily [Nutritional Supplements]      Oranges-anything with orange color    Metoprolol Succinate [Metoprolol]      Excessive drowsiness/ Metoprolol tartrate-excessive drowsiness    Nsaids      Other reaction(s): GI Intolerance    Dent Itching and Other (See Comments)     Other reaction(s): Vomiting    Orange Fruit [Citrus]      And artificial    Other      Cloraprep    Reglan [Metoclopramide]      Nausea,vomiting    Tramadol Other (See Comments)     Excessive sleeping x 3 days, pt takes 1/2 a pill.     Zofran [Ondansetron Hcl] Hives    Orange Oil Nausea And Vomiting    Sulfamethoxazole-Trimethoprim Nausea And Vomiting     Current Meds: vancomycin (VANCOCIN) 750 mg in dextrose 5 % 250 mL IVPB, Q12H  heparin flush 100 UNIT/ML injection 300 Units, PRN  dextrose 5 % and 0.9 % sodium chloride infusion, Continuous  cefepime (MAXIPIME) 2,000 mg in sodium chloride 0.9 % 50 mL IVPB ([id number]), Q12H  vancomycin (VANCOCIN) intermittent dosing (placeholder), RX Placeholder  sodium chloride flush 0.9 % injection 5-40 mL, 2 times per day  sodium chloride flush 0.9 % injection 5-40 mL, PRN  0.9 % sodium chloride infusion, PRN  enoxaparin (LOVENOX) injection 40 mg, Daily  polyethylene glycol (GLYCOLAX) packet 17 g, Daily PRN  acetaminophen (TYLENOL) tablet 650 mg, Q6H PRN   Or  acetaminophen (TYLENOL) suppository 650 mg, Q6H PRN  promethazine (PHENERGAN) tablet 12.5 mg, Q6H PRN  dicyclomine (BENTYL) capsule 10 mg, BID  linaclotide (LINZESS) capsule 145 mcg, QAM AC  promethazine (PHENERGAN) injection 6.25 mg, Q6H PRN  diphenhydrAMINE (BENADRYL) tablet 25 mg, Q6H PRN  famotidine (PEPCID) 20 mg in sodium chloride (PF) 10 mL injection, BID  triamcinolone (KENALOG) 0.1 % cream, BID      Review of Systems see HPI    VitalSigns:  BP 99/63   Pulse 76   Temp 96.9 °F (36.1 °C) (Temporal)   Resp 18   Ht 5' 2\" (1.575 m)   Wt 129 lb (58.5 kg)   LMP 01/14/2018 (Exact Date)   SpO2 100%   BMI 23.59 kg/m²      Physical Exam  Munguia catheter with some superficial skin breakdown at the insertion site. No purulent drainage. No tenderness along tunnel. Lab Results:  CBC:   Recent Labs     09/24/22  0116 09/25/22  0322 09/26/22  0352   WBC 10.0 4.9 5.2   HGB 10.5* 9.4* 8.6*    295 282     BMP:  Recent Labs     09/24/22  0116 09/25/22  0322 09/26/22  0352   * 133* 136   K 4.1 3.5 4.3    105 107   CO2 16* 18* 17*   BUN 9 9 8   CREATININE 0.7 0.7 0.7   GLUCOSE 66* 94 115*     CultureResults:  Blood culture September 23, 2022 no growth  Blood culture September 25, 2022-no growth to date    Radiology: None    Additional Studies Reviewed:  None    Impression:  Superficial breakdown involving a small area near the superior portion of the entry site of the Munguia catheter. No purulence. No surrounding induration. Blood cultures remaining no growth. She is without fever.   White blood cell count normal.    Recommendations:  Discontinue IV antibiotic treatment  Would suggest short course of oral antibiotic treatment with the hope of eradicating any soft tissue organisms and helping facilitate some improvement/healing of small area of skin breakdown region of Munguia entry site.     Colleen Kellogg MD

## 2022-09-26 NOTE — PROGRESS NOTES
Pharmacy Intravenous to Oral Protocol    Medication changed per [facility] IV to PO protocol: Famotidine    Patient meets the following inclusion criteria and none of the exclusion criteria:    Inclusion criteria:  - IV therapy > 24 hours (antibiotics only)  - Tolerating diet more advanced than clear liquids  - Tolerating PO medications  - No vasopressor blood pressure support (ie no signs of shock)  - Patient hasn't had a seizure for 72 hrs (antiepileptic medications only)    Exclusion criteria:  - Infections requiring IV therapy (ie meningitis, endocarditis, osteomyelitis, pancreatitis)   - Nausea and/or vomiting or severe diarrhea within past 24 hours   - Has gastrectomy, ileus, gastric outlet or bowel obstruction, or malabsorption syndromes   - Has significant painful oral ulceration   - TPN with an NPO order   - Active GI bleed   - Unable to swallow   - NPO   - Febrile in the last 24 hours (antibiotics only)   - Clinical deteriorating or unstable (antibiotics only)   - Pediatric patients and patients who are not euthyroid (not on oral levothyroxine/not stabilized on oral levothyroxine)- Levothyroxine only     Electronically signed by Federica Aldana RPH on 9/26/2022 at 11:22 AM

## 2022-09-27 ENCOUNTER — TELEPHONE (OUTPATIENT)
Dept: INTERNAL MEDICINE CLINIC | Age: 32
End: 2022-09-27

## 2022-09-27 LAB
ACINETOBACTER CALCOAC BAUMANNII COMPLEX BY PCR: NOT DETECTED
BACTEROIDES FRAGILIS BY PCR: NOT DETECTED
CANDIDA ALBICANS BY PCR: NOT DETECTED
CANDIDA AURIS BY PCR: NOT DETECTED
CANDIDA GLABRATA BY PCR: NOT DETECTED
CANDIDA KRUSEI BY PCR: NOT DETECTED
CANDIDA PARAPSILOSIS BY PCR: NOT DETECTED
CANDIDA TROPICALIS BY PCR: NOT DETECTED
CRYPTOCOCCUS NEOFORMANS/GATTII BY PCR: NOT DETECTED
ENTEROBACTER CLOACAE COMPLEX BY PCR: NOT DETECTED
ENTEROBACTERALES BY PCR: NOT DETECTED
ENTEROCOCCUS FAECALIS BY PCR: NOT DETECTED
ENTEROCOCCUS FAECIUM BY PCR: NOT DETECTED
ESCHERICHIA COLI BY PCR: NOT DETECTED
HAEMOPHILUS INFLUENZAE BY PCR: NOT DETECTED
KLEBSIELLA AEROGENES BY PCR: NOT DETECTED
KLEBSIELLA OXYTOCA BY PCR: NOT DETECTED
KLEBSIELLA PNEUMONIAE GROUP BY PCR: NOT DETECTED
LISTERIA MONOCYTOGENES BY PCR: NOT DETECTED
NEISSERIA MENINGITIDIS BY PCR: NOT DETECTED
PROTEUS SPECIES BY PCR: NOT DETECTED
PSEUDOMONAS AERUGINOSA BY PCR: NOT DETECTED
SALMONELLA SPECIES BY PCR: NOT DETECTED
SERRATIA MARCESCENS BY PCR: NOT DETECTED
STAPHYLOCOCCUS AUREUS BY PCR: NOT DETECTED
STAPHYLOCOCCUS EPIDERMIDIS BY PCR: NOT DETECTED
STAPHYLOCOCCUS LUGDUNENSIS BY PCR: NOT DETECTED
STAPHYLOCOCCUS SPECIES BY PCR: NOT DETECTED
STENOTROPHOMONAS MALTOPHILIA BY PCR: NOT DETECTED
STREPTOCOCCUS AGALACTIAE BY PCR: NOT DETECTED
STREPTOCOCCUS PNEUMONIAE BY PCR: NOT DETECTED
STREPTOCOCCUS PYOGENES  BY PCR: NOT DETECTED
STREPTOCOCCUS SPECIES BY PCR: NOT DETECTED

## 2022-09-27 PROCEDURE — 1210000000 HC MED SURG R&B

## 2022-09-27 PROCEDURE — 2580000003 HC RX 258: Performed by: STUDENT IN AN ORGANIZED HEALTH CARE EDUCATION/TRAINING PROGRAM

## 2022-09-27 PROCEDURE — 6370000000 HC RX 637 (ALT 250 FOR IP)

## 2022-09-27 PROCEDURE — 6370000000 HC RX 637 (ALT 250 FOR IP): Performed by: INTERNAL MEDICINE

## 2022-09-27 PROCEDURE — 6360000002 HC RX W HCPCS

## 2022-09-27 RX ADMIN — DICYCLOMINE HYDROCHLORIDE 10 MG: 10 CAPSULE ORAL at 22:23

## 2022-09-27 RX ADMIN — DOXYCYCLINE HYCLATE 100 MG: 100 CAPSULE ORAL at 22:23

## 2022-09-27 RX ADMIN — FAMOTIDINE 20 MG: 20 TABLET ORAL at 22:23

## 2022-09-27 RX ADMIN — FAMOTIDINE 20 MG: 20 TABLET ORAL at 11:08

## 2022-09-27 RX ADMIN — DICYCLOMINE HYDROCHLORIDE 10 MG: 10 CAPSULE ORAL at 11:08

## 2022-09-27 RX ADMIN — PROMETHAZINE HYDROCHLORIDE 12.5 MG: 25 TABLET ORAL at 17:41

## 2022-09-27 RX ADMIN — ENOXAPARIN SODIUM 40 MG: 100 INJECTION SUBCUTANEOUS at 17:40

## 2022-09-27 RX ADMIN — DOXYCYCLINE HYCLATE 100 MG: 100 CAPSULE ORAL at 11:08

## 2022-09-27 RX ADMIN — DEXTROSE AND SODIUM CHLORIDE: 5; 900 INJECTION, SOLUTION INTRAVENOUS at 00:34

## 2022-09-27 ASSESSMENT — ENCOUNTER SYMPTOMS
CONSTIPATION: 0
COLOR CHANGE: 0
NAUSEA: 1
VOMITING: 0
CHEST TIGHTNESS: 0
SHORTNESS OF BREATH: 0
ABDOMINAL PAIN: 0
DIARRHEA: 0
SORE THROAT: 0

## 2022-09-27 ASSESSMENT — PAIN SCALES - GENERAL: PAINLEVEL_OUTOF10: 3

## 2022-09-27 ASSESSMENT — PAIN DESCRIPTION - LOCATION: LOCATION: ABDOMEN

## 2022-09-27 NOTE — TELEPHONE ENCOUNTER
[facility] has referral from [address] for [facility] PT/OT and SW ( she gets nursing weekly thru an infusion company)     Will Dr Miguelito Grande follow pt for [facility] orders for PT/OT and SW ?

## 2022-09-27 NOTE — PROGRESS NOTES
[facility] Hospitalists      Patient:  Irene Guevara  YOB: 1990  Date of Service: 9/27/2022  MRN: [id number]   Acct: [de-identified]   Primary Care Physician: Gareth Love MD  Advance Directive: Full Code  Admit Date: 9/23/2022       Hospital Day: 4  Portions of this note have been copied forward, however, changed to reflect the most current clinical status of this patient. CHIEF COMPLAINT vascular access problem    SUBJECTIVE: Patient offers no complaints today. Patient is concerned about her living situations. Patient denies fever or chills overnight. Patient denies any drainage around her Munguia today. CUMULATIVE HOSPITAL COURSE:  The patient is a 59-year-old female with past medical history of anxiety, depression, gastroparesis, GERD, Hoyos's esophagus, and POTS who presented to [facility] ED with complaints of vascular access problem. Patient follows with [facility] motility clinic for IVIG and TPN due to gastroparesis. Patient was seen by vascular surgery and had left internal IJ double lumen Munguia catheter placed on 7/26/2022. Patient reported noticing discharge and warmth that started on the night prior to admission. Patient denied fever, chills, shortness of breath, chest pain. Patient reported home health has been completing her dressing changes and assisting with her TPN. Patient also reported having bedbug infestation at home. ED work-up fairly unremarkable with WBC of 9.2 and hemoglobin of 10. Patient was admitted to the hospitalist service for infection of the vascular catheter. Original blood cultures drawn on admission with no growth to date. Vascular surgery recommended obtaining blood cultures from Munguia catheter, these were drawn on 9/26 set 1 indicates no growth to date sent to indicates gram-positive geraldine. Blood cultures drawn on 9/25/2022 with no growth at the 48-hour pedro. ID on board and recommended transitioning to oral doxycycline.   ID is aware of 1 positive blood culture in 1 negative drawn at the same time from the Munguia line. Patient has discussed living situation with social work and social work consulted to assist in discharge planning. Patient is currently with home health agency and supplies helps with her TPN, have not been able to contact this facility today to ensure patient has home health at discharge. Unable to discharge patient today without clarifying home health. Review of Systems:   Review of Systems   Constitutional:  Negative for appetite change, chills, fatigue and fever. HENT:  Negative for sneezing and sore throat. Respiratory:  Negative for chest tightness and shortness of breath. Cardiovascular:  Negative for chest pain, palpitations and leg swelling. Gastrointestinal:  Positive for nausea. Negative for abdominal pain, constipation, diarrhea and vomiting. Genitourinary:  Negative for difficulty urinating, flank pain, hematuria and urgency. Musculoskeletal:  Negative for arthralgias and myalgias. Skin:  Negative for color change. Patient reports area around Munguia insertion has greatly improved   Neurological:  Negative for dizziness, tremors and headaches. Psychiatric/Behavioral:  Negative for agitation and confusion. 14 point review of systems is negative except as specifically addressed above. Objective:   VITALS:  BP 98/66   Pulse 69   Temp 97.8 °F (36.6 °C) (Temporal)   Resp 18   Ht 5' 2\" (1.575 m)   Wt 129 lb (58.5 kg)   LMP 01/14/2018 (Exact Date)   SpO2 100%   BMI 23.59 kg/m²   24HR INTAKE/OUTPUT:    Intake/Output Summary (Last 24 hours) at 9/27/2022 1655  Last data filed at 9/27/2022 1413  Gross per 24 hour   Intake 120 ml   Output 500 ml   Net -380 ml         Physical Exam  Vitals and nursing note reviewed. Constitutional:       Appearance: She is not ill-appearing. HENT:      Mouth/Throat:      Mouth: Mucous membranes are moist.      Pharynx: Oropharynx is clear.    Eyes:      Pupils: Pupils are equal, round, and reactive to light. Cardiovascular:      Rate and Rhythm: Normal rate and regular rhythm. Pulses: Normal pulses. Heart sounds: Normal heart sounds. Pulmonary:      Effort: Pulmonary effort is normal.      Breath sounds: Normal breath sounds. Abdominal:      General: Abdomen is flat. Bowel sounds are normal. There is no distension. Palpations: Abdomen is soft. Tenderness: There is no abdominal tenderness. There is no guarding. Musculoskeletal:         General: Normal range of motion. Right lower leg: No edema. Left lower leg: No edema. Skin:     General: Skin is warm and dry. Capillary Refill: Capillary refill takes less than 2 seconds. Comments: Munguia's catheter to the left upper chest, dressing intact, slight amount of dried drainage under dressing   Neurological:      General: No focal deficit present. Mental Status: She is alert and oriented to person, place, and time. Medications:      dextrose 5 % and 0.9 % NaCl 100 mL/hr at 09/27/22 0034    sodium chloride        famotidine  20 mg Oral BID    doxycycline hyclate  100 mg Oral 2 times per day    sodium chloride flush  5-40 mL IntraVENous 2 times per day    enoxaparin  40 mg SubCUTAneous Daily    dicyclomine  10 mg Oral BID    linaclotide  145 mcg Oral QAM AC    triamcinolone   Topical BID     heparin flush, sodium chloride flush, sodium chloride, polyethylene glycol, acetaminophen **OR** acetaminophen, promethazine, promethazine, diphenhydrAMINE  ADULT DIET;  Regular     Lab and other Data:     Recent Labs     09/25/22 0322 09/26/22  0352   WBC 4.9 5.2   HGB 9.4* 8.6*    282       Recent Labs     09/25/22  0322 09/26/22  0352   * 136   K 3.5 4.3    107   CO2 18* 17*   BUN 9 8   CREATININE 0.7 0.7   GLUCOSE 94 115*       Recent Labs     09/25/22  0322 09/26/22  0352   AST 21 20   ALT 16 14   BILITOT <0.2 <0.2   ALKPHOS 68 63       Troponin T: No results for input(s): TROPONINI in the last 72 hours. Pro-BNP: No results for input(s): BNP in the last 72 hours. INR: No results for input(s): INR in the last 72 hours. UA:No results for input(s): NITRITE, COLORU, PHUR, LABCAST, WBCUA, RBCUA, MUCUS, TRICHOMONAS, YEAST, BACTERIA, CLARITYU, SPECGRAV, LEUKOCYTESUR, UROBILINOGEN, BILIRUBINUR, BLOODU, GLUCOSEU, AMORPHOUS in the last 72 hours. Invalid input(s): Edwin Ends  A1C: No results for input(s): LABA1C in the last 72 hours. ABG:No results for input(s): PHART, NDV5QXN, PO2ART, GHW9USW, BEART, HGBAE, W7HBRYBS, CARBOXHGBART in the last 72 hours. RAD:   No results found. Micro:   Component 9/23/22 1902    Blood Culture, Routine No Growth to date. Any change in status will be called. Component 9/23/22 1915    Culture, Blood 2 No Growth to date. Any change in status will be called. Component 9/25/22 1328    Blood Culture, Routine No Growth to date. Any change in status will be called. Component 9/25/22 1328    Culture, Blood 2  Abnormal    Bottle volume = 8 ml   Gram stain Aerobic bottle:   Gram positive rods   Culture in progress   Please notify Physician            Assessment/Plan   Principal Problem:    Infection of vascular catheter, initial encounter ([facility] [address] / Soft tissue infection   -follow cultures   -ID on board   -vascular on board   -continue oral doxycycline at this time   -ok to DC per ID, however unable to DC [facility]    Active Problems:    Infestation by bed bug   -patient bathed and placed in new room and belongings bagged per protocol    -SW to assist in DC planning      Gastroparesis   -continue current home medications      POTS (postural orthostatic tachycardia syndrome) / Autoimmune disease ([facility] [address]   -noted     Resolved Problems:    * No resolved hospital problems.  *      Antibiotic: doxyxyxline    DVT Prophylaxis:Lovenox        SHRUTHI Hubbard - CNP, 9/27/2022 4:55 PM

## 2022-09-27 NOTE — CARE COORDINATION
Set up with [facility]  [phone].    Electronically signed by Christophe Cui on 9/27/22 at 1:21 PM CDT

## 2022-09-27 NOTE — PLAN OF CARE
Problem: Pain  Goal: Verbalizes/displays adequate comfort level or baseline comfort level  9/27/2022 0446 by Landon Gregg RN  Outcome: Progressing  9/26/2022 1831 by Gabe Rosario RN  Outcome: Progressing     Problem: Safety - Adult  Goal: Free from fall injury  9/27/2022 0446 by Landon Gregg RN  Outcome: Progressing  Flowsheets (Taken 9/26/2022 2058)  Free From Fall Injury: Instruct family/caregiver on patient safety  9/26/2022 1831 by Gabe Rosario RN  Outcome: Progressing  Flowsheets (Taken 9/26/2022 0816)  Free From Fall Injury: Instruct family/caregiver on patient safety     Problem: Nutrition Deficit:  Goal: Optimize nutritional status  9/27/2022 0446 by Landon Gregg RN  Outcome: Progressing  9/26/2022 1831 by Gabe Rosario RN  Outcome: Progressing     Problem: Neurosensory - Adult  Goal: Achieves stable or improved neurological status  9/27/2022 0446 by Landon Gregg RN  Outcome: Progressing  Flowsheets (Taken 9/26/2022 2058)  Achieves stable or improved neurological status: Assess for and report changes in neurological status  9/26/2022 1831 by Gabe Rosario RN  Outcome: Progressing  Flowsheets (Taken 9/26/2022 0816)  Achieves stable or improved neurological status: Assess for and report changes in neurological status  Goal: Absence of seizures  9/27/2022 0446 by Landon Gregg RN  Outcome: Progressing  Flowsheets (Taken 9/26/2022 2058)  Absence of seizures:   Monitor for seizure activity. If seizure occurs, document type and location of movements and any associated apnea   Administer anticonvulsants as ordered  9/26/2022 1831 by Gabe Rosario RN  Outcome: Progressing  Flowsheets (Taken 9/26/2022 0816)  Absence of seizures: Monitor for seizure activity.   If seizure occurs, document type and location of movements and any associated apnea  Goal: Remains free of injury related to seizures activity  9/27/2022 0446 by Landon Gregg RN  Outcome: Progressing  Flowsheets (Taken 9/26/2022 2058)  Remains free of injury related to seizure activity: Maintain airway, patient safety  and administer oxygen as ordered  9/26/2022 1831 by Eden Galloway RN  Outcome: Progressing  Flowsheets (Taken 9/26/2022 0816)  Remains free of injury related to seizure activity: Maintain airway, patient safety  and administer oxygen as ordered  Goal: Achieves maximal functionality and self care  9/27/2022 0446 by Marah Buchanan RN  Outcome: Progressing  Flowsheets (Taken 9/26/2022 2058)  Achieves maximal functionality and self care: Monitor swallowing and airway patency with patient fatigue and changes in neurological status  9/26/2022 1831 by Eden Galloway RN  Outcome: Progressing  Flowsheets (Taken 9/26/2022 0816)  Achieves maximal functionality and self care: Monitor swallowing and airway patency with patient fatigue and changes in neurological status     Problem: Respiratory - Adult  Goal: Achieves optimal ventilation and oxygenation  9/27/2022 0446 by Marah Buchanan RN  Outcome: Progressing  Flowsheets (Taken 9/26/2022 2058)  Achieves optimal ventilation and oxygenation: Assess for changes in respiratory status  9/26/2022 1831 by Eden Galloway RN  Outcome: Progressing  Flowsheets (Taken 9/26/2022 0816)  Achieves optimal ventilation and oxygenation: Assess for changes in respiratory status     Problem: Cardiovascular - Adult  Goal: Maintains optimal cardiac output and hemodynamic stability  9/27/2022 0446 by Marah Buchanan RN  Outcome: Progressing  Flowsheets (Taken 9/26/2022 2058)  Maintains optimal cardiac output and hemodynamic stability: Monitor blood pressure and heart rate  9/26/2022 1831 by Eden Galloway RN  Outcome: Progressing  Flowsheets (Taken 9/26/2022 0816)  Maintains optimal cardiac output and hemodynamic stability: Monitor blood pressure and heart rate  Goal: Absence of cardiac dysrhythmias or at baseline  9/27/2022 0446 by Puma Arrieta RN  Outcome: Progressing  Flowsheets (Taken 9/26/2022 2058)  Absence of cardiac dysrhythmias or at baseline: Monitor cardiac rate and rhythm  9/26/2022 1831 by Malick Vasquez RN  Outcome: Progressing  Flowsheets (Taken 9/26/2022 0816)  Absence of cardiac dysrhythmias or at baseline: Monitor cardiac rate and rhythm     Problem: Skin/Tissue Integrity - Adult  Goal: Skin integrity remains intact  9/27/2022 0446 by Puma Arrieta RN  Outcome: Progressing  Flowsheets (Taken 9/26/2022 2058)  Skin Integrity Remains Intact: Monitor for areas of redness and/or skin breakdown  9/26/2022 1831 by Malick Vasquez RN  Outcome: Progressing  Flowsheets (Taken 9/26/2022 0816)  Skin Integrity Remains Intact: Monitor for areas of redness and/or skin breakdown  Goal: Incisions, wounds, or drain sites healing without S/S of infection  9/27/2022 0446 by Puma Arrieta RN  Outcome: Progressing  Flowsheets (Taken 9/26/2022 2058)  Incisions, Wounds, or Drain Sites Healing Without Sign and Symptoms of Infection: ADMISSION and DAILY: Assess and document risk factors for pressure ulcer development  9/26/2022 1831 by Malick Vasquez RN  Outcome: Progressing  Flowsheets (Taken 9/26/2022 0816)  Incisions, Wounds, or Drain Sites Healing Without Sign and Symptoms of Infection: ADMISSION and DAILY: Assess and document risk factors for pressure ulcer development  Goal: Oral mucous membranes remain intact  9/27/2022 0446 by Puma Arrieta RN  Outcome: Progressing  Flowsheets (Taken 9/26/2022 2058)  Oral Mucous Membranes Remain Intact: Assess oral mucosa and hygiene practices  9/26/2022 1831 by Malick Vasquez RN  Outcome: Progressing  Flowsheets (Taken 9/26/2022 0816)  Oral Mucous Membranes Remain Intact: Assess oral mucosa and hygiene practices     Problem: Musculoskeletal - Adult  Goal: Return mobility to safest level of function  9/27/2022 0446 by Puma Arrieta RN  Outcome: Progressing  Flowsheets (Taken 9/26/2022 2058)  Return Mobility to Safest Level of Function: Assess patient stability and activity tolerance for standing, transferring and ambulating with or without assistive devices  9/26/2022 1831 by Tom Johnson RN  Outcome: Progressing  Flowsheets (Taken 9/26/2022 0816)  Return Mobility to Safest Level of Function: Assess patient stability and activity tolerance for standing, transferring and ambulating with or without assistive devices  Goal: Maintain proper alignment of affected body part  9/27/2022 0446 by Chloe Babcock RN  Outcome: Progressing  Flowsheets (Taken 9/26/2022 2058)  Maintain proper alignment of affected body part: Support and protect limb and body alignment per provider's orders  9/26/2022 1831 by Tom Johnson RN  Outcome: Progressing  Flowsheets (Taken 9/26/2022 0816)  Maintain proper alignment of affected body part: Support and protect limb and body alignment per provider's orders  Goal: Return ADL status to a safe level of function  9/27/2022 0446 by Chloe Babcock RN  Outcome: Progressing  Flowsheets (Taken 9/26/2022 2058)  Return ADL Status to a Safe Level of Function: Administer medication as ordered  9/26/2022 1831 by Tom Johnson RN  Outcome: Progressing  Flowsheets (Taken 9/26/2022 0816)  Return ADL Status to a Safe Level of Function: Administer medication as ordered     Problem: Gastrointestinal - Adult  Goal: Minimal or absence of nausea and vomiting  9/27/2022 0446 by Chloe Babcock RN  Outcome: Progressing  Flowsheets (Taken 9/26/2022 2058)  Minimal or absence of nausea and vomiting: Administer IV fluids as ordered to ensure adequate hydration  9/26/2022 1831 by Tom Johnson RN  Outcome: Progressing  Flowsheets (Taken 9/26/2022 0816)  Minimal or absence of nausea and vomiting: Administer IV fluids as ordered to ensure adequate hydration  Goal: Maintains or returns to baseline bowel function  9/27/2022 0446 by Katie Tavarez RN  Outcome: Progressing  Flowsheets (Taken 9/26/2022 2058)  Maintains or returns to baseline bowel function: Assess bowel function  9/26/2022 1831 by Sherley Nesbitt RN  Outcome: Progressing  Flowsheets (Taken 9/26/2022 0816)  Maintains or returns to baseline bowel function: Assess bowel function  Goal: Maintains adequate nutritional intake  9/27/2022 0446 by Katie Tavarez RN  Outcome: Progressing  Flowsheets (Taken 9/26/2022 2058)  Maintains adequate nutritional intake: Monitor percentage of each meal consumed  9/26/2022 1831 by Sherley Nesbitt RN  Outcome: Progressing  Flowsheets (Taken 9/26/2022 0816)  Maintains adequate nutritional intake: Monitor percentage of each meal consumed  Goal: Establish and maintain optimal ostomy function  9/27/2022 0446 by Katie Tavarez RN  Outcome: Progressing  Flowsheets (Taken 9/26/2022 2058)  Establish and maintain optimal ostomy function: Monitor output from ostomies  9/26/2022 1831 by Sherley Nesbitt RN  Outcome: Progressing  Flowsheets (Taken 9/26/2022 0816)  Establish and maintain optimal ostomy function: Monitor output from ostomies     Problem: Genitourinary - Adult  Goal: Absence of urinary retention  9/27/2022 0446 by Katie Tavarez RN  Outcome: Progressing  Flowsheets (Taken 9/26/2022 2058)  Absence of urinary retention: Assess patients ability to void and empty bladder  9/26/2022 1831 by Sherley Nesbitt RN  Outcome: Progressing  Flowsheets (Taken 9/26/2022 0816)  Absence of urinary retention: Assess patients ability to void and empty bladder  Goal: Urinary catheter remains patent  9/27/2022 0446 by Katie Tavarez RN  Outcome: Progressing  Flowsheets (Taken 9/26/2022 2058)  Urinary catheter remains patent:   Assess patency of urinary catheter   Assess need for a larger catheter size or a 3-way catheter for continuous bladder irrigation   Irrigate catheter per Licensed Independent Practitioner order if indicated and notify Licensed Independent Practitioner if unable to irrigate  9/26/2022 1831 by Pan Garcia RN  Outcome: Progressing     Problem: Infection - Adult  Goal: Absence of infection at discharge  9/27/2022 0446 by Vance Negrete RN  Outcome: Progressing  Flowsheets (Taken 9/26/2022 2058)  Absence of infection at discharge: Assess and monitor for signs and symptoms of infection  9/26/2022 1831 by Pan Garcia RN  Outcome: Progressing  Flowsheets (Taken 9/26/2022 0816)  Absence of infection at discharge: Assess and monitor for signs and symptoms of infection  Goal: Absence of infection during hospitalization  9/27/2022 0446 by Vance Negrete RN  Outcome: Progressing  Flowsheets (Taken 9/26/2022 2058)  Absence of infection during hospitalization: Assess and monitor for signs and symptoms of infection  9/26/2022 1831 by Pan Garcia RN  Outcome: Progressing  Flowsheets (Taken 9/26/2022 0816)  Absence of infection during hospitalization: Assess and monitor for signs and symptoms of infection  Goal: Absence of fever/infection during anticipated neutropenic period  9/27/2022 0446 by Vance Negrete RN  Outcome: Progressing  Flowsheets (Taken 9/26/2022 2058)  Absence of fever/infection during anticipated neutropenic period: Monitor white blood cell count  9/26/2022 1831 by Pan Garcia RN  Outcome: Progressing  Flowsheets (Taken 9/26/2022 0816)  Absence of fever/infection during anticipated neutropenic period: Monitor white blood cell count     Problem: Metabolic/Fluid and Electrolytes - Adult  Goal: Electrolytes maintained within normal limits  9/27/2022 0446 by Vance Negrete RN  Outcome: Progressing  Flowsheets (Taken 9/26/2022 2058)  Electrolytes maintained within normal limits: Monitor labs and assess patient for signs and symptoms of electrolyte imbalances  9/26/2022 1831 by Pan Garcia RN  Outcome: Progressing  Flowsheets (Taken 9/26/2022 0816)  Electrolytes maintained within normal limits: Monitor labs and assess patient for signs and symptoms of electrolyte imbalances  Goal: Hemodynamic stability and optimal renal function maintained  9/27/2022 0446 by Chance Kaiser RN  Outcome: Progressing  Flowsheets (Taken 9/26/2022 2058)  Hemodynamic stability and optimal renal function maintained: Monitor labs and assess for signs and symptoms of volume excess or deficit  9/26/2022 1831 by Jaime Bowen RN  Outcome: Progressing  Flowsheets (Taken 9/26/2022 0816)  Hemodynamic stability and optimal renal function maintained: Monitor labs and assess for signs and symptoms of volume excess or deficit  Goal: Glucose maintained within prescribed range  9/27/2022 0446 by Chance Kaiser RN  Outcome: Progressing  Flowsheets (Taken 9/26/2022 2058)  Glucose maintained within prescribed range: Monitor blood glucose as ordered  9/26/2022 1831 by Jaime Bowen RN  Outcome: Progressing  Flowsheets (Taken 9/26/2022 0816)  Glucose maintained within prescribed range: Monitor blood glucose as ordered     Problem: Hematologic - Adult  Goal: Maintains hematologic stability  9/27/2022 0446 by Chance Kaiser RN  Outcome: Progressing  Flowsheets (Taken 9/26/2022 2058)  Maintains hematologic stability: Assess for signs and symptoms of bleeding or hemorrhage  9/26/2022 1831 by Jaime Bowen RN  Outcome: Progressing  Flowsheets (Taken 9/26/2022 0816)  Maintains hematologic stability: Assess for signs and symptoms of bleeding or hemorrhage     Problem: Chronic Conditions and Co-morbidities  Goal: Patient's chronic conditions and co-morbidity symptoms are monitored and maintained or improved  9/27/2022 0446 by Chance Kaiser RN  Outcome: Progressing  Flowsheets (Taken 9/26/2022 2058)  Care Plan - Patient's Chronic Conditions and Co-Morbidity Symptoms are Monitored and Maintained or Improved: Monitor and assess patient's chronic conditions and comorbid symptoms for stability, deterioration, or improvement  9/26/2022 1831 by Aggie Campuzano RN  Outcome: Progressing  Flowsheets (Taken 9/26/2022 0816)  Care Plan - Patient's Chronic Conditions and Co-Morbidity Symptoms are Monitored and Maintained or Improved: Monitor and assess patient's chronic conditions and comorbid symptoms for stability, deterioration, or improvement     Problem: Discharge Planning  Goal: Discharge to home or other facility with appropriate resources  9/27/2022 0446 by Rowdy Méndez RN  Outcome: Progressing  Flowsheets (Taken 9/26/2022 2058)  Discharge to home or other facility with appropriate resources: Identify barriers to discharge with patient and caregiver  9/26/2022 1831 by Aggie Campuzano RN  Outcome: Progressing  Flowsheets (Taken 9/26/2022 0816)  Discharge to home or other facility with appropriate resources: Identify barriers to discharge with patient and caregiver     Problem: ABCDS Injury Assessment  Goal: Absence of physical injury  9/27/2022 0446 by Rowdy Méndez RN  Outcome: Progressing  Flowsheets (Taken 9/26/2022 2058)  Absence of Physical Injury: Implement safety measures based on patient assessment  9/26/2022 1831 by Aggie Campuzano RN  Outcome: Progressing  Flowsheets (Taken 9/26/2022 0816)  Absence of Physical Injury: Implement safety measures based on patient assessment

## 2022-09-27 NOTE — CARE COORDINATION
SW has placed calls/emails to:  '   PADD office re: signing Pt up with in-home services or at least getting her onto the waiting list; reached out to Yoly Acuna and Lanie Solano;  **update: Lanie Solano indicated she would reach out to Pt re: application/questionnaire; gave cell and room number. mgr Elham at [email or web address] [facility] re: bedbug tx and status of another apt for Pt  [phone] left VM; [email or web address]; **update: received return call from Jayne at [phone]; she indicated that Pt can return to her apt at any time; she noted that Pt notified her of the bedbug issue this past Thursday and was told it would be October 6th before the pest control company can work her in; also noted to Pt that they cannot spray/treat her apt until it is in a \"better condition\" she confirmed that Pt has clutter, molding food, animal feces, etc., about apt; they can't even get into her bedroom. She confirmed Pt is next in line for a 2BR apt at their building, however, this will not transpire until her apt is clean and manageable; she also noted re: the back rent Pt Ibrahima jo has already paid 250.00 toward this total.     AIS/AIC: [phone] re: TPN/medical supplies and RN visits/labs, etc., : **updated placed call; will have to call back again tomorrow AM.     L  has accepted Pt for short-term therapy and SW needs at d/c; AIS/AIC to hopefully continue with long-term SN needs. APS referral also made to assist Pt with needs and problem-solving with bedbugs and help at home. Electronically signed by KENNEDI Ken on 9/27/2022 at 4:59 PM    Your e-mail has been received.  This inbox is monitored Monday-Friday from 8:00 AM-4:30 PM.  Emergencies and after hours (including weekends and holidays) reports should be called in to:  [phone]  [phone]

## 2022-09-28 VITALS
OXYGEN SATURATION: 100 % | SYSTOLIC BLOOD PRESSURE: 98 MMHG | HEIGHT: 62 IN | WEIGHT: 129 LBS | RESPIRATION RATE: 16 BRPM | TEMPERATURE: 97.7 F | HEART RATE: 83 BPM | DIASTOLIC BLOOD PRESSURE: 68 MMHG | BODY MASS INDEX: 23.74 KG/M2

## 2022-09-28 LAB
BLOOD CULTURE, ROUTINE: NORMAL
CULTURE, BLOOD 2: ABNORMAL
CULTURE, BLOOD 2: ABNORMAL
CULTURE, BLOOD 2: NORMAL
ORGANISM: ABNORMAL

## 2022-09-28 PROCEDURE — 6370000000 HC RX 637 (ALT 250 FOR IP): Performed by: INTERNAL MEDICINE

## 2022-09-28 PROCEDURE — 6370000000 HC RX 637 (ALT 250 FOR IP)

## 2022-09-28 PROCEDURE — 2580000003 HC RX 258: Performed by: STUDENT IN AN ORGANIZED HEALTH CARE EDUCATION/TRAINING PROGRAM

## 2022-09-28 RX ORDER — DOXYCYCLINE HYCLATE 100 MG/1
100 CAPSULE ORAL EVERY 12 HOURS SCHEDULED
Qty: 6 CAPSULE | Refills: 0 | Status: SHIPPED | OUTPATIENT
Start: 2022-09-28 | End: 2022-10-01

## 2022-09-28 RX ORDER — TRIAMCINOLONE ACETONIDE 1 MG/G
CREAM TOPICAL
Qty: 80 G | Refills: 0 | Status: ON HOLD | OUTPATIENT
Start: 2022-09-28

## 2022-09-28 RX ADMIN — DEXTROSE AND SODIUM CHLORIDE: 5; 900 INJECTION, SOLUTION INTRAVENOUS at 06:34

## 2022-09-28 RX ADMIN — DOXYCYCLINE HYCLATE 100 MG: 100 CAPSULE ORAL at 08:57

## 2022-09-28 RX ADMIN — TRIAMCINOLONE ACETONIDE: 1 CREAM TOPICAL at 08:58

## 2022-09-28 RX ADMIN — DICYCLOMINE HYDROCHLORIDE 10 MG: 10 CAPSULE ORAL at 08:57

## 2022-09-28 RX ADMIN — FAMOTIDINE 20 MG: 20 TABLET ORAL at 08:57

## 2022-09-28 NOTE — PROGRESS NOTES
Infectious Diseases Progress Note    Patient:  Silvino Cummings  YOB: 1990  MRN: [id number]   Admit date: 9/23/2022   Admitting Physician: Adrien Moya MD  Primary Care Physician: Render Boxer, MD    Chief Complaint/Interval History:  She had 1 of 4 blood cultures grow bacteria. Likely contaminant. She remains without fever. Tolerating doxycycline. Discussed with Hospitalist service yesterday. Okay with me for discharge home on short course of oral antibiotic treatment. In/Out    Intake/Output Summary (Last 24 hours) at 9/28/2022 1302  Last data filed at 9/28/2022 1034  Gross per 24 hour   Intake 120 ml   Output --   Net 120 ml     Allergies: Allergies   Allergen Reactions    Iv Dye [Iodides] Shortness Of Breath, Itching and Other (See Comments)     IV 3000; SoA, dizziness    Iv Dye [Iodides] Anaphylaxis     Iodine containing    Adhesive Tape Itching     And tegaderm    Bee Pollen Hives     Oranges-anything with orange color  Oranges-anything with orange color  Oranges-anything with orange color  Oranges-anything with orange color  Oranges-anything with orange color      Chlorhexidine      Other reaction(s): ITCHING    Fruit & Vegetable Daily [Nutritional Supplements]      Oranges-anything with orange color    Metoprolol Succinate [Metoprolol]      Excessive drowsiness/ Metoprolol tartrate-excessive drowsiness    Nsaids      Other reaction(s): GI Intolerance    Winkler Itching and Other (See Comments)     Other reaction(s): Vomiting    Orange Fruit [Citrus]      And artificial    Other      Cloraprep    Reglan [Metoclopramide]      Nausea,vomiting    Tramadol Other (See Comments)     Excessive sleeping x 3 days, pt takes 1/2 a pill.     Zofran [Ondansetron Hcl] Hives    Orange Oil Nausea And Vomiting    Sulfamethoxazole-Trimethoprim Nausea And Vomiting     Current Meds: famotidine (PEPCID) tablet 20 mg, BID  doxycycline hyclate (VIBRAMYCIN) capsule 100 mg, 2 times per day  heparin flush 100 UNIT/ML injection 300 Units, PRN  dextrose 5 % and 0.9 % sodium chloride infusion, Continuous  sodium chloride flush 0.9 % injection 5-40 mL, 2 times per day  sodium chloride flush 0.9 % injection 5-40 mL, PRN  0.9 % sodium chloride infusion, PRN  enoxaparin (LOVENOX) injection 40 mg, Daily  polyethylene glycol (GLYCOLAX) packet 17 g, Daily PRN  acetaminophen (TYLENOL) tablet 650 mg, Q6H PRN   Or  acetaminophen (TYLENOL) suppository 650 mg, Q6H PRN  promethazine (PHENERGAN) tablet 12.5 mg, Q6H PRN  dicyclomine (BENTYL) capsule 10 mg, BID  linaclotide (LINZESS) capsule 145 mcg, QAM AC  promethazine (PHENERGAN) injection 6.25 mg, Q6H PRN  diphenhydrAMINE (BENADRYL) tablet 25 mg, Q6H PRN  triamcinolone (KENALOG) 0.1 % cream, BID      Review of SystemsSee HPI    VitalSigns:  BP 98/68   Pulse 83   Temp 97.7 °F (36.5 °C) (Temporal)   Resp 16   Ht 5' 2\" (1.575 m)   Wt 129 lb (58.5 kg)   LMP 01/14/2018 (Exact Date)   SpO2 100%   BMI 23.59 kg/m²      Physical Exam  Munguia site with a little bit of skin breakdown right at the insertion location. Seems to be improving. There is no purulent exudate. No surrounding erythema. No tenderness or fluctuance. Lab Results:  CBC:   Recent Labs     09/26/22  0352   WBC 5.2   HGB 8.6*        BMP:  Recent Labs     09/26/22  0352      K 4.3      CO2 17*   BUN 8   CREATININE 0.7   GLUCOSE 115*     CultureResults:  Blood culture September 23, 2022-no growth  Blood culture September 23, 2022-no growth  Blood culture September 25, 2022-no growth to date  Blood culture September 25, 2022-1 of 2 bottles Mycobacterium-contaminant    Radiology: None    Additional Studies Reviewed:  None    Impression:  1. Small area of superficial breakdown near Munguia insertion site improving-she has a bout of 5 x 5 mm area that seems to be improving. There is no purulence, erythema, induration, or tenderness.   2.  Possible culture represents a contaminant    Recommendations:  Okay with me for discharge home  Suggest doxycycline 100 mg orally twice daily for 1 week  Will be happy to reassess if no ongoing improvement or if additional concerns for infectious diseases  She can otherwise follow-up with infectious diseases as needed    Barry Cabral MD

## 2022-09-28 NOTE — PLAN OF CARE
Problem: Pain  Goal: Verbalizes/displays adequate comfort level or baseline comfort level  Outcome: Progressing     Problem: Safety - Adult  Goal: Free from fall injury  Outcome: Progressing     Problem: Nutrition Deficit:  Goal: Optimize nutritional status  Outcome: Progressing     Problem: Neurosensory - Adult  Goal: Achieves stable or improved neurological status  Outcome: Progressing  Goal: Absence of seizures  Outcome: Progressing  Goal: Remains free of injury related to seizures activity  Outcome: Progressing  Goal: Achieves maximal functionality and self care  Outcome: Progressing     Problem: Respiratory - Adult  Goal: Achieves optimal ventilation and oxygenation  Outcome: Progressing     Problem: Cardiovascular - Adult  Goal: Maintains optimal cardiac output and hemodynamic stability  Outcome: Progressing  Goal: Absence of cardiac dysrhythmias or at baseline  Outcome: Progressing     Problem: Skin/Tissue Integrity - Adult  Goal: Skin integrity remains intact  Outcome: Progressing  Goal: Incisions, wounds, or drain sites healing without S/S of infection  Outcome: Progressing  Goal: Oral mucous membranes remain intact  Outcome: Progressing     Problem: Musculoskeletal - Adult  Goal: Return mobility to safest level of function  Outcome: Progressing  Goal: Maintain proper alignment of affected body part  Outcome: Progressing  Goal: Return ADL status to a safe level of function  Outcome: Progressing     Problem: Gastrointestinal - Adult  Goal: Minimal or absence of nausea and vomiting  Outcome: Progressing  Goal: Maintains or returns to baseline bowel function  Outcome: Progressing  Goal: Maintains adequate nutritional intake  Outcome: Progressing  Goal: Establish and maintain optimal ostomy function  Outcome: Progressing     Problem: Genitourinary - Adult  Goal: Absence of urinary retention  Outcome: Progressing  Goal: Urinary catheter remains patent  Outcome: Progressing     Problem: Infection - Adult  Goal: Absence of infection at discharge  Outcome: Progressing  Goal: Absence of infection during hospitalization  Outcome: Progressing  Goal: Absence of fever/infection during anticipated neutropenic period  Outcome: Progressing     Problem: Metabolic/Fluid and Electrolytes - Adult  Goal: Electrolytes maintained within normal limits  Outcome: Progressing  Goal: Hemodynamic stability and optimal renal function maintained  Outcome: Progressing  Goal: Glucose maintained within prescribed range  Outcome: Progressing     Problem: Hematologic - Adult  Goal: Maintains hematologic stability  Outcome: Progressing     Problem: Chronic Conditions and Co-morbidities  Goal: Patient's chronic conditions and co-morbidity symptoms are monitored and maintained or improved  Outcome: Progressing     Problem: Discharge Planning  Goal: Discharge to home or other facility with appropriate resources  Outcome: Progressing     Problem: ABCDS Injury Assessment  Goal: Absence of physical injury  Outcome: Progressing     Problem: Skin/Tissue Integrity  Goal: Absence of new skin breakdown  Description: 1. Monitor for areas of redness and/or skin breakdown  2. Assess vascular access sites hourly  3. Every 4-6 hours minimum:  Change oxygen saturation probe site  4. Every 4-6 hours:  If on nasal continuous positive airway pressure, respiratory therapy assess nares and determine need for appliance change or resting period.   Outcome: Progressing

## 2022-09-28 NOTE — CARE COORDINATION
SW placed call to [facility] [phone]; to arrange a Medicaid hospital release ride back to her apt as she is discharging today  Confirmation ID: [id number]     will call  when they arrive; pick-up will be at [facility] entrance at 4:30PM;   Electronically signed by Pro Carter on 9/28/2022 at 2:48 PM

## 2022-09-28 NOTE — PROGRESS NOTES
CLINICAL PHARMACY NOTE: MEDS TO BEDS    Total # of Prescriptions Filled: 2   The following medications were delivered to the patient:  Doxycycline 100 mg  Triamcinolone 0.1 % cream    Additional Documentation:   Delivered Rx's to patients room. Gave Rx to patient. Patient paid $0.00 copays.

## 2022-09-28 NOTE — CARE COORDINATION
ARIANNE placed call to [facility] (advanced infusion center) re: Pt's d/c and her TPN and SN needs. [phone]; called multiple times; talked to  and kept getting transferred; and the options on the phone are not appropriate for this Pt; attempting to get Pt's personal nurse's phone number and also waiting until noon to call back as they are on EST; possibly at lunch.  They also gave [phone], this number also went back to options menu  Electronically signed by KENNEDI Whitley on 9/28/2022 at 11:23 AM    ARIANNE placed call to Pt's RN-Marci at [phone] and left VM for a return call  Electronically signed by KENNEDI Whitley on 9/28/2022 at 11:35 AM    **ARIANNE placed f/u call again to [phone], spoke with Tucker with intake; he stated that Pt's nurse Sylvie Zazueta is aware that Pt will d/c home today; they don't need any orders, but would like d/c summary faxed to: [phone]  Electronically signed by Pro Whitley on 9/28/2022 at 1:15 PM

## 2022-09-28 NOTE — DISCHARGE SUMMARY
Dedrick Lopez  :  1990  MRN:  [id number]    Admit date:  2022  Discharge date:  2022    Discharging Physician:  Dr. Jason Bradley Directive: Full Code    Consults: Claudell Ped TO INFECTIOUS DISEASES  IP CONSULT TO VASCULAR SURGERY  IP CONSULT TO [address] [place] [address]     Primary Care Physician:  Obinna Ocampo MD    Discharge Diagnoses:  Principal Problem:    Infection of vascular catheter, initial encounter ([facility]  Active Problems:    Infestation by bed bug    Soft tissue infection    Gastroparesis    POTS (postural orthostatic tachycardia syndrome)    Autoimmune disease ([facility]  Resolved Problems:    * No resolved hospital problems. *      Portions of this note have been copied forward, however, changed to reflect the most current clinical status of this patient. Hospital Course: The patient is a 80-year-old female with past medical history of anxiety, depression, gastroparesis, GERD, Hoyos's esophagus, and POTS who presented to [address] [place] [address] ED with complaints of vascular access problem. Patient follows with [facility] motility clinic for IVIG and TPN due to gastroparesis. Patient was seen by vascular surgery and had left internal IJ double lumen Munguia catheter placed on 2022. Patient reported noticing discharge and warmth that started on the night prior to admission. Patient denied fever, chills, shortness of breath, chest pain. Patient reported home health has been completing her dressing changes and assisting with her TPN. Patient also reported having bedbug infestation at home. ED work-up fairly unremarkable with WBC of 9.2 and hemoglobin of 10. Patient was admitted to the hospitalist service for infection of the vascular catheter. Original blood cultures drawn on admission with no growth to date.   Vascular surgery recommended obtaining blood cultures from Munguia catheter, these were drawn on  set 1 indicated no growth to date and the second set with Microbacterium flavescens/laevaniformans likely a skin contaminant. ID on board and recommended transitioning to oral doxycycline. Patient has discussed living situation with social work and social work consulted to assist in discharge planning. Social work has discussed bed bug infestation with  and plans to spraying have been made. Patient is currently with home health agency and supplies helps with her TPN and are aware of patients DC. Patient is now medically stable for discharge home with home health. Significant Diagnostic Studies:   No results found. Pertinent Labs:   CBC:   Recent Labs     09/26/22  0352   WBC 5.2   HGB 8.6*        BMP:    Recent Labs     09/26/22  0352      K 4.3      CO2 17*   BUN 8   CREATININE 0.7   GLUCOSE 115*     INR: No results for input(s): INR in the last 72 hours. Physical Exam:  Vital Signs: BP 98/68   Pulse 83   Temp 97.7 °F (36.5 °C) (Temporal)   Resp 16   Ht 5' 2\" (1.575 m)   Wt 129 lb (58.5 kg)   LMP 01/14/2018 (Exact Date)   SpO2 100%   BMI 23.59 kg/m²   Physical Exam  Vitals and nursing note reviewed. Constitutional:       Appearance: She is not ill-appearing. HENT:      Mouth/Throat:      Mouth: Mucous membranes are moist.      Pharynx: Oropharynx is clear. Eyes:      Pupils: Pupils are equal, round, and reactive to light. Cardiovascular:      Rate and Rhythm: Normal rate and regular rhythm. Pulses: Normal pulses. Heart sounds: Normal heart sounds. Pulmonary:      Effort: Pulmonary effort is normal.      Breath sounds: Normal breath sounds. Abdominal:      General: Abdomen is flat. Bowel sounds are normal. There is no distension. Palpations: Abdomen is soft. Tenderness: There is no abdominal tenderness. There is no guarding. Musculoskeletal:         General: Normal range of motion. Right lower leg: No edema. Left lower leg: No edema.    Skin: General: Skin is warm and dry. Capillary Refill: Capillary refill takes less than 2 seconds. Comments: Munguia's catheter to the left upper chest, dressing intact, slight amount of dried drainage under dressing   Neurological:      General: No focal deficit present. Mental Status: She is alert and oriented to person, place, and time. Discharge Medications:         Medication List        START taking these medications      doxycycline hyclate 100 MG capsule  Commonly known as: VIBRAMYCIN  Take 1 capsule by mouth every 12 hours for 6 doses     triamcinolone 0.1 % cream  Commonly known as: KENALOG  Apply topically 2 times daily. CONTINUE taking these medications      * Banophen 25 MG capsule  Generic drug: diphenhydrAMINE     * diphenhydrAMINE 50 MG/ML injection  Commonly known as: BENADRYL     dicyclomine 10 MG capsule  Commonly known as: BENTYL     heparin flush 100 UNIT/ML injection  100 Units by Intracatheter route 2 times daily     linaclotide 145 MCG capsule  Commonly known as: LINZESS     meloxicam 15 MG tablet  Commonly known as: MOBIC     Octagam 20 GM/200ML Soln solution  Generic drug: immune globulin (human)     PEPCID IV     promethazine 25 MG/ML injection  Commonly known as: PHENERGAN  Infuse 25 mg intravenously every 3 hours     sodium chloride 0.9 % infusion     sodium chloride flush 0.9 % injection     Wheelchair Misc           * This list has 2 medication(s) that are the same as other medications prescribed for you. Read the directions carefully, and ask your doctor or other care provider to review them with you. Where to Get Your Medications        These medications were sent to [facility]  [address] , [address] [place] [id number]      Phone: [phone]   doxycycline hyclate 100 MG capsule  triamcinolone 0.1 % cream         Discharge Instructions:    Follow up with Maricruz Cruz MD in 7-10 days. Take medications as directed. Resume activity as tolerated. Diet: ADULT DIET; Regular     Disposition: Patient is Stableand will be discharged to Home with [address] [place] [address]. Time spent on discharge 32 minutes spent in assessing patient, reviewing medications, discussion with nursing, confirming safe discharge plan and preparation of discharge summary.     Signed:  SHRUTHI Shultz CNP, 9/28/2022 2:28 PM

## 2022-09-28 NOTE — PLAN OF CARE
Problem: Pain  Goal: Verbalizes/displays adequate comfort level or baseline comfort level  9/28/2022 1527 by Yaritza Jeffers RN  Outcome: Completed  9/28/2022 1354 by Yaritza Jeffers RN  Outcome: Progressing     Problem: Safety - Adult  Goal: Free from fall injury  9/28/2022 1527 by Yaritza Jeffers RN  Outcome: Completed  9/28/2022 1354 by Yaritza Jeffers RN  Outcome: Progressing     Problem: Nutrition Deficit:  Goal: Optimize nutritional status  9/28/2022 1527 by Yaritza Jeffers RN  Outcome: Completed  9/28/2022 1354 by Yaritza Jeffers RN  Outcome: Progressing     Problem: Neurosensory - Adult  Goal: Achieves stable or improved neurological status  9/28/2022 1527 by Yaritza Jeffers RN  Outcome: Completed  9/28/2022 1354 by Yaritza Jeffers RN  Outcome: Progressing  Goal: Absence of seizures  9/28/2022 1527 by Yaritza Jeffers, RN  Outcome: Completed  9/28/2022 1354 by Yaritza Jeffers RN  Outcome: Progressing  Goal: Remains free of injury related to seizures activity  9/28/2022 1527 by Yaritza Jeffers, RN  Outcome: Completed  9/28/2022 1354 by Yaritza Jeffers RN  Outcome: Progressing  Goal: Achieves maximal functionality and self care  9/28/2022 1527 by Yaritza Jeffers, RN  Outcome: Completed  9/28/2022 1354 by Yaritza Jeffers RN  Outcome: Progressing     Problem: Respiratory - Adult  Goal: Achieves optimal ventilation and oxygenation  9/28/2022 1527 by Yaritza Jeffers RN  Outcome: Completed  9/28/2022 1354 by Yaritza Jeffers RN  Outcome: Progressing     Problem: Cardiovascular - Adult  Goal: Maintains optimal cardiac output and hemodynamic stability  9/28/2022 1527 by Yaritza Jeffers RN  Outcome: Completed  9/28/2022 1354 by Yaritza Jeffers RN  Outcome: Progressing  Goal: Absence of cardiac dysrhythmias or at baseline  9/28/2022 1527 by Yaritza Jeffers, RN  Outcome: Completed  9/28/2022 1354 by Yaritza Jeffers RN  Outcome: Progressing     Problem: Skin/Tissue Integrity - Adult  Goal: Skin integrity remains intact  9/28/2022 1527 by Elise Munoz RN  Outcome: Completed  9/28/2022 1354 by Elise Munoz RN  Outcome: Progressing  Goal: Incisions, wounds, or drain sites healing without S/S of infection  9/28/2022 1527 by Elise Munoz RN  Outcome: Completed  9/28/2022 1354 by Elise Munoz RN  Outcome: Progressing  Goal: Oral mucous membranes remain intact  9/28/2022 1527 by Elise Munoz RN  Outcome: Completed  9/28/2022 1354 by Elise Munoz RN  Outcome: Progressing     Problem: Musculoskeletal - Adult  Goal: Return mobility to safest level of function  9/28/2022 1527 by Elise Munoz RN  Outcome: Completed  9/28/2022 1354 by Elise Munoz RN  Outcome: Progressing  Goal: Maintain proper alignment of affected body part  9/28/2022 1527 by Elise Munoz RN  Outcome: Completed  9/28/2022 1354 by Elise Munoz RN  Outcome: Progressing  Goal: Return ADL status to a safe level of function  9/28/2022 1527 by Elise Munoz RN  Outcome: Completed  9/28/2022 1354 by Elise Munoz RN  Outcome: Progressing     Problem: Gastrointestinal - Adult  Goal: Minimal or absence of nausea and vomiting  9/28/2022 1527 by Elise Munoz RN  Outcome: Completed  9/28/2022 1354 by Elise Munoz RN  Outcome: Progressing  Goal: Maintains or returns to baseline bowel function  9/28/2022 1527 by Elise Munoz RN  Outcome: Completed  9/28/2022 1354 by Elise Munoz RN  Outcome: Progressing  Goal: Maintains adequate nutritional intake  9/28/2022 1527 by Elise Munoz RN  Outcome: Completed  9/28/2022 1354 by Elise Munoz RN  Outcome: Progressing  Goal: Establish and maintain optimal ostomy function  9/28/2022 1527 by Elise Munoz RN  Outcome: Completed  9/28/2022 1354 by Elise Munoz RN  Outcome: Progressing     Problem: Genitourinary - Adult  Goal: Absence of urinary retention  9/28/2022 1527 by Elise Hipps, RN  Outcome: Completed  9/28/2022 1354 by Elise Munoz RN  Outcome: Progressing  Goal: Urinary catheter remains patent  9/28/2022 1527 by Horacio Hobbs RN  Outcome: Completed  9/28/2022 1354 by Horacio Hobbs RN  Outcome: Progressing     Problem: Infection - Adult  Goal: Absence of infection at discharge  9/28/2022 1527 by Horacio Hobbs RN  Outcome: Completed  9/28/2022 1354 by Horacio Hobbs RN  Outcome: Progressing  Goal: Absence of infection during hospitalization  9/28/2022 1527 by Horacio Hobbs RN  Outcome: Completed  9/28/2022 1354 by Horacio Hobbs RN  Outcome: Progressing  Goal: Absence of fever/infection during anticipated neutropenic period  9/28/2022 1527 by Horacio Hobbs RN  Outcome: Completed  9/28/2022 1354 by Horacio Hobbs RN  Outcome: Progressing     Problem: Metabolic/Fluid and Electrolytes - Adult  Goal: Electrolytes maintained within normal limits  9/28/2022 1527 by Horacio Hobbs RN  Outcome: Completed  9/28/2022 1354 by Horacio Hobbs RN  Outcome: Progressing  Goal: Hemodynamic stability and optimal renal function maintained  9/28/2022 1527 by Horacio Hobbs RN  Outcome: Completed  9/28/2022 1354 by Horacio Hobbs RN  Outcome: Progressing  Goal: Glucose maintained within prescribed range  9/28/2022 1527 by Horacio Hobbs RN  Outcome: Completed  9/28/2022 1354 by Horacio Hobbs RN  Outcome: Progressing     Problem: Hematologic - Adult  Goal: Maintains hematologic stability  9/28/2022 1527 by Horacio Hobbs RN  Outcome: Completed  9/28/2022 1354 by Horacio Hobbs RN  Outcome: Progressing     Problem: Chronic Conditions and Co-morbidities  Goal: Patient's chronic conditions and co-morbidity symptoms are monitored and maintained or improved  9/28/2022 1527 by Horacio Hobbs RN  Outcome: Completed  9/28/2022 1354 by Horacio Hobbs RN  Outcome: Progressing     Problem: Discharge Planning  Goal: Discharge to home or other facility with appropriate resources  9/28/2022 1527 by Horacio Hobbs RN  Outcome: Completed  9/28/2022 1354 by Horacio Hobbs RN  Outcome: Progressing     Problem: ABCDS Injury Assessment  Goal: Absence of physical injury  9/28/2022 1527 by Radha Forbes RN  Outcome: Completed  9/28/2022 1354 by Radha Forbes RN  Outcome: Progressing     Problem: Skin/Tissue Integrity  Goal: Absence of new skin breakdown  Description: 1. Monitor for areas of redness and/or skin breakdown  2. Assess vascular access sites hourly  3. Every 4-6 hours minimum:  Change oxygen saturation probe site  4. Every 4-6 hours:  If on nasal continuous positive airway pressure, respiratory therapy assess nares and determine need for appliance change or resting period.   9/28/2022 1527 by Radha Forbes RN  Outcome: Completed  9/28/2022 1354 by Radha Forbes RN  Outcome: Progressing

## 2022-09-28 NOTE — PROGRESS NOTES
Nutrition Assessment     Type and Reason for Visit: Reassess    Nutrition Recommendations/Plan:   Continue current POC. Encourage continuation of good oral intake. Malnutrition Assessment:  Malnutrition Status: At risk for malnutrition (Comment)    Nutrition Assessment:  Pt improving from a nutritional standpoint with adequate PO intake to meet nutritional needs. Oral intake >50% of meals over the last few days. Aware pt is established with [address] [place] [address] Rylan Hicks and previously had home TPN via [facility] line. M[place]uia line still in place. However, oral intake sufficient to meet needs at this time. No new weight available. Aware SW working to ensure safe discharge home with continuation of Home Health services. Estimated Daily Nutrient Needs:  Energy (kcal):  1985 Weight Used for Energy Requirements: Admission     Protein (g):  85 Weight Used for Protein Requirements: Admission        Fluid (ml/day):  1985 Method Used for Fluid Requirements: 1 ml/kcal    Nutrition Related Findings:   J-tube, Left IJ dual lumen 7/26; removed right IJ single lumen 7/26 Wound Type: None    Current Nutrition Therapies:    ADULT DIET;  Regular    Anthropometric Measures:  Height: 5' 2\" (157.5 cm)  Current Body Wt: 129 lb (58.5 kg)   BMI: 23.6    Nutrition Diagnosis:   Predicted inadequate energy intake related to altered GI function, impaired nutrient utilization, psychological cause or life stress, inadequate parenteral nutrition infusion, inadequate enteral nutrition infusion as evidenced by NPO or clear liquid status due to medical condition, nutrition support - enteral nutrition, nutrition support - parenteral nutrition, GI abnormality, nausea, other (comment) (chronic gastroparesis)    Nutrition Interventions:   Food and/or Nutrient Delivery: Continue Current Diet  Nutrition Education/Counseling: No recommendation at this time  Coordination of Nutrition Care: Continue to monitor while inpatient  Plan of Care discussed with: RN    Goals:  Previous Goal Met: Progressing toward Goal(s)  Goals: Meet at least 75% of estimated needs       Nutrition Monitoring and Evaluation:   Behavioral-Environmental Outcomes: None Identified  Food/Nutrient Intake Outcomes: Food and Nutrient Intake  Physical Signs/Symptoms Outcomes: GI Status, Nausea or Vomiting, Weight    Discharge Planning:     Too soon to determine, Coordination of community care     [employer],  [address] [place] [address],   Contact: [phone]

## 2022-09-29 ENCOUNTER — TELEPHONE (OUTPATIENT)
Dept: INTERNAL MEDICINE CLINIC | Age: 32
End: 2022-09-29

## 2022-09-29 ENCOUNTER — CARE COORDINATION (OUTPATIENT)
Dept: CASE MANAGEMENT | Age: 32
End: 2022-09-29

## 2022-09-29 NOTE — TELEPHONE ENCOUNTER
[address] [address] PT eval completed and PT recommending visits ,  1 wk 1, then 2 wk 2 for deep breathing, trunk/core, and B LE strengthening exercises; transfer and gait training; balance activities; home safety instruction    Please advise if PT visits approved

## 2022-09-29 NOTE — CARE COORDINATION
[facility] Care Transitions Initial Follow Up Call    Call within 2 business days of discharge: Yes    Care Transition Nurse contacted the patient by telephone to perform post hospital discharge assessment. Verified name and  with patient as identifiers. Provided introduction to self, and explanation of the Care Transition Nurse role. Patient: Jorge Ventura Patient : 1990   MRN: [id number]  Reason for Admission: Vascular access infection  Discharge Date: 22 RARS: Readmission Risk Score: 9.9      Last Discharge [address]       Date Complaint Diagnosis Description Type Department Provider    22 Vascular Access Problem Infection of vascular catheter, initial encounter [facility]) ED to Hosp-Admission (Discharged) (ADMITTED) [facility] ON[facility] Aylin Ritchie MD; Ibrahima Sams,... Was this an external facility discharge? No     Challenges to be reviewed by the provider   Additional needs identified to be addressed with provider: No  none         Method of communication with provider: none. Spoke with patient for an initial follow up call following discharge. She reported that she is doing fine. She denied any abnormal symptoms or issues. She said she has all of her IV supplies and is not having any issues with medications. She did not want to do a medication review. She said she has her discharge antibiotics but has not taken it yet today. Encouraged to take it as ordered to keep infection under control. She said she will. She said she is doing ok. She is not aware of any issues. She is aware of her hospital follow up appointment with her PCP, said it is virtual.  She said everything is going ok. She did ask for the number for Madhu Bautista, the SW/discharge planner at the hospital by name. I told her I didn't have it directly available to me at the time, but did give her the hospital  number and told her that she could be connected that way.   I asked if there was anything I could help with and she said she just needed to speak with Jose Pablo. No other needs reported. CTN to follow up as indicated. Care Transition Nurse reviewed discharge instructions, medical action plan, and red flags with patient who verbalized understanding. The patient was given an opportunity to ask questions and does not have any further questions or concerns at this time. Were discharge instructions available to patient? Yes. Reviewed appropriate site of care based on symptoms and resources available to patient including: PCP  Urgent care clinics  [address]  When to call [address] The patient agrees to contact the PCP office for questions related to their healthcare. Advance Care Planning:   Does patient have an Advance Directive: Not on file, educated. Advance Care Planning   Healthcare Decision Maker:  Patient does not have a current health care decision maker listed and is not able to name one at this time. Medication reconciliation was not performed with patient, declined. She verbalizes understanding of administration of home medications. Non-face-to-face services provided:  Reviewed encounter information for continuity of care prior to follow up Care Transitions Coordination phone call - chart notes, consults, progress notes, test results, med list, appointments, AVS, other information.     Care Transitions 24 Hour Call    Schedule Follow Up Appointment with PCP: Completed  Do you have a copy of your discharge instructions?: Yes  Do you have all of your prescriptions and are they filled?: Yes  Have you been contacted by a [address]?: No  Have you scheduled your follow up appointment?: Yes  How are you going to get to your appointment?: Other  Do you feel like you have everything you need to keep you well at home?: Yes  Care Transitions Interventions         Follow Up  Future Appointments   Date Time Provider Sarai Sanchez   10/4/2022  8:00 AM Marilia Randall MD [facility]       Care Transition Nurse provided contact information. Plan for follow-up call in 5-7 days based on severity of symptoms and risk factors.   Plan for next call: Plan for next call - assess changes since last call, assess overall status, pain, appetite/nutritional status, signs of infection, effectiveness of meds, teaching needs, etc.      Yon Kulkarni RN

## 2022-09-30 LAB — BLOOD CULTURE, ROUTINE: NORMAL

## 2022-10-03 RX ORDER — DICLOFENAC SODIUM 30 MG/G
GEL TOPICAL
Status: ON HOLD | COMMUNITY
Start: 2022-08-29

## 2022-10-03 RX ORDER — FAMOTIDINE 10 MG/ML
INJECTION, SOLUTION INTRAVENOUS
COMMUNITY
Start: 2022-09-20 | End: 2022-10-03

## 2022-10-03 RX ORDER — IMMUNE GLOBULIN 100 MG/ML
SOLUTION INTRAVENOUS
COMMUNITY
Start: 2022-08-23 | End: 2022-10-04

## 2022-10-03 RX ORDER — SERTRALINE HYDROCHLORIDE 20 MG/ML
SOLUTION ORAL
Status: ON HOLD | COMMUNITY
Start: 2022-09-20

## 2022-10-03 RX ORDER — ALTEPLASE 2.2 MG/2ML
INJECTION, POWDER, LYOPHILIZED, FOR SOLUTION INTRAVENOUS
Status: ON HOLD | COMMUNITY
Start: 2022-09-13

## 2022-10-04 ENCOUNTER — TELEMEDICINE (OUTPATIENT)
Dept: FAMILY MEDICINE CLINIC | Age: 32
End: 2022-10-04
Payer: MEDICAID

## 2022-10-04 ENCOUNTER — TELEPHONE (OUTPATIENT)
Dept: INTERNAL MEDICINE CLINIC | Age: 32
End: 2022-10-04

## 2022-10-04 DIAGNOSIS — G47.00 INSOMNIA, UNSPECIFIED TYPE: ICD-10-CM

## 2022-10-04 DIAGNOSIS — Z09 HOSPITAL DISCHARGE FOLLOW-UP: Primary | ICD-10-CM

## 2022-10-04 DIAGNOSIS — T80.219D CENTRAL VENOUS LINE INFECTION, SUBSEQUENT ENCOUNTER: ICD-10-CM

## 2022-10-04 DIAGNOSIS — G90.9 AUTONOMIC NEUROPATHY: ICD-10-CM

## 2022-10-04 PROCEDURE — 1111F DSCHRG MED/CURRENT MED MERGE: CPT | Performed by: FAMILY MEDICINE

## 2022-10-04 PROCEDURE — 99213 OFFICE O/P EST LOW 20 MIN: CPT | Performed by: FAMILY MEDICINE

## 2022-10-04 RX ORDER — PHENOL 1.4 %
AEROSOL, SPRAY (ML) MUCOUS MEMBRANE
Status: ON HOLD | COMMUNITY

## 2022-10-04 NOTE — TELEPHONE ENCOUNTER
[address] [address] OT eval completed and per OT pt isndependent with all adl routines  , so was eval only with no OT further visits needed   [facility]

## 2022-10-04 NOTE — PROGRESS NOTES
Post-Discharge Transitional Care  Follow Up      Asha Lopez   YOB: 1990    Date of Office Visit:  10/4/2022  Date of Hospital Admission: 9/23/22  Date of Hospital Discharge: 9/28/22  Risk of hospital readmission (high >=14%. Medium >=10%) :Readmission Risk Score: 9.9      Care management risk score Rising risk (score 2-5) and Complex Care (Scores >=6): No Risk Score On File     Non face to face  following discharge, date last encounter closed (first attempt may have been earlier): 09/29/2022    Call initiated 2 business days of discharge: Yes    ASSESSMENT/PLAN:   Below is the assessment and plan developed based on review of pertinent history, physical exam, labs, studies, and medications. Hospital discharge follow-up  -     MA DISCHARGE MEDS RECONCILED W/ CURRENT OUTPATIENT MED LIST  Insomnia, unspecified type  Autonomic neuropathy  -     Lucien Mata MD, [facility], subsequent encounter    Medical Decision Making: moderate complexity  Return in about 6 months (around 4/4/2023) for physical, in office. Subjective:   HPI:  Follow up of Hospital problems/diagnosis(es): infection of catheter    Inpatient course: Discharge summary reviewed- see chart. Interval history/Current status:     Patient presented to ED with discharge/warmth around her catheter. She was admitted for the same. Her 9/23 blood cultures were negative. Repeat blood cultures from 9/25 showed contaminant. Munguia culture showed the same. She was discharged with 3 days prescription Doxycycline which she completed. Home health was arranged, she reports they have sprayed for bed bugs. Overall she feels better. Still having insomnia, previously on melatonin but she does not recall how much she took. Encouraged her to try 10 mg. She is also requesting a referral to Neurology, primarily for weakness.   Chart review shows she saw Dr Brayan Acharya in [facility] previously, last note was dated 2017, diagnosis \"autonomic neuropathy unspecified. \"       Patient Active Problem List   Diagnosis    Hiccups    Barretts esophagus    Belching    Chronic serous otitis media of left ear    Disc herniation    Neck pain    Back pain    Scoliosis    Anxiety disorder    NSAID long-term use    Heartburn    Irritable bowel syndrome    Pelvic pain in female    Dysmenorrhea    Family history of endometriosis    Gastroparesis    Insomnia    Nausea & vomiting    Constipation    Poor venous access    History of syncope    POTS (postural orthostatic tachycardia syndrome)    Tachycardia    Near syncope    ETD (eustachian tube dysfunction)    Jejunostomy tube present (HCC)    Knee pain    Mixed hearing loss of left ear    Chronic fatigue syndrome    Tyrell's syndrome pupil    Idiopathic scoliosis of thoracolumbar spine    Nutritional disorder    Autoimmune disease ([facility]    Cluster headache    Common variable agammaglobulinemia ([facility]    Hiatal hernia    History of infection due to human papilloma virus (HPV)    History of total hysterectomy    Learning disability    Vitamin D deficiency    Nonintractable headache    Encounter for care related to vascular access port    Infection of vascular catheter, initial encounter ([facility]    Infestation by bed bug    Soft tissue infection       Medications listed as ordered at the time of discharge from hospital     Medication List            Accurate as of October 4, 2022  8:10 AM. If you have any questions, ask your nurse or doctor.                 CONTINUE taking these medications      * Banophen 25 MG capsule  Generic drug: diphenhydrAMINE     * diphenhydrAMINE 50 MG/ML injection  Commonly known as: BENADRYL     Cathflo Activase 2 MG injection  Generic drug: alteplase     Diclofenac Sodium 3 % Gel     dicyclomine 10 MG capsule  Commonly known as: BENTYL     heparin flush 100 UNIT/ML injection  100 Units by Intracatheter route 2 times daily     hyoscyamine 125 MCG sublingual tablet  Commonly known as: LEVSIN/SL     linaclotide 145 MCG capsule  Commonly known as: LINZESS     Melatonin 10 MG Tabs     meloxicam 15 MG tablet  Commonly known as: MOBIC     Octagam 20 GM/200ML Soln solution  Generic drug: immune globulin (human)     PEPCID IV     promethazine 25 MG/ML injection  Commonly known as: PHENERGAN  Infuse 25 mg intravenously every 3 hours     sertraline 20 MG/ML concentrated solution  Commonly known as: ZOLOFT     sodium chloride 0.9 % infusion     sodium chloride flush 0.9 % injection     sterile water injection     triamcinolone 0.1 % cream  Commonly known as: KENALOG  Apply topically 2 times daily. Wheelchair Misc           * This list has 2 medication(s) that are the same as other medications prescribed for you. Read the directions carefully, and ask your doctor or other care provider to review them with you. Medications marked \"taking\" at this time  Outpatient Medications Marked as Taking for the 10/4/22 encounter (Telemedicine) with Rodrigo Sandoval MD   Medication Sig Dispense Refill    Melatonin 10 MG TABS Take by mouth          Medications patient taking as of now reconciled against medications ordered at time of hospital discharge: Yes    A comprehensive review of systems was negative except for what was noted in the HPI. Objective:    Patient-Reported Vitals  No data recorded        PHYSICAL EXAMINATION:  [ INSTRUCTIONS:  \"[x]\" Indicates a positive item  \"[]\" Indicates a negative item  -- DELETE ALL ITEMS NOT EXAMINED]  Vital Signs: (As obtained by patient/caregiver or practitioner observation)    Blood pressure-  Heart rate-    Respiratory rate-    Temperature-  Pulse oximetry-     There were no vitals filed for this visit.     Constitutional: [x] Appears well-developed and well-nourished [x] No apparent distress      [] Abnormal-   Mental status  [x] Alert and awake  [x] Oriented to person/place/time []Able to follow commands Eyes:  EOM    [x]  Normal  [] Abnormal-  Sclera  [x]  Normal  [] Abnormal -         Discharge [x]  None visible  [] Abnormal -    HENT:   [x] Normocephalic, atraumatic. [] Abnormal   [x] Mouth/Throat: Mucous membranes are moist.     External Ears [x] Normal  [] Abnormal-     Neck: [x] No visualized mass     Pulmonary/Chest: [x] Respiratory effort normal.  [x] No visualized signs of difficulty breathing or respiratory distress        [] Abnormal-      Musculoskeletal:   [] Normal gait with no signs of ataxia         [x] Normal range of motion of neck        [] Abnormal-       Neurological:        [x] No Facial Asymmetry (Cranial nerve 7 motor function) (limited exam to video visit)          [x] No gaze palsy        [] Abnormal-         Skin:        [x] No significant exanthematous lesions or discoloration noted on facial skin         [] Abnormal-            Psychiatric:       [x] Normal Affect [] No Hallucinations        [] Abnormal-     Other pertinent observable physical exam findings-      Devin Benavides, was evaluated through a synchronous (real-time) audio-video encounter. The patient (or guardian if applicable) is aware that this is a billable service, which includes applicable co-pays. This Virtual Visit was conducted with patient's (and/or legal guardian's) consent. The visit was conducted pursuant to the emergency declaration under the [address] [place] [address] authority and the [employer] General Act. Patient identification was verified, and a caregiver was present when appropriate. The patient was located at Home: [address] [place] [address]. Provider was located at [address] [address]): [address] [place] [address].       An electronic signature was used to authenticate this note.  --Bindu Cruz MD

## 2022-10-05 ENCOUNTER — CARE COORDINATION (OUTPATIENT)
Dept: CASE MANAGEMENT | Age: 32
End: 2022-10-05

## 2022-10-05 ENCOUNTER — TELEPHONE (OUTPATIENT)
Dept: NEUROLOGY | Age: 32
End: 2022-10-05

## 2022-10-05 NOTE — CARE COORDINATION
[facility] Transitions Follow Up Call    10/5/2022    Patient: Irene Guevara  Patient : 1990   MRN: [id number]  Reason for Admission: Infection of vascular catheter Discharge Date: 22 RARS: Readmission Risk Score: 9.9         Spoke with: Irene Guevara who reported that she is doing fine. Patient stated that she is almost back to her baseline. Patient states that the [facility] nurse comes in every Wednesday to clean and change port dressing and help with TPN. Patient denies cp, sob, cough, dizziness, headache, n/v, diarrhea, abdominal pains, fever, or chills. Patient is taking all medications as ordered. Patient had a follow up with PCP via tele-health and she states that the visit went well. The doctor did give her a referral to Neuro. No new medications ordered. Patient denies any needs at this time. Patient instructed to continue to monitor s/s, reporting any that may present to MD immediately for early intervention. Agreeable to f/u calls. Care Transitions Subsequent and Final Call    Subsequent and Final Calls  Do you have any ongoing symptoms?: No  Have your medications changed?: No  Do you have any questions related to your medications?: No  Do you currently have any active services?: Yes  Are you currently active with any services?: Home Health  Do you have any needs or concerns that I can assist you with?: No  Identified Barriers: None  Care Transitions Interventions  Other Interventions:              Follow Up  Future Appointments   Date Time Provider Sarai Sanchez   2022 11:30 AM MD SAMY Sheppard NEURO RE[facility]   2023  1:40 PM Amie Toure MD [facility] LPN

## 2022-10-05 NOTE — TELEPHONE ENCOUNTER
Received a referral for this patient. Called and spoke with patient to let her know when I was able to get her scheduled an appointment with Dr. Jonny Floyd. Patient is aware of the appointment time/date/location.

## 2022-10-12 ENCOUNTER — CARE COORDINATION (OUTPATIENT)
Dept: CASE MANAGEMENT | Age: 32
End: 2022-10-12

## 2022-10-12 ENCOUNTER — TELEPHONE (OUTPATIENT)
Dept: FAMILY MEDICINE CLINIC | Age: 32
End: 2022-10-12

## 2022-10-12 NOTE — CARE COORDINATION
[facility] Care Transitions Follow Up Call    Care Transition Nurse contacted the patient by telephone to follow up. Patient: Ender Espinoza  Patient : 1990   MRN: [id number]   Discharge Date: 22 RARS: Readmission Risk Score: 9.9      Needs to be reviewed by the provider   Additional needs identified to be addressed with provider: No  none         Method of communication with provider: none. Addressed changes since last contact:  none    Discussed follow-up appointments. Follow Up  Future Appointments   Date Time Provider Sarai Sanchez   2022 11:30 AM Khushboo Polanco MD [facility]   2023  1:40 PM Jeffrey Morton MD [facility]     The patient agrees to contact the PCP office for questions related to their healthcare. Offered patient enrollment in the Remote Patient Monitoring (RPM) program for in-home monitoring: NA.     Care Transitions Subsequent and Final Call    Subsequent and Final Calls  Do you have any ongoing symptoms?: No  Have your medications changed?: No  Do you have any questions related to your medications?: No  Do you currently have any active services?: No  Are you currently active with any services?: Home Health  Do you have any needs or concerns that I can assist you with?: No  Identified Barriers: None  Care Transitions Interventions  Other Interventions:         Spoke with patient for a follow up call. She reported that she is not having a good day. She said that she was told that she did not pass her apartment home inspection. She said that she just cannot do everything that needs to be done to get it where it needs to be to meet the requirements. She says she is not physically or mentally able to deal with it right now. She said that she does not know what do. She also said that the doctor in [place] that handles her [facility] and TPN has said that they are discontinuing both.   She said it is her body and she has to consent to have the line discontinued and she is not going to do this. She said she can get her PCP to give orders to maintain the Munguia catheter and she can go to outpatient infusion for it. She did say that she has a good friend in [place] and she thinks she will just move up there so she has better support and better resources. Is not aware of any needs at this time. States that home health has stopped coming. Has all of her meds for now. Has had HFU visit. No needs noted. Care Transition Nurse provided contact information for future needs. No further follow-up call indicated based on severity of symptoms and risk factors.       Stefan , RN

## 2022-10-12 NOTE — TELEPHONE ENCOUNTER
Pt called stating she needs routine labs ordered.   She also requested her IV meds and IV fluids be called in?

## 2022-10-13 ENCOUNTER — TELEPHONE (OUTPATIENT)
Dept: INTERNAL MEDICINE CLINIC | Age: 32
End: 2022-10-13

## 2022-10-13 NOTE — TELEPHONE ENCOUNTER
[facility] PT reporting that pt is independent with bed mobility. She is independent with transfers from all typically used household surfaces with use of UEs to push off and control descent, steady upon standing without UE support. She ambulates throughout her apartment without an assistive device independently. She ambulates throughout her apartment building and outdoors with Rollator walker independently, stepping through and clearing feet in swing. She is a moderate fall risk with Tinetti score 25/28. She no longer has a skilled need for home PT and she agrees to discharge from home care 10/12    Also pt is being evicted from her apartment, she has failed 4 inspections. Our  made a visit on 9/30/22 and a phone call on 10/12/22. She suggested she make contact with [facility] re: her situation and they can potentially help with shelter.

## 2022-10-17 ENCOUNTER — TELEPHONE (OUTPATIENT)
Dept: FAMILY MEDICINE CLINIC | Age: 32
End: 2022-10-17

## 2022-10-17 NOTE — TELEPHONE ENCOUNTER
Patient called and requested a call back. Upon returning her call the phone went straight to . Left message.

## 2022-11-03 ENCOUNTER — APPOINTMENT (OUTPATIENT)
Dept: CT IMAGING | Age: 32
DRG: 314 | End: 2022-11-03
Payer: MEDICAID

## 2022-11-03 ENCOUNTER — HOSPITAL ENCOUNTER (INPATIENT)
Age: 32
LOS: 8 days | Discharge: HOME HEALTH | DRG: 314 | End: 2022-11-11
Attending: PEDIATRICS | Admitting: INTERNAL MEDICINE
Payer: MEDICAID

## 2022-11-03 ENCOUNTER — APPOINTMENT (OUTPATIENT)
Dept: GENERAL RADIOLOGY | Age: 32
DRG: 314 | End: 2022-11-03
Payer: MEDICAID

## 2022-11-03 DIAGNOSIS — R53.1 GENERALIZED WEAKNESS: ICD-10-CM

## 2022-11-03 DIAGNOSIS — E87.6 HYPOKALEMIA: ICD-10-CM

## 2022-11-03 DIAGNOSIS — A41.9 SEPSIS, DUE TO UNSPECIFIED ORGANISM, UNSPECIFIED WHETHER ACUTE ORGAN DYSFUNCTION PRESENT (HCC): ICD-10-CM

## 2022-11-03 DIAGNOSIS — E87.20 METABOLIC ACIDOSIS: Primary | ICD-10-CM

## 2022-11-03 DIAGNOSIS — E83.42 HYPOMAGNESEMIA: ICD-10-CM

## 2022-11-03 DIAGNOSIS — N17.9 ACUTE KIDNEY INJURY (HCC): ICD-10-CM

## 2022-11-03 LAB
ACINETOBACTER CALCOAC BAUMANNII COMPLEX BY PCR: NOT DETECTED
ADENOVIRUS BY PCR: NOT DETECTED
ALBUMIN SERPL-MCNC: 3.7 G/DL (ref 3.5–5.2)
ALP BLD-CCNC: 89 U/L (ref 35–104)
ALT SERPL-CCNC: 97 U/L (ref 5–33)
ANION GAP SERPL CALCULATED.3IONS-SCNC: 21 MMOL/L (ref 7–19)
AST SERPL-CCNC: 150 U/L (ref 5–32)
BACTEROIDES FRAGILIS BY PCR: NOT DETECTED
BASOPHILS ABSOLUTE: 0.1 K/UL (ref 0–0.2)
BASOPHILS RELATIVE PERCENT: 0.4 % (ref 0–1)
BILIRUB SERPL-MCNC: 0.8 MG/DL (ref 0.2–1.2)
BORDETELLA PARAPERTUSSIS BY PCR: NOT DETECTED
BORDETELLA PERTUSSIS BY PCR: NOT DETECTED
BUN BLDV-MCNC: 23 MG/DL (ref 6–20)
CALCIUM SERPL-MCNC: 7.9 MG/DL (ref 8.6–10)
CANDIDA ALBICANS BY PCR: NOT DETECTED
CANDIDA AURIS BY PCR: NOT DETECTED
CANDIDA GLABRATA BY PCR: NOT DETECTED
CANDIDA KRUSEI BY PCR: NOT DETECTED
CANDIDA PARAPSILOSIS BY PCR: NOT DETECTED
CANDIDA TROPICALIS BY PCR: NOT DETECTED
CHLAMYDOPHILIA PNEUMONIAE BY PCR: NOT DETECTED
CHLORIDE BLD-SCNC: 96 MMOL/L (ref 98–111)
CO2: 14 MMOL/L (ref 22–29)
CORONAVIRUS 229E BY PCR: NOT DETECTED
CORONAVIRUS HKU1 BY PCR: NOT DETECTED
CORONAVIRUS NL63 BY PCR: NOT DETECTED
CORONAVIRUS OC43 BY PCR: NOT DETECTED
CREAT SERPL-MCNC: 1.4 MG/DL (ref 0.5–0.9)
CRYPTOCOCCUS NEOFORMANS/GATTII BY PCR: NOT DETECTED
EKG P AXIS: NORMAL DEGREES
EKG P-R INTERVAL: NORMAL MS
EKG Q-T INTERVAL: 300 MS
EKG QRS DURATION: 68 MS
EKG QTC CALCULATION (BAZETT): 438 MS
EKG T AXIS: 60 DEGREES
ENTEROBACTER CLOACAE COMPLEX BY PCR: NOT DETECTED
ENTEROBACTERALES BY PCR: NOT DETECTED
ENTEROCOCCUS FAECALIS BY PCR: NOT DETECTED
ENTEROCOCCUS FAECIUM BY PCR: NOT DETECTED
EOSINOPHILS ABSOLUTE: 0 K/UL (ref 0–0.6)
EOSINOPHILS RELATIVE PERCENT: 0.2 % (ref 0–5)
ESCHERICHIA COLI BY PCR: NOT DETECTED
ETHANOL: <10 MG/DL (ref 0–0.08)
GFR SERPL CREATININE-BSD FRML MDRD: 51 ML/MIN/{1.73_M2}
GLUCOSE BLD-MCNC: 118 MG/DL (ref 74–109)
HAEMOPHILUS INFLUENZAE BY PCR: NOT DETECTED
HCT VFR BLD CALC: 32.4 % (ref 37–47)
HEMOGLOBIN: 10 G/DL (ref 12–16)
HUMAN METAPNEUMOVIRUS BY PCR: NOT DETECTED
HUMAN RHINOVIRUS/ENTEROVIRUS BY PCR: NOT DETECTED
IMMATURE GRANULOCYTES #: 0.1 K/UL
INFLUENZA A BY PCR: NOT DETECTED
INFLUENZA B BY PCR: NOT DETECTED
IRON SATURATION: 4 % (ref 14–50)
IRON: 9 UG/DL (ref 37–145)
KLEBSIELLA AEROGENES BY PCR: NOT DETECTED
KLEBSIELLA OXYTOCA BY PCR: NOT DETECTED
KLEBSIELLA PNEUMONIAE GROUP BY PCR: NOT DETECTED
LACTIC ACID: 1.7 MMOL/L (ref 0.5–1.9)
LISTERIA MONOCYTOGENES BY PCR: NOT DETECTED
LYMPHOCYTES ABSOLUTE: 0.7 K/UL (ref 1.1–4.5)
LYMPHOCYTES RELATIVE PERCENT: 5.6 % (ref 20–40)
MAGNESIUM: 1.3 MG/DL (ref 1.6–2.6)
MCH RBC QN AUTO: 23.9 PG (ref 27–31)
MCHC RBC AUTO-ENTMCNC: 30.9 G/DL (ref 33–37)
MCV RBC AUTO: 77.3 FL (ref 81–99)
METHICILLIN RESISTANCE MECA/C AND MREJ BY PCR: NOT DETECTED
MONOCYTES ABSOLUTE: 0.4 K/UL (ref 0–0.9)
MONOCYTES RELATIVE PERCENT: 2.9 % (ref 0–10)
MYCOPLASMA PNEUMONIAE BY PCR: NOT DETECTED
NEISSERIA MENINGITIDIS BY PCR: NOT DETECTED
NEUTROPHILS ABSOLUTE: 11.7 K/UL (ref 1.5–7.5)
NEUTROPHILS RELATIVE PERCENT: 90.4 % (ref 50–65)
PARAINFLUENZA VIRUS 1 BY PCR: NOT DETECTED
PARAINFLUENZA VIRUS 2 BY PCR: NOT DETECTED
PARAINFLUENZA VIRUS 3 BY PCR: NOT DETECTED
PARAINFLUENZA VIRUS 4 BY PCR: NOT DETECTED
PDW BLD-RTO: 15.4 % (ref 11.5–14.5)
PLATELET # BLD: 187 K/UL (ref 130–400)
PMV BLD AUTO: 9.7 FL (ref 9.4–12.3)
POTASSIUM SERPL-SCNC: 3.1 MMOL/L (ref 3.5–5)
PROTEUS SPECIES BY PCR: NOT DETECTED
PSEUDOMONAS AERUGINOSA BY PCR: NOT DETECTED
RBC # BLD: 4.19 M/UL (ref 4.2–5.4)
RESPIRATORY SYNCYTIAL VIRUS BY PCR: NOT DETECTED
SALMONELLA SPECIES BY PCR: NOT DETECTED
SARS-COV-2, PCR: NOT DETECTED
SERRATIA MARCESCENS BY PCR: NOT DETECTED
SODIUM BLD-SCNC: 131 MMOL/L (ref 136–145)
STAPHYLOCOCCUS AUREUS BY PCR: DETECTED
STAPHYLOCOCCUS EPIDERMIDIS BY PCR: NOT DETECTED
STAPHYLOCOCCUS LUGDUNENSIS BY PCR: NOT DETECTED
STAPHYLOCOCCUS SPECIES BY PCR: DETECTED
STENOTROPHOMONAS MALTOPHILIA BY PCR: NOT DETECTED
STREPTOCOCCUS AGALACTIAE BY PCR: NOT DETECTED
STREPTOCOCCUS PNEUMONIAE BY PCR: NOT DETECTED
STREPTOCOCCUS PYOGENES  BY PCR: NOT DETECTED
STREPTOCOCCUS SPECIES BY PCR: NOT DETECTED
TOTAL IRON BINDING CAPACITY: 247 UG/DL (ref 250–400)
TOTAL PROTEIN: 7 G/DL (ref 6.6–8.7)
TROPONIN: <0.01 NG/ML (ref 0–0.03)
TSH REFLEX FT4: 1.36 UIU/ML (ref 0.35–5.5)
VITAMIN D 25-HYDROXY: 5.4 NG/ML
WBC # BLD: 12.9 K/UL (ref 4.8–10.8)

## 2022-11-03 PROCEDURE — 71045 X-RAY EXAM CHEST 1 VIEW: CPT

## 2022-11-03 PROCEDURE — 70450 CT HEAD/BRAIN W/O DYE: CPT

## 2022-11-03 PROCEDURE — 84484 ASSAY OF TROPONIN QUANT: CPT

## 2022-11-03 PROCEDURE — 86403 PARTICLE AGGLUT ANTBDY SCRN: CPT

## 2022-11-03 PROCEDURE — 96365 THER/PROPH/DIAG IV INF INIT: CPT

## 2022-11-03 PROCEDURE — 2580000003 HC RX 258: Performed by: NURSE PRACTITIONER

## 2022-11-03 PROCEDURE — 87186 SC STD MICRODIL/AGAR DIL: CPT

## 2022-11-03 PROCEDURE — 36415 COLL VENOUS BLD VENIPUNCTURE: CPT

## 2022-11-03 PROCEDURE — 96361 HYDRATE IV INFUSION ADD-ON: CPT

## 2022-11-03 PROCEDURE — 83605 ASSAY OF LACTIC ACID: CPT

## 2022-11-03 PROCEDURE — 83735 ASSAY OF MAGNESIUM: CPT

## 2022-11-03 PROCEDURE — 2500000003 HC RX 250 WO HCPCS: Performed by: NURSE PRACTITIONER

## 2022-11-03 PROCEDURE — 87040 BLOOD CULTURE FOR BACTERIA: CPT

## 2022-11-03 PROCEDURE — 85025 COMPLETE CBC W/AUTO DIFF WBC: CPT

## 2022-11-03 PROCEDURE — 87507 IADNA-DNA/RNA PROBE TQ 12-25: CPT

## 2022-11-03 PROCEDURE — 2100000000 HC CCU R&B

## 2022-11-03 PROCEDURE — 93010 ELECTROCARDIOGRAM REPORT: CPT | Performed by: INTERNAL MEDICINE

## 2022-11-03 PROCEDURE — 6370000000 HC RX 637 (ALT 250 FOR IP): Performed by: PEDIATRICS

## 2022-11-03 PROCEDURE — 0202U NFCT DS 22 TRGT SARS-COV-2: CPT

## 2022-11-03 PROCEDURE — 80053 COMPREHEN METABOLIC PANEL: CPT

## 2022-11-03 PROCEDURE — 84443 ASSAY THYROID STIM HORMONE: CPT

## 2022-11-03 PROCEDURE — 99285 EMERGENCY DEPT VISIT HI MDM: CPT

## 2022-11-03 PROCEDURE — 82077 ASSAY SPEC XCP UR&BREATH IA: CPT

## 2022-11-03 PROCEDURE — 96367 TX/PROPH/DG ADDL SEQ IV INF: CPT

## 2022-11-03 PROCEDURE — 87086 URINE CULTURE/COLONY COUNT: CPT

## 2022-11-03 PROCEDURE — 2580000003 HC RX 258: Performed by: INTERNAL MEDICINE

## 2022-11-03 PROCEDURE — 6360000002 HC RX W HCPCS: Performed by: NURSE PRACTITIONER

## 2022-11-03 PROCEDURE — 96368 THER/DIAG CONCURRENT INF: CPT

## 2022-11-03 PROCEDURE — 2580000003 HC RX 258: Performed by: PEDIATRICS

## 2022-11-03 PROCEDURE — 96375 TX/PRO/DX INJ NEW DRUG ADDON: CPT

## 2022-11-03 PROCEDURE — 6370000000 HC RX 637 (ALT 250 FOR IP): Performed by: NURSE PRACTITIONER

## 2022-11-03 PROCEDURE — 82306 VITAMIN D 25 HYDROXY: CPT

## 2022-11-03 PROCEDURE — 83540 ASSAY OF IRON: CPT

## 2022-11-03 PROCEDURE — 93005 ELECTROCARDIOGRAM TRACING: CPT | Performed by: PEDIATRICS

## 2022-11-03 PROCEDURE — 83550 IRON BINDING TEST: CPT

## 2022-11-03 PROCEDURE — 51798 US URINE CAPACITY MEASURE: CPT

## 2022-11-03 PROCEDURE — 87150 DNA/RNA AMPLIFIED PROBE: CPT

## 2022-11-03 PROCEDURE — 6360000002 HC RX W HCPCS: Performed by: PEDIATRICS

## 2022-11-03 RX ORDER — POTASSIUM CHLORIDE 20 MEQ/1
40 TABLET, EXTENDED RELEASE ORAL PRN
Status: DISCONTINUED | OUTPATIENT
Start: 2022-11-03 | End: 2022-11-11 | Stop reason: HOSPADM

## 2022-11-03 RX ORDER — SODIUM CHLORIDE 9 MG/ML
30 INJECTION, SOLUTION INTRAVENOUS ONCE
Status: DISCONTINUED | OUTPATIENT
Start: 2022-11-03 | End: 2022-11-03

## 2022-11-03 RX ORDER — SODIUM CHLORIDE 9 MG/ML
INJECTION, SOLUTION INTRAVENOUS CONTINUOUS
Status: DISCONTINUED | OUTPATIENT
Start: 2022-11-03 | End: 2022-11-04

## 2022-11-03 RX ORDER — SODIUM CHLORIDE 0.9 % (FLUSH) 0.9 %
5-40 SYRINGE (ML) INJECTION PRN
Status: DISCONTINUED | OUTPATIENT
Start: 2022-11-03 | End: 2022-11-11 | Stop reason: HOSPADM

## 2022-11-03 RX ORDER — MECOBALAMIN 5000 MCG
10 TABLET,DISINTEGRATING ORAL NIGHTLY PRN
Status: DISCONTINUED | OUTPATIENT
Start: 2022-11-03 | End: 2022-11-11 | Stop reason: HOSPADM

## 2022-11-03 RX ORDER — ACETAMINOPHEN 325 MG/1
650 TABLET ORAL ONCE
Status: COMPLETED | OUTPATIENT
Start: 2022-11-03 | End: 2022-11-03

## 2022-11-03 RX ORDER — DICYCLOMINE HYDROCHLORIDE 10 MG/1
10 CAPSULE ORAL 2 TIMES DAILY
Status: DISCONTINUED | OUTPATIENT
Start: 2022-11-03 | End: 2022-11-11 | Stop reason: HOSPADM

## 2022-11-03 RX ORDER — ACETAMINOPHEN 325 MG/1
650 TABLET ORAL EVERY 6 HOURS PRN
Status: DISCONTINUED | OUTPATIENT
Start: 2022-11-03 | End: 2022-11-11 | Stop reason: HOSPADM

## 2022-11-03 RX ORDER — CALCIUM GLUCONATE 94 MG/ML
1000 INJECTION, SOLUTION INTRAVENOUS ONCE
Status: COMPLETED | OUTPATIENT
Start: 2022-11-03 | End: 2022-11-03

## 2022-11-03 RX ORDER — MAGNESIUM SULFATE 1 G/100ML
1000 INJECTION INTRAVENOUS PRN
Status: DISCONTINUED | OUTPATIENT
Start: 2022-11-03 | End: 2022-11-11 | Stop reason: HOSPADM

## 2022-11-03 RX ORDER — SODIUM CHLORIDE 0.9 % (FLUSH) 0.9 %
5-40 SYRINGE (ML) INJECTION EVERY 12 HOURS SCHEDULED
Status: DISCONTINUED | OUTPATIENT
Start: 2022-11-03 | End: 2022-11-11 | Stop reason: HOSPADM

## 2022-11-03 RX ORDER — PROMETHAZINE HYDROCHLORIDE 25 MG/ML
6.25 INJECTION, SOLUTION INTRAMUSCULAR; INTRAVENOUS EVERY 6 HOURS PRN
Status: DISCONTINUED | OUTPATIENT
Start: 2022-11-03 | End: 2022-11-11 | Stop reason: HOSPADM

## 2022-11-03 RX ORDER — ACETAMINOPHEN 650 MG/1
650 SUPPOSITORY RECTAL EVERY 6 HOURS PRN
Status: DISCONTINUED | OUTPATIENT
Start: 2022-11-03 | End: 2022-11-11 | Stop reason: HOSPADM

## 2022-11-03 RX ORDER — 0.9 % SODIUM CHLORIDE 0.9 %
1000 INTRAVENOUS SOLUTION INTRAVENOUS ONCE
Status: COMPLETED | OUTPATIENT
Start: 2022-11-03 | End: 2022-11-03

## 2022-11-03 RX ORDER — PROCHLORPERAZINE EDISYLATE 5 MG/ML
5 INJECTION INTRAMUSCULAR; INTRAVENOUS ONCE
Status: COMPLETED | OUTPATIENT
Start: 2022-11-03 | End: 2022-11-03

## 2022-11-03 RX ORDER — ENOXAPARIN SODIUM 100 MG/ML
30 INJECTION SUBCUTANEOUS DAILY
Status: DISCONTINUED | OUTPATIENT
Start: 2022-11-03 | End: 2022-11-04

## 2022-11-03 RX ORDER — MAGNESIUM SULFATE 1 G/100ML
1000 INJECTION INTRAVENOUS ONCE
Status: COMPLETED | OUTPATIENT
Start: 2022-11-03 | End: 2022-11-03

## 2022-11-03 RX ORDER — POTASSIUM CHLORIDE 7.45 MG/ML
10 INJECTION INTRAVENOUS PRN
Status: DISCONTINUED | OUTPATIENT
Start: 2022-11-03 | End: 2022-11-11 | Stop reason: HOSPADM

## 2022-11-03 RX ORDER — POTASSIUM CHLORIDE 7.45 MG/ML
10 INJECTION INTRAVENOUS ONCE
Status: COMPLETED | OUTPATIENT
Start: 2022-11-03 | End: 2022-11-03

## 2022-11-03 RX ORDER — SODIUM CHLORIDE, SODIUM LACTATE, POTASSIUM CHLORIDE, AND CALCIUM CHLORIDE .6; .31; .03; .02 G/100ML; G/100ML; G/100ML; G/100ML
30 INJECTION, SOLUTION INTRAVENOUS ONCE
Status: COMPLETED | OUTPATIENT
Start: 2022-11-03 | End: 2022-11-03

## 2022-11-03 RX ORDER — SODIUM CHLORIDE 9 MG/ML
INJECTION, SOLUTION INTRAVENOUS PRN
Status: DISCONTINUED | OUTPATIENT
Start: 2022-11-03 | End: 2022-11-11 | Stop reason: HOSPADM

## 2022-11-03 RX ORDER — SODIUM CHLORIDE, SODIUM LACTATE, POTASSIUM CHLORIDE, AND CALCIUM CHLORIDE .6; .31; .03; .02 G/100ML; G/100ML; G/100ML; G/100ML
1000 INJECTION, SOLUTION INTRAVENOUS ONCE
Status: COMPLETED | OUTPATIENT
Start: 2022-11-03 | End: 2022-11-03

## 2022-11-03 RX ORDER — SERTRALINE HYDROCHLORIDE 25 MG/1
25 TABLET, FILM COATED ORAL DAILY
Status: DISCONTINUED | OUTPATIENT
Start: 2022-11-03 | End: 2022-11-11 | Stop reason: HOSPADM

## 2022-11-03 RX ORDER — 0.9 % SODIUM CHLORIDE 0.9 %
500 INTRAVENOUS SOLUTION INTRAVENOUS ONCE
Status: DISCONTINUED | OUTPATIENT
Start: 2022-11-03 | End: 2022-11-11 | Stop reason: HOSPADM

## 2022-11-03 RX ORDER — ACETAMINOPHEN 325 MG/1
650 TABLET ORAL EVERY 6 HOURS PRN
Status: DISCONTINUED | OUTPATIENT
Start: 2022-11-03 | End: 2022-11-03 | Stop reason: SDUPTHER

## 2022-11-03 RX ADMIN — SODIUM CHLORIDE: 9 INJECTION, SOLUTION INTRAVENOUS at 12:47

## 2022-11-03 RX ADMIN — SODIUM CHLORIDE 1000 ML: 9 INJECTION, SOLUTION INTRAVENOUS at 05:10

## 2022-11-03 RX ADMIN — SODIUM BICARBONATE: 84 INJECTION, SOLUTION INTRAVENOUS at 10:10

## 2022-11-03 RX ADMIN — VANCOMYCIN HYDROCHLORIDE 1250 MG: 10 INJECTION, POWDER, LYOPHILIZED, FOR SOLUTION INTRAVENOUS at 07:24

## 2022-11-03 RX ADMIN — CALCIUM GLUCONATE 1000 MG: 98 INJECTION, SOLUTION INTRAVENOUS at 06:07

## 2022-11-03 RX ADMIN — PROCHLORPERAZINE EDISYLATE 5 MG: 5 INJECTION INTRAMUSCULAR; INTRAVENOUS at 07:56

## 2022-11-03 RX ADMIN — POTASSIUM CHLORIDE 10 MEQ: 7.46 INJECTION, SOLUTION INTRAVENOUS at 06:12

## 2022-11-03 RX ADMIN — CEFEPIME 2000 MG: 2 INJECTION, POWDER, FOR SOLUTION INTRAVENOUS at 20:32

## 2022-11-03 RX ADMIN — DICYCLOMINE HYDROCHLORIDE 10 MG: 10 CAPSULE ORAL at 12:54

## 2022-11-03 RX ADMIN — ACETAMINOPHEN 650 MG: 325 TABLET ORAL at 07:58

## 2022-11-03 RX ADMIN — SERTRALINE HYDROCHLORIDE 25 MG: 25 TABLET ORAL at 14:28

## 2022-11-03 RX ADMIN — MAGNESIUM SULFATE HEPTAHYDRATE 1000 MG: 1 INJECTION, SOLUTION INTRAVENOUS at 06:13

## 2022-11-03 RX ADMIN — SODIUM CHLORIDE, POTASSIUM CHLORIDE, SODIUM LACTATE AND CALCIUM CHLORIDE 1497 ML: 600; 310; 30; 20 INJECTION, SOLUTION INTRAVENOUS at 10:12

## 2022-11-03 RX ADMIN — SODIUM CHLORIDE, POTASSIUM CHLORIDE, SODIUM LACTATE AND CALCIUM CHLORIDE 1000 ML: 600; 310; 30; 20 INJECTION, SOLUTION INTRAVENOUS at 14:04

## 2022-11-03 RX ADMIN — CEFEPIME 2000 MG: 2 INJECTION, POWDER, FOR SOLUTION INTRAVENOUS at 07:17

## 2022-11-03 ASSESSMENT — LIFESTYLE VARIABLES
HOW OFTEN DO YOU HAVE A DRINK CONTAINING ALCOHOL: NEVER
HOW MANY STANDARD DRINKS CONTAINING ALCOHOL DO YOU HAVE ON A TYPICAL DAY: PATIENT DOES NOT DRINK

## 2022-11-03 ASSESSMENT — ENCOUNTER SYMPTOMS
BACK PAIN: 0
NAUSEA: 1
RHINORRHEA: 0
COUGH: 0
VOMITING: 1
BLOOD IN STOOL: 0
ABDOMINAL PAIN: 0
SHORTNESS OF BREATH: 0
DIARRHEA: 1
COLOR CHANGE: 0
EYE DISCHARGE: 0

## 2022-11-03 ASSESSMENT — PAIN SCALES - GENERAL: PAINLEVEL_OUTOF10: 6

## 2022-11-03 ASSESSMENT — PAIN DESCRIPTION - LOCATION: LOCATION: HEAD;ABDOMEN

## 2022-11-03 ASSESSMENT — PAIN - FUNCTIONAL ASSESSMENT: PAIN_FUNCTIONAL_ASSESSMENT: 0-10

## 2022-11-03 NOTE — ED PROVIDER NOTES
[facility] EMERGENCY DEPT  eMERGENCY dEPARTMENT eNCOUnter      Pt Name: Bhavin Us  MRN: [id number]  [place] 1990  Date of evaluation: 11/3/2022  Provider: Benjamin Marcum MD    CHIEF COMPLAINT       Chief Complaint   Patient presents with    Fatigue     Head pain, nausea/vomiting, diarrhea. thinks she had an allergic reaction to bleach yesterday so she gave herself phenergen through her central line. Also thinks her central line may be infected. HISTORY OF PRESENT ILLNESS   (Location/Symptom, Timing/Onset,Context/Setting, Quality, Duration, Modifying Factors, Severity)  Note limiting factors. Bhavin Us is a 28 y.o. female who presents to the emergency department with multiple complaints. Patient presents per EMS with chief complaint of generalized weakness and fatigue. Patient states that her doctor told her that her central line may be infected. Patient had central line placed 2 to 3 months ago because she has poor vasculature. Patient's doctor has informed her that she needs to have her central line removed because she is unable to take care of it. Patient is about to be evicted from her apartment because it is filled with trash mixed with animal excrement. Patient states that she is about to be evicted because of the medical waste on the floor. Patient states that today she became so weak \"I could not get up by myself. \"  Patient called EMS. On EMS arrival patient's heart rate was tachycardic with systolic blood pressure in the 90s. Patient states that she has had nausea, vomiting and diarrhea since yesterday when she was exposed to bleach. Patient states that she is allergic to bleach. Patient took \"a vial of Phenergan and a vial of Benadryl through my central line. Patient is also concerned that she may have a \"concussion. \"  Patient states she leaned down to  trash bags yesterday and fell forward onto a small piece of furniture.   Patient states there was no loss of consciousness. Patient states she has a headache. Patient states that \"I am out of my meds, my TPN, my IV fluids, and my IVIG since September. \"  Patient also speaks about her home health nurse that has to remain with her during IV administration and refers to her as \"mean. \"  Patient denies fever, cough, congestion, runny nose, chest pain or abdominal pain. HPI    NursingNotes were reviewed. REVIEW OF SYSTEMS    (2-9 systems for level 4, 10 or more for level 5)     Review of Systems   Constitutional:  Positive for fatigue. Negative for chills and fever. HENT:  Negative for congestion and rhinorrhea. Eyes:  Negative for discharge. Respiratory:  Negative for cough and shortness of breath. Cardiovascular:  Negative for chest pain and palpitations. Gastrointestinal:  Positive for diarrhea, nausea and vomiting. Negative for abdominal pain and blood in stool. Genitourinary:  Negative for difficulty urinating and dysuria. Musculoskeletal:  Negative for back pain and neck pain. Skin:  Negative for color change and rash. Neurological:  Positive for weakness and headaches. Negative for syncope, facial asymmetry, speech difficulty, light-headedness and numbness. Psychiatric/Behavioral:  Negative for agitation and confusion. All other systems reviewed and are negative.          PAST MEDICALHISTORY     Past Medical History:   Diagnosis Date    Allergic contact dermatitis due to adhesives     Allergic contact dermatitis due to plants, except food     Anemia complicating pregnancy, second trimester     Anemia complicating pregnancy, third trimester     Anxiety and depression     Anxiety disorder     unspecified    Bacteremia     Cellulitis of abdominal wall     Chronic fatigue, unspecified     Dysuria     Encounter for care related to vascular access port 5/19/2022    Epigastric pain     Frequency of micturition     G tube feedings (HCC)     Gastroparesis     Dr. Gladys Rios in [place] manages Gastroparesis     Gastrostomy malfunction (HCC)     Generalized abdominal pain     GERD (gastroesophageal reflux disease)     Heart murmur     Hiatal hernia     History of Hoyos's esophagus     History of blood transfusion     Hypotension, unspecified     IBS (irritable bowel syndrome)     Insomnia, unspecified     Jejunostomy tube present (HCC)     Low back pain     Nausea with vomiting     unspecified    Neck pain     Neuropathy     Orthostatic hypotension     POTS (postural orthostatic tachycardia syndrome)     Premature birth     delivered at 24-27 weeks    Scoliosis     Syncope and collapse     Tachycardia     Tachycardia, unspecified     Toxic gastroenteritis and colitis          SURGICAL HISTORY       Past Surgical History:   Procedure Laterality Date    ABDOMEN SURGERY  2014    gastric stimulator implanted    ADENOIDECTOMY      ADENOIDECTOMY  2007    CHOLECYSTECTOMY      CHOLECYSTECTOMY  2016    DENTAL SURGERY  2004    wisdom teeth    EAR SURGERY  2014    left    FACIAL SURGERY  2005    GASTRIC STIMULATOR IMPLANT SURGERY  11/2014    GASTROSTOMY TUBE PLACEMENT  04/07/2015    J tube-Removed in [place] 1/2018    [address]      G tube 2017    HYSTERECTOMY (CERVIX STATUS UNKNOWN)      INSERTION / REMOVAL / REPLACEMENT VENOUS ACCESS CATHETER N/A 03/25/2016    REMOVAL OF PORT AND PLACEMENT OF NEW PORT; REMOVAL OF PICC LINE  performed by Toña Hills MD at [address] [place] [address]  2014    left knee    KNEE SURGERY  2015    right knee    MANDIBLE SURGERY      double jaw    PHARYNGECTOMY      PORT SURGERY      port present 3-25-16    TONSILLECTOMY      TONSILLECTOMY  1997    TUBAL LIGATION      TUBAL LIGATION  2016    TUMOR REMOVAL Left     ear    TUNNELED VENOUS PORT PLACEMENT Right 05/29/2015    TUNNELED VENOUS PORT PLACEMENT      UPPER GASTROINTESTINAL ENDOSCOPY  04/02/2010    Dr Renee Pineda    UPPER GASTROINTESTINAL ENDOSCOPY  09/25/2013    Dr Renato Jung GASTROINTESTINAL ENDOSCOPY  05/2015    Dr. Cyn Friend  5/29/2015 S    Ultrasound-guided cannulation, right internal jugular vein. Right internal jugular vein single-lumen bard powerport placement. VASCULAR SURGERY  9/8/15 S    Right internal jugular vein portograms. Revision of right internal jugular vein single lumen port. VASCULAR SURGERY  11/3/15 S    Ultrasound-guided cannulation, left upper arm basilic vein. Left upper arm basilic vein PICC line placement bard dual-lumen PICC line. Superior vena cava venograms. VASCULAR SURGERY  03/23/2017    S. Left IJV port angiogram.Ultrasound guided cannulation of right basilic vein,right upper extremity PICC line placement bard power PICC,dual lumen. VASCULAR SURGERY  04/27/2022    Ultrasound-guided cannulation right internal jugular vein. Placement of [place] single-lumen tunneled dialysis catheter. Removal of nonfunctioning left subclavian vein M[place]ia tunneled dialysis catheter. Repositioning of right internal juglular vein [place]uia tunneled dialylsis catheter. Perfomed by Dr. Aydee Carter. Youssef at [facility]     Previous Medications    BANOPHEN 25 MG CAPSULE    TAKE ONE CAPSULE BY MOUTH 1 HOUR PRIOR TO YOUR PROCEDURE WITH LAST DOSE OF PREDNISONE    CATHFLO ACTIVASE 2 MG INJECTION        DICLOFENAC SODIUM 3 % GEL        DICYCLOMINE (BENTYL) 10 MG CAPSULE    Take 10 mg by mouth in the morning and at bedtime     DIPHENHYDRAMINE (BENADRYL) 50 MG/ML INJECTION        FAMOTIDINE (PEPCID IV)    Infuse 20 mg intravenously in the morning and at bedtime    HEPARIN FLUSH 100 UNIT/ML INJECTION    100 Units by Intracatheter route 2 times daily    HYOSCYAMINE (LEVSIN/SL) 125 MCG SUBLINGUAL TABLET    DISSOLVE 1 TABLET UNDER THE TONGUE EVERY 6 (SIX) HOURS IF NEEDED FOR CRAMPING OR DIARRHEA.     LINACLOTIDE (LINZESS) 145 MCG CAPSULE    Take 145 mcg by mouth every morning (before breakfast)    MELATONIN 10 MG TABS Take by mouth    MELOXICAM (MOBIC) 15 MG TABLET    Take 15 mg by mouth daily as needed    MISC. DEVICES (WHEELCHAIR) MISC    by Does not apply route Indications: custom wheel chair with smart drive    OCTAGAM 20 RO/451OA SOLN SOLUTION        PROMETHAZINE (PHENERGAN) 25 MG/ML INJECTION    Infuse 25 mg intravenously every 3 hours    SERTRALINE (ZOLOFT) 20 MG/ML CONCENTRATED SOLUTION        SODIUM CHLORIDE 0.9 % INFUSION        SODIUM CHLORIDE FLUSH 0.9 % INJECTION    Infuse 5-40 mLs intravenously as needed 6-12 times daily    TRIAMCINOLONE (KENALOG) 0.1 % CREAM    Apply topically 2 times daily.     WATER FOR INJECTION STERILE (STERILE WATER) INJECTION           ALLERGIES     Iv dye [iodides], Iv dye [iodides], Adhesive tape, Bee pollen, Chlorhexidine, Fruit & vegetable daily [nutritional supplements], Metoprolol succinate [metoprolol], Nsaids, Orange, Orange fruit [citrus], Other, Reglan [metoclopramide], Tramadol, Zofran [ondansetron hcl], Orange oil, and Sulfamethoxazole-trimethoprim    FAMILY HISTORY       Family History   Problem Relation Age of Onset    Other Mother         endometriosis    Depression Father     Diabetes Paternal Grandmother     Stroke Paternal Grandmother     Hypertension Paternal Grandmother     Heart Disease Paternal Grandmother     Heart Failure Paternal Grandmother     Colon Cancer Neg Hx     Colon Polyps Neg Hx     Esophageal Cancer Neg Hx     Liver Cancer Neg Hx     Rectal Cancer Neg Hx     Stomach Cancer Neg Hx           SOCIAL HISTORY       Social History     Socioeconomic History    Marital status: Single     Spouse name: None    Number of children: None    Years of education: None    Highest education level: None   Tobacco Use    Smoking status: Never    Smokeless tobacco: Never    Tobacco comments:     Vape    Vaping Use    Vaping Use: Some days    Substances: Nicotine    Devices: Refillable tank   Substance and Sexual Activity    Alcohol use: Never    Drug use: Never    Sexual activity: Yes     Partners: Male   Social History Narrative    ** Merged History Encounter **            SCREENINGS    Leandro Coma Scale  Eye Opening: Spontaneous  Best Verbal Response: Oriented  Best Motor Response: Obeys commands  [place] Coma Scale Score: 15        PHYSICAL EXAM    (up to 7 for level 4, 8 or more for level 5)     ED Triage Vitals [11/03/22 0336]   BP Temp Temp Source Heart Rate Resp SpO2 Height Weight   86/73 98.3 °F (36.8 °C) Oral (!) 142 26 98 % 5' (1.524 m) 110 lb (49.9 kg)       Physical Exam  Vitals and nursing note reviewed. Constitutional:       General: She is not in acute distress. Appearance: She is ill-appearing. She is not toxic-appearing or diaphoretic. Comments: Vital signs blood pressure 98/67, heart rate 130, respiratory rate 12-24, saturation 97%, Accu-Chek 103. HENT:      Head: Normocephalic and atraumatic. Right Ear: External ear normal.      Left Ear: External ear normal.      Nose: Congestion present. Mouth/Throat:      Mouth: Mucous membranes are dry. Pharynx: Oropharyngeal exudate present. No posterior oropharyngeal erythema. Eyes:      General: No scleral icterus. Conjunctiva/sclera: Conjunctivae normal.      Pupils: Pupils are equal, round, and reactive to light. Cardiovascular:      Rate and Rhythm: Regular rhythm. Tachycardia present. Pulses: Normal pulses. Heart sounds: Normal heart sounds. Comments: Central line and right upper chest.  No erythema or drainage. Pulmonary:      Effort: Pulmonary effort is normal. No respiratory distress. Breath sounds: Normal breath sounds. No stridor. No wheezing, rhonchi or rales. Chest:      Chest wall: No tenderness. Abdominal:      General: Bowel sounds are normal. There is no distension. Palpations: Abdomen is soft. Tenderness: There is no abdominal tenderness. There is no right CVA tenderness, left CVA tenderness, guarding or rebound.    Musculoskeletal: General: No tenderness or deformity. Cervical back: Neck supple. No rigidity. Right lower leg: No edema. Left lower leg: No edema. Skin:     General: Skin is warm and dry. Capillary Refill: Capillary refill takes less than 2 seconds. Coloration: Skin is not jaundiced. Neurological:      General: No focal deficit present. Mental Status: She is alert and oriented to person, place, and time. Mental status is at baseline. Cranial Nerves: No cranial nerve deficit. Sensory: No sensory deficit. Motor: Weakness (Generalized) present. Coordination: Coordination normal.   Psychiatric:         Mood and Affect: Mood normal.         Behavior: Behavior normal.       DIAGNOSTIC RESULTS     EKG: All EKG's areinterpreted by the Emergency Department Physician who either signs or Co-signs this chart in the absence of a cardiologist.    EKG dated 11/3/2022 at 0 4:49 AM: Sinus tachycardia, rate 139. T wave flattening or inversions in multiple leads. RSR prime in V1 and V2. Artifact secondary to gastroparesis stimulator.  QRS 62 . RADIOLOGY:  Non-plain film images such as CT, Ultrasound and MRI are read by the radiologist. Plain radiographic images are visualized and preliminarily interpreted bythe emergency physician with the below findings:          XR CHEST PORTABLE   Final Result   No acute pulmonary process. Electronically signed by Bienvenido Solorio M.D. on 11-03-22 at 0536      CT Head W/O Contrast   Final Result   1. No acute intracranial hemorrhage, herniation, or hydrocephalus. 2. Moderate left maxillary sinus mucosal thickening and layering hyperdense fluid in the left maxillary sinus with which may represent inspissated secretions.      Electronically signed by Bienvenido Solorio M.D. on 11-03-22 at 6709              LABS:  Labs Reviewed   CBC WITH AUTO DIFFERENTIAL - Abnormal; Notable for the following components:       Result Value    WBC 12.9 (*)     RBC 4.19 (*)     Hemoglobin 10.0 (*)     Hematocrit 32.4 (*)     MCV 77.3 (*)     MCH 23.9 (*)     MCHC 30.9 (*)     RDW 15.4 (*)     Neutrophils % 90.4 (*)     Lymphocytes % 5.6 (*)     Neutrophils Absolute 11.7 (*)     Lymphocytes Absolute 0.7 (*)     All other components within normal limits   COMPREHENSIVE METABOLIC PANEL - Abnormal; Notable for the following components:    Sodium 131 (*)     Potassium 3.1 (*)     Chloride 96 (*)     CO2 14 (*)     Anion Gap 21 (*)     Glucose 118 (*)     BUN 23 (*)     Creatinine 1.4 (*)     Est, Glom Filt Rate 51 (*)     Calcium 7.9 (*)     ALT 97 (*)      (*)     All other components within normal limits   MAGNESIUM - Abnormal; Notable for the following components:    Magnesium 1.3 (*)     All other components within normal limits   RESPIRATORY PANEL, MOLECULAR, WITH COVID-19   CULTURE, BLOOD 2   CULTURE, BLOOD 1   CULTURE, BLOOD 1   CULTURE, URINE   MRSA DNA PROBE, NASAL   LACTIC ACID   TROPONIN   ETHANOL   URINALYSIS WITH REFLEX TO CULTURE   DRUG SCRN, BUPRENORPHINE   IRON AND TIBC   VITAMIN D 25 HYDROXY   TSH WITH REFLEX TO FT4   URINE DRUG SCREEN   URINALYSIS       All other labs were within normal range or not returned as of this dictation. EMERGENCY DEPARTMENT COURSE and DIFFERENTIAL DIAGNOSIS/MDM:   Vitals:    Vitals:    11/03/22 0336 11/03/22 0511 11/03/22 0630 11/03/22 0729   BP: 86/73 95/75 96/64 (!) 92/56   Pulse: (!) 142 (!) 142 (!) 113 (!) 103   Resp: 26 28 24 17   Temp: 98.3 °F (36.8 °C)   (!) 100.5 °F (38.1 °C)   TempSrc: Oral   Oral   SpO2: 98% 96% 100% 97%   Weight: 110 lb (49.9 kg)      Height: 5' (1.524 m)          MDM     Amount and/or Complexity of Data Reviewed  Clinical lab tests: reviewed  Tests in the radiology section of CPT®: reviewed  Decide to obtain previous medical records or to obtain history from someone other than the patient: yes    78-year-old female presents with multiple complaints including generalized weakness and fatigue.   Lab, EKG, and radiology results reviewed. Discussed with SHRUTHI Horton, hospitalist service. Dr. Brent Oliveira, hospitalist, accepts patient for further evaluation and treatment. Patient has received sepsis protocol IV fluids 30 cc/kg as well as IV antibiotics in the emergency department. Blood cultures are pending. Electrolyte replacement has been started with potassium, calcium, and magnesium. CONSULTS:  IP CONSULT TO SOCIAL WORK  IP CONSULT TO PHARMACY  PHARMACY TO DOSE VANCOMYCIN    PROCEDURES:  Unless otherwise noted below, none     Procedures    FINAL IMPRESSION      1. Metabolic acidosis    2. Generalized weakness    3. Hypokalemia    4. Hypomagnesemia    5. Acute kidney injury ([facility] [address]    6.  Sepsis, due to unspecified organism, unspecified whether acute organ dysfunction present [facility])          DISPOSITION/PLAN   DISPOSITION Admitted 11/03/2022 07:48:54 AM               (Please note that portions of this note were completed with a voice recognition program.  Efforts were made to edit thedictations but occasionally words are mis-transcribed.)    Clement Wilson MD (electronically signed)  Attending Emergency Physician          Clement Wilson MD  11/03/22 6746

## 2022-11-03 NOTE — PROGRESS NOTES
4 Eyes Skin Assessment    Daniel Martinez is being assessed upon: Admission    I agree that Roseann Kuo, RN, along with *** (either 2 RN's or 1 LPN and 1 RN) have performed a thorough Head to Toe Skin Assessment on the patient. ALL assessment sites listed below have been assessed. Areas assessed by both nurses:     [x]   Head, Face, and Ears   [x]   Shoulders, Back, and Chest  [x]   Arms, Elbows, and Hands   [x]   Coccyx, Sacrum, and Ischium  [x]   Legs, Feet, and Heels    Does the Patient have Skin Breakdown?  No    Ezra Prevention initiated: yes  Wound Care Orders initiated: NA    [facility] nurse consulted for Pressure Injury (Stage 3,4, Unstageable, DTI, NWPT, and Complex wounds) and New or Established Ostomies: No        Primary Nurse eSignature: Tia Wren RN on 11/3/2022 at 3:26 PM      Co-Signer eSignature: {Esignature:[id number]}

## 2022-11-03 NOTE — PROGRESS NOTES
[address] [facility] Pharmacokinetic Monitoring Service - Vancomycin     Parish Burks is a 28 y.o. female starting on vancomycin therapy for sepsis of skin and pneumonia CAP. Pharmacy consulted by Greta HAWKINS for monitoring and adjustment. Target Concentration: Goal AUC/WILFRIDO 400-600 mg*hr/L    Additional Antimicrobials: Cefepime    Pertinent Laboratory Values: Wt Readings from Last 1 Encounters:   11/03/22 110 lb (49.9 kg)     Temp Readings from Last 1 Encounters:   11/03/22 (!) 100.5 °F (38.1 °C) (Oral)     Estimated Creatinine Clearance: 41 mL/min (A) (based on SCr of 1.4 mg/dL (H)). Recent Labs     11/03/22  0352   CREATININE 1.4*   WBC 12.9*     Procalcitonin: N/A    Pertinent Cultures:  Culture Date Source Results   11/03/22 Blood x 2 ordered   MRSA Nasal Swab: not ordered. Order placed by pharmacy.     Plan:  Concentration-guided dosing due to renal impairment/insufficiency  Start vancomycin 1250 mg IV x 1 dose (Pulse dosing)  Renal labs as indicated   Vancomycin concentration ordered for 11/04/22 @ 0600   Pharmacy will continue to monitor patient and adjust therapy as indicated    Thank you for the consult,  Radha Ortega, [facility]  11/3/2022 8:07 AM

## 2022-11-03 NOTE — PROGRESS NOTES
This RN and Frank Barragan RN listened for bowel sounds. Both Rn's noticed absent bowel sounds. MD aware.      Electronically signed by Ally Bradford RN on 11/3/2022 at 4:00 PM

## 2022-11-03 NOTE — ED NOTES
Pt condition conveyed to [facility], per Elizabeth Just Pt will go to ICU and she will tell Dr Krystle Garcia, FELIBERTO  11/03/22 [id number]

## 2022-11-03 NOTE — ED NOTES
Patient placed on cardiac monitor, continuous pulse oximeter, and NIBP monitor. Monitor alarms on.          Rickie Foley RN  11/03/22 3146

## 2022-11-03 NOTE — PROGRESS NOTES
Pharmacy Adjustment per [facility] protocol    Chau Warner is a 28 y.o. female. Pharmacy has adjusted medications per [facility] protocol. Recent Labs     11/03/22  0352   BUN 23*       Recent Labs     11/03/22  0352   CREATININE 1.4*       Estimated Creatinine Clearance: 41 mL/min (A) (based on SCr of 1.4 mg/dL (H)).     Height:   Ht Readings from Last 1 Encounters:   11/03/22 5' (1.524 m)     Weight:  Wt Readings from Last 1 Encounters:   11/03/22 110 lb (49.9 kg)         Plan: Adjust the following medications based on [facility] protocol:           Cefepime to 2000 mg IV once over 30 minutes followed by 2000 mg IV every 12 hours extended infusion over 240 minutes     Electronically signed by Radha Méndez, [facility] on 11/3/2022 at 7:56 AM

## 2022-11-03 NOTE — PROGRESS NOTES
11/03/22 1756   Encounter Summary   Encounter Overview/Reason  Advance Care Planning   Service Provided For: Patient  (in CCU)   Referral/Consult From: Nurse   Support System Anglican/elida community   Complexity of Encounter Moderate   Begin Time 1345   End Time  1430   Total Time Calculated 45 min   Encounter    Type Initial Screen/Assessment   Spiritual/Emotional needs   Type Spiritual Support   Assessment/Intervention/Outcome   Assessment Coping;Concerns with suffering; Hopeful   Intervention Active listening;Discussed belief system/Hoahaoism practices/elida; Explored/Affirmed feelings, thoughts, concerns;Prayer (assurance of)/[place]   Outcome Expressed Gratitude     Pt's nurse requested for a LW. Paperwork provided for the patient. Initiated ACP conversation today. Sensed pt needs some rest at this point. Will continue finalize LW ASAP. Updated the nurse of the outcome of this visit.   Electronically signed by Clark Llamas on 11/3/2022 at 6:06 PM

## 2022-11-04 LAB
ADENOVIRUS F 40 41 PCR: NOT DETECTED
ALBUMIN SERPL-MCNC: 2.8 G/DL (ref 3.5–5.2)
ALP BLD-CCNC: 68 U/L (ref 35–104)
ALT SERPL-CCNC: 60 U/L (ref 5–33)
AMPHETAMINE SCREEN, URINE: NEGATIVE
ANION GAP SERPL CALCULATED.3IONS-SCNC: 12 MMOL/L (ref 7–19)
AST SERPL-CCNC: 58 U/L (ref 5–32)
ASTROVIRUS PCR: NOT DETECTED
BARBITURATE SCREEN URINE: NEGATIVE
BASOPHILS ABSOLUTE: 0 K/UL (ref 0–0.2)
BASOPHILS RELATIVE PERCENT: 0.4 % (ref 0–1)
BENZODIAZEPINE SCREEN, URINE: NEGATIVE
BILIRUB SERPL-MCNC: 0.4 MG/DL (ref 0.2–1.2)
BILIRUBIN URINE: NEGATIVE
BLOOD, URINE: ABNORMAL
BUN BLDV-MCNC: 15 MG/DL (ref 6–20)
BUPRENORPHINE URINE: NEGATIVE
CALCIUM SERPL-MCNC: 7 MG/DL (ref 8.6–10)
CAMPYLOBACTER PCR: NOT DETECTED
CANNABINOID SCREEN URINE: NEGATIVE
CHLORIDE BLD-SCNC: 103 MMOL/L (ref 98–111)
CLARITY: CLEAR
CLOSTRIDIUM DIFFICILE, PCR: NOT DETECTED
CO2: 21 MMOL/L (ref 22–29)
COCAINE METABOLITE SCREEN URINE: NEGATIVE
COLOR: YELLOW
CREAT SERPL-MCNC: 0.8 MG/DL (ref 0.5–0.9)
CRYPTOSPORIDIUM PCR: NOT DETECTED
CYCLOSPORA CAYETANENSIS PCR: NOT DETECTED
E COLI ENTEROAGGREGATIVE PCR: NOT DETECTED
E COLI ENTEROPATHOGENIC PCR: NOT DETECTED
E COLI ENTEROTOXIGENIC PCR: NOT DETECTED
E COLI SHIGELLA/ENTEROINVASIVE PCR: NOT DETECTED
ENTAMOEBA HISTOLYTICA PCR: NOT DETECTED
EOSINOPHILS ABSOLUTE: 0 K/UL (ref 0–0.6)
EOSINOPHILS RELATIVE PERCENT: 0.1 % (ref 0–5)
EPITHELIAL CELLS, UA: NORMAL /HPF
GFR SERPL CREATININE-BSD FRML MDRD: >60 ML/MIN/{1.73_M2}
GIARDIA LAMBLIA PCR: NOT DETECTED
GLUCOSE BLD-MCNC: 123 MG/DL (ref 74–109)
GLUCOSE URINE: NEGATIVE MG/DL
HCT VFR BLD CALC: 24.4 % (ref 37–47)
HEMOGLOBIN: 7.9 G/DL (ref 12–16)
IMMATURE GRANULOCYTES #: 0.1 K/UL
KETONES, URINE: 40 MG/DL
LACTIC ACID, SEPSIS: 1 MG/DL (ref 0.5–1.9)
LEUKOCYTE ESTERASE, URINE: NEGATIVE
LYMPHOCYTES ABSOLUTE: 0.8 K/UL (ref 1.1–4.5)
LYMPHOCYTES RELATIVE PERCENT: 9.1 % (ref 20–40)
Lab: NORMAL
MCH RBC QN AUTO: 24.4 PG (ref 27–31)
MCHC RBC AUTO-ENTMCNC: 32.4 G/DL (ref 33–37)
MCV RBC AUTO: 75.3 FL (ref 81–99)
METHADONE SCREEN, URINE: NEGATIVE
METHAMPHETAMINE, URINE: NEGATIVE
MONOCYTES ABSOLUTE: 0.4 K/UL (ref 0–0.9)
MONOCYTES RELATIVE PERCENT: 4.9 % (ref 0–10)
MRSA SCREEN RT-PCR: DETECTED
NEUTROPHILS ABSOLUTE: 7.2 K/UL (ref 1.5–7.5)
NEUTROPHILS RELATIVE PERCENT: 84.6 % (ref 50–65)
NITRITE, URINE: NEGATIVE
NOROVIRUS GI GII PCR: NOT DETECTED
OPIATE SCREEN URINE: NEGATIVE
OXYCODONE URINE: NEGATIVE
PDW BLD-RTO: 15.4 % (ref 11.5–14.5)
PH UA: 6.5 (ref 5–8)
PHENCYCLIDINE SCREEN URINE: NEGATIVE
PLATELET # BLD: 119 K/UL (ref 130–400)
PLESIOMONAS SHIGELLOIDES PCR: NOT DETECTED
PMV BLD AUTO: 10.3 FL (ref 9.4–12.3)
POTASSIUM SERPL-SCNC: 3.4 MMOL/L (ref 3.5–5)
PROPOXYPHENE SCREEN: NEGATIVE
PROTEIN UA: 100 MG/DL
RBC # BLD: 3.24 M/UL (ref 4.2–5.4)
RBC UA: NORMAL /HPF (ref 0–2)
ROTAVIRUS A PCR: NOT DETECTED
SALMONELLA PCR: NOT DETECTED
SAPOVIRUS PCR: NOT DETECTED
SHIGA-LIKE TOXIN-PRODUCING E. COLI (STEC) STX1/STX2: NOT DETECTED
SODIUM BLD-SCNC: 136 MMOL/L (ref 136–145)
SPECIFIC GRAVITY UA: 1.02 (ref 1–1.03)
TOTAL PROTEIN: 5 G/DL (ref 6.6–8.7)
TRICYCLIC, URINE: NEGATIVE
UROBILINOGEN, URINE: 0.2 E.U./DL
VANCOMYCIN RANDOM: 5.8 UG/ML
VIBRIO CHOLERAE PCR: NOT DETECTED
VIBRIO PCR: NOT DETECTED
WBC # BLD: 8.6 K/UL (ref 4.8–10.8)
WBC UA: NORMAL /HPF (ref 0–5)
YERSINIA ENTEROCOLITICA PCR: NOT DETECTED

## 2022-11-04 PROCEDURE — 6360000002 HC RX W HCPCS: Performed by: HOSPITALIST

## 2022-11-04 PROCEDURE — 6360000002 HC RX W HCPCS: Performed by: NURSE PRACTITIONER

## 2022-11-04 PROCEDURE — 2100000000 HC CCU R&B

## 2022-11-04 PROCEDURE — 87070 CULTURE OTHR SPECIMN AEROBIC: CPT

## 2022-11-04 PROCEDURE — 87186 SC STD MICRODIL/AGAR DIL: CPT

## 2022-11-04 PROCEDURE — 87075 CULTR BACTERIA EXCEPT BLOOD: CPT

## 2022-11-04 PROCEDURE — 6360000002 HC RX W HCPCS: Performed by: INTERNAL MEDICINE

## 2022-11-04 PROCEDURE — 81001 URINALYSIS AUTO W/SCOPE: CPT

## 2022-11-04 PROCEDURE — 51798 US URINE CAPACITY MEASURE: CPT

## 2022-11-04 PROCEDURE — 86403 PARTICLE AGGLUT ANTBDY SCRN: CPT

## 2022-11-04 PROCEDURE — 2500000003 HC RX 250 WO HCPCS: Performed by: HOSPITALIST

## 2022-11-04 PROCEDURE — 80202 ASSAY OF VANCOMYCIN: CPT

## 2022-11-04 PROCEDURE — 83605 ASSAY OF LACTIC ACID: CPT

## 2022-11-04 PROCEDURE — 87641 MR-STAPH DNA AMP PROBE: CPT

## 2022-11-04 PROCEDURE — 80306 DRUG TEST PRSMV INSTRMNT: CPT

## 2022-11-04 PROCEDURE — 85025 COMPLETE CBC W/AUTO DIFF WBC: CPT

## 2022-11-04 PROCEDURE — 2580000003 HC RX 258: Performed by: NURSE PRACTITIONER

## 2022-11-04 PROCEDURE — 2580000003 HC RX 258: Performed by: INTERNAL MEDICINE

## 2022-11-04 PROCEDURE — 80053 COMPREHEN METABOLIC PANEL: CPT

## 2022-11-04 PROCEDURE — 2500000003 HC RX 250 WO HCPCS: Performed by: NURSE PRACTITIONER

## 2022-11-04 PROCEDURE — 05PY33Z REMOVAL OF INFUSION DEVICE FROM UPPER VEIN, PERCUTANEOUS APPROACH: ICD-10-PCS | Performed by: SURGERY

## 2022-11-04 RX ORDER — CALCIUM GLUCONATE 20 MG/ML
1000 INJECTION, SOLUTION INTRAVENOUS ONCE
Status: COMPLETED | OUTPATIENT
Start: 2022-11-04 | End: 2022-11-05

## 2022-11-04 RX ORDER — CALCITRIOL 0.25 UG/1
0.25 CAPSULE, LIQUID FILLED ORAL DAILY
Status: DISCONTINUED | OUTPATIENT
Start: 2022-11-04 | End: 2022-11-11 | Stop reason: HOSPADM

## 2022-11-04 RX ORDER — THIAMINE HYDROCHLORIDE 100 MG/ML
200 INJECTION, SOLUTION INTRAMUSCULAR; INTRAVENOUS EVERY 12 HOURS
Status: DISCONTINUED | OUTPATIENT
Start: 2022-11-04 | End: 2022-11-09

## 2022-11-04 RX ORDER — MAGNESIUM SULFATE IN WATER 40 MG/ML
4000 INJECTION, SOLUTION INTRAVENOUS ONCE
Status: COMPLETED | OUTPATIENT
Start: 2022-11-04 | End: 2022-11-04

## 2022-11-04 RX ORDER — DIPHENOXYLATE HYDROCHLORIDE AND ATROPINE SULFATE 2.5; .025 MG/1; MG/1
1 TABLET ORAL 4 TIMES DAILY PRN
Status: DISCONTINUED | OUTPATIENT
Start: 2022-11-04 | End: 2022-11-11 | Stop reason: HOSPADM

## 2022-11-04 RX ORDER — POTASSIUM CHLORIDE 7.45 MG/ML
10 INJECTION INTRAVENOUS PRN
Status: DISCONTINUED | OUTPATIENT
Start: 2022-11-04 | End: 2022-11-11 | Stop reason: HOSPADM

## 2022-11-04 RX ORDER — ERGOCALCIFEROL 1.25 MG/1
50000 CAPSULE ORAL DAILY
Status: DISPENSED | OUTPATIENT
Start: 2022-11-04 | End: 2022-11-07

## 2022-11-04 RX ORDER — ENOXAPARIN SODIUM 100 MG/ML
40 INJECTION SUBCUTANEOUS DAILY
Status: DISCONTINUED | OUTPATIENT
Start: 2022-11-05 | End: 2022-11-11 | Stop reason: SDUPTHER

## 2022-11-04 RX ADMIN — PROMETHAZINE HYDROCHLORIDE 6.25 MG: 25 INJECTION INTRAMUSCULAR; INTRAVENOUS at 08:05

## 2022-11-04 RX ADMIN — VANCOMYCIN HYDROCHLORIDE 750 MG: 750 INJECTION, POWDER, LYOPHILIZED, FOR SOLUTION INTRAVENOUS at 21:41

## 2022-11-04 RX ADMIN — POTASSIUM CHLORIDE: 149 INJECTION, SOLUTION, CONCENTRATE INTRAVENOUS at 13:20

## 2022-11-04 RX ADMIN — CEFEPIME 2000 MG: 2 INJECTION, POWDER, FOR SOLUTION INTRAVENOUS at 07:38

## 2022-11-04 RX ADMIN — MAGNESIUM SULFATE HEPTAHYDRATE 4000 MG: 40 INJECTION, SOLUTION INTRAVENOUS at 12:00

## 2022-11-04 RX ADMIN — IRON SUCROSE 500 MG: 20 INJECTION, SOLUTION INTRAVENOUS at 13:33

## 2022-11-04 RX ADMIN — SODIUM BICARBONATE: 84 INJECTION, SOLUTION INTRAVENOUS at 07:57

## 2022-11-04 RX ADMIN — POTASSIUM CHLORIDE: 149 INJECTION, SOLUTION, CONCENTRATE INTRAVENOUS at 23:35

## 2022-11-04 RX ADMIN — THIAMINE HYDROCHLORIDE 200 MG: 100 INJECTION, SOLUTION INTRAMUSCULAR; INTRAVENOUS at 21:37

## 2022-11-04 RX ADMIN — CALCIUM GLUCONATE 1000 MG: 20 INJECTION, SOLUTION INTRAVENOUS at 23:40

## 2022-11-04 RX ADMIN — THIAMINE HYDROCHLORIDE 200 MG: 100 INJECTION, SOLUTION INTRAMUSCULAR; INTRAVENOUS at 12:10

## 2022-11-04 RX ADMIN — POTASSIUM CHLORIDE: 149 INJECTION, SOLUTION, CONCENTRATE INTRAVENOUS at 13:21

## 2022-11-04 RX ADMIN — Medication 10 ML: at 21:31

## 2022-11-04 RX ADMIN — PROMETHAZINE HYDROCHLORIDE 6.25 MG: 25 INJECTION INTRAMUSCULAR; INTRAVENOUS at 00:20

## 2022-11-04 RX ADMIN — EPOETIN ALFA-EPBX 10000 UNITS: 10000 INJECTION, SOLUTION INTRAVENOUS; SUBCUTANEOUS at 12:11

## 2022-11-04 RX ADMIN — SODIUM CHLORIDE: 9 INJECTION, SOLUTION INTRAVENOUS at 00:32

## 2022-11-04 RX ADMIN — ENOXAPARIN SODIUM 30 MG: 100 INJECTION SUBCUTANEOUS at 08:05

## 2022-11-04 RX ADMIN — VANCOMYCIN HYDROCHLORIDE 750 MG: 750 INJECTION, POWDER, LYOPHILIZED, FOR SOLUTION INTRAVENOUS at 08:05

## 2022-11-04 ASSESSMENT — ENCOUNTER SYMPTOMS
DIARRHEA: 0
SHORTNESS OF BREATH: 0
RHINORRHEA: 0
COLOR CHANGE: 0
VOMITING: 0
COUGH: 0
CONSTIPATION: 0
BACK PAIN: 0
VOICE CHANGE: 0
NAUSEA: 0

## 2022-11-04 ASSESSMENT — PAIN SCALES - GENERAL: PAINLEVEL_OUTOF10: 0

## 2022-11-04 NOTE — PROCEDURES
Lawrence Gray is a 28 y.o. female patient. 1. Metabolic acidosis    2. Generalized weakness    3. Hypokalemia    4. Hypomagnesemia    5. Acute kidney injury ([address]    6. Sepsis, due to unspecified organism, unspecified whether acute organ dysfunction present [facility])      Past Medical History:   Diagnosis Date    Allergic contact dermatitis due to adhesives     Allergic contact dermatitis due to plants, except food     Anemia complicating pregnancy, second trimester     Anemia complicating pregnancy, third trimester     Anxiety and depression     Anxiety disorder     unspecified    Bacteremia     Cellulitis of abdominal wall     Chronic fatigue, unspecified     Dysuria     Encounter for care related to vascular access port 5/19/2022    Epigastric pain     Frequency of micturition     G tube feedings (HCC)     Gastroparesis     Dr. Ean Pinzon in [place] manages    Gastroparesis     Gastrostomy malfunction [facility])     Generalized abdominal pain     GERD (gastroesophageal reflux disease)     Heart murmur     Hiatal hernia     History of Hoyos's esophagus     History of blood transfusion     Hypotension, unspecified     IBS (irritable bowel syndrome)     Insomnia, unspecified     Jejunostomy tube present ([facility])     Low back pain     Nausea with vomiting     unspecified    Neck pain     Neuropathy     Orthostatic hypotension     POTS (postural orthostatic tachycardia syndrome)     Premature birth     delivered at 24-27 weeks    Scoliosis     Syncope and collapse     Tachycardia     Tachycardia, unspecified     Toxic gastroenteritis and colitis      Blood pressure 97/71, pulse (!) 106, temperature 99.1 °F (37.3 °C), temperature source Tympanic, resp. rate 20, height 5' (1.524 m), weight 134 lb 9.6 oz (61.1 kg), last menstrual period 01/14/2018, SpO2 98 %, unknown if currently breastfeeding. Procedures    Preprocedure Diagnosis:    1. Infected left IJV dual lumen Munguia (tunneled central venous catheter)  2. Bacteremia  3. Gastroparesis    Postprocedure Diagnosis:  Same    Procedure:  Removal of infected tunneled central venous catheter left internal jugular vein    Surgeon:  Ki Bergeron. Lucille Martinez M.D. Anesthesia:  Local (1% lidocaine without epinephrine)    EBL:  Less than 50 ml    Findings: The cuff and catheter were completely removed. There were signs of infection. Procedure in Detail:      After the patient was consented, the left neck and chest were prepped and draped in the usual sterile fashion. Skin and subcutaneous tissues around the exit site and over the cuff were anesthetized with lidocaine. The cuff is dissected away from the subcutaneous tissues with sharp dissection. The catheter and cuff were then completely removed and pressure was held for hemostasis. A sterile dressing was applied. The patient tolerated the procedure well. The tip was sent for culture.     Angie Albarado MD  11/4/2022

## 2022-11-04 NOTE — CARE COORDINATION
Pt reports moving into friends apartment at [address] [facility] [address] d/t being evicted from [address] and must be out by Nov 11th. Pt states evictions d/t environmental concerns (medical waste on the floor, animal excrement). Following for approx 8yrs at clinic with Dr Henri Dawson 30: [address] [phone]  Dressing changes weekly for Munguia access, TPN labs, IV IG treatments   Supplier for medical needs is [facility] P: [address] or [phone]. Reports has J-tube but does not use at this time. FELIBERTO Santos with [facility] reports did stop services for the pt recently d/t environmental issues and pts inability to maintain medical supplies as necessary. RN states pt was to have Munguia removed when services were stopped but pt choose not to. Pt is trained for administering meds PRN, such as benedryl, phenagren, or zofran through her Munguia. RN states would have to contact [facility] office to determine ability to reinstate pt services.      SW requested FELIBERTO Pratt contact [facility] office to discuss medical needs/supplies

## 2022-11-04 NOTE — PROGRESS NOTES
Pt. Bladder scanned at 0800 which showed 80mL. Scanned again around 1400 and pt showed 120 mL. Pt. Has not voided entire shift, and claims to only pee once a day at baseline. Did not straight-cath patient to allow a period of time for the patient to urinate. Would recommend bladder scanning tonight and consider a straight cath if amount is sufficient.

## 2022-11-04 NOTE — PROGRESS NOTES
Units SubCUTAneous Q7 Days Lisabeth John, DO   10,000 Units at 11/04/22 1211    vitamin D (CHOLECALCIFEROL) capsule 5,000 Units  5,000 Units Oral Daily Lisabeth John, DO        vitamin D (ERGOCALCIFEROL) capsule 50,000 Units  50,000 Units Oral Daily Lisabeth John, DO        thiamine (B-1) injection 200 mg  200 mg IntraVENous Q12H Lisabeth John, DO   200 mg at 11/04/22 1210    calcitRIOL (ROCALTROL) capsule 0.25 mcg  0.25 mcg Oral Daily Lisabeth John, DO        potassium chloride 40 mEq in lactated ringers 1,000 mL infusion   IntraVENous Continuous Lisabeth John,  mL/hr at 11/04/22 1321 New Bag at 11/04/22 1321    diphenoxylate-atropine (LOMOTIL) 2.5-0.025 MG per tablet 1 tablet  1 tablet Oral 4x Daily PRN Lisabeth John, DO        0.9 % sodium chloride bolus  500 mL IntraVENous Once Divine Grover MD        dicyclomine (BENTYL) capsule 10 mg  10 mg Oral BID Wilfredo Petit, APRN - CNP   10 mg at 11/03/22 1254    melatonin disintegrating tablet 10 mg  10 mg Oral Nightly PRN Wilfredo Petit, APRN - CNP        sertraline (ZOLOFT) tablet 25 mg  25 mg Oral Daily Wilfredo Petit, APRN - CNP   25 mg at 11/03/22 1428    sodium chloride flush 0.9 % injection 5-40 mL  5-40 mL IntraVENous 2 times per day Wilfredo Petit, APRN - CNP        sodium chloride flush 0.9 % injection 5-40 mL  5-40 mL IntraVENous PRN Wilfredo Petit, APRN - CNP        0.9 % sodium chloride infusion   IntraVENous PRN Wilfredo Petit, APRN - CNP        acetaminophen (TYLENOL) suppository 650 mg  650 mg Rectal Q6H PRN Wilfredo Petit, APRN - CNP        magnesium sulfate 1000 mg in dextrose 5% 100 mL IVPB  1,000 mg IntraVENous PRN Wilfredo Petit, APRN - CNP        potassium chloride (KLOR-CON M) extended release tablet 40 mEq  40 mEq Oral PRN Wilfredo Petit, APRN - CNP        Or    potassium bicarb-citric acid (EFFER-K) effervescent tablet 40 mEq  40 mEq Oral PRN Wilfredo Petit, APRN - CNP        Or    potassium chloride 10 mEq/100 mL IVPB (Peripheral Line)  10 mEq IntraVENous PRN SHRUTHI Kaur CNP        acetaminophen (TYLENOL) tablet 650 mg  650 mg Oral Q6H PRN SHRUTHI Kaur CNP        vancomycin [facility]) intermittent dosing (placeholder)   Other RX Placeholder SHRUTHI Kaur CNP        promethazine (PHENERGAN) injection 6.25 mg  6.25 mg IntraMUSCular Q6H PRN Felicia Swan MD   6.25 mg at 11/04/22 0805       Past Medical History:  Past Medical History:   Diagnosis Date    Allergic contact dermatitis due to adhesives     Allergic contact dermatitis due to plants, except food     Anemia complicating pregnancy, second trimester     Anemia complicating pregnancy, third trimester     Anxiety and depression     Anxiety disorder     unspecified    Bacteremia     Cellulitis of abdominal wall     Chronic fatigue, unspecified     Dysuria     Encounter for care related to vascular access port 5/19/2022    Epigastric pain     Frequency of micturition     G tube feedings (HCC)     Gastroparesis     Dr. Alexander Dominguez in [place] manages    Gastroparesis     Gastrostomy malfunction [facility])     Generalized abdominal pain     GERD (gastroesophageal reflux disease)     Heart murmur     Hiatal hernia     History of Hoyos's esophagus     History of blood transfusion     Hypotension, unspecified     IBS (irritable bowel syndrome)     Insomnia, unspecified     Jejunostomy tube present (HCC)     Low back pain     Nausea with vomiting     unspecified    Neck pain     Neuropathy     Orthostatic hypotension     POTS (postural orthostatic tachycardia syndrome)     Premature birth     delivered at 24-27 weeks    Scoliosis     Syncope and collapse     Tachycardia     Tachycardia, unspecified     Toxic gastroenteritis and colitis        Past Surgical History:  Past Surgical History:   Procedure Laterality Date    ABDOMEN SURGERY  2014    gastric stimulator implanted    ADENOIDECTOMY      ADENOIDECTOMY  2007    CHOLECYSTECTOMY CHOLECYSTECTOMY  2016    DENTAL SURGERY  2004    wisdom teeth    EAR SURGERY  2014    left    FACIAL SURGERY  2005    GASTRIC STIMULATOR IMPLANT SURGERY  11/2014    GASTROSTOMY TUBE PLACEMENT  04/07/2015    J tube-Removed in [place] 1/2018    GASTROSTOMY TUBE PLACEMENT      G tube 2017    HYSTERECTOMY (CERVIX STATUS UNKNOWN)      INSERTION / REMOVAL / REPLACEMENT VENOUS ACCESS CATHETER N/A 03/25/2016    REMOVAL OF PORT AND PLACEMENT OF NEW PORT; REMOVAL OF PICC LINE  performed by Johnna Carrion MD at [address] [place] [address]  2014    left knee    KNEE SURGERY  2015    right knee    MANDIBLE SURGERY      double jaw    PHARYNGECTOMY      PORT SURGERY      port present 3-25-16    TONSILLECTOMY      TONSILLECTOMY  1997    TUBAL LIGATION      TUBAL LIGATION  2016    TUMOR REMOVAL Left     ear    TUNNELED VENOUS PORT PLACEMENT Right 05/29/2015    TUNNELED VENOUS PORT PLACEMENT      UPPER GASTROINTESTINAL ENDOSCOPY  04/02/2010    Dr Ruby Oswald ENDOSCOPY  09/25/2013    Dr Krystal Troy  05/2015    Dr. Rony Neil  5/29/2015 [facility]    Ultrasound-guided cannulation, right internal jugular vein. Right internal jugular vein single-lumen bard powerport placement. VASCULAR SURGERY  9/8/15 [facility]    Right internal jugular vein portograms. Revision of right internal jugular vein single lumen port. VASCULAR SURGERY  11/3/15 [facility]    Ultrasound-guided cannulation, left upper arm basilic vein. Left upper arm basilic vein PICC line placement bard dual-lumen PICC line. Superior vena cava venograms. VASCULAR SURGERY  03/23/2017    [facility]. Left IJV port angiogram.Ultrasound guided cannulation of right basilic vein,right upper extremity PICC line placement bard power PICC,dual lumen. VASCULAR SURGERY  04/27/2022    Ultrasound-guided cannulation right internal jugular vein. Placement of Munguia single-lumen tunneled dialysis catheter. Removal of nonfunctioning left subclavian vein Tripp tunneled dialysis catheter. Repositioning of right internal juglular vein Tripp tunneled dialylsis catheter. Perfomed by Dr. Terri Marcelo. Marianne Chi at [address] [place] [address] EXTRACTION         Family History  Family History   Problem Relation Age of Onset    Other Mother         endometriosis    Depression Father     Diabetes Paternal Grandmother     Stroke Paternal Grandmother     Hypertension Paternal Grandmother     Heart Disease Paternal Grandmother     Heart Failure Paternal Grandmother     Colon Cancer Neg Hx     Colon Polyps Neg Hx     Esophageal Cancer Neg Hx     Liver Cancer Neg Hx     Rectal Cancer Neg Hx     Stomach Cancer Neg Hx        Social History  Social History     Socioeconomic History    Marital status: Single     Spouse name: Not on file    Number of children: Not on file    Years of education: Not on file    Highest education level: Not on file   Occupational History    Not on file   Tobacco Use    Smoking status: Never    Smokeless tobacco: Never    Tobacco comments:     Vape    Vaping Use    Vaping Use: Former    Substances: Nicotine    Devices: Refillable tank   Substance and Sexual Activity    Alcohol use: Never    Drug use: Never    Sexual activity: Yes     Partners: Male   Other Topics Concern    Not on file   Social History Narrative    ** Merged History Encounter **          Social Determinants of Health     Financial Resource Strain: Not on file   Food Insecurity: Not on file   Transportation Needs: Not on file   Physical Activity: Not on file   Stress: Not on file   Social Connections: Not on file   Intimate Partner Violence: Not on file   Housing Stability: Not on file         Review of Systems:    Review of Systems   Constitutional:  Negative for activity change and fatigue. HENT:  Negative for rhinorrhea and voice change. Eyes:  Negative for visual disturbance. Respiratory:  Negative for cough and shortness of breath.     Cardiovascular: Negative for chest pain and leg swelling. Gastrointestinal:  Negative for constipation, diarrhea, nausea and vomiting. Endocrine: Negative for polyuria. Genitourinary:  Negative for difficulty urinating and dysuria. Musculoskeletal:  Negative for arthralgias and back pain. Skin:  Negative for color change. Allergic/Immunologic: Negative for immunocompromised state. Neurological:  Negative for dizziness and headaches. Psychiatric/Behavioral:  Negative for confusion. Objective:  Blood pressure 97/71, pulse (!) 106, temperature 99.1 °F (37.3 °C), temperature source Tympanic, resp. rate 20, height 5' (1.524 m), weight 134 lb 9.6 oz (61.1 kg), last menstrual period 01/14/2018, SpO2 98 %, unknown if currently breastfeeding. Intake/Output Summary (Last 24 hours) at 11/4/2022 1621  Last data filed at 11/4/2022 1400  Gross per 24 hour   Intake 7030.84 ml   Output 3077 ml   Net 3953.84 ml       Physical Exam  Vitals and nursing note reviewed. Constitutional:       Appearance: She is ill-appearing. HENT:      Head: Normocephalic and atraumatic. Right Ear: External ear normal.      Left Ear: External ear normal.      Nose: Nose normal.      Mouth/Throat:      Mouth: Mucous membranes are moist.   Eyes:      Conjunctiva/sclera: Conjunctivae normal.      Pupils: Pupils are equal, round, and reactive to light. Cardiovascular:      Rate and Rhythm: Normal rate and regular rhythm. Heart sounds: Normal heart sounds. Pulmonary:      Effort: Pulmonary effort is normal.      Breath sounds: Normal breath sounds. Abdominal:      General: Abdomen is flat. Palpations: Abdomen is soft. Musculoskeletal:         General: Normal range of motion. Cervical back: Neck supple. No rigidity. No muscular tenderness. Skin:     General: Skin is warm and dry. Neurological:      Mental Status: She is alert and oriented to person, place, and time.    Psychiatric:         Mood and Affect: Mood normal.       Labs:  BMP:   Recent Labs     11/03/22 0352 11/04/22 0429   * 136   K 3.1* 3.4*   CL 96* 103   CO2 14* 21*   BUN 23* 15   CREATININE 1.4* 0.8   CALCIUM 7.9* 7.0*     CBC:   Recent Labs     11/03/22 0352 11/04/22 0429   WBC 12.9* 8.6   HGB 10.0* 7.9*   HCT 32.4* 24.4*   MCV 77.3* 75.3*    119*     LIVER PROFILE:   Recent Labs     11/03/22 0352 11/04/22 0429   * 58*   ALT 97* 60*   BILITOT 0.8 0.4   ALKPHOS 89 68     PT/INR: No results for input(s): PROTIME, INR in the last 72 hours. APTT: No results for input(s): APTT in the last 72 hours. BNP:  No results for input(s): BNP in the last 72 hours. Ionized Calcium:No results for input(s): IONCA in the last 72 hours. Magnesium:  Recent Labs     11/03/22 0352   MG 1.3*     Phosphorus:No results for input(s): PHOS in the last 72 hours. HgbA1C: No results for input(s): LABA1C in the last 72 hours. Hepatic:   Recent Labs     11/03/22 0352 11/04/22 0429   ALKPHOS 89 68   ALT 97* 60*   * 58*   PROT 7.0 5.0*   BILITOT 0.8 0.4   LABALBU 3.7 2.8*     Lactic Acid:   Recent Labs     11/03/22 0352   LACTA 1.7     Troponin: No results for input(s): CKTOTAL, CKMB, TROPONINT in the last 72 hours. ABGs: No results for input(s): PH, PCO2, PO2, HCO3, O2SAT in the last 72 hours. CRP:  No results for input(s): CRP in the last 72 hours. Sed Rate:  No results for input(s): SEDRATE in the last 72 hours. Cultures:   No results for input(s): CULTURE in the last 72 hours.   Recent Labs     11/03/22 0933 11/03/22 0934   BC  --   Bottle volume = 6 ml  Gram stain Aerobic bottle:  Gram positive cocci in clusters  resembling Staphylococcus  Culture in progress  Please notify Physician  Gram stain Anaerobic bottle:  Gram positive cocci in clusters  resembling Staphylococcus  Culture in progress  Please notify Physician  *   BLOODCULT2  Bottle volume = 6 ml  Gram stain Anaerobic bottle:  Gram positive cocci in clusters  resembling Staphylococcus  Culture in progress  Please notify Physician  *  --      No results for input(s): CXSURG in the last 72 hours. Radiology reports as per the Radiologist  Radiology: CT Head W/O Contrast    Result Date: 11/3/2022  Patient: Teodora Elder  Time Out: 05:29Exam(s): CT HEAD Without Contrast EXAM:  CT Head Without Intravenous ContrastCLINICAL HISTORY:   Reason for exam: fall, headache. TECHNIQUE:  Axial computed tomography images of the head/brain without intravenous contrast.COMPARISON:4/24/2014FINDINGS:  Brain:  Unremarkable. No hemorrhage. No significant white matter disease. No edema. Ventricles:  Unremarkable. No ventriculomegaly. Bones/joints:  Unremarkable. No acute fracture. Soft tissues:  Unremarkable. Sinuses: Layering hyperdense fluid in the left maxillary sinus. Moderate left maxillary sinus mucosal thickening. Mastoid air cells:  Unremarkable as visualized. No mastoid effusion. 1. No acute intracranial hemorrhage, herniation, or hydrocephalus. 2. Moderate left maxillary sinus mucosal thickening and layering hyperdense fluid in the left maxillary sinus with which may represent inspissated secretions. Electronically signed by Lindsey Burger M.D. on 11-03-22 at 0529    XR CHEST PORTABLE    Result Date: 11/3/2022  Patient: Teodora Elder  Time Out: 05:36Exam(s): FILM CXR 1 VIEW EXAM:  XR Chest, 1 ViewCLINICAL HISTORY:   Reason for exam: weakness. TECHNIQUE:  Frontal view of the chest.COMPARISON:  No relevant prior studies available. FINDINGS:  Lungs:  Unremarkable. No consolidation. Pleural space:  Unremarkable. No pneumothorax. Heart:  Unremarkable. No cardiomegaly. Mediastinum: Tunneled left IJ central venous catheter in place. Bones/joints:  Unremarkable. No acute pulmonary process. Electronically signed by Lindsey Burger M.D. on 11-03-22 at 5576       Assessment     Staff aureus sepsis. Discontinue Munguia catheter. Continue vancomycin.     ERIKA (acute kidney injury). Improved after fluid resuscitation. Anxiety disorder. Supportive care. Gastroparesis. Supportive care. POTS (postural orthostatic tachycardia syndrome). Fluid resuscitation for now. Please document 40 minutes of critical care time for patient assessment, chart review, discussion with staff, .       Anika López, DO

## 2022-11-04 NOTE — PROGRESS NOTES
Comprehensive Nutrition Assessment    Type and Reason for Visit:  Initial, Positive Nutrition Screen    Nutrition Recommendations/Plan:   Start po idet, add snacks 10,2 and evening     Malnutrition Assessment:  Malnutrition Status: At risk for malnutrition (Comment) (11/04/22 1031)    Context:  Acute Illness     Findings of the 6 clinical characteristics of malnutrition:  Energy Intake:  Mild decrease in energy intake (Comment)  Weight Loss:  No significant weight loss     Body Fat Loss:  No significant body fat loss     Muscle Mass Loss:  No significant muscle mass loss    Fluid Accumulation:  No significant fluid accumulation     Strength:  Not Performed    Nutrition Assessment:    +NS for home TPN. Aware pt to have Munguia/TPN access removed. TPN not to be restarted. Diet advanced to Regular--modified to low fat low residue small frequent meals d/t hx of gastroparesis. Nutrition Related Findings:    Has been receiving PN and lipids separate  at home. PN has been running 16 hours. Wound Type: None       Current Nutrition Intake & Therapies:    Average Meal Intake: Unable to assess  Average Supplements Intake: None Ordered  ADULT DIET; Regular; Low Fat (less than or equal to 50 gm/day); Low Fiber  ADULT ORAL NUTRITION SUPPLEMENT; AM Snack; Snack; pudding and  canned frit  ADULT ORAL NUTRITION SUPPLEMENT; PM Snack; Snack; vanilla wafers and banana  ADULT ORAL NUTRITION SUPPLEMENT; HS Snack; Snack; 1/2 meat sandwich with trimmings (no carvalho) marin    Anthropometric Measures:  Height: 5' (152.4 cm)  Ideal Body Weight (IBW): 100 lbs (45 kg)    Admission Body Weight: 133 lb 6.4 oz (60.5 kg) (bed)  Current Body Weight: 134 lb 9.6 oz (61.1 kg), 134.6 % IBW. Weight Source: Bed Scale  Current BMI (kg/m2): 26.3  Usual Body Weight: 127 lb 9.6 oz (57.9 kg) (6/2022)  % Weight Change (Calculated): 5.5  Weight Adjustment For: No Adjustment  BMI Categories: Overweight (BMI 25.0-29. 9)    Estimated Daily Nutrient Needs:  Energy Requirements Based On: Kcal/kg  Weight Used for Energy Requirements: Current  Energy (kcal/day): 3423-7286 kcals (20-25 kcals/kg)  Weight Used for Protein Requirements: Ideal  Protein (g/day): 54-91g  Method Used for Fluid Requirements: 1 ml/kcal  Fluid (ml/day): 3717-4355 ml    Nutrition Diagnosis:   Inadequate protein-energy intake related to altered GI function as evidenced by nutrition support - parenteral nutrition    Nutrition Interventions:   Food and/or Nutrient Delivery: Start Oral Diet (start snacks)  Nutrition Education/Counseling: No recommendation at this time  Coordination of Nutrition Care: Continue to monitor while inpatient       Goals:     Goals: Meet at least 75% of estimated needs, PO intake 50% or greater       Nutrition Monitoring and Evaluation:   Behavioral-Environmental Outcomes: None Identified  Food/Nutrient Intake Outcomes: Food and Nutrient Intake  Physical Signs/Symptoms Outcomes: Biochemical Data, Diarrhea, GI Status, Fluid Status or Edema, Weight, Skin    Discharge Planning:     Too soon to determine     Andre Barnard MS, RD, LD  Contact: [phone]

## 2022-11-04 NOTE — PLAN OF CARE
Nutrition Problem #1: Inadequate protein-energy intake  Intervention: Food and/or Nutrient Delivery: Start Oral Diet (start snacks)  Nutritional

## 2022-11-04 NOTE — H&P
[facility] Medicine H&P    Patient:  Matt Infante  MRN: [id number]    Consulting Physician: Garth Love DO  Reason for Consult: Medical Management  Primacy Care Physician: Ariella Cardona MD    HISTORY OF PRESENT ILLNESS:   The patient is a 28 y.o. female presents with nausea vomiting headache. She has been ill for about 24 hours. She came into the emergency department for evaluation and was shown to be septic. She had evidence of acute kidney injury. She will be admitted to CCU and we will initiate sepsis pathway.     Past Medical History:        Diagnosis Date    Allergic contact dermatitis due to adhesives     Allergic contact dermatitis due to plants, except food     Anemia complicating pregnancy, second trimester     Anemia complicating pregnancy, third trimester     Anxiety and depression     Anxiety disorder     unspecified    Bacteremia     Cellulitis of abdominal wall     Chronic fatigue, unspecified     Dysuria     Encounter for care related to vascular access port 5/19/2022    Epigastric pain     Frequency of micturition     G tube feedings (HCC)     Gastroparesis     Dr. Michalene Severs in [place] manages    Gastroparesis     Gastrostomy malfunction [facility])     Generalized abdominal pain     GERD (gastroesophageal reflux disease)     Heart murmur     Hiatal hernia     History of Hoyos's esophagus     History of blood transfusion     Hypotension, unspecified     IBS (irritable bowel syndrome)     Insomnia, unspecified     Jejunostomy tube present (HCC)     Low back pain     Nausea with vomiting     unspecified    Neck pain     Neuropathy     Orthostatic hypotension     POTS (postural orthostatic tachycardia syndrome)     Premature birth     delivered at 24-27 weeks    Scoliosis     Syncope and collapse     Tachycardia     Tachycardia, unspecified     Toxic gastroenteritis and colitis        Past Surgical History:        Procedure Laterality Date    ABDOMEN SURGERY  2014    gastric stimulator implanted ADENOIDECTOMY      ADENOIDECTOMY  2007    CHOLECYSTECTOMY      CHOLECYSTECTOMY  2016    DENTAL SURGERY  2004    wisdom teeth    EAR SURGERY  2014    left    FACIAL SURGERY  2005    GASTRIC STIMULATOR IMPLANT SURGERY  11/2014    GASTROSTOMY TUBE PLACEMENT  04/07/2015    J tube-Removed in [place] 1/2018    GASTROSTOMY TUBE PLACEMENT      G tube 2017    HYSTERECTOMY (CERVIX STATUS UNKNOWN)      INSERTION / REMOVAL / REPLACEMENT VENOUS ACCESS CATHETER N/A 03/25/2016    REMOVAL OF PORT AND PLACEMENT OF NEW PORT; REMOVAL OF PICC LINE  performed by Henri Phillips MD at [address] [place] [address]  2014    left knee    KNEE SURGERY  2015    right knee    MANDIBLE SURGERY      double jaw    PHARYNGECTOMY      PORT SURGERY      port present 3-25-16    TONSILLECTOMY      TONSILLECTOMY  1997    TUBAL LIGATION      TUBAL LIGATION  2016    TUMOR REMOVAL Left     ear    TUNNELED VENOUS PORT PLACEMENT Right 05/29/2015    TUNNELED VENOUS PORT PLACEMENT      UPPER GASTROINTESTINAL ENDOSCOPY  04/02/2010    Dr Shefali Swenson ENDOSCOPY  09/25/2013    Dr Karen Amaya  05/2015    Dr. Bridgette Cuellar  5/29/2015 [facility]    Ultrasound-guided cannulation, right internal jugular vein. Right internal jugular vein single-lumen bard powerport placement. VASCULAR SURGERY  9/8/15 S    Right internal jugular vein portograms. Revision of right internal jugular vein single lumen port. VASCULAR SURGERY  11/3/15 [facility]    Ultrasound-guided cannulation, left upper arm basilic vein. Left upper arm basilic vein PICC line placement bard dual-lumen PICC line. Superior vena cava venograms. VASCULAR SURGERY  03/23/2017    [facility]. Left IJV port angiogram.Ultrasound guided cannulation of right basilic vein,right upper extremity PICC line placement bard power PICC,dual lumen. VASCULAR SURGERY  04/27/2022    Ultrasound-guided cannulation right internal jugular vein.  Placement of Tripp single-lumen tunneled dialysis catheter. Removal of nonfunctioning left subclavian vein Tripp tunneled dialysis catheter. Repositioning of right internal juglular vein Tripp tunneled dialylsis catheter. Perfomed by Dr. Nick Youssef at [address] [place] [address] EXTRACTION         Medications: Scheduled Meds:   vancomycin  750 mg IntraVENous Q12H    [START ON 11/5/2022] enoxaparin  40 mg SubCUTAneous Daily    magnesium sulfate  4,000 mg IntraVENous Once    iron sucrose  500 mg IntraVENous Once    epoetin juan-epbx  10,000 Units SubCUTAneous Q7 Days    vitamin D  5,000 Units Oral Daily    vitamin D  50,000 Units Oral Daily    thiamine  200 mg IntraVENous Q12H    calcitRIOL  0.25 mcg Oral Daily    sodium chloride  500 mL IntraVENous Once    dicyclomine  10 mg Oral BID    sertraline  25 mg Oral Daily    sodium chloride flush  5-40 mL IntraVENous 2 times per day    vancomycin (VANCOCIN) intermittent dosing (placeholder)   Other RX Placeholder     Continuous Infusions:   IV infusion builder 125 mL/hr at 11/04/22 1321    sodium chloride       PRN Meds:.diphenoxylate-atropine, melatonin, sodium chloride flush, sodium chloride, [DISCONTINUED] acetaminophen **OR** acetaminophen, magnesium sulfate, potassium chloride **OR** potassium alternative oral replacement **OR** potassium chloride, acetaminophen, promethazine    Allergies: Iv dye [iodides], Iv dye [iodides], Adhesive tape, Bee pollen, Chlorhexidine, Fruit & vegetable daily [nutritional supplements], Metoprolol succinate [metoprolol], Nsaids, Orange, Orange fruit [citrus], Other, Reglan [metoclopramide], Tramadol, Zofran [ondansetron hcl], Orange oil, and Sulfamethoxazole-trimethoprim    Social History:   TOBACCO:   reports that she has never smoked. She has never used smokeless tobacco.  ETOH:   reports no history of alcohol use.     Family History:       Problem Relation Age of Onset    Other Mother         endometriosis    Depression Father     Diabetes Paternal Grandmother     Stroke Paternal Grandmother     Hypertension Paternal Grandmother     Heart Disease Paternal Grandmother     Heart Failure Paternal Grandmother     Colon Cancer Neg Hx     Colon Polyps Neg Hx     Esophageal Cancer Neg Hx     Liver Cancer Neg Hx     Rectal Cancer Neg Hx     Stomach Cancer Neg Hx        ROS:  Review of Systems   Constitutional:  Positive for activity change, chills and fever. Negative for fatigue. HENT:  Negative for rhinorrhea and voice change. Eyes:  Negative for visual disturbance. Respiratory:  Negative for cough and shortness of breath. Cardiovascular:  Negative for chest pain and leg swelling. Gastrointestinal:  Negative for constipation, diarrhea, nausea and vomiting. Endocrine: Negative for polyuria. Genitourinary:  Negative for difficulty urinating and dysuria. Musculoskeletal:  Negative for arthralgias and back pain. Skin:  Negative for color change. Allergic/Immunologic: Negative for immunocompromised state. Neurological:  Negative for dizziness and headaches. Psychiatric/Behavioral:  Negative for confusion. Physical Exam:    Vitals: BP 97/71   Pulse (!) 106   Temp 99.1 °F (37.3 °C) (Tympanic)   Resp 20   Ht 5' (1.524 m)   Wt 134 lb 9.6 oz (61.1 kg)   LMP 01/14/2018 (Exact Date)   SpO2 98%   BMI 26.29 kg/m²     Physical Exam  Vitals and nursing note reviewed. Constitutional:       Appearance: She is ill-appearing and toxic-appearing. HENT:      Head: Normocephalic and atraumatic. Right Ear: External ear normal.      Left Ear: External ear normal.      Nose: Nose normal.      Mouth/Throat:      Mouth: Mucous membranes are moist.   Eyes:      Conjunctiva/sclera: Conjunctivae normal.      Pupils: Pupils are equal, round, and reactive to light. Cardiovascular:      Rate and Rhythm: Normal rate and regular rhythm. Heart sounds: Normal heart sounds.    Pulmonary:      Effort: Pulmonary effort is normal.      Breath sounds: Normal breath sounds. Abdominal:      General: Abdomen is flat. Palpations: Abdomen is soft. Musculoskeletal:         General: Normal range of motion. Cervical back: Neck supple. No rigidity. No muscular tenderness. Skin:     General: Skin is warm and dry. Neurological:      Mental Status: She is alert and oriented to person, place, and time. Psychiatric:         Mood and Affect: Mood normal.       CBC:   Recent Labs     11/03/22 0352 11/04/22 0429   WBC 12.9* 8.6   HGB 10.0* 7.9*    119*     BMP:    Recent Labs     11/03/22 0352 11/04/22 0429   * 136   K 3.1* 3.4*   CL 96* 103   CO2 14* 21*   BUN 23* 15   CREATININE 1.4* 0.8   GLUCOSE 118* 123*     Hepatic:   Recent Labs     11/03/22 0352 11/04/22 0429   * 58*   ALT 97* 60*   BILITOT 0.8 0.4   ALKPHOS 89 68     Troponin:   Recent Labs     11/03/22 0352   TROPONINI <0.01     BNP: No results for input(s): BNP in the last 72 hours. Lipids: No results for input(s): CHOL, HDL in the last 72 hours. Invalid input(s): LDLCALCU  ABGs: No results found for: PHART, PO2ART, SGH0DBM  INR: No results for input(s): INR in the last 72 hours. -----------------------------------------------------------------  CT Head W/O Contrast    Result Date: 11/3/2022  Patient: Miguelito Szymanski  Time Out: 05:29Exam(s): CT HEAD Without Contrast EXAM:  CT Head Without Intravenous ContrastCLINICAL HISTORY:   Reason for exam: fall, headache. TECHNIQUE:  Axial computed tomography images of the head/brain without intravenous contrast.COMPARISON:4/24/2014FINDINGS:  Brain:  Unremarkable. No hemorrhage. No significant white matter disease. No edema. Ventricles:  Unremarkable. No ventriculomegaly. Bones/joints:  Unremarkable. No acute fracture. Soft tissues:  Unremarkable. Sinuses: Layering hyperdense fluid in the left maxillary sinus. Moderate left maxillary sinus mucosal thickening. Mastoid air cells:  Unremarkable as visualized. No mastoid effusion. 1. No acute intracranial hemorrhage, herniation, or hydrocephalus. 2. Moderate left maxillary sinus mucosal thickening and layering hyperdense fluid in the left maxillary sinus with which may represent inspissated secretions. Electronically signed by Milton Gross M.D. on 11-03-22 at 0529    XR CHEST PORTABLE    Result Date: 11/3/2022  Patient: Clarisa Fuentes  Time Out: 05:36Exam(s): FILM CXR 1 VIEW EXAM:  XR Chest, 1 ViewCLINICAL HISTORY:   Reason for exam: weakness. TECHNIQUE:  Frontal view of the chest.COMPARISON:  No relevant prior studies available. FINDINGS:  Lungs:  Unremarkable. No consolidation. Pleural space:  Unremarkable. No pneumothorax. Heart:  Unremarkable. No cardiomegaly. Mediastinum: Tunneled left IJ central venous catheter in place. Bones/joints:  Unremarkable. No acute pulmonary process. Electronically signed by Milton Gross M.D. on 11-03-22 at 1163        Assessment and Plan     Sepsis. Sepsis pathway. Fluid resuscitation. Pressor support. Broad-spectrum antibiotics. ERIKA (acute kidney injury). Fluid resuscitation. Please document 35 minutes of critical care time for patient assessment, chart review, discussion with staff, .       Bernarda Tsang, DO

## 2022-11-04 NOTE — PROGRESS NOTES
Automatic Dose Adjustment of                Subcutaneous Anticoagulant for Prophylaxis    Dedrick Lopez is a 28 y.o. female. Recent Labs     11/03/22  0352 11/04/22  0429   CREATININE 1.4* 0.8       Estimated Creatinine Clearance: 82 mL/min (based on SCr of 0.8 mg/dL). Weight:  Wt Readings from Last 1 Encounters:   11/04/22 134 lb 9.6 oz (61.1 kg)           Pharmacy has adjusted subcutaneous anticoagulant for prophylaxis to Enoxaparin 40 mg SC daily based on the patient's weight and estimated CrCl per [facility] policy.              HUBER CHAHAL, PHARM D, 11/4/2022, 8:40 AM

## 2022-11-04 NOTE — CARE COORDINATION
Left voicemail at [facility] office asking them to contact this CM regading reinstating services for TPN and IVIG.   Awaiting return call Electronically signed by Mason Thomas RN on 11/4/2022 at 10:32 AM

## 2022-11-04 NOTE — CONSULTS
[facility] Vascular Access Team:  Consult Note    Patient: Feli Renteria  YOB: 1990   MRN: [id number]  Room: 01/701-     Attending Physician: Alesha Camejo DO  Ordering Physician: Alesha Camejo DO    Diagnosis:   Hypokalemia [E87.6]  Hypomagnesemia [L79.52]  Metabolic acidosis [K56.85]  Generalized weakness [R53.1]  Acute kidney injury ([address] [N17.9]  Sepsis ([address] [A41.9]  Sepsis, due to unspecified organism, unspecified whether acute organ dysfunction present [facility]) [A41.9]    Active LDAs:  CVC Double Lumen 11/03/22 Left Subclavian (Active)   Number of days: 1       Peripheral IV 11/04/22 Right; Anterior Forearm (Active)   Number of days: 0       Reason for Consult:  [facility] vascular access team consulted for placement of Ultrasound Guided Peripheral IV. Indication(s):  Feli Renteria is a 28 y.o. female admitted to 0701/701-01 for Sepsis [facility]). Feli Renteria currently has a Munguia catheter in her left chest that is scheduled for removal due to positive blood cultures. Patient requires IV access for medications. Has CHG and tegaderm allergy. Findings:  A 20G Ultrasound Guide Peripheral IV was placed in patient's right forearm with one attempt and no complications. PIV was dressed with a SORBAVIEW dressing due to patient's allergy to standard tegaderm dressings. Used alcohol to cleanse site rather than CHG.       Past Medical History:   Diagnosis Date    Allergic contact dermatitis due to adhesives     Allergic contact dermatitis due to plants, except food     Anemia complicating pregnancy, second trimester     Anemia complicating pregnancy, third trimester     Anxiety and depression     Anxiety disorder     unspecified    Bacteremia     Cellulitis of abdominal wall     Chronic fatigue, unspecified     Dysuria     Encounter for care related to vascular access port 5/19/2022    Epigastric pain     Frequency of micturition     G tube feedings (HCC)     Gastroparesis     Dr. Deisy Alexander in [place] manages    Gastroparesis     Gastrostomy malfunction (HCC)     Generalized abdominal pain     GERD (gastroesophageal reflux disease)     Heart murmur     Hiatal hernia     History of Hoyos's esophagus     History of blood transfusion     Hypotension, unspecified     IBS (irritable bowel syndrome)     Insomnia, unspecified     Jejunostomy tube present (HCC)     Low back pain     Nausea with vomiting     unspecified    Neck pain     Neuropathy     Orthostatic hypotension     POTS (postural orthostatic tachycardia syndrome)     Premature birth     delivered at 24-27 weeks    Scoliosis     Syncope and collapse     Tachycardia     Tachycardia, unspecified     Toxic gastroenteritis and colitis        Recent Labs     Units 11/04/22  0429 11/03/22  0934 11/03/22  0933   BC   --   Bottle volume = 6 ml  Gram stain Aerobic bottle:  Gram positive cocci in clusters  resembling Staphylococcus  Culture in progress  Please notify Physician  Gram stain Anaerobic bottle:  Gram positive cocci in clusters  resembling Staphylococcus  Culture in progress  Please notify Physician  *  --    BLOODCULT2   --   --   Bottle volume = 6 ml  Gram stain Anaerobic bottle:  Gram positive cocci in clusters  resembling Staphylococcus  Culture in progress  Please notify Physician  *   WBC K/uL 8.6  --   --    PLT K/uL 119*  --   --    CREATININE mg/dL 0.8  --   --        Impression/Plan:  1. Ultrasound-Guided Peripheral IV is ready to be used    Thank you for your time and consult.      Electronically Signed By: Donald Patiño RN on 11/4/2022 at 3:29 PM

## 2022-11-04 NOTE — PROGRESS NOTES
[facility] Pharmacokinetic Monitoring Service - Vancomycin    Consulting Provider: Mira HAWKINS   Indication: PNA/Skin infection  Target Concentration: Goal AUC/WILFRIDO 400-600 mg*hr/L  Day of Therapy: 2  Additional Antimicrobials: Cefepime    Pertinent Laboratory Values: Wt Readings from Last 1 Encounters:   11/03/22 133 lb 6.4 oz (60.5 kg)     Temp Readings from Last 1 Encounters:   11/04/22 98.5 °F (36.9 °C) (Temporal)     Estimated Creatinine Clearance: 82 mL/min (based on SCr of 0.8 mg/dL).   Recent Labs     11/03/22  0352 11/04/22  0429   CREATININE 1.4* 0.8   WBC 12.9* 8.6     Procalcitonin:     Pertinent Cultures:  Culture Date Source Results   11/03/22 Blood Resembles Staph   11/03/22 Urine Sent   MRSA Nasal Swab:  sent 11/04/22    Recent vancomycin administrations                     vancomycin (VANCOCIN) 1,250 mg in dextrose 5 % 250 mL IVPB (mg) 1,250 mg New Bag 11/03/22 0724                    Assessment:  Date/Time Current Dose Concentration Timing of Concentration (h) AUC   11/04/22 Pulse 5.8     Note: Serum concentrations collected for AUC dosing may appear elevated if collected in close proximity to the dose administered, this is not necessarily an indication of toxicity    Plan:  Initiate Vancomycin 750 mg IV q12h  Repeat vancomycin concentration ordered for 11/05 @ [address] will continue to monitor patient and adjust therapy as indicated    Thank you for the consult,  SILVINO Gutierrez [facility]  11/4/2022 6:02 AM Impression: LIVAN MEJIAS. Status: Symptomatic. Plan: AFTS.

## 2022-11-04 NOTE — PLAN OF CARE
Problem: Discharge Planning  Goal: Discharge to home or other facility with appropriate resources  Outcome: Progressing     Problem: Pain  Goal: Verbalizes/displays adequate comfort level or baseline comfort level  Outcome: Progressing  Flowsheets (Taken 11/4/2022 0000 by Vinicio Martinez RN)  Verbalizes/displays adequate comfort level or baseline comfort level: Encourage patient to monitor pain and request assistance     Problem: Safety - Adult  Goal: Free from fall injury  Outcome: Progressing     Problem: Skin/Tissue Integrity  Goal: Absence of new skin breakdown  Description: 1. Monitor for areas of redness and/or skin breakdown  2. Assess vascular access sites hourly  3. Every 4-6 hours minimum:  Change oxygen saturation probe site  4. Every 4-6 hours:  If on nasal continuous positive airway pressure, respiratory therapy assess nares and determine need for appliance change or resting period.   Outcome: Progressing

## 2022-11-05 LAB
ALBUMIN SERPL-MCNC: 2.8 G/DL (ref 3.5–5.2)
ALP BLD-CCNC: 64 U/L (ref 35–104)
ALT SERPL-CCNC: 42 U/L (ref 5–33)
ANION GAP SERPL CALCULATED.3IONS-SCNC: 14 MMOL/L (ref 7–19)
AST SERPL-CCNC: 37 U/L (ref 5–32)
BASOPHILS ABSOLUTE: 0 K/UL (ref 0–0.2)
BASOPHILS RELATIVE PERCENT: 0.5 % (ref 0–1)
BILIRUB SERPL-MCNC: 0.4 MG/DL (ref 0.2–1.2)
BLOOD CULTURE, ROUTINE: ABNORMAL
BUN BLDV-MCNC: 11 MG/DL (ref 6–20)
CALCIUM SERPL-MCNC: 7.5 MG/DL (ref 8.6–10)
CHLORIDE BLD-SCNC: 105 MMOL/L (ref 98–111)
CO2: 19 MMOL/L (ref 22–29)
CREAT SERPL-MCNC: 0.7 MG/DL (ref 0.5–0.9)
CULTURE, BLOOD 2: ABNORMAL
EOSINOPHILS ABSOLUTE: 0.1 K/UL (ref 0–0.6)
EOSINOPHILS RELATIVE PERCENT: 0.9 % (ref 0–5)
GFR SERPL CREATININE-BSD FRML MDRD: >60 ML/MIN/{1.73_M2}
GLUCOSE BLD-MCNC: 100 MG/DL (ref 74–109)
HCT VFR BLD CALC: 26.1 % (ref 37–47)
HEMOGLOBIN: 8.1 G/DL (ref 12–16)
IMMATURE GRANULOCYTES #: 0.1 K/UL
LACTIC ACID, SEPSIS: 1.2 MG/DL (ref 0.5–1.9)
LYMPHOCYTES ABSOLUTE: 0.6 K/UL (ref 1.1–4.5)
LYMPHOCYTES RELATIVE PERCENT: 9.2 % (ref 20–40)
MAGNESIUM: 2.5 MG/DL (ref 1.6–2.6)
MCH RBC QN AUTO: 23.6 PG (ref 27–31)
MCHC RBC AUTO-ENTMCNC: 31 G/DL (ref 33–37)
MCV RBC AUTO: 76.1 FL (ref 81–99)
MONOCYTES ABSOLUTE: 0.4 K/UL (ref 0–0.9)
MONOCYTES RELATIVE PERCENT: 5.3 % (ref 0–10)
NEUTROPHILS ABSOLUTE: 5.5 K/UL (ref 1.5–7.5)
NEUTROPHILS RELATIVE PERCENT: 82.7 % (ref 50–65)
ORGANISM: ABNORMAL
PDW BLD-RTO: 15.7 % (ref 11.5–14.5)
PHOSPHORUS: 1 MG/DL (ref 2.5–4.5)
PLATELET # BLD: 112 K/UL (ref 130–400)
PMV BLD AUTO: 10.8 FL (ref 9.4–12.3)
POTASSIUM SERPL-SCNC: 3.9 MMOL/L (ref 3.5–5)
RBC # BLD: 3.43 M/UL (ref 4.2–5.4)
SODIUM BLD-SCNC: 138 MMOL/L (ref 136–145)
TOTAL PROTEIN: 4.9 G/DL (ref 6.6–8.7)
URINE CULTURE, ROUTINE: NORMAL
WBC # BLD: 6.7 K/UL (ref 4.8–10.8)

## 2022-11-05 PROCEDURE — 51702 INSERT TEMP BLADDER CATH: CPT

## 2022-11-05 PROCEDURE — 80053 COMPREHEN METABOLIC PANEL: CPT

## 2022-11-05 PROCEDURE — 92522 EVALUATE SPEECH PRODUCTION: CPT

## 2022-11-05 PROCEDURE — 85025 COMPLETE CBC W/AUTO DIFF WBC: CPT

## 2022-11-05 PROCEDURE — 6360000002 HC RX W HCPCS: Performed by: INTERNAL MEDICINE

## 2022-11-05 PROCEDURE — 92610 EVALUATE SWALLOWING FUNCTION: CPT

## 2022-11-05 PROCEDURE — 84100 ASSAY OF PHOSPHORUS: CPT

## 2022-11-05 PROCEDURE — 2580000003 HC RX 258: Performed by: NURSE PRACTITIONER

## 2022-11-05 PROCEDURE — 2580000003 HC RX 258: Performed by: HOSPITALIST

## 2022-11-05 PROCEDURE — 6370000000 HC RX 637 (ALT 250 FOR IP): Performed by: INTERNAL MEDICINE

## 2022-11-05 PROCEDURE — 83735 ASSAY OF MAGNESIUM: CPT

## 2022-11-05 PROCEDURE — 2580000003 HC RX 258: Performed by: INTERNAL MEDICINE

## 2022-11-05 PROCEDURE — 36415 COLL VENOUS BLD VENIPUNCTURE: CPT

## 2022-11-05 PROCEDURE — 2500000003 HC RX 250 WO HCPCS: Performed by: HOSPITALIST

## 2022-11-05 PROCEDURE — 6360000002 HC RX W HCPCS: Performed by: NURSE PRACTITIONER

## 2022-11-05 PROCEDURE — 1210000000 HC MED SURG R&B

## 2022-11-05 PROCEDURE — 6370000000 HC RX 637 (ALT 250 FOR IP): Performed by: NURSE PRACTITIONER

## 2022-11-05 PROCEDURE — 83605 ASSAY OF LACTIC ACID: CPT

## 2022-11-05 RX ORDER — MAGNESIUM SULFATE IN WATER 40 MG/ML
4000 INJECTION, SOLUTION INTRAVENOUS ONCE
Status: COMPLETED | OUTPATIENT
Start: 2022-11-05 | End: 2022-11-05

## 2022-11-05 RX ADMIN — THIAMINE HYDROCHLORIDE 200 MG: 100 INJECTION, SOLUTION INTRAMUSCULAR; INTRAVENOUS at 21:54

## 2022-11-05 RX ADMIN — CEFAZOLIN 2000 MG: 2 INJECTION, POWDER, FOR SOLUTION INTRAMUSCULAR; INTRAVENOUS at 18:26

## 2022-11-05 RX ADMIN — POTASSIUM PHOSPHATE, MONOBASIC 250 MG: 500 TABLET, SOLUBLE ORAL at 21:53

## 2022-11-05 RX ADMIN — POTASSIUM PHOSPHATE, MONOBASIC 250 MG: 500 TABLET, SOLUBLE ORAL at 17:30

## 2022-11-05 RX ADMIN — Medication 10 ML: at 08:54

## 2022-11-05 RX ADMIN — POTASSIUM PHOSPHATE, MONOBASIC 250 MG: 500 TABLET, SOLUBLE ORAL at 12:22

## 2022-11-05 RX ADMIN — CEFAZOLIN 2000 MG: 2 INJECTION, POWDER, FOR SOLUTION INTRAMUSCULAR; INTRAVENOUS at 12:00

## 2022-11-05 RX ADMIN — MAGNESIUM SULFATE HEPTAHYDRATE 4000 MG: 40 INJECTION, SOLUTION INTRAVENOUS at 12:13

## 2022-11-05 RX ADMIN — VANCOMYCIN HYDROCHLORIDE 750 MG: 750 INJECTION, POWDER, LYOPHILIZED, FOR SOLUTION INTRAVENOUS at 10:57

## 2022-11-05 RX ADMIN — DICYCLOMINE HYDROCHLORIDE 10 MG: 10 CAPSULE ORAL at 21:53

## 2022-11-05 RX ADMIN — SODIUM PHOSPHATE, MONOBASIC, MONOHYDRATE AND SODIUM PHOSPHATE, DIBASIC, ANHYDROUS 19.56 MMOL: 276; 142 INJECTION, SOLUTION INTRAVENOUS at 04:53

## 2022-11-05 RX ADMIN — ENOXAPARIN SODIUM 40 MG: 100 INJECTION SUBCUTANEOUS at 08:52

## 2022-11-05 RX ADMIN — THIAMINE HYDROCHLORIDE 200 MG: 100 INJECTION, SOLUTION INTRAMUSCULAR; INTRAVENOUS at 08:52

## 2022-11-05 RX ADMIN — DICYCLOMINE HYDROCHLORIDE 10 MG: 10 CAPSULE ORAL at 08:52

## 2022-11-05 ASSESSMENT — ENCOUNTER SYMPTOMS
CONSTIPATION: 0
COLOR CHANGE: 0
NAUSEA: 0
VOICE CHANGE: 0
DIARRHEA: 0
RHINORRHEA: 0
COUGH: 0
SHORTNESS OF BREATH: 0
BACK PAIN: 0
VOMITING: 0

## 2022-11-05 NOTE — PROGRESS NOTES
Dr. Steffi Eid in unit, evaluating patient and updating POC  Electronically signed by Derrek Shahid RN on 11/5/2022 at 10:34 AM

## 2022-11-05 NOTE — PROGRESS NOTES
[address] [facility] transferred to [phone] from [facility] via bed. Reason for transfer: hemodynamically stable, no longer requiring continuous CCU bedside monitoring   Explained reason for transfer to Patient. Belongings: Glasses, phone, ipad,  with patient at bedside . Soft chart transferred with patient: Yes. Telemetry box number transferred with patient: no, order d/c  Report given to: Jason Farooq RN, via telephone.       Electronically signed by Emilia Bennett RN on 11/5/2022 at 1:52 PM

## 2022-11-05 NOTE — PLAN OF CARE
Problem: Discharge Planning  Goal: Discharge to home or other facility with appropriate resources  Outcome: Progressing  Flowsheets  Taken 11/5/2022 1605 by Erick Powers RN  Discharge to home or other facility with appropriate resources: Identify barriers to discharge with patient and caregiver  Taken 11/5/2022 1200 by Kathy Nobles RN  Discharge to home or other facility with appropriate resources:   Identify barriers to discharge with patient and caregiver   Refer to discharge planning if patient needs post-hospital services based on physician order or complex needs related to functional status, cognitive ability or social support system  Taken 11/5/2022 0800 by Kathy Nobles RN  Discharge to home or other facility with appropriate resources:   Identify barriers to discharge with patient and caregiver   Refer to discharge planning if patient needs post-hospital services based on physician order or complex needs related to functional status, cognitive ability or social support system     Problem: Safety - Adult  Goal: Free from fall injury  Outcome: Progressing

## 2022-11-05 NOTE — CONSULTS
Vascular Surgery Consultation     Reason for Consultation    Request for removal of infected left IJV henning catheter. HPI    She is currently in the hospital for nausea, vomiting, headache and was found to be bacteremic/septic. She has gastroparesis and multiple tunneled central venous catheters in the past for TPN/Lipids. Dr. Renan Segundo is her chosen vascular surgeon, even though I had performed catheter placement on her in the past. Dr. Renan Segundo is out of town and I am covering and she needs an infected left IJV henning removed because it is the cause of her bacteremia/sepsis.     Lab Results   Component Value Date    CREATININE 0.7 11/05/2022    BUN 11 11/05/2022     11/05/2022    K 3.9 11/05/2022     11/05/2022    CO2 19 (L) 11/05/2022         History    Carolynn Camp is a 28 y.o. female with the following history reviewed and recorded in [facility]:  Patient Active Problem List    Diagnosis Date Noted    ERIKA (acute kidney injury) ([address] 11/03/2022     Priority: Medium    Sepsis ([address] 11/03/2022     Priority: Medium    Soft tissue infection 09/26/2022     Priority: Medium    Infection of vascular catheter, initial encounter ([address] 09/23/2022     Priority: Medium    Infestation by bed bug 09/23/2022     Priority: Medium    Encounter for care related to vascular access port 05/19/2022     Priority: Medium    Nonintractable headache      Priority: Medium    Cluster headache 05/24/2021    Hiatal hernia 05/24/2021    History of infection due to human papilloma virus (HPV) 05/24/2021    History of total hysterectomy 05/24/2021    Knee pain 05/11/2021    Autoimmune disease ([address] 02/28/2021    Common variable agammaglobulinemia ([address] 02/28/2021    Jejunostomy tube present ([address] 02/04/2021    Nutritional disorder 02/18/2020    Vitamin D deficiency 01/29/2018    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    Idiopathic scoliosis of thoracolumbar spine 03/22/2017    History of syncope 03/16/2017    POTS (postural orthostatic tachycardia syndrome) 03/16/2017    Tachycardia 03/16/2017    Near syncope 03/16/2017    Chronic fatigue syndrome 01/24/2017    ETD (eustachian tube dysfunction) 10/04/2016    Mixed hearing loss of left ear 09/09/2016    Poor venous access 05/27/2015    Learning disability 08/25/2014    Nausea & vomiting 03/11/2014    Constipation 03/11/2014    Gastroparesis 03/10/2014    Insomnia 03/10/2014    Irritable bowel syndrome 01/06/2014    Pelvic pain in female 01/06/2014    Dysmenorrhea 01/06/2014    Family history of endometriosis 01/06/2014    NSAID long-term use 11/06/2013    Heartburn 11/06/2013    Anxiety disorder 09/27/2013    Chronic serous otitis media of left ear 09/23/2013    Disc herniation 09/23/2013    Neck pain 09/23/2013    Back pain 09/23/2013    Scoliosis 09/23/2013    Hiccups 09/18/2013    Barretts esophagus 09/18/2013    Belching 09/18/2013     Current Facility-Administered Medications   Medication Dose Route Frequency Provider Last Rate Last Admin    sodium phosphate 19.56 mmol in sodium chloride 0.9 % 250 mL IVPB  0.32 mmol/kg IntraVENous PRN Sharron Meza MD 41.7 mL/hr at 11/05/22 0453 19.56 mmol at 11/05/22 0453    vancomycin (VANCOCIN) 750 mg in dextrose 5 % 250 mL IVPB  750 mg IntraVENous [address], APRN - CNP   Stopped at 11/04/22 2241    enoxaparin (LOVENOX) injection 40 mg  40 mg SubCUTAneous Daily SHRUTHI Ware CNP        epoetin juan-epbx (RETACRIT) injection 10,000 Units  10,000 Units SubCUTAneous Q7 Days Genetta Leonor, DO   10,000 Units at 11/04/22 1211    vitamin D (CHOLECALCIFEROL) capsule 5,000 Units  5,000 Units Oral Daily Genetta Leonor, DO        vitamin D (ERGOCALCIFEROL) capsule 50,000 Units  50,000 Units Oral Daily Genetta Leonor, DO        thiamine (B-1) injection 200 mg  200 mg IntraVENous Q12H Genetta Leonor, DO   200 mg at 11/04/22 2137    calcitRIOL (ROCALTROL) capsule 0.25 mcg  0.25 mcg Oral Daily Genetta Leonor, DO potassium chloride 40 mEq in lactated ringers 1,000 mL infusion   IntraVENous Continuous Genetta Leonor,  mL/hr at 11/04/22 2335 New Bag at 11/04/22 2335    diphenoxylate-atropine (LOMOTIL) 2.5-0.025 MG per tablet 1 tablet  1 tablet Oral 4x Daily PRN Genetta Leonor, DO        potassium chloride 10 mEq/100 mL IVPB (Peripheral Line)  10 mEq IntraVENous PRN Saumya Khan MD        0.9 % sodium chloride bolus  500 mL IntraVENous Once Ann Marie Laureano MD        dicyclomine (BENTYL) capsule 10 mg  10 mg Oral BID SHRUTHI Ware CNP   10 mg at 11/03/22 1254    melatonin disintegrating tablet 10 mg  10 mg Oral Nightly PRN SHRUTHI Ware CNP        sertraline (ZOLOFT) tablet 25 mg  25 mg Oral Daily SHRUTHI Ware CNP   25 mg at 11/03/22 1428    sodium chloride flush 0.9 % injection 5-40 mL  5-40 mL IntraVENous 2 times per day SHRUTHI Ware CNP   10 mL at 11/04/22 2131    sodium chloride flush 0.9 % injection 5-40 mL  5-40 mL IntraVENous PRN SHRUTHI Ware CNP        0.9 % sodium chloride infusion   IntraVENous PRN SHRUTHI Ware CNP        acetaminophen (TYLENOL) suppository 650 mg  650 mg Rectal Q6H PRN SHRUTHI Ware CNP        magnesium sulfate 1000 mg in dextrose 5% 100 mL IVPB  1,000 mg IntraVENous PRN SHRUTHI Ware CNP        potassium chloride (KLOR-CON M) extended release tablet 40 mEq  40 mEq Oral PRN SHRUTHI Ware - CNP        Or    potassium bicarb-citric acid (EFFER-K) effervescent tablet 40 mEq  40 mEq Oral PRN SHRUTHI Ware CNP        Or    potassium chloride 10 mEq/100 mL IVPB (Peripheral Line)  10 mEq IntraVENous PRN SHRUTHI Ware CNP        acetaminophen (TYLENOL) tablet 650 mg  650 mg Oral Q6H PRN SHRUTHI Ware - CNP        vancomycin [facility]) intermittent dosing (placeholder)   Other RX Placeholder SHRUTHI Ware CNP        promethazine (PHENERGAN) injection 6.25 mg  6.25 mg IntraMUSCular Q6H PRN Fredis Blackburn MD   6.25 mg at 11/04/22 0805     Allergies:  Iv dye [iodides], Iv dye [iodides], Adhesive tape, Bee pollen, Chlorhexidine, Fruit & vegetable daily [nutritional supplements], Metoprolol succinate [metoprolol], Nsaids, Orange, Orange fruit [citrus], Other, Reglan [metoclopramide], Tramadol, Zofran [ondansetron hcl], Orange oil, and Sulfamethoxazole-trimethoprim  Past Medical History:   Diagnosis Date    Allergic contact dermatitis due to adhesives     Allergic contact dermatitis due to plants, except food     Anemia complicating pregnancy, second trimester     Anemia complicating pregnancy, third trimester     Anxiety and depression     Anxiety disorder     unspecified    Bacteremia     Cellulitis of abdominal wall     Chronic fatigue, unspecified     Dysuria     Encounter for care related to vascular access port 5/19/2022    Epigastric pain     Frequency of micturition     G tube feedings (HCC)     Gastroparesis     Dr. Rin Michel in [place] manages    Gastroparesis     Gastrostomy malfunction ([facility]     Generalized abdominal pain     GERD (gastroesophageal reflux disease)     Heart murmur     Hiatal hernia     History of Hoyos's esophagus     History of blood transfusion     Hypotension, unspecified     IBS (irritable bowel syndrome)     Insomnia, unspecified     Jejunostomy tube present (HCC)     Low back pain     Nausea with vomiting     unspecified    Neck pain     Neuropathy     Orthostatic hypotension     POTS (postural orthostatic tachycardia syndrome)     Premature birth     delivered at 24-27 weeks    Scoliosis     Syncope and collapse     Tachycardia     Tachycardia, unspecified     Toxic gastroenteritis and colitis      Past Surgical History:   Procedure Laterality Date    ABDOMEN SURGERY  2014    gastric stimulator implanted    ADENOIDECTOMY      ADENOIDECTOMY  2007    CHOLECYSTECTOMY      CHOLECYSTECTOMY  2016    DENTAL SURGERY  2004    wisdom teeth    EAR SURGERY  2014 left    FACIAL SURGERY  2005    GASTRIC STIMULATOR IMPLANT SURGERY  11/2014    GASTROSTOMY TUBE PLACEMENT  04/07/2015    J tube-Removed in [place] 1/2018    GASTROSTOMY TUBE PLACEMENT      G tube 2017    HYSTERECTOMY (CERVIX STATUS UNKNOWN)      INSERTION / REMOVAL / REPLACEMENT VENOUS ACCESS CATHETER N/A 03/25/2016    REMOVAL OF PORT AND PLACEMENT OF NEW PORT; REMOVAL OF PICC LINE  performed by Kailee Garduno MD at [address] [facility]  2014    left knee    KNEE SURGERY  2015    right knee    MANDIBLE SURGERY      double jaw    PHARYNGECTOMY      PORT SURGERY      port present 3-25-16    TONSILLECTOMY      TONSILLECTOMY  1997    TUBAL LIGATION      TUBAL LIGATION  2016    TUMOR REMOVAL Left     ear    TUNNELED VENOUS PORT PLACEMENT Right 05/29/2015    TUNNELED VENOUS PORT PLACEMENT      UPPER GASTROINTESTINAL ENDOSCOPY  04/02/2010    Dr Priyanka Chaidez ENDOSCOPY  09/25/2013    Dr Korina Kim  05/2015    Dr. Irma Segovia  5/29/2015 [facility]    Ultrasound-guided cannulation, right internal jugular vein. Right internal jugular vein single-lumen bard powerport placement. VASCULAR SURGERY  9/8/15 S    Right internal jugular vein portograms. Revision of right internal jugular vein single lumen port. VASCULAR SURGERY  11/3/15 [facility]    Ultrasound-guided cannulation, left upper arm basilic vein. Left upper arm basilic vein PICC line placement bard dual-lumen PICC line. Superior vena cava venograms. VASCULAR SURGERY  03/23/2017    [facility]. Left IJV port angiogram.Ultrasound guided cannulation of right basilic vein,right upper extremity PICC line placement bard power PICC,dual lumen. VASCULAR SURGERY  04/27/2022    Ultrasound-guided cannulation right internal jugular vein. Placement of [place] single-lumen tunneled dialysis catheter. Removal of nonfunctioning left subclavian vein [place] tunneled dialysis catheter. Repositioning of right internal juglular vein Tripp tunneled dialylsis catheter. Perfomed by Dr. Tomer Grant. Stephanie Araiza at [address] [facility] EXTRACTION       Family History   Problem Relation Age of Onset    Other Mother         endometriosis    Depression Father     Diabetes Paternal Grandmother     Stroke Paternal Grandmother     Hypertension Paternal Grandmother     Heart Disease Paternal Grandmother     Heart Failure Paternal Grandmother     Colon Cancer Neg Hx     Colon Polyps Neg Hx     Esophageal Cancer Neg Hx     Liver Cancer Neg Hx     Rectal Cancer Neg Hx     Stomach Cancer Neg Hx      Social History     Tobacco Use    Smoking status: Never    Smokeless tobacco: Never    Tobacco comments:     Vape    Substance Use Topics    Alcohol use: Never       Old records have been obtained from the referring provider. These records have been reviewed and summarized. Review of Systems    10 point review of systems were performed and except those noted in the HPI and other physicians notes are negative. Physical Exam    BP (!) 90/55   Pulse 98   Temp 99 °F (37.2 °C) (Temporal)   Resp 19   Ht 5' (1.524 m)   Wt 137 lb 11.2 oz (62.5 kg)   LMP 01/14/2018 (Exact Date)   SpO2 95%   BMI 26.89 kg/m²     Constitutional - well developed. No diaphoresis or acute distress. Neck- ROM appears normal, no tracheal deviation, negative jugular venous distension, carotid pulses are 2+ to palpation bilaterally without bruit. Cardiovascular - Regular rate and rhythm. Extremities - No cyanosis, clubbing, or significant edema. No signs atheroembolic event. Pulmonary - effort appears normal.  No respiratory distress. Musculoskeletal - ROM appears normal.  No significant edema. Neurologic - Awake, grossly normal  Skin - Crust and redness around left IJV tripp exit site  Psychiatric - mood, affect, and behavior appear normal.  Judgment and thought processes appear normal.    Assessment    1. Metabolic acidosis    2.  Generalized weakness    3. Hypokalemia    4. Hypomagnesemia    5. Acute kidney injury ([address]    6. Sepsis, due to unspecified organism, unspecified whether acute organ dysfunction present [facility])        She needs left IJV dual lumen tunneled central venous catheter (Henning) removed because of infection/bacteremia. I had placed a right IJV henning in the past, but Dr. Bar Oconnell removed it and placed this left IJV henning. I am not sure why the functioning, not infected right IJV catheter was removed, but apparently, the patient chose Dr. Bar Oconnell over me to care for her vascular issues. Dr. Bar Oconnell is out of town and I am covering. Plan    Proceed with removal of infected left IJV henning cateter  Risks have been discussed with the patient including bleeding, infection, thrombosis, arrhythmias, pneumothorax, arterial injury, nerve injury. She seems to understand and agrees to proceed. She can f/u with Dr. Bar Oconnell for replacement at a later date, after bacteremia resolved.

## 2022-11-05 NOTE — PROGRESS NOTES
Speech Language Pathology  Facility/Department: [facility] CORONARY CARE UNIT   CLINICAL BEDSIDE SWALLOW EVALUATION  SPEECH PRODUCTION EVALUATION     NAME: Asha Lopez  : 1990  MRN: [id number]    ADMISSION DATE: 11/3/2022  ADMITTING DIAGNOSIS: has Hiccups; Barretts esophagus; Belching; Chronic serous otitis media of left ear; Disc herniation; Neck pain; Back pain; Scoliosis; Anxiety disorder; NSAID long-term use; Heartburn; Irritable bowel syndrome; Pelvic pain in female; Dysmenorrhea; Family history of endometriosis; Gastroparesis; Insomnia; Nausea & vomiting; Constipation; Poor venous access; History of syncope; POTS (postural orthostatic tachycardia syndrome); Tachycardia; Near syncope; ETD (eustachian tube dysfunction); Jejunostomy tube present ([address] Knee pain; Mixed hearing loss of left ear; Chronic fatigue syndrome; Tyrell's syndrome pupil; Idiopathic scoliosis of thoracolumbar spine; Nutritional disorder; Autoimmune disease ([address] Cluster headache; Common variable agammaglobulinemia ([address] Hiatal hernia; History of infection due to human papilloma virus (HPV); History of total hysterectomy; Learning disability; Vitamin D deficiency; Nonintractable headache; Encounter for care related to vascular access port; Infection of vascular catheter, initial encounter ([address] Infestation by bed bug; Soft tissue infection; ERIKA (acute kidney injury) ([address] and Sepsis ([address] on their problem list.    Date of Eval: 2022  Evaluating Therapist: MP Noel    Current Diet level:  Regular solid consistency with thin liquids    Reason for Referral  Asha Lopez was referred for a bedside swallow evaluation to assess the efficiency of her swallow function, identify signs and symptoms of aspiration and make recommendations regarding safe dietary consistencies, effective compensatory strategies, and safe eating environment. Impression  Assessed patient's swallowing function.  Patient exhibited slow oral prep and decreased oral transit of more solid consistencies as well as sluggish, mild-moderately decreased laryngeal elevation for swallow airway protection. Even so, no outward S/S penetration/aspiration was noted with any  regular solid consistency trial or thin liquid trial presented during evaluation this date. At this time, least restrictive diet would be to continue thin liquids with regular solid consistency (extra sauces/gravy/condiments). Assist in meal set-up. Will continue to follow. Thank you for this consult. Treatment Plan  Requires SLP Intervention: Yes     Recommended Diet and Intervention  Diet Solids Recommendation: Regular solid  Liquid Consistency Recommendation: Thin  Recommended Form of Meds: PO  Therapeutic Interventions: Patient/Family education;Diet tolerance monitoring     Compensatory Swallowing Strategies  Compensatory Swallowing Strategies: Upright as possible for all oral intake;Small bites/sips;Eat/Feed slowly; Alternate solids and liquids; Remain upright for 30-45 minutes after meals     Treatment/Goals  Timeframe for Short-term Goals: 1-2x/day for 3 days   Goal 1: Patient will tolerate regular solid consistency and thin liquids with min S/S penetration/aspiration during PO intake. Goal 2: Patient will receive daily oral care to decrease bacteria from the oral cavity. Goal 3: Patient will complete oral motor, lingual, and pharyngeal exercises with provision of min cues/prompts. Goal 4: Re-assess speech production. Goal 5: Patient will utilize tools/strategies to promote increased clarity of speech at word, phrase, and sentence level with provision of min cues/prompts. General  Chart Reviewed: Yes  Behavior/Cognition: Alert; Cooperative  O2 Device: None (Room air)  Communication Observation: (Assessed patient's speech production. Patient exhibited slowed, decreased lingual movements during verbalizations.  SLP still ranked functional intelligibility of speech for unfamiliar listeners at 100% in utterances with background noise present.)  Follows Directions: Simple   Dentition: Some missing dentition  Patient Positioning: Upright in bed  Consistencies Administered: Regular solid; Thin - straw     Oral Motor Examination   Labial ROM: (Adequate during labial retraction trials and labial protrusion trials.)  Labial Strength: (Adequate during labial compression trials.)  Labial Coordination: (Adequate movements were noted.)  Lingual ROM: (Adequate during lingual extension trials with full point achieved; decreased during lingual elevation trials without use of accessory jaw movement; adequate movements noted bilaterally.)  Lingual Strength: (Decreased with fasciculations noted during lingual extension trials.)  Lingual Coordination: (Slowed movements were noted.)     Assessed patient's swallowing function with the following observations noted:     Oral Phase  Mastication: Regular solid (Patient exhibited slow oral prep of regular solid consistency trials presented independently)  Suspected Premature Bolus Loss: Regular solid (Oral transit of regular solid consistency trials varied from 1-3 seconds in length.)  Decreased Oral Transit: Regular solid (Min-moderate oral cavity residue was noted post swallows; residue was slow but cleared from the mouth with thin liquid washes.)  Oral Phase - Comment: Oral transit of thin liquid trials, presented independently via straw, primarily measured 1-2 seconds in length. It is noted that patient appeared slow when bringing food/drink trials to self.      Pharyngeal Phase  Suspected Swallow Delay: Regular solid (Suspect secondary to oral transit times.)  Laryngeal Elevation: (Patient exhibited sluggish, mild-moderately decreased laryngeal elevation for swallow airway protection.)  Pharyngeal Phase - Comment: No outward S/S penetration/aspiration was noted with any regular solid consistency trial or thin liquid trial presented during evaluation this date. At this time, least restrictive diet would be to continue thin liquids with regular solid consistency (extra sauces/gravy/condiments). Assist in meal set-up. Will continue to follow.      Electronically signed by MP Olivier on 11/5/2022 at 1:34 PM

## 2022-11-05 NOTE — PROGRESS NOTES
Hospitalist Progress Note    Patient:  Kvng Buchanan  YOB: 1990  Date of Service: 11/5/2022  MRN: [id number]   Acct: [de-identified]   Primary Care Physician: Ady Hayes MD  Advance Directive: Full Code  Admit Date: 11/3/2022       Hospital Day: 2  Referring Provider: Lindsey Figueroa DO    Patient Seen, Chart, Consults, Notes, Labs, Radiology studies reviewed. Subjective:  Kvng Buchanan is a 28 y.o. female  whom we are following for  sepsis, acute kidney injury, gastroparesis, POTS. She is feeling much better today. Hemodynamics are stable. Her blood cultures are growing MSSA. I will transition her to cefazolin. She is stable for transfer out of CCU. Allergies:   Iv dye [iodides], Iv dye [iodides], Adhesive tape, Bee pollen, Chlorhexidine, Fruit & vegetable daily [nutritional supplements], Metoprolol succinate [metoprolol], Nsaids, Orange, Orange fruit [citrus], Other, Reglan [metoclopramide], Tramadol, Zofran [ondansetron hcl], Orange oil, and Sulfamethoxazole-trimethoprim    Medicines:  Current Facility-Administered Medications   Medication Dose Route Frequency Provider Last Rate Last Admin    sodium phosphate 19.56 mmol in sodium chloride 0.9 % 250 mL IVPB  0.32 mmol/kg IntraVENous PRN Lindsey Ro, DO   Stopped at 11/05/22 1059    potassium phosphate (monobasic) (K-PHOS) tablet 250 mg  250 mg Oral 4x Daily WC Lindsey Ro, DO   250 mg at 11/05/22 1222    ceFAZolin (ANCEF) 2,000 mg in sterile water 20 mL IV syringe  2,000 mg IntraVENous Q8H Lindsey Ro, DO   2,000 mg at 11/05/22 1200    magnesium sulfate 4000 mg in 100 mL IVPB premix  4,000 mg IntraVENous Once Lindsey Ro, DO 12.5 mL/hr at 11/05/22 1213 4,000 mg at 11/05/22 1213    enoxaparin (LOVENOX) injection 40 mg  40 mg SubCUTAneous Daily Lindsey Ro, DO   40 mg at 11/05/22 2585    epoetin juan-epbx (RETACRIT) injection 10,000 Units  10,000 Units SubCUTAneous Q7 Days Lindsey Ro, DO   10,000 Units at 11/04/22 1211    vitamin D (CHOLECALCIFEROL) capsule 5,000 Units  5,000 Units Oral Daily Kameron Slocumb, DO        vitamin D (ERGOCALCIFEROL) capsule 50,000 Units  50,000 Units Oral Daily Kameron Slocumb, DO        thiamine (B-1) injection 200 mg  200 mg IntraVENous Q12H Kameron Slocumb, DO   200 mg at 11/05/22 9119    calcitRIOL (ROCALTROL) capsule 0.25 mcg  0.25 mcg Oral Daily Kameron Slocumb, DO        diphenoxylate-atropine (LOMOTIL) 2.5-0.025 MG per tablet 1 tablet  1 tablet Oral 4x Daily PRN Kameron Slocumb, DO        potassium chloride 10 mEq/100 mL IVPB (Peripheral Line)  10 mEq IntraVENous PRN Kameron Slocumb, DO        0.9 % sodium chloride bolus  500 mL IntraVENous Once Kameron Slocumb, DO        dicyclomine (BENTYL) capsule 10 mg  10 mg Oral BID Kameron Slocumb, DO   10 mg at 11/05/22 3430    melatonin disintegrating tablet 10 mg  10 mg Oral Nightly PRN Kameron Slocumb, DO        sertraline (ZOLOFT) tablet 25 mg  25 mg Oral Daily Kameron Slocumb, DO   25 mg at 11/03/22 1428    sodium chloride flush 0.9 % injection 5-40 mL  5-40 mL IntraVENous 2 times per day Kameron Slocumb, DO   10 mL at 11/05/22 6696    sodium chloride flush 0.9 % injection 5-40 mL  5-40 mL IntraVENous PRN Kameron Slocumb, DO        0.9 % sodium chloride infusion   IntraVENous PRN Kameron Slocumb, DO        acetaminophen (TYLENOL) suppository 650 mg  650 mg Rectal Q6H PRN Kameron Slocumb, DO        magnesium sulfate 1000 mg in dextrose 5% 100 mL IVPB  1,000 mg IntraVENous PRN Kameron Slocumb, DO        potassium chloride (KLOR-CON M) extended release tablet 40 mEq  40 mEq Oral PRN Kameron Slocumb, DO        Or    potassium bicarb-citric acid (EFFER-K) effervescent tablet 40 mEq  40 mEq Oral PRN Kameron Slocumb, DO        Or    potassium chloride 10 mEq/100 mL IVPB (Peripheral Line)  10 mEq IntraVENous PRN Kameron Slocumb, DO acetaminophen (TYLENOL) tablet 650 mg  650 mg Oral Q6H PRN Alona Skiff, DO        promethazine (PHENERGAN) injection 6.25 mg  6.25 mg IntraMUSCular Q6H PRN Alona Skiff, DO   6.25 mg at 11/04/22 0805       Past Medical History:  Past Medical History:   Diagnosis Date    Allergic contact dermatitis due to adhesives     Allergic contact dermatitis due to plants, except food     Anemia complicating pregnancy, second trimester     Anemia complicating pregnancy, third trimester     Anxiety and depression     Anxiety disorder     unspecified    Bacteremia     Cellulitis of abdominal wall     Chronic fatigue, unspecified     Dysuria     Encounter for care related to vascular access port 5/19/2022    Epigastric pain     Frequency of micturition     G tube feedings (HCC)     Gastroparesis     Dr. Ady Lopez in [place] manages    Gastroparesis     Gastrostomy malfunction [facility])     Generalized abdominal pain     GERD (gastroesophageal reflux disease)     Heart murmur     Hiatal hernia     History of Hoyos's esophagus     History of blood transfusion     Hypotension, unspecified     IBS (irritable bowel syndrome)     Insomnia, unspecified     Jejunostomy tube present (HCC)     Low back pain     Nausea with vomiting     unspecified    Neck pain     Neuropathy     Orthostatic hypotension     POTS (postural orthostatic tachycardia syndrome)     Premature birth     delivered at 24-27 weeks    Scoliosis     Syncope and collapse     Tachycardia     Tachycardia, unspecified     Toxic gastroenteritis and colitis        Past Surgical History:  Past Surgical History:   Procedure Laterality Date    ABDOMEN SURGERY  2014    gastric stimulator implanted    ADENOIDECTOMY      ADENOIDECTOMY  2007    CHOLECYSTECTOMY      CHOLECYSTECTOMY  2016    DENTAL SURGERY  2004    wisdom teeth    EAR SURGERY  2014    left    FACIAL SURGERY  2005    GASTRIC STIMULATOR IMPLANT SURGERY  11/2014    GASTROSTOMY TUBE PLACEMENT  04/07/2015    SHON tube-Removed in [place] 1/2018    GASTROSTOMY TUBE PLACEMENT      G tube 2017    HYSTERECTOMY (CERVIX STATUS UNKNOWN)      INSERTION / REMOVAL / REPLACEMENT VENOUS ACCESS CATHETER N/A 03/25/2016    REMOVAL OF PORT AND PLACEMENT OF NEW PORT; REMOVAL OF PICC LINE  performed by Roman Peterson MD at [address] [facility]  2014    left knee    KNEE SURGERY  2015    right knee    MANDIBLE SURGERY      double jaw    PHARYNGECTOMY      PORT SURGERY      port present 3-25-16    TONSILLECTOMY      TONSILLECTOMY  1997    TUBAL LIGATION      TUBAL LIGATION  2016    TUMOR REMOVAL Left     ear    TUNNELED VENOUS PORT PLACEMENT Right 05/29/2015    TUNNELED VENOUS PORT PLACEMENT      UPPER GASTROINTESTINAL ENDOSCOPY  04/02/2010    Dr Abbott Spar ENDOSCOPY  09/25/2013    Dr Rudy Gallegos  05/2015    Dr. Guru Cordova  5/29/2015 hospitals    Ultrasound-guided cannulation, right internal jugular vein. Right internal jugular vein single-lumen bard powerport placement. VASCULAR SURGERY  9/8/15 [facility]    Right internal jugular vein portograms. Revision of right internal jugular vein single lumen port. VASCULAR SURGERY  11/3/15 S    Ultrasound-guided cannulation, left upper arm basilic vein. Left upper arm basilic vein PICC line placement bard dual-lumen PICC line. Superior vena cava venograms. VASCULAR SURGERY  03/23/2017    [facility]. Left IJV port angiogram.Ultrasound guided cannulation of right basilic vein,right upper extremity PICC line placement bard power PICC,dual lumen. VASCULAR SURGERY  04/27/2022    Ultrasound-guided cannulation right internal jugular vein. Placement of [place] single-lumen tunneled dialysis catheter. Removal of nonfunctioning left subclavian vein M[place]uia tunneled dialysis catheter. Repositioning of right internal juglular vein [place]ia tunneled dialylsis catheter. Perf[facility] Dr[facility] Darlene [facility]oussef at [facility] EXTRACTION         Family History  Family History   Problem Relation Age of Onset    Other Mother         endometriosis    Depression Father     Diabetes Paternal Grandmother     Stroke Paternal Grandmother     Hypertension Paternal Grandmother     Heart Disease Paternal Grandmother     Heart Failure Paternal Grandmother     Colon Cancer Neg Hx     Colon Polyps Neg Hx     Esophageal Cancer Neg Hx     Liver Cancer Neg Hx     Rectal Cancer Neg Hx     Stomach Cancer Neg Hx        Social History  Social History     Socioeconomic History    Marital status: Single     Spouse name: Not on file    Number of children: Not on file    Years of education: Not on file    Highest education level: Not on file   Occupational History    Not on file   Tobacco Use    Smoking status: Never    Smokeless tobacco: Never    Tobacco comments:     Vape    Vaping Use    Vaping Use: Former    Substances: Nicotine    Devices: Refillable tank   Substance and Sexual Activity    Alcohol use: Never    Drug use: Never    Sexual activity: Yes     Partners: Male   Other Topics Concern    Not on file   Social History Narrative    ** Merged History Encounter **          Social Determinants of Health     Financial Resource Strain: Not on file   Food Insecurity: Not on file   Transportation Needs: Not on file   Physical Activity: Not on file   Stress: Not on file   Social Connections: Not on file   Intimate Partner Violence: Not on file   Housing Stability: Not on file         Review of Systems:    Review of Systems   Constitutional:  Negative for activity change and fatigue. HENT:  Negative for rhinorrhea and voice change. Eyes:  Negative for visual disturbance. Respiratory:  Negative for cough and shortness of breath. Cardiovascular:  Negative for chest pain and leg swelling. Gastrointestinal:  Negative for constipation, diarrhea, nausea and vomiting. Endocrine: Negative for polyuria.    Genitourinary:  Negative for difficulty urinating and dysuria. Musculoskeletal:  Positive for gait problem. Negative for arthralgias and back pain. Skin:  Negative for color change. Allergic/Immunologic: Negative for immunocompromised state. Neurological:  Positive for weakness. Negative for dizziness and headaches. Psychiatric/Behavioral:  Negative for confusion. Objective:  Blood pressure 96/69, pulse 96, temperature 97 °F (36.1 °C), resp. rate 18, height 5' (1.524 m), weight 137 lb 11.2 oz (62.5 kg), last menstrual period 01/14/2018, SpO2 97 %, unknown if currently breastfeeding. Intake/Output Summary (Last 24 hours) at 11/5/2022 1713  Last data filed at 11/5/2022 1605  Gross per 24 hour   Intake 1597.38 ml   Output 2427 ml   Net -829.62 ml       Physical Exam  Vitals and nursing note reviewed. HENT:      Head: Normocephalic and atraumatic. Right Ear: External ear normal.      Left Ear: External ear normal.      Nose: Nose normal.      Mouth/Throat:      Mouth: Mucous membranes are moist.   Eyes:      Conjunctiva/sclera: Conjunctivae normal.      Pupils: Pupils are equal, round, and reactive to light. Cardiovascular:      Rate and Rhythm: Normal rate and regular rhythm. Heart sounds: Normal heart sounds. Pulmonary:      Effort: Pulmonary effort is normal.      Breath sounds: Normal breath sounds. Abdominal:      General: Abdomen is flat. Palpations: Abdomen is soft. Musculoskeletal:         General: Normal range of motion. Cervical back: Neck supple. No rigidity. No muscular tenderness. Skin:     General: Skin is warm and dry. Neurological:      Mental Status: She is alert and oriented to person, place, and time.    Psychiatric:         Mood and Affect: Mood normal.       Labs:  BMP:   Recent Labs     11/03/22  0352 11/04/22  0429 11/05/22  0204   * 136 138   K 3.1* 3.4* 3.9   CL 96* 103 105   CO2 14* 21* 19*   PHOS  --   --  1.0*   BUN 23* 15 11   CREATININE 1.4* 0.8 0.7   CALCIUM 7.9* 7.0* 7.5*     CBC: Recent Labs     11/03/22 0352 11/04/22 0429 11/05/22 0204   WBC 12.9* 8.6 6.7   HGB 10.0* 7.9* 8.1*   HCT 32.4* 24.4* 26.1*   MCV 77.3* 75.3* 76.1*    119* 112*     LIVER PROFILE:   Recent Labs     11/03/22 0352 11/04/22 0429 11/05/22 0204   * 58* 37*   ALT 97* 60* 42*   BILITOT 0.8 0.4 0.4   ALKPHOS 89 68 64     PT/INR: No results for input(s): PROTIME, INR in the last 72 hours. APTT: No results for input(s): APTT in the last 72 hours. BNP:  No results for input(s): BNP in the last 72 hours. Ionized Calcium:No results for input(s): IONCA in the last 72 hours. Magnesium:  Recent Labs     11/03/22 0352 11/05/22 0204   MG 1.3* 2.5     Phosphorus:  Recent Labs     11/05/22 0204   PHOS 1.0*     HgbA1C: No results for input(s): LABA1C in the last 72 hours. Hepatic:   Recent Labs     11/03/22 0352 11/04/22 0429 11/05/22 0204   ALKPHOS 89 68 64   ALT 97* 60* 42*   * 58* 37*   PROT 7.0 5.0* 4.9*   BILITOT 0.8 0.4 0.4   LABALBU 3.7 2.8* 2.8*     Lactic Acid:   Recent Labs     11/03/22 0352   LACTA 1.7     Troponin: No results for input(s): CKTOTAL, CKMB, TROPONINT in the last 72 hours. ABGs: No results for input(s): PH, PCO2, PO2, HCO3, O2SAT in the last 72 hours. CRP:  No results for input(s): CRP in the last 72 hours. Sed Rate:  No results for input(s): SEDRATE in the last 72 hours. Cultures:   No results for input(s): CULTURE in the last 72 hours. Recent Labs     11/03/22 0933 11/03/22 0934   BC  --   Bottle volume = 6 ml*  No further workup  Isolated from Aerobic and Anaerobic bottle  Refer to (blood culture collected [email or web address]) for sensitivity results     BLOODCULT2  Bottle volume = 6 ml*  No further workup  Isolated from Aerobic and Anaerobic bottle  Refer to (blood culture collected [email or web address]) for sensitivity results    --      No results for input(s): CXSURG in the last 72 hours.     Radiology reports as per the Radiologist  Radiology: CT Head W/O Contrast    Result Date: 11/3/2022  Patient: Janae Michelle  Time Out: 05:29Exam(s): CT HEAD Without Contrast EXAM:  CT Head Without Intravenous ContrastCLINICAL HISTORY:   Reason for exam: fall, headache. TECHNIQUE:  Axial computed tomography images of the head/brain without intravenous contrast.COMPARISON:4/24/2014FINDINGS:  Brain:  Unremarkable. No hemorrhage. No significant white matter disease. No edema. Ventricles:  Unremarkable. No ventriculomegaly. Bones/joints:  Unremarkable. No acute fracture. Soft tissues:  Unremarkable. Sinuses: Layering hyperdense fluid in the left maxillary sinus. Moderate left maxillary sinus mucosal thickening. Mastoid air cells:  Unremarkable as visualized. No mastoid effusion. 1. No acute intracranial hemorrhage, herniation, or hydrocephalus. 2. Moderate left maxillary sinus mucosal thickening and layering hyperdense fluid in the left maxillary sinus with which may represent inspissated secretions. Electronically signed by Rafaela Leggett M.D. on 11-03-22 at 0529    XR CHEST PORTABLE    Result Date: 11/3/2022  Patient: Janae Michelle  Time Out: 05:36Exam(s): FILM CXR 1 VIEW EXAM:  XR Chest, 1 ViewCLINICAL HISTORY:   Reason for exam: weakness. TECHNIQUE:  Frontal view of the chest.COMPARISON:  No relevant prior studies available. FINDINGS:  Lungs:  Unremarkable. No consolidation. Pleural space:  Unremarkable. No pneumothorax. Heart:  Unremarkable. No cardiomegaly. Mediastinum: Tunneled left IJ central venous catheter in place. Bones/joints:  Unremarkable. No acute pulmonary process. Electronically signed by Rafaela Leggett M.D. on 11-03-22 at 0316       Assessment     MSSA sepsis. Continue cefazolin. ERIKA (acute kidney injury). Resolved. Anxiety disorder. Supportive care. Gastroparesis. Supportive care. POTS (postural orthostatic tachycardia syndrome). Fluid resuscitation for now. Transfer out of CCU.      Please document 40 minutes of critical care time for patient assessment, chart review, discussion with staff, .       Kathy Pleitez, DO

## 2022-11-05 NOTE — PROGRESS NOTES
11/04/22 1950   Encounter Summary   Encounter Overview/Reason  Spiritual/Emotional Needs   Service Provided For: Patient  (in CCU)   Referral/Consult From: Rounding   Complexity of Encounter Moderate   Begin Time 1215   End Time  1245   Total Time Calculated 30 min   Encounter    Type Initial Screen/Assessment   Spiritual/Emotional needs   Type Spiritual Support   Assessment/Intervention/Outcome   Assessment Concerns with suffering; Hopeful   Intervention Active listening;Prayer (assurance of)/[place]   Outcome Encouraged     Follow up with LW. Beverly Fam said she has not talk with the decision maker yet. Pt was encouraged by spiritual support.    Electronically signed by Dvaid Cao on 11/4/2022 at 7:54 PM

## 2022-11-06 LAB
ALBUMIN SERPL-MCNC: 2.6 G/DL (ref 3.5–5.2)
ALP BLD-CCNC: 57 U/L (ref 35–104)
ALT SERPL-CCNC: 25 U/L (ref 5–33)
ANION GAP SERPL CALCULATED.3IONS-SCNC: 9 MMOL/L (ref 7–19)
AST SERPL-CCNC: 25 U/L (ref 5–32)
BASOPHILS ABSOLUTE: 0 K/UL (ref 0–0.2)
BASOPHILS RELATIVE PERCENT: 0.5 % (ref 0–1)
BILIRUB SERPL-MCNC: <0.2 MG/DL (ref 0.2–1.2)
BUN BLDV-MCNC: 9 MG/DL (ref 6–20)
CALCIUM SERPL-MCNC: 7.2 MG/DL (ref 8.6–10)
CHLORIDE BLD-SCNC: 103 MMOL/L (ref 98–111)
CO2: 24 MMOL/L (ref 22–29)
CREAT SERPL-MCNC: 0.6 MG/DL (ref 0.5–0.9)
EOSINOPHILS ABSOLUTE: 0.1 K/UL (ref 0–0.6)
EOSINOPHILS RELATIVE PERCENT: 1.8 % (ref 0–5)
GFR SERPL CREATININE-BSD FRML MDRD: >60 ML/MIN/{1.73_M2}
GLUCOSE BLD-MCNC: 88 MG/DL (ref 74–109)
HCT VFR BLD CALC: 23.9 % (ref 37–47)
HEMOGLOBIN: 7.4 G/DL (ref 12–16)
IMMATURE GRANULOCYTES #: 0.1 K/UL
LYMPHOCYTES ABSOLUTE: 1.2 K/UL (ref 1.1–4.5)
LYMPHOCYTES RELATIVE PERCENT: 20.3 % (ref 20–40)
MAGNESIUM: 2.5 MG/DL (ref 1.6–2.6)
MCH RBC QN AUTO: 23.9 PG (ref 27–31)
MCHC RBC AUTO-ENTMCNC: 31 G/DL (ref 33–37)
MCV RBC AUTO: 77.3 FL (ref 81–99)
MONOCYTES ABSOLUTE: 0.5 K/UL (ref 0–0.9)
MONOCYTES RELATIVE PERCENT: 8.8 % (ref 0–10)
NEUTROPHILS ABSOLUTE: 3.8 K/UL (ref 1.5–7.5)
NEUTROPHILS RELATIVE PERCENT: 66.3 % (ref 50–65)
PDW BLD-RTO: 16 % (ref 11.5–14.5)
PHOSPHORUS: 1.5 MG/DL (ref 2.5–4.5)
PLATELET # BLD: 127 K/UL (ref 130–400)
PMV BLD AUTO: 11.5 FL (ref 9.4–12.3)
POTASSIUM SERPL-SCNC: 3.4 MMOL/L (ref 3.5–5)
RBC # BLD: 3.09 M/UL (ref 4.2–5.4)
SODIUM BLD-SCNC: 136 MMOL/L (ref 136–145)
TOTAL PROTEIN: 4.4 G/DL (ref 6.6–8.7)
WBC # BLD: 5.7 K/UL (ref 4.8–10.8)

## 2022-11-06 PROCEDURE — 1210000000 HC MED SURG R&B

## 2022-11-06 PROCEDURE — 6370000000 HC RX 637 (ALT 250 FOR IP): Performed by: INTERNAL MEDICINE

## 2022-11-06 PROCEDURE — 36415 COLL VENOUS BLD VENIPUNCTURE: CPT

## 2022-11-06 PROCEDURE — 6360000002 HC RX W HCPCS: Performed by: INTERNAL MEDICINE

## 2022-11-06 PROCEDURE — 83735 ASSAY OF MAGNESIUM: CPT

## 2022-11-06 PROCEDURE — 87040 BLOOD CULTURE FOR BACTERIA: CPT

## 2022-11-06 PROCEDURE — 2580000003 HC RX 258: Performed by: INTERNAL MEDICINE

## 2022-11-06 PROCEDURE — 80053 COMPREHEN METABOLIC PANEL: CPT

## 2022-11-06 PROCEDURE — 84100 ASSAY OF PHOSPHORUS: CPT

## 2022-11-06 PROCEDURE — 85025 COMPLETE CBC W/AUTO DIFF WBC: CPT

## 2022-11-06 RX ADMIN — Medication 10 ML: at 09:27

## 2022-11-06 RX ADMIN — POTASSIUM PHOSPHATE, MONOBASIC 250 MG: 500 TABLET, SOLUBLE ORAL at 21:14

## 2022-11-06 RX ADMIN — POTASSIUM PHOSPHATE, MONOBASIC 250 MG: 500 TABLET, SOLUBLE ORAL at 17:01

## 2022-11-06 RX ADMIN — POTASSIUM PHOSPHATE, MONOBASIC 250 MG: 500 TABLET, SOLUBLE ORAL at 09:28

## 2022-11-06 RX ADMIN — CEFAZOLIN 2000 MG: 2 INJECTION, POWDER, FOR SOLUTION INTRAMUSCULAR; INTRAVENOUS at 18:31

## 2022-11-06 RX ADMIN — ACETAMINOPHEN 650 MG: 325 TABLET ORAL at 12:51

## 2022-11-06 RX ADMIN — ACETAMINOPHEN 650 MG: 325 TABLET ORAL at 18:31

## 2022-11-06 RX ADMIN — CEFAZOLIN 2000 MG: 2 INJECTION, POWDER, FOR SOLUTION INTRAMUSCULAR; INTRAVENOUS at 11:30

## 2022-11-06 RX ADMIN — POTASSIUM PHOSPHATE, MONOBASIC 250 MG: 500 TABLET, SOLUBLE ORAL at 12:49

## 2022-11-06 RX ADMIN — POTASSIUM CHLORIDE 40 MEQ: 1500 TABLET, EXTENDED RELEASE ORAL at 02:54

## 2022-11-06 RX ADMIN — DICYCLOMINE HYDROCHLORIDE 10 MG: 10 CAPSULE ORAL at 09:27

## 2022-11-06 RX ADMIN — Medication 10 ML: at 21:15

## 2022-11-06 RX ADMIN — DICYCLOMINE HYDROCHLORIDE 10 MG: 10 CAPSULE ORAL at 21:14

## 2022-11-06 RX ADMIN — CEFAZOLIN 2000 MG: 2 INJECTION, POWDER, FOR SOLUTION INTRAMUSCULAR; INTRAVENOUS at 02:39

## 2022-11-06 RX ADMIN — ENOXAPARIN SODIUM 40 MG: 100 INJECTION SUBCUTANEOUS at 09:26

## 2022-11-06 ASSESSMENT — PAIN SCALES - GENERAL
PAINLEVEL_OUTOF10: 1
PAINLEVEL_OUTOF10: 1
PAINLEVEL_OUTOF10: 0

## 2022-11-06 ASSESSMENT — ENCOUNTER SYMPTOMS
BACK PAIN: 0
RHINORRHEA: 0
SHORTNESS OF BREATH: 0
DIARRHEA: 0
NAUSEA: 0
COUGH: 0
VOMITING: 0
CONSTIPATION: 0
VOICE CHANGE: 0
COLOR CHANGE: 0

## 2022-11-06 ASSESSMENT — PAIN DESCRIPTION - ORIENTATION
ORIENTATION: RIGHT

## 2022-11-06 ASSESSMENT — PAIN DESCRIPTION - DESCRIPTORS
DESCRIPTORS: ACHING
DESCRIPTORS: BURNING;DISCOMFORT
DESCRIPTORS: BURNING

## 2022-11-06 ASSESSMENT — PAIN DESCRIPTION - LOCATION
LOCATION: ABDOMEN
LOCATION: ARM

## 2022-11-06 NOTE — PROGRESS NOTES
Hospitalist Progress Note    Patient:  Matt Infante  YOB: 1990  Date of Service: 11/6/2022  MRN: [id number]   Acct: [de-identified]   Primary Care Physician: Ariella Cardona MD  Advance Directive: Full Code  Admit Date: 11/3/2022       Hospital Day: 3  Referring Provider: Garth Loev DO    Patient Seen, Chart, Consults, Notes, Labs, Radiology studies reviewed. Subjective:  Matt Infante is a 28 y.o. female  whom we are following for MSSA sepsis, acute kidney injury, gastroparesis, POTS. She is feeling much better today. Hemodynamics are stable. Her blood cultures are growing MSSA. Continuing cefazolin. Allergies:   Iv dye [iodides], Iv dye [iodides], Adhesive tape, Bee pollen, Chlorhexidine, Fruit & vegetable daily [nutritional supplements], Metoprolol succinate [metoprolol], Nsaids, Orange, Orange fruit [citrus], Other, Reglan [metoclopramide], Tramadol, Zofran [ondansetron hcl], Orange oil, and Sulfamethoxazole-trimethoprim    Medicines:  Current Facility-Administered Medications   Medication Dose Route Frequency Provider Last Rate Last Admin    sodium phosphate 19.56 mmol in sodium chloride 0.9 % 250 mL IVPB  0.32 mmol/kg IntraVENous PRN Garth Love DO   Stopped at 11/05/22 1059    potassium phosphate (monobasic) (K-PHOS) tablet 250 mg  250 mg Oral 4x Daily WC Garth Love DO   250 mg at 11/06/22 1249    ceFAZolin (ANCEF) 2,000 mg in sterile water 20 mL IV syringe  2,000 mg IntraVENous Q8H Garth Love DO   2,000 mg at 11/06/22 1130    enoxaparin (LOVENOX) injection 40 mg  40 mg SubCUTAneous Daily Garth Love DO   40 mg at 11/06/22 8797    epoetin juan-epbx (RETACRIT) injection 10,000 Units  10,000 Units SubCUTAneous Q7 Days Garth Love DO   10,000 Units at 11/04/22 1211    vitamin D (CHOLECALCIFEROL) capsule 5,000 Units  5,000 Units Oral Daily Garth Love DO        vitamin D (ERGOCALCIFEROL) capsule 50,000 Units  50,000 Units Oral Daily Jean Paul Amtthews, DO        thiamine (B-1) injection 200 mg  200 mg IntraVENous Q12H Jean Paul Matthews, DO   200 mg at 11/05/22 2154    calcitRIOL (ROCALTROL) capsule 0.25 mcg  0.25 mcg Oral Daily Jean Paul Matthews, DO        diphenoxylate-atropine (LOMOTIL) 2.5-0.025 MG per tablet 1 tablet  1 tablet Oral 4x Daily PRN Jean Paul Matthews, DO        potassium chloride 10 mEq/100 mL IVPB (Peripheral Line)  10 mEq IntraVENous PRN Jean Paul Matthews, DO        0.9 % sodium chloride bolus  500 mL IntraVENous Once Jean Paul Matthews, DO        dicyclomine (BENTYL) capsule 10 mg  10 mg Oral BID Jean Paul Matthews, DO   10 mg at 11/06/22 8268    melatonin disintegrating tablet 10 mg  10 mg Oral Nightly PRN Jean Paul Matthews, DO        sertraline (ZOLOFT) tablet 25 mg  25 mg Oral Daily Jean Paul Matthews, DO   25 mg at 11/03/22 1428    sodium chloride flush 0.9 % injection 5-40 mL  5-40 mL IntraVENous 2 times per day Jean Paul Matthews, DO   10 mL at 11/06/22 5854    sodium chloride flush 0.9 % injection 5-40 mL  5-40 mL IntraVENous PRN Jean Paul Matthews, DO        0.9 % sodium chloride infusion   IntraVENous PRN Jean Paul Matthews, DO        acetaminophen (TYLENOL) suppository 650 mg  650 mg Rectal Q6H PRN Jean Paul Matthews, DO        magnesium sulfate 1000 mg in dextrose 5% 100 mL IVPB  1,000 mg IntraVENous PRN Jean Paul Matthews, DO        potassium chloride (KLOR-CON M) extended release tablet 40 mEq  40 mEq Oral PRN Jean Paul Matthews, DO   40 mEq at 11/06/22 0254    Or    potassium bicarb-citric acid (EFFER-K) effervescent tablet 40 mEq  40 mEq Oral PRN Jean Paul Matthews, DO        Or    potassium chloride 10 mEq/100 mL IVPB (Peripheral Line)  10 mEq IntraVENous PRN Jean Paul Matthews, DO        acetaminophen (TYLENOL) tablet 650 mg  650 mg Oral Q6H PRN Jean Paul Matthews, DO   650 mg at 11/06/22 1251    promethazine (PHENERGAN) injection 6.25 mg  6.25 mg IntraMUSCular Q6H PRN Braxton Holloway Shanice Miller DO   6.25 mg at 11/04/22 0805       Past Medical History:  Past Medical History:   Diagnosis Date    Allergic contact dermatitis due to adhesives     Allergic contact dermatitis due to plants, except food     Anemia complicating pregnancy, second trimester     Anemia complicating pregnancy, third trimester     Anxiety and depression     Anxiety disorder     unspecified    Bacteremia     Cellulitis of abdominal wall     Chronic fatigue, unspecified     Dysuria     Encounter for care related to vascular access port 5/19/2022    Epigastric pain     Frequency of micturition     G tube feedings (HCC)     Gastroparesis     Dr. Ana Cristina Holloway in [place] manages    Gastroparesis     Gastrostomy malfunction [facility])     Generalized abdominal pain     GERD (gastroesophageal reflux disease)     Heart murmur     Hiatal hernia     History of [place]oy[place]'s esophagus     History of blood transfusion     Hypotension, unspecified     IBS (irritable bowel syndrome)     Insomnia, unspecified     Jejunostomy tube present (HCC)     Low back pain     Nausea with vomiting     unspecified    Neck pain     Neuropathy     Orthostatic hypotension     POTS (postural orthostatic tachycardia syndrome)     Premature birth     delivered at 24-27 weeks    Scoliosis     Syncope and collapse     Tachycardia     Tachycardia, unspecified     Toxic gastroenteritis and colitis        Past Surgical History:  Past Surgical History:   Procedure Laterality Date    ABDOMEN SURGERY  2014    gastric stimulator implanted    ADENOIDECTOMY      ADENOIDECTOMY  2007    CHOLECYSTECTOMY      CHOLECYSTECTOMY  2016    DENTAL SURGERY  2004    wisdom teeth    EAR SURGERY  2014    left    FACIAL SURGERY  2005    GASTRIC STIMULATOR IMPLANT SURGERY  11/2014    GASTROSTOMY TUBE PLACEMENT  04/07/2015    J tube-Removed in [place] 1/2018    GASTROSTOMY TUBE PLACEMENT      G tube 2017    HYSTERECTOMY (CERVIX STATUS UNKNOWN)      INSERTION / REMOVAL / REPLACEMENT VENOUS ACCESS CATHETER N/A 03/25/2016    REMOVAL OF PORT AND PLACEMENT OF NEW PORT; REMOVAL OF PICC LINE  performed by Court Mccoy MD at [facility]  2014    left knee    KNEE SURGERY  2015    right knee    MANDIBLE SURGERY      double jaw    PHARYNGECTOMY      PORT SURGERY      port present 3-25-16    TONSILLECTOMY      TONSILLECTOMY  1997    TUBAL LIGATION      TUBAL LIGATION  2016    TUMOR REMOVAL Left     ear    TUNNELED VENOUS PORT PLACEMENT Right 05/29/2015    TUNNELED VENOUS PORT PLACEMENT      UPPER GASTROINTESTINAL ENDOSCOPY  04/02/2010    Dr Lake Wright ENDOSCOPY  09/25/2013    Dr Baron People  05/2015    Dr. Toni Mancilla  5/29/2015 [facility]    Ultrasound-guided cannulation, right internal jugular vein. Right internal jugular vein single-lumen bard powerport placement. VASCULAR SURGERY  9/8/15 S    Right internal jugular vein portograms. Revision of right internal jugular vein single lumen port. VASCULAR SURGERY  11/3/15 [facility]    Ultrasound-guided cannulation, left upper arm basilic vein. Left upper arm basilic vein PICC line placement bard dual-lumen PICC line. Superior vena cava venograms. VASCULAR SURGERY  03/23/2017    [facility]. Left IJV port angiogram.Ultrasound guided cannulation of right basilic vein,right upper extremity PICC line placement bard power PICC,dual lumen. VASCULAR SURGERY  04/27/2022    Ultrasound-guided cannulation right internal jugular vein. Placement of [place] single-lumen tunneled dialysis catheter. Removal of nonfunctioning left subclavian vein [place] tunneled dialysis catheter. Repositioning of right internal juglular vein [place] tunneled dialylsis catheter. Perfomed by Dr. Sander Vaughan.  Rosi Eason at [address] [place] [address] EXTRACTION         Family History  Family History   Problem Relation Age of Onset    Other Mother         endometriosis    Depression Father     Diabetes Paternal Grandmother Stroke Paternal Grandmother     Hypertension Paternal Grandmother     Heart Disease Paternal Grandmother     Heart Failure Paternal Grandmother     Colon Cancer Neg Hx     Colon Polyps Neg Hx     Esophageal Cancer Neg Hx     Liver Cancer Neg Hx     Rectal Cancer Neg Hx     Stomach Cancer Neg Hx        Social History  Social History     Socioeconomic History    Marital status: Single     Spouse name: Not on file    Number of children: Not on file    Years of education: Not on file    Highest education level: Not on file   Occupational History    Not on file   Tobacco Use    Smoking status: Never    Smokeless tobacco: Never    Tobacco comments:     Vape    Vaping Use    Vaping Use: Former    Substances: Nicotine    Devices: Refillable tank   Substance and Sexual Activity    Alcohol use: Never    Drug use: Never    Sexual activity: Yes     Partners: Male   Other Topics Concern    Not on file   Social History Narrative    ** Merged History Encounter **          Social Determinants of Health     Financial Resource Strain: Not on file   Food Insecurity: Not on file   Transportation Needs: Not on file   Physical Activity: Not on file   Stress: Not on file   Social Connections: Not on file   Intimate Partner Violence: Not on file   Housing Stability: Not on file         Review of Systems:    Review of Systems   Constitutional:  Negative for activity change and fatigue. HENT:  Negative for rhinorrhea and voice change. Eyes:  Negative for visual disturbance. Respiratory:  Negative for cough and shortness of breath. Cardiovascular:  Negative for chest pain and leg swelling. Gastrointestinal:  Negative for constipation, diarrhea, nausea and vomiting. Endocrine: Negative for polyuria. Genitourinary:  Negative for difficulty urinating and dysuria. Musculoskeletal:  Positive for gait problem. Negative for arthralgias and back pain. Skin:  Negative for color change.    Allergic/Immunologic: Negative for immunocompromised state. Neurological:  Positive for weakness. Negative for dizziness and headaches. Psychiatric/Behavioral:  Negative for confusion. Objective:  Blood pressure (!) 95/56, pulse 93, temperature 97.3 °F (36.3 °C), resp. rate 18, height 5' (1.524 m), weight 137 lb 11.2 oz (62.5 kg), last menstrual period 01/14/2018, SpO2 96 %, unknown if currently breastfeeding. Intake/Output Summary (Last 24 hours) at 11/6/2022 1701  Last data filed at 11/6/2022 1501  Gross per 24 hour   Intake 310 ml   Output 1250 ml   Net -940 ml       Physical Exam  Vitals and nursing note reviewed. HENT:      Head: Normocephalic and atraumatic. Right Ear: External ear normal.      Left Ear: External ear normal.      Nose: Nose normal.      Mouth/Throat:      Mouth: Mucous membranes are moist.   Eyes:      Conjunctiva/sclera: Conjunctivae normal.      Pupils: Pupils are equal, round, and reactive to light. Cardiovascular:      Rate and Rhythm: Normal rate and regular rhythm. Heart sounds: Normal heart sounds. Pulmonary:      Effort: Pulmonary effort is normal.      Breath sounds: Normal breath sounds. Abdominal:      General: Abdomen is flat. Palpations: Abdomen is soft. Musculoskeletal:         General: Normal range of motion. Cervical back: Neck supple. No rigidity. No muscular tenderness. Skin:     General: Skin is warm and dry. Neurological:      Mental Status: She is alert and oriented to person, place, and time.    Psychiatric:         Mood and Affect: Mood normal.       Labs:  BMP:   Recent Labs     11/04/22 0429 11/05/22 0204 11/06/22 0144    138 136   K 3.4* 3.9 3.4*    105 103   CO2 21* 19* 24   PHOS  --  1.0* 1.5*   BUN 15 11 9   CREATININE 0.8 0.7 0.6   CALCIUM 7.0* 7.5* 7.2*     CBC:   Recent Labs     11/04/22 0429 11/05/22 0204 11/06/22 0144   WBC 8.6 6.7 5.7   HGB 7.9* 8.1* 7.4*   HCT 24.4* 26.1* 23.9*   MCV 75.3* 76.1* 77.3*   * 112* 127* LIVER PROFILE:   Recent Labs     11/04/22 0429 11/05/22 0204 11/06/22 0144   AST 58* 37* 25   ALT 60* 42* 25   BILITOT 0.4 0.4 <0.2   ALKPHOS 68 64 57     PT/INR: No results for input(s): PROTIME, INR in the last 72 hours. APTT: No results for input(s): APTT in the last 72 hours. BNP:  No results for input(s): BNP in the last 72 hours. Ionized Calcium:No results for input(s): IONCA in the last 72 hours. Magnesium:  Recent Labs     11/05/22 0204 11/06/22 0144   MG 2.5 2.5     Phosphorus:  Recent Labs     11/05/22 0204 11/06/22 0144   PHOS 1.0* 1.5*     HgbA1C: No results for input(s): LABA1C in the last 72 hours. Hepatic:   Recent Labs     11/04/22 0429 11/05/22 0204 11/06/22 0144   ALKPHOS 68 64 57   ALT 60* 42* 25   AST 58* 37* 25   PROT 5.0* 4.9* 4.4*   BILITOT 0.4 0.4 <0.2   LABALBU 2.8* 2.8* 2.6*     Lactic Acid: No results for input(s): LACTA in the last 72 hours. Troponin: No results for input(s): CKTOTAL, CKMB, TROPONINT in the last 72 hours. ABGs: No results for input(s): PH, PCO2, PO2, HCO3, O2SAT in the last 72 hours. CRP:  No results for input(s): CRP in the last 72 hours. Sed Rate:  No results for input(s): SEDRATE in the last 72 hours. Cultures:   No results for input(s): CULTURE in the last 72 hours. No results for input(s): BC, [address] in the last 72 hours. No results for input(s): CXSURG in the last 72 hours. Radiology reports as per the Radiologist  Radiology: CT Head W/O Contrast    Result Date: 11/3/2022  Patient: Cassius Oliveira  Time Out: 05:29Exam(s): CT HEAD Without Contrast EXAM:  CT Head Without Intravenous ContrastCLINICAL HISTORY:   Reason for exam: fall, headache. TECHNIQUE:  Axial computed tomography images of the head/brain without intravenous contrast.COMPARISON:4/24/2014FINDINGS:  Brain:  Unremarkable. No hemorrhage. No significant white matter disease. No edema. Ventricles:  Unremarkable. No ventriculomegaly. Bones/joints:  Unremarkable.   No acute fracture. Soft tissues:  Unremarkable. Sinuses: Layering hyperdense fluid in the left maxillary sinus. Moderate left maxillary sinus mucosal thickening. Mastoid air cells:  Unremarkable as visualized. No mastoid effusion. 1. No acute intracranial hemorrhage, herniation, or hydrocephalus. 2. Moderate left maxillary sinus mucosal thickening and layering hyperdense fluid in the left maxillary sinus with which may represent inspissated secretions. Electronically signed by Madhuri Lott M.D. on 11-03-22 at 0529    XR CHEST PORTABLE    Result Date: 11/3/2022  Patient: Altaf Macario  Time Out: 05:36Exam(s): FILM CXR 1 VIEW EXAM:  XR Chest, 1 ViewCLINICAL HISTORY:   Reason for exam: weakness. TECHNIQUE:  Frontal view of the chest.COMPARISON:  No relevant prior studies available. FINDINGS:  Lungs:  Unremarkable. No consolidation. Pleural space:  Unremarkable. No pneumothorax. Heart:  Unremarkable. No cardiomegaly. Mediastinum: Tunneled left IJ central venous catheter in place. Bones/joints:  Unremarkable. No acute pulmonary process. Electronically signed by Madhuri Lott M.D. on 11-03-22 at 9090       Assessment     MSSA sepsis. Continue cefazolin. Repeat blood cultures to assure clearance of her bloodstream.    ERIKA (acute kidney injury). Resolved. Anxiety disorder. Supportive care. Gastroparesis. Supportive care. POTS (postural orthostatic tachycardia syndrome). Supportive care.       Jean Paul Matthews, DO

## 2022-11-07 LAB
ANAEROBIC CULTURE: ABNORMAL
CULTURE CATHETER TIP: ABNORMAL
ORGANISM: ABNORMAL

## 2022-11-07 PROCEDURE — 2580000003 HC RX 258: Performed by: INTERNAL MEDICINE

## 2022-11-07 PROCEDURE — 6360000002 HC RX W HCPCS: Performed by: INTERNAL MEDICINE

## 2022-11-07 PROCEDURE — 1210000000 HC MED SURG R&B

## 2022-11-07 PROCEDURE — 6370000000 HC RX 637 (ALT 250 FOR IP): Performed by: INTERNAL MEDICINE

## 2022-11-07 RX ADMIN — DICYCLOMINE HYDROCHLORIDE 10 MG: 10 CAPSULE ORAL at 09:23

## 2022-11-07 RX ADMIN — POTASSIUM PHOSPHATE, MONOBASIC 250 MG: 500 TABLET, SOLUBLE ORAL at 09:27

## 2022-11-07 RX ADMIN — Medication 10 ML: at 20:45

## 2022-11-07 RX ADMIN — CEFAZOLIN 2000 MG: 2 INJECTION, POWDER, FOR SOLUTION INTRAMUSCULAR; INTRAVENOUS at 02:34

## 2022-11-07 RX ADMIN — CEFAZOLIN 2000 MG: 2 INJECTION, POWDER, FOR SOLUTION INTRAMUSCULAR; INTRAVENOUS at 20:45

## 2022-11-07 RX ADMIN — CALCITRIOL CAPSULES 0.25 MCG 0.25 MCG: 0.25 CAPSULE ORAL at 09:23

## 2022-11-07 RX ADMIN — POTASSIUM PHOSPHATE, MONOBASIC 250 MG: 500 TABLET, SOLUBLE ORAL at 17:21

## 2022-11-07 RX ADMIN — CEFAZOLIN 2000 MG: 2 INJECTION, POWDER, FOR SOLUTION INTRAMUSCULAR; INTRAVENOUS at 11:54

## 2022-11-07 RX ADMIN — POTASSIUM PHOSPHATE, MONOBASIC 250 MG: 500 TABLET, SOLUBLE ORAL at 11:54

## 2022-11-07 RX ADMIN — ACETAMINOPHEN 650 MG: 325 TABLET ORAL at 02:32

## 2022-11-07 RX ADMIN — Medication 10 ML: at 09:28

## 2022-11-07 RX ADMIN — Medication 5000 UNITS: at 09:23

## 2022-11-07 RX ADMIN — THIAMINE HYDROCHLORIDE 200 MG: 100 INJECTION, SOLUTION INTRAMUSCULAR; INTRAVENOUS at 20:45

## 2022-11-07 RX ADMIN — POTASSIUM PHOSPHATE, MONOBASIC 250 MG: 500 TABLET, SOLUBLE ORAL at 20:45

## 2022-11-07 RX ADMIN — DICYCLOMINE HYDROCHLORIDE 10 MG: 10 CAPSULE ORAL at 20:45

## 2022-11-07 RX ADMIN — ENOXAPARIN SODIUM 40 MG: 100 INJECTION SUBCUTANEOUS at 09:23

## 2022-11-07 RX ADMIN — SERTRALINE HYDROCHLORIDE 25 MG: 25 TABLET ORAL at 09:23

## 2022-11-07 ASSESSMENT — ENCOUNTER SYMPTOMS
COLOR CHANGE: 0
CONSTIPATION: 0
NAUSEA: 0
COUGH: 0
SHORTNESS OF BREATH: 0
VOMITING: 0
VOICE CHANGE: 0
DIARRHEA: 0
RHINORRHEA: 0
BACK PAIN: 0

## 2022-11-07 NOTE — CARE COORDINATION
I called [facility] and left a voicemail for Michael Sahara for a return call. Needing to know what medications and IV fluids she was receiving at home and out to re-order if needed.     Electronically signed by Nina Edward RN, BSN on 11/7/2022 at 3:20 PM

## 2022-11-07 NOTE — PROGRESS NOTES
Hospitalist Progress Note    Patient:  Ximena Roth  YOB: 1990  Date of Service: 11/7/2022  MRN: [id number]   Acct: [de-identified]   Primary Care Physician: Mary Jo Cronin MD  Advance Directive: Full Code  Admit Date: 11/3/2022       Hospital Day: 4  Referring Provider: Jaya Becker DO    Patient Seen, Chart, Consults, Notes, Labs, Radiology studies reviewed. Subjective:  Ximena Roth is a 28 y.o. female  whom we are following for MSSA sepsis, acute kidney injury, gastroparesis, POTS. Follow-up surveillance blood cultures are negative thus far. She will need a PICC line as she would like to resume TPN once she is discharged from the hospital.  Otherwise she feels well. Allergies:   Iv dye [iodides], Iv dye [iodides], Adhesive tape, Bee pollen, Chlorhexidine, Fruit & vegetable daily [nutritional supplements], Metoprolol succinate [metoprolol], Nsaids, Orange, Orange fruit [citrus], Other, Reglan [metoclopramide], Tramadol, Zofran [ondansetron hcl], Orange oil, and Sulfamethoxazole-trimethoprim    Medicines:  Current Facility-Administered Medications   Medication Dose Route Frequency Provider Last Rate Last Admin    sodium phosphate 19.56 mmol in sodium chloride 0.9 % 250 mL IVPB  0.32 mmol/kg IntraVENous PRN Jayadc Becker DO   Stopped at 11/05/22 1059    potassium phosphate (monobasic) (K-PHOS) tablet 250 mg  250 mg Oral 4x Daily WC Jaya Becker DO   250 mg at 11/07/22 1154    ceFAZolin (ANCEF) 2,000 mg in sterile water 20 mL IV syringe  2,000 mg IntraVENous Q8H Jaya Eugenio DO   2,000 mg at 11/07/22 1154    enoxaparin (LOVENOX) injection 40 mg  40 mg SubCUTAneous Daily Jaya Becker DO   40 mg at 11/07/22 3042    epoetin juan-epbx (RETACRIT) injection 10,000 Units  10,000 Units SubCUTAneous Q7 Days Jayadc Becker DO   10,000 Units at 11/04/22 1211    vitamin D (CHOLECALCIFEROL) capsule 5,000 Units  5,000 Units Oral Daily Jaya Becker DO 5,000 Units at 11/07/22 4345    thiamine (B-1) injection 200 mg  200 mg IntraVENous Q12H Lamar Alto, DO   200 mg at 11/05/22 2154    calcitRIOL (ROCALTROL) capsule 0.25 mcg  0.25 mcg Oral Daily Lamar Alto, DO   0.25 mcg at 11/07/22 3803    diphenoxylate-atropine (LOMOTIL) 2.5-0.025 MG per tablet 1 tablet  1 tablet Oral 4x Daily PRN Lamar [place], DO        potassium chloride 10 mEq/100 mL IVPB (Peripheral Line)  10 mEq IntraVENous PRN Lamar Alto, DO        0.9 % sodium chloride bolus  500 mL IntraVENous Once Lamar Alto, DO        dicyclomine (BENTYL) capsule 10 mg  10 mg Oral BID Lamar [place], DO   10 mg at 11/07/22 8144    melatonin disintegrating tablet 10 mg  10 mg Oral Nightly PRN Lamar [place], DO        sertraline (ZOLOFT) tablet 25 mg  25 mg Oral Daily Lamar [place], DO   25 mg at 11/07/22 8469    sodium chloride flush 0.9 % injection 5-40 mL  5-40 mL IntraVENous 2 times per day Lamar Alto, DO   10 mL at 11/07/22 4739    sodium chloride flush 0.9 % injection 5-40 mL  5-40 mL IntraVENous PRN Lamar Alto, DO        0.9 % sodium chloride infusion   IntraVENous PRN Lamar [place], DO        acetaminophen (TYLENOL) suppository 650 mg  650 mg Rectal Q6H PRN Lamar [place], DO        magnesium sulfate 1000 mg in dextrose 5% 100 mL IVPB  1,000 mg IntraVENous PRN Lamar [place], DO        potassium chloride (KLOR-CON M) extended release tablet 40 mEq  40 mEq Oral PRN Lamar [place], DO   40 mEq at 11/06/22 0254    Or    potassium bicarb-citric acid (EFFER-K) effervescent tablet 40 mEq  40 mEq Oral PRN Lamar [place], DO        Or    potassium chloride 10 mEq/100 mL IVPB (Peripheral Line)  10 mEq IntraVENous PRN Lamar [place], DO        acetaminophen (TYLENOL) tablet 650 mg  650 mg Oral Q6H PRN Lamar [place], DO   650 mg at 11/07/22 0232    promethazine (PHENERGAN) injection 6.25 mg  6.25 mg IntraMUSCular Q6H PRN Jeannie Miramontes DO   6.25 mg at 11/04/22 0805       Past Medical History:  Past Medical History:   Diagnosis Date    Allergic contact dermatitis due to adhesives     Allergic contact dermatitis due to plants, except food     Anemia complicating pregnancy, second trimester     Anemia complicating pregnancy, third trimester     Anxiety and depression     Anxiety disorder     unspecified    Bacteremia     Cellulitis of abdominal wall     Chronic fatigue, unspecified     Dysuria     Encounter for care related to vascular access port 5/19/2022    Epigastric pain     Frequency of micturition     G tube feedings (HCC)     Gastroparesis     Dr. Chau Duran in [place] manages    Gastroparesis     Gastrostomy malfunction [facility])     Generalized abdominal pain     GERD (gastroesophageal reflux disease)     Heart murmur     Hiatal hernia     History of Hoyos's esophagus     History of blood transfusion     Hypotension, unspecified     IBS (irritable bowel syndrome)     Insomnia, unspecified     Jejunostomy tube present (HCC)     Low back pain     Nausea with vomiting     unspecified    Neck pain     Neuropathy     Orthostatic hypotension     POTS (postural orthostatic tachycardia syndrome)     Premature birth     delivered at 24-27 weeks    Scoliosis     Syncope and collapse     Tachycardia     Tachycardia, unspecified     Toxic gastroenteritis and colitis        Past Surgical History:  Past Surgical History:   Procedure Laterality Date    ABDOMEN SURGERY  2014    gastric stimulator implanted    ADENOIDECTOMY      ADENOIDECTOMY  2007    CHOLECYSTECTOMY      CHOLECYSTECTOMY  2016    DENTAL SURGERY  2004    wisdom teeth    EAR SURGERY  2014    left    FACIAL SURGERY  2005    GASTRIC STIMULATOR IMPLANT SURGERY  11/2014    GASTROSTOMY TUBE PLACEMENT  04/07/2015    J tube-Removed in [place] 1/2018    GASTROSTOMY TUBE PLACEMENT      G tube 2017    HYSTERECTOMY (CERVIX STATUS UNKNOWN)      INSERTION / REMOVAL / REPLACEMENT VENOUS ACCESS CATHETER N/A 03/25/2016    REMOVAL OF PORT AND PLACEMENT OF NEW PORT; REMOVAL OF PICC LINE  performed by Franky Zacarias MD at [address] [facility]  2014    left knee    KNEE SURGERY  2015    right knee    MANDIBLE SURGERY      double jaw    PHARYNGECTOMY      PORT SURGERY      port present 3-25-16    TONSILLECTOMY      TONSILLECTOMY  1997    TUBAL LIGATION      TUBAL LIGATION  2016    TUMOR REMOVAL Left     ear    TUNNELED VENOUS PORT PLACEMENT Right 05/29/2015    TUNNELED VENOUS PORT PLACEMENT      UPPER GASTROINTESTINAL ENDOSCOPY  04/02/2010    Dr Tasha Caputo ENDOSCOPY  09/25/2013    Dr Magdaleno Leiva  05/2015    Dr. Ester Benitez  5/29/2015 [facility]    Ultrasound-guided cannulation, right internal jugular vein. Right internal jugular vein single-lumen bard powerport placement. VASCULAR SURGERY  9/8/15 S    Right internal jugular vein portograms. Revision of right internal jugular vein single lumen port. VASCULAR SURGERY  11/3/15 S    Ultrasound-guided cannulation, left upper arm basilic vein. Left upper arm basilic vein PICC line placement bard dual-lumen PICC line. Superior vena cava venograms. VASCULAR SURGERY  03/23/2017    [facility]. Left IJV port angiogram.Ultrasound guided cannulation of right basilic vein,right upper extremity PICC line placement bard power PICC,dual lumen. VASCULAR SURGERY  04/27/2022    Ultrasound-guided cannulation right internal jugular vein. Placement of [place] single-lumen tunneled dialysis catheter. Removal of nonfunctioning left subclavian vein M[place]ia tunneled dialysis catheter. Repositioning of right internal juglular vein M[place]uia tunneled dialylsis catheter. Perfomed by Dr. Jaz Bean.  Oxana Lopez at [address] [place] [address] EXTRACTION         Family History  Family History   Problem Relation Age of Onset    Other Mother         endometriosis    Depression Father     Diabetes Paternal Grandmother     Stroke Paternal Grandmother     Hypertension Paternal Grandmother     Heart Disease Paternal Grandmother     Heart Failure Paternal Grandmother     Colon Cancer Neg Hx     Colon Polyps Neg Hx     Esophageal Cancer Neg Hx     Liver Cancer Neg Hx     Rectal Cancer Neg Hx     Stomach Cancer Neg Hx        Social History  Social History     Socioeconomic History    Marital status: Single     Spouse name: Not on file    Number of children: Not on file    Years of education: Not on file    Highest education level: Not on file   Occupational History    Not on file   Tobacco Use    Smoking status: Never    Smokeless tobacco: Never    Tobacco comments:     Vape    Vaping Use    Vaping Use: Former    Substances: Nicotine    Devices: Refillable tank   Substance and Sexual Activity    Alcohol use: Never    Drug use: Never    Sexual activity: Yes     Partners: Male   Other Topics Concern    Not on file   Social History Narrative    ** Merged History Encounter **          Social Determinants of Health     Financial Resource Strain: Not on file   Food Insecurity: Not on file   Transportation Needs: Not on file   Physical Activity: Not on file   Stress: Not on file   Social Connections: Not on file   Intimate Partner Violence: Not on file   Housing Stability: Not on file         Review of Systems:    Review of Systems   Constitutional:  Negative for activity change and fatigue. HENT:  Negative for rhinorrhea and voice change. Eyes:  Negative for visual disturbance. Respiratory:  Negative for cough and shortness of breath. Cardiovascular:  Negative for chest pain and leg swelling. Gastrointestinal:  Negative for constipation, diarrhea, nausea and vomiting. Endocrine: Negative for polyuria. Genitourinary:  Negative for difficulty urinating and dysuria. Musculoskeletal:  Positive for gait problem. Negative for arthralgias and back pain. Skin:  Negative for color change.    Allergic/Immunologic: Negative for immunocompromised state. Neurological:  Positive for weakness. Negative for dizziness and headaches. Psychiatric/Behavioral:  Negative for confusion. Objective:  Blood pressure (!) 93/58, pulse 85, temperature 97.7 °F (36.5 °C), temperature source Temporal, resp. rate 16, height 5' (1.524 m), weight 137 lb 11.2 oz (62.5 kg), last menstrual period 01/14/2018, SpO2 93 %, unknown if currently breastfeeding. Intake/Output Summary (Last 24 hours) at 11/7/2022 1618  Last data filed at 11/7/2022 1500  Gross per 24 hour   Intake 240 ml   Output 1120 ml   Net -880 ml       Physical Exam  Vitals and nursing note reviewed. HENT:      Head: Normocephalic and atraumatic. Right Ear: External ear normal.      Left Ear: External ear normal.      Nose: Nose normal.      Mouth/Throat:      Mouth: Mucous membranes are moist.   Eyes:      Conjunctiva/sclera: Conjunctivae normal.      Pupils: Pupils are equal, round, and reactive to light. Cardiovascular:      Rate and Rhythm: Normal rate and regular rhythm. Heart sounds: Normal heart sounds. Pulmonary:      Effort: Pulmonary effort is normal.      Breath sounds: Normal breath sounds. Abdominal:      General: Abdomen is flat. Palpations: Abdomen is soft. Musculoskeletal:         General: Normal range of motion. Cervical back: Neck supple. No rigidity. No muscular tenderness. Skin:     General: Skin is warm and dry. Neurological:      Mental Status: She is alert and oriented to person, place, and time.    Psychiatric:         Mood and Affect: Mood normal.       Labs:  BMP:   Recent Labs     11/05/22 0204 11/06/22 0144    136   K 3.9 3.4*    103   CO2 19* 24   PHOS 1.0* 1.5*   BUN 11 9   CREATININE 0.7 0.6   CALCIUM 7.5* 7.2*     CBC:   Recent Labs     11/05/22 0204 11/06/22 0144   WBC 6.7 5.7   HGB 8.1* 7.4*   HCT 26.1* 23.9*   MCV 76.1* 77.3*   * 127*     LIVER PROFILE:   Recent Labs     11/05/22 0204 11/06/22 0144   AST 37* 25   ALT 42* 25   BILITOT 0.4 <0.2   ALKPHOS 64 57     PT/INR: No results for input(s): PROTIME, INR in the last 72 hours. APTT: No results for input(s): APTT in the last 72 hours. BNP:  No results for input(s): BNP in the last 72 hours. Ionized Calcium:No results for input(s): IONCA in the last 72 hours. Magnesium:  Recent Labs     11/05/22 0204 11/06/22 0144   MG 2.5 2.5     Phosphorus:  Recent Labs     11/05/22 0204 11/06/22 0144   PHOS 1.0* 1.5*     HgbA1C: No results for input(s): LABA1C in the last 72 hours. Hepatic:   Recent Labs     11/05/22 0204 11/06/22 0144   ALKPHOS 64 57   ALT 42* 25   AST 37* 25   PROT 4.9* 4.4*   BILITOT 0.4 <0.2   LABALBU 2.8* 2.6*     Lactic Acid: No results for input(s): LACTA in the last 72 hours. Troponin: No results for input(s): CKTOTAL, CKMB, TROPONINT in the last 72 hours. ABGs: No results for input(s): PH, PCO2, PO2, HCO3, O2SAT in the last 72 hours. CRP:  No results for input(s): CRP in the last 72 hours. Sed Rate:  No results for input(s): SEDRATE in the last 72 hours. Cultures:   No results for input(s): CULTURE in the last 72 hours. No results for input(s): BC, Sridhar Agatha in the last 72 hours. No results for input(s): CXSURG in the last 72 hours. Radiology reports as per the Radiologist  Radiology: CT Head W/O Contrast    Result Date: 11/3/2022  Patient: Kayden Oliver  Time Out: 05:29Exam(s): CT HEAD Without Contrast EXAM:  CT Head Without Intravenous ContrastCLINICAL HISTORY:   Reason for exam: fall, headache. TECHNIQUE:  Axial computed tomography images of the head/brain without intravenous contrast.COMPARISON:4/24/2014FINDINGS:  Brain:  Unremarkable. No hemorrhage. No significant white matter disease. No edema. Ventricles:  Unremarkable. No ventriculomegaly. Bones/joints:  Unremarkable. No acute fracture. Soft tissues:  Unremarkable. Sinuses: Layering hyperdense fluid in the left maxillary sinus.   Moderate left maxillary sinus mucosal thickening. Mastoid air cells:  Unremarkable as visualized. No mastoid effusion. 1. No acute intracranial hemorrhage, herniation, or hydrocephalus. 2. Moderate left maxillary sinus mucosal thickening and layering hyperdense fluid in the left maxillary sinus with which may represent inspissated secretions. Electronically signed by Deanna Fairbanks M.D. on 11-03-22 at 0529    XR CHEST PORTABLE    Result Date: 11/3/2022  Patient: Jacqueline Morocho  Time Out: 05:36Exam(s): FILM CXR 1 VIEW EXAM:  XR Chest, 1 ViewCLINICAL HISTORY:   Reason for exam: weakness. TECHNIQUE:  Frontal view of the chest.COMPARISON:  No relevant prior studies available. FINDINGS:  Lungs:  Unremarkable. No consolidation. Pleural space:  Unremarkable. No pneumothorax. Heart:  Unremarkable. No cardiomegaly. Mediastinum: Tunneled left IJ central venous catheter in place. Bones/joints:  Unremarkable. No acute pulmonary process. Electronically signed by Deanna Fairbanks M.D. on 11-03-22 at 2111       Assessment     MSSA sepsis. Continue cefazolin. Repeat blood cultures pending. ERIKA (acute kidney injury). Resolved. Anxiety disorder. Supportive care. Gastroparesis. Supportive care. POTS (postural orthostatic tachycardia syndrome). Supportive care.       Enrrique Rose DO

## 2022-11-07 NOTE — ADT AUTH CERT
Infection, Thrombosis, or Other Complication of Intravenous Device - Care Day 4 (11/6/2022) by Ashley Park RN       Review Status Review Entered   Completed 11/7/2022 1404       Created By   Ashley Park RN      Criteria Review      Care Day: 4 Care Date: 11/6/2022 Level of Care: ICU    Guideline Day 4    Level Of Care    (X) Floor to discharge    11/7/2022 3:04 PM EST by Juan Antonio Banks      med/surg    Clinical Status    (X) * Hemodynamic stability    11/7/2022 3:04 PM EST by Juan Antonio Banks      bp: 92/58, pulse 96    (X) * No bleeding or recurring emboli    11/7/2022 3:04 PM EST by Juan Antonio Banks      no bleeding or emboli noted    ( ) * Infection absent or outpatient treatment arranged    ( ) * Outpatient anticoagulation arranged or not indicated    (X) * IV access established or not necessary    11/7/2022 3:04 PM EST by Juan Antonio Banks      iv accessed established    ( ) * Discharge plans and education understood    Activity    (X) * Ambulatory or acceptable for next level of care    11/7/2022 3:04 PM EST by Juan Antonio Banks      up as tolerated    Routes    (X) * Oral hydration    11/7/2022 3:04 PM EST by Juan Antonio Banks      oral hydration    ( ) * Oral medications or regimen acceptable for next level of care    (X) * Oral diet or acceptable for next level of care    11/7/2022 3:04 PM EST by Juan Antonio Banks      regular diet    Interventions    (X) CBC    11/7/2022 3:04 PM EST by Juan Antonio Banks      wbc 5.7, hgb 7.4, hct 23.9, plts 127, k 3.4, ca 7.2, total protein 4.4, albumin 2.6, mag 2.5, phos 1.5  culture tip of henning catheter: staphylococcus aureus    Medications    (X) Continue anticoagulants and antibiotics as indicated    11/7/2022 3:04 PM EST by Juan Antonio Banks      ancef 2000mg iv q8hrs  lovenox 40mg sub q daily       Definitions for Care Day 4    Hemodynamic stability    (X) Hemodynamic stability, as indicated by  1 or more  of the following :       (X) Patient hemodynamically stable, as indicated by  ALL  of the following  (1) (2) (3) (4) (5):          (X) Tachycardia absent          (X) Hypotension absent          (X) No evidence of inadequate perfusion (eg, no myocardial ischemia)          (X) No other hemodynamic abnormalities (eg, no Orthostatic hypotension)       Tachycardia absent    (X) Tachycardia absent, as indicated by  1 or more  of the following  (1) (2):       (X) Heart rate less than or equal to 100 beats per minute in adult or child 6 years or older       Hypotension absent    (X) Hypotension absent, as indicated by  1 or more  of the following  (1) (2) (3) (4):       (X) SBP greater than or equal to 90 mm Hg and without recent decrease greater than 40 mm Hg from       baseline in adult or child 10 years or older       * Milestone   Additional Notes   Blood cultures x2   Regular diet   Roth   Routine vitals q4hrs   Strict I&O q8hrs         Bp: 92/58, pulse 96, resp 20, temp 97.5, o2 sat 97% on ra            Rocaltrol 0.25mcg po daily   Ancef 2000mg iv q8hrs   Bentyl 10mg po bid   Lovenox 40mg sub q daily   K phos 250mg po qid   Zoloft 25mg po daily   Thiamine 200mg iv q12hrs   Vit d 5000 units po daily   Vit d 50,000 units po daily      Tylenol 650mg po q6hrs prn- had 2 doses   Klor con 40meq po prn- had 1 dose               Hospitalist Progress Note      Subjective:   Ender Espinoza is a 28 y.o. female  whom we are following for MSSA sepsis, acute kidney injury, gastroparesis, POTS. She is feeling much better today. Hemodynamics are stable. Her blood cultures are growing MSSA. Continuing cefazolin. Assessment       MSSA sepsis. Continue cefazolin. Repeat blood cultures to assure clearance of her bloodstream.      ERIKA (acute kidney injury). Resolved. Anxiety disorder. Supportive care. Gastroparesis. Supportive care. POTS (postural orthostatic tachycardia syndrome). Supportive care.            Infection, Thrombosis, or Other Complication of Intravenous Device - Care Day 3 (11/5/2022) by Clayton Cheng RN       Review Status Review Entered   Completed 11/7/2022 1346       Created By   Clayton Cheng RN      Criteria Review      Care Day: 3 Care Date: 11/5/2022 Level of Care: ICU    Guideline Day 3    Level Of Care    (X) Floor    11/7/2022 2:46 PM EST by Sridhar Velasco      transferred out of CCU to med/surg unit today    Clinical Status    ( ) * Infection absent or improved    Activity    (X) * Ambulatory    11/7/2022 2:46 PM EST by Sridhar Velasco      up as tolerated    Routes    (X) Usual diet    11/7/2022 2:46 PM EST by Sridhar Velasco      regular diet    (X) Oral hydration    11/7/2022 2:46 PM EST by Sridhar Velasco      oral hydration    Interventions    (X) CBC    11/7/2022 2:46 PM EST by Sridhar Velasco      wbc 6.7, hgb 8.1, hct 26.1, plts 112, lactic acid 1.2, phos 1.0, mag 2.5, ca 7.5, total protein 4.9, albumin 2.8, alt 42, ast 37  blood culture #2: staphylococcus aureus    Medications    (X) Continue anticoagulants and antibiotics as indicated    11/7/2022 2:46 PM EST by Sridhar Velasco      ancef 2000mg iv [id number]  vancomycin 750mg iv [id number]'d    * Milestone   Additional Notes   Regular diet   Roth   Daily weights   Strict I&o q8hrs   Up as tolerated   Routine vitals q4hrs         Bp: 96/67, pulse 101-108 persistently, resp 21-25, temp 97.7, o2 sat 95% on ra            Rocaltrol 0.25mcg po daily   Ancef 2000mg iv q8hrs   Bentyl 10mg po bid   Lovenox 40mg sub q daily   Magnesium sulfate 4000mg iv x1 run   K phos 250mg po qid   Zoloft 25mg po dialy   Thiamine 200mg iv q12hrs   Vancomycin 750mg iv [id number]-u/c'd   Vit d 5000 units po daily   Vit d 50,000 units po daily      Lr w/40k @ 125 cc/hr      Sodium phosphate 19.56mmol iv prn- had 1 run            Hospitalist Progress Note      Subjective:   Nirali Dawson is a 28 y.o. female  whom we are following for  sepsis, acute kidney injury, gastroparesis, POTS. She is feeling much better today. Hemodynamics are stable. Her blood cultures are growing MSSA. I will transition her to cefazolin. She is stable for transfer out of CCU. Assessment       MSSA sepsis. Continue cefazolin. ERIKA (acute kidney injury). Resolved. Anxiety disorder. Supportive care. Gastroparesis. Supportive care. POTS (postural orthostatic tachycardia syndrome). Fluid resuscitation for now. Transfer out of CCU. Vascular Surgery Consultation        Reason for Consultation       Request for removal of infected left IJV munguia catheter. HPI       She is currently in the hospital for nausea, vomiting, headache and was found to be bacteremic/septic. She has gastroparesis and multiple tunneled central venous catheters in the past for TPN/Lipids. Dr. Les Fam is her chosen vascular surgeon, even though I had performed catheter placement on her in the past. Dr. Les Fam is out of town and I am covering and she needs an infected left IJV munguia removed because it is the cause of her bacteremia/sepsis      Assessment       1. Metabolic acidosis    2. Generalized weakness    3. Hypokalemia    4. Hypomagnesemia    5. Acute kidney injury ([address]    6. Sepsis, due to unspecified organism, unspecified whether acute organ dysfunction present [facility])            She needs left IJV dual lumen tunneled central venous catheter (Munguia) removed because of infection/bacteremia. I had placed a right IJV munguia in the past, but Dr. Les Fam removed it and placed this left IJV munguia. I am not sure why the functioning, not infected right IJV catheter was removed, but apparently, the patient chose Dr. Les Fam over me to care for her vascular issues. Dr. Les Fam is out of town and I am covering.         Plan       Proceed with removal of infected left IJV munguia cateter   Risks have been discussed with the patient including bleeding, infection, thrombosis, arrhythmias, pneumothorax, arterial injury, nerve injury. She seems to understand and agrees to proceed.             Infection, Thrombosis, or Other Complication of Intravenous Device - Care Day 2 (11/4/2022) by Carrie Coppola RN       Review Status Review Entered   Completed 11/4/2022 1432       Created By   Carrie Coppola RN      Criteria Review      Care Day: 2 Care Date: 11/4/2022 Level of Care: ICU    Guideline Day 2    Level Of Care    (X) Floor    11/4/2022 3:32 PM EDT by Stan Briceno      ccu    Clinical Status    ( ) * Hemodynamic stability    (X) * No evidence of bleeding or recurring emboli    11/4/2022 3:32 PM EDT by Stan Briceno      no bleeding noted    Activity    (X) Ambulatory as tolerated    11/4/2022 3:32 PM EDT by Stan Briceno      up as tolerated    Routes    (X) Usual diet    11/4/2022 3:32 PM EDT by Stan Briceno      low fat low fiber diet    (X) Oral hydration    11/4/2022 3:32 PM EDT by Stan Brcieno      oral hydration    Interventions    (X) CBC    11/4/2022 3:32 PM EDT by Stan Briceno      wbc 8.6, hgb 7.9, hct 24.4, plts 119    Medications    (X) Continue anticoagulants and antibiotics as indicated    11/4/2022 3:32 PM EDT by Stan Briceno      maxipime 2000mg iv q48chp-o/c'd  lovenox 30mg sub q daily-d/c'd  vancomycin 750mg iv q12hrs    * Milestone   Additional Notes   Phos, mag, cmp in am   Speech eval and treat   Routine ccu vitals and monitoring   Continuous telemetry monitoring   Continuous pulse ox monitoring         Ca 7.0, total protein 5.0, albumin 2.8, alt 60, ast 58   Molecular GI panel: negative   Blood culture #2: gram (+) cocci in clusters resembling staphylococcus         Bp: 105/71, pulse 107-122 persistently, resp 20-27 persistently, temp 99.1, o2 sat 87% on ra            Rocaltrol 0.25mcg po daily   Bentyl 10mg po bid   Retacrit 10,000 units sub q q7days   Venofer 500mg iv x1   Magnesium sulfate 4000mg iv x1 run   Zoloft 25mg po daily   Thiamine 200mg iv q12hrs Vancomycin 750mg iv q12hrs   Vit d 5000 units po daily      Ns @ 150 cc/hr-d/c'd   Lr w/40k @ 125 cc/hr-started   Sodium biarb @ 50 cc/hr-d/c'd      Phenergan 6.25mg im q6hrs prn- has had 2 doses today            Social work/dc planning note:      Pt reports moving into friends apartment at [address] [address] d/t being evicted from [address] [place] [address] and must be out by Nov 11th. Pt states evictions d/t environmental concerns (medical waste on the floor, animal excrement). Following for approx 8yrs at clinic with Dr Laura Dawson 30: [address] [facility] [phone]   Dressing changes weekly for Munguia access, TPN labs, IV IG treatments    Supplier for medical needs is [facility] P: [address] or [phone]. Reports has J-tube but does not use at this time. RN Ilan Hillman with [facility] reports did stop services for the pt recently d/t environmental issues and pts inability to maintain medical supplies as necessary. RN states pt was to have Munguia removed when services were stopped but pt choose not to. Pt is trained for administering meds PRN, such as benedryl, phenagren, or zofran through her Munguia. RN states would have to contact [facility] office to determine ability to reinstate pt services.         SW requested FELIBERTO Florentino contact [facility] office to discuss medical needs/supplies

## 2022-11-08 PROCEDURE — 6360000002 HC RX W HCPCS: Performed by: INTERNAL MEDICINE

## 2022-11-08 PROCEDURE — 2580000003 HC RX 258: Performed by: INTERNAL MEDICINE

## 2022-11-08 PROCEDURE — 6370000000 HC RX 637 (ALT 250 FOR IP): Performed by: INTERNAL MEDICINE

## 2022-11-08 PROCEDURE — 92526 ORAL FUNCTION THERAPY: CPT

## 2022-11-08 PROCEDURE — 1210000000 HC MED SURG R&B

## 2022-11-08 PROCEDURE — 92523 SPEECH SOUND LANG COMPREHEN: CPT

## 2022-11-08 RX ADMIN — Medication 5000 UNITS: at 08:55

## 2022-11-08 RX ADMIN — CEFAZOLIN 2000 MG: 2 INJECTION, POWDER, FOR SOLUTION INTRAMUSCULAR; INTRAVENOUS at 03:24

## 2022-11-08 RX ADMIN — POTASSIUM PHOSPHATE, MONOBASIC 250 MG: 500 TABLET, SOLUBLE ORAL at 08:54

## 2022-11-08 RX ADMIN — CEFAZOLIN 2000 MG: 2 INJECTION, POWDER, FOR SOLUTION INTRAMUSCULAR; INTRAVENOUS at 20:47

## 2022-11-08 RX ADMIN — CEFAZOLIN 2000 MG: 2 INJECTION, POWDER, FOR SOLUTION INTRAMUSCULAR; INTRAVENOUS at 14:23

## 2022-11-08 RX ADMIN — Medication 10 ML: at 20:47

## 2022-11-08 RX ADMIN — DICYCLOMINE HYDROCHLORIDE 10 MG: 10 CAPSULE ORAL at 08:57

## 2022-11-08 RX ADMIN — DICYCLOMINE HYDROCHLORIDE 10 MG: 10 CAPSULE ORAL at 20:47

## 2022-11-08 RX ADMIN — SERTRALINE HYDROCHLORIDE 25 MG: 25 TABLET ORAL at 08:55

## 2022-11-08 RX ADMIN — CALCITRIOL CAPSULES 0.25 MCG 0.25 MCG: 0.25 CAPSULE ORAL at 08:55

## 2022-11-08 RX ADMIN — ENOXAPARIN SODIUM 40 MG: 100 INJECTION SUBCUTANEOUS at 08:58

## 2022-11-08 NOTE — CARE COORDINATION
ARIANNE has reached out to [address] [place] [address] [phone] regarding inability to get a hold of anyone at the infusion center. Umu Vazquez stated she would reach out to the director and ask for her to call me so we can discuss restarting services. Awaiting call back at this time.   Electronically signed by Jin Lewis on 11/8/2022 at 12:26 PM

## 2022-11-08 NOTE — CARE COORDINATION
SW met with Pt in the room to go over updates that are available. Pt plans to go live with her sisters at [address] [address] once medically cleared from the hospital. Pt requested SW to call and let Juanis Lorin know she was still in the hospital and she may be unable to get her stuff out in time before the November 11th deadline of when she was supposed to be evicted. SW attempted to call Juanis Padgett but was unable to reach anyone nor was he able to leave a voicemail.    Electronically signed by Alberto Aguilar on 11/8/2022 at 1:26 PM

## 2022-11-08 NOTE — PROGRESS NOTES
Speech Language Pathology  Facility/Department: [facility] 5 SURG SERVICES  Dysphagia Daily Treatment Note  Initial Speech/Language/Cognitive Evaluation    NAME: Sameer Meza  : 1990  MRN: [id number]    Patient Diagnosis(es):   Patient Active Problem List    Diagnosis Date Noted    ERIKA (acute kidney injury) ([facility] 2022    Sepsis ([facility] 2022    Soft tissue infection 2022    Infection of vascular catheter, initial encounter ([facility] 2022    Infestation by bed bug 2022    Encounter for care related to vascular access port 2022    Nonintractable headache     Cluster headache 2021    Hiatal hernia 2021    History of infection due to human papilloma virus (HPV) 2021    History of total hysterectomy 2021    Knee pain 2021    Autoimmune disease ([facility] 2021    Common variable agammaglobulinemia ([facility] 2021    [facility] 2021    Nutritional disorder 2020    Vitamin D deficiency 2018    Tyrell's syndrome pupil 2017    Idiopathic scoliosis of thoracolumbar spine 2017    History of syncope 2017    POTS (postural orthostatic tachycardia syndrome) 2017    Tachycardia 2017    Near syncope 2017    Chronic fatigue syndrome 2017    ETD (eustachian tube dysfunction) 10/04/2016    Mixed hearing loss of left ear 2016    Poor venous access 2015    Learning disability 2014    Nausea & vomiting 2014    Constipation 2014    Gastroparesis 03/10/2014    Insomnia 03/10/2014    Irritable bowel syndrome 2014    Pelvic pain in female 2014    Dysmenorrhea 2014    Family history of endometriosis 2014    NSAID long-term use 2013    Heartburn 2013    Anxiety disorder 2013    Chronic serous otitis media of left ear 2013    Disc herniation 2013    Neck pain 2013    Back pain 2013    Scoliosis 2013 Hiccups 09/18/2013    Barretts esophagus 09/18/2013    Belching 09/18/2013     Allergies: Allergies   Allergen Reactions    Iv Dye [Iodides] Shortness Of Breath, Itching and Other (See Comments)     IV 3000; SoA, dizziness    Iv Dye [Iodides] Anaphylaxis     Iodine containing    Adhesive Tape Itching     And tegaderm    Bee Pollen Hives     Oranges-anything with orange color  Oranges-anything with orange color  Oranges-anything with orange color  Oranges-anything with orange color  Oranges-anything with orange color      Chlorhexidine      Other reaction(s): ITCHING    Fruit & Vegetable Daily [Nutritional Supplements]      Oranges-anything with orange color    Metoprolol Succinate [Metoprolol]      Excessive drowsiness/ Metoprolol tartrate-excessive drowsiness    Nsaids      Other reaction(s): GI Intolerance    Naples Itching and Other (See Comments)     Other reaction(s): Vomiting    Orange Fruit [Citrus]      And artificial    Other      Cloraprep    Reglan [Metoclopramide]      Nausea,vomiting    Tramadol Other (See Comments)     Excessive sleeping x 3 days, pt takes 1/2 a pill. Zofran [Ondansetron Hcl] Hives    Orange Oil Nausea And Vomiting    Sulfamethoxazole-Trimethoprim Nausea And Vomiting     Clinical Impression:  Speech was considered mildly dysarthric as characterized by by slowed, decreased lingual movement during verbalizations. Pt also present with articulation errors at baseline. Significantly fast rate of speech is present. Portions of the RIPA/WAB and The Mini-Mental State Examination completed on this date. Pt achieved a 26/30  on The Mini-Mental State Examination revealing [facility]. Pt does present with poor problem solving/safety awarness, decreased recall skills, as well as decreased executive functioning skills. It is not recommended that pt lives independently upon discharge. Swallowing reassessment completed.  Oral prep reveals mildly decreased rotary chewing with regular solids; however, WFL. Oral transit timing is consistently 1-2 seconds with regular solids and thin liquids via consecutive sips. Minimum residue noted with regular solids and cleared with liquid wash. Laryngeal movement observed to be consistently sluggish and mildly decreased  for swallow airway protection. No overt s/s of aspiration/penetration present with regular solids and thin liquids via consecutive sips. Diet recommendations include continuing regular solids with thin liquids. Medications administered whole with H2O. Current Diet Level:  Regular solids/thin liquids     Pain:  Pain Assessment  Pain Assessment: None - Denies Pain  Pain Level: 1  Patient's Stated Pain Goal: 0 - No pain  Pain Location: Arm  Pain Orientation: Right  Pain Descriptors: Burning, Discomfort      Recommended Diet and Intervention  Diet Solids Recommendation: Regular solid  Liquid Consistency Recommendation: Thin  Recommended Form of Meds: PO  Therapeutic Interventions: Patient/Family education;Diet tolerance monitoring     Compensatory Swallowing Strategies  Compensatory Swallowing Strategies: Upright as possible for all oral intake;Small bites/sips;Eat/Feed slowly; Alternate solids and liquids; Remain upright for 30-45 minutes after meals     Treatment/Goals  Timeframe for Short-term Goals: 1-2x/day for 3 days   Goal 1: Patient will tolerate regular solid consistency and thin liquids with min S/S penetration/aspiration during PO intake. Goal 2: Patient will receive daily oral care to decrease bacteria from the oral cavity. Goal 3: Patient will complete oral motor, lingual, and pharyngeal exercises with provision of min cues/prompts. Goal 4: Re-assess speech production. Goal 5: Patient will utilize tools/strategies to promote increased clarity of speech at word, phrase, and sentence level with provision of min cues/prompts. Portions of the RIPA/WAB completed on this date.  The Mini-Mental State Examination was also completed on this date. Pt achieved a  revealing cognition to be [employer] on this date. Pt does present with poor problem solving/safety awarness, decreased recall skills, as well as decreased executive functioning skills. Receptive: Auditory Comprehension:  Simple Yes/No Questions: WNL- 100% accuracy answers were timely   Complex Yes/No questions- WNL-100% accuracy; answers elicited in a timely manner  Simple 1 Step Directions: WNL-100% accuracy- answers elicited in a timely manner  Simple 2 Step Directions: WNL-100% accuracy; answers elicited in a timely manner     Expressive Skills/ Verbal Expression:  Confrontation Naming/Labeling: WNL-100% accuracy  Responsive Speech: WNL-100% accuracy; however, answers elicited mildly delayed     Attention/Orientation:  Attention/concentration:  mildly decreased selective and sustained attention  Orientation: 100% oriented  Pt unable to verbalize current age. Pt able to verbalize name, age, , address, phone number, current location (\[facility]"), city and state, current month/year/date/day of week, season, floor number, county. Memory:  Immediate Memory: 33% accuracy up to 5 components  Short-term Memory: 100% accuracy given a 5 minute distracted time delay with 3 unrelated components    Problem Solving:  Pt able to identify current MD, pharmacy, and conditions to take medications. Poor problem solving/safety awareness noted with problems that could occur while completing ADLs. Prior level of functioning:   Pt reports she was living alone prior to recent hospitalizations. She states that she was managing her medications and finances independently. She states she was NOT completing any laundry, cooking, or cleaning. She also states that she did not have help completing these tasks so they did not get completed. She reports she would like to live with her sisters upon discharge.     Pt does report that she has a daughter that she had to put up for adoption when the baby was 1 months old. She would not disclose further information regarding this topic other than the baby was places through [address] Dysarthria:   Patient exhibits dysarthria characterized by slowed, decreased lingual movement during verbalizations. Pt also present with articulation errors at baseline. Significantly fast rate of speech is present. Clinician ranked functional intelligibility of speech for unfamiliar listeners at 100% accuracy, with background noise present and context known. Swallowing:   Swallowing reassessment completed on this date. Pt alert, cooperative, and upright in bed. No family present during evaluation. Teeth are in poor condition. Pt does report inconsistent globus sensations with regular solids that will clear with multiple drinks of thin liquids. Consistencies presented include: regular solids and thin liquids via consecutive sips. Oral Prep: Mildly decreased rotary chewing with regular solids; however, WFL. Transit: timing is consistently 1-2 seconds with regular solids and thin liquids via consecutive sips. Minimum residue noted with regular solids and cleared with liquid wash. Laryngeal movement: consistently sluggish and mildly decreased  for swallow airway protection with regular solids and thin liquids via consecutive sips. S/S of aspiration: No overt s/s of aspiration/penetration present with regular solids and thin liquids via consecutive sips. Continue current diet at this time. Continue regular solids with thin liquids. Add extra sauce/gravy with each meal.    SLP will continue to follow and treat.       Electronically signed by MP Everett on 11/8/2022 at 1:39 PM

## 2022-11-09 PROCEDURE — 1210000000 HC MED SURG R&B

## 2022-11-09 PROCEDURE — 6370000000 HC RX 637 (ALT 250 FOR IP): Performed by: INTERNAL MEDICINE

## 2022-11-09 PROCEDURE — 2580000003 HC RX 258: Performed by: INTERNAL MEDICINE

## 2022-11-09 PROCEDURE — 6360000002 HC RX W HCPCS: Performed by: INTERNAL MEDICINE

## 2022-11-09 PROCEDURE — 6370000000 HC RX 637 (ALT 250 FOR IP): Performed by: NURSE PRACTITIONER

## 2022-11-09 RX ORDER — CEPHALEXIN 250 MG/1
500 CAPSULE ORAL EVERY 6 HOURS SCHEDULED
Status: DISCONTINUED | OUTPATIENT
Start: 2022-11-09 | End: 2022-11-11 | Stop reason: HOSPADM

## 2022-11-09 RX ORDER — GAUZE BANDAGE 2" X 2"
100 BANDAGE TOPICAL DAILY
Status: DISCONTINUED | OUTPATIENT
Start: 2022-11-09 | End: 2022-11-11 | Stop reason: HOSPADM

## 2022-11-09 RX ADMIN — MUPIROCIN: 20 OINTMENT TOPICAL at 21:22

## 2022-11-09 RX ADMIN — Medication 5000 UNITS: at 09:02

## 2022-11-09 RX ADMIN — CEPHALEXIN 500 MG: 250 CAPSULE ORAL at 16:11

## 2022-11-09 RX ADMIN — CEFAZOLIN 2000 MG: 2 INJECTION, POWDER, FOR SOLUTION INTRAMUSCULAR; INTRAVENOUS at 05:10

## 2022-11-09 RX ADMIN — DICYCLOMINE HYDROCHLORIDE 10 MG: 10 CAPSULE ORAL at 21:21

## 2022-11-09 RX ADMIN — DICYCLOMINE HYDROCHLORIDE 10 MG: 10 CAPSULE ORAL at 09:02

## 2022-11-09 RX ADMIN — SERTRALINE HYDROCHLORIDE 25 MG: 25 TABLET ORAL at 09:01

## 2022-11-09 RX ADMIN — Medication 5 ML: at 09:01

## 2022-11-09 RX ADMIN — CALCITRIOL CAPSULES 0.25 MCG 0.25 MCG: 0.25 CAPSULE ORAL at 09:02

## 2022-11-09 RX ADMIN — THIAMINE HCL TAB 100 MG 100 MG: 100 TAB at 16:14

## 2022-11-09 RX ADMIN — ENOXAPARIN SODIUM 40 MG: 100 INJECTION SUBCUTANEOUS at 09:02

## 2022-11-09 RX ADMIN — CEPHALEXIN 500 MG: 250 CAPSULE ORAL at 17:38

## 2022-11-09 ASSESSMENT — ENCOUNTER SYMPTOMS
VOICE CHANGE: 0
RHINORRHEA: 0
BACK PAIN: 0
DIARRHEA: 0
NAUSEA: 0
COLOR CHANGE: 0
COUGH: 0
SHORTNESS OF BREATH: 0
CONSTIPATION: 0
VOMITING: 0

## 2022-11-09 ASSESSMENT — PAIN SCALES - GENERAL: PAINLEVEL_OUTOF10: 0

## 2022-11-09 NOTE — PROGRESS NOTES
She is going to be living with her sister upon discharge.  is following and we will firm this up and plan discharge in the next 24 to 48 hours.

## 2022-11-09 NOTE — PROGRESS NOTES
Hospitalist Progress Note    Patient:  Matt Infante  YOB: 1990  Date of Service: 11/9/2022  MRN: [id number]   Acct: [de-identified]   Primary Care Physician: Ariella Cardona MD  Advance Directive: Full Code  Admit Date: 11/3/2022       Hospital Day: 6  Referring Provider: Garth Love DO    Patient Seen, Chart, Consults, Notes, Labs, Radiology studies reviewed. Subjective:  Matt Infante is a 28 y.o. female  whom we are following for MSSA sepsis, acute kidney injury, gastroparesis, POTS. Follow-up surveillance blood cultures remain negative thus far. She is requesting a Munguia catheter instead of a PICC line. This will be set up for Friday    Allergies:   Iv dye [iodides], Iv dye [iodides], Adhesive tape, Bee pollen, Chlorhexidine, Fruit & vegetable daily [nutritional supplements], Metoprolol succinate [metoprolol], Nsaids, Orange, Orange fruit [citrus], Other, Reglan [metoclopramide], Tramadol, Zofran [ondansetron hcl], Orange oil, and Sulfamethoxazole-trimethoprim    Medicines:  Current Facility-Administered Medications   Medication Dose Route Frequency Provider Last Rate Last Admin    mupirocin (BACTROBAN) 2 % ointment   Nasal BID SHRUTHI Hurley        cephALEXin (KEFLEX) capsule 500 mg  500 mg Oral 4 times per day Garth Love DO   500 mg at 11/09/22 1611    thiamine mononitrate tablet 100 mg  100 mg Oral Daily Garth Love DO   100 mg at 11/09/22 1614    sodium phosphate 19.56 mmol in sodium chloride 0.9 % 250 mL IVPB  0.32 mmol/kg IntraVENous PRN Garth Love DO   Stopped at 11/05/22 1059    potassium phosphate (monobasic) (K-PHOS) tablet 250 mg  250 mg Oral 4x Daily WC Garth Love DO   250 mg at 11/08/22 0854    enoxaparin (LOVENOX) injection 40 mg  40 mg SubCUTAneous Daily Garth Love DO   40 mg at 11/09/22 0902    epoetin juan-epbx (RETACRIT) injection 10,000 Units  10,000 Units SubCUTAneous Q7 Days Garth Love DO   10,000 Units at 11/04/22 1211    vitamin D (CHOLECALCIFEROL) capsule 5,000 Units  5,000 Units Oral Daily Lamar Alto, DO   5,000 Units at 11/09/22 6131    calcitRIOL (ROCALTROL) capsule 0.25 mcg  0.25 mcg Oral Daily Lamar Alto, DO   0.25 mcg at 11/09/22 2612    diphenoxylate-atropine (LOMOTIL) 2.5-0.025 MG per tablet 1 tablet  1 tablet Oral 4x Daily PRN Lamar Millington, DO        potassium chloride 10 mEq/100 mL IVPB (Peripheral Line)  10 mEq IntraVENous PRN Lamar Alto, DO        0.9 % sodium chloride bolus  500 mL IntraVENous Once Lamar Alto, DO        dicyclomine (BENTYL) capsule 10 mg  10 mg Oral BID Lamar Alto, DO   10 mg at 11/09/22 3966    melatonin disintegrating tablet 10 mg  10 mg Oral Nightly PRN Lamar Alto, DO        sertraline (ZOLOFT) tablet 25 mg  25 mg Oral Daily Lamar Alto, DO   25 mg at 11/09/22 0901    sodium chloride flush 0.9 % injection 5-40 mL  5-40 mL IntraVENous 2 times per day Lamar Alto, DO   5 mL at 11/09/22 0901    sodium chloride flush 0.9 % injection 5-40 mL  5-40 mL IntraVENous PRN Lamar Alto, DO        0.9 % sodium chloride infusion   IntraVENous PRN Lamar Alto, DO        acetaminophen (TYLENOL) suppository 650 mg  650 mg Rectal Q6H PRN Lamar Millington, DO        magnesium sulfate 1000 mg in dextrose 5% 100 mL IVPB  1,000 mg IntraVENous PRN Lamar Alto, DO        potassium chloride (KLOR-CON M) extended release tablet 40 mEq  40 mEq Oral PRN Lamar Alto, DO   40 mEq at 11/06/22 0254    Or    potassium bicarb-citric acid (EFFER-K) effervescent tablet 40 mEq  40 mEq Oral PRN Lamar Alto, DO        Or    potassium chloride 10 mEq/100 mL IVPB (Peripheral Line)  10 mEq IntraVENous PRN Lamar Alto, DO        acetaminophen (TYLENOL) tablet 650 mg  650 mg Oral Q6H PRN Lamar Alto, DO   650 mg at 11/07/22 0232    promethazine (PHENERGAN) injection 6.25 mg  6.25 mg IntraMUSCular Q6H PRN Teddy Sarahas,    6.25 mg at 11/04/22 7134       Past Medical History:  Past Medical History:   Diagnosis Date    Allergic contact dermatitis due to adhesives     Allergic contact dermatitis due to plants, except food     Anemia complicating pregnancy, second trimester     Anemia complicating pregnancy, third trimester     Anxiety and depression     Anxiety disorder     unspecified    Bacteremia     Cellulitis of abdominal wall     Chronic fatigue, unspecified     Dysuria     Encounter for care related to vascular access port 5/19/2022    Epigastric pain     Frequency of micturition     G tube feedings (HCC)     Gastroparesis     Dr. Marivel Dickey in [place] manages    Gastroparesis     Gastrostomy malfunction [facility])     Generalized abdominal pain     GERD (gastroesophageal reflux disease)     Heart murmur     Hiatal hernia     History of Hoyos's esophagus     History of blood transfusion     Hypotension, unspecified     IBS (irritable bowel syndrome)     Insomnia, unspecified     Jejunostomy tube present (HCC)     Low back pain     Nausea with vomiting     unspecified    Neck pain     Neuropathy     Orthostatic hypotension     POTS (postural orthostatic tachycardia syndrome)     Premature birth     delivered at 24-27 weeks    Scoliosis     Syncope and collapse     Tachycardia     Tachycardia, unspecified     Toxic gastroenteritis and colitis        Past Surgical History:  Past Surgical History:   Procedure Laterality Date    ABDOMEN SURGERY  2014    gastric stimulator implanted    ADENOIDECTOMY      ADENOIDECTOMY  2007    CHOLECYSTECTOMY      CHOLECYSTECTOMY  2016    DENTAL SURGERY  2004    wisdom teeth    EAR SURGERY  2014    left    FACIAL SURGERY  2005    GASTRIC STIMULATOR IMPLANT SURGERY  11/2014    GASTROSTOMY TUBE PLACEMENT  04/07/2015    J tube-Removed in [place] 1/2018    GASTROSTOMY TUBE PLACEMENT      G tube 2017    HYSTERECTOMY (CERVIX STATUS UNKNOWN)      INSERTION / REMOVAL / REPLACEMENT VENOUS ACCESS CATHETER N/A 03/25/2016    REMOVAL OF PORT AND PLACEMENT OF NEW PORT; REMOVAL OF PICC LINE  performed by Roman Peterson MD at [facility]  2014    left knee    KNEE SURGERY  2015    right knee    MANDIBLE SURGERY      double jaw    PHARYNGECTOMY      PORT SURGERY      port present 3-25-16    TONSILLECTOMY      TONSILLECTOMY  1997    TUBAL LIGATION      TUBAL LIGATION  2016    TUMOR REMOVAL Left     ear    TUNNELED VENOUS PORT PLACEMENT Right 05/29/2015    TUNNELED VENOUS PORT PLACEMENT      UPPER GASTROINTESTINAL ENDOSCOPY  04/02/2010    Dr Milena Christianson ENDOSCOPY  09/25/2013    Dr Rudy Gallegos  05/2015    Dr. Guru Cordova  5/29/2015 [facility]    Ultrasound-guided cannulation, right internal jugular vein. Right internal jugular vein single-lumen bard powerport placement. VASCULAR SURGERY  9/8/15 S    Right internal jugular vein portograms. Revision of right internal jugular vein single lumen port. VASCULAR SURGERY  11/3/15 [facility]    Ultrasound-guided cannulation, left upper arm basilic vein. Left upper arm basilic vein PICC line placement bard dual-lumen PICC line. Superior vena cava venograms. VASCULAR SURGERY  03/23/2017    [facility]. Left IJV port angiogram.Ultrasound guided cannulation of right basilic vein,right upper extremity PICC line placement bard power PICC,dual lumen. VASCULAR SURGERY  04/27/2022    Ultrasound-guided cannulation right internal jugular vein. Placement of Munguia single-lumen tunneled dialysis catheter. Removal of nonfunctioning left subclavian vein Munguia tunneled dialysis catheter. Repositioning of right internal juglular vein Munguia tunneled dialylsis catheter. Perfomed by Dr. Darlene Woods at [address] [place] [address] EXTRACTION         Family History  Family History   Problem Relation Age of Onset    Other Mother         endometriosis    Depression Father     Diabetes Paternal Grandmother     Stroke Paternal Grandmother     Hypertension Paternal Grandmother     Heart Disease Paternal Grandmother     Heart Failure Paternal Grandmother     Colon Cancer Neg Hx     Colon Polyps Neg Hx     Esophageal Cancer Neg Hx     Liver Cancer Neg Hx     Rectal Cancer Neg Hx     Stomach Cancer Neg Hx        Social History  Social History     Socioeconomic History    Marital status: Single     Spouse name: Not on file    Number of children: Not on file    Years of education: Not on file    Highest education level: Not on file   Occupational History    Not on file   Tobacco Use    Smoking status: Never    Smokeless tobacco: Never    Tobacco comments:     Vape    Vaping Use    Vaping Use: Former    Substances: Nicotine    Devices: Refillable tank   Substance and Sexual Activity    Alcohol use: Never    Drug use: Never    Sexual activity: Yes     Partners: Male   Other Topics Concern    Not on file   Social History Narrative    ** Merged History Encounter **          Social Determinants of Health     Financial Resource Strain: Not on file   Food Insecurity: Not on file   Transportation Needs: Not on file   Physical Activity: Not on file   Stress: Not on file   Social Connections: Not on file   Intimate Partner Violence: Not on file   Housing Stability: Not on file         Review of Systems:    Review of Systems   Constitutional:  Negative for activity change and fatigue. HENT:  Negative for rhinorrhea and voice change. Eyes:  Negative for visual disturbance. Respiratory:  Negative for cough and shortness of breath. Cardiovascular:  Negative for chest pain and leg swelling. Gastrointestinal:  Negative for constipation, diarrhea, nausea and vomiting. Endocrine: Negative for polyuria. Genitourinary:  Negative for difficulty urinating and dysuria. Musculoskeletal:  Negative for arthralgias and back pain. Skin:  Negative for color change.    Allergic/Immunologic: Negative for immunocompromised state. Neurological:  Negative for dizziness and headaches. Psychiatric/Behavioral:  Negative for confusion. Objective:  Blood pressure 107/79, pulse (!) 105, temperature 97.7 °F (36.5 °C), resp. rate 18, height 5' (1.524 m), weight 137 lb 11.2 oz (62.5 kg), last menstrual period 01/14/2018, SpO2 97 %, unknown if currently breastfeeding. Intake/Output Summary (Last 24 hours) at 11/9/2022 1709  Last data filed at 11/9/2022 1015  Gross per 24 hour   Intake 240 ml   Output --   Net 240 ml       Physical Exam  Vitals and nursing note reviewed. HENT:      Head: Normocephalic and atraumatic. Right Ear: External ear normal.      Left Ear: External ear normal.      Nose: Nose normal.      Mouth/Throat:      Mouth: Mucous membranes are moist.   Eyes:      Conjunctiva/sclera: Conjunctivae normal.      Pupils: Pupils are equal, round, and reactive to light. Cardiovascular:      Rate and Rhythm: Normal rate and regular rhythm. Heart sounds: Normal heart sounds. Pulmonary:      Effort: Pulmonary effort is normal.      Breath sounds: Normal breath sounds. Abdominal:      General: Abdomen is flat. Palpations: Abdomen is soft. Musculoskeletal:         General: Normal range of motion. Cervical back: Neck supple. No rigidity. No muscular tenderness. Skin:     General: Skin is warm and dry. Neurological:      Mental Status: She is alert and oriented to person, place, and time. Psychiatric:         Mood and Affect: Mood normal.       Labs:  BMP: No results for input(s): NA, K, CL, CO2, PHOS, BUN, CREATININE, CALCIUM in the last 72 hours. CBC: No results for input(s): WBC, HGB, HCT, MCV, PLT in the last 72 hours. LIVER PROFILE: No results for input(s): AST, ALT, LIPASE, BILIDIR, BILITOT, ALKPHOS in the last 72 hours. Invalid input(s): AMYLASE,  ALB  PT/INR: No results for input(s): PROTIME, INR in the last 72 hours. APTT: No results for input(s):  APTT in the last 72 hours. BNP:  No results for input(s): BNP in the last 72 hours. Ionized Calcium:No results for input(s): IONCA in the last 72 hours. Magnesium:No results for input(s): MG in the last 72 hours. Phosphorus:No results for input(s): PHOS in the last 72 hours. HgbA1C: No results for input(s): LABA1C in the last 72 hours. Hepatic: No results for input(s): ALKPHOS, ALT, AST, PROT, BILITOT, BILIDIR, LABALBU in the last 72 hours. Lactic Acid: No results for input(s): LACTA in the last 72 hours. Troponin: No results for input(s): CKTOTAL, CKMB, TROPONINT in the last 72 hours. ABGs: No results for input(s): PH, PCO2, PO2, HCO3, O2SAT in the last 72 hours. CRP:  No results for input(s): CRP in the last 72 hours. Sed Rate:  No results for input(s): SEDRATE in the last 72 hours. Cultures:   No results for input(s): CULTURE in the last 72 hours. Recent Labs     11/06/22  1736 11/06/22  1742   BC No Growth to date. Any change in status will be called. --    BLOODCULT2  --  No Growth to date. Any change in status will be called. No results for input(s): CXSURG in the last 72 hours. Radiology reports as per the Radiologist  Radiology: CT Head W/O Contrast    Result Date: 11/3/2022  Patient: Nate Brewer  Time Out: 05:29Exam(s): CT HEAD Without Contrast EXAM:  CT Head Without Intravenous ContrastCLINICAL HISTORY:   Reason for exam: fall, headache. TECHNIQUE:  Axial computed tomography images of the head/brain without intravenous contrast.COMPARISON:4/24/2014FINDINGS:  Brain:  Unremarkable. No hemorrhage. No significant white matter disease. No edema. Ventricles:  Unremarkable. No ventriculomegaly. Bones/joints:  Unremarkable. No acute fracture. Soft tissues:  Unremarkable. Sinuses: Layering hyperdense fluid in the left maxillary sinus. Moderate left maxillary sinus mucosal thickening. Mastoid air cells:  Unremarkable as visualized. No mastoid effusion.     1. No acute intracranial hemorrhage, herniation, or hydrocephalus. 2. Moderate left maxillary sinus mucosal thickening and layering hyperdense fluid in the left maxillary sinus with which may represent inspissated secretions. Electronically signed by Haile Barrera M.D. on 11-03-22 at 0529    XR CHEST PORTABLE    Result Date: 11/3/2022  Patient: Jennifer Cancel  Time Out: 05:36Exam(s): FILM CXR 1 VIEW EXAM:  XR Chest, 1 ViewCLINICAL HISTORY:   Reason for exam: weakness. TECHNIQUE:  Frontal view of the chest.COMPARISON:  No relevant prior studies available. FINDINGS:  Lungs:  Unremarkable. No consolidation. Pleural space:  Unremarkable. No pneumothorax. Heart:  Unremarkable. No cardiomegaly. Mediastinum: Tunneled left IJ central venous catheter in place. Bones/joints:  Unremarkable. No acute pulmonary process. Electronically signed by Haile Barrera M.D. on 11-03-22 at 4148       Assessment     MSSA sepsis. Changed to Keflex. Surveillance blood cultures are negative. ERIKA (acute kidney injury). Resolved. Anxiety disorder. Supportive care. Gastroparesis. Supportive care. POTS (postural orthostatic tachycardia syndrome). Supportive care.       Sil Green DO

## 2022-11-09 NOTE — PLAN OF CARE
Discussed plan for placement of IJ tunneled double port Munguia Catheter with patient. Patient did state we would not be able to use chlorhexidine, she is only able to use special tapes over site, and the catheter must specifically be a double port. I did reassure patient we have a double port available, and would take care not to use items she is allergic to. Patient is agreeable to placement of catheter. Patient is scheduled for procedure on Friday as 3rd case in Radiology Specials per [facility].

## 2022-11-09 NOTE — ADT AUTH CERT
11/8 Progress Note by Teresita Lagunas RN       Review Status Review Entered   In Primary 11/9/2022 0909       Created By   Teresita Lagunas RN      Criteria Review   She is going to be living with her sister upon discharge.  is following and we will firm this up and plan discharge in the next 24 to 48 hours. D/c planning note:     SW met with Pt in the room to go over updates that are available. Pt plans to go live with her sisters at [address] [address] once medically cleared from the hospital. Pt requested SW to call and let Bobby Paez know she was still in the hospital and she may be unable to get her stuff out in time before the November 11th deadline of when she was supposed to be evicted. SW attempted to call Bobby Paez but was unable to reach anyone nor was he able to leave a voicemail.          Infection, Thrombosis, or Other Complication of Intravenous Device - Care Day 6 (11/8/2022) by Emelia Amezcau RN       Review Status Review Entered   Completed 11/8/2022 9275       Created By   Emelia Amezcua RN      Criteria Review      Care Day: 6 Care Date: 11/8/2022 Level of Care: ICU    Guideline Day 4    Level Of Care    (X) Floor to discharge    Clinical Status    (X) * Hemodynamic stability    11/8/2022 4:44 PM EST by Meme Cardoso      HD stable    (X) * No bleeding or recurring emboli    11/8/2022 4:44 PM EST by Meme Cardoso      none indicated    ( ) * Infection absent or outpatient treatment arranged    ( ) * Outpatient anticoagulation arranged or not indicated    (X) * IV access established or not necessary    11/8/2022 4:44 PM EST by Meme Cardoso      established    ( ) * Discharge plans and education understood    Activity    (X) * Ambulatory or acceptable for next level of care    11/8/2022 4:44 PM EST by Meme Cardoso      Up as tolerated   PT/OT eval    Routes    (X) * Oral hydration    11/8/2022 4:44 PM EST by Meme Cardoso PO hydration    (X) * Oral medications or regimen acceptable for next level of care    11/8/2022 4:44 PM EST by Marco Taylor      Vitamin D 5000 units daily PO  Zoloft 25 mg Daily PO  K phos 250 mg QACHS PO  Bentyl 10 mg BID PO  Rocaltrol 0.25 mcg daily PO    (X) * Oral diet or acceptable for next level of care    11/8/2022 4:44 PM EST by Marco Taylor      Regular diet    Medications    (X) Continue anticoagulants and antibiotics as indicated    11/8/2022 4:44 PM EST by Marco Taylor      Ancef 2 g Q8 IV  Lovenox 40 mg daily SC       Definitions for Care Day 6    Hemodynamic stability    (X) Hemodynamic stability, as indicated by  1 or more  of the following :       (X) Patient hemodynamically stable, as indicated by  ALL  of the following  (1) (2) (3) (4) (5):          (X) Tachycardia absent          (X) Hypotension absent          (X) No evidence of inadequate perfusion (eg, no myocardial ischemia)          (X) No other hemodynamic abnormalities (eg, no Orthostatic hypotension)       Tachycardia absent    (X) Tachycardia absent, as indicated by  1 or more  of the following  (1) (2):       (X) Heart rate less than or equal to 100 beats per minute in adult or child 6 years or older       Hypotension absent    (X) Hypotension absent, as indicated by  1 or more  of the following  (1) (2) (3) (4):       (X) SBP greater than or equal to 90 mm Hg and without recent decrease greater than 40 mm Hg from       baseline in adult or child 10 years or older       * Milestone   Additional Notes   11/8      VITALS:   Temp: 97.4 (36.3) RR: 20 HR: 95 BP: 109/75 - SpO2: 99       ORDERS:   PT/OT eval and treat   Regular diet   Contact isolation   Insert urinary catheter

## 2022-11-10 LAB
ALBUMIN SERPL-MCNC: 3.1 G/DL (ref 3.5–5.2)
ALP BLD-CCNC: 81 U/L (ref 35–104)
ALT SERPL-CCNC: 10 U/L (ref 5–33)
ANION GAP SERPL CALCULATED.3IONS-SCNC: 16 MMOL/L (ref 7–19)
AST SERPL-CCNC: 29 U/L (ref 5–32)
BILIRUB SERPL-MCNC: 0.3 MG/DL (ref 0.2–1.2)
BUN BLDV-MCNC: 7 MG/DL (ref 6–20)
CALCIUM SERPL-MCNC: 8.8 MG/DL (ref 8.6–10)
CHLORIDE BLD-SCNC: 103 MMOL/L (ref 98–111)
CO2: 17 MMOL/L (ref 22–29)
CREAT SERPL-MCNC: 0.4 MG/DL (ref 0.5–0.9)
GFR SERPL CREATININE-BSD FRML MDRD: >60 ML/MIN/{1.73_M2}
GLUCOSE BLD-MCNC: 91 MG/DL (ref 74–109)
HCT VFR BLD CALC: 27.9 % (ref 37–47)
HEMOGLOBIN: 8.3 G/DL (ref 12–16)
MCH RBC QN AUTO: 24 PG (ref 27–31)
MCHC RBC AUTO-ENTMCNC: 29.7 G/DL (ref 33–37)
MCV RBC AUTO: 80.6 FL (ref 81–99)
PDW BLD-RTO: 18.2 % (ref 11.5–14.5)
PLATELET # BLD: 456 K/UL (ref 130–400)
PMV BLD AUTO: 9.6 FL (ref 9.4–12.3)
POTASSIUM SERPL-SCNC: 4.7 MMOL/L (ref 3.5–5)
RBC # BLD: 3.46 M/UL (ref 4.2–5.4)
SODIUM BLD-SCNC: 136 MMOL/L (ref 136–145)
TOTAL PROTEIN: 5.8 G/DL (ref 6.6–8.7)
WBC # BLD: 10.9 K/UL (ref 4.8–10.8)

## 2022-11-10 PROCEDURE — 80053 COMPREHEN METABOLIC PANEL: CPT

## 2022-11-10 PROCEDURE — 36415 COLL VENOUS BLD VENIPUNCTURE: CPT

## 2022-11-10 PROCEDURE — 6360000002 HC RX W HCPCS: Performed by: INTERNAL MEDICINE

## 2022-11-10 PROCEDURE — 97110 THERAPEUTIC EXERCISES: CPT

## 2022-11-10 PROCEDURE — 97166 OT EVAL MOD COMPLEX 45 MIN: CPT

## 2022-11-10 PROCEDURE — 6370000000 HC RX 637 (ALT 250 FOR IP): Performed by: INTERNAL MEDICINE

## 2022-11-10 PROCEDURE — 1210000000 HC MED SURG R&B

## 2022-11-10 PROCEDURE — 97535 SELF CARE MNGMENT TRAINING: CPT

## 2022-11-10 PROCEDURE — 85027 COMPLETE CBC AUTOMATED: CPT

## 2022-11-10 PROCEDURE — 97530 THERAPEUTIC ACTIVITIES: CPT

## 2022-11-10 PROCEDURE — 97162 PT EVAL MOD COMPLEX 30 MIN: CPT

## 2022-11-10 PROCEDURE — 2580000003 HC RX 258: Performed by: INTERNAL MEDICINE

## 2022-11-10 RX ADMIN — CEPHALEXIN 500 MG: 250 CAPSULE ORAL at 00:33

## 2022-11-10 RX ADMIN — Medication 5 ML: at 10:12

## 2022-11-10 RX ADMIN — ENOXAPARIN SODIUM 40 MG: 100 INJECTION SUBCUTANEOUS at 10:12

## 2022-11-10 RX ADMIN — CALCITRIOL CAPSULES 0.25 MCG 0.25 MCG: 0.25 CAPSULE ORAL at 10:12

## 2022-11-10 RX ADMIN — DICYCLOMINE HYDROCHLORIDE 10 MG: 10 CAPSULE ORAL at 22:41

## 2022-11-10 RX ADMIN — DICYCLOMINE HYDROCHLORIDE 10 MG: 10 CAPSULE ORAL at 10:12

## 2022-11-10 RX ADMIN — CEPHALEXIN 500 MG: 250 CAPSULE ORAL at 06:02

## 2022-11-10 RX ADMIN — CEPHALEXIN 500 MG: 250 CAPSULE ORAL at 10:12

## 2022-11-10 RX ADMIN — Medication 5000 UNITS: at 10:12

## 2022-11-10 RX ADMIN — Medication 10 ML: at 22:42

## 2022-11-10 RX ADMIN — POTASSIUM PHOSPHATE, MONOBASIC 250 MG: 500 TABLET, SOLUBLE ORAL at 22:41

## 2022-11-10 RX ADMIN — CEPHALEXIN 500 MG: 250 CAPSULE ORAL at 22:41

## 2022-11-10 RX ADMIN — THIAMINE HCL TAB 100 MG 100 MG: 100 TAB at 10:12

## 2022-11-10 RX ADMIN — MUPIROCIN: 20 OINTMENT TOPICAL at 22:43

## 2022-11-10 RX ADMIN — CEPHALEXIN 500 MG: 250 CAPSULE ORAL at 17:46

## 2022-11-10 RX ADMIN — MUPIROCIN: 20 OINTMENT TOPICAL at 10:13

## 2022-11-10 NOTE — CARE COORDINATION
Pt had TPN discontinued per note from 10/12/22 from Masha Leach. There does not appear to be any need for TPN at this time but the henning is being replaced for IV access.  Electronically signed by Shaila Schofield on 11/10/2022 at 2:52 PM

## 2022-11-10 NOTE — PROGRESS NOTES
Occupational Therapy  Facility/Department: [place] [address]  Occupational Therapy Initial Assessment    Name: Cata Anderson  : 1990  MRN: [id number]  Date of Service: 11/10/2022    Discharge Recommendations:  Patient would benefit from continued therapy after discharge  OT Equipment Recommendations  Equipment Needed: Yes  Other: BSC, tub transfer bench       Patient Diagnosis(es): The primary encounter diagnosis was Metabolic acidosis. Diagnoses of Generalized weakness, Hypokalemia, Hypomagnesemia, Acute kidney injury ([address] and Sepsis, due to unspecified organism, unspecified whether acute organ dysfunction present [facility]) were also pertinent to this visit. Past Medical History:  has a past medical history of Allergic contact dermatitis due to adhesives, Allergic contact dermatitis due to plants, except food, Anemia complicating pregnancy, second trimester, Anemia complicating pregnancy, third trimester, Anxiety and depression, Anxiety disorder, Bacteremia, Cellulitis of abdominal wall, Chronic fatigue, unspecified, Dysuria, Encounter for care related to vascular access port, Epigastric pain, Frequency of micturition, G tube feedings ([address] Gastroparesis, Gastroparesis, Gastrostomy malfunction ([address] Generalized abdominal pain, GERD (gastroesophageal reflux disease), Heart murmur, Hiatal hernia, History of Hoyos's esophagus, History of blood transfusion, Hypotension, unspecified, IBS (irritable bowel syndrome), Insomnia, unspecified, Jejunostomy tube present ([address] Low back pain, Nausea with vomiting, Neck pain, Neuropathy, Orthostatic hypotension, POTS (postural orthostatic tachycardia syndrome), Premature birth, Scoliosis, Syncope and collapse, Tachycardia, Tachycardia, unspecified, and Toxic gastroenteritis and colitis. Past Surgical History:  has a past surgical history that includes Mandible surgery; Adenoidectomy; [place] tooth extraction; Pharyngectomy;  Tonsillectomy; Gastric stimulator implant surgery (11/2014); Gastrostomy tube placement (04/07/2015); Upper gastrointestinal endoscopy (04/02/2010); Upper gastrointestinal endoscopy (09/25/2013); Upper gastrointestinal endoscopy (05/2015); Tunneled venous port placement (Right, 05/29/2015); vascular surgery (5/29/2015 SJS); vascular surgery (9/8/15 S[facility]); vascular surgery (11/3/15 S[facility]); INSERTION / REMOVAL / REPLACEMENT VENOUS ACCESS CATHETER (N/A, 03/25/2016); Tubal ligation; Tunneled venous port placement; vascular surgery (03/23/2017); tumor removal (Left); Cholecystectomy; knee surgery; Abdomen surgery (2014); Adenoidectomy (2007); Cholecystectomy (2016); Ear surgery (2014); Facial Surgery (2005); knee surgery (2014); knee surgery (2015); Dental surgery (2004); Port Surgery; Gastrostomy tube placement; Tonsillectomy (1997); Tubal ligation (2016); Hysterectomy; and vascular surgery (04/27/2022). Treatment Diagnosis: sepsis      Assessment   Performance deficits / Impairments: Decreased functional mobility ; Decreased balance;Decreased ADL status; Decreased ROM; Decreased endurance;Decreased high-level IADLs;Decreased strength  Assessment: Eval complete and tx initiated. Pt SBA with bed mobility, c/o dizziness with position changes due to POTS. Pt tolerates a few seconds standing in LAHTI stedy but needs to return to sitting, c/o dizzy. Decreased LB reach for ADL. Pt reports decreased ability to manage ADL/IADL at home and needs tub transfer bench and BSC for increased safety. Pt would benefit from skilled OT to increase tolerance/ind in ADL and functional transfers.   Treatment Diagnosis: sepsis  REQUIRES OT FOLLOW-UP: Yes  Activity Tolerance  Activity Tolerance Comments: Limited by POTS        Plan   Occupational Therapy Plan  Times Per Week: 3-5  Current Treatment Recommendations: Strengthening, ROM, Balance training, Functional mobility training, Endurance training, Patient/Caregiver education & training, Equipment evaluation, education, & procurement, Self-Care / ADL     Restrictions  Restrictions/Precautions  Restrictions/Precautions: Fall Risk, Contact Precautions  Position Activity Restriction  Other position/activity restrictions: MRSA; PEG tube-place gait belt high    Subjective   General  Patient assessed for rehabilitation services?: Yes  abdomen-feels nauseated. Does not want meds. Social/Functional History  Social/Functional History  Lives With: Alone (moving to live with sister)  Type of Home: Apartment  Bathroom Shower/Tub: Tub/Shower unit  Bathroom Toilet: Standard  Home Equipment: Wheelchair-electric (reports unable to use electric w/c at home, but unable to use)  ADL Assistance: Independent (pt having difficulty completing)  Homemaking Assistance:  (pt unable to perform household tasks)  Ambulation Assistance: Independent (rollator)  Transfer Assistance: Independent  Active : No  Additional Comments: reports she takes a long time to get anything done; was having a hard time taking care of her apt. and is now being evicted. Reports getting all the way down in the tub, but she was having difficulty getting out. Objective   Heart Rate: 100  Heart Rate Source: Monitor  BP: 105/77  BP Location: Left upper arm  Patient Position: Supine  MAP (Calculated): 86  Resp: 16  SpO2: 98 %  O2 Device: None (Room air)          Observation/Palpation  Posture: Fair  Observation: tolerated sitting upright on EOB x 15-20 mins  Safety Devices  Type of Devices: Gait belt;Call light within reach; Left in bed;Nurse notified; Bed alarm in place; Patient at risk for falls           ADL  Feeding: Independent;Setup  Grooming: Minimal assistance (setup for face/teeth. pt unable to manage hair washing)  UE Bathing: Stand by assistance  LE Bathing:  Moderate assistance (per clinical observation)  UE Dressing: Stand by assistance;Minimal assistance (per clinical observation)  LE Dressing: Maximum assistance (per clinical observation)  Toileting: Dependent/Total (rectal tube, BSC for urination)     Activity Tolerance  Activity Tolerance: Patient limited by fatigue; Other (comment)  Activity Tolerance Comments: limited by POTS     Transfers  Transfer Comments: HENNY cormier for sit to stand. Pt c/o dizziness after 10 seconds, sat on paddles of SS a few seconds but continued lightheaded. Returned to [address] [place] [address] Ave: Impaired  Vision Exceptions: Wears glasses at all times  Hearing  Hearing: Exceptions to [facility]  Hearing Exceptions: Hard of hearing/hearing concerns (pt reports L ear almost deaf)  Cognition  Overall Cognitive Status: Exceptions  Arousal/Alertness: Delayed responses to stimuli  Following Commands: Follows one step commands with repetition; Follows one step commands with increased time  Safety Judgement: Decreased awareness of need for assistance;Decreased awareness of need for safety  Problem Solving: Assistance required to generate solutions;Assistance required to implement solutions;Decreased awareness of errors  Insights: Decreased awareness of deficits  Initiation: Requires cues for some  Sequencing: Requires cues for some  Orientation  Overall Orientation Status: Within Functional Limits                     LUE AROM (degrees)  LUE AROM : WFL  RUE AROM (degrees)  RUE AROM : WFL                       Goals  Short Term Goals  Time Frame for Short Term Goals: 1-2 weeks  Short Term Goal 1: Pt will tolerate standing x 30 seconds in prep for functional activity with min A  Short Term Goal 2: Pt will perform toilet/BSC transfer with min A  Short Term Goal 3: Pt will dress LB with min A, AE prn  Short Term Goal 4: Pt will demo understanding of ther activity recommendations/DME needs after ed  Patient Goals   Patient goals : return home with sister         Keesha Loya OT  Electronically signed by Keesha Loya OT on 11/10/2022 at 2:39 PM

## 2022-11-10 NOTE — PLAN OF CARE
Problem: Discharge Planning  Goal: Discharge to home or other facility with appropriate resources  11/10/2022 1226 by Jim Ashby RN  Outcome: Progressing  11/10/2022 0427 by Emigdio Mcgraw RN  Outcome: Progressing     Problem: Pain  Goal: Verbalizes/displays adequate comfort level or baseline comfort level  11/10/2022 1226 by Jim Ashby RN  Outcome: Progressing  11/10/2022 0427 by Emigdio Mcgraw RN  Outcome: Progressing     Problem: Safety - Adult  Goal: Free from fall injury  11/10/2022 1226 by Jim Ashby RN  Outcome: Progressing  11/10/2022 0427 by Emigdio Mcgraw RN  Outcome: Progressing     Problem: Skin/Tissue Integrity  Goal: Absence of new skin breakdown  Description: 1. Monitor for areas of redness and/or skin breakdown  2. Assess vascular access sites hourly  3. Every 4-6 hours minimum:  Change oxygen saturation probe site  4. Every 4-6 hours:  If on nasal continuous positive airway pressure, respiratory therapy assess nares and determine need for appliance change or resting period.   11/10/2022 1226 by Jim Ashby RN  Outcome: Progressing  11/10/2022 0427 by Emigdio Mcgraw RN  Outcome: Progressing     Problem: Nutrition Deficit:  Goal: Optimize nutritional status  11/10/2022 1226 by Jim Ashby RN  Outcome: Progressing  11/10/2022 0427 by Emigdio Mcgraw RN  Outcome: Progressing

## 2022-11-10 NOTE — PROGRESS NOTES
Physical Therapy  Facility/Department: [facility] SURG SERVICES  Physical Therapy Initial Assessment    Name: Dedrick Lopez  : 1990  MRN: [id number]  Date of Service: 11/10/2022    Discharge Recommendations:  Continue to assess pending progress, 24 hour supervision or assist, Patient would benefit from continued therapy after discharge          Patient Diagnosis(es): The primary encounter diagnosis was Metabolic acidosis. Diagnoses of Generalized weakness, Hypokalemia, Hypomagnesemia, Acute kidney injury ([address] and Sepsis, due to unspecified organism, unspecified whether acute organ dysfunction present [facility]) were also pertinent to this visit. Past Medical History:  has a past medical history of Allergic contact dermatitis due to adhesives, Allergic contact dermatitis due to plants, except food, Anemia complicating pregnancy, second trimester, Anemia complicating pregnancy, third trimester, Anxiety and depression, Anxiety disorder, Bacteremia, Cellulitis of abdominal wall, Chronic fatigue, unspecified, Dysuria, Encounter for care related to vascular access port, Epigastric pain, Frequency of micturition, G tube feedings ([address] Gastroparesis, Gastroparesis, Gastrostomy malfunction ([address] Generalized abdominal pain, GERD (gastroesophageal reflux disease), Heart murmur, Hiatal hernia, History of Hoyos's esophagus, History of blood transfusion, Hypotension, unspecified, IBS (irritable bowel syndrome), Insomnia, unspecified, Jejunostomy tube present ([address] Low back pain, Nausea with vomiting, Neck pain, Neuropathy, Orthostatic hypotension, POTS (postural orthostatic tachycardia syndrome), Premature birth, Scoliosis, Syncope and collapse, Tachycardia, Tachycardia, unspecified, and Toxic gastroenteritis and colitis. Past Surgical History:  has a past surgical history that includes Mandible surgery; Adenoidectomy; [place] tooth extraction; Pharyngectomy;  Tonsillectomy; Gastric stimulator implant surgery (2014); Gastrostomy tube placement (04/07/2015); Upper gastrointestinal endoscopy (04/02/2010); Upper gastrointestinal endoscopy (09/25/2013); Upper gastrointestinal endoscopy (05/2015); Tunneled venous port placement (Right, 05/29/2015); vascular surgery (5/29/2015 S[facility]); vascular surgery (9/8/15 SJS); vascular surgery (11/3/15 S[facility]); INSERTION / REMOVAL / REPLACEMENT VENOUS ACCESS CATHETER (N/A, 03/25/2016); Tubal ligation; Tunneled venous port placement; vascular surgery (03/23/2017); tumor removal (Left); Cholecystectomy; knee surgery; Abdomen surgery (2014); Adenoidectomy (2007); Cholecystectomy (2016); Ear surgery (2014); Facial Surgery (2005); knee surgery (2014); knee surgery (2015); Dental surgery (2004); Port Surgery; Gastrostomy tube placement; Tonsillectomy (1997); Tubal ligation (2016); Hysterectomy; and vascular surgery (04/27/2022). Assessment   Body Structures, Functions, Activity Limitations Requiring Skilled Therapeutic Intervention: Decreased functional mobility ; Decreased strength;Decreased balance; Increased pain;Decreased posture;Decreased safe awareness;Decreased cognition;Decreased ROM; Decreased endurance;Decreased sensation;Decreased tolerance to work activity; Decreased ADL status  Assessment: Pt. will benefit from cont. PT to decrease impairments. Pt. only able to tolerate standing in SS for a few seconds. She quickly became dizzy and had to sit on paddles. Dizziness worsened and she needed to sit back on bed. Pt. tolerated sitting well, but limited due to rectal tube. Would recommend cont. PT prior to d/c home. Pt. would benefit from tub transfer bench and BSC. Treatment Diagnosis: impaired gait and mobility  Therapy Prognosis: Good  Decision Making: Medium Complexity  Barriers to Learning: none noted  Requires PT Follow-Up: Yes  Activity Tolerance  Activity Tolerance: Patient limited by fatigue; Other (comment)  Activity Tolerance Comments: limited by POTS     Plan   Physcial Therapy Plan  General Plan: 5-7 times per week  Current Treatment Recommendations: Strengthening, Balance training, Functional mobility training, Transfer training, Gait training, Endurance training, Safety education & training, Positioning, Equipment evaluation, education, & procurement, Patient/Caregiver education & training, Therapeutic activities  Safety Devices  Type of Devices: Gait belt, Call light within reach, Left in bed, Nurse notified, Bed alarm in place, Patient at risk for falls     Restrictions  Restrictions/Precautions  Restrictions/Precautions: Fall Risk, Contact Precautions  Position Activity Restriction  Other position/activity restrictions: MRSA; PEG tube-place gait belt high     Subjective   Pain: abdomen-feels nauseated. Does not want meds. General  Chart Reviewed: Yes  Patient assessed for rehabilitation services?: Yes  Response To Previous Treatment: Not applicable  Family / Caregiver Present: No  Referring Practitioner: Margret Olivo DO  Referral Date : 11/07/22  Diagnosis: metabolic acidosis, generalized weakness, hypokalemia, hypomagnesemia, ERIKA, staph aureus sepsis, POTS  Follows Commands: Impaired  Other (Comment): needs redirection, simple cues  General Comment  Comments: RN, Molly Dietz PT. Subjective  Subjective: Pt. willing to work with therapy. \"I have POTS. \" Also states her L side is her bad side, knee paxton and drops things with L hand. Social/Functional History  Social/Functional History  Lives With: Alone (moving to live with sister)  Type of Home: Apartment  Home Equipment: Wheelchair-electric (reports unable to use electric w/c at home, but unable to use)  Ambulation Assistance: Independent (rollator)  Transfer Assistance: Independent  Active : No  Additional Comments: reports she takes a long time to get anything done; was having a hard time taking care of her apt. and is now being evicted.  Reports getting all the way down in the tub, but she was having difficulty getting out. Vision/Hearing  Vision  Vision: Impaired  Vision Exceptions: Wears glasses at all times  Hearing  Hearing: Exceptions to [facility]  Hearing Exceptions: Hard of hearing/hearing concerns (pt reports L ear almost deaf)    Cognition   Orientation  Overall Orientation Status: Within Functional Limits  Cognition  Overall Cognitive Status: Exceptions  Arousal/Alertness: Delayed responses to stimuli  Following Commands: Follows one step commands with repetition; Follows one step commands with increased time  Safety Judgement: Decreased awareness of need for assistance;Decreased awareness of need for safety  Problem Solving: Assistance required to generate solutions;Assistance required to implement solutions;Decreased awareness of errors  Insights: Decreased awareness of deficits  Initiation: Requires cues for some  Sequencing: Requires cues for some     Objective   Heart Rate: 100  Heart Rate Source: Monitor  BP: 105/77  BP Location: Left upper arm  Patient Position: Supine  MAP (Calculated): 86  Resp: 16  SpO2: 98 %  O2 Device: None (Room air)     Observation/Palpation  Posture: Fair  Observation: tolerated sitting upright on EOB x 15-20 mins  Gross Assessment  Strength: Generally decreased, functional  Coordination: Generally decreased, functional  Sensation: Impaired     AROM RLE (degrees)  RLE AROM: WFL  AROM LLE (degrees)  LLE AROM : WFL  Strength RLE  Strength RLE: WFL  Comment: 3+/5  Strength LLE  Strength LLE: WFL  Comment: 3+/5           Bed mobility  Supine to Sit: Stand by assistance  Sit to Supine: Stand by assistance  Scooting: Stand by assistance  Bed Mobility Comments: able to scoot self up in bed SBA  Transfers  Sit to Stand: Minimal Assistance;Contact guard assistance;2 Person Assistance  Stand to Sit: Contact guard assistance;Minimal Assistance;2 Person Assistance  Comment: pt stood once in SS, became lightheaded, sat on paddles, lightheadedness continued and then sat on bed  Ambulation  Comments: pt unable to tolerate standing more than a few seconds     Balance  Posture: Fair  Sitting - Static: Fair;+  Sitting - Dynamic: Fair;-  Standing - Static: Poor;+  Standing - Dynamic: Poor;+           OutComes Score                                                  AM-PAC Score             Tinneti Score       Goals  Patient Goals   Patient Goals : go home after Munguia catheter       Education  Patient Education  Education Given To: Patient  Education Provided: Role of Therapy;Plan of Care;Transfer Training;Equipment  Education Provided Comments: use of SS, staff A, gait belt  Education Method: Verbal  Barriers to Learning: None  Education Outcome: Verbalized understanding;Continued education needed      Therapy Time   Individual Concurrent Group Co-treatment   Time In           Time Out           Minutes                   Fidel Duarte PT     Electronically signed by Fidel Duarte PT on 11/10/2022 at 2:10 PM

## 2022-11-11 ENCOUNTER — APPOINTMENT (OUTPATIENT)
Dept: INTERVENTIONAL RADIOLOGY/VASCULAR | Age: 32
DRG: 314 | End: 2022-11-11
Payer: MEDICAID

## 2022-11-11 VITALS
SYSTOLIC BLOOD PRESSURE: 105 MMHG | HEIGHT: 60 IN | RESPIRATION RATE: 16 BRPM | WEIGHT: 139.2 LBS | HEART RATE: 99 BPM | DIASTOLIC BLOOD PRESSURE: 75 MMHG | BODY MASS INDEX: 27.33 KG/M2 | TEMPERATURE: 97.7 F | OXYGEN SATURATION: 96 %

## 2022-11-11 PROBLEM — N17.9 AKI (ACUTE KIDNEY INJURY) (HCC): Status: RESOLVED | Noted: 2022-11-03 | Resolved: 2022-11-11

## 2022-11-11 PROBLEM — E87.20 METABOLIC ACIDOSIS: Status: RESOLVED | Noted: 2022-11-11 | Resolved: 2022-11-11

## 2022-11-11 PROBLEM — A41.9 SEPSIS (HCC): Status: RESOLVED | Noted: 2022-11-03 | Resolved: 2022-11-11

## 2022-11-11 PROBLEM — E87.20 METABOLIC ACIDOSIS: Status: ACTIVE | Noted: 2022-11-11

## 2022-11-11 LAB
BLOOD CULTURE, ROUTINE: NORMAL
CULTURE, BLOOD 2: NORMAL

## 2022-11-11 PROCEDURE — 6360000002 HC RX W HCPCS: Performed by: SURGERY

## 2022-11-11 PROCEDURE — 02HV33Z INSERTION OF INFUSION DEVICE INTO SUPERIOR VENA CAVA, PERCUTANEOUS APPROACH: ICD-10-PCS | Performed by: SURGERY

## 2022-11-11 PROCEDURE — 2500000003 HC RX 250 WO HCPCS: Performed by: SURGERY

## 2022-11-11 PROCEDURE — 36558 INSERT TUNNELED CV CATH: CPT | Performed by: SURGERY

## 2022-11-11 PROCEDURE — 6370000000 HC RX 637 (ALT 250 FOR IP): Performed by: SURGERY

## 2022-11-11 PROCEDURE — 2580000003 HC RX 258: Performed by: SURGERY

## 2022-11-11 PROCEDURE — 76937 US GUIDE VASCULAR ACCESS: CPT

## 2022-11-11 PROCEDURE — B548ZZA ULTRASONOGRAPHY OF SUPERIOR VENA CAVA, GUIDANCE: ICD-10-PCS | Performed by: SURGERY

## 2022-11-11 PROCEDURE — C1769 GUIDE WIRE: HCPCS

## 2022-11-11 PROCEDURE — 6370000000 HC RX 637 (ALT 250 FOR IP): Performed by: INTERNAL MEDICINE

## 2022-11-11 PROCEDURE — 36558 INSERT TUNNELED CV CATH: CPT

## 2022-11-11 PROCEDURE — 77001 FLUOROGUIDE FOR VEIN DEVICE: CPT

## 2022-11-11 RX ORDER — MIDAZOLAM HYDROCHLORIDE 2 MG/2ML
INJECTION, SOLUTION INTRAMUSCULAR; INTRAVENOUS
Status: COMPLETED | OUTPATIENT
Start: 2022-11-11 | End: 2022-11-11

## 2022-11-11 RX ORDER — HEPARIN SODIUM 5000 [USP'U]/ML
INJECTION, SOLUTION INTRAVENOUS; SUBCUTANEOUS
Status: COMPLETED | OUTPATIENT
Start: 2022-11-11 | End: 2022-11-11

## 2022-11-11 RX ORDER — LIDOCAINE HYDROCHLORIDE AND EPINEPHRINE BITARTRATE 20; .01 MG/ML; MG/ML
INJECTION, SOLUTION SUBCUTANEOUS
Status: COMPLETED | OUTPATIENT
Start: 2022-11-11 | End: 2022-11-11

## 2022-11-11 RX ORDER — SODIUM CHLORIDE 0.9 % (FLUSH) 0.9 %
5-40 SYRINGE (ML) INJECTION PRN
Status: DISCONTINUED | OUTPATIENT
Start: 2022-11-11 | End: 2022-11-11 | Stop reason: HOSPADM

## 2022-11-11 RX ORDER — FENTANYL CITRATE 50 UG/ML
INJECTION, SOLUTION INTRAMUSCULAR; INTRAVENOUS
Status: COMPLETED | OUTPATIENT
Start: 2022-11-11 | End: 2022-11-11

## 2022-11-11 RX ORDER — SODIUM CHLORIDE 9 MG/ML
INJECTION, SOLUTION INTRAVENOUS PRN
Status: DISCONTINUED | OUTPATIENT
Start: 2022-11-11 | End: 2022-11-11 | Stop reason: HOSPADM

## 2022-11-11 RX ORDER — SODIUM CHLORIDE 0.9 % (FLUSH) 0.9 %
5-40 SYRINGE (ML) INJECTION EVERY 12 HOURS SCHEDULED
Status: DISCONTINUED | OUTPATIENT
Start: 2022-11-11 | End: 2022-11-11 | Stop reason: HOSPADM

## 2022-11-11 RX ORDER — ENOXAPARIN SODIUM 100 MG/ML
40 INJECTION SUBCUTANEOUS DAILY
Status: DISCONTINUED | OUTPATIENT
Start: 2022-11-12 | End: 2022-11-11 | Stop reason: HOSPADM

## 2022-11-11 RX ORDER — CEPHALEXIN 500 MG/1
500 CAPSULE ORAL 3 TIMES DAILY
Qty: 6 CAPSULE | Refills: 0 | Status: SHIPPED | OUTPATIENT
Start: 2022-11-11 | End: 2022-11-13

## 2022-11-11 RX ADMIN — FENTANYL CITRATE 25 MCG: 50 INJECTION, SOLUTION INTRAMUSCULAR; INTRAVENOUS at 13:12

## 2022-11-11 RX ADMIN — Medication 5000 UNITS: at 15:17

## 2022-11-11 RX ADMIN — MIDAZOLAM HYDROCHLORIDE 0.5 MG: 1 INJECTION, SOLUTION INTRAMUSCULAR; INTRAVENOUS at 13:05

## 2022-11-11 RX ADMIN — CEPHALEXIN 500 MG: 250 CAPSULE ORAL at 06:10

## 2022-11-11 RX ADMIN — HEPARIN SODIUM 5000 UNITS: 5000 INJECTION INTRAVENOUS; SUBCUTANEOUS at 13:09

## 2022-11-11 RX ADMIN — EPOETIN ALFA-EPBX 10000 UNITS: 10000 INJECTION, SOLUTION INTRAVENOUS; SUBCUTANEOUS at 15:17

## 2022-11-11 RX ADMIN — DICYCLOMINE HYDROCHLORIDE 10 MG: 10 CAPSULE ORAL at 15:17

## 2022-11-11 RX ADMIN — FENTANYL CITRATE 25 MCG: 50 INJECTION, SOLUTION INTRAMUSCULAR; INTRAVENOUS at 13:06

## 2022-11-11 RX ADMIN — VANCOMYCIN HYDROCHLORIDE 1000 MG: 1 INJECTION, POWDER, LYOPHILIZED, FOR SOLUTION INTRAVENOUS at 12:30

## 2022-11-11 RX ADMIN — MUPIROCIN: 20 OINTMENT TOPICAL at 10:30

## 2022-11-11 RX ADMIN — LIDOCAINE HYDROCHLORIDE AND EPINEPHRINE 10 ML: 20; 10 INJECTION, SOLUTION INFILTRATION; PERINEURAL at 13:08

## 2022-11-11 RX ADMIN — THIAMINE HCL TAB 100 MG 100 MG: 100 TAB at 15:17

## 2022-11-11 RX ADMIN — MIDAZOLAM HYDROCHLORIDE 0.5 MG: 1 INJECTION, SOLUTION INTRAMUSCULAR; INTRAVENOUS at 13:13

## 2022-11-11 RX ADMIN — CALCITRIOL CAPSULES 0.25 MCG 0.25 MCG: 0.25 CAPSULE ORAL at 15:17

## 2022-11-11 ASSESSMENT — PAIN SCALES - GENERAL: PAINLEVEL_OUTOF10: 0

## 2022-11-11 NOTE — PROGRESS NOTES
Nutrition Assessment     Type and Reason for Visit: Reassess    Nutrition Recommendations/Plan:   Continue snacks as previously ordered once oral diet resumed. No need for TPN right now. Malnutrition Assessment:  Malnutrition Status: At risk for malnutrition (Comment)    Nutrition Assessment:  Pt's oral intake has improved, >50% documented of her snacks. She is NPO today for Munguia cath placement. No need for TPN at this time. Will follow clinical course. Estimated Daily Nutrient Needs:  Energy (kcal):  9022-9176 kcals (20-25 kcals/kg) Weight Used for Energy Requirements: Current     Protein (g):  54-91g Weight Used for Protein Requirements: Ideal        Fluid (ml/day):  5620-7921 ml Method Used for Fluid Requirements: 1 ml/kcal    Nutrition Related Findings:   Has PN at home.   Jtube present but does not use Wound Type: Surgical Incision    Current Nutrition Therapies:    Diet NPO Exceptions are: Sips of Water with Meds    Anthropometric Measures:  Height: 5' (152.4 cm)  Current Body Wt: 139 lb 3.2 oz (63.1 kg)   BMI: 27.2    Nutrition Diagnosis:   Inadequate protein-energy intake related to altered GI function as evidenced by intake 0-25%    Nutrition Interventions:   Food and/or Nutrient Delivery: Continue NPO  Nutrition Education/Counseling: No recommendation at this time  Coordination of Nutrition Care: Continue to monitor while inpatient  Plan of Care discussed with: pt    Goals:  Previous Goal Met: Progressing toward Goal(s)  Goals: Meet at least 75% of estimated needs, PO intake 50% or greater       Nutrition Monitoring and Evaluation:   Behavioral-Environmental Outcomes: Readiness for Change, Beliefs and Attitutes  Food/Nutrient Intake Outcomes: Food and Nutrient Intake  Physical Signs/Symptoms Outcomes: Biochemical Data, GI Status, Fluid Status or Edema, Weight, Skin    Discharge Planning:    Continue current diet     Mauricio Mitchell RD, LD  Contact: [phone]

## 2022-11-11 NOTE — DISCHARGE INSTR - DIET

## 2022-11-11 NOTE — OP NOTE
Operative Note      11/11/2022       Pre Op Diagnosis: gastroparesis, TPN      Post Op Diagnosis: same     Procedure: right IJ access under continuous US guidance. Placement of right IJ dual lumen Munguia catheter     Anesthesia: Moderate Sedation + local        Estimated Blood Loss: Less than 50 ml     Complications: None     Implants: dual lumen Munguia catheter      Procedure Details: Patient was brought to the operating room and placed in supine position. Preoperative antibiotics were given. Moderate Sedation anesthesia was applied by anesthesia provider. Patient's lower neck and chest was prepped and draped in sterile fashion. Timeout was performed. Under continuous ultrasound guidance right IJ was accessed using micropuncture needle and exchanged to micropuncture sheath over the wire. J-wire was then inserted and floated into IVC. The potential exit site was marked on the neck, the neck entrance site was open slightly more using 11 blade and dissected with hemostat. [place] catheter was tunneled . The catheter was cut to 24cm. Peel-away sheath was placed over the wire and dilator was wire was removed, the catheter was floated inside the sheath. It was peeled away to the sides. The tip position and transition point were confirmed good. Catheter was sutured to the exit site at the chest using 3-0 nylon. Sterile dressing was applied. Neck entrance site was sutured using 4-0 Monocryl and closed with Dermabond. Patient tolerated procedure well. The catheter was given excellent blood return and flushed well     Findings: Tip of the catheter at the SVC atrial junction. right  IJ compressible, patent.      Disposition:aroused from sedation, and taken to the recovery room in a stable condition      Darryle Laughter, DO  11/11/2022 1:34 PM

## 2022-11-11 NOTE — DISCHARGE INSTRUCTIONS
After Munguia or Central Line Placement:    1. Expect to be sore for a few days. You may use acetaminophen (tylenol) for this temporary discomfort. Please refer to your primary care physician for further questions about medications or dosing. 2.  A small amount of blood or drainage oozing from the insertion site is normal.  If the dressing becomes saturated with blood, hold firm and direct pressure on the insertion site and sit up at a 90 degree angle for 20 minutes. If bleeding continues, call the office. If the office is closed, you may be directed to go to the nearest emergency department for evaluation. 3.  Prevent tugging or pulling the catheter. If it is dislodged it may not work properly and may need to be replaced. 4.  Do not use antibiotic ointment or creams over the insertion area. The Home Health nurses will clean and dress your catheter per line treatment schedule. 5.  Keep the catheter dry. You may not shower. Sitting in a tub is permissible as long as the water level is below the catheter. No swimming or hot tubs ! 6. Avoid lifting anything weighing more than 10 pounds for the next three days. Ten pounds is about the weight of two phone books or a gallon of milk. Lifting may put a strain on the incision before it has a chance to heal.  7.  Do not change the dressing yourself. Site care should be provided by your Home Health nurses and must be done with a sterile technique to prevent infection. PROBLEMS OR CONCERNS    Call the vascular surgery office at [phone] with any of the followin. Continued bleeding after 20 minutes of firm, direct pressure and sitting upright. If the office is closed, you may be directed to go to the nearest emergency department for evaluation. 2.  Fever or chills, redness, heat or sudden swelling around insertion site. 3.  Catheter position changes or catheter pulled out.

## 2022-11-11 NOTE — PROGRESS NOTES
CLINICAL PHARMACY NOTE: MEDS TO BEDS    Total # of Prescriptions Filled: 1   The following medications were delivered to the patient:  Cephalexin 500 mg    Additional Documentation:    Handed script to patient at bedside

## 2022-11-11 NOTE — DISCHARGE SUMMARY
Discharge Summary    Nancy Rajan  :  1990  MRN:  [id number]    Admit date:  11/3/2022  Discharge date: 2022     Admitting Physician:  Chidi Barksdale DO    Advance Directive: Full Code    Consults: vascular surgery    Primary Care Physician:  Angeline Mata MD    Discharge Diagnoses:  Principal Problem (Resolved):    Sepsis ([address]  Active Problems:    Encounter for care related to vascular access port    Anxiety disorder    Gastroparesis    POTS (postural orthostatic tachycardia syndrome)    Chronic fatigue syndrome    Tyrell's syndrome pupil    Idiopathic scoliosis of thoracolumbar spine  Resolved Problems:    ERIKA (acute kidney injury) ([address]    Metabolic acidosis      Significant Diagnostic Studies:   CT Head W/O Contrast    Result Date: 11/3/2022  Patient: Mayte Retort  Time Out: 05:29Exam(s): CT HEAD Without Contrast EXAM:  CT Head Without Intravenous ContrastCLINICAL HISTORY:   Reason for exam: fall, headache. TECHNIQUE:  Axial computed tomography images of the head/brain without intravenous contrast.COMPARISON:2014FINDINGS:  Brain:  Unremarkable. No hemorrhage. No significant white matter disease. No edema. Ventricles:  Unremarkable. No ventriculomegaly. Bones/joints:  Unremarkable. No acute fracture. Soft tissues:  Unremarkable. Sinuses: Layering hyperdense fluid in the left maxillary sinus. Moderate left maxillary sinus mucosal thickening. Mastoid air cells:  Unremarkable as visualized. No mastoid effusion. 1. No acute intracranial hemorrhage, herniation, or hydrocephalus. 2. Moderate left maxillary sinus mucosal thickening and layering hyperdense fluid in the left maxillary sinus with which may represent inspissated secretions.   Electronically signed by Jaya Zapata M.D. on 22 at 0529    XR CHEST PORTABLE    Result Date: 11/3/2022  Patient: Mayte Retort  Time Out: 05:36Exam(s): FILM CXR 1 VIEW EXAM:  XR Chest, 1 ViewCLINICAL HISTORY:   Reason for exam: weakness. TECHNIQUE:  Frontal view of the chest.COMPARISON:  No relevant prior studies available. FINDINGS:  Lungs:  Unremarkable. No consolidation. Pleural space:  Unremarkable. No pneumothorax. Heart:  Unremarkable. No cardiomegaly. Mediastinum: Tunneled left IJ central venous catheter in place. Bones/joints:  Unremarkable. No acute pulmonary process. Electronically signed by Medardo Gonzalez M.D. on 11-03-22 at 0536      CBC:   Recent Labs     11/10/22  0604   WBC 10.9*   HGB 8.3*   *     BMP:    Recent Labs     11/10/22  0604      K 4.7      CO2 17*   BUN 7   CREATININE 0.4*   GLUCOSE 91     INR: No results for input(s): INR in the last 72 hours. Lipids: No results for input(s): CHOL, HDL in the last 72 hours. Invalid input(s): LDLCALCU  ABGs:No results for input(s): PHART, FAT2CKY, PO2ART, IHA1EHU, BEART, HGBAE, H0YSMPMW, CARBOXHGBART, 02THERAPY in the last 72 hours. HgBA1c:  No results for input(s): LABA1C in the last 72 hours. Procedures: Removal of Munguia catheter and insertion of Munguia catheter  Hospital Course: Ms. cox was admitted on 11/3 because of sepsis. She had an infected Munguia catheter. Consultation was obtained with vascular surgery who discontinued the catheter. She grew out MSSA from her blood cultures. She was treated with cefazolin and then transition to Keflex. On 11/11 a new Munguia catheter was inserted by vascular surgery. She is also clinically stable for discharge. She will be returning home to live with her sister. Physical Exam:  Vital Signs: /76   Pulse 97   Temp 97.9 °F (36.6 °C) (Temporal)   Resp 17   Ht 5' (1.524 m)   Wt 139 lb 3.2 oz (63.1 kg)   LMP 01/14/2018 (Exact Date)   SpO2 98%   BMI 27.19 kg/m²   General appearance:. Alert and Cooperative   HEENT: Normocephalic. Chest: clear to auscultation bilaterally without wheezes or rhonchi.   Cardiac: Normal heart tones with regular rate and rhythm, S1, S2 normal. No murmurs, gallops, or rubs auscultated. Abdomen:soft, non-tender; normal bowel sounds, no masses, no organomegaly. Extremities: No clubbing or cyanosis. No peripheral edema. Peripheral pulses palpable. Neurologic: Grossly intact. Discharge Medications:         Medication List        START taking these medications      cephALEXin 500 MG capsule  Commonly known as: KEFLEX  Take 1 capsule by mouth 3 times daily for 2 days            CONTINUE taking these medications      * Banophen 25 MG capsule  Generic drug: diphenhydrAMINE     * diphenhydrAMINE 50 MG/ML injection  Commonly known as: BENADRYL     Cathflo Activase 2 MG injection  Generic drug: alteplase     Diclofenac Sodium 3 % Gel     dicyclomine 10 MG capsule  Commonly known as: BENTYL     heparin flush 100 UNIT/ML injection  100 Units by Intracatheter route 2 times daily     hyoscyamine 125 MCG sublingual tablet  Commonly known as: LEVSIN/SL     linaclotide 145 MCG capsule  Commonly known as: LINZESS     Melatonin 10 MG Tabs     meloxicam 15 MG tablet  Commonly known as: MOBIC     Octagam 20 GM/200ML Soln solution  Generic drug: immune globulin (human)     PEPCID IV     promethazine 25 MG/ML injection  Commonly known as: PHENERGAN  Infuse 25 mg intravenously every 3 hours     sertraline 20 MG/ML concentrated solution  Commonly known as: ZOLOFT     sodium chloride 0.9 % infusion     sodium chloride flush 0.9 % injection     sterile water injection     triamcinolone 0.1 % cream  Commonly known as: KENALOG  Apply topically 2 times daily. Wheelchair Misc           * This list has 2 medication(s) that are the same as other medications prescribed for you. Read the directions carefully, and ask your doctor or other care provider to review them with you.                    Where to Get Your Medications        These medications were sent to [facility], [address] [place] [address] [address] [place] [address] [address] [place] [address], [address] [place] [address] [place] [address] Phone: [phone]   cephALEXin 500 MG capsule         Discharge Instructions: Follow up with Amol Terry MD in 3-5 days. Take medications as directed. Resume activity as tolerated. Diet: ADULT DIET; Regular     Disposition: Patient is medically stable and will be discharged to home. Time spent on discharge 40 minutes.     Signed:  Lamar Sanchez DO

## 2022-11-11 NOTE — PROGRESS NOTES
Physical Therapy  Name: Sejal Peña  MRN:  [id number]  Date of service:  11/11/2022 11/11/22 1156   Subjective   Subjective Attempt: Pt declined therapy at this time, states she is waiting to have a Munguia catheter placed. Will continue to follow.        Electronically signed by Nikki Carvalho PTA on 11/11/2022 at 11:59 AM

## 2022-11-14 ENCOUNTER — TELEPHONE (OUTPATIENT)
Dept: INTERNAL MEDICINE | Age: 32
End: 2022-11-14

## 2022-11-14 NOTE — TELEPHONE ENCOUNTER
[facility] Transitions Initial Follow Up Call    Outreach made within 2 business days of discharge: Yes    Patient: Martín Villeda   Patient : 1990 MRN: [id number]    Reason for Admission: Sepsis    Discharge Date: 22      Discharge Diagnoses:  Principal Problem (Resolved):    Sepsis ([facility]  Active Problems:    Encounter for care related to vascular access port    Anxiety disorder    Gastroparesis    POTS (postural orthostatic tachycardia syndrome)    Chronic fatigue syndrome    Tyrell's syndrome pupil    Idiopathic scoliosis of thoracolumbar spine  Resolved Problems:    ERIKA (acute kidney injury) ([facility]    Metabolic acidosis     Spoke with: patient    Discharge department/facility: [facility]    TCM Interactive Patient Contact:  Was patient able to fill all prescriptions: Yes  Was patient instructed to bring all medications to the follow-up visit: Yes  Is patient taking all medications as directed in the discharge summary? Yes  Does patient understand their discharge instructions: Yes  Does patient have questions or concerns that need addressed prior to 7-14 day follow up office visit: no    Spoke to the patient she doing well. They got her back on her TPN and she will need dressing changes. She has a nurse that comes out to see her she is going to reach out to her to see if she can come get that done for her and if not she will call us back. I could not get her in sooner due to she rides the [employer] bus and they need 72hrs notice. Pt will let us know if she needs anything else.     Scheduled appointment with PCP within 7-14 days    Follow Up  Future Appointments   Date Time Provider Sarai Sanchez   2022 11:30 AM Katy Retana MD [address] [place] [address] Neurology -   2022  1:30 PM SHRUTHI Reed [facility]   2023  1:40 PM [facility]       [place], [place]

## 2022-11-21 ENCOUNTER — OFFICE VISIT (OUTPATIENT)
Dept: FAMILY MEDICINE CLINIC | Age: 32
End: 2022-11-21
Payer: MEDICAID

## 2022-11-21 VITALS
HEART RATE: 107 BPM | TEMPERATURE: 97.8 F | BODY MASS INDEX: 23.28 KG/M2 | DIASTOLIC BLOOD PRESSURE: 70 MMHG | WEIGHT: 119.2 LBS | OXYGEN SATURATION: 97 % | SYSTOLIC BLOOD PRESSURE: 90 MMHG

## 2022-11-21 DIAGNOSIS — Z86.19 INFECTION RESOLVED: ICD-10-CM

## 2022-11-21 DIAGNOSIS — Z09 HOSPITAL DISCHARGE FOLLOW-UP: Primary | ICD-10-CM

## 2022-11-21 PROCEDURE — 99214 OFFICE O/P EST MOD 30 MIN: CPT | Performed by: NURSE PRACTITIONER

## 2022-11-21 PROCEDURE — 1111F DSCHRG MED/CURRENT MED MERGE: CPT | Performed by: NURSE PRACTITIONER

## 2022-11-21 NOTE — PROGRESS NOTES
Post-Discharge Transitional Care  Follow Up      Opal Franklin   YOB: 1990    Date of Office Visit:  11/21/2022  Date of Hospital Admission: 11/3/22  Date of Hospital Discharge: 11/11/22  Risk of hospital readmission (high >=14%. Medium >=10%) :Readmission Risk Score: 21.9      Care management risk score Rising risk (score 2-5) and Complex Care (Scores >=6): No Risk Score On File     Non face to face  following discharge, date last encounter closed (first attempt may have been earlier): 11/14/2022    Call initiated 2 business days of discharge: Yes    ASSESSMENT/PLAN:   Hospital discharge follow-up  -     WI DISCHARGE MEDS RECONCILED W/ CURRENT OUTPATIENT MED LIST  Infection resolved    Medical Decision Making: low complexity  Return for keep follow up with PCP. On this date 11/21/2022 I have spent 15 minutes reviewing previous notes, test results and face to face with the patient discussing the diagnosis and importance of compliance with the treatment plan as well as documenting on the day of the visit. Subjective:   HPI:  Follow up of Hospital problems/diagnosis(es): She presents for follow up of Sepsis. States she had right port inserted on 11/11/22. States she is awaiting TPN. She gives herself Phenergan through her port. [facility] has orders but awaiting a nurse to be applied to her. States she is getting her dressing changed weekly. Inpatient course: Discharge summary reviewed- see chart.     Interval history/Current status: stable    Patient Active Problem List   Diagnosis    Hiccups    Barretts esophagus    Belching    Chronic serous otitis media of left ear    Disc herniation    Neck pain    Back pain    Scoliosis    Anxiety disorder    NSAID long-term use    Heartburn    Irritable bowel syndrome    Pelvic pain in female    Dysmenorrhea    Family history of endometriosis    Gastroparesis    Insomnia    Nausea & vomiting    Constipation    Poor venous access    History of syncope    POTS (postural orthostatic tachycardia syndrome)    Tachycardia    Near syncope    ETD (eustachian tube dysfunction)    Jejunostomy tube present (HCC)    Knee pain    Mixed hearing loss of left ear    Chronic fatigue syndrome    Tyrell's syndrome pupil    Idiopathic scoliosis of thoracolumbar spine    Nutritional disorder    Autoimmune disease ([facility]    Cluster headache    Common variable agammaglobulinemia ([facility]    Hiatal hernia    History of infection due to human papilloma virus (HPV)    History of total hysterectomy    Learning disability    Vitamin D deficiency    Nonintractable headache    Encounter for care related to vascular access port    Infection of vascular catheter, initial encounter ([facility]    Infestation by bed bug    Soft tissue infection       Medications listed as ordered at the time of discharge from hospital     Medication List            Accurate as of November 21, 2022  1:50 PM. If you have any questions, ask your nurse or doctor.                 CONTINUE taking these medications      * Banophen 25 MG capsule  Generic drug: diphenhydrAMINE     * diphenhydrAMINE 50 MG/ML injection  Commonly known as: BENADRYL     Cathflo Activase 2 MG injection  Generic drug: alteplase     Diclofenac Sodium 3 % Gel     dicyclomine 10 MG capsule  Commonly known as: BENTYL     heparin flush 100 UNIT/ML injection  100 Units by Intracatheter route 2 times daily     hyoscyamine 125 MCG sublingual tablet  Commonly known as: LEVSIN/SL     linaclotide 145 MCG capsule  Commonly known as: LINZESS     Melatonin 10 MG Tabs     meloxicam 15 MG tablet  Commonly known as: MOBIC     Octagam 20 GM/200ML Soln solution  Generic drug: immune globulin (human)     PEPCID IV     promethazine 25 MG/ML injection  Commonly known as: PHENERGAN  Infuse 25 mg intravenously every 3 hours     sertraline 20 MG/ML concentrated solution  Commonly known as: ZOLOFT     sodium chloride 0.9 % infusion     sodium chloride flush 0.9 % injection     sterile water injection     triamcinolone 0.1 % cream  Commonly known as: KENALOG  Apply topically 2 times daily. Wheelchair Misc           * This list has 2 medication(s) that are the same as other medications prescribed for you. Read the directions carefully, and ask your doctor or other care provider to review them with you. Medications marked \"taking\" at this time  Outpatient Medications Marked as Taking for the 11/21/22 encounter (Office Visit) with Anola Sensing Crumble, APRN   Medication Sig Dispense Refill    Melatonin 10 MG TABS Take by mouth      Diclofenac Sodium 3 % GEL       hyoscyamine (LEVSIN/SL) 125 MCG sublingual tablet DISSOLVE 1 TABLET UNDER THE TONGUE EVERY 6 (SIX) HOURS IF NEEDED FOR CRAMPING OR DIARRHEA.      sertraline (ZOLOFT) 20 MG/ML concentrated solution       Water For Injection Sterile (STERILE WATER) injection       CATHFLO ACTIVASE 2 MG injection       triamcinolone (KENALOG) 0.1 % cream Apply topically 2 times daily. 80 g 0    sodium chloride 0.9 % infusion       OCTAGAM 20 GM/200ML SOLN solution       diphenhydrAMINE (BENADRYL) 50 MG/ML injection       BANOPHEN 25 MG capsule TAKE ONE CAPSULE BY MOUTH 1 HOUR PRIOR TO YOUR PROCEDURE WITH LAST DOSE OF PREDNISONE      Famotidine (PEPCID IV) Infuse 20 mg intravenously in the morning and at bedtime      heparin flush 100 UNIT/ML injection 100 Units by Intracatheter route 2 times daily 15 mL 2    promethazine (PHENERGAN) 25 MG/ML injection Infuse 25 mg intravenously every 3 hours 100 mL 2    meloxicam (MOBIC) 15 MG tablet Take 15 mg by mouth daily as needed      dicyclomine (BENTYL) 10 MG capsule Take 10 mg by mouth in the morning and at bedtime       linaclotide (LINZESS) 145 MCG capsule Take 145 mcg by mouth every morning (before breakfast)      sodium chloride flush 0.9 % injection Infuse 5-40 mLs intravenously as needed 6-12 times daily      Misc.  Devices [facility]) MISC by Does not apply route Indications: custom wheel chair with smart drive          Medications patient taking as of now reconciled against medications ordered at time of hospital discharge: Yes    A comprehensive review of systems was negative except for what was noted in the HPI. Objective:    BP 90/70   Pulse (!) 107   Temp 97.8 °F (36.6 °C)   Wt 119 lb 3.2 oz (54.1 kg)   LMP 01/14/2018 (Exact Date)   SpO2 97%   BMI 23.28 kg/m²   General Appearance: alert and oriented to person, place and time, well developed and well- nourished, in no acute distress  Skin: warm and dry, no rash or erythema  Head: normocephalic and atraumatic  Eyes: pupils equal, round, and reactive to light, extraocular eye movements intact, conjunctivae normal  ENT: tympanic membrane, external ear and ear canal normal bilaterally, nose without deformity, nasal mucosa and turbinates normal without polyps  Neck: supple and non-tender without mass, no thyromegaly or thyroid nodules, no cervical lymphadenopathy  Pulmonary/Chest: Port placed in right chest.  clear to auscultation bilaterally- no wheezes, rales or rhonchi, normal air movement, no respiratory distress  Cardiovascular: normal rate, regular rhythm, normal S1 and S2, no murmurs, rubs, clicks, or gallops, distal pulses intact, no carotid bruits  Abdomen: J-tube left lower stomach (non-functional). Extremities: no cyanosis, clubbing or edema  Musculoskeletal: brace on left leg. Has rollator walker present. Neurologic: reflexes normal and symmetric, no cranial nerve deficit, gait, coordination and speech normal      An electronic signature was used to authenticate this note.   --Christian Tomlinson, SHRUTHI

## 2022-11-22 ENCOUNTER — OFFICE VISIT (OUTPATIENT)
Dept: NEUROLOGY | Age: 32
End: 2022-11-22
Payer: MEDICAID

## 2022-11-22 VITALS
SYSTOLIC BLOOD PRESSURE: 117 MMHG | DIASTOLIC BLOOD PRESSURE: 84 MMHG | HEART RATE: 110 BPM | HEIGHT: 60 IN | BODY MASS INDEX: 23.36 KG/M2 | WEIGHT: 119 LBS

## 2022-11-22 DIAGNOSIS — G90.A POTS (POSTURAL ORTHOSTATIC TACHYCARDIA SYNDROME): ICD-10-CM

## 2022-11-22 DIAGNOSIS — G62.9 NEUROPATHY: Primary | ICD-10-CM

## 2022-11-22 DIAGNOSIS — R53.1 WEAKNESS: ICD-10-CM

## 2022-11-22 DIAGNOSIS — R51.9 NONINTRACTABLE HEADACHE, UNSPECIFIED CHRONICITY PATTERN, UNSPECIFIED HEADACHE TYPE: ICD-10-CM

## 2022-11-22 DIAGNOSIS — R26.9 GAIT ABNORMALITY: ICD-10-CM

## 2022-11-22 PROCEDURE — 1111F DSCHRG MED/CURRENT MED MERGE: CPT | Performed by: PSYCHIATRY & NEUROLOGY

## 2022-11-22 PROCEDURE — 1036F TOBACCO NON-USER: CPT | Performed by: PSYCHIATRY & NEUROLOGY

## 2022-11-22 PROCEDURE — G8427 DOCREV CUR MEDS BY ELIG CLIN: HCPCS | Performed by: PSYCHIATRY & NEUROLOGY

## 2022-11-22 PROCEDURE — 99204 OFFICE O/P NEW MOD 45 MIN: CPT | Performed by: PSYCHIATRY & NEUROLOGY

## 2022-11-22 PROCEDURE — G8420 CALC BMI NORM PARAMETERS: HCPCS | Performed by: PSYCHIATRY & NEUROLOGY

## 2022-11-22 PROCEDURE — G8484 FLU IMMUNIZE NO ADMIN: HCPCS | Performed by: PSYCHIATRY & NEUROLOGY

## 2022-11-22 NOTE — PROGRESS NOTES
Chief Complaint   Patient presents with    New Patient     Referred by Arelis Alavrado for Autonomic Neuropathy        Irene Guevara is a 28y.o. year old female who is seen for evaluation of autonomic neuropathy. The patient has a longstanding history of multiple issues including gastroparesis for which she is currently getting TPN, hypotension with syncope/presyncope, generalized weakness, and headaches. The patient indicates frequently she will get lightheaded dizzy if she stands up or moves about. She has generalized weakness and has chronic issues on her left side and has had previous left knee surgery. She currently wears a knee brace on the left. She has generalized numbness in the extremities. She is on IVIG weekly for gastroparesis.         Active Ambulatory Problems     Diagnosis Date Noted    Hiccups 09/18/2013    Barretts esophagus 09/18/2013    Belching 09/18/2013    Chronic serous otitis media of left ear 09/23/2013    Disc herniation 09/23/2013    Neck pain 09/23/2013    Back pain 09/23/2013    Scoliosis 09/23/2013    Anxiety disorder 09/27/2013    NSAID long-term use 11/06/2013    Heartburn 11/06/2013    Irritable bowel syndrome 01/06/2014    Pelvic pain in female 01/06/2014    Dysmenorrhea 01/06/2014    Family history of endometriosis 01/06/2014    Gastroparesis 03/10/2014    Insomnia 03/10/2014    Nausea & vomiting 03/11/2014    Constipation 03/11/2014    Poor venous access 05/27/2015    History of syncope 03/16/2017    POTS (postural orthostatic tachycardia syndrome) 03/16/2017    Tachycardia 03/16/2017    Near syncope 03/16/2017    ETD (eustachian tube dysfunction) 10/04/2016    Jejunostomy tube present ([address] 02/04/2021    Knee pain 05/11/2021    Mixed hearing loss of left ear 09/09/2016    Chronic fatigue syndrome 01/24/2017    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    Idiopathic scoliosis of thoracolumbar spine 03/22/2017    Nutritional disorder 02/18/2020    Autoimmune disease ([address] 02/28/2021 Cluster headache 05/24/2021    Common variable agammaglobulinemia ([facility] 02/28/2021    Hiatal hernia 05/24/2021    History of infection due to human papilloma virus (HPV) 05/24/2021    History of total hysterectomy 05/24/2021    Learning disability 08/25/2014    Vitamin D deficiency 01/29/2018    Nonintractable headache     Encounter for care related to vascular access port 05/19/2022    Infection of vascular catheter, initial encounter ([facility] 09/23/2022    Infestation by bed bug 09/23/2022    Soft tissue infection 09/26/2022    Neuropathy 11/22/2022    Weakness 11/22/2022    Gait abnormality 11/22/2022     Resolved Ambulatory Problems     Diagnosis Date Noted    ERIKA (acute kidney injury) ([facility] 11/03/2022    Sepsis ([facility] 49/58/0354    Metabolic acidosis 32/29/7517     Past Medical History:   Diagnosis Date    Allergic contact dermatitis due to adhesives     Allergic contact dermatitis due to plants, except food     Anemia complicating pregnancy, second trimester     Anemia complicating pregnancy, third trimester     Anxiety and depression     Bacteremia     Cellulitis of abdominal wall     Chronic fatigue, unspecified     Dysuria     Epigastric pain     Frequency of micturition     G tube feedings (HCC)     Gastrostomy malfunction (HCC)     Generalized abdominal pain     GERD (gastroesophageal reflux disease)     Heart murmur     History of Hoyos's esophagus     History of blood transfusion     Hypotension, unspecified     IBS (irritable bowel syndrome)     Insomnia, unspecified     Low back pain     Nausea with vomiting     Orthostatic hypotension     Premature birth     Syncope and collapse     Tachycardia, unspecified     Toxic gastroenteritis and colitis        Past Surgical History:   Procedure Laterality Date    ABDOMEN SURGERY  2014    gastric stimulator implanted    ADENOIDECTOMY      ADENOIDECTOMY  2007    CHOLECYSTECTOMY      CHOLECYSTECTOMY  2016    DENTAL SURGERY  2004    wisdom teeth    EAR SURGERY right internal juglular vein Tripp tunneled dialylsis catheter. Perfomed by Dr. Gabrielle Oconnell. [facility] at [place] [address]      VASCULAR SURGERY  11/11/2022    dual lumen Tripp catheter VI    WISDOM TOOTH EXTRACTION         Family History   Problem Relation Age of Onset    Other Mother         endometriosis    Depression Father     Diabetes Paternal Grandmother     Stroke Paternal Grandmother     Hypertension Paternal Grandmother     Heart Disease Paternal Grandmother     Heart Failure Paternal Grandmother     Colon Cancer Neg Hx     Colon Polyps Neg Hx     Esophageal Cancer Neg Hx     Liver Cancer Neg Hx     Rectal Cancer Neg Hx     Stomach Cancer Neg Hx        Allergies   Allergen Reactions    Iv Dye [Iodides] Shortness Of Breath, Itching and Other (See Comments)     IV 3000; SoA, dizziness    Iv Dye [Iodides] Anaphylaxis     Iodine containing    Adhesive Tape Itching     And tegaderm    Bee Pollen Hives     Oranges-anything with orange color  Oranges-anything with orange color  Oranges-anything with orange color  Oranges-anything with orange color  Oranges-anything with orange color      Chlorhexidine      Other reaction(s): ITCHING    Fruit & Vegetable Daily [Nutritional Supplements]      Oranges-anything with orange color    Metoprolol Succinate [Metoprolol]      Excessive drowsiness/ Metoprolol tartrate-excessive drowsiness    Nsaids      Other reaction(s): GI Intolerance    Dalton Itching and Other (See Comments)     Other reaction(s): Vomiting    Orange Fruit [Citrus]      And artificial    Other      Cloraprep    Reglan [Metoclopramide]      Nausea,vomiting    Tramadol Other (See Comments)     Excessive sleeping x 3 days, pt takes 1/2 a pill.     Zofran [Ondansetron Hcl] Hives    Orange Oil Nausea And Vomiting    Sulfamethoxazole-Trimethoprim Nausea And Vomiting       Social History     Socioeconomic History    Marital status: Single     Spouse name: Not on file    Number of children: Not on file Years of education: Not on file    Highest education level: Not on file   Occupational History    Not on file   Tobacco Use    Smoking status: Never    Smokeless tobacco: Never    Tobacco comments:     Vape    Vaping Use    Vaping Use: Former    Substances: Nicotine    Devices: Refillable tank   Substance and Sexual Activity    Alcohol use: Never    Drug use: Never    Sexual activity: Yes     Partners: Male   Other Topics Concern    Not on file   Social History Narrative    ** Merged History Encounter **          Social Determinants of Health     Financial Resource Strain: Not on file   Food Insecurity: Not on file   Transportation Needs: Not on file   Physical Activity: Not on file   Stress: Not on file   Social Connections: Not on file   Intimate Partner Violence: Not on file   Housing Stability: Not on file       Review of Systems    Constitutional - No fever or chills. No diaphoresis or significant fatigue. HENT -  No tinnitus or significant hearing loss. Eyes - no sudden vision change or eye pain  Respiratory - no significant shortness of breath or cough  Cardiovascular - no chest pain No palpitations or significant leg swelling  Gastrointestinal - no abdominal swelling or pain. Genitourinary - No difficulty urinating, dysuria  Musculoskeletal - no back pain or myalgia. Skin - no color change or rash  Neurologic - No seizures. No lateralizing weakness. Hematologic - no easy bruising or excessive bleeding. Psychiatric - no severe anxiety or nervousness. All other review of systems are negative.       Current Outpatient Medications   Medication Sig Dispense Refill    Melatonin 10 MG TABS Take by mouth      Diclofenac Sodium 3 % GEL       hyoscyamine (LEVSIN/SL) 125 MCG sublingual tablet DISSOLVE 1 TABLET UNDER THE TONGUE EVERY 6 (SIX) HOURS IF NEEDED FOR CRAMPING OR DIARRHEA.      sertraline (ZOLOFT) 20 MG/ML concentrated solution       Water For Injection Sterile (STERILE WATER) injection CATHFLO ACTIVASE 2 MG injection       triamcinolone (KENALOG) 0.1 % cream Apply topically 2 times daily. 80 g 0    sodium chloride 0.9 % infusion       OCTAGAM 20 GM/200ML SOLN solution       diphenhydrAMINE (BENADRYL) 50 MG/ML injection       BANOPHEN 25 MG capsule TAKE ONE CAPSULE BY MOUTH 1 HOUR PRIOR TO YOUR PROCEDURE WITH LAST DOSE OF PREDNISONE      Famotidine (PEPCID IV) Infuse 20 mg intravenously in the morning and at bedtime      heparin flush 100 UNIT/ML injection 100 Units by Intracatheter route 2 times daily 15 mL 2    promethazine (PHENERGAN) 25 MG/ML injection Infuse 25 mg intravenously every 3 hours 100 mL 2    meloxicam (MOBIC) 15 MG tablet Take 15 mg by mouth daily as needed      dicyclomine (BENTYL) 10 MG capsule Take 10 mg by mouth in the morning and at bedtime       linaclotide (LINZESS) 145 MCG capsule Take 145 mcg by mouth every morning (before breakfast)      sodium chloride flush 0.9 % injection Infuse 5-40 mLs intravenously as needed 6-12 times daily      Misc. Devices [facility]) MISC by Does not apply route Indications: custom wheel chair with smart drive       No current facility-administered medications for this visit. /84   Pulse (!) 110   Ht 5' (1.524 m)   Wt 119 lb (54 kg)   LMP 01/14/2018 (Exact Date)   BMI 23.24 kg/m²     Constitutional - well developed, well nourished. Eyes - conjunctiva normal.  Pupils not tested  Ear, nose, throat -hearing intact to finger rub No scars, masses, or lesions over external nose or ears, no atrophy of tongue  Neck-symmetric, no masses noted, no jugular vein distension  Respiration- chest wall appears symmetric, good expansion,   normal effort without use of accessory muscles  Musculoskeletal - no significant wasting of muscles noted, no bony deformities  Extremities-no clubbing, cyanosis or edema  Skin - warm, dry, and intact. No rash, erythema, or pallor.   Psychiatric - mood, affect, and behavior appear normal.      Neurological exam  Awake, alert, fluent oriented x 3 appropriate affect  Attention and concentration appear appropriate  Recent and remote memory appears unremarkable  Speech normal without dysarthria  No clear issues with language of fund of knowledge    Cranial Nerve Exam   CN II- Visual fields grossly unremarkable  CN III, IV,VI-EOMI, No nystagmus, conjugate eye movements, no ptosis  CN V-sensation intact to LT over face  CN VII-no facial assymetry  CN VIII-Hearing intact to finger rub  CN IX and X- Palate not tested  CN XI-not test shoulder shrug  CN XII-Tongue midline with no fasciculations or fibrillations    Motor Exam  Generalized giveaway weakness throughout upper and lower extremities bilaterally, no cogwheeling, normal tone    Sensory Exam  Sensation reduced over her arms and legs    Reflexes   2+ biceps bilaterally  2+ brachioradialis  1+patella  2+ ankle jerks  No clonus ankles  No Patel's sign bilateral hands    Tremors- no tremors in hands or head noted    Gait  Slightly slowed with a walker    Coordination  Finger to nose-unremarkable    No results found for: [id number]  Lab Results   Component Value Date    WBC 10.9 (H) 11/10/2022    HGB 8.3 (L) 11/10/2022    HCT 27.9 (L) 11/10/2022    MCV 80.6 (L) 11/10/2022     (H) 11/10/2022     Lab Results   Component Value Date     11/10/2022    K 4.7 11/10/2022     11/10/2022    CO2 17 (L) 11/10/2022    BUN 7 11/10/2022    CREATININE 0.4 (L) 11/10/2022    GLUCOSE 91 11/10/2022    CALCIUM 8.8 11/10/2022    PROT 5.8 (L) 11/10/2022    LABALBU 3.1 (L) 11/10/2022    BILITOT 0.3 11/10/2022    ALKPHOS 81 11/10/2022    AST 29 11/10/2022    ALT 10 11/10/2022    LABGLOM >60 11/10/2022    GFRAA >59 09/26/2022    GLOB 2.5 08/11/2016           Assessment    ICD-10-CM    1. Neuropathy  G62.9 External Referral To Neurology      2. POTS (postural orthostatic tachycardia syndrome)  G90. A       3.  Nonintractable headache, unspecified chronicity pattern, unspecified headache type  R51.9       4. Weakness  R53.1       5. Gait abnormality  R26.9           Her neurological examination today was significant for generalized giveaway weakness along with reduced sensation in her extremities. She used a walker to ambulate. At this time she will be referred to a tertiary care center to a neuromuscular specialist for evaluation of autoimmune neuropathy/autonomic dysfunction to see if her underlying autoimmune issues can be optimized. The patient indicated understanding of the management plan. She is to follow-up with me on a as needed basis and call with any further problems. Plan    Orders Placed This Encounter   Procedures    External Referral To Neurology       No orders of the defined types were placed in this encounter. No follow-ups on file. EMR Dragon/transcription disclaimer:Significant part of this  encounter note is electronic transcription/translation of spoken language to printed text. The electronic translation of spoken language may be erroneous, or at times, nonsensical words or phrases may be inadvertently transcribed.  Although I have reviewed the note for such errors, some may still exist.

## 2022-11-22 NOTE — PROGRESS NOTES
Review of Systems    Constitutional - No fever or chills. No diaphoresis yes significant fatigue. HENT -  No tinnitus or significant hearing loss. Eyes - no sudden vision change or eye pain  Respiratory - no significant shortness of breath or cough  Cardiovascular - no chest pain No palpitations or significant leg swelling  Gastrointestinal - yes abdominal swelling or pain. Genitourinary - No difficulty urinating, dysuria  Musculoskeletal - yes back pain or myalgia. Skin - no color change or rash  Neurologic - No seizures. No lateralizing weakness. Hematologic - yes easy bruising or excessive bleeding. Psychiatric - yes severe anxiety or nervousness. All other review of systems are negative.

## 2022-11-29 SDOH — HEALTH STABILITY: PHYSICAL HEALTH: ON AVERAGE, HOW MANY MINUTES DO YOU ENGAGE IN EXERCISE AT THIS LEVEL?: 0 MIN

## 2022-11-29 SDOH — HEALTH STABILITY: PHYSICAL HEALTH: ON AVERAGE, HOW MANY DAYS PER WEEK DO YOU ENGAGE IN MODERATE TO STRENUOUS EXERCISE (LIKE A BRISK WALK)?: 0 DAYS

## 2022-11-30 ENCOUNTER — OFFICE VISIT (OUTPATIENT)
Dept: PRIMARY CARE CLINIC | Age: 32
End: 2022-11-30
Payer: MEDICAID

## 2022-11-30 VITALS
TEMPERATURE: 97.8 F | HEART RATE: 122 BPM | DIASTOLIC BLOOD PRESSURE: 64 MMHG | WEIGHT: 114 LBS | OXYGEN SATURATION: 99 % | HEIGHT: 60 IN | SYSTOLIC BLOOD PRESSURE: 92 MMHG | BODY MASS INDEX: 22.38 KG/M2

## 2022-11-30 DIAGNOSIS — K31.84 GASTROPARESIS: ICD-10-CM

## 2022-11-30 DIAGNOSIS — G90.A POTS (POSTURAL ORTHOSTATIC TACHYCARDIA SYNDROME): ICD-10-CM

## 2022-11-30 DIAGNOSIS — Z78.9 ON TOTAL PARENTERAL NUTRITION (TPN): ICD-10-CM

## 2022-11-30 DIAGNOSIS — H90.5 SENSORINEURAL HEARING LOSS (SNHL) OF LEFT EAR, UNSPECIFIED HEARING STATUS ON CONTRALATERAL SIDE: ICD-10-CM

## 2022-11-30 DIAGNOSIS — M41.9 SCOLIOSIS, UNSPECIFIED SCOLIOSIS TYPE, UNSPECIFIED SPINAL REGION: ICD-10-CM

## 2022-11-30 DIAGNOSIS — Z76.89 ESTABLISHING CARE WITH NEW DOCTOR, ENCOUNTER FOR: Primary | ICD-10-CM

## 2022-11-30 DIAGNOSIS — F81.9 LEARNING DISABILITY: ICD-10-CM

## 2022-11-30 DIAGNOSIS — M35.9 AUTOIMMUNE DISEASE (HCC): ICD-10-CM

## 2022-11-30 PROBLEM — B88.8 INFESTATION BY BED BUG: Status: RESOLVED | Noted: 2022-09-23 | Resolved: 2022-11-30

## 2022-11-30 PROBLEM — L08.9 SOFT TISSUE INFECTION: Status: RESOLVED | Noted: 2022-09-26 | Resolved: 2022-11-30

## 2022-11-30 PROCEDURE — 1036F TOBACCO NON-USER: CPT | Performed by: FAMILY MEDICINE

## 2022-11-30 PROCEDURE — 99215 OFFICE O/P EST HI 40 MIN: CPT | Performed by: FAMILY MEDICINE

## 2022-11-30 PROCEDURE — G8484 FLU IMMUNIZE NO ADMIN: HCPCS | Performed by: FAMILY MEDICINE

## 2022-11-30 PROCEDURE — 1111F DSCHRG MED/CURRENT MED MERGE: CPT | Performed by: FAMILY MEDICINE

## 2022-11-30 PROCEDURE — G8427 DOCREV CUR MEDS BY ELIG CLIN: HCPCS | Performed by: FAMILY MEDICINE

## 2022-11-30 PROCEDURE — G8420 CALC BMI NORM PARAMETERS: HCPCS | Performed by: FAMILY MEDICINE

## 2022-11-30 RX ORDER — SODIUM CHLORIDE 0.9 % (FLUSH) 0.9 %
5-40 SYRINGE (ML) INJECTION PRN
OUTPATIENT
Start: 2022-11-30

## 2022-11-30 RX ORDER — HEPARIN SODIUM (PORCINE) LOCK FLUSH IV SOLN 100 UNIT/ML 100 UNIT/ML
500 SOLUTION INTRAVENOUS PRN
OUTPATIENT
Start: 2022-11-30

## 2022-11-30 SDOH — ECONOMIC STABILITY: FOOD INSECURITY: WITHIN THE PAST 12 MONTHS, YOU WORRIED THAT YOUR FOOD WOULD RUN OUT BEFORE YOU GOT MONEY TO BUY MORE.: NEVER TRUE

## 2022-11-30 SDOH — ECONOMIC STABILITY: FOOD INSECURITY: WITHIN THE PAST 12 MONTHS, THE FOOD YOU BOUGHT JUST DIDN'T LAST AND YOU DIDN'T HAVE MONEY TO GET MORE.: NEVER TRUE

## 2022-11-30 ASSESSMENT — SOCIAL DETERMINANTS OF HEALTH (SDOH): HOW HARD IS IT FOR YOU TO PAY FOR THE VERY BASICS LIKE FOOD, HOUSING, MEDICAL CARE, AND HEATING?: NOT HARD AT ALL
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
## 2022-11-30 NOTE — PROGRESS NOTES
Physician Progress Note      PATIENT:               Daniel Irving  CSN #:                  [id number]  :                       1990  ADMIT DATE:       11/3/2022 3:34 AM  aC Bañuelos DATE:        2022 4:20 PM  RESPONDING  PROVIDER #:        Lamar Sanchez DO          QUERY TEXT:    Pt admitted with N/V and HA and noted to have Sepsis and thought to have an   infected catheter. If possible, please document in progress notes and   discharge summary the relationship, if any, between sepsis and infected   catheter. The medical record reflects the following:  Risk Factors: Sepsis, known infected Munguia Catheter  Clinical Indicators: positive blood cultures, removal of Munguia catheter, Dr. Marianne Mireles documents Emogene Broom needs a left IJV dual lumen tunneled central venous   catheter removed because of infection/bacteremia\"  Treatment: Replaced catheter, blood cultures, Vancomycin, IV    Thank you in advance,    Ayde Vaughan RN-BSN, [facility]  Clinical   [address]  [email or web address]  Options provided:  -- Sepsis due to infected catheter  -- Sepsis unrelated to infected catheter  -- Other - I will add my own diagnosis  -- Disagree - Not applicable / Not valid  -- Disagree - Clinically unable to determine / Unknown  -- Refer to Clinical Documentation Reviewer    PROVIDER RESPONSE TEXT:    This patient has sepsis due to infected catheter.     Query created by: Makenzie Silva on 2022 12:57 PM      Electronically signed by:  Lamar Sanchez DO 2022 6:56 PM

## 2022-12-01 ENCOUNTER — OFFICE VISIT (OUTPATIENT)
Dept: VASCULAR SURGERY | Age: 32
End: 2022-12-01

## 2022-12-01 VITALS
TEMPERATURE: 97 F | HEART RATE: 97 BPM | DIASTOLIC BLOOD PRESSURE: 80 MMHG | SYSTOLIC BLOOD PRESSURE: 98 MMHG | OXYGEN SATURATION: 99 %

## 2022-12-01 DIAGNOSIS — I87.8 POOR VENOUS ACCESS: Primary | ICD-10-CM

## 2022-12-01 PROCEDURE — 99024 POSTOP FOLLOW-UP VISIT: CPT | Performed by: NURSE PRACTITIONER

## 2022-12-01 ASSESSMENT — ENCOUNTER SYMPTOMS
EYES NEGATIVE: 1
CONSTIPATION: 0
NAUSEA: 0
RESPIRATORY NEGATIVE: 1
BLOOD IN STOOL: 0
ABDOMINAL PAIN: 1
VOMITING: 0
DIARRHEA: 0
BACK PAIN: 1

## 2022-12-01 NOTE — PROGRESS NOTES
Patient came to department and states that a nurse will be coming to  her home starting tomorrow for her Munguia dressing care. Can cancel appointment for 12/2.

## 2022-12-02 NOTE — PROGRESS NOTES
Gloria Canas is a 28 y.o. female who presents for post op evaluation. Patient had a  double lumen henning placed  2 weeks ago. This was performed by Dr. Delmi Borja. She has since moved and has services set back up for TPN and fluids starting tomorrow. Post op symptoms reported none. Post op pain is minimal.      On evaluation today, incision is clean, dry, intact. No signs of infection are present. Today we have recommended she wash incision daily with soap and water and cover with dry gauze until healed. This should bring you up to date on Gloria Canas  As always we want to thank you for allowing us to participate in the care of your patients.     Sincerely,    SHRUTHI Ortiz

## 2022-12-07 ENCOUNTER — TELEPHONE (OUTPATIENT)
Dept: GASTROENTEROLOGY | Age: 32
End: 2022-12-07
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
## 2022-12-12 ENCOUNTER — HOSPITAL ENCOUNTER (INPATIENT)
Age: 32
LOS: 4 days | Discharge: HOME HEALTH | End: 2022-12-16
Attending: EMERGENCY MEDICINE | Admitting: STUDENT IN AN ORGANIZED HEALTH CARE EDUCATION/TRAINING PROGRAM
Payer: MEDICAID

## 2022-12-12 DIAGNOSIS — R78.81 BACTEREMIA: Primary | ICD-10-CM

## 2022-12-12 PROBLEM — E87.6 HYPOKALEMIA: Status: ACTIVE | Noted: 2022-12-12

## 2022-12-12 PROBLEM — A41.9 SEPSIS (HCC): Status: ACTIVE | Noted: 2022-12-12

## 2022-12-12 LAB
ACINETOBACTER CALCOAC BAUMANNII COMPLEX BY PCR: NOT DETECTED
ALBUMIN SERPL-MCNC: 3 G/DL (ref 3.5–5.2)
ALP BLD-CCNC: 95 U/L (ref 35–104)
ALT SERPL-CCNC: 10 U/L (ref 5–33)
ANION GAP SERPL CALCULATED.3IONS-SCNC: 17 MMOL/L (ref 7–19)
AST SERPL-CCNC: 22 U/L (ref 5–32)
BACTEROIDES FRAGILIS BY PCR: NOT DETECTED
BASOPHILS ABSOLUTE: 0.1 K/UL (ref 0–0.2)
BASOPHILS RELATIVE PERCENT: 0.4 % (ref 0–1)
BILIRUB SERPL-MCNC: 0.4 MG/DL (ref 0.2–1.2)
BUN BLDV-MCNC: 16 MG/DL (ref 6–20)
CALCIUM SERPL-MCNC: 8.4 MG/DL (ref 8.6–10)
CANDIDA ALBICANS BY PCR: NOT DETECTED
CANDIDA AURIS BY PCR: NOT DETECTED
CANDIDA GLABRATA BY PCR: NOT DETECTED
CANDIDA KRUSEI BY PCR: NOT DETECTED
CANDIDA PARAPSILOSIS BY PCR: NOT DETECTED
CANDIDA TROPICALIS BY PCR: NOT DETECTED
CARBAPENEM RESISTANCE IMP GENE BY PCR: NOT DETECTED
CARBAPENEM RESISTANCE KPC BY PCR: NOT DETECTED
CARBAPENEM RESISTANCE NDM GENE BY PCR: NOT DETECTED
CARBAPENEM RESISTANCE OXA-48 GENE BY PCR: NOT DETECTED
CARBAPENEM RESISTANCE VIM GENE BY PCR: NOT DETECTED
CEPHALOSPORIN RESISTANCE CTX-M GENE BY PCR: NOT DETECTED
CHLORIDE BLD-SCNC: 104 MMOL/L (ref 98–111)
CO2: 22 MMOL/L (ref 22–29)
COLISTIN RESISTANCE MCR-1 GENE BY PCR: NOT DETECTED
CREAT SERPL-MCNC: 0.7 MG/DL (ref 0.5–0.9)
CRYPTOCOCCUS NEOFORMANS/GATTII BY PCR: NOT DETECTED
EKG P AXIS: 65 DEGREES
EKG P-R INTERVAL: 124 MS
EKG Q-T INTERVAL: 320 MS
EKG QRS DURATION: 60 MS
EKG QTC CALCULATION (BAZETT): 405 MS
EKG T AXIS: -54 DEGREES
ENTEROBACTER CLOACAE COMPLEX BY PCR: DETECTED
ENTEROBACTERALES BY PCR: DETECTED
ENTEROCOCCUS FAECALIS BY PCR: NOT DETECTED
ENTEROCOCCUS FAECIUM BY PCR: NOT DETECTED
EOSINOPHILS ABSOLUTE: 0 K/UL (ref 0–0.6)
EOSINOPHILS RELATIVE PERCENT: 0.1 % (ref 0–5)
ESCHERICHIA COLI BY PCR: NOT DETECTED
GFR SERPL CREATININE-BSD FRML MDRD: >60 ML/MIN/{1.73_M2}
GLUCOSE BLD-MCNC: 108 MG/DL (ref 74–109)
HAEMOPHILUS INFLUENZAE BY PCR: NOT DETECTED
HCT VFR BLD CALC: 31 % (ref 37–47)
HEMOGLOBIN: 9.9 G/DL (ref 12–16)
IMMATURE GRANULOCYTES #: 0.2 K/UL
KLEBSIELLA AEROGENES BY PCR: NOT DETECTED
KLEBSIELLA OXYTOCA BY PCR: NOT DETECTED
KLEBSIELLA PNEUMONIAE GROUP BY PCR: NOT DETECTED
LACTIC ACID, SEPSIS: 3 MG/DL (ref 0.5–1.9)
LACTIC ACID: 1 MMOL/L (ref 0.5–1.9)
LIPASE: 19 U/L (ref 13–60)
LISTERIA MONOCYTOGENES BY PCR: NOT DETECTED
LYMPHOCYTES ABSOLUTE: 1 K/UL (ref 1.1–4.5)
LYMPHOCYTES RELATIVE PERCENT: 6.4 % (ref 20–40)
MAGNESIUM: 1.6 MG/DL (ref 1.6–2.6)
MCH RBC QN AUTO: 25.8 PG (ref 27–31)
MCHC RBC AUTO-ENTMCNC: 31.9 G/DL (ref 33–37)
MCV RBC AUTO: 80.7 FL (ref 81–99)
MONOCYTES ABSOLUTE: 0.7 K/UL (ref 0–0.9)
MONOCYTES RELATIVE PERCENT: 4.5 % (ref 0–10)
NEISSERIA MENINGITIDIS BY PCR: NOT DETECTED
NEUTROPHILS ABSOLUTE: 13.8 K/UL (ref 1.5–7.5)
NEUTROPHILS RELATIVE PERCENT: 87.3 % (ref 50–65)
PDW BLD-RTO: 19.4 % (ref 11.5–14.5)
PLATELET # BLD: 307 K/UL (ref 130–400)
PMV BLD AUTO: 9.5 FL (ref 9.4–12.3)
POTASSIUM SERPL-SCNC: 2.9 MMOL/L (ref 3.5–5)
PROTEUS SPECIES BY PCR: NOT DETECTED
PSEUDOMONAS AERUGINOSA BY PCR: NOT DETECTED
RBC # BLD: 3.84 M/UL (ref 4.2–5.4)
SALMONELLA SPECIES BY PCR: NOT DETECTED
SARS-COV-2, NAAT: NOT DETECTED
SERRATIA MARCESCENS BY PCR: NOT DETECTED
SODIUM BLD-SCNC: 143 MMOL/L (ref 136–145)
STAPHYLOCOCCUS AUREUS BY PCR: NOT DETECTED
STAPHYLOCOCCUS EPIDERMIDIS BY PCR: NOT DETECTED
STAPHYLOCOCCUS LUGDUNENSIS BY PCR: NOT DETECTED
STAPHYLOCOCCUS SPECIES BY PCR: NOT DETECTED
STENOTROPHOMONAS MALTOPHILIA BY PCR: NOT DETECTED
STREPTOCOCCUS AGALACTIAE BY PCR: NOT DETECTED
STREPTOCOCCUS PNEUMONIAE BY PCR: NOT DETECTED
STREPTOCOCCUS PYOGENES  BY PCR: NOT DETECTED
STREPTOCOCCUS SPECIES BY PCR: NOT DETECTED
TOTAL PROTEIN: 7.2 G/DL (ref 6.6–8.7)
WBC # BLD: 15.8 K/UL (ref 4.8–10.8)

## 2022-12-12 PROCEDURE — 93005 ELECTROCARDIOGRAM TRACING: CPT | Performed by: PHYSICIAN ASSISTANT

## 2022-12-12 PROCEDURE — 2580000003 HC RX 258

## 2022-12-12 PROCEDURE — 1210000000 HC MED SURG R&B

## 2022-12-12 PROCEDURE — 36415 COLL VENOUS BLD VENIPUNCTURE: CPT

## 2022-12-12 PROCEDURE — 2500000003 HC RX 250 WO HCPCS: Performed by: STUDENT IN AN ORGANIZED HEALTH CARE EDUCATION/TRAINING PROGRAM

## 2022-12-12 PROCEDURE — 85025 COMPLETE CBC W/AUTO DIFF WBC: CPT

## 2022-12-12 PROCEDURE — 83690 ASSAY OF LIPASE: CPT

## 2022-12-12 PROCEDURE — 83735 ASSAY OF MAGNESIUM: CPT

## 2022-12-12 PROCEDURE — 99285 EMERGENCY DEPT VISIT HI MDM: CPT

## 2022-12-12 PROCEDURE — 2580000003 HC RX 258: Performed by: HOSPITALIST

## 2022-12-12 PROCEDURE — 93010 ELECTROCARDIOGRAM REPORT: CPT | Performed by: INTERNAL MEDICINE

## 2022-12-12 PROCEDURE — 80053 COMPREHEN METABOLIC PANEL: CPT

## 2022-12-12 PROCEDURE — 36556 INSERT NON-TUNNEL CV CATH: CPT

## 2022-12-12 PROCEDURE — 87040 BLOOD CULTURE FOR BACTERIA: CPT

## 2022-12-12 PROCEDURE — 87150 DNA/RNA AMPLIFIED PROBE: CPT

## 2022-12-12 PROCEDURE — 87635 SARS-COV-2 COVID-19 AMP PRB: CPT

## 2022-12-12 PROCEDURE — 6370000000 HC RX 637 (ALT 250 FOR IP)

## 2022-12-12 PROCEDURE — 87186 SC STD MICRODIL/AGAR DIL: CPT

## 2022-12-12 PROCEDURE — 83605 ASSAY OF LACTIC ACID: CPT

## 2022-12-12 PROCEDURE — 6360000002 HC RX W HCPCS: Performed by: PHYSICIAN ASSISTANT

## 2022-12-12 PROCEDURE — 96365 THER/PROPH/DIAG IV INF INIT: CPT

## 2022-12-12 PROCEDURE — 6360000002 HC RX W HCPCS

## 2022-12-12 PROCEDURE — 2580000003 HC RX 258: Performed by: PHYSICIAN ASSISTANT

## 2022-12-12 PROCEDURE — 93005 ELECTROCARDIOGRAM TRACING: CPT | Performed by: HOSPITALIST

## 2022-12-12 RX ORDER — POTASSIUM CHLORIDE 7.45 MG/ML
10 INJECTION INTRAVENOUS PRN
Status: DISCONTINUED | OUTPATIENT
Start: 2022-12-12 | End: 2022-12-16 | Stop reason: HOSPADM

## 2022-12-12 RX ORDER — POTASSIUM CHLORIDE 20 MEQ/1
40 TABLET, EXTENDED RELEASE ORAL PRN
Status: DISCONTINUED | OUTPATIENT
Start: 2022-12-12 | End: 2022-12-16 | Stop reason: HOSPADM

## 2022-12-12 RX ORDER — POTASSIUM CHLORIDE 750 MG/1
10 TABLET, EXTENDED RELEASE ORAL 2 TIMES DAILY
Status: DISCONTINUED | OUTPATIENT
Start: 2022-12-12 | End: 2022-12-12

## 2022-12-12 RX ORDER — 0.9 % SODIUM CHLORIDE 0.9 %
30 INTRAVENOUS SOLUTION INTRAVENOUS ONCE
Status: COMPLETED | OUTPATIENT
Start: 2022-12-12 | End: 2022-12-13

## 2022-12-12 RX ORDER — DICYCLOMINE HYDROCHLORIDE 10 MG/1
10 CAPSULE ORAL 2 TIMES DAILY
Status: DISCONTINUED | OUTPATIENT
Start: 2022-12-12 | End: 2022-12-16 | Stop reason: HOSPADM

## 2022-12-12 RX ORDER — 0.9 % SODIUM CHLORIDE 0.9 %
500 INTRAVENOUS SOLUTION INTRAVENOUS ONCE
Status: COMPLETED | OUTPATIENT
Start: 2022-12-12 | End: 2022-12-12

## 2022-12-12 RX ORDER — 0.9 % SODIUM CHLORIDE 0.9 %
1000 INTRAVENOUS SOLUTION INTRAVENOUS ONCE
Status: COMPLETED | OUTPATIENT
Start: 2022-12-12 | End: 2022-12-12

## 2022-12-12 RX ORDER — ENOXAPARIN SODIUM 100 MG/ML
40 INJECTION SUBCUTANEOUS DAILY
Status: DISCONTINUED | OUTPATIENT
Start: 2022-12-12 | End: 2022-12-13

## 2022-12-12 RX ORDER — ACETAMINOPHEN 650 MG/1
650 SUPPOSITORY RECTAL EVERY 6 HOURS PRN
Status: DISCONTINUED | OUTPATIENT
Start: 2022-12-12 | End: 2022-12-13

## 2022-12-12 RX ORDER — SODIUM CHLORIDE 0.9 % (FLUSH) 0.9 %
5-40 SYRINGE (ML) INJECTION EVERY 12 HOURS SCHEDULED
Status: DISCONTINUED | OUTPATIENT
Start: 2022-12-12 | End: 2022-12-16 | Stop reason: HOSPADM

## 2022-12-12 RX ORDER — SODIUM CHLORIDE 0.9 % (FLUSH) 0.9 %
5-40 SYRINGE (ML) INJECTION PRN
Status: DISCONTINUED | OUTPATIENT
Start: 2022-12-12 | End: 2022-12-16 | Stop reason: HOSPADM

## 2022-12-12 RX ORDER — SODIUM CHLORIDE 9 MG/ML
INJECTION, SOLUTION INTRAVENOUS CONTINUOUS
Status: DISCONTINUED | OUTPATIENT
Start: 2022-12-12 | End: 2022-12-12

## 2022-12-12 RX ORDER — SODIUM CHLORIDE 9 MG/ML
INJECTION, SOLUTION INTRAVENOUS PRN
Status: DISCONTINUED | OUTPATIENT
Start: 2022-12-12 | End: 2022-12-16 | Stop reason: HOSPADM

## 2022-12-12 RX ORDER — POTASSIUM CHLORIDE 20 MEQ/1
40 TABLET, EXTENDED RELEASE ORAL ONCE
Status: DISCONTINUED | OUTPATIENT
Start: 2022-12-12 | End: 2022-12-13

## 2022-12-12 RX ORDER — ACETAMINOPHEN 325 MG/1
650 TABLET ORAL EVERY 6 HOURS PRN
Status: DISCONTINUED | OUTPATIENT
Start: 2022-12-12 | End: 2022-12-13

## 2022-12-12 RX ORDER — PROCHLORPERAZINE EDISYLATE 5 MG/ML
10 INJECTION INTRAMUSCULAR; INTRAVENOUS EVERY 6 HOURS PRN
Status: DISCONTINUED | OUTPATIENT
Start: 2022-12-12 | End: 2022-12-16 | Stop reason: HOSPADM

## 2022-12-12 RX ADMIN — SODIUM CHLORIDE 1000 ML: 9 INJECTION, SOLUTION INTRAVENOUS at 22:36

## 2022-12-12 RX ADMIN — SODIUM CHLORIDE 500 ML: 9 INJECTION, SOLUTION INTRAVENOUS at 20:06

## 2022-12-12 RX ADMIN — DICYCLOMINE HYDROCHLORIDE 10 MG: 10 CAPSULE ORAL at 22:37

## 2022-12-12 RX ADMIN — SODIUM CHLORIDE 1000 ML: 9 INJECTION, SOLUTION INTRAVENOUS at 12:58

## 2022-12-12 RX ADMIN — SODIUM CHLORIDE: 9 INJECTION, SOLUTION INTRAVENOUS at 21:22

## 2022-12-12 RX ADMIN — Medication 1000 MG: at 18:26

## 2022-12-12 RX ADMIN — ENOXAPARIN SODIUM 40 MG: 100 INJECTION SUBCUTANEOUS at 20:10

## 2022-12-12 RX ADMIN — DEXTROSE, SODIUM CHLORIDE, AND POTASSIUM CHLORIDE: 5; .45; .15 INJECTION INTRAVENOUS at 22:54

## 2022-12-12 RX ADMIN — SODIUM CHLORIDE, PRESERVATIVE FREE 10 ML: 5 INJECTION INTRAVENOUS at 20:10

## 2022-12-12 RX ADMIN — ACETAMINOPHEN 650 MG: 325 TABLET, FILM COATED ORAL at 21:37

## 2022-12-12 RX ADMIN — PROCHLORPERAZINE EDISYLATE 10 MG: 5 INJECTION INTRAMUSCULAR; INTRAVENOUS at 20:09

## 2022-12-12 RX ADMIN — CEFEPIME HYDROCHLORIDE 1000 MG: 1 INJECTION, POWDER, FOR SOLUTION INTRAMUSCULAR; INTRAVENOUS at 17:23

## 2022-12-12 ASSESSMENT — PAIN DESCRIPTION - PAIN TYPE: TYPE: ACUTE PAIN

## 2022-12-12 ASSESSMENT — ENCOUNTER SYMPTOMS
NAUSEA: 1
SHORTNESS OF BREATH: 0
CONSTIPATION: 0
ABDOMINAL PAIN: 0
RESPIRATORY NEGATIVE: 1
BLOOD IN STOOL: 0
VOMITING: 1
DIARRHEA: 1
COUGH: 0

## 2022-12-12 ASSESSMENT — PAIN SCALES - GENERAL
PAINLEVEL_OUTOF10: 0
PAINLEVEL_OUTOF10: 5

## 2022-12-12 ASSESSMENT — PAIN DESCRIPTION - DESCRIPTORS: DESCRIPTORS: ACHING

## 2022-12-12 ASSESSMENT — PAIN DESCRIPTION - LOCATION: LOCATION: ABDOMEN

## 2022-12-12 ASSESSMENT — PAIN DESCRIPTION - FREQUENCY: FREQUENCY: CONTINUOUS

## 2022-12-12 ASSESSMENT — PAIN - FUNCTIONAL ASSESSMENT: PAIN_FUNCTIONAL_ASSESSMENT: 0-10

## 2022-12-12 NOTE — ED PROVIDER NOTES
[facility] - [facility] EMERGENCY DEPT  eMERGENCY dEPARTMENT eNCOUnter      Pt Name: Feli Renteria  MRN: [id number]  [place] 1990  Date of evaluation: 12/12/2022  Provider: [facility]       Chief Complaint   Patient presents with    Nausea     Pt was admitted last month with sepsis. Pt receives IVIG infusions at home and said last one was last Monday. Pt states she has had chills, nausea/diarrhea since then. Pt got called today by pharmacist and was told she has bacteria in her blood again and needed to come to ED. Diarrhea    Emesis         HISTORY OF PRESENT ILLNESS   (Location/Symptom, Timing/Onset,Context/Setting, Quality, Duration, Modifying Factors, Severity)  Note limiting factors. Feli Renteria is a 28 y.o. female with history including IBS, gastroporesis, POTS, bacteremia, anxiety, and scoliosis who presents to the emergency department with complaint of bacteremia. She also notes nausea and vomiting with diarrhea that began yesterday. She states she received a call from the pharmacist who handles her IgG and TPN who told her she had positive blood cultures and needed to report immediately to the ED. She denies fever, chest pain, or SOA. She was just admitted here approx 1 month ago when port was placed with sepsis. NursingNotes were reviewed. REVIEW OF SYSTEMS    (2-9 systems for level 4, 10 or more for level 5)     Review of Systems   Constitutional:  Negative for chills and fever. Respiratory:  Negative for shortness of breath. Cardiovascular:  Negative for chest pain. Gastrointestinal:  Positive for diarrhea, nausea and vomiting. Negative for abdominal pain, blood in stool and constipation. Musculoskeletal:  Negative for myalgias. Neurological:  Negative for dizziness and headaches. All other systems reviewed and are negative.          PAST MEDICALHISTORY     Past Medical History:   Diagnosis Date    Allergic contact dermatitis due to adhesives     Allergic contact dermatitis due to plants, except food     Anemia complicating pregnancy, second trimester     Anemia complicating pregnancy, third trimester     Anxiety and depression     Anxiety disorder     unspecified    Bacteremia     Cellulitis of abdominal wall     Chronic fatigue, unspecified     Dysuria     Encounter for care related to vascular access port 5/19/2022    Epigastric pain     Frequency of micturition     G tube feedings (HCC)     Gastroparesis     Dr. Ady Lopez in [place] manages    Gastroparesis     Gastrostomy malfunction [facility])     Generalized abdominal pain     GERD (gastroesophageal reflux disease)     Heart murmur     Hiatal hernia     History of Hoyos's esophagus     History of blood transfusion     Hypotension, unspecified     IBS (irritable bowel syndrome)     Insomnia, unspecified     Jejunostomy tube present (HCC)     Low back pain     Nausea with vomiting     unspecified    Neck pain     Neuropathy     Orthostatic hypotension     POTS (postural orthostatic tachycardia syndrome)     Premature birth     delivered at 24-27 weeks    Scoliosis     Syncope and collapse     Tachycardia     Tachycardia, unspecified     Toxic gastroenteritis and colitis          SURGICAL HISTORY       Past Surgical History:   Procedure Laterality Date    ABDOMEN SURGERY  2014    gastric stimulator implanted    ADENOIDECTOMY      ADENOIDECTOMY  2007    CHOLECYSTECTOMY      CHOLECYSTECTOMY  2016    DENTAL SURGERY  2004    wisdom teeth    EAR SURGERY  2014    left    FACIAL SURGERY  2005    GASTRIC STIMULATOR IMPLANT SURGERY  11/2014    GASTROSTOMY TUBE PLACEMENT  04/07/2015    J tube-Removed in [place] 1/2018    [address]      G tube 2017    HYSTERECTOMY (CERVIX STATUS UNKNOWN)      INSERTION / REMOVAL / REPLACEMENT VENOUS ACCESS CATHETER N/A 03/25/2016    REMOVAL OF PORT AND PLACEMENT OF NEW PORT; REMOVAL OF PICC LINE  performed by Bjorn Negrete MD at [facility]  2014 Iv dye [iodides], Iv dye [iodides], Adhesive tape, Bee pollen, Chlorhexidine, Fruit & vegetable daily [nutritional supplements], Metoprolol succinate [metoprolol], Nsaids, Orange, Orange fruit [citrus], Other, Reglan [metoclopramide], Tramadol, Zofran [ondansetron hcl], Orange oil, and Sulfamethoxazole-trimethoprim    FAMILY HISTORY       Family History   Problem Relation Age of Onset    Other Mother         endometriosis    Depression Father     Diabetes Paternal Grandmother     Stroke Paternal Grandmother     Hypertension Paternal Grandmother     Heart Disease Paternal Grandmother     Heart Failure Paternal Grandmother     Colon Cancer Neg Hx     Colon Polyps Neg Hx     Esophageal Cancer Neg Hx     Liver Cancer Neg Hx     Rectal Cancer Neg Hx     Stomach Cancer Neg Hx           SOCIAL HISTORY       Social History     Socioeconomic History    Marital status: Single     Spouse name: None    Number of children: None    Years of education: None    Highest education level: None   Tobacco Use    Smoking status: Never    Smokeless tobacco: Never    Tobacco comments:     Vape    Vaping Use    Vaping Use: Former    Substances: Nicotine    Devices: Refillable tank   Substance and Sexual Activity    Alcohol use: Never    Drug use: Never    Sexual activity: Yes     Partners: Male   Social History Narrative    ** Merged History Encounter **          Social Determinants of Health     Financial Resource Strain: Low Risk     Difficulty of Paying Living Expenses: Not hard at all   Food Insecurity: No Food Insecurity    Worried About Running Out of Food in the Last Year: Never true    Ran Out of Food in the Last Year: Never true   Physical Activity: Inactive    Days of Exercise per Week: 0 days    Minutes of Exercise per Session: 0 min   Intimate Partner Violence: Not At Risk    Fear of Current or Ex-Partner: No    Emotionally Abused: No    Physically Abused: No    Sexually Abused: No       SCREENINGS    Ramona Coma Scale  Eye Opening: Spontaneous  Best Verbal Response: Oriented  Best Motor Response: Obeys commands  Leandro Coma Scale Score: 15        PHYSICAL EXAM    (up to 7 for level 4, 8 or more for level 5)     ED Triage Vitals [12/12/22 1137]   BP Temp Temp Source Heart Rate Resp SpO2 Height Weight   116/75 97.8 °F (36.6 °C) Oral (!) 130 18 100 % 5' (1.524 m) 114 lb (51.7 kg)       Physical Exam  Vitals and nursing note reviewed. Constitutional:       General: She is not in acute distress. Appearance: Normal appearance. She is normal weight. She is ill-appearing (chronically). She is not toxic-appearing or diaphoretic. HENT:      Head: Normocephalic and atraumatic. Cardiovascular:      Rate and Rhythm: Normal rate and regular rhythm. Pulses: Normal pulses. Pulmonary:      Effort: Pulmonary effort is normal. No respiratory distress. Chest:      Chest wall: No tenderness. Abdominal:      General: There is no distension. Palpations: Abdomen is soft. Tenderness: There is no abdominal tenderness. Musculoskeletal:      Cervical back: Normal range of motion and neck supple. No rigidity or tenderness. Skin:     General: Skin is warm and dry. Neurological:      Mental Status: She is alert and oriented to person, place, and time. Mental status is at baseline.        DIAGNOSTIC RESULTS     EKG: All EKG's areinterpreted by the Emergency Department Physician who either signs or Co-signs this chart in the absence of a cardiologist.    EKG interpreted by attending, Sinus rhythm at a rate of 109, No stemi or acute ischemia, , QTc 454      LABS:  Labs Reviewed   CBC WITH AUTO DIFFERENTIAL - Abnormal; Notable for the following components:       Result Value    WBC 15.8 (*)     RBC 3.84 (*)     Hemoglobin 9.9 (*)     Hematocrit 31.0 (*)     MCV 80.7 (*)     MCH 25.8 (*)     MCHC 31.9 (*)     RDW 19.4 (*)     Neutrophils % 87.3 (*)     Lymphocytes % 6.4 (*)     Neutrophils Absolute 13.8 (*)     Lymphocytes Absolute 1.0 (*)     All other components within normal limits   COMPREHENSIVE METABOLIC PANEL - Abnormal; Notable for the following components:    Potassium 2.9 (*)     Calcium 8.4 (*)     Albumin 3.0 (*)     All other components within normal limits   COVID-19, RAPID   CULTURE, BLOOD 1   CULTURE, BLOOD 2   LIPASE   LACTIC ACID   URINALYSIS   PREGNANCY, URINE       All other labs were within normal range or not returned as of this dictation. EMERGENCY DEPARTMENT COURSE and DIFFERENTIAL DIAGNOSIS/MDM:   Vitals:    Vitals:    12/12/22 1137 12/12/22 1300 12/12/22 1400 12/12/22 1630   BP: 116/75 101/85 102/70 94/71   Pulse: (!) 130 (!) 112 (!) 107    Resp: 18 18 18    Temp: 97.8 °F (36.6 °C)      TempSrc: Oral      SpO2: 100% 98% 98% 99%   Weight: 114 lb (51.7 kg)      Height: 5' (1.524 m)          MDM     Amount and/or Complexity of Data Reviewed  Decide to obtain previous medical records or to obtain history from someone other than the patient: yes    Patient is a 77-year-old female who presents to the ER with complaint of bacteremia with nausea, vomiting, and diarrhea. On arrival she did have tachycardia which improved with fluids. Blood cultures were obtained and she was started on cefepime and vancomycin in the ER. She does have leukocytosis on her CBC but otherwise shows stable chronic anemia. CMP does show multiple mild electrolyte disturbances without ERIKA or LFT elevation. COVID-negative. Lactic negative. Due to concern for bacteremia, the patient will be admitted with hospitalist. Spoke with [address] [place] [address] for report. CONSULTS:  SHRUTHI Adames Hospitalist    FINAL IMPRESSION      1.  Bacteremia          DISPOSITION/PLAN   DISPOSITION Decision To Admit 12/12/2022 04:42:25 PM      (Please note that portions of this note were completed with a voice recognition program.  Efforts were made to edit thedictations but occasionally words are mis-transcribed.)    Cat Reid (electronically signed)     Antonette Manley, [address]  12/12/22 9459

## 2022-12-13 ENCOUNTER — APPOINTMENT (OUTPATIENT)
Dept: GENERAL RADIOLOGY | Age: 32
End: 2022-12-13
Payer: MEDICAID

## 2022-12-13 ENCOUNTER — APPOINTMENT (OUTPATIENT)
Dept: CT IMAGING | Age: 32
End: 2022-12-13
Payer: MEDICAID

## 2022-12-13 LAB
ANION GAP SERPL CALCULATED.3IONS-SCNC: 17 MMOL/L (ref 7–19)
BACTERIA: NEGATIVE /HPF
BILIRUBIN URINE: NEGATIVE
BLOOD, URINE: NEGATIVE
BUN BLDV-MCNC: 14 MG/DL (ref 6–20)
CALCIUM SERPL-MCNC: 6.7 MG/DL (ref 8.6–10)
CHLORIDE BLD-SCNC: 108 MMOL/L (ref 98–111)
CLARITY: CLEAR
CO2: 14 MMOL/L (ref 22–29)
COLOR: YELLOW
CREAT SERPL-MCNC: 0.8 MG/DL (ref 0.5–0.9)
CRYSTALS, UA: ABNORMAL /HPF
D DIMER: 1.34 UG/ML FEU (ref 0–0.48)
EKG P AXIS: NORMAL DEGREES
EKG P-R INTERVAL: NORMAL MS
EKG Q-T INTERVAL: 252 MS
EKG QRS DURATION: 62 MS
EKG QTC CALCULATION (BAZETT): 383 MS
EKG T AXIS: -66 DEGREES
EPITHELIAL CELLS, UA: 1 /HPF (ref 0–5)
FERRITIN: 386.4 NG/ML (ref 13–150)
FOLATE: 9.4 NG/ML (ref 4.8–37.3)
GFR SERPL CREATININE-BSD FRML MDRD: >60 ML/MIN/{1.73_M2}
GLUCOSE BLD-MCNC: 277 MG/DL (ref 74–109)
GLUCOSE URINE: NEGATIVE MG/DL
HCT VFR BLD CALC: 26.5 % (ref 37–47)
HEMOGLOBIN: 8.5 G/DL (ref 12–16)
HYALINE CASTS: 1 /HPF (ref 0–8)
IRON SATURATION: 6 % (ref 14–50)
IRON: 6 UG/DL (ref 37–145)
KETONES, URINE: =>160 MG/DL
LACTIC ACID: 0.7 MMOL/L (ref 0.5–1.9)
LACTIC ACID: 1 MMOL/L (ref 0.5–1.9)
LEUKOCYTE ESTERASE, URINE: ABNORMAL
MCH RBC QN AUTO: 26.3 PG (ref 27–31)
MCHC RBC AUTO-ENTMCNC: 32.1 G/DL (ref 33–37)
MCV RBC AUTO: 82 FL (ref 81–99)
NITRITE, URINE: POSITIVE
PDW BLD-RTO: 19.9 % (ref 11.5–14.5)
PH UA: 6.5 (ref 5–8)
PLATELET # BLD: 290 K/UL (ref 130–400)
PMV BLD AUTO: 10.5 FL (ref 9.4–12.3)
POTASSIUM SERPL-SCNC: 3 MMOL/L (ref 3.5–5)
PROCALCITONIN: 63.06 NG/ML (ref 0–0.09)
PROTEIN UA: 100 MG/DL
RBC # BLD: 3.23 M/UL (ref 4.2–5.4)
RBC UA: 1 /HPF (ref 0–4)
SODIUM BLD-SCNC: 139 MMOL/L (ref 136–145)
SPECIFIC GRAVITY UA: 1.03 (ref 1–1.03)
TOTAL IRON BINDING CAPACITY: 97 UG/DL (ref 250–400)
TROPONIN: <0.01 NG/ML (ref 0–0.03)
TSH SERPL DL<=0.05 MIU/L-ACNC: 1.51 UIU/ML (ref 0.27–4.2)
UROBILINOGEN, URINE: 1 E.U./DL
VITAMIN B-12: 424 PG/ML (ref 211–946)
VITAMIN D 25-HYDROXY: 12.7 NG/ML
WBC # BLD: 18.5 K/UL (ref 4.8–10.8)
WBC UA: 22 /HPF (ref 0–5)

## 2022-12-13 PROCEDURE — 82306 VITAMIN D 25 HYDROXY: CPT

## 2022-12-13 PROCEDURE — C8929 TTE W OR WO FOL WCON,DOPPLER: HCPCS

## 2022-12-13 PROCEDURE — 84443 ASSAY THYROID STIM HORMONE: CPT

## 2022-12-13 PROCEDURE — 2580000003 HC RX 258: Performed by: STUDENT IN AN ORGANIZED HEALTH CARE EDUCATION/TRAINING PROGRAM

## 2022-12-13 PROCEDURE — 82728 ASSAY OF FERRITIN: CPT

## 2022-12-13 PROCEDURE — 71046 X-RAY EXAM CHEST 2 VIEWS: CPT

## 2022-12-13 PROCEDURE — 6370000000 HC RX 637 (ALT 250 FOR IP)

## 2022-12-13 PROCEDURE — 6360000002 HC RX W HCPCS: Performed by: HOSPITALIST

## 2022-12-13 PROCEDURE — 82746 ASSAY OF FOLIC ACID SERUM: CPT

## 2022-12-13 PROCEDURE — 93010 ELECTROCARDIOGRAM REPORT: CPT | Performed by: INTERNAL MEDICINE

## 2022-12-13 PROCEDURE — 2580000003 HC RX 258

## 2022-12-13 PROCEDURE — 51798 US URINE CAPACITY MEASURE: CPT

## 2022-12-13 PROCEDURE — 81001 URINALYSIS AUTO W/SCOPE: CPT

## 2022-12-13 PROCEDURE — 85027 COMPLETE CBC AUTOMATED: CPT

## 2022-12-13 PROCEDURE — 2580000003 HC RX 258: Performed by: HOSPITALIST

## 2022-12-13 PROCEDURE — 83605 ASSAY OF LACTIC ACID: CPT

## 2022-12-13 PROCEDURE — 2500000003 HC RX 250 WO HCPCS: Performed by: STUDENT IN AN ORGANIZED HEALTH CARE EDUCATION/TRAINING PROGRAM

## 2022-12-13 PROCEDURE — 80048 BASIC METABOLIC PNL TOTAL CA: CPT

## 2022-12-13 PROCEDURE — 36415 COLL VENOUS BLD VENIPUNCTURE: CPT

## 2022-12-13 PROCEDURE — 83540 ASSAY OF IRON: CPT

## 2022-12-13 PROCEDURE — 2500000003 HC RX 250 WO HCPCS: Performed by: HOSPITALIST

## 2022-12-13 PROCEDURE — 87086 URINE CULTURE/COLONY COUNT: CPT

## 2022-12-13 PROCEDURE — 85379 FIBRIN DEGRADATION QUANT: CPT

## 2022-12-13 PROCEDURE — 71046 X-RAY EXAM CHEST 2 VIEWS: CPT | Performed by: RADIOLOGY

## 2022-12-13 PROCEDURE — 6370000000 HC RX 637 (ALT 250 FOR IP): Performed by: HOSPITALIST

## 2022-12-13 PROCEDURE — 82607 VITAMIN B-12: CPT

## 2022-12-13 PROCEDURE — 6360000002 HC RX W HCPCS

## 2022-12-13 PROCEDURE — 84484 ASSAY OF TROPONIN QUANT: CPT

## 2022-12-13 PROCEDURE — 84145 PROCALCITONIN (PCT): CPT

## 2022-12-13 PROCEDURE — 1210000000 HC MED SURG R&B

## 2022-12-13 PROCEDURE — 6360000004 HC RX CONTRAST MEDICATION: Performed by: STUDENT IN AN ORGANIZED HEALTH CARE EDUCATION/TRAINING PROGRAM

## 2022-12-13 PROCEDURE — 83550 IRON BINDING TEST: CPT

## 2022-12-13 PROCEDURE — 87040 BLOOD CULTURE FOR BACTERIA: CPT

## 2022-12-13 PROCEDURE — 74176 CT ABD & PELVIS W/O CONTRAST: CPT

## 2022-12-13 PROCEDURE — 6360000002 HC RX W HCPCS: Performed by: INTERNAL MEDICINE

## 2022-12-13 RX ORDER — CALCIUM GLUCONATE 20 MG/ML
1000 INJECTION, SOLUTION INTRAVENOUS ONCE
Status: COMPLETED | OUTPATIENT
Start: 2022-12-13 | End: 2022-12-13

## 2022-12-13 RX ORDER — 0.9 % SODIUM CHLORIDE 0.9 %
1000 INTRAVENOUS SOLUTION INTRAVENOUS ONCE
Status: COMPLETED | OUTPATIENT
Start: 2022-12-13 | End: 2022-12-13

## 2022-12-13 RX ORDER — NOREPINEPHRINE BIT/0.9 % NACL 16MG/250ML
1-100 INFUSION BOTTLE (ML) INTRAVENOUS CONTINUOUS
Status: DISCONTINUED | OUTPATIENT
Start: 2022-12-13 | End: 2022-12-13

## 2022-12-13 RX ORDER — DIPHENHYDRAMINE HYDROCHLORIDE 50 MG/ML
25 INJECTION INTRAMUSCULAR; INTRAVENOUS EVERY 4 HOURS PRN
COMMUNITY
Start: 2022-12-01

## 2022-12-13 RX ORDER — MEROPENEM AND SODIUM CHLORIDE 1 G/50ML
1000 INJECTION, SOLUTION INTRAVENOUS EVERY 8 HOURS
Status: DISCONTINUED | OUTPATIENT
Start: 2022-12-13 | End: 2022-12-16

## 2022-12-13 RX ORDER — MEROPENEM AND SODIUM CHLORIDE 1 G/50ML
1000 INJECTION, SOLUTION INTRAVENOUS EVERY 8 HOURS
Status: DISCONTINUED | OUTPATIENT
Start: 2022-12-13 | End: 2022-12-13

## 2022-12-13 RX ORDER — FAMOTIDINE 10 MG/ML
20 INJECTION, SOLUTION INTRAVENOUS DAILY
COMMUNITY
Start: 2022-12-01

## 2022-12-13 RX ORDER — ERGOCALCIFEROL 1.25 MG/1
50000 CAPSULE ORAL WEEKLY
Status: DISCONTINUED | OUTPATIENT
Start: 2022-12-13 | End: 2022-12-14

## 2022-12-13 RX ORDER — ACETAMINOPHEN 650 MG/1
650 SUPPOSITORY RECTAL EVERY 6 HOURS PRN
Status: DISCONTINUED | OUTPATIENT
Start: 2022-12-13 | End: 2022-12-16 | Stop reason: HOSPADM

## 2022-12-13 RX ORDER — MEROPENEM AND SODIUM CHLORIDE 1 G/50ML
1000 INJECTION, SOLUTION INTRAVENOUS ONCE
Status: COMPLETED | OUTPATIENT
Start: 2022-12-13 | End: 2022-12-13

## 2022-12-13 RX ORDER — ERGOCALCIFEROL 1.25 MG/1
50000 CAPSULE ORAL WEEKLY
Status: DISCONTINUED | OUTPATIENT
Start: 2022-12-13 | End: 2022-12-13

## 2022-12-13 RX ORDER — ENOXAPARIN SODIUM 100 MG/ML
1 INJECTION SUBCUTANEOUS 2 TIMES DAILY
Status: DISCONTINUED | OUTPATIENT
Start: 2022-12-13 | End: 2022-12-16 | Stop reason: HOSPADM

## 2022-12-13 RX ORDER — SODIUM BICARBONATE 650 MG/1
1300 TABLET ORAL 3 TIMES DAILY
Status: DISCONTINUED | OUTPATIENT
Start: 2022-12-13 | End: 2022-12-14

## 2022-12-13 RX ORDER — POTASSIUM CHLORIDE 7.45 MG/ML
10 INJECTION INTRAVENOUS
Status: COMPLETED | OUTPATIENT
Start: 2022-12-13 | End: 2022-12-13

## 2022-12-13 RX ORDER — SODIUM CHLORIDE 9 MG/ML
INJECTION, SOLUTION INTRAVENOUS CONTINUOUS
Status: DISCONTINUED | OUTPATIENT
Start: 2022-12-13 | End: 2022-12-13

## 2022-12-13 RX ORDER — PROMETHAZINE HYDROCHLORIDE 25 MG/ML
25 INJECTION, SOLUTION INTRAMUSCULAR; INTRAVENOUS
COMMUNITY
Start: 2022-12-01

## 2022-12-13 RX ORDER — ACETAMINOPHEN 500 MG
1000 TABLET ORAL EVERY 6 HOURS PRN
Status: DISCONTINUED | OUTPATIENT
Start: 2022-12-13 | End: 2022-12-16 | Stop reason: HOSPADM

## 2022-12-13 RX ADMIN — ACETAMINOPHEN 650 MG: 325 TABLET, FILM COATED ORAL at 21:38

## 2022-12-13 RX ADMIN — POTASSIUM CHLORIDE 10 MEQ: 7.45 INJECTION INTRAVENOUS at 03:56

## 2022-12-13 RX ADMIN — ERGOCALCIFEROL 50000 UNITS: 1.25 CAPSULE ORAL at 04:50

## 2022-12-13 RX ADMIN — SODIUM BICARBONATE: 84 INJECTION, SOLUTION INTRAVENOUS at 20:37

## 2022-12-13 RX ADMIN — DICYCLOMINE HYDROCHLORIDE 10 MG: 10 CAPSULE ORAL at 20:35

## 2022-12-13 RX ADMIN — SODIUM BICARBONATE 1300 MG: 650 TABLET ORAL at 08:08

## 2022-12-13 RX ADMIN — POTASSIUM CHLORIDE 10 MEQ: 7.45 INJECTION INTRAVENOUS at 04:44

## 2022-12-13 RX ADMIN — CALCIUM GLUCONATE 1000 MG: 20 INJECTION, SOLUTION INTRAVENOUS at 04:38

## 2022-12-13 RX ADMIN — ENOXAPARIN SODIUM 50 MG: 100 INJECTION SUBCUTANEOUS at 20:35

## 2022-12-13 RX ADMIN — PERFLUTREN 1.5 ML: 6.52 INJECTION, SUSPENSION INTRAVENOUS at 13:27

## 2022-12-13 RX ADMIN — SODIUM BICARBONATE 1300 MG: 650 TABLET ORAL at 03:57

## 2022-12-13 RX ADMIN — ENOXAPARIN SODIUM 50 MG: 100 INJECTION SUBCUTANEOUS at 08:07

## 2022-12-13 RX ADMIN — SODIUM BICARBONATE: 84 INJECTION, SOLUTION INTRAVENOUS at 08:11

## 2022-12-13 RX ADMIN — MEROPENEM AND SODIUM CHLORIDE 1000 MG: 1 INJECTION, SOLUTION INTRAVENOUS at 17:59

## 2022-12-13 RX ADMIN — SODIUM CHLORIDE, PRESERVATIVE FREE 10 ML: 5 INJECTION INTRAVENOUS at 08:13

## 2022-12-13 RX ADMIN — SODIUM CHLORIDE: 9 INJECTION, SOLUTION INTRAVENOUS at 03:55

## 2022-12-13 RX ADMIN — DICYCLOMINE HYDROCHLORIDE 10 MG: 10 CAPSULE ORAL at 08:08

## 2022-12-13 RX ADMIN — POTASSIUM CHLORIDE 10 MEQ: 7.45 INJECTION INTRAVENOUS at 05:46

## 2022-12-13 RX ADMIN — SODIUM BICARBONATE 1300 MG: 650 TABLET ORAL at 14:04

## 2022-12-13 RX ADMIN — MEROPENEM AND SODIUM CHLORIDE 1000 MG: 1 INJECTION, SOLUTION INTRAVENOUS at 11:18

## 2022-12-13 RX ADMIN — CEFEPIME 2000 MG: 2 INJECTION, POWDER, FOR SOLUTION INTRAVENOUS at 00:24

## 2022-12-13 RX ADMIN — SODIUM CHLORIDE 1000 ML: 9 INJECTION, SOLUTION INTRAVENOUS at 02:14

## 2022-12-13 RX ADMIN — Medication 1000 MG: at 05:48

## 2022-12-13 RX ADMIN — SODIUM BICARBONATE 1300 MG: 650 TABLET ORAL at 20:35

## 2022-12-13 RX ADMIN — CEFEPIME 2000 MG: 2 INJECTION, POWDER, FOR SOLUTION INTRAVENOUS at 08:13

## 2022-12-13 ASSESSMENT — PAIN SCALES - GENERAL: PAINLEVEL_OUTOF10: 0

## 2022-12-13 ASSESSMENT — ENCOUNTER SYMPTOMS
WHEEZING: 0
VOMITING: 0
SORE THROAT: 0
SHORTNESS OF BREATH: 0
CONSTIPATION: 0
DIARRHEA: 0
BLOOD IN STOOL: 0
SINUS PAIN: 0
ABDOMINAL DISTENTION: 0
TROUBLE SWALLOWING: 0
NAUSEA: 1
COUGH: 0
ABDOMINAL PAIN: 0

## 2022-12-13 NOTE — PROGRESS NOTES
4 Eyes Skin Assessment    Ximena Roth is being assessed upon: Admission    I agree that I, Yun Sapp RN, along with Bimal Moran RN (either 2 RN's or 1 LPN and 1 RN) have performed a thorough Head to Toe Skin Assessment on the patient. ALL assessment sites listed below have been assessed. Areas assessed by both nurses:     [x]   Head, Face, and Ears   [x]   Shoulders, Back, and Chest  [x]   Arms, Elbows, and Hands   [x]   Coccyx, Sacrum, and Ischium  [x]   Legs, Feet, and Heels    Does the Patient have Skin Breakdown? No    Ezra Prevention initiated: Yes  Wound Care Orders initiated: No    WOC nurse consulted for Pressure Injury (Stage 3,4, Unstageable, DTI, NWPT, and Complex wounds) and New or Established Ostomies: No        Primary Nurse eSignature:  Yun Sapp RN on 12/13/2022 at 4:49 AM      Co-Signer eSignature: {Esignature:[id number]}

## 2022-12-13 NOTE — CONSULTS
INFECTIOUS DISEASES CONSULT NOTE    Patient:  Priyanka Maddox 28 y.o. female  ROOM # [unfilled]  YOB: 1990  MRN: [id number]  CSN:  [id number]  Admit date: 12/12/2022   Admitting Physician: Juwan Guillen MD  Primary Care Physician: Geo Hameed MD  REFERRING PROVIDER: No ref. provider found    Reason for Consultation: \"Severe sepsis\"    History of Present Illness/Chief Complaint: 55-year-old woman. She has a history of gastroparesis. She reports a history of an autoimmune disorder for which she recently had received IVIG. She has a J-tube in place. She indicates that she failed J-tube feeds. She would like the tube removed. She had had a Munguia catheter in place on her left upper chest area. In early November she had sepsis secondary to line infection. She had grown MSSA. At Munguia catheter was removed. She had a new Munguia catheter placed on the right. She indicates she was awaiting orders from home health to reinitiate TPN. She indicates there was a delay in getting some of her laboratory work completed. She indicates last week they did labs in anticipation of resuming TPN at home. From what she describes some labs were drawn on Thursday and it appears she received a call yesterday morning that her blood cultures were positive. She was not having fever. She did develop diarrhea. She was directed to the hospital.  She was admitted for further management. She is receiving empiric antibiotic treatment with vancomycin and cefepime. Blood cultures drawn yesterday are growing gram-negative geraldine. She had had an isolated temperature elevation to 103.6 yesterday. She was tachycardic in conjunction with her fever. She also had some hypotension. Temperature has improved. She is on room air. Her heart rate is in the 80s and blood pressures are in the 90s over 60s. Infectious disease asked to evaluate and offer recommendations.     Current Scheduled Medications:    enoxaparin  1 mg/kg SubCUTAneous BID    sodium bicarbonate  1,300 mg Oral TID    vitamin D  50,000 Units Oral Weekly    sodium chloride flush  5-40 mL IntraVENous 2 times per day    cefepime  2,000 mg IntraVENous Q8H    vancomycin (VANCOCIN) intermittent dosing (placeholder)   Other RX Placeholder    vancomycin  1,000 mg IntraVENous Q12H    dicyclomine  10 mg Oral BID     Current PRN Medications:  sodium chloride flush, sodium chloride, acetaminophen **OR** acetaminophen, potassium chloride **OR** potassium alternative oral replacement **OR** potassium chloride, prochlorperazine    Allergies: Allergies   Allergen Reactions    Iv Dye [Iodides] Shortness Of Breath, Itching and Other (See Comments)     IV 3000; SoA, dizziness    Iv Dye [Iodides] Anaphylaxis     Iodine containing    Adhesive Tape Itching     And tegaderm    Bee Pollen Hives     Oranges-anything with orange color  Oranges-anything with orange color  Oranges-anything with orange color  Oranges-anything with orange color  Oranges-anything with orange color      Chlorhexidine      Other reaction(s): ITCHING    Fruit & Vegetable Daily [Nutritional Supplements]      Oranges-anything with orange color    Metoprolol Succinate [Metoprolol]      Excessive drowsiness/ Metoprolol tartrate-excessive drowsiness    Nsaids      Other reaction(s): GI Intolerance    [place] Itching and Other (See Comments)     Other reaction(s): Vomiting    Orange Fruit [Citrus]      And artificial    Other      Cloraprep    Reglan [Metoclopramide]      Nausea,vomiting    Tramadol Other (See Comments)     Excessive sleeping x 3 days, pt takes 1/2 a pill. Zofran [Ondansetron Hcl] Hives    Orange Oil Nausea And Vomiting    Sulfamethoxazole-Trimethoprim Nausea And Vomiting     Past Medical History: Anxiety/depression. Gastroparesis. She reports an autoimmune disorder. Previous jejunostomy tube. Nazia Givens. Scoliosis. Irritable bowel syndrome. Hoyos's esophagus.      Past Surgical History: Cholecystectomy. Gastric stimulator placement. Gastrostomy tube placement. Hysterectomy. Prior venous access device placement. Mandibular surgery. Tubal ligation. Social History: She reports no tobacco, alcohol, or illicit drug use. Family History: Depression. Diabetes. Stroke. Hypertension. Exposure History: No close contacts have been ill    Review of Systems:   No productive cough or sputum production  No chest pain or chest pressure  No nausea present  She reports some diarrhea  Denies abdominal pain at present  She does not report dysuria or urinary frequency    Vital Signs:  BP 95/61   Pulse 67   Temp 97.6 °F (36.4 °C) (Temporal)   Resp 20   Ht 5' (1.524 m)   Wt 114 lb (51.7 kg)   LMP 2018 (Exact Date)   SpO2 96%   BMI 22.26 kg/m²  Temp (24hrs), Av.9 °F (37.2 °C), Min:96.8 °F (36 °C), Max:103.6 °F (39.8 °C)    Physical Exam:   Vital signs reviewed. Munguia catheter site right upper chest without erythema, warmth, induration, or drainage  J-tube site left mid abdominal area without erythema, induration, or drainage.   Skin without rash  Lungs clear to auscultation without crackles  Heart without systolic or diastolic murmur  Extremities without significant edema  Skin without rash    Lab Results:  CBC:   Recent Labs     22  1225 22  0117   WBC 15.8* 18.5*   HGB 9.9* 8.5*   HCT 31.0* 26.5*    290   LYMPHOPCT 6.4*  --    MONOPCT 4.5  --      CMP:   Recent Labs     22  1225 22  0117    139   K 2.9* 3.0*    108   CO2 22 14*   BUN 16 14   CREATININE 0.7 0.8   CALCIUM 8.4* 6.7*   BILITOT 0.4  --    ALKPHOS 95  --    ALT 10  --    AST 22  --    GLUCOSE 108 277*     Urinalysis today-22 white blood cells and 1 red blood cell    Culture:     Blood cultures 2022-gram-negative rods  Blood culture today-pending  (Molecular identification-Enterobacter)    Urine culture today-pending    Blood cultures November 3, 2022-Staph aureus  Blood cultures November 6, 2022-Staph aureus    Catheter tip culture dated November 4, 2022:  Methicillin susceptible Staph aureus    Radiology:     Additional Studies Reviewed:     Impression:   1. Gram-negative bacteremia-Enterobacter by molecular identification (possible urine, line, or transient bacteremia from colonic source all possible)  2. Recent MSSA bloodstream infection  3.   Munguia catheter in place right upper chest    Recommendations:    Suggest antibiotic treatment with meropenem  Await susceptibility testing  Follow-up blood cultures to make sure bacteremia clearing  She had reported having had outpatient blood cultures that were positive drawn through her infusion company last week-we need to try to locate a copy of those cultures to make sure they match what is growing from cultures this admission  Watch for any metastatic foci of infection  Additional antibiotic recommendations will depend upon follow-up cultures and clinical course  Continue to follow      David Henley MD  12/13/22  7:32 AM

## 2022-12-13 NOTE — PLAN OF CARE
Problem: Nutrition Deficit:  Goal: Optimize nutritional status  Outcome: Not Progressing   Nutrition Problem #1: Inadequate energy intake  Intervention: Food and/or Nutrient Delivery: Continue NPO
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
## 2022-12-13 NOTE — PROGRESS NOTES
Nutrition Assessment     Type and Reason for Visit: Initial, Positive Nutrition Screen       Malnutrition Assessment:  Malnutrition Status: At risk for malnutrition (Comment)    Nutrition Assessment:  +NS for wt loss with decreased appetiite. Pt presents nutritionally compromised with hx gastroparesis and home PN. Pt also with J-tube in place that has not been used in some time. She has also not been using home PN for about 3-4 months. Per records, pt has lost 11 lbs over the past 3 months. Pt tolerated a regular diet during previous admission and received snacks three times daily. She is NPO at this time. Will follow for plan of care. Estimated Daily Nutrient Needs:  Energy (kcal):  3096-8530 Weight Used for Energy Requirements: Current     Protein (g):   Weight Used for Protein Requirements: Current        Fluid (ml/day):  9725-3953 Method Used for Fluid Requirements: 1 ml/kcal    Nutrition Related Findings:     Wound Type: None    Current Nutrition Therapies:    Diet NPO Exceptions are: Sips of Water with Meds, Ice Chips    Anthropometric Measures:  Height: 5' (152.4 cm)  Current Body Wt: 114 lb (51.7 kg)   BMI: 22.3    Nutrition Diagnosis:   Inadequate energy intake related to altered GI function, altered GI structure as evidenced by GI abnormality, weight loss 7.5% in 3 months    Nutrition Interventions:   Food and/or Nutrient Delivery: Continue NPO  Nutrition Education/Counseling: No recommendation at this time  Coordination of Nutrition Care: Continue to monitor while inpatient       Goals:     Goals: Initiate PO diet       Nutrition Monitoring and Evaluation:   Behavioral-Environmental Outcomes: None Identified  Food/Nutrient Intake Outcomes: Diet Advancement/Tolerance  Physical Signs/Symptoms Outcomes: Biochemical Data, Weight, Nausea or Vomiting, GI Status, Nutrition Focused Physical Findings    Discharge Planning:     Too soon to determine     Edyta Michaels MS, RDN, LD, CDCES  Contact: Brandyn Marks

## 2022-12-13 NOTE — PROGRESS NOTES
Pharmacy Adjustment per [facility] protocol    Parish Burks is a 28 y.o. female. Pharmacy has adjusted medications per [facility] protocol. Recent Labs     12/12/22  1225 12/13/22  0117   BUN 16 14       Recent Labs     12/12/22  1225 12/13/22  0117   CREATININE 0.7 0.8       Estimated Creatinine Clearance: 73 mL/min (based on SCr of 0.8 mg/dL).     Height:   Ht Readings from Last 1 Encounters:   12/13/22 5' (1.524 m)     Weight:  Wt Readings from Last 1 Encounters:   12/12/22 114 lb (51.7 kg)         Plan: Adjust the following medications based on [facility] protocol:           Meropenem to 1000 mg IV once over 30 minutes followed by 1000 mg IV every 8 hours extended infusion over 180 minutes      Electronically signed by Brian Jones RPh, BCPS, 12/13/2022,9:59 AM

## 2022-12-13 NOTE — PROGRESS NOTES
Matt Infante arrived to room # 408. Presented with: Nausea and vomiting   Mental Status: Patient is Alert, Oriented   Vitals:    12/13/22 0400   BP: (!) 70/52   Pulse:    Resp:    Temp:    SpO2:      Patient safety contract and falls prevention contract reviewed with patient Yes. Oriented Patient to room. Call light within reach. Yes.   Needs, issues or concerns expressed at this time: no.      Electronically signed by Davidson Stovall RN on 12/13/2022 at 4:47 AM

## 2022-12-13 NOTE — PROGRESS NOTES
[address] Pharmacokinetic Monitoring Service - Vancomycin     Danielle Ziegler is a 28 y.o. female starting on vancomycin therapy for bloodstream infection. Pharmacy consulted by SHRUTHI Bermudez for monitoring and adjustment. Target Concentration: Goal AUC/WILFRIDO 400-600 mg*hr/L    Additional Antimicrobials: Cefepime    Pertinent Laboratory Values: Wt Readings from Last 1 Encounters:   12/12/22 114 lb (51.7 kg)     Temp Readings from Last 1 Encounters:   12/12/22 98.4 °F (36.9 °C) (Tympanic)     Estimated Creatinine Clearance: 83 mL/min (based on SCr of 0.7 mg/dL).   Recent Labs     12/12/22  1225   CREATININE 0.7   WBC 15.8*     Procalcitonin: not ordered     Pertinent Cultures:  Culture Date Source Results   12/12/22 Blood sent   MRSA Nasal Swab: detected 11/4/22    Plan:  Dosing recommendations based on Bayesian software  Start vancomycin 1250 mg x 1 loading dose, followed by 1000 mg every 12 hours  Anticipated AUC of 538 and trough concentration of 15.5 at steady state  Renal labs as indicated   Vancomycin concentration ordered for 12/14/22 @ [facility] will continue to monitor patient and adjust therapy as indicated    Thank you for the consult,  SILVINO Stock JOÃO [facility] [place] [facility] [place]  12/12/2022 7:49 PM

## 2022-12-13 NOTE — PROGRESS NOTES
[facility]      Progress Note    Patient:  Silvino Cummings  YOB: 1990  Date of Service: 12/13/2022  MRN: [id number]   Acct: [de-identified]   Primary Care Physician: Checo Osei MD  Advance Directive: Full Code  Admit Date: 12/12/2022       Hospital Day: 1    Portions of this note have been copied forward, however, updated to reflect the most current clinical status of this patient. CHIEF COMPLAINT bacteremia    SUBJECTIVE:  Ms. Agus Peralta was resting comfortably in bed this morning. Reported chronic nausea. Denied vomiting at this time. Reported fever and chills overnight. CUMULATIVE HOSPITAL COURSE:   The patient is a 75-year-old female with past medical history of anxiety, depression, gastroparesis, GERD, Hoyos's esophagus, and POTS who presented to [facility] ED with complaints of positive blood cultures. Ms. Meli Miramontes stated she was advised to go to ED for positive blood cultures. Of note patient was recently admitted in November with sepsis (MSSA bacteremia) from an infected Munguia catheter, Munguia catheter was subsequently discontinued and new catheter was inserted on right side of the chest on 11/11/2022. Patient has had a clean catheter for TPN and IVIG for gastroparesis. Reported she has not had TPN since August due to issues with her home health nurse and Munguia catheter infection. Does have J-tube in her left lower quadrant however has not been using due to \"failed\" tube feeding trails. Work-up in ED revealed tachycardia on arrival heart rate in 130's, WBC 15.8, afebrile, K+ 2.9, Hgb 9.9, urinalysis indicated trace leukocytes, positive nitrites, WBC 22. Received 1 L of IV fluids, vancomycin and cefepime in ED. Patient was admitted to hospital medicine for sepsis work-up. IV vancomycin and cefepime initiated. Blood culture from admission indicated gram-negative rods. ID consultation placed.   CT abdomen indicated left lower lobe pneumonia, no nephrolithiasis or hydroureter nephrosis. Procalcitonin 63.06. Patient was in ICU for hypotension, did not require pressors at this time. Transferred to floor today. ID recommended switching antibiotic to Merrem. Review of Systems   Constitutional:  Positive for chills and fever. Negative for diaphoresis and fatigue. HENT:  Negative for congestion, ear pain, sinus pain, sore throat and trouble swallowing. Eyes:  Negative for visual disturbance. Respiratory:  Negative for cough, shortness of breath and wheezing. Cardiovascular:  Negative for chest pain, palpitations and leg swelling. Gastrointestinal:  Positive for nausea (Chronic). Negative for abdominal distention, abdominal pain, blood in stool, constipation, diarrhea and vomiting. Endocrine: Negative for cold intolerance and heat intolerance. Genitourinary:  Negative for difficulty urinating, flank pain, frequency and urgency. Musculoskeletal:  Negative for arthralgias and myalgias. Neurological:  Negative for dizziness, syncope, weakness, light-headedness, numbness and headaches. Hematological:  Does not bruise/bleed easily. Psychiatric/Behavioral:  Negative for agitation, confusion and dysphoric mood. Objective:   VITALS:  BP 99/66   Pulse 86   Temp 97.5 °F (36.4 °C) (Oral)   Resp 16   Ht 5' (1.524 m)   Wt 114 lb (51.7 kg)   LMP 01/14/2018 (Exact Date)   SpO2 99%   BMI 22.26 kg/m²   24HR INTAKE/OUTPUT:    Intake/Output Summary (Last 24 hours) at 12/13/2022 1630  Last data filed at 12/13/2022 0800  Gross per 24 hour   Intake 204.25 ml   Output 400 ml   Net -195.75 ml         Physical Exam  Constitutional:       General: She is not in acute distress. Appearance: Normal appearance. She is ill-appearing (Chronically). She is not toxic-appearing or diaphoretic. HENT:      Head: Normocephalic and atraumatic.       Right Ear: External ear normal.      Left Ear: External ear normal.      Nose: Nose normal. No congestion or rhinorrhea. Mouth/Throat:      Mouth: Mucous membranes are moist.      Pharynx: Oropharynx is clear. Eyes:      General: No scleral icterus. Extraocular Movements: Extraocular movements intact. Conjunctiva/sclera: Conjunctivae normal.   Cardiovascular:      Rate and Rhythm: Normal rate and regular rhythm. Pulses: Normal pulses. Heart sounds: Normal heart sounds. No murmur heard. No friction rub. No gallop. Pulmonary:      Effort: Pulmonary effort is normal. No respiratory distress. Breath sounds: Normal breath sounds. No wheezing, rhonchi or rales. Chest:      Comments: Munguia catheter to right chest  Abdominal:      General: Abdomen is flat. Bowel sounds are normal. There is no distension. Palpations: Abdomen is soft. Tenderness: There is no abdominal tenderness. Comments: J- Tube to LLQ   Stimulator to RLQ    Musculoskeletal:         General: No swelling. Normal range of motion. Cervical back: Normal range of motion and neck supple. Right lower leg: No edema. Left lower leg: No edema. Skin:     General: Skin is warm and dry. Coloration: Skin is not jaundiced. Findings: No erythema, lesion or rash. Neurological:      General: No focal deficit present. Mental Status: She is alert and oriented to person, place, and time. Mental status is at baseline. Cranial Nerves: No cranial nerve deficit. Sensory: No sensory deficit. Motor: No weakness. Psychiatric:         Mood and Affect: Mood normal.         Behavior: Behavior normal.         Thought Content:  Thought content normal.         Judgment: Judgment normal.          Medications:      sodium bicarbonate infusion 150 mL/hr at 12/13/22 0811    sodium chloride        enoxaparin  1 mg/kg SubCUTAneous BID    sodium bicarbonate  1,300 mg Oral TID    vitamin D  50,000 Units Oral Weekly    meropenem  1,000 mg IntraVENous Q8H    sodium chloride flush  5-40 mL IntraVENous 2 times per day    dicyclomine  10 mg Oral BID     sodium chloride flush, sodium chloride, acetaminophen **OR** acetaminophen, potassium chloride **OR** potassium alternative oral replacement **OR** potassium chloride, prochlorperazine  ADULT DIET; Easy to Chew; GI [place] (GERD/Peptic Ulcer)     Lab and other Data:     Recent Labs     12/12/22  1225 12/13/22  0117   WBC 15.8* 18.5*   HGB 9.9* 8.5*    290     Recent Labs     12/12/22  1225 12/13/22  0117    139   K 2.9* 3.0*    108   CO2 22 14*   BUN 16 14   CREATININE 0.7 0.8   GLUCOSE 108 277*     Recent Labs     12/12/22  1225   AST 22   ALT 10   BILITOT 0.4   ALKPHOS 95     Troponin T:   Recent Labs     12/13/22  0005   TROPONINI <0.01       UA:  Recent Labs     12/13/22  0500   COLORU YELLOW   PHUR 6.5   WBCUA 22*   RBCUA 1   BACTERIA NEGATIVE*   CLARITYU Clear   SPECGRAV 1.026   LEUKOCYTESUR TRACE*   UROBILINOGEN 1.0   BILIRUBINUR Negative   BLOODU Negative   GLUCOSEU Negative       RAD:     CT ABDOMEN PELVIS WO CONTRAST Additional Contrast? Radiologist Recommendation  Result Date: 12/13/2022    1. Left lower lobe pneumonia. 2.No nephrolithiasis or hydronephrosis. 3.Correlate for diarrhea state.       Micro:    COVID-19, Rapid [[id number]] Collected: 12/12/22 1300   Order Status: Completed Specimen: Nasopharyngeal Swab Updated: 12/12/22 1353    SARS-CoV-2, NAAT Not Detected     Blood Culture 1 [[id number]] (Abnormal) Collected: 12/12/22 1225   Order Status: Completed Specimen: Blood Updated: 12/13/22 0107    Blood Culture, Routine -- Abnormal      Bottle volume = 6 ml   Gram stain Aerobic bottle:   Gram negative rods   Culture in progress   Please notify Physician    Bottle volume = 8 ml   Gram stain Anaerobic bottle:   Gram negative rods   Culture in progress   Please notify Physician    Abnormal      Culture, Blood 2 [[id number]] Collected: 12/12/22 1300   Order Status: Completed Specimen: Blood Updated: 12/13/22 1515    Culture, Blood 2 No Growth to date. Any change in status will be called. Assessment/Plan   Principal Problem:    Sepsis ([address]  Active Problems:    Hypokalemia    Gastroparesis  Resolved Problems:    * No resolved hospital problems. *      Principal Problem:    Sepsis (HCC)/ Bacteremia-    - Continue IV abx per ID recommendations    - CT abdomen unremarkable for GI pathology, indicated LLL PNA    - Follow urine culture    - Follow repeat blood cultures    - ID following     - Recommended IV Merrem   - Monitor labs   - Chest xray in am       Active Problems:    Hypokalemia-    - Replace K+    - Monitor labs closely    - PRN K+ replacement orders in place       Gastroparesis-    - continue Bentyl    - PRN Compazine         Antibiotic: Merrem      DVT Prophylaxis: Lovenox      Discharge planning: TBD        Further Orders per Clinical course/attending. Electronically signed by SHRUTHI Mir CNP on 12/13/2022 at 4:30 PM       EMR Dragon/Transcription disclaimer:   Much of this encounter note is an electronic transcription/translation of spoken language to printed text.  The electronic translation of spoken language may permit erroneous, or at times, nonsensical words or phrases to be inadvertently transcribed; although attempts have made to review the note for such errors, some may still exist.

## 2022-12-13 NOTE — PROGRESS NOTES
[id number] transferred to [phone] from 152 via bed. Reason for transfer: lower level of care   Explained reason for transfer to Patient. Belongings: Glasses with patient at bedside . Soft chart transferred with patient: Yes. Telemetry box number [phone] transferred with patient: yes. Report given to: Frankie Menchaca RN, via telephone.       Electronically signed by Edgar Rodriguez RN on 12/13/2022 at 1:43 PM

## 2022-12-14 LAB
ALBUMIN SERPL-MCNC: 2.1 G/DL (ref 3.5–5.2)
ANION GAP SERPL CALCULATED.3IONS-SCNC: 6 MMOL/L (ref 7–19)
BUN BLDV-MCNC: 9 MG/DL (ref 6–20)
CALCIUM SERPL-MCNC: 6.8 MG/DL (ref 8.6–10)
CHLORIDE BLD-SCNC: 102 MMOL/L (ref 98–111)
CO2: 34 MMOL/L (ref 22–29)
CREAT SERPL-MCNC: 0.5 MG/DL (ref 0.5–0.9)
GFR SERPL CREATININE-BSD FRML MDRD: >60 ML/MIN/{1.73_M2}
GLUCOSE BLD-MCNC: 112 MG/DL (ref 74–109)
HCT VFR BLD CALC: 22.8 % (ref 37–47)
HEMOGLOBIN: 7.3 G/DL (ref 12–16)
MAGNESIUM: 1.1 MG/DL (ref 1.6–2.6)
MCH RBC QN AUTO: 26 PG (ref 27–31)
MCHC RBC AUTO-ENTMCNC: 32 G/DL (ref 33–37)
MCV RBC AUTO: 81.1 FL (ref 81–99)
PDW BLD-RTO: 19.8 % (ref 11.5–14.5)
PLATELET # BLD: 276 K/UL (ref 130–400)
PMV BLD AUTO: 9.9 FL (ref 9.4–12.3)
POTASSIUM SERPL-SCNC: 2.5 MMOL/L (ref 3.5–5)
RBC # BLD: 2.81 M/UL (ref 4.2–5.4)
SODIUM BLD-SCNC: 142 MMOL/L (ref 136–145)
URINE CULTURE, ROUTINE: NORMAL
WBC # BLD: 9.8 K/UL (ref 4.8–10.8)

## 2022-12-14 PROCEDURE — 6360000002 HC RX W HCPCS: Performed by: STUDENT IN AN ORGANIZED HEALTH CARE EDUCATION/TRAINING PROGRAM

## 2022-12-14 PROCEDURE — 82040 ASSAY OF SERUM ALBUMIN: CPT

## 2022-12-14 PROCEDURE — 2500000003 HC RX 250 WO HCPCS: Performed by: STUDENT IN AN ORGANIZED HEALTH CARE EDUCATION/TRAINING PROGRAM

## 2022-12-14 PROCEDURE — 36415 COLL VENOUS BLD VENIPUNCTURE: CPT

## 2022-12-14 PROCEDURE — 85027 COMPLETE CBC AUTOMATED: CPT

## 2022-12-14 PROCEDURE — 83735 ASSAY OF MAGNESIUM: CPT

## 2022-12-14 PROCEDURE — 2580000003 HC RX 258

## 2022-12-14 PROCEDURE — 6370000000 HC RX 637 (ALT 250 FOR IP): Performed by: NURSE PRACTITIONER

## 2022-12-14 PROCEDURE — 2500000003 HC RX 250 WO HCPCS: Performed by: HOSPITALIST

## 2022-12-14 PROCEDURE — 6360000002 HC RX W HCPCS: Performed by: HOSPITALIST

## 2022-12-14 PROCEDURE — 94760 N-INVAS EAR/PLS OXIMETRY 1: CPT

## 2022-12-14 PROCEDURE — 6370000000 HC RX 637 (ALT 250 FOR IP)

## 2022-12-14 PROCEDURE — 6370000000 HC RX 637 (ALT 250 FOR IP): Performed by: STUDENT IN AN ORGANIZED HEALTH CARE EDUCATION/TRAINING PROGRAM

## 2022-12-14 PROCEDURE — 80048 BASIC METABOLIC PNL TOTAL CA: CPT

## 2022-12-14 PROCEDURE — 2580000003 HC RX 258: Performed by: STUDENT IN AN ORGANIZED HEALTH CARE EDUCATION/TRAINING PROGRAM

## 2022-12-14 PROCEDURE — 1210000000 HC MED SURG R&B

## 2022-12-14 PROCEDURE — 6360000002 HC RX W HCPCS

## 2022-12-14 PROCEDURE — 6360000002 HC RX W HCPCS: Performed by: INTERNAL MEDICINE

## 2022-12-14 PROCEDURE — 6370000000 HC RX 637 (ALT 250 FOR IP): Performed by: HOSPITALIST

## 2022-12-14 RX ORDER — ERGOCALCIFEROL 1.25 MG/1
50000 CAPSULE ORAL WEEKLY
Status: DISCONTINUED | OUTPATIENT
Start: 2022-12-14 | End: 2022-12-16 | Stop reason: HOSPADM

## 2022-12-14 RX ORDER — CALCIUM CARBONATE 200(500)MG
1000 TABLET,CHEWABLE ORAL 3 TIMES DAILY
Status: DISPENSED | OUTPATIENT
Start: 2022-12-14 | End: 2022-12-16

## 2022-12-14 RX ORDER — CALCIUM GLUCONATE 20 MG/ML
1000 INJECTION, SOLUTION INTRAVENOUS ONCE
Status: COMPLETED | OUTPATIENT
Start: 2022-12-14 | End: 2022-12-14

## 2022-12-14 RX ORDER — POTASSIUM CHLORIDE 20 MEQ/1
40 TABLET, EXTENDED RELEASE ORAL 2 TIMES DAILY
Status: COMPLETED | OUTPATIENT
Start: 2022-12-14 | End: 2022-12-14

## 2022-12-14 RX ORDER — MAGNESIUM SULFATE IN WATER 40 MG/ML
4000 INJECTION, SOLUTION INTRAVENOUS ONCE
Status: COMPLETED | OUTPATIENT
Start: 2022-12-14 | End: 2022-12-14

## 2022-12-14 RX ORDER — POTASSIUM CHLORIDE 20 MEQ/1
40 TABLET, EXTENDED RELEASE ORAL 2 TIMES DAILY
Status: DISCONTINUED | OUTPATIENT
Start: 2022-12-14 | End: 2022-12-14

## 2022-12-14 RX ADMIN — ERGOCALCIFEROL 50000 UNITS: 1.25 CAPSULE ORAL at 09:12

## 2022-12-14 RX ADMIN — POTASSIUM CHLORIDE 10 MEQ: 7.46 INJECTION, SOLUTION INTRAVENOUS at 06:44

## 2022-12-14 RX ADMIN — POTASSIUM CHLORIDE 10 MEQ: 7.46 INJECTION, SOLUTION INTRAVENOUS at 08:35

## 2022-12-14 RX ADMIN — ACETAMINOPHEN 500 MG: 500 TABLET ORAL at 06:01

## 2022-12-14 RX ADMIN — MAGNESIUM SULFATE HEPTAHYDRATE 4000 MG: 40 INJECTION, SOLUTION INTRAVENOUS at 16:12

## 2022-12-14 RX ADMIN — MEROPENEM AND SODIUM CHLORIDE 1000 MG: 1 INJECTION, SOLUTION INTRAVENOUS at 21:36

## 2022-12-14 RX ADMIN — SODIUM BICARBONATE 1300 MG: 650 TABLET ORAL at 09:12

## 2022-12-14 RX ADMIN — ANTACID TABLETS 1000 MG: 500 TABLET, CHEWABLE ORAL at 21:34

## 2022-12-14 RX ADMIN — CALCIUM GLUCONATE 1000 MG: 20 INJECTION, SOLUTION INTRAVENOUS at 04:55

## 2022-12-14 RX ADMIN — ENOXAPARIN SODIUM 50 MG: 100 INJECTION SUBCUTANEOUS at 09:12

## 2022-12-14 RX ADMIN — POTASSIUM CHLORIDE 10 MEQ: 7.46 INJECTION, SOLUTION INTRAVENOUS at 05:26

## 2022-12-14 RX ADMIN — ANTACID TABLETS 1000 MG: 500 TABLET, CHEWABLE ORAL at 16:12

## 2022-12-14 RX ADMIN — SODIUM CHLORIDE, PRESERVATIVE FREE 10 ML: 5 INJECTION INTRAVENOUS at 21:36

## 2022-12-14 RX ADMIN — POTASSIUM CHLORIDE 10 MEQ: 7.46 INJECTION, SOLUTION INTRAVENOUS at 09:42

## 2022-12-14 RX ADMIN — ANTACID TABLETS 1000 MG: 500 TABLET, CHEWABLE ORAL at 09:12

## 2022-12-14 RX ADMIN — SODIUM CHLORIDE, PRESERVATIVE FREE 10 ML: 5 INJECTION INTRAVENOUS at 09:13

## 2022-12-14 RX ADMIN — DICYCLOMINE HYDROCHLORIDE 10 MG: 10 CAPSULE ORAL at 21:33

## 2022-12-14 RX ADMIN — MEROPENEM AND SODIUM CHLORIDE 1000 MG: 1 INJECTION, SOLUTION INTRAVENOUS at 02:24

## 2022-12-14 RX ADMIN — SODIUM BICARBONATE: 84 INJECTION, SOLUTION INTRAVENOUS at 06:06

## 2022-12-14 RX ADMIN — POTASSIUM CHLORIDE 40 MEQ: 1500 TABLET, EXTENDED RELEASE ORAL at 09:12

## 2022-12-14 RX ADMIN — MEROPENEM AND SODIUM CHLORIDE 1000 MG: 1 INJECTION, SOLUTION INTRAVENOUS at 11:03

## 2022-12-14 RX ADMIN — POTASSIUM CHLORIDE 10 MEQ: 7.46 INJECTION, SOLUTION INTRAVENOUS at 04:39

## 2022-12-14 RX ADMIN — ENOXAPARIN SODIUM 50 MG: 100 INJECTION SUBCUTANEOUS at 21:34

## 2022-12-14 RX ADMIN — DEXTROSE MONOHYDRATE, SODIUM CHLORIDE, AND POTASSIUM CHLORIDE: 50; 4.5; 1.49 INJECTION, SOLUTION INTRAVENOUS at 09:13

## 2022-12-14 RX ADMIN — DICYCLOMINE HYDROCHLORIDE 10 MG: 10 CAPSULE ORAL at 09:12

## 2022-12-14 RX ADMIN — POTASSIUM CHLORIDE 40 MEQ: 1500 TABLET, EXTENDED RELEASE ORAL at 21:34

## 2022-12-14 ASSESSMENT — ENCOUNTER SYMPTOMS
CONSTIPATION: 0
ABDOMINAL PAIN: 0
SORE THROAT: 0
TROUBLE SWALLOWING: 0
NAUSEA: 1
SINUS PAIN: 0
DIARRHEA: 0
BLOOD IN STOOL: 0
ABDOMINAL DISTENTION: 0
VOMITING: 0
COUGH: 0
SHORTNESS OF BREATH: 0
WHEEZING: 0

## 2022-12-14 ASSESSMENT — PAIN DESCRIPTION - DESCRIPTORS: DESCRIPTORS: ACHING

## 2022-12-14 ASSESSMENT — PAIN - FUNCTIONAL ASSESSMENT: PAIN_FUNCTIONAL_ASSESSMENT: ACTIVITIES ARE NOT PREVENTED

## 2022-12-14 ASSESSMENT — PAIN SCALES - GENERAL
PAINLEVEL_OUTOF10: 0
PAINLEVEL_OUTOF10: 7

## 2022-12-14 ASSESSMENT — PAIN DESCRIPTION - LOCATION: LOCATION: HEAD

## 2022-12-14 NOTE — PROGRESS NOTES
Comprehensive Nutrition Assessment    Type and Reason for Visit:  Reassess    Nutrition Recommendations/Plan:   Will recommend to start ONS TID and continue to monitor wts and intakes daily. Malnutrition Assessment:  Malnutrition Status: Moderate malnutrition (12/14/22 0950)    Context:  Chronic Illness     Findings of the 6 clinical characteristics of malnutrition:  Energy Intake:  Mild decrease in energy intake (Comment)  Weight Loss:  Greater than 7.5% over 3 months     Body Fat Loss:  Unable to assess     Muscle Mass Loss:  Unable to assess    Fluid Accumulation:  Unable to assess     Strength:  Not Performed    Nutrition Assessment:    Seen for follow-up assessment. Pt. presents nutritionally compromised hx of gastroparesis and at home PN. Pt. has a J-tube in place however, has not been using it d/t \"failed tube feed trials\". Pt. reflecting a 1-25% PO intake at this time. Will recommend to start ONS TID and continue to monitor wts and intakes daily. Nutrition Related Findings:      Wound Type: None       Current Nutrition Intake & Therapies:    Average Meal Intake: 1-25%  Average Supplements Intake: Unable to assess  ADULT DIET; Easy to Chew; GI [place] (GERD/Peptic Ulcer)    Anthropometric Measures:  Height: 5' (152.4 cm)  Ideal Body Weight (IBW): 100 lbs (45 kg)    Admission Body Weight: 114 lb (51.7 kg)  Current Body Weight: 114 lb (51.7 kg), 114 % IBW. Weight Source: Stated  Current BMI (kg/m2): 22.3  Usual Body Weight: 125 lb (56.7 kg) (9/23/22)  % Weight Change (Calculated): -8.8  Weight Adjustment For: No Adjustment     BMI Categories: Normal Weight (BMI 18.5-24. 9)    Estimated Daily Nutrient Needs:  Energy Requirements Based On: Kcal/kg  Weight Used for Energy Requirements: Current  Energy (kcal/day): 5257-5927  Weight Used for Protein Requirements: Current  Protein (g/day):   Method Used for Fluid Requirements: 1 ml/kcal  Fluid (ml/day): 9761-7455    Nutrition Diagnosis:   Inadequate oral intake related to altered GI function, altered GI structure as evidenced by weight loss 7.5% in 3 months, GI abnormality    Nutrition Interventions:   Food and/or Nutrient Delivery: Continue Current Diet, Start Oral Nutrition Supplement  Nutrition Education/Counseling: No recommendation at this time  Coordination of Nutrition Care: No recommendation at this time       Goals:  Previous Goal Met: Progressing toward Goal(s)  Goals: Meet at least 75% of estimated needs, PO intake 50% or greater       Nutrition Monitoring and Evaluation:   Behavioral-Environmental Outcomes: None Identified  Food/Nutrient Intake Outcomes: Diet Advancement/Tolerance, Food and Nutrient Intake, Supplement Intake  Physical Signs/Symptoms Outcomes: Biochemical Data, Weight, Nausea or Vomiting, GI Status, Nutrition Focused Physical Findings    Discharge Planning:     Too soon to determine     Ericka Mcpherson RD, LD  Contact: [phone]

## 2022-12-14 NOTE — PROGRESS NOTES
Infectious Diseases Progress Note    Patient:  Dayron Ocampo  YOB: 1990  MRN: [id number]   Admit date: 12/12/2022   Admitting Physician: Masood Pool MD  Primary Care Physician: Britney Michel MD    Chief Complaint/Interval History: She is transferred out of ICU. She is without fever at present. She had a temperature as high as 102 yesterday evening around 9:30 PM.  She remains on room air. Excellent oxygen saturation. In/Out    Intake/Output Summary (Last 24 hours) at 12/14/2022 1445  Last data filed at 12/14/2022 1341  Gross per 24 hour   Intake 120 ml   Output --   Net 120 ml     Allergies: Allergies   Allergen Reactions    Iv Dye [Iodides] Shortness Of Breath, Itching and Other (See Comments)     IV 3000; SoA, dizziness    Iv Dye [Iodides] Anaphylaxis     Iodine containing    Adhesive Tape Itching     And tegaderm    Bee Pollen Hives     Oranges-anything with orange color  Oranges-anything with orange color  Oranges-anything with orange color  Oranges-anything with orange color  Oranges-anything with orange color      Chlorhexidine      Other reaction(s): ITCHING    Fruit & Vegetable Daily [Nutritional Supplements]      Oranges-anything with orange color    Metoprolol Succinate [Metoprolol]      Excessive drowsiness/ Metoprolol tartrate-excessive drowsiness    Nsaids      Other reaction(s): GI Intolerance    [place] Itching and Other (See Comments)     Other reaction(s): Vomiting    Orange Fruit [Citrus]      And artificial    Other      Cloraprep    Reglan [Metoclopramide]      Nausea,vomiting    Tramadol Other (See Comments)     Excessive sleeping x 3 days, pt takes 1/2 a pill.     Zofran [Ondansetron Hcl] Hives    Orange Oil Nausea And Vomiting    Sulfamethoxazole-Trimethoprim Nausea And Vomiting     Current Meds: vitamin D (ERGOCALCIFEROL) capsule 50,000 Units, Weekly  dextrose 5% and 0.45% NaCl with KCl 20 mEq 1,000 mL infusion, Continuous  potassium chloride (KLOR-CON M) extended release tablet 40 mEq, BID  calcium carbonate (TUMS) chewable tablet 1,000 mg, TID  enoxaparin (LOVENOX) injection 50 mg, BID  sodium bicarbonate tablet 1,300 mg, TID  meropenem (MERREM) 1000 mg in sodium chloride 0.9% 50 mL (duplex), Q8H  acetaminophen (TYLENOL) tablet 1,000 mg, Q6H PRN   Or  acetaminophen (TYLENOL) suppository 650 mg, Q6H PRN  sodium chloride flush 0.9 % injection 5-40 mL, 2 times per day  sodium chloride flush 0.9 % injection 5-40 mL, PRN  0.9 % sodium chloride infusion, PRN  potassium chloride (KLOR-CON M) extended release tablet 40 mEq, PRN   Or  potassium bicarb-citric acid (EFFER-K) effervescent tablet 40 mEq, PRN   Or  potassium chloride 10 mEq/100 mL IVPB (Peripheral Line), PRN  prochlorperazine (COMPAZINE) injection 10 mg, Q6H PRN  dicyclomine (BENTYL) capsule 10 mg, BID      Review of Systems see HPI. No cough or sputum production. VitalSigns:  BP 91/62   Pulse 96   Temp 99 °F (37.2 °C) (Oral)   Resp 16   Ht 5' (1.524 m)   Wt 114 lb (51.7 kg)   LMP 01/14/2018 (Exact Date)   SpO2 97%   BMI 22.26 kg/m²      Physical Exam  Line/IV (right upper chest central line) site: No erythema, warmth, induration, or tenderness. Abdomen nondistended  No suprapubic or flank tenderness    Lab Results:  CBC:   Recent Labs     12/12/22  1225 12/13/22  0117 12/14/22  0214   WBC 15.8* 18.5* 9.8   HGB 9.9* 8.5* 7.3*    290 276     BMP:  Recent Labs     12/12/22  1225 12/13/22  0117 12/14/22  0351    139 142   K 2.9* 3.0* 2.5*    108 102   CO2 22 14* 34*   BUN 16 14 9   CREATININE 0.7 0.8 0.5   GLUCOSE 108 277* 112*     CultureResults:  Urine culture yesterday-no growth to date  Blood culture December 12, 2022-Enterobacter cloacae-susceptibility pending  Blood culture December 13, 2022-no growth to date    Radiology: None    Additional Studies Reviewed:  None    Impression:  1. Gram-negative bacteremia-Enterobacter cloacae recovered on culture with susceptibility pending  2. Recent MSSA bloodstream infection  3.   Munguia catheter in place right upper chest    Recommendations:  Continue meropenem  Await susceptibility testing  Await follow-up culture results  Her improvement in white blood cell count would suggest current antibiotic treatment appropriate  Continue current treatment  Continue to follow    Jannette Dyson MD

## 2022-12-14 NOTE — PLAN OF CARE
Problem: Pain  Goal: Verbalizes/displays adequate comfort level or baseline comfort level  Outcome: Progressing     Problem: Safety - Adult  Goal: Free from fall injury  12/13/2022 2209 by Koko Cormier RN  Outcome: Progressing  12/13/2022 1840 by Elena Marley RN  Outcome: Progressing     Problem: ABCDS Injury Assessment  Goal: Absence of physical injury  Outcome: Progressing     Problem: Skin/Tissue Integrity  Goal: Absence of new skin breakdown  Description: 1. Monitor for areas of redness and/or skin breakdown  2. Assess vascular access sites hourly  3. Every 4-6 hours minimum:  Change oxygen saturation probe site  4. Every 4-6 hours:  If on nasal continuous positive airway pressure, respiratory therapy assess nares and determine need for appliance change or resting period.   Outcome: Progressing     Problem: Discharge Planning  Goal: Discharge to home or other facility with appropriate resources  Outcome: Progressing  Flowsheets (Taken 12/13/2022 2119)  Discharge to home or other facility with appropriate resources: Identify barriers to discharge with patient and caregiver     Problem: Nutrition Deficit:  Goal: Optimize nutritional status  Outcome: Progressing

## 2022-12-14 NOTE — PROGRESS NOTES
[facility]      Progress Note    Patient:  Daniel Martinez  YOB: 1990  Date of Service: 12/14/2022  MRN: [id number]   Acct: [de-identified]   Primary Care Physician: Anton Gupta MD  Advance Directive: Full Code  Admit Date: 12/12/2022       Hospital Day: 2    Portions of this note have been copied forward, however, updated to reflect the most current clinical status of this patient. CHIEF COMPLAINT bacteremia    SUBJECTIVE:  Ms. Flora Muro was resting comfortably in bed this morning. Continues to report chronic nausea. Reported having fever last night. Denies SOB or chest pain. CUMULATIVE HOSPITAL COURSE:   The patient is a 58-year-old female with past medical history of anxiety, depression, gastroparesis, GERD, Hoyos's esophagus, and POTS who presented to [address] [place] [address] ED with complaints of positive blood cultures. Ms. Karthik Fairbanks stated she was advised to go to ED for positive blood cultures. Of note patient was recently admitted in November with sepsis (MSSA bacteremia) from an infected Munguia catheter, Munguia catheter was subsequently discontinued and new catheter was inserted on right side of the chest on 11/11/2022. Patient has had a clean catheter for TPN and IVIG for gastroparesis. Reported she has not had TPN since August due to issues with her home health nurse and Munguia catheter infection. Does have J-tube in her left lower quadrant however has not been using due to \"failed\" tube feeding trails. Work-up in ED revealed tachycardia on arrival heart rate in 130's, WBC 15.8, afebrile, K+ 2.9, Hgb 9.9, urinalysis indicated trace leukocytes, positive nitrites, WBC 22. Received 1 L of IV fluids, vancomycin and cefepime in ED. Patient was admitted to hospital medicine for sepsis work-up. IV vancomycin and cefepime initiated. Blood culture from admission indicated gram-negative rods. ID consultation placed.   CT abdomen indicated left lower lobe pneumonia, no nephrolithiasis or hydroureter nephrosis. Procalcitonin 63.06. Patient was in ICU for hypotension, did not require pressors at that time. Transferred to floor. ID recommended switching antibiotic to Merrem. Urine culture no growth. Repeat blood cultures (12/13/2022) NGTD. Review of Systems   Constitutional:  Positive for chills and fever. Negative for diaphoresis and fatigue. HENT:  Negative for congestion, ear pain, sinus pain, sore throat and trouble swallowing. Eyes:  Negative for visual disturbance. Respiratory:  Negative for cough, shortness of breath and wheezing. Cardiovascular:  Negative for chest pain, palpitations and leg swelling. Gastrointestinal:  Positive for nausea (Chronic). Negative for abdominal distention, abdominal pain, blood in stool, constipation, diarrhea and vomiting. Endocrine: Negative for cold intolerance and heat intolerance. Genitourinary:  Negative for difficulty urinating, flank pain, frequency and urgency. Musculoskeletal:  Negative for arthralgias and myalgias. Neurological:  Negative for dizziness, syncope, weakness, light-headedness, numbness and headaches. Hematological:  Does not bruise/bleed easily. Psychiatric/Behavioral:  Negative for agitation, confusion and dysphoric mood. Objective:   VITALS:  BP 91/62   Pulse 96   Temp 99 °F (37.2 °C) (Oral)   Resp 16   Ht 5' (1.524 m)   Wt 114 lb (51.7 kg)   LMP 01/14/2018 (Exact Date)   SpO2 97%   BMI 22.26 kg/m²   24HR INTAKE/OUTPUT:    Intake/Output Summary (Last 24 hours) at 12/14/2022 1429  Last data filed at 12/14/2022 1341  Gross per 24 hour   Intake 120 ml   Output --   Net 120 ml           Physical Exam  Constitutional:       General: She is not in acute distress. Appearance: Normal appearance. She is ill-appearing (Chronically). She is not toxic-appearing or diaphoretic. HENT:      Head: Normocephalic and atraumatic.       Right Ear: External ear normal.      Left Ear: External ear normal. Nose: Nose normal. No congestion or rhinorrhea. Mouth/Throat:      Mouth: Mucous membranes are moist.      Pharynx: Oropharynx is clear. Eyes:      General: No scleral icterus. Extraocular Movements: Extraocular movements intact. Conjunctiva/sclera: Conjunctivae normal.   Cardiovascular:      Rate and Rhythm: Normal rate and regular rhythm. Pulses: Normal pulses. Heart sounds: Normal heart sounds. No murmur heard. No friction rub. No gallop. Pulmonary:      Effort: Pulmonary effort is normal. No respiratory distress. Breath sounds: Normal breath sounds. No wheezing, rhonchi or rales. Chest:      Comments: Munguia catheter to right chest  Abdominal:      General: Abdomen is flat. Bowel sounds are normal. There is no distension. Palpations: Abdomen is soft. Tenderness: There is no abdominal tenderness. Comments: J- Tube to LLQ   Stimulator to RLQ    Musculoskeletal:         General: No swelling. Normal range of motion. Cervical back: Normal range of motion and neck supple. Right lower leg: No edema. Left lower leg: No edema. Skin:     General: Skin is warm and dry. Coloration: Skin is not jaundiced. Findings: No erythema, lesion or rash. Neurological:      General: No focal deficit present. Mental Status: She is alert and oriented to person, place, and time. Mental status is at baseline. Cranial Nerves: No cranial nerve deficit. Sensory: No sensory deficit. Motor: No weakness. Psychiatric:         Mood and Affect: Mood normal.         Behavior: Behavior normal.         Thought Content:  Thought content normal.         Judgment: Judgment normal.          Medications:      IV infusion builder 150 mL/hr at 12/14/22 0913    sodium chloride        vitamin D  50,000 Units Oral Weekly    potassium chloride  40 mEq Oral BID    calcium carbonate  1,000 mg Oral TID    enoxaparin  1 mg/kg SubCUTAneous BID    sodium bicarbonate 1,300 mg Oral TID    meropenem  1,000 mg IntraVENous Q8H    sodium chloride flush  5-40 mL IntraVENous 2 times per day    dicyclomine  10 mg Oral BID     acetaminophen **OR** acetaminophen, sodium chloride flush, sodium chloride, potassium chloride **OR** potassium alternative oral replacement **OR** potassium chloride, prochlorperazine  ADULT DIET; Easy to Chew; GI Newell (GERD/Peptic Ulcer)  ADULT ORAL NUTRITION SUPPLEMENT; Breakfast, Lunch, Dinner; Standard High Calorie/High Protein Oral Supplement     Lab and other Data:     Recent Labs     12/12/22  1225 12/13/22  0117 12/14/22  0214   WBC 15.8* 18.5* 9.8   HGB 9.9* 8.5* 7.3*    290 276       Recent Labs     12/12/22  1225 12/13/22  0117 12/14/22  0351    139 142   K 2.9* 3.0* 2.5*    108 102   CO2 22 14* 34*   BUN 16 14 9   CREATININE 0.7 0.8 0.5   GLUCOSE 108 277* 112*       Recent Labs     12/12/22  1225   AST 22   ALT 10   BILITOT 0.4   ALKPHOS 95       Troponin T:   Recent Labs     12/13/22  0005   TROPONINI <0.01         UA:  Recent Labs     12/13/22  0500   COLORU YELLOW   PHUR 6.5   WBCUA 22*   RBCUA 1   BACTERIA NEGATIVE*   CLARITYU Clear   SPECGRAV 1.026   LEUKOCYTESUR TRACE*   UROBILINOGEN 1.0   BILIRUBINUR Negative   BLOODU Negative   GLUCOSEU Negative         RAD:     CT ABDOMEN PELVIS WO CONTRAST Additional Contrast? Radiologist Recommendation  Result Date: 12/13/2022    1. Left lower lobe pneumonia. 2.No nephrolithiasis or hydronephrosis. 3.Correlate for diarrhea state.       Micro:    COVID-19, Rapid [[id number]] Collected: 12/12/22 1300   Order Status: Completed Specimen: Nasopharyngeal Swab Updated: 12/12/22 1353    SARS-CoV-2, NAAT Not Detected     Blood Culture 1 [[id number]] (Abnormal) Collected: 12/12/22 1225   Order Status: Completed Specimen: Blood Updated: 12/14/22 0734    Blood Culture, Routine -- Abnormal      Bottle volume = 6 ml   Gram stain Aerobic bottle:   Gram negative rods   Culture in progress   Please notify Physician    Bottle volume = 8 ml   Gram stain Anaerobic bottle:   Gram negative rods   Culture in progress   Please notify Physician    Abnormal     Organism Enterobacter cloacae complex Abnormal     Blood Culture, Routine --    Sensitivity to follow   Isolated from Aerobic and Anaerobic bottle        Culture, Blood 2 [[id number]] Collected: 12/12/22 1300   Order Status: Completed Specimen: Blood Updated: 12/13/22 1515    Culture, Blood 2 No Growth to date. Any change in status will be called. Culture, Blood 1 [[id number]] Collected: 12/13/22 0250   Order Status: Completed Specimen: Blood Updated: 12/14/22 0514    Blood Culture, Routine No Growth to date. Any change in status will be called. Culture, Blood 2 [[id number]] Collected: 12/13/22 0630   Order Status: Completed Specimen: Blood Updated: 12/14/22 0714    Culture, Blood 2 No Growth to date. Any change in status will be called. Assessment/Plan   Principal Problem:    Sepsis ([address]  Active Problems:    Hypokalemia    Gastroparesis  Resolved Problems:    * No resolved hospital problems. *      Principal Problem:    Sepsis (HCC)/ Bacteremia-    - Continue IV abx per ID recommendations    - CT abdomen unremarkable for GI pathology, indicated LLL PNA    - Urine culture- No growth     - Repeat blood cultures (12/13/2022)- NGTD    - ID following     - Recommended IV Merrem   - Monitor labs   - Chest xray pending         Active Problems:    Hypokalemia-    - Continue to Replace K+    - Monitor labs closely    - PRN K+ replacement orders in place       Gastroparesis-    - continue Bentyl    - PRN Compazine         Antibiotic: Merrem      DVT Prophylaxis: Lovenox      Discharge planning: TBD        Further Orders per Clinical course/attending.      Electronically signed by SHRUTHI Ellis CNP on 12/14/2022 at 2:29 PM       EMR Dragon/Transcription disclaimer:   Much of this encounter note is an electronic transcription/translation of spoken language to printed text.  The electronic translation of spoken language may permit erroneous, or at times, nonsensical words or phrases to be inadvertently transcribed; although attempts have made to review the note for such errors, some may still exist.

## 2022-12-14 NOTE — CARE COORDINATION
Patient Contact Information:    [address] [facility]  [place] [address]  [phone] (home)   No relevant phone numbers on file. Above information verified? [x]   Yes  []   No      Emergency Contacts:    Extended Emergency Contact Information  Primary Emergency Contact: [address] [facility]  Mobile Phone: [phone]  Relation: Other   needed? No      Have you been vaccinated for COVID-19 (SARS-CoV-2)? []   Yes  [x]   No                   If so, when? Which :         []   Pfizer-BioNTech  []   Moderna  []   [employer]  []   Other:         Pharmacy:    [address] [facility], [address] [facility]   [address] [address]  Phone: [phone] Fax: [phone]          Patient Deficits:    []   Yes   [x]   No    If yes:    []   Confusion/Memory  []   Visual  []   Motor/Sensory         []   Right arm         []   Right leg         []   Left arm         []   Left leg  []   Language/Speech         []   Aphasia         []   Dysarthria         []   Swallow         Leandro Coma Scale  Eye Opening: Spontaneous  Best Verbal Response: Oriented  Best Motor Response: Obeys commands  Leandro Coma Scale Score: 15         SW visited with Pt re: d/c planning; stated she has moved into a new apt since her last hospitalization; has a roommate; still taking IVIG with nurse visiting once per week from [facility]; but she stated waiting on labs for her TPN to start again; [address],  (advanced infusion center): [phone] or [phone]; Nam Pierce is Pt's RN: [phone]; SW asked again if pt has worked with the [address] to get in-home services if she qualifies; she stated no, that she didn't want to do that at this time but would be interested in the CHW program if she qualifies; she noted she can manage her ADL and IADL needs IND but with some difficulty or modifications; uses rollator and has w/c;   Electronically signed by KENNEDI Bennett on 12/15/2022 at 5:59 PM

## 2022-12-15 LAB
ANION GAP SERPL CALCULATED.3IONS-SCNC: 9 MMOL/L (ref 7–19)
BLOOD CULTURE, ROUTINE: ABNORMAL
BLOOD CULTURE, ROUTINE: ABNORMAL
BUN BLDV-MCNC: 4 MG/DL (ref 6–20)
CALCIUM SERPL-MCNC: 7.5 MG/DL (ref 8.6–10)
CHLORIDE BLD-SCNC: 100 MMOL/L (ref 98–111)
CO2: 28 MMOL/L (ref 22–29)
CREAT SERPL-MCNC: 0.5 MG/DL (ref 0.5–0.9)
GFR SERPL CREATININE-BSD FRML MDRD: >60 ML/MIN/{1.73_M2}
GLUCOSE BLD-MCNC: 98 MG/DL (ref 74–109)
HCT VFR BLD CALC: 24.8 % (ref 37–47)
HEMOGLOBIN: 8 G/DL (ref 12–16)
MAGNESIUM: 2.3 MG/DL (ref 1.6–2.6)
MCH RBC QN AUTO: 26.1 PG (ref 27–31)
MCHC RBC AUTO-ENTMCNC: 32.3 G/DL (ref 33–37)
MCV RBC AUTO: 80.8 FL (ref 81–99)
ORGANISM: ABNORMAL
PDW BLD-RTO: 19.6 % (ref 11.5–14.5)
PLATELET # BLD: 379 K/UL (ref 130–400)
PMV BLD AUTO: 10.2 FL (ref 9.4–12.3)
POTASSIUM SERPL-SCNC: 3 MMOL/L (ref 3.5–5)
PROCALCITONIN: 72.53 NG/ML (ref 0–0.09)
RBC # BLD: 3.07 M/UL (ref 4.2–5.4)
SODIUM BLD-SCNC: 137 MMOL/L (ref 136–145)
WBC # BLD: 9.4 K/UL (ref 4.8–10.8)

## 2022-12-15 PROCEDURE — 6370000000 HC RX 637 (ALT 250 FOR IP): Performed by: STUDENT IN AN ORGANIZED HEALTH CARE EDUCATION/TRAINING PROGRAM

## 2022-12-15 PROCEDURE — 6360000002 HC RX W HCPCS

## 2022-12-15 PROCEDURE — 1210000000 HC MED SURG R&B

## 2022-12-15 PROCEDURE — 36415 COLL VENOUS BLD VENIPUNCTURE: CPT

## 2022-12-15 PROCEDURE — 2580000003 HC RX 258

## 2022-12-15 PROCEDURE — 6360000002 HC RX W HCPCS: Performed by: HOSPITALIST

## 2022-12-15 PROCEDURE — 85027 COMPLETE CBC AUTOMATED: CPT

## 2022-12-15 PROCEDURE — 80048 BASIC METABOLIC PNL TOTAL CA: CPT

## 2022-12-15 PROCEDURE — 83735 ASSAY OF MAGNESIUM: CPT

## 2022-12-15 PROCEDURE — 6360000002 HC RX W HCPCS: Performed by: INTERNAL MEDICINE

## 2022-12-15 PROCEDURE — 6370000000 HC RX 637 (ALT 250 FOR IP)

## 2022-12-15 PROCEDURE — 2500000003 HC RX 250 WO HCPCS: Performed by: STUDENT IN AN ORGANIZED HEALTH CARE EDUCATION/TRAINING PROGRAM

## 2022-12-15 PROCEDURE — 84145 PROCALCITONIN (PCT): CPT

## 2022-12-15 PROCEDURE — 94760 N-INVAS EAR/PLS OXIMETRY 1: CPT

## 2022-12-15 PROCEDURE — 6370000000 HC RX 637 (ALT 250 FOR IP): Performed by: HOSPITALIST

## 2022-12-15 RX ADMIN — MEROPENEM AND SODIUM CHLORIDE 1000 MG: 1 INJECTION, SOLUTION INTRAVENOUS at 02:54

## 2022-12-15 RX ADMIN — ACETAMINOPHEN 1000 MG: 500 TABLET ORAL at 00:12

## 2022-12-15 RX ADMIN — DICYCLOMINE HYDROCHLORIDE 10 MG: 10 CAPSULE ORAL at 10:07

## 2022-12-15 RX ADMIN — SODIUM CHLORIDE, PRESERVATIVE FREE 10 ML: 5 INJECTION INTRAVENOUS at 10:15

## 2022-12-15 RX ADMIN — POTASSIUM CHLORIDE 10 MEQ: 7.46 INJECTION, SOLUTION INTRAVENOUS at 14:37

## 2022-12-15 RX ADMIN — POTASSIUM CHLORIDE 10 MEQ: 7.46 INJECTION, SOLUTION INTRAVENOUS at 16:15

## 2022-12-15 RX ADMIN — ENOXAPARIN SODIUM 50 MG: 100 INJECTION SUBCUTANEOUS at 22:26

## 2022-12-15 RX ADMIN — POTASSIUM CHLORIDE 10 MEQ: 7.46 INJECTION, SOLUTION INTRAVENOUS at 12:21

## 2022-12-15 RX ADMIN — ANTACID TABLETS 1000 MG: 500 TABLET, CHEWABLE ORAL at 10:07

## 2022-12-15 RX ADMIN — POTASSIUM CHLORIDE 10 MEQ: 7.46 INJECTION, SOLUTION INTRAVENOUS at 11:05

## 2022-12-15 RX ADMIN — MEROPENEM AND SODIUM CHLORIDE 1000 MG: 1 INJECTION, SOLUTION INTRAVENOUS at 17:49

## 2022-12-15 RX ADMIN — DEXTROSE MONOHYDRATE, SODIUM CHLORIDE, AND POTASSIUM CHLORIDE: 50; 4.5; 1.49 INJECTION, SOLUTION INTRAVENOUS at 10:05

## 2022-12-15 RX ADMIN — DICYCLOMINE HYDROCHLORIDE 10 MG: 10 CAPSULE ORAL at 22:26

## 2022-12-15 RX ADMIN — POTASSIUM CHLORIDE 10 MEQ: 7.46 INJECTION, SOLUTION INTRAVENOUS at 10:11

## 2022-12-15 RX ADMIN — ENOXAPARIN SODIUM 50 MG: 100 INJECTION SUBCUTANEOUS at 10:07

## 2022-12-15 RX ADMIN — DEXTROSE MONOHYDRATE, SODIUM CHLORIDE, AND POTASSIUM CHLORIDE: 50; 4.5; 1.49 INJECTION, SOLUTION INTRAVENOUS at 16:19

## 2022-12-15 RX ADMIN — PROCHLORPERAZINE EDISYLATE 10 MG: 5 INJECTION INTRAMUSCULAR; INTRAVENOUS at 13:34

## 2022-12-15 RX ADMIN — MEROPENEM AND SODIUM CHLORIDE 1000 MG: 1 INJECTION, SOLUTION INTRAVENOUS at 11:07

## 2022-12-15 RX ADMIN — ANTACID TABLETS 1000 MG: 500 TABLET, CHEWABLE ORAL at 22:26

## 2022-12-15 RX ADMIN — POTASSIUM CHLORIDE 10 MEQ: 7.46 INJECTION, SOLUTION INTRAVENOUS at 13:31

## 2022-12-15 ASSESSMENT — ENCOUNTER SYMPTOMS
NAUSEA: 1
VOMITING: 0
DIARRHEA: 0
WHEEZING: 0
ABDOMINAL DISTENTION: 0
ABDOMINAL PAIN: 0
COUGH: 0
BLOOD IN STOOL: 0
SORE THROAT: 0
SINUS PAIN: 0
SHORTNESS OF BREATH: 0
TROUBLE SWALLOWING: 0
CONSTIPATION: 0

## 2022-12-15 ASSESSMENT — PAIN SCALES - GENERAL: PAINLEVEL_OUTOF10: 3

## 2022-12-15 ASSESSMENT — PAIN DESCRIPTION - LOCATION: LOCATION: BACK

## 2022-12-15 NOTE — PROGRESS NOTES
Infectious Diseases Progress Note    Patient:  Ender Espinoza  YOB: 1990  MRN: [id number]   Admit date: 12/12/2022   Admitting Physician: Chau Mitchell MD  Primary Care Physician: Rico Summers MD    Chief Complaint/Interval History: She seems to be feeling better. She is without fever. Her last elevated temperature was the evening of December 13. She is hemodynamically stable. Remains on room air. Reviewed positive blood culture for Enterobacter with her. Explained we may have a good oral alternative for her in the form of levofloxacin. She wants to travel to [facility] next week to be with a friend who is undergoing a procedure and also because she indicates she has an appointment to turn down her gastric stimulator. Explained risks of fluoroquinolone treatment including tendon soreness, tendon weakening, and rarely tendon rupture. She indicates that she is accepting of those risks. In/Out    Intake/Output Summary (Last 24 hours) at 12/15/2022 0831  Last data filed at 12/14/2022 1341  Gross per 24 hour   Intake 0 ml   Output --   Net 0 ml     Allergies: Allergies   Allergen Reactions    Iv Dye [Iodides] Shortness Of Breath, Itching and Other (See Comments)     IV 3000;  SoA, dizziness    Iv Dye [Iodides] Anaphylaxis     Iodine containing    Adhesive Tape Itching     And tegaderm    Bee Pollen Hives     Oranges-anything with orange color  Oranges-anything with orange color  Oranges-anything with orange color  Oranges-anything with orange color  Oranges-anything with orange color      Chlorhexidine      Other reaction(s): ITCHING    Fruit & Vegetable Daily [Nutritional Supplements]      Oranges-anything with orange color    Metoprolol Succinate [Metoprolol]      Excessive drowsiness/ Metoprolol tartrate-excessive drowsiness    Nsaids      Other reaction(s): GI Intolerance    Mayes Itching and Other (See Comments)     Other reaction(s): Vomiting    Orange Fruit [Citrus]      And artificial    Other      Cloraprep    Reglan [Metoclopramide]      Nausea,vomiting    Tramadol Other (See Comments)     Excessive sleeping x 3 days, pt takes 1/2 a pill. Zofran [Ondansetron Hcl] Hives    Orange Oil Nausea And Vomiting    Sulfamethoxazole-Trimethoprim Nausea And Vomiting     Current Meds: vitamin D (ERGOCALCIFEROL) capsule 50,000 Units, Weekly  dextrose 5% and 0.45% NaCl with KCl 20 mEq 1,000 mL infusion, Continuous  calcium carbonate (TUMS) chewable tablet 1,000 mg, TID  enoxaparin (LOVENOX) injection 50 mg, BID  meropenem (MERREM) 1000 mg in sodium chloride 0.9% 50 mL (duplex), Q8H  acetaminophen (TYLENOL) tablet 1,000 mg, Q6H PRN   Or  acetaminophen (TYLENOL) suppository 650 mg, Q6H PRN  sodium chloride flush 0.9 % injection 5-40 mL, 2 times per day  sodium chloride flush 0.9 % injection 5-40 mL, PRN  0.9 % sodium chloride infusion, PRN  potassium chloride (KLOR-CON M) extended release tablet 40 mEq, PRN   Or  potassium bicarb-citric acid (EFFER-K) effervescent tablet 40 mEq, PRN   Or  potassium chloride 10 mEq/100 mL IVPB (Peripheral Line), PRN  prochlorperazine (COMPAZINE) injection 10 mg, Q6H PRN  dicyclomine (BENTYL) capsule 10 mg, BID      Review of Systems see HPI    VitalSigns:  /70   Pulse 83   Temp 97.5 °F (36.4 °C) (Oral)   Resp 16   Ht 5' (1.524 m)   Wt 114 lb (51.7 kg)   LMP 01/14/2018 (Exact Date)   SpO2 94%   BMI 22.26 kg/m²      Physical Exam  Line/IV site: No erythema, warmth, induration, or tenderness. Abdomen with minimal tenderness. No rebound.   Lungs without crackles    Lab Results:  CBC:   Recent Labs     12/13/22 0117 12/14/22  0214 12/15/22  0245   WBC 18.5* 9.8 9.4   HGB 8.5* 7.3* 8.0*    276 379     BMP:  Recent Labs     12/13/22  0117 12/14/22  0351 12/15/22  0245    142 137   K 3.0* 2.5* 3.0*    102 100   CO2 14* 34* 28   BUN 14 9 4*   CREATININE 0.8 0.5 0.5   GLUCOSE 277* 112* 98     CultureResults:  Susceptibility    Enterobacter cloacae complex (1)    Antibiotic Interpretation Microscan  Method Status    aztreonam Sensitive <=1 mcg/mL BACTERIAL SUSCEPTIBILITY PANEL BY WILFRIDO     ceFAZolin Resistant >=64 mcg/mL BACTERIAL SUSCEPTIBILITY PANEL BY WILFRIDO     cefepime Sensitive <=1 mcg/mL BACTERIAL SUSCEPTIBILITY PANEL BY WILFRIDO     cefTRIAXone Sensitive <=1 mcg/mL BACTERIAL SUSCEPTIBILITY PANEL BY WILFRIDO     ertapenem Sensitive <=0.5 mcg/mL BACTERIAL SUSCEPTIBILITY PANEL BY WILFRIDO     gentamicin Sensitive <=1 mcg/mL BACTERIAL SUSCEPTIBILITY PANEL BY WILFRIDO     levofloxacin Sensitive <=0.12 mcg/mL BACTERIAL SUSCEPTIBILITY PANEL BY WILFRIDO     meropenem Sensitive <=0.25 mcg/mL BACTERIAL SUSCEPTIBILITY PANEL BY WILFRIDO     piperacillin-tazobactam Sensitive 8 mcg/mL BACTERIAL SUSCEPTIBILITY PANEL BY WILFRIDO     trimethoprim-sulfamethoxazole Sensitive <=20 mcg/mL BACTERIAL SUSCEPTIBILITY PANEL BY WILFRIDO       Radiology: None    Additional Studies Reviewed:  None    Impression:  1. Gram-negative bacteremia-Enterobacter  2. Recent MSSA bloodstream infection  3.   Munguia catheter in place right upper chest    Recommendations:  Continue meropenem  If ongoing improvement possibly transition to levofloxacin tomorrow to complete her course of treatment  Discussed with hospitalist service    Shea Haque MD

## 2022-12-15 NOTE — PROGRESS NOTES
Comprehensive Nutrition Assessment    Type and Reason for Visit:  Reassess    Nutrition Recommendations/Plan:   Will d/c ONS. Malnutrition Assessment:  Malnutrition Status: Moderate malnutrition (12/15/22 1215)    Context:  Chronic Illness     Findings of the 6 clinical characteristics of malnutrition:  Energy Intake:  Mild decrease in energy intake (Comment)  Weight Loss:  Greater than 7.5% over 3 months     Body Fat Loss:  Unable to assess     Muscle Mass Loss:  Unable to assess    Fluid Accumulation:  Unable to assess     Strength:  Not Performed    Nutrition Assessment:    Seen for follow-up assessment. Pt. presents nutritionally compromised hx of gastroparesis and at home TPN. Pt. came off of her NPO diet on 12/12/22. Diet upgrade was made to a PO easy to chew on 12/13/22. She is reflecting a 0% intake x 3 days. Pt. states her appetite flunctuates d/t her Gastroparesis. Supplement intake was encouraged however, pt asked me to remove them from her orders. Will d/c ONS. Nutrition Related Findings:      Wound Type: None       Current Nutrition Intake & Therapies:    Average Meal Intake: 1-25%  Average Supplements Intake: Unable to assess  ADULT DIET; Easy to Chew; GI Loiza (GERD/Peptic Ulcer)    Anthropometric Measures:  Height: 5' (152.4 cm)  Ideal Body Weight (IBW): 100 lbs (45 kg)    Admission Body Weight: 114 lb (51.7 kg)  Current Body Weight: 114 lb (51.7 kg), 114 % IBW. Weight Source: Stated  Current BMI (kg/m2): 22.3  Usual Body Weight: 125 lb (56.7 kg) (9/23/22)  % Weight Change (Calculated): -8.8  Weight Adjustment For: No Adjustment    BMI Categories: Normal Weight (BMI 18.5-24. 9)    Estimated Daily Nutrient Needs:  Energy Requirements Based On: Kcal/kg  Weight Used for Energy Requirements: Current  Energy (kcal/day): 5504-9476  Weight Used for Protein Requirements: Current  Protein (g/day):   Method Used for Fluid Requirements: 1 ml/kcal  Fluid (ml/day): 7538-7106    Nutrition Diagnosis:   Inadequate oral intake related to altered GI function, altered GI structure as evidenced by weight loss 7.5% in 3 months, GI abnormality    Nutrition Interventions:   Food and/or Nutrient Delivery: Continue Current Diet, Start Oral Nutrition Supplement  Nutrition Education/Counseling: No recommendation at this time  Coordination of Nutrition Care: No recommendation at this time       Goals:  Previous Goal Met: Progressing toward Goal(s)  Goals: Meet at least 75% of estimated needs, PO intake 50% or greater       Nutrition Monitoring and Evaluation:   Behavioral-Environmental Outcomes: None Identified  Food/Nutrient Intake Outcomes: Diet Advancement/Tolerance, Food and Nutrient Intake, Supplement Intake  Physical Signs/Symptoms Outcomes: Biochemical Data, Weight, Nausea or Vomiting, GI Status, Nutrition Focused Physical Findings    Discharge Planning:     Too soon to determine     Rubia Harrison [address], LD  Contact: [phone]

## 2022-12-15 NOTE — PROGRESS NOTES
[facility]      Progress Note    Patient:  Dedrick Lopez  YOB: 1990  Date of Service: 12/15/2022  MRN: [id number]   Acct: [de-identified]   Primary Care Physician: Linh Love MD  Advance Directive: Full Code  Admit Date: 12/12/2022       Hospital Day: 3    Portions of this note have been copied forward, however, updated to reflect the most current clinical status of this patient. CHIEF COMPLAINT bacteremia    SUBJECTIVE:  Ms. Flor Le was resting comfortably in bed this morning. Continues to have chronic nausea. Reported low-grade fever last time. Stated she is feeling better today. CUMULATIVE HOSPITAL COURSE:   The patient is a 70-year-old female with past medical history of anxiety, depression, gastroparesis, GERD, Hoyos's esophagus, and POTS who presented to [facility] ED with complaints of positive blood cultures. Ms. Perlita Mccartney stated she was advised to go to ED for positive blood cultures. Of note patient was recently admitted in November with sepsis (MSSA bacteremia) from an infected Munguia catheter, Munguia catheter was subsequently discontinued and new catheter was inserted on right side of the chest on 11/11/2022. Patient has had a clean catheter for TPN and IVIG for gastroparesis. Reported she has not had TPN since August due to issues with her home health nurse and Munguia catheter infection. Does have J-tube in her left lower quadrant however has not been using due to \"failed\" tube feeding trails. Work-up in ED revealed tachycardia on arrival heart rate in 130's, WBC 15.8, afebrile, K+ 2.9, Hgb 9.9, urinalysis indicated trace leukocytes, positive nitrites, WBC 22. Received 1 L of IV fluids, vancomycin and cefepime in ED. Patient was admitted to hospital medicine for sepsis work-up. IV vancomycin and cefepime initiated. Blood culture from admission indicated gram-negative rods. ID consultation placed.   CT abdomen indicated left lower lobe pneumonia, no nephrolithiasis or hydroureter nephrosis. Procalcitonin 63.06. Patient was in ICU for hypotension, did not require pressors at that time. Transferred to floor. ID recommended switching antibiotic to Merrem. Urine culture no growth. Repeat blood cultures (12/13/2022) NGTD. ID recommended possibly switching to Levaquin tomorrow. Discussed ID recommendations and risk of fluoroquinolone treatment including tendon soreness, tendon weakening, and/or tendon rupture (rarely). She indicated she has had Levaquin before and had done well with it in the past.         Review of Systems   Constitutional:  Positive for chills and fever. Negative for diaphoresis and fatigue. HENT:  Negative for congestion, ear pain, sinus pain, sore throat and trouble swallowing. Eyes:  Negative for visual disturbance. Respiratory:  Negative for cough, shortness of breath and wheezing. Cardiovascular:  Negative for chest pain, palpitations and leg swelling. Gastrointestinal:  Positive for nausea (Chronic). Negative for abdominal distention, abdominal pain, blood in stool, constipation, diarrhea and vomiting. Endocrine: Negative for cold intolerance and heat intolerance. Genitourinary:  Negative for difficulty urinating, flank pain, frequency and urgency. Musculoskeletal:  Negative for arthralgias and myalgias. Neurological:  Negative for dizziness, syncope, weakness, light-headedness, numbness and headaches. Hematological:  Does not bruise/bleed easily. Psychiatric/Behavioral:  Negative for agitation, confusion and dysphoric mood. Objective:   VITALS:  /72   Pulse 96   Temp 97.2 °F (36.2 °C) (Oral)   Resp 16   Ht 5' (1.524 m)   Wt 114 lb (51.7 kg)   LMP 01/14/2018 (Exact Date)   SpO2 97%   BMI 22.26 kg/m²   24HR INTAKE/OUTPUT:  No intake or output data in the 24 hours ending 12/15/22 1508        Physical Exam  Constitutional:       General: She is not in acute distress.      Appearance: Normal appearance. She is ill-appearing (Chronically). She is not toxic-appearing or diaphoretic. HENT:      Head: Normocephalic and atraumatic. Right Ear: External ear normal.      Left Ear: External ear normal.      Nose: Nose normal. No congestion or rhinorrhea. Mouth/Throat:      Mouth: Mucous membranes are moist.      Pharynx: Oropharynx is clear. Eyes:      General: No scleral icterus. Extraocular Movements: Extraocular movements intact. Conjunctiva/sclera: Conjunctivae normal.   Cardiovascular:      Rate and Rhythm: Normal rate and regular rhythm. Pulses: Normal pulses. Heart sounds: Normal heart sounds. No murmur heard. No friction rub. No gallop. Pulmonary:      Effort: Pulmonary effort is normal. No respiratory distress. Breath sounds: Normal breath sounds. No wheezing, rhonchi or rales. Chest:      Comments: Munguia catheter to right chest  Abdominal:      General: Abdomen is flat. Bowel sounds are normal. There is no distension. Palpations: Abdomen is soft. Tenderness: There is no abdominal tenderness. Comments: J- Tube to LLQ   Stimulator to RLQ    Musculoskeletal:         General: No swelling. Normal range of motion. Cervical back: Normal range of motion and neck supple. Right lower leg: No edema. Left lower leg: No edema. Skin:     General: Skin is warm and dry. Coloration: Skin is not jaundiced. Findings: No erythema, lesion or rash. Neurological:      General: No focal deficit present. Mental Status: She is alert and oriented to person, place, and time. Mental status is at baseline. Cranial Nerves: No cranial nerve deficit. Sensory: No sensory deficit. Motor: No weakness. Psychiatric:         Mood and Affect: Mood normal.         Behavior: Behavior normal.         Thought Content:  Thought content normal.         Judgment: Judgment normal.          Medications:      IV infusion builder 150 mL/hr at 12/15/22 1005    sodium chloride        vitamin D  50,000 Units Oral Weekly    calcium carbonate  1,000 mg Oral TID    enoxaparin  1 mg/kg SubCUTAneous BID    meropenem  1,000 mg IntraVENous Q8H    sodium chloride flush  5-40 mL IntraVENous 2 times per day    dicyclomine  10 mg Oral BID     acetaminophen **OR** acetaminophen, sodium chloride flush, sodium chloride, potassium chloride **OR** potassium alternative oral replacement **OR** potassium chloride, prochlorperazine  ADULT DIET; Easy to Chew; GI [place] (GERD/Peptic Ulcer)     Lab and other Data:     Recent Labs     12/13/22  0117 12/14/22  0214 12/15/22  0245   WBC 18.5* 9.8 9.4   HGB 8.5* 7.3* 8.0*    276 379       Recent Labs     12/13/22  0117 12/14/22  0351 12/15/22  0245    142 137   K 3.0* 2.5* 3.0*    102 100   CO2 14* 34* 28   BUN 14 9 4*   CREATININE 0.8 0.5 0.5   GLUCOSE 277* 112* 98         Troponin T:   Recent Labs     12/13/22  0005   TROPONINI <0.01         UA:  Recent Labs     12/13/22  0500   COLORU YELLOW   PHUR 6.5   WBCUA 22*   RBCUA 1   BACTERIA NEGATIVE*   CLARITYU Clear   SPECGRAV 1.026   LEUKOCYTESUR TRACE*   UROBILINOGEN 1.0   BILIRUBINUR Negative   BLOODU Negative   GLUCOSEU Negative         RAD:     CT ABDOMEN PELVIS WO CONTRAST Additional Contrast? Radiologist Recommendation  Result Date: 12/13/2022    1. Left lower lobe pneumonia. 2.No nephrolithiasis or hydronephrosis. 3.Correlate for diarrhea state.       Micro:    COVID-19, Rapid [id number] Collected: 12/12/22 1300   Order Status: Completed Specimen: Nasopharyngeal Swab Updated: 12/12/22 1353    SARS-CoV-2, NAAT Not Detected     Blood Culture 1 [[id number]] (Abnormal)  Collected: 12/12/22 1225   Order Status: Completed Specimen: Blood Updated: 12/15/22 0616    Blood Culture, Routine -- Abnormal      Bottle volume = 6 ml    Bottle volume = 8 ml    Abnormal     Organism Enterobacter cloacae complex Abnormal     Blood Culture, Routine --    Identification by Molecular PCR   Isolated from Aerobic and Anaerobic bottle    Susceptibility    Enterobacter cloacae complex (1)    Antibiotic Interpretation Microscan  Method Status    aztreonam Sensitive <=1 mcg/mL BACTERIAL SUSCEPTIBILITY PANEL BY WILFRIDO     ceFAZolin Resistant >=64 mcg/mL BACTERIAL SUSCEPTIBILITY PANEL BY WILFRIDO     cefepime Sensitive <=1 mcg/mL BACTERIAL SUSCEPTIBILITY PANEL BY WILFRIDO     cefTRIAXone Sensitive <=1 mcg/mL BACTERIAL SUSCEPTIBILITY PANEL BY WILFRIDO     ertapenem Sensitive <=0.5 mcg/mL BACTERIAL SUSCEPTIBILITY PANEL BY WILFRIDO     gentamicin Sensitive <=1 mcg/mL BACTERIAL SUSCEPTIBILITY PANEL BY WILFRIDO     levofloxacin Sensitive <=0.12 mcg/mL BACTERIAL SUSCEPTIBILITY PANEL BY WILFRIDO     meropenem Sensitive <=0.25 mcg/mL BACTERIAL SUSCEPTIBILITY PANEL BY WILFRIOD     piperacillin-tazobactam Sensitive 8 mcg/mL BACTERIAL SUSCEPTIBILITY PANEL BY WILFRIDO     trimethoprim-sulfamethoxazole Sensitive <=20 mcg/mL BACTERIAL SUSCEPTIBILITY PANEL BY WILFRIDO         Culture, Blood 2 [[id number]] Collected: 12/12/22 1300   Order Status: Completed Specimen: Blood Updated: 12/13/22 1515    Culture, Blood 2 No Growth to date. Any change in status will be called. Culture, Blood 1 [[id number]] Collected: 12/13/22 0250   Order Status: Completed Specimen: Blood Updated: 12/14/22 0514    Blood Culture, Routine No Growth to date. Any change in status will be called. Culture, Blood 2 [[id number]] Collected: 12/13/22 0630   Order Status: Completed Specimen: Blood Updated: 12/14/22 0714    Culture, Blood 2 No Growth to date. Any change in status will be called. Assessment/Plan   Principal Problem:    Sepsis ([address]  Active Problems:    Hypokalemia    Gastroparesis  Resolved Problems:    * No resolved hospital problems.  *      Principal Problem:    Sepsis (HCC)/ Bacteremia-    - Continue IV abx per ID recommendations    - CT abdomen unremarkable for GI pathology, indicated LLL PNA    - Urine culture- No growth     - Repeat blood cultures (12/13/2022)- NGTD    - ID following     - Recommended continuing IV Merrem for now, possible transition to Levaquin tomorrow   - Monitor labs   - Chest xray indicated 3.6 cm focal infiltrate just lateral to the left cardiac silhouette, on antibiotics   - Incentive spirometry           Active Problems:    Hypokalemia-    - Improving   - Continue to Replace K+    - Monitor labs closely    - PRN K+ replacement orders in place       Gastroparesis-    - continue Bentyl    - PRN Compazine         Antibiotic: Merrem      DVT Prophylaxis: Lovenox      Discharge planning: TBD        Further Orders per Clinical course/attending. Electronically signed by SHRUTHI Ellis CNP on 12/15/2022 at 3:08 PM       EMR Dragon/Transcription disclaimer:   Much of this encounter note is an electronic transcription/translation of spoken language to printed text.  The electronic translation of spoken language may permit erroneous, or at times, nonsensical words or phrases to be inadvertently transcribed; although attempts have made to review the note for such errors, some may still exist.

## 2022-12-16 VITALS
HEIGHT: 60 IN | HEART RATE: 102 BPM | SYSTOLIC BLOOD PRESSURE: 112 MMHG | OXYGEN SATURATION: 98 % | WEIGHT: 114 LBS | TEMPERATURE: 97.5 F | DIASTOLIC BLOOD PRESSURE: 75 MMHG | BODY MASS INDEX: 22.38 KG/M2 | RESPIRATION RATE: 16 BRPM

## 2022-12-16 LAB
ANION GAP SERPL CALCULATED.3IONS-SCNC: 9 MMOL/L (ref 7–19)
BASOPHILS ABSOLUTE: 0.1 K/UL (ref 0–0.2)
BASOPHILS RELATIVE PERCENT: 0.6 % (ref 0–1)
BUN BLDV-MCNC: 4 MG/DL (ref 6–20)
CALCIUM SERPL-MCNC: 8 MG/DL (ref 8.6–10)
CHLORIDE BLD-SCNC: 103 MMOL/L (ref 98–111)
CO2: 22 MMOL/L (ref 22–29)
CREAT SERPL-MCNC: 0.5 MG/DL (ref 0.5–0.9)
EOSINOPHILS ABSOLUTE: 0 K/UL (ref 0–0.6)
EOSINOPHILS RELATIVE PERCENT: 0.4 % (ref 0–5)
GFR SERPL CREATININE-BSD FRML MDRD: >60 ML/MIN/{1.73_M2}
GLUCOSE BLD-MCNC: 94 MG/DL (ref 74–109)
HCT VFR BLD CALC: 28.4 % (ref 37–47)
HEMOGLOBIN: 9.1 G/DL (ref 12–16)
IMMATURE GRANULOCYTES #: 0.1 K/UL
LYMPHOCYTES ABSOLUTE: 2.1 K/UL (ref 1.1–4.5)
LYMPHOCYTES RELATIVE PERCENT: 20 % (ref 20–40)
MAGNESIUM: 1.9 MG/DL (ref 1.6–2.6)
MCH RBC QN AUTO: 26.8 PG (ref 27–31)
MCHC RBC AUTO-ENTMCNC: 32 G/DL (ref 33–37)
MCV RBC AUTO: 83.5 FL (ref 81–99)
MONOCYTES ABSOLUTE: 0.7 K/UL (ref 0–0.9)
MONOCYTES RELATIVE PERCENT: 6.7 % (ref 0–10)
NEUTROPHILS ABSOLUTE: 7.6 K/UL (ref 1.5–7.5)
NEUTROPHILS RELATIVE PERCENT: 71.5 % (ref 50–65)
PDW BLD-RTO: 20.4 % (ref 11.5–14.5)
PLATELET # BLD: 503 K/UL (ref 130–400)
PMV BLD AUTO: 10 FL (ref 9.4–12.3)
POTASSIUM REFLEX MAGNESIUM: 4.7 MMOL/L (ref 3.5–5)
RBC # BLD: 3.4 M/UL (ref 4.2–5.4)
SODIUM BLD-SCNC: 134 MMOL/L (ref 136–145)
WBC # BLD: 10.6 K/UL (ref 4.8–10.8)

## 2022-12-16 PROCEDURE — 80048 BASIC METABOLIC PNL TOTAL CA: CPT

## 2022-12-16 PROCEDURE — 6360000002 HC RX W HCPCS: Performed by: INTERNAL MEDICINE

## 2022-12-16 PROCEDURE — 83735 ASSAY OF MAGNESIUM: CPT

## 2022-12-16 PROCEDURE — 2580000003 HC RX 258: Performed by: INTERNAL MEDICINE

## 2022-12-16 PROCEDURE — 85025 COMPLETE CBC W/AUTO DIFF WBC: CPT

## 2022-12-16 PROCEDURE — 36415 COLL VENOUS BLD VENIPUNCTURE: CPT

## 2022-12-16 PROCEDURE — 6360000002 HC RX W HCPCS: Performed by: HOSPITALIST

## 2022-12-16 PROCEDURE — 94760 N-INVAS EAR/PLS OXIMETRY 1: CPT

## 2022-12-16 PROCEDURE — 6370000000 HC RX 637 (ALT 250 FOR IP)

## 2022-12-16 PROCEDURE — 2580000003 HC RX 258

## 2022-12-16 RX ORDER — LEVOFLOXACIN 750 MG/1
750 TABLET ORAL DAILY
Qty: 7 TABLET | Refills: 0 | Status: SHIPPED | OUTPATIENT
Start: 2022-12-17 | End: 2022-12-24

## 2022-12-16 RX ORDER — ERGOCALCIFEROL 1.25 MG/1
50000 CAPSULE ORAL WEEKLY
Qty: 5 CAPSULE | Refills: 0 | Status: SHIPPED | OUTPATIENT
Start: 2022-12-21

## 2022-12-16 RX ORDER — LEVOFLOXACIN 500 MG/1
750 TABLET, FILM COATED ORAL DAILY
Status: DISCONTINUED | OUTPATIENT
Start: 2022-12-17 | End: 2022-12-16 | Stop reason: HOSPADM

## 2022-12-16 RX ADMIN — CEFTRIAXONE 2000 MG: 2 INJECTION, POWDER, FOR SOLUTION INTRAMUSCULAR; INTRAVENOUS at 08:45

## 2022-12-16 RX ADMIN — DICYCLOMINE HYDROCHLORIDE 10 MG: 10 CAPSULE ORAL at 08:45

## 2022-12-16 RX ADMIN — SODIUM CHLORIDE, PRESERVATIVE FREE 10 ML: 5 INJECTION INTRAVENOUS at 08:46

## 2022-12-16 RX ADMIN — MEROPENEM AND SODIUM CHLORIDE 1000 MG: 1 INJECTION, SOLUTION INTRAVENOUS at 02:34

## 2022-12-16 RX ADMIN — ENOXAPARIN SODIUM 50 MG: 100 INJECTION SUBCUTANEOUS at 08:45

## 2022-12-16 NOTE — PROGRESS NOTES
Spoke with answering service at [facility] to resume pt outpatient home care infusions. Staff stated that a note was for resumption of care was placed & someone from [facility] would be in contact shortly. D/c summary faxed to [phone] per chart.

## 2022-12-16 NOTE — PROGRESS NOTES
Comprehensive Nutrition Assessment    Type and Reason for Visit:  Reassess    Nutrition Recommendations/Plan:   Will continue to monitor intakes and wts daily. Malnutrition Assessment:  Malnutrition Status: Moderate malnutrition (12/16/22 5344)    Context:  Chronic Illness     Findings of the 6 clinical characteristics of malnutrition:  Energy Intake:     Weight Loss: Body Fat Loss:        Muscle Mass Loss:       Fluid Accumulation:        Strength:       Nutrition Assessment:    Seen for follow-up assessmet. Pt. reflecting a 0% PO intake x4 days. Aware pt. is to discharge today. Will continue to monitor intakes and wts daily. Nutrition Related Findings:      Wound Type: None       Current Nutrition Intake & Therapies:    Average Meal Intake: 1-25%  Average Supplements Intake: Unable to assess  ADULT DIET; Easy to Chew; GI [place] (GERD/Peptic Ulcer)    Anthropometric Measures:  Height: 5' (152.4 cm)  Ideal Body Weight (IBW): 100 lbs (45 kg)    Admission Body Weight: 114 lb (51.7 kg)  Current Body Weight: 114 lb (51.7 kg), 114 % IBW. Weight Source: Stated  Current BMI (kg/m2): 22.3  Usual Body Weight: 125 lb (56.7 kg) (9/23/22)  % Weight Change (Calculated): -8.8  Weight Adjustment For: No Adjustment                 BMI Categories: Normal Weight (BMI 18.5-24. 9)    Estimated Daily Nutrient Needs:  Energy Requirements Based On: Kcal/kg  Weight Used for Energy Requirements: Current  Energy (kcal/day): 1184-5984  Weight Used for Protein Requirements: Current  Protein (g/day):   Method Used for Fluid Requirements: 1 ml/kcal  Fluid (ml/day): 7698-1897    Nutrition Diagnosis:   Inadequate oral intake related to altered GI function, altered GI structure as evidenced by weight loss 7.5% in 3 months, GI abnormality    Nutrition Interventions:   Food and/or Nutrient Delivery: Continue Current Diet, Start Oral Nutrition Supplement  Nutrition Education/Counseling: No recommendation at this time  Coordination of Nutrition Care: No recommendation at this time       Goals:  Previous Goal Met: Progressing toward Goal(s)  Goals: Meet at least 75% of estimated needs, PO intake 50% or greater       Nutrition Monitoring and Evaluation:   Behavioral-Environmental Outcomes: None Identified  Food/Nutrient Intake Outcomes: Diet Advancement/Tolerance, Food and Nutrient Intake, Supplement Intake  Physical Signs/Symptoms Outcomes: Biochemical Data, Weight, Nausea or Vomiting, GI Status, Nutrition Focused Physical Findings    Discharge Planning:     Too soon to determine     Rubia Hendricks [address], [address], LD  Contact: [phone]

## 2022-12-16 NOTE — PLAN OF CARE
Problem: Pain  Goal: Verbalizes/displays adequate comfort level or baseline comfort level  12/16/2022 1213 by Jesi Zarate RN  Outcome: Completed  12/16/2022 1018 by Jesi Zarate RN  Outcome: Progressing     Problem: Safety - Adult  Goal: Free from fall injury  12/16/2022 1213 by Jesi Zarate RN  Outcome: Completed  12/16/2022 1018 by Jesi Zarate RN  Outcome: Progressing     Problem: ABCDS Injury Assessment  Goal: Absence of physical injury  12/16/2022 1213 by Jesi Zarate RN  Outcome: Completed  12/16/2022 1018 by Jesi Zarate RN  Outcome: Progressing     Problem: Skin/Tissue Integrity  Goal: Absence of new skin breakdown  Description: 1. Monitor for areas of redness and/or skin breakdown  2. Assess vascular access sites hourly  3. Every 4-6 hours minimum:  Change oxygen saturation probe site  4. Every 4-6 hours:  If on nasal continuous positive airway pressure, respiratory therapy assess nares and determine need for appliance change or resting period.   12/16/2022 1213 by Jesi Zarate RN  Outcome: Completed  12/16/2022 1018 by Jesi Zarate RN  Outcome: Progressing     Problem: Discharge Planning  Goal: Discharge to home or other facility with appropriate resources  12/16/2022 1213 by Jesi Zarate RN  Outcome: Completed  12/16/2022 1018 by Jesi Zarate RN  Outcome: Progressing  Flowsheets (Taken 12/16/2022 9500)  Discharge to home or other facility with appropriate resources: Identify barriers to discharge with patient and caregiver     Problem: Nutrition Deficit:  Goal: Optimize nutritional status  12/16/2022 1213 by Jesi Zarate RN  Outcome: Completed  12/16/2022 1018 by Jesi Zarate RN  Outcome: Progressing

## 2022-12-16 NOTE — CARE COORDINATION
12/15/22 1800   Service Assessment   Patient Orientation Alert and Oriented   Cognition Alert   History Provided By Patient   Primary Caregiver Self   Support Systems Friends/Neighbors; /; Family Members   Patient's [address]: Legal Next of Bria [place]   PCP Verified by CM Yes   Last Visit to PCP Within last 3 months   Prior Functional Level Independent in ADLs/IADLs   Current Functional Level Independent in ADLs/IADLs   Can patient return to prior living arrangement Yes   Ability to make needs known: Good   Family able to assist with home care needs: Other (comment)  (has roommate and nurse)   Financial Resources  Resources ECF/Home [employer]   Social/Functional History   Lives With Friend(s)   Type of Home [address] One level   Bathroom Shower/Tub Tub/Shower unit   [address]; [address] Help From Home health;Friend(s)   ADL Assistance Independent   Homemaking Assistance Independent   Ambulation Assistance Independent   Transfer Assistance Independent   Active  No  (takes [place] transit or calls an [employer])   Occupation On disability   Discharge Planning   Type of Residence Apartment   Living Arrangements Friends   Current Services Prior To Admission Home Care;Transportation;Durable Medical Equipment   Current DME Prior to [employer]; Wheelchair   Potential Assistance Needed Home Care   Potential Assistance Purchasing Medications No   Type of Home Care Services Nursing Services   Patient expects to be discharged to: Apartment   One/Two Story Residence One story   History of falls? 0   Services At/After Discharge   Transition of Care Consult (CM Consult) Home Health;Transportation Assistance   Services At/After Discharge Community Resources; Home Health;IV Therapy   Mode of Transport at Discharge Other (see comment)  (GRITS; PATS;)   Confirm Follow Up Transport Self Electronically signed by KENNEDI Jhaveri on 12/15/2022 at 6:07 PM

## 2022-12-16 NOTE — PROGRESS NOTES
Infectious Diseases Progress Note    Patient:  Feli Renteria  YOB: 1990  MRN: [id number]   Admit date: 12/12/2022   Admitting Physician: Ramon Mayorga MD  Primary Care Physician: Ashby Kehr, MD    Chief Complaint/Interval History: She feels better. She hopes to go home. Talked with her again today. Reviewed risks of fluoroquinolone treatment. She understands risks. She accepts risks including tendon soreness, tendon weakening, and in rare circumstances tendon rupture. She is interested in transitioning to oral fluoroquinolone treatment. In/Out  No intake or output data in the 24 hours ending 12/16/22 2204  Allergies: Allergies   Allergen Reactions    Iv Dye [Iodides] Shortness Of Breath, Itching and Other (See Comments)     IV 3000; SoA, dizziness    Iv Dye [Iodides] Anaphylaxis     Iodine containing    Adhesive Tape Itching     And tegaderm    Bee Pollen Hives     Oranges-anything with orange color  Oranges-anything with orange color  Oranges-anything with orange color  Oranges-anything with orange color  Oranges-anything with orange color      Chlorhexidine      Other reaction(s): ITCHING    Fruit & Vegetable Daily [Nutritional Supplements]      Oranges-anything with orange color    Metoprolol Succinate [Metoprolol]      Excessive drowsiness/ Metoprolol tartrate-excessive drowsiness    Nsaids      Other reaction(s): GI Intolerance    [place] Itching and Other (See Comments)     Other reaction(s): Vomiting    Orange Fruit [Citrus]      And artificial    Other      Cloraprep    Reglan [Metoclopramide]      Nausea,vomiting    Tramadol Other (See Comments)     Excessive sleeping x 3 days, pt takes 1/2 a pill.     Zofran [Ondansetron Hcl] Hives    Orange Oil Nausea And Vomiting    Sulfamethoxazole-Trimethoprim Nausea And Vomiting     Current Meds: vitamin D (ERGOCALCIFEROL) capsule 50,000 Units, Weekly  dextrose 5% and 0.45% NaCl with KCl 20 mEq 1,000 mL infusion, Continuous  calcium carbonate (TUMS) chewable tablet 1,000 mg, TID  enoxaparin (LOVENOX) injection 50 mg, BID  meropenem (MERREM) 1000 mg in sodium chloride 0.9% 50 mL (duplex), Q8H  acetaminophen (TYLENOL) tablet 1,000 mg, Q6H PRN   Or  acetaminophen (TYLENOL) suppository 650 mg, Q6H PRN  sodium chloride flush 0.9 % injection 5-40 mL, 2 times per day  sodium chloride flush 0.9 % injection 5-40 mL, PRN  0.9 % sodium chloride infusion, PRN  potassium chloride (KLOR-CON M) extended release tablet 40 mEq, PRN   Or  potassium bicarb-citric acid (EFFER-K) effervescent tablet 40 mEq, PRN   Or  potassium chloride 10 mEq/100 mL IVPB (Peripheral Line), PRN  prochlorperazine (COMPAZINE) injection 10 mg, Q6H PRN  dicyclomine (BENTYL) capsule 10 mg, BID      Review of Systems no cardiopulmonary symptoms    VitalSigns:  /74   Pulse (!) 101   Temp 99 °F (37.2 °C) (Oral)   Resp 16   Ht 5' (1.524 m)   Wt 114 lb (51.7 kg)   LMP 01/14/2018 (Exact Date)   SpO2 97%   BMI 22.26 kg/m²      Physical Exam  Line/IV site: No erythema, warmth, induration, or tenderness.   Lungs clear without crackles  Abdomen soft and nontender    Lab Results:  CBC:   Recent Labs     12/14/22  0214 12/15/22  0245   WBC 9.8 9.4   HGB 7.3* 8.0*    379     BMP:  Recent Labs     12/14/22  0351 12/15/22  0245    137   K 2.5* 3.0*    100   CO2 34* 28   BUN 9 4*   CREATININE 0.5 0.5   GLUCOSE 112* 98     CultureResults:  Blood cultures December 12, 2022-no growth  Blood cultures December 12, 2022-Enterobacter with susceptibility below  Blood cultures December 30, 2022-no growth  Urine culture December 13, 2022-no growth  Urine culture December 13, 2022-no growth    Susceptibility     Enterobacter cloacae complex (1)     Antibiotic Interpretation Microscan   Method Status     aztreonam Sensitive <=1 mcg/mL BACTERIAL SUSCEPTIBILITY PANEL BY WILFRIDO       ceFAZolin Resistant >=64 mcg/mL BACTERIAL SUSCEPTIBILITY PANEL BY WILFRIDO       cefepime Sensitive <=1 mcg/mL BACTERIAL SUSCEPTIBILITY PANEL BY WILFRIDO       cefTRIAXone Sensitive <=1 mcg/mL BACTERIAL SUSCEPTIBILITY PANEL BY WILFRIDO       ertapenem Sensitive <=0.5 mcg/mL BACTERIAL SUSCEPTIBILITY PANEL BY WILFRIDO       gentamicin Sensitive <=1 mcg/mL BACTERIAL SUSCEPTIBILITY PANEL BY WILFRIDO       levofloxacin Sensitive <=0.12 mcg/mL BACTERIAL SUSCEPTIBILITY PANEL BY WILFRIDO       meropenem Sensitive <=0.25 mcg/mL BACTERIAL SUSCEPTIBILITY PANEL BY WILFRIDO       piperacillin-tazobactam Sensitive 8 mcg/mL BACTERIAL SUSCEPTIBILITY PANEL BY WILFRIDO       trimethoprim-sulfamethoxazole Sensitive <=20 mcg/mL BACTERIAL SUSCEPTIBILITY PANEL BY WILFRIDO         Radiology: None    Additional Studies Reviewed:  None    Impression:  1. Gram-negative bacteremia-Enterobacter  2. Recent MSSA bloodstream infection-no evidence of recurrence of present  3. Munguia catheter in place right upper chest    Recommendations:  Definite source for her Enterobacter bloodstream infection uncertain. Possibly transient via GI source. Munguia catheter would be a possibility, but typically with line infection she might be slower to defervesce and have multiple blood cultures grow bacteria. She only had 1 of 2 sets of her initial cultures grow Enterobacter and her cultures cleared rapidly with rapid improvement in clinical course. We will discontinue meropenem. Will give ceftriaxone 2 g IV x1 today. Recommend discharge home on levofloxacin 750 mg orally for 7 days starting tomorrow  She will follow-up with Dr. Gia Granados her primary care provider and other specialty providers she sees in [address] [address].     Marlon Flores MD

## 2022-12-16 NOTE — CARE COORDINATION
Home Health referral received. Spoke with Glenn Mc, manager with [facility], spoke with Jose Harden at [facility], and spoke with Carolyn Murray at [facility]. No agency could accept this referral due to unable to accept payor source. No home health was set up for this patient. Will sign off.      Electronically signed by Stan Manley RN on 12/16/22 at 3:06 PM CST

## 2022-12-16 NOTE — PROGRESS NOTES
CLINICAL PHARMACY NOTE: MEDS TO BEDS    Total # of Prescriptions Filled: 2   The following medications were delivered to the patient:  Current Discharge Medication List        START taking these medications    Details   levoFLOXacin (LEVAQUIN) 750 MG tablet Take 1 tablet by mouth daily for 7 doses  Qty: 7 tablet, Refills: 0      Ergocalciferol (VITAMIN D) 05491 units CAPS Take 50,000 Units by mouth once a week  Qty: 5 capsule, Refills: 0               Additional Documentation:   Delivered Rx's to patients room. Gave Rx's to patient. Patient paid $0.00 copay.

## 2022-12-16 NOTE — DISCHARGE SUMMARY
[facility] Hospitalists    Discharge Summary      Cata Anderson  :  1990  MRN:  [id number]    Admit date:  2022  Discharge date:    2022    Discharging Physician:  Dr. Adela Morelos Directive: Full Code    Consults: ID    Primary Care Physician:  Jayro Krause MD    Discharge Diagnoses:  Principal Problem:    Sepsis [facility])  Active Problems:    Hypokalemia    Gastroparesis  Resolved Problems:    * No resolved hospital problems. *      Portions of this note have been copied forward, however, changed to reflect the most current clinical status of this patient. Hospital Course: The patient is a 28-year-old female with past medical history of anxiety, depression, gastroparesis, GERD, Hoyos's esophagus, and POTS who presented to [facility] ED with complaints of positive blood cultures. Ms. Delano Davila stated she was advised to go to ED for positive blood cultures. Of note patient was recently admitted in November with sepsis (MSSA bacteremia) from an infected Munguia catheter, Munguia catheter was subsequently discontinued and new catheter was inserted on right side of the chest on 2022. Patient has had a clean catheter for TPN and IVIG for gastroparesis. Reported she has not had TPN since August due to issues with her home health nurse and Munguia catheter infection. Does have J-tube in her left lower quadrant however has not been using due to \"failed\" tube feeding trails. Work-up in ED revealed tachycardia on arrival heart rate in 130's, WBC 15.8, afebrile, K+ 2.9, Hgb 9.9, urinalysis indicated trace leukocytes, positive nitrites, WBC 22. Received 1 L of IV fluids, vancomycin and cefepime in ED. Patient was admitted to hospital medicine for sepsis work-up. IV vancomycin and cefepime initiated. Blood culture from admission indicated Enterobacter cloace complex, susceptible to Levaquin. ID was consulted.   CT abdomen indicated left lower lobe pneumonia, no nephrolithiasis or hydroureter nephrosis. Procalcitonin 63.06. Patient was in ICU for hypotension initially, did not require pressors at that time. Transferred to floor. ID recommended switching antibiotic to Merrem. Urine culture no growth. Repeat blood cultures (12/13/2022) NGTD. ID recommended possibly switching to oral Levaquin. Discussed ID recommendations and risk of fluoroquinolone treatment including tendon soreness, tendon weakening, and/or tendon rupture (rarely). She indicated she has had Levaquin before and had done well with it in the past.  ID recommended Rocephin 2 g x 1 dose followed by Levaquin 750 mg daily for 7 days. Patient has been instructed to follow-up with PCP and ID as recommended. She has been advised to keep follow-up appointment with primary GI in [place]. Patient is currently in stable condition to be discharged home with home health. Significant Diagnostic Studies:   CT ABDOMEN PELVIS WO CONTRAST Additional Contrast? Radiologist Recommendation  Result Date: 12/13/2022    1. Left lower lobe pneumonia. 2.No nephrolithiasis or hydronephrosis. 3.Correlate for diarrhea state. XR CHEST (2 VW)  Result Date: 12/15/2022    Mild leftward rotation and lower dorsal dextroscoliosis overlying the right hilum. 3.6 cm focal infiltrate just lateral to the left cardiac silhouette. Right transjugular portacatheter with tip in the lower SVC. Recommendation: Follow up as clinically indicated.  Dictated and Electronically Signed by Edelmira Hardy MD at 15-Dec-2022 03:09:06 PM               Pertinent Labs:   CBC:   Recent Labs     12/14/22  0214 12/15/22  0245 12/16/22 0210   WBC 9.8 9.4 10.6   HGB 7.3* 8.0* 9.1*    379 503*     BMP:    Recent Labs     12/14/22  0351 12/15/22  0245 12/16/22  0210    137 134*   K 2.5* 3.0* 4.7    100 103   CO2 34* 28 22   BUN 9 4* 4*   CREATININE 0.5 0.5 0.5   GLUCOSE 112* 98 94         Physical Exam:   Vital Signs: /75   Pulse 96   Temp 98.8 °F (37.1 °C) (Oral)   Resp 18   Ht 5' (1.524 m)   Wt 114 lb (51.7 kg)   LMP 01/14/2018 (Exact Date)   SpO2 97%   BMI 22.26 kg/m²   General appearance:. Alert and Cooperative   HEENT: Normocephalic. Chest: Lung sounds clear bilaterally without wheezes or rhonchi. Cardiac: RRR, S1, S2 normal. No murmurs, gallops, or rubs auscultated. Abdomen: soft, non-tender; non-distended normal bowel sounds no masses, no organomegaly. Extremities: No clubbing or cyanosis. No peripheral edema. Peripheral pulses palpable. Neurologic: Grossly intact. Discharge Medications:          Medication List        START taking these medications      levoFLOXacin 750 MG tablet  Commonly known as: LEVAQUIN  Take 1 tablet by mouth daily for 7 doses  Start taking on: December 17, 2022     Vitamin D (Ergocalciferol) 81684 units Caps  Take 50,000 Units by mouth once a week  Start taking on: December 21, 2022            Cathy Gorman taking these medications      dicyclomine 10 MG capsule  Commonly known as: BENTYL     diphenhydrAMINE 50 MG/ML injection  Commonly known as: BENADRYL     famotidine 20 MG/2ML Soln injection  Commonly known as: PEPCID     promethazine 25 MG/ML injection  Commonly known as: PHENERGAN            STOP taking these medications      linaclotide 145 MCG capsule  Commonly known as: LINZESS               Where to Get Your Medications        These medications were sent to [facility], [address] [place] [address]  [address] [place] [id number]      Phone: [phone]   levoFLOXacin 750 MG tablet  Vitamin D (Ergocalciferol) 48586 units Caps            Discharge Instructions: Follow up with Geo Hameed MD within 2 business days of Discharge. Follow-up with ID as recommended. Take medications as directed. Resume activity as tolerated.     Diet: ADULT DIET; Easy to Chew; GI [place] (GERD/Peptic Ulcer)     Disposition: Patient is medically stable and will be discharged home with home health. Time spent on discharge 38 minutes spent in assessing patient, reviewing medications, discussion with nursing, confirming safe discharge plan and preparation of discharge summary. Signed:  Electronically signed by SHRUTHI Jones CNP on 12/16/22 at 11:18 AM CST         EMR Dragon/Transcription disclaimer:   Much of this encounter note is an electronic transcription/translation of spoken language to printed text.  The electronic translation of spoken language may permit erroneous, or at times, nonsensical words or phrases to be inadvertently transcribed; although attempts have made to review the note for such errors, some may still exist.

## 2022-12-16 NOTE — PLAN OF CARE
Problem: Pain  Goal: Verbalizes/displays adequate comfort level or baseline comfort level  Outcome: Progressing     Problem: Safety - Adult  Goal: Free from fall injury  Outcome: Progressing     Problem: ABCDS Injury Assessment  Goal: Absence of physical injury  Outcome: Progressing     Problem: Skin/Tissue Integrity  Goal: Absence of new skin breakdown  Description: 1. Monitor for areas of redness and/or skin breakdown  2. Assess vascular access sites hourly  3. Every 4-6 hours minimum:  Change oxygen saturation probe site  4. Every 4-6 hours:  If on nasal continuous positive airway pressure, respiratory therapy assess nares and determine need for appliance change or resting period.   Outcome: Progressing     Problem: Discharge Planning  Goal: Discharge to home or other facility with appropriate resources  Outcome: Progressing  Flowsheets (Taken 12/16/2022 2643)  Discharge to home or other facility with appropriate resources: Identify barriers to discharge with patient and caregiver     Problem: Nutrition Deficit:  Goal: Optimize nutritional status  Outcome: Progressing

## 2022-12-17 LAB — CULTURE, BLOOD 2: NORMAL

## 2022-12-18 LAB
BLOOD CULTURE, ROUTINE: NORMAL
CULTURE, BLOOD 2: NORMAL

## 2022-12-19 ENCOUNTER — TELEPHONE (OUTPATIENT)
Dept: INTERNAL MEDICINE | Age: 32
End: 2022-12-19

## 2022-12-19 NOTE — TELEPHONE ENCOUNTER
[facility] Transitions Initial Follow Up Call    Outreach made within 2 business days of discharge: Yes    Patient: Alexsandra Harris   Patient : 1990 MRN: [id number]    Reason for Admission: Positive blood cultures    Discharge Date: 22      Discharge Diagnoses:  Principal Problem:    Sepsis ([address]  Active Problems:    Hypokalemia    Gastroparesis  Resolved Problems:    * No resolved hospital problems     Spoke with: Patient    Discharge department/facility: [address] Interactive Patient Contact:  Was patient able to fill all prescriptions: Yes  Was patient instructed to bring all medications to the follow-up visit: Yes  Is patient taking all medications as directed in the discharge summary? Yes  Does patient understand their discharge instructions: Yes  Does patient have questions or concerns that need addressed prior to 7-14 day follow up office visit: no    Spoke to the patient she is not feeling any better. She has not been taking the antibiotic because she keeps having diarrhea. She is getting diarrhea every time she takes it. She is not able to get to the bathroom and is having accidents before she goes to the bathroom. She is not eating because it makes it worse. I have sent Dr Suri Saenz a message to see what she needs to do. Pt is scheduled for a follow up on the .     Scheduled appointment with PCP within 7-14 days    Follow Up  Future Appointments   Date Time Provider Sarai Sanchez   2022 11:15 AM Jesusita Kayser, MD [facility]   2023  1:15 PM Jesusita Kayser, MD [facility]   2023 10:30 AM Toby Henriquez [facility]   2023 11:00 AM SADE Diaz [facility]       Evelyn Hobbs MA

## 2022-12-19 NOTE — TELEPHONE ENCOUNTER
Spoke to the patient she is not feeling any better. She has not been taking the antibiotic because she keeps having diarrhea. She is getting diarrhea every time she takes it. She is not able to get to the bathroom and is having accidents before she goes to the bathroom. She is not eating because it makes it worse.

## 2023-01-10 ENCOUNTER — TRANSCRIBE ORDERS (OUTPATIENT)
Dept: ADMINISTRATIVE | Facility: HOSPITAL | Age: 33
End: 2023-01-10
Payer: COMMERCIAL

## 2023-01-10 DIAGNOSIS — M54.16 LUMBAR RADICULOPATHY: Primary | ICD-10-CM

## 2023-01-10 DIAGNOSIS — G89.29 OTHER CHRONIC PAIN: ICD-10-CM

## 2023-01-10 DIAGNOSIS — M17.12 ARTHRITIS OF LEFT KNEE: ICD-10-CM

## 2023-01-10 DIAGNOSIS — K31.84 GASTROPARESIS: ICD-10-CM

## 2023-01-10 DIAGNOSIS — M47.816 LUMBAR SPONDYLOSIS: ICD-10-CM

## 2023-01-10 DIAGNOSIS — M41.9 KYPHOSCOLIOSIS: ICD-10-CM

## 2023-01-16 ENCOUNTER — OFFICE VISIT (OUTPATIENT)
Dept: PRIMARY CARE CLINIC | Age: 33
End: 2023-01-16
Payer: MEDICAID

## 2023-01-16 VITALS
HEART RATE: 126 BPM | SYSTOLIC BLOOD PRESSURE: 102 MMHG | BODY MASS INDEX: 20.77 KG/M2 | OXYGEN SATURATION: 98 % | DIASTOLIC BLOOD PRESSURE: 68 MMHG | WEIGHT: 105.8 LBS | TEMPERATURE: 97.7 F | HEIGHT: 60 IN

## 2023-01-16 DIAGNOSIS — L29.9 ITCHING: ICD-10-CM

## 2023-01-16 DIAGNOSIS — F41.1 GAD (GENERALIZED ANXIETY DISORDER): ICD-10-CM

## 2023-01-16 DIAGNOSIS — K58.2 IRRITABLE BOWEL SYNDROME WITH BOTH CONSTIPATION AND DIARRHEA: ICD-10-CM

## 2023-01-16 DIAGNOSIS — K52.9 CHRONIC DIARRHEA: Primary | ICD-10-CM

## 2023-01-16 PROBLEM — E87.6 HYPOKALEMIA: Status: RESOLVED | Noted: 2022-12-12 | Resolved: 2023-01-16

## 2023-01-16 PROBLEM — R53.1 WEAKNESS: Status: RESOLVED | Noted: 2022-11-22 | Resolved: 2023-01-16

## 2023-01-16 PROBLEM — A41.9 SEPSIS (HCC): Status: RESOLVED | Noted: 2022-12-12 | Resolved: 2023-01-16

## 2023-01-16 PROBLEM — M25.569 KNEE PAIN: Status: RESOLVED | Noted: 2021-05-11 | Resolved: 2023-01-16

## 2023-01-16 PROCEDURE — G8484 FLU IMMUNIZE NO ADMIN: HCPCS | Performed by: FAMILY MEDICINE

## 2023-01-16 PROCEDURE — 1036F TOBACCO NON-USER: CPT | Performed by: FAMILY MEDICINE

## 2023-01-16 PROCEDURE — G8420 CALC BMI NORM PARAMETERS: HCPCS | Performed by: FAMILY MEDICINE

## 2023-01-16 PROCEDURE — 99214 OFFICE O/P EST MOD 30 MIN: CPT | Performed by: FAMILY MEDICINE

## 2023-01-16 PROCEDURE — G8427 DOCREV CUR MEDS BY ELIG CLIN: HCPCS | Performed by: FAMILY MEDICINE

## 2023-01-16 RX ORDER — ALTEPLASE 2.2 MG/2ML
INJECTION, POWDER, LYOPHILIZED, FOR SOLUTION INTRAVENOUS
COMMUNITY
Start: 2022-12-08

## 2023-01-16 RX ORDER — BACLOFEN 10 MG/1
GRANULE ORAL
COMMUNITY

## 2023-01-16 RX ORDER — HYOSCYAMINE SULFATE 0.125 MG
TABLET,DISINTEGRATING ORAL
COMMUNITY
Start: 2023-01-06

## 2023-01-16 RX ORDER — HYDROXYZINE HYDROCHLORIDE 10 MG/1
10 TABLET, FILM COATED ORAL NIGHTLY
Qty: 30 TABLET | Refills: 0 | Status: SHIPPED | OUTPATIENT
Start: 2023-01-16 | End: 2023-02-15

## 2023-01-16 ASSESSMENT — PATIENT HEALTH QUESTIONNAIRE - PHQ9
10. IF YOU CHECKED OFF ANY PROBLEMS, HOW DIFFICULT HAVE THESE PROBLEMS MADE IT FOR YOU TO DO YOUR WORK, TAKE CARE OF THINGS AT HOME, OR GET ALONG WITH OTHER PEOPLE: 1
SUM OF ALL RESPONSES TO PHQ QUESTIONS 1-9: 2
9. THOUGHTS THAT YOU WOULD BE BETTER OFF DEAD, OR OF HURTING YOURSELF: 0
1. LITTLE INTEREST OR PLEASURE IN DOING THINGS: 1
2. FEELING DOWN, DEPRESSED OR HOPELESS: 1
SUM OF ALL RESPONSES TO PHQ QUESTIONS 1-9: 2
7. TROUBLE CONCENTRATING ON THINGS, SUCH AS READING THE NEWSPAPER OR WATCHING TELEVISION: 0
8. MOVING OR SPEAKING SO SLOWLY THAT OTHER PEOPLE COULD HAVE NOTICED. OR THE OPPOSITE, BEING SO FIGETY OR RESTLESS THAT YOU HAVE BEEN MOVING AROUND A LOT MORE THAN USUAL: 0
SUM OF ALL RESPONSES TO PHQ9 QUESTIONS 1 & 2: 2
5. POOR APPETITE OR OVEREATING: 0
SUM OF ALL RESPONSES TO PHQ QUESTIONS 1-9: 2
4. FEELING TIRED OR HAVING LITTLE ENERGY: 0
3. TROUBLE FALLING OR STAYING ASLEEP: 0
SUM OF ALL RESPONSES TO PHQ QUESTIONS 1-9: 2
6. FEELING BAD ABOUT YOURSELF - OR THAT YOU ARE A FAILURE OR HAVE LET YOURSELF OR YOUR FAMILY DOWN: 0

## 2023-01-16 ASSESSMENT — ENCOUNTER SYMPTOMS
NAUSEA: 0
CONSTIPATION: 0
BACK PAIN: 1
RESPIRATORY NEGATIVE: 1
EYES NEGATIVE: 1
ABDOMINAL PAIN: 1
VOMITING: 0
BLOOD IN STOOL: 0
DIARRHEA: 1

## 2023-01-16 NOTE — PROGRESS NOTES
[address] [facility]  [address] [place] [address] [place] [address]  Dept: [phone]  Dept Fax: [phone]  Loc: [phone]      Subjective:     Chief Complaint   Patient presents with    Follow-up     5 weeks        HPI:  Kyrie Lucas is a 28 y.o. female presents with chronic diarrhea. Her stool test came back normal.  She has not lost weight. She was seen at the GI clinic before but she has not been back. She states she saw one of the NPs. She requests to see Dr Ale Morris. POTS is stable at present . BP is stable. She c/o generalized itching. She states that she is not getting any relief from Benadryl anymore. Anxiety is about the same. ROS:   Review of Systems   Constitutional:  Positive for activity change (sec to gen weakness). Negative for fever. HENT: Negative. Eyes: Negative. Respiratory: Negative. Cardiovascular: Negative. Gastrointestinal:  Positive for abdominal pain (sec to gastroparesis) and diarrhea. Negative for blood in stool, constipation, nausea and vomiting. Musculoskeletal:  Positive for arthralgias, back pain (sec to scoliosis), gait problem and myalgias.      PMHx:  Past Medical History:   Diagnosis Date    Allergic contact dermatitis due to adhesives     Allergic contact dermatitis due to plants, except food     Anemia complicating pregnancy, second trimester     Anemia complicating pregnancy, third trimester     Anxiety and depression     Anxiety disorder     unspecified    Bacteremia     Cellulitis of abdominal wall     Chronic fatigue, unspecified     Chronic serous otitis media of left ear 9/23/2013    Dysuria     Encounter for care related to vascular access port 5/19/2022    Epigastric pain     Frequency of micturition     G tube feedings (HCC)     Gastroparesis     Dr. Camila Dunlap in [place] manages    Gastroparesis     Gastrostomy malfunction [facility])     Generalized abdominal pain     GERD (gastroesophageal reflux disease)     Heart murmur     Hiatal hernia     History of Hoyos's esophagus     History of blood transfusion     Hypotension, unspecified     IBS (irritable bowel syndrome)     Insomnia, unspecified     Jejunostomy tube present (HCC)     Low back pain     Nausea with vomiting     unspecified    Neck pain     Neuropathy     Orthostatic hypotension     POTS (postural orthostatic tachycardia syndrome)     Premature birth     delivered at 24-27 weeks    Scoliosis     Syncope and collapse     Tachycardia     Tachycardia, unspecified     Toxic gastroenteritis and colitis      Patient Active Problem List   Diagnosis    Barretts esophagus    Disc herniation    Back pain    Anxiety disorder    NSAID long-term use    Heartburn    Irritable bowel syndrome    Dysmenorrhea    Gastroparesis    Insomnia    Nausea & vomiting    Poor venous access    History of syncope    POTS (postural orthostatic tachycardia syndrome)    ETD (eustachian tube dysfunction)    Jejunostomy tube present (HCC)    Mixed hearing loss of left ear    Chronic fatigue syndrome    Tyrell's syndrome pupil    Idiopathic scoliosis of thoracolumbar spine    Nutritional disorder    Autoimmune disease ([address]    Cluster headache    Common variable agammaglobulinemia ([address]    Hiatal hernia    History of infection due to human papilloma virus (HPV)    History of total hysterectomy    Learning disability    Vitamin D deficiency    Encounter for care related to vascular access port    Infection of vascular catheter, initial encounter ([address]    Neuropathy    Gait abnormality       PSHx:  Past Surgical History:   Procedure Laterality Date    ABDOMEN SURGERY  2014    gastric stimulator implanted    ADENOIDECTOMY      ADENOIDECTOMY  2007    CHOLECYSTECTOMY      CHOLECYSTECTOMY  2016    DENTAL SURGERY  2004    wisdom teeth    EAR SURGERY  2014    left    FACIAL SURGERY  2005    GASTRIC STIMULATOR IMPLANT SURGERY  11/2014    GASTROSTOMY TUBE PLACEMENT  04/07/2015    SHON tube-Removed in [place] 1/2018    GASTROSTOMY TUBE PLACEMENT      G tube 2017    HYSTERECTOMY (CERVIX STATUS UNKNOWN)      INSERTION / REMOVAL / REPLACEMENT VENOUS ACCESS CATHETER N/A 03/25/2016    REMOVAL OF PORT AND PLACEMENT OF NEW PORT; REMOVAL OF PICC LINE  performed by Amanda Oliver MD at [address] [place] [address]  2014    left knee    KNEE SURGERY  2015    right knee    MANDIBLE SURGERY      double jaw    PHARYNGECTOMY      PORT SURGERY      port present 3-25-16    TONSILLECTOMY      TONSILLECTOMY  1997    TUBAL LIGATION      TUBAL LIGATION  2016    TUMOR REMOVAL Left     ear    TUNNELED VENOUS PORT PLACEMENT Right 05/29/2015    TUNNELED VENOUS PORT PLACEMENT      UPPER GASTROINTESTINAL ENDOSCOPY  04/02/2010    Dr Frandy Harris ENDOSCOPY  09/25/2013    Dr Tawny Martin  05/2015    Dr. Guillermo Ramos  5/29/2015 hospitals    Ultrasound-guided cannulation, right internal jugular vein. Right internal jugular vein single-lumen bard powerport placement. VASCULAR SURGERY  9/8/15 [facility]    Right internal jugular vein portograms. Revision of right internal jugular vein single lumen port. VASCULAR SURGERY  11/3/15 S    Ultrasound-guided cannulation, left upper arm basilic vein. Left upper arm basilic vein PICC line placement bard dual-lumen PICC line. Superior vena cava venograms. VASCULAR SURGERY  03/23/2017    [facility]. Left IJV port angiogram.Ultrasound guided cannulation of right basilic vein,right upper extremity PICC line placement bard power PICC,dual lumen. VASCULAR SURGERY  04/27/2022    Ultrasound-guided cannulation right internal jugular vein. Placement of Munguia single-lumen tunneled dialysis catheter. Removal of nonfunctioning left subclavian vein Munguia tunneled dialysis catheter. Repositioning of right internal juglular vein Munguia tunneled dialylsis catheter. Perfomed by Dr. Vanessa Toney.  [facility] at [place] [address] VASCULAR SURGERY  11/11/2022    dual lumen Munguia catheter VI    WISDOM TOOTH EXTRACTION         PFHx:  Family History   Problem Relation Age of Onset    Other Mother         endometriosis    Depression Father     Diabetes Paternal Grandmother     Stroke Paternal Grandmother     Hypertension Paternal Grandmother     Heart Disease Paternal Grandmother     Heart Failure Paternal Grandmother     Colon Cancer Neg Hx     Colon Polyps Neg Hx     Esophageal Cancer Neg Hx     Liver Cancer Neg Hx     Rectal Cancer Neg Hx     Stomach Cancer Neg Hx        SocialHx:  Social History     Tobacco Use    Smoking status: Never    Smokeless tobacco: Never    Tobacco comments:     Vape    Substance Use Topics    Alcohol use: Never       Allergies: Allergies   Allergen Reactions    Iv Dye [Iodides] Shortness Of Breath, Itching and Other (See Comments)     IV 3000; SoA, dizziness    Iv Dye [Iodides] Anaphylaxis     Iodine containing    Adhesive Tape Itching     And tegaderm    Bee Pollen Hives     Oranges-anything with orange color  Oranges-anything with orange color  Oranges-anything with orange color  Oranges-anything with orange color  Oranges-anything with orange color      Chlorhexidine      Other reaction(s): ITCHING    Fruit & Vegetable Daily [Nutritional Supplements]      Oranges-anything with orange color    Metoprolol Succinate [Metoprolol]      Excessive drowsiness/ Metoprolol tartrate-excessive drowsiness    Nsaids      Other reaction(s): GI Intolerance    [place] Itching and Other (See Comments)     Other reaction(s): Vomiting    Orange Fruit [Citrus]      And artificial    Other      Cloraprep    Reglan [Metoclopramide]      Nausea,vomiting    Tramadol Other (See Comments)     Excessive sleeping x 3 days, pt takes 1/2 a pill.     Zofran [Ondansetron Hcl] Hives    Orange Oil Nausea And Vomiting    Sulfamethoxazole-Trimethoprim Nausea And Vomiting       Medications:  Current Outpatient Medications   Medication Sig Dispense Refill    hyoscyamine (OSCIMIN) 125 MCG TBDP dispersible tablet DISSOLVE 1 TABLET UNDER THE TONGUE EVERY 6 HOURS IF NEEDED FOR CRAMPING OR DIARRHEA      CATHFLO ACTIVASE 2 MG injection       Baclofen (LYVISPAH) 10 MG PACK Take by mouth 5 mg twice daily      hydrOXYzine HCl (ATARAX) 10 MG tablet Take 1 tablet by mouth nightly 30 tablet 0    Ergocalciferol (VITAMIN D) 71819 units CAPS Take 50,000 Units by mouth once a week 5 capsule 0    promethazine (PHENERGAN) 25 MG/ML injection 25 mg every 3 hours as needed      famotidine (PEPCID) 20 MG/2ML SOLN injection Infuse 20 mg intravenously daily      dicyclomine (BENTYL) 10 MG capsule Take 10 mg by mouth in the morning and at bedtime        No current facility-administered medications for this visit. Objective:   PE:  /68   Pulse (!) 126   Temp 97.7 °F (36.5 °C)   Ht 5' (1.524 m)   Wt 105 lb 12.8 oz (48 kg)   LMP 01/14/2018 (Exact Date)   SpO2 98%   BMI 20.66 kg/m²   Physical Exam  Vitals and nursing note reviewed. Constitutional:       General: She is not in acute distress. Appearance: She is not ill-appearing. Comments: Thin built   HENT:      Head: Normocephalic. Right Ear: Tympanic membrane, ear canal and external ear normal.      Left Ear: Tympanic membrane, ear canal and external ear normal.      Ears:      Comments: Hearing loss      Nose: Nose normal.      Mouth/Throat:      Pharynx: Oropharynx is clear. Eyes:      Conjunctiva/sclera: Conjunctivae normal.   Cardiovascular:      Rate and Rhythm: Regular rhythm. Heart sounds: Normal heart sounds. Pulmonary:      Breath sounds: Normal breath sounds. Chest:      Comments: Munguia port in place. - site of insertion looks good, no skin irritation around the area  Abdominal:      General: There is no distension. Palpations: Abdomen is soft. Tenderness: There is no abdominal tenderness. There is no guarding. Hernia: No hernia is present.       Comments: J - tube in place    Musculoskeletal:         General: No swelling. Lymphadenopathy:      Cervical: No cervical adenopathy. Neurological:      Mental Status: She is alert and oriented to person, place, and time. Gait: Gait abnormal.   Psychiatric:         Behavior: Behavior normal.          Assessment & Plan   Hermilo Osuna was seen today for follow-up. Diagnoses and all orders for this visit:    Chronic diarrhea  -     Colton Quarles MD, Gastroenterology, [place]    ROSALVA (generalized anxiety disorder)    Itching  -     hydrOXYzine HCl (ATARAX) 10 MG tablet; Take 1 tablet by mouth nightly    Irritable bowel syndrome with both constipation and diarrhea      Return in 1 month (on 2/16/2023) for routine follow-up with me, chronic care management. All questions were answered. Medications, including possible adverse effects, and instructions were reviewed and  understanding was confirmed. Follow-up recommendations, including when to contact or return to office (ie; if symptoms worsen or fail to improve), were discussed and acknowledged.     Electronically signed by Anne Larios MD on 1/16/23 at 12:06 PM CST

## 2023-01-20 RX ORDER — DIPHENHYDRAMINE HYDROCHLORIDE 50 MG/ML
25 INJECTION INTRAMUSCULAR; INTRAVENOUS EVERY 4 HOURS PRN
COMMUNITY

## 2023-01-20 NOTE — PROGRESS NOTES
Devin Benavides, was evaluated through a synchronous (real-time) audio-video encounter. The patient (or guardian if applicable) is aware that this is a billable service, which includes applicable co-pays. This Virtual Visit was conducted with patient's (and/or legal guardian's) consent. The visit was conducted pursuant to the emergency declaration under the [facility], [address] [place] [address] authority and the [employer] assistant and [employer] Act. Patient identification was verified, and a caregiver was present when appropriate. The patient was located at Home: [address] [place] [address]. Provider was located at Home ([place] [id number]): [id number] on 1/23/2023 at 9:58 AM    An electronic signature was used to authenticate this note. Subjective:     Patient ID: Devin Benavides is a 28 y.o. female  PCP: Miguel Marsh MD  Referring Provider: Nancy Mascorro MD    HPI  Patient presents to the office today with the following complaints: No chief complaint on file. Patient seen per video visit today regarding j tube removal, she has not used this in 2 years. Reports she is on TPN and she is eating food for pleasure. Reports she is having diarrhea, nausea and abd pain. She does have gastroparesis and gastric stimulator. She goes next month for stimulator adjustment. Assessment:     1. Diarrhea, unspecified type  2. Nausea         Plan:   Schedule Colonoscopy and EGD  Instruct on bowel prep. Nothing to eat or drink after midnight the day of the exam.  Unable to drive for 24 hours after the procedure. No aspirin or nonsteroidal anti-inflammatories for 5 days before procedure. I have discussed the benefits, alternatives, and risks (including bleeding, perforation and death)  for pursuing Endoscopy (EGD/Colonscopy/EUS/ERCP) with the patient and they are willing to continue.  We also discussed the need for anesthesia, IV access, proper dietary changes, medication changes if necessary, and need for bowel prep (if ordered) prior to their Endoscopic procedure. They are aware they must have someone accompany them to their scheduled procedure to drive them home - they agree to the above and are willing to continue. Orders  No orders of the defined types were placed in this encounter. Medications  No orders of the defined types were placed in this encounter.         Patient History:     Past Medical History:   Diagnosis Date    Allergic contact dermatitis due to adhesives     Allergic contact dermatitis due to plants, except food     Anemia complicating pregnancy, second trimester     Anemia complicating pregnancy, third trimester     Anxiety and depression     Anxiety disorder     unspecified    Bacteremia     Cellulitis of abdominal wall     Chronic fatigue, unspecified     Chronic serous otitis media of left ear 9/23/2013    Dysuria     Encounter for care related to vascular access port 5/19/2022    Epigastric pain     Frequency of micturition     G tube feedings (HCC)     Gastroparesis     Dr. Ciara Garcia in [place] manages    Gastroparesis     Gastrostomy malfunction [facility])     Generalized abdominal pain     GERD (gastroesophageal reflux disease)     Heart murmur     Hiatal hernia     History of Hoyos's esophagus     History of blood transfusion     Hypotension, unspecified     IBS (irritable bowel syndrome)     Insomnia, unspecified     Jejunostomy tube present (HCC)     Low back pain     Nausea with vomiting     unspecified    Neck pain     Neuropathy     Orthostatic hypotension     POTS (postural orthostatic tachycardia syndrome)     Premature birth     delivered at 24-27 weeks    Scoliosis     Syncope and collapse     Tachycardia     Tachycardia, unspecified     Toxic gastroenteritis and colitis        Past Surgical History:   Procedure Laterality Date    ABDOMEN SURGERY  2014    gastric stimulator implanted    ADENOIDECTOMY  2007    CHOLECYSTECTOMY  2016    COLONOSCOPY  2017    Afsaneh Smallwood-  normal per pt    EAR SURGERY Left 2014    FACIAL SURGERY  2005    GASTRIC STIMULATOR IMPLANT SURGERY  11/2014    GASTROSTOMY TUBE PLACEMENT  04/07/2015    Dr Kamaljit Gongora-[place]-J tube-Removed in [place] 1/2018    GASTROSTOMY TUBE PLACEMENT  2017    G tube    HYSTERECTOMY (CERVIX STATUS UNKNOWN)      INSERTION / REMOVAL / REPLACEMENT VENOUS ACCESS CATHETER N/A 03/25/2016    REMOVAL OF PORT AND PLACEMENT OF NEW PORT; REMOVAL OF PICC LINE  performed by Bekah Gonzalez MD at [address] Left 2014    KNEE SURGERY Right 2015    MANDIBLE SURGERY      double jaw    PHARYNGECTOMY      PORT SURGERY      port present 3-25-16    TONSILLECTOMY  1997    TUBAL LIGATION  2016    TUMOR REMOVAL Left     ear    TUNNELED VENOUS PORT PLACEMENT Right 05/29/2015    UPPER GASTROINTESTINAL ENDOSCOPY  04/02/2010    Dr Linh Estevez, asymptomatic distal esophageal stricture, gastroparesis, (+) Hoyos's, no dysplasia    UPPER GASTROINTESTINAL ENDOSCOPY  09/25/2013    Dr Ludmila su, no Hoyos's    UPPER GASTROINTESTINAL ENDOSCOPY  05/2015    Dr. Wan Waldron  5/29/2015 [facility]    Ultrasound-guided cannulation, right internal jugular vein. Right internal jugular vein single-lumen bard powerport placement. VASCULAR SURGERY  9/8/15 S    Right internal jugular vein portograms. Revision of right internal jugular vein single lumen port. VASCULAR SURGERY  11/3/15 S    Ultrasound-guided cannulation, left upper arm basilic vein. Left upper arm basilic vein PICC line placement bard dual-lumen PICC line. Superior vena cava venograms. VASCULAR SURGERY  03/23/2017    [facility]. Left IJV port angiogram.Ultrasound guided cannulation of right basilic vein,right upper extremity PICC line placement bard power PICC,dual lumen. VASCULAR SURGERY  04/27/2022    Ultrasound-guided cannulation right internal jugular vein.  Placement of Tripp single-lumen tunneled dialysis catheter. Removal of nonfunctioning left subclavian vein Tripp tunneled dialysis catheter. Repositioning of right internal juglular vein Tripp tunneled dialylsis catheter. Perfomed by Dr. Nilo Gonzalez.  Mick Rose at [address] [facility]    VASCULAR SURGERY  11/11/2022    dual lumen Tripp catheter VI    WISDOM TOOTH EXTRACTION      WISDOM TOOTH EXTRACTION  2004       Family History   Problem Relation Age of Onset    Other Mother         endometriosis    Depression Father     Diabetes Paternal Grandmother     Stroke Paternal Grandmother     Hypertension Paternal Grandmother     Heart Disease Paternal Grandmother     Heart Failure Paternal Grandmother     Colon Cancer Neg Hx     Colon Polyps Neg Hx     Esophageal Cancer Neg Hx     Liver Cancer Neg Hx     Rectal Cancer Neg Hx     Stomach Cancer Neg Hx        Social History     Socioeconomic History    Marital status: Single   Tobacco Use    Smoking status: Never    Smokeless tobacco: Never    Tobacco comments:     Vape    Vaping Use    Vaping Use: Former    Substances: Nicotine    Devices: Refillable tank   Substance and Sexual Activity    Alcohol use: Never    Drug use: Never    Sexual activity: Yes     Partners: Male   Social History Narrative    ** Merged History Encounter **          Social Determinants of Health     Financial Resource Strain: Low Risk     Difficulty of Paying Living Expenses: Not hard at all   Food Insecurity: No Food Insecurity    Worried About Running Out of Food in the Last Year: Never true    Ran Out of Food in the Last Year: Never true   Physical Activity: Inactive    Days of Exercise per Week: 0 days    Minutes of Exercise per Session: 0 min   Intimate Partner Violence: Not At Risk    Fear of Current or Ex-Partner: No    Emotionally Abused: No    Physically Abused: No    Sexually Abused: No       Current Outpatient Medications   Medication Sig Dispense Refill    diphenhydrAMINE (BENADRYL) 50 MG/ML injection Infuse 25 mg intravenously every 4 hours as needed for Itching      hyoscyamine (OSCIMIN) 125 MCG TBDP dispersible tablet DISSOLVE 1 TABLET UNDER THE TONGUE EVERY 6 HOURS IF NEEDED FOR CRAMPING OR DIARRHEA      CATHFLO ACTIVASE 2 MG injection       Baclofen (LYVISPAH) 10 MG PACK Take by mouth 5 mg twice daily      hydrOXYzine HCl (ATARAX) 10 MG tablet Take 1 tablet by mouth nightly 30 tablet 0    Ergocalciferol (VITAMIN D) 01637 units CAPS Take 50,000 Units by mouth once a week 5 capsule 0    promethazine (PHENERGAN) 25 MG/ML injection 25 mg every 3 hours as needed      famotidine (PEPCID) 20 MG/2ML SOLN injection Infuse 20 mg intravenously daily      dicyclomine (BENTYL) 10 MG capsule Take 10 mg by mouth in the morning and at bedtime        No current facility-administered medications for this visit. Allergies   Allergen Reactions    Iv Dye [Iodides] Shortness Of Breath, Itching and Other (See Comments)     IV 3000; SoA, dizziness    Iv Dye [Iodides] Anaphylaxis     Iodine containing    Adhesive Tape Itching     And tegaderm    Bee Pollen Hives     Oranges-anything with orange color  Oranges-anything with orange color  Oranges-anything with orange color  Oranges-anything with orange color  Oranges-anything with orange color      Chlorhexidine      Other reaction(s): ITCHING    Fruit & Vegetable Daily [Nutritional Supplements]      Oranges-anything with orange color    Metoprolol Succinate [Metoprolol]      Excessive drowsiness/ Metoprolol tartrate-excessive drowsiness    Nsaids      Other reaction(s): GI Intolerance    [place] Itching and Other (See Comments)     Other reaction(s): Vomiting    Orange Fruit [Citrus]      And artificial    Other      Cloraprep    Reglan [Metoclopramide]      Nausea,vomiting    Tramadol Other (See Comments)     Excessive sleeping x 3 days, pt takes 1/2 a pill.     Zofran [Ondansetron Hcl] Hives    Orange Oil Nausea And Vomiting    Sulfamethoxazole-Trimethoprim Nausea And Vomiting       Review of Systems   Constitutional:  Negative for activity change, appetite change, fatigue, fever and unexpected weight change. HENT:  Negative for trouble swallowing. Respiratory:  Negative for cough, choking and shortness of breath. Cardiovascular:  Negative for chest pain. Gastrointestinal:  Positive for abdominal pain, diarrhea and nausea. Negative for abdominal distention, anal bleeding, blood in stool, constipation, rectal pain and vomiting. Allergic/Immunologic: Negative for food allergies. All other systems reviewed and are negative. Objective:     LMP 01/14/2018 (Exact Date)     Physical Exam  Vitals reviewed. Constitutional:       General: She is not in acute distress. Appearance: She is well-developed. HENT:      Head: Normocephalic and atraumatic. Right Ear: External ear normal.      Left Ear: External ear normal.      Nose: Nose normal.   Eyes:      General: No scleral icterus. Right eye: No discharge. Left eye: No discharge. Conjunctiva/sclera: Conjunctivae normal.      Pupils: Pupils are equal, round, and reactive to light. Cardiovascular:      Heart sounds: No murmur heard. Pulmonary:      Effort: Pulmonary effort is normal. No respiratory distress. Breath sounds: No wheezing or rales. Abdominal:      General: There is no distension. Palpations: There is no mass. Tenderness: There is no abdominal tenderness. There is no guarding or rebound. Musculoskeletal:         General: Normal range of motion. Cervical back: Normal range of motion and neck supple. Skin:     General: Skin is warm and dry. Coloration: Skin is not pale. Neurological:      Mental Status: She is alert and oriented to person, place, and time.    Psychiatric:         Behavior: Behavior normal.

## 2023-01-23 ENCOUNTER — TELEMEDICINE (OUTPATIENT)
Dept: GASTROENTEROLOGY | Age: 33
End: 2023-01-23
Payer: MEDICAID

## 2023-01-23 DIAGNOSIS — T85.598S FEEDING TUBE DYSFUNCTION, SEQUELA: ICD-10-CM

## 2023-01-23 DIAGNOSIS — R19.7 DIARRHEA, UNSPECIFIED TYPE: Primary | ICD-10-CM

## 2023-01-23 DIAGNOSIS — R11.0 NAUSEA: ICD-10-CM

## 2023-01-23 PROCEDURE — G8420 CALC BMI NORM PARAMETERS: HCPCS | Performed by: NURSE PRACTITIONER

## 2023-01-23 PROCEDURE — 99204 OFFICE O/P NEW MOD 45 MIN: CPT | Performed by: NURSE PRACTITIONER

## 2023-01-23 PROCEDURE — G8484 FLU IMMUNIZE NO ADMIN: HCPCS | Performed by: NURSE PRACTITIONER

## 2023-01-23 PROCEDURE — 1036F TOBACCO NON-USER: CPT | Performed by: NURSE PRACTITIONER

## 2023-01-23 PROCEDURE — G8428 CUR MEDS NOT DOCUMENT: HCPCS | Performed by: NURSE PRACTITIONER

## 2023-01-23 ASSESSMENT — ENCOUNTER SYMPTOMS
ABDOMINAL PAIN: 1
DIARRHEA: 1
ABDOMINAL DISTENTION: 0
COUGH: 0
CHOKING: 0
TROUBLE SWALLOWING: 0
RECTAL PAIN: 0
CONSTIPATION: 0
ANAL BLEEDING: 0
SHORTNESS OF BREATH: 0
BLOOD IN STOOL: 0
NAUSEA: 1
VOMITING: 0

## 2023-01-23 NOTE — PATIENT INSTRUCTIONS
Schedule colonoscopy and endoscopy. Do not eat or drink after midnight the day of the procedure. Allowed medications can be taken with a small sip of water. Please review your prep instructions for allowed medications. You will not be able to drive for 24 hours after the procedure due to sedation. Must have a responsible adult, 18 years or older, accompany you to drive you home the day of your procedure. If you are on blood thinners, clearance from the prescribing physician will be obtained before your procedure is scheduled. If it is determined it is not safe to hold these medications for a short time an alternative procedure for evaluation may be recommended. No aspirin, ibuprofen, naproxen, fish oil or vitamin E for 5 days before procedure. Risks of colonoscopy and endoscopy include, but are not limited to, perforation, bleeding, and infection, Risk of perforation and bleeding are increased if there is a polyp removed or dilation completed. Anesthesia risks will be reviewed with you before the procedure by a member of the anesthesia department. Your physician may also schedule a follow up appointment with the nurse practitioner to discuss pathology, symptoms or to check if you have had any problems related to your procedure. If you prefer not to return to the office after your procedure please discuss this with your physician on the day of your colonoscopy. The physician will talk with you and/or your family after the procedure is completed. Final recommendations are based on the pathologist report if biopsies or specimens are taken. If polyps are removed during the procedure they will be sent to a pathologist for analysis. Unless you have a follow up appointment scheduled, you will be notified by mail of the pathology results within 4 weeks. If you have not received results after 4 weeks you may call the office to obtain this information. For Colonoscopy:   You will be given specific directions regarding restrictions to diet and bowel prep instructions including laxatives. Please read these instructions one week prior to your scheduled procedure to ensure that you are prepared. If you have any questions regarding these instructions please call our office Mon through Fri from 8:00 am to 4:00 pm.     Follow prep instructions provided for bowel prep. Take all of the bowel prep as directed. If you are having problems with nausea, stop your prep for 30-45 min to allow the nausea to subside before resuming your prep. It is important to drink plenty of fluids throughout the day before taking your laxatives. This will help to protect your kidneys, prevent dehydration and maximize the effect of the bowel prep. Your diet before a colonoscopy bowel preparation is very important to ensure a successful colon exam. It is recommended to consider certain changes to your diet three to four days prior to the procedure. Remember that your bowels need to be completely empty for the exam.    What foods are good to eat? Cut down on heavy solid foods three to four days before the procedure and start introducing lighter meals to your diet. The following food suggestions are a good part of your diet before a colonoscopy bowel preparation. Light meat that is easily digestible such as chicken (without the skin)   Potatoes without skin   Cheese   Eggs   A light meal of steamed white fish   Light clear soups    Foods and drinks to avoid  Avoid foods that contain too much fiber. Stay clear of dark colored beverages. They can stick to the walls of the digestive tract and make it difficult to differentiate from blood.  Some of these foods are:  Red meat, rice, nuts and vegetables   Milk, other milk based fluids and cream   Most fruit and puddings   Whole grain pasta   Cereals, bran and seeds   Colored beverages, especially those that are red or purple in color   Red colored Jell-O   On the day before the colonoscopy, continue to drink plenty of clear fluids. It is important   to keep yourself hydrated before the exam.     Please follow all instructions as provided for cleansing the bowel. Failure to have an adequately prepped colon may cause you to have incomplete exam with further testing required.     [email or web address]

## 2023-01-24 ENCOUNTER — HOSPITAL ENCOUNTER (EMERGENCY)
Age: 33
Discharge: HOME | End: 2023-01-24
Attending: EMERGENCY MEDICINE
Payer: MEDICAID

## 2023-01-24 VITALS
TEMPERATURE: 97.4 F | OXYGEN SATURATION: 99 % | RESPIRATION RATE: 15 BRPM | SYSTOLIC BLOOD PRESSURE: 120 MMHG | HEART RATE: 110 BPM | DIASTOLIC BLOOD PRESSURE: 79 MMHG

## 2023-01-24 DIAGNOSIS — Z45.2 ENCOUNTER FOR VENOUS ACCESS DEVICE CARE: Primary | ICD-10-CM

## 2023-01-24 PROCEDURE — 99283 EMERGENCY DEPT VISIT LOW MDM: CPT

## 2023-01-24 ASSESSMENT — PAIN DESCRIPTION - LOCATION: LOCATION: ABDOMEN

## 2023-01-24 ASSESSMENT — PAIN DESCRIPTION - DESCRIPTORS: DESCRIPTORS: CRAMPING

## 2023-01-24 ASSESSMENT — PAIN - FUNCTIONAL ASSESSMENT: PAIN_FUNCTIONAL_ASSESSMENT: 0-10

## 2023-01-24 ASSESSMENT — PAIN SCALES - GENERAL: PAINLEVEL_OUTOF10: 7

## 2023-01-24 NOTE — ED NOTES
Dressing change completed using sterile technique. Pt tolerated well with no complaints.      Alexa Marie RN  01/24/23 [id number]

## 2023-01-24 NOTE — ED PROVIDER NOTES
[facility] EMERGENCY DEPT  eMERGENCY dEPARTMENT eNCOUnter      Pt Name: Donya Marino  MRN: [id number]  [place] 1990  Date of evaluation: 1/24/2023  Provider: Ekaterina Alvares MD    [address] [place] [address]       Chief Complaint   Patient presents with    Dressing Change         HISTORY OF PRESENT ILLNESS   (Location/Symptom, Timing/Onset,Context/Setting, Quality, Duration, Modifying Factors, Severity)  Note limiting factors. Donya Marino is a 28 y.o. female who presents to the emergency department dressing change and catheter care    42-year-old female has a venous access catheter in the right upper chest.  That needs attention and dressing change. Receives TPN through this venous access device. She does eat foods for pleasure. She has a J-tube which they are planning on removal soon according to her last note from yesterday from GI. She denies any fever the dressing was not soiled. The history is provided by the patient. NursingNotes were reviewed. REVIEW OF SYSTEMS    (2-9 systems for level 4, 10 or more for level 5)     Review of Systems   Constitutional:  Negative for chills and fever. Gastrointestinal:         History of severe gastroparesis history of a J-tube in place with plan removal soon. Skin:         Skin care and dressing to venous access device   All other systems reviewed and are negative. A complete review of systems was performed and is negative except as noted above in the HPI.        PAST MEDICAL HISTORY     Past Medical History:   Diagnosis Date    Allergic contact dermatitis due to adhesives     Allergic contact dermatitis due to plants, except food     Anemia complicating pregnancy, second trimester     Anemia complicating pregnancy, third trimester     Anxiety and depression     Anxiety disorder     unspecified    Bacteremia     Cellulitis of abdominal wall     Chronic fatigue, unspecified     Chronic serous otitis media of left ear 9/23/2013    Dysuria     Encounter for care related to vascular access port 5/19/2022    Epigastric pain     Frequency of micturition     G tube feedings (HCC)     Gastroparesis     Dr. Ramila Varma in [place] manages    Gastroparesis     Gastrostomy malfunction [facility])     Generalized abdominal pain     GERD (gastroesophageal reflux disease)     Heart murmur     Hiatal hernia     History of Ohyos's esophagus     History of blood transfusion     Hypotension, unspecified     IBS (irritable bowel syndrome)     Insomnia, unspecified     Jejunostomy tube present (HCC)     Low back pain     Nausea with vomiting     unspecified    Neck pain     Neuropathy     Orthostatic hypotension     POTS (postural orthostatic tachycardia syndrome)     Premature birth     delivered at 24-27 weeks    Scoliosis     Syncope and collapse     Tachycardia     Tachycardia, unspecified     Toxic gastroenteritis and colitis          SURGICAL HISTORY       Past Surgical History:   Procedure Laterality Date    ABDOMEN SURGERY  2014    gastric stimulator implanted    ADENOIDECTOMY  2007    CHOLECYSTECTOMY  2016    COLONOSCOPY  2017    Afsaneh [place]-  normal per pt    EAR SURGERY Left 2014    FACIAL SURGERY  2005    GASTRIC STIMULATOR IMPLANT SURGERY  11/2014    GASTROSTOMY TUBE PLACEMENT  04/07/2015    Dr Oleg Gongora-[place]-J tube-Removed in [place] 1/2018    GASTROSTOMY TUBE PLACEMENT  2017    G tube    HYSTERECTOMY (CERVIX STATUS UNKNOWN)      INSERTION / REMOVAL / REPLACEMENT VENOUS ACCESS CATHETER N/A 03/25/2016    REMOVAL OF PORT AND PLACEMENT OF NEW PORT; REMOVAL OF PICC LINE  performed by Susan Leblanc MD at [address] Left 2014    KNEE SURGERY Right 2015    MANDIBLE SURGERY      double jaw    PHARYNGECTOMY      PORT SURGERY      port present 3-25-16    TONSILLECTOMY  1997    TUBAL LIGATION  2016    TUMOR REMOVAL Left     ear    TUNNELED VENOUS PORT PLACEMENT Right 05/29/2015    UPPER GASTROINTESTINAL ENDOSCOPY  04/02/2010    Dr April Eaton, asymptomatic distal esophageal stricture, gastroparesis, (+) Hoyos's, no dysplasia    UPPER GASTROINTESTINAL ENDOSCOPY  09/25/2013    Dr Brian Walsh neg, no Hoyos's    UPPER GASTROINTESTINAL ENDOSCOPY  05/2015    Dr. Yasir Fontenot  5/29/2015 [facility]    Ultrasound-guided cannulation, right internal jugular vein. Right internal jugular vein single-lumen bard powerport placement. VASCULAR SURGERY  9/8/15 S    Right internal jugular vein portograms. Revision of right internal jugular vein single lumen port. VASCULAR SURGERY  11/3/15 S    Ultrasound-guided cannulation, left upper arm basilic vein. Left upper arm basilic vein PICC line placement bard dual-lumen PICC line. Superior vena cava venograms. VASCULAR SURGERY  03/23/2017    [facility]. Left IJV port angiogram.Ultrasound guided cannulation of right basilic vein,right upper extremity PICC line placement bard power PICC,dual lumen. VASCULAR SURGERY  04/27/2022    Ultrasound-guided cannulation right internal jugular vein. Placement of Munguia single-lumen tunneled dialysis catheter. Removal of nonfunctioning left subclavian vein Munguia tunneled dialysis catheter. Repositioning of right internal juglular vein Munguia tunneled dialylsis catheter. Perfomed by Dr. Kristian Blount.  [place] [address]  11/11/2022    dual lumen Munguia catheter VI    WISDOM TOOTH EXTRACTION      WISDOM TOOTH EXTRACTION  2004         CURRENT MEDICATIONS       Previous Medications    BACLOFEN (LYVISPAH) 10 MG PACK    Take by mouth 5 mg twice daily    CATHFLO ACTIVASE 2 MG INJECTION        DICYCLOMINE (BENTYL) 10 MG CAPSULE    Take 10 mg by mouth in the morning and at bedtime     DIPHENHYDRAMINE (BENADRYL) 50 MG/ML INJECTION    Infuse 25 mg intravenously every 4 hours as needed for Itching    ERGOCALCIFEROL (VITAMIN D) 30107 UNITS CAPS    Take 50,000 Units by mouth once a week    FAMOTIDINE (PEPCID) 20 MG/2ML SOLN INJECTION    Infuse 20 mg intravenously daily    HYDROXYZINE HCL (ATARAX) 10 MG TABLET    Take 1 tablet by mouth nightly    HYOSCYAMINE (OSCIMIN) 125 MCG TBDP DISPERSIBLE TABLET    DISSOLVE 1 TABLET UNDER THE TONGUE EVERY 6 HOURS IF NEEDED FOR CRAMPING OR DIARRHEA    PROMETHAZINE (PHENERGAN) 25 MG/ML INJECTION    25 mg every 3 hours as needed       ALLERGIES     Iv dye [iodides], Iv dye [iodides], Adhesive tape, Bee pollen, Chlorhexidine, Fruit & vegetable daily [nutritional supplements], Metoprolol succinate [metoprolol], Nsaids, Orange, Orange fruit [citrus], Other, Reglan [metoclopramide], Tramadol, Zofran [ondansetron hcl], Orange oil, and Sulfamethoxazole-trimethoprim    FAMILY HISTORY       Family History   Problem Relation Age of Onset    Other Mother         endometriosis    Depression Father     Diabetes Paternal Grandmother     Stroke Paternal Grandmother     Hypertension Paternal Grandmother     Heart Disease Paternal Grandmother     Heart Failure Paternal Grandmother     Colon Cancer Neg Hx     Colon Polyps Neg Hx     Esophageal Cancer Neg Hx     Liver Cancer Neg Hx     Rectal Cancer Neg Hx     Stomach Cancer Neg Hx           SOCIAL HISTORY       Social History     Socioeconomic History    Marital status: Single     Spouse name: None    Number of children: None    Years of education: None    Highest education level: None   Tobacco Use    Smoking status: Never    Smokeless tobacco: Never    Tobacco comments:     Vape    Vaping Use    Vaping Use: Former    Substances: Nicotine    Devices: Refillable tank   Substance and Sexual Activity    Alcohol use: Never    Drug use: Never    Sexual activity: Yes     Partners: Male   Social History Narrative    ** Merged History Encounter **          Social Determinants of Health     Financial Resource Strain: Low Risk     Difficulty of Paying Living Expenses: Not hard at all   Food Insecurity: No Food Insecurity    Worried About Running Out of Food in the Last Year: Never true    Ran Out of Food in the Last Year: Never true   Physical Activity: Inactive    Days of Exercise per Week: 0 days    Minutes of Exercise per Session: 0 min   Intimate Partner Violence: Not At Risk    Fear of Current or Ex-Partner: No    Emotionally Abused: No    Physically Abused: No    Sexually Abused: No       SCREENINGS    [place]andro Coma Scale  Eye Opening: Spontaneous  Best Verbal Response: Oriented  Best Motor Response: Obeys commands  [place] Coma Scale Score: 15        PHYSICAL EXAM    (up to 7 for level 4, 8 or more for level 5)     ED Triage Vitals [01/24/23 0747]   BP Temp Temp Source Heart Rate Resp SpO2 Height Weight   120/79 97.4 °F (36.3 °C) Oral (!) 110 15 99 % -- --       Physical Exam  Constitutional:       General: She is not in acute distress. Eyes:      General: No scleral icterus. Cardiovascular:      Rate and Rhythm: Regular rhythm. Tachycardia present. Pulmonary:      Effort: Pulmonary effort is normal.   Skin:     Coloration: Skin is not jaundiced or pale. Neurological:      Mental Status: She is alert. Psychiatric:         Mood and Affect: Mood normal.         Behavior: Behavior normal.       DIAGNOSTIC RESULTS     EKG: All EKG's are interpreted by the Emergency Department Physician who either signs or Co-signs this chart in the absence of a cardiologist.        RADIOLOGY:   Non-plain film images such as CT, Ultrasound and MRI are read by the radiologist. Plainradiographic images are visualized and preliminarily interpreted by the emergency physician with the below findings:        Interpretation per the Radiologist below, if available at the time of this note:    No orders to display         ED BEDSIDE ULTRASOUND:   Performed by ED Physician - none    LABS:  Labs Reviewed - No data to display    All other labs were within normal range or not returned as of this dictation.     EMERGENCY DEPARTMENT COURSE and DIFFERENTIALDIAGNOSIS/MDM:   Vitals:    Vitals:    01/24/23 0747   BP: 120/79   Pulse: (!) 110   Resp: 15   Temp: 97.4 °F (36.3 °C)   TempSrc: Oral SpO2: 99%       MDM  Number of Diagnoses or Management Options  Diagnosis management comments: Nursing staff will do dressing care. CONSULTS:  None    PROCEDURES:  Unless otherwise notedbelow, none     Procedures    FINAL IMPRESSION     1.  Encounter for venous access device care          DISPOSITION/PLAN   DISPOSITION Decision To Discharge 01/24/2023 08:32:39 AM      PATIENT REFERRED TO:  @FUP@    DISCHARGE MEDICATIONS:  New Prescriptions    No medications on file          (Please note that portions of this note were completed with a voice recognition program.  Efforts were made to edit the dictations butoccasionally words are mis-transcribed.)    Gold Quintanilla MD (electronically signed)  AttendingEmergency Physician          Chuck Mason MD  01/24/23 2825

## 2023-01-27 ENCOUNTER — TELEPHONE (OUTPATIENT)
Dept: GASTROENTEROLOGY | Age: 33
End: 2023-01-27

## 2023-01-27 NOTE — TELEPHONE ENCOUNTER
Patient called to schedule her colonoscopy and egd that Climmie Foil ordered last week. Please return patient's call.  Thank you

## 2023-01-30 ENCOUNTER — ANESTHESIA EVENT (OUTPATIENT)
Dept: ENDOSCOPY | Age: 33
End: 2023-01-30
Payer: MEDICAID

## 2023-01-31 ENCOUNTER — TELEMEDICINE (OUTPATIENT)
Dept: PRIMARY CARE CLINIC | Age: 33
End: 2023-01-31
Payer: MEDICAID

## 2023-01-31 DIAGNOSIS — G89.29 CHRONIC PAIN OF LEFT KNEE: Primary | ICD-10-CM

## 2023-01-31 DIAGNOSIS — F41.1 GAD (GENERALIZED ANXIETY DISORDER): ICD-10-CM

## 2023-01-31 DIAGNOSIS — M25.562 CHRONIC PAIN OF LEFT KNEE: Primary | ICD-10-CM

## 2023-01-31 DIAGNOSIS — K31.84 GASTROPARESIS: ICD-10-CM

## 2023-01-31 PROBLEM — Z86.19 HISTORY OF INFECTION DUE TO HUMAN PAPILLOMA VIRUS (HPV): Status: RESOLVED | Noted: 2021-05-24 | Resolved: 2023-01-31

## 2023-01-31 PROBLEM — Z90.710 HISTORY OF TOTAL HYSTERECTOMY: Status: RESOLVED | Noted: 2021-05-24 | Resolved: 2023-01-31

## 2023-01-31 PROCEDURE — 99422 OL DIG E/M SVC 11-20 MIN: CPT | Performed by: FAMILY MEDICINE

## 2023-01-31 NOTE — PROGRESS NOTES
[address] [facility]  [address] [place] [address]  [address] [address]  Dept: [phone]  Dept Fax: [phone]  Loc: [phone]      Subjective:     Chief Complaint   Patient presents with    Follow-up       HPI:  Ximena Roth is a 35 y.o. female presents via virtual visit through [address] She is requesting for a referral to orthopedic for evaluation of her chronic L knee pain. She states she has fallen twice in the last few days. It is swollen. She states the L knee has been weak for sometime and it pops and gives out on her every now and then. She can still walk on it but very carefully. Pt is unable to do MRI because she has a gastric stimulator. She is also asking for a letter to state that her cat is an emotional support animal for her. However, her cat is not registered yet as such. ROS:   Review of Systems   Gastrointestinal:         Gastroparesis - status quo   Musculoskeletal:  Positive for arthralgias (L knee pain) and joint swelling (L knee). Psychiatric/Behavioral:  The patient is nervous/anxious. All other systems reviewed and are negative.     PMHx:  Past Medical History:   Diagnosis Date    Allergic contact dermatitis due to adhesives     Allergic contact dermatitis due to plants, except food     Anemia complicating pregnancy, second trimester     Anemia complicating pregnancy, third trimester     Anxiety and depression     Anxiety disorder     unspecified    Bacteremia     Cellulitis of abdominal wall     Chronic fatigue, unspecified     Chronic serous otitis media of left ear 9/23/2013    Dysuria     Encounter for care related to vascular access port 5/19/2022    Epigastric pain     Frequency of micturition     G tube feedings (HCC)     Gastroparesis     Dr. Nick De Jesus in [place] manages    Gastroparesis     Gastrostomy malfunction [facility])     Generalized abdominal pain     GERD (gastroesophageal reflux disease)     Heart murmur     Hiatal hernia History of Hoyos's esophagus     History of blood transfusion     History of infection due to human papilloma virus (HPV) 5/24/2021    History of total hysterectomy 5/24/2021    Hypotension, unspecified     IBS (irritable bowel syndrome)     Insomnia, unspecified     Jejunostomy tube present (HCC)     Low back pain     Nausea with vomiting     unspecified    Neck pain     Neuropathy     Orthostatic hypotension     POTS (postural orthostatic tachycardia syndrome)     Premature birth     delivered at 24-27 weeks    Scoliosis     Syncope and collapse     Tachycardia     Tachycardia, unspecified     Toxic gastroenteritis and colitis      Patient Active Problem List   Diagnosis    Barretts esophagus    Disc herniation    Back pain    Anxiety disorder    NSAID long-term use    Irritable bowel syndrome    Dysmenorrhea    Gastroparesis    Insomnia    Poor venous access    History of syncope    POTS (postural orthostatic tachycardia syndrome)    ETD (eustachian tube dysfunction)    Jejunostomy tube present (HCC)    Mixed hearing loss of left ear    Chronic fatigue syndrome    Tyrell's syndrome pupil    Idiopathic scoliosis of thoracolumbar spine    Nutritional disorder    Autoimmune disease ([address]    Cluster headache    Common variable agammaglobulinemia ([address]    Hiatal hernia    Learning disability    Vitamin D deficiency    Encounter for care related to vascular access port    Infection of vascular catheter, initial encounter ([address]    Neuropathy    Gait abnormality       PSHx:  Past Surgical History:   Procedure Laterality Date    ABDOMEN SURGERY  2014    gastric stimulator implanted    ADENOIDECTOMY  2007    CHOLECYSTECTOMY  2016    COLONOSCOPY  2017    [place]-  normal per pt    EAR SURGERY Left 2014    FACIAL SURGERY  2005    GASTRIC STIMULATOR IMPLANT SURGERY  11/2014    GASTROSTOMY TUBE PLACEMENT  04/07/2015    Dr Balwinder Gongora-[place]-J tube-Removed in [place] 1/2018    GASTROSTOMY TUBE PLACEMENT  2017 G tube    HYSTERECTOMY (CERVIX STATUS UNKNOWN)      INSERTION / REMOVAL / REPLACEMENT VENOUS ACCESS CATHETER N/A 03/25/2016    REMOVAL OF PORT AND PLACEMENT OF NEW PORT; REMOVAL OF PICC LINE  performed by Johnna Carrion MD at [address] Left 2014    KNEE SURGERY Right 2015    MANDIBLE SURGERY      double jaw    PHARYNGECTOMY      PORT SURGERY      port present 3-25-16    TONSILLECTOMY  1997    TUBAL LIGATION  2016    TUMOR REMOVAL Left     ear    TUNNELED VENOUS PORT PLACEMENT Right 05/29/2015    UPPER GASTROINTESTINAL ENDOSCOPY  04/02/2010    Dr Yudy Schreiber, asymptomatic distal esophageal stricture, gastroparesis, (+) Hoyos's, no dysplasia    UPPER GASTROINTESTINAL ENDOSCOPY  09/25/2013    Dr Juan David Guerra neg, no Hoyos's    UPPER GASTROINTESTINAL ENDOSCOPY  05/2015    Dr. Rony Neil  5/29/2015 S    Ultrasound-guided cannulation, right internal jugular vein. Right internal jugular vein single-lumen bard powerport placement. VASCULAR SURGERY  9/8/15 [facility]    Right internal jugular vein portograms. Revision of right internal jugular vein single lumen port. VASCULAR SURGERY  11/3/15 [facility]    Ultrasound-guided cannulation, left upper arm basilic vein. Left upper arm basilic vein PICC line placement bard dual-lumen PICC line. Superior vena cava venograms. VASCULAR SURGERY  03/23/2017    [facility]. Left IJV port angiogram.Ultrasound guided cannulation of right basilic vein,right upper extremity PICC line placement bard power PICC,dual lumen. VASCULAR SURGERY  04/27/2022    Ultrasound-guided cannulation right internal jugular vein. Placement of Munguia single-lumen tunneled dialysis catheter. Removal of nonfunctioning left subclavian vein Munguia tunneled dialysis catheter. Repositioning of right internal juglular vein Munguia tunneled dialylsis catheter. Perfomed by Dr. Tomer Youssef at [place] [address]  11/11/2022    dual lumen Munguia catheter VI    WISDOM TOOTH EXTRACTION WISDOM TOOTH EXTRACTION  2004       PFHx:  Family History   Problem Relation Age of Onset    Other Mother         endometriosis    Depression Father     Diabetes Paternal Grandmother     Stroke Paternal Grandmother     Hypertension Paternal Grandmother     Heart Disease Paternal Grandmother     Heart Failure Paternal Grandmother     Colon Cancer Neg Hx     Colon Polyps Neg Hx     Esophageal Cancer Neg Hx     Liver Cancer Neg Hx     Rectal Cancer Neg Hx     Stomach Cancer Neg Hx        SocialHx:  Social History     Tobacco Use    Smoking status: Never    Smokeless tobacco: Never    Tobacco comments:     Vape    Substance Use Topics    Alcohol use: Never       Allergies: Allergies   Allergen Reactions    Iv Dye [Iodides] Shortness Of Breath, Itching and Other (See Comments)     IV 3000; SoA, dizziness    Iv Dye [Iodides] Anaphylaxis     Iodine containing    Adhesive Tape Itching     And tegaderm    Bee Pollen Hives     Oranges-anything with orange color  Oranges-anything with orange color  Oranges-anything with orange color  Oranges-anything with orange color  Oranges-anything with orange color      Chlorhexidine      Other reaction(s): ITCHING    Fruit & Vegetable Daily [Nutritional Supplements]      Oranges-anything with orange color    Metoprolol Succinate [Metoprolol]      Excessive drowsiness/ Metoprolol tartrate-excessive drowsiness    Nsaids      Other reaction(s): GI Intolerance    [place] Itching and Other (See Comments)     Other reaction(s): Vomiting    Orange Fruit [Citrus]      And artificial    Other      Cloraprep    Reglan [Metoclopramide]      Nausea,vomiting    Tramadol Other (See Comments)     Excessive sleeping x 3 days, pt takes 1/2 a pill.     Zofran [Ondansetron Hcl] Hives    Orange Oil Nausea And Vomiting    Sulfamethoxazole-Trimethoprim Nausea And Vomiting       Medications:  Current Outpatient Medications   Medication Sig Dispense Refill    diphenhydrAMINE (BENADRYL) 50 MG/ML injection Infuse 25 mg intravenously every 4 hours as needed for Itching      hyoscyamine (OSCIMIN) 125 MCG TBDP dispersible tablet DISSOLVE 1 TABLET UNDER THE TONGUE EVERY 6 HOURS IF NEEDED FOR CRAMPING OR DIARRHEA      CATHFLO ACTIVASE 2 MG injection       Baclofen (LYVISPAH) 10 MG PACK Take by mouth 5 mg twice daily      hydrOXYzine HCl (ATARAX) 10 MG tablet Take 1 tablet by mouth nightly 30 tablet 0    Ergocalciferol (VITAMIN D) 22320 units CAPS Take 50,000 Units by mouth once a week 5 capsule 0    promethazine (PHENERGAN) 25 MG/ML injection 25 mg every 3 hours as needed      famotidine (PEPCID) 20 MG/2ML SOLN injection Infuse 20 mg intravenously daily      dicyclomine (BENTYL) 10 MG capsule Take 10 mg by mouth in the morning and at bedtime        No current facility-administered medications for this visit. Objective:   PE:  LMP 01/14/2018 (Exact Date)   Physical Exam     During the virtual visit, pt appeared to be stable, alert and oriented. She appears to  be sitting in her couch comfortably with her cat and her roommate. Assessment & Plan   Rebeka Palomares was seen today for follow-up. Diagnoses and all orders for this visit:    Chronic pain of left knee  -     External Referral To Orthopedic Surgery    Gastroparesis    ROSALVA (generalized anxiety disorder)    Recommend that the cat be registered first and then I will write her a letter. Continue present care and management  FALL precaution  Return in 27 days (on 2/27/2023) for routine follow-up with me. All questions were answered. Medications, including possible adverse effects, and instructions were reviewed and  understanding was confirmed. Follow-up recommendations, including when to contact or return to office (ie; if symptoms worsen or fail to improve), were discussed and acknowledged.     Electronically signed by Wayne Juarez MD on 1/31/23 at 9:02 AM CST

## 2023-02-03 ENCOUNTER — HOSPITAL ENCOUNTER (OUTPATIENT)
Age: 33
Setting detail: OUTPATIENT SURGERY
Discharge: HOME | End: 2023-02-03
Attending: INTERNAL MEDICINE | Admitting: INTERNAL MEDICINE
Payer: MEDICAID

## 2023-02-03 ENCOUNTER — ANESTHESIA (OUTPATIENT)
Dept: ENDOSCOPY | Age: 33
End: 2023-02-03
Payer: MEDICAID

## 2023-02-03 VITALS
HEIGHT: 60 IN | OXYGEN SATURATION: 100 % | RESPIRATION RATE: 18 BRPM | DIASTOLIC BLOOD PRESSURE: 68 MMHG | SYSTOLIC BLOOD PRESSURE: 106 MMHG | TEMPERATURE: 97.2 F | BODY MASS INDEX: 21.2 KG/M2 | WEIGHT: 108 LBS | HEART RATE: 95 BPM

## 2023-02-03 DIAGNOSIS — R11.0 NAUSEA: ICD-10-CM

## 2023-02-03 DIAGNOSIS — R19.7 DIARRHEA, UNSPECIFIED TYPE: ICD-10-CM

## 2023-02-03 PROCEDURE — 43239 EGD BIOPSY SINGLE/MULTIPLE: CPT | Performed by: INTERNAL MEDICINE

## 2023-02-03 PROCEDURE — 3609012400 HC EGD TRANSORAL BIOPSY SINGLE/MULTIPLE: Performed by: INTERNAL MEDICINE

## 2023-02-03 PROCEDURE — 45380 COLONOSCOPY AND BIOPSY: CPT | Performed by: INTERNAL MEDICINE

## 2023-02-03 PROCEDURE — 7100000010 HC PHASE II RECOVERY - FIRST 15 MIN: Performed by: INTERNAL MEDICINE

## 2023-02-03 PROCEDURE — 3700000000 HC ANESTHESIA ATTENDED CARE: Performed by: INTERNAL MEDICINE

## 2023-02-03 PROCEDURE — 88342 IMHCHEM/IMCYTCHM 1ST ANTB: CPT

## 2023-02-03 PROCEDURE — 3700000001 HC ADD 15 MINUTES (ANESTHESIA): Performed by: INTERNAL MEDICINE

## 2023-02-03 PROCEDURE — 7100000011 HC PHASE II RECOVERY - ADDTL 15 MIN: Performed by: INTERNAL MEDICINE

## 2023-02-03 PROCEDURE — 3609010300 HC COLONOSCOPY W/BIOPSY SINGLE/MULTIPLE: Performed by: INTERNAL MEDICINE

## 2023-02-03 PROCEDURE — 2580000003 HC RX 258: Performed by: INTERNAL MEDICINE

## 2023-02-03 PROCEDURE — 88305 TISSUE EXAM BY PATHOLOGIST: CPT

## 2023-02-03 PROCEDURE — 2500000003 HC RX 250 WO HCPCS: Performed by: NURSE ANESTHETIST, CERTIFIED REGISTERED

## 2023-02-03 PROCEDURE — 6360000002 HC RX W HCPCS: Performed by: NURSE ANESTHETIST, CERTIFIED REGISTERED

## 2023-02-03 PROCEDURE — 2709999900 HC NON-CHARGEABLE SUPPLY: Performed by: INTERNAL MEDICINE

## 2023-02-03 RX ORDER — PROMETHAZINE HYDROCHLORIDE 25 MG/ML
25 INJECTION, SOLUTION INTRAMUSCULAR; INTRAVENOUS ONCE
Status: CANCELLED | OUTPATIENT
Start: 2023-02-03

## 2023-02-03 RX ORDER — PROPOFOL 10 MG/ML
INJECTION, EMULSION INTRAVENOUS PRN
Status: DISCONTINUED | OUTPATIENT
Start: 2023-02-03 | End: 2023-02-03 | Stop reason: SDUPTHER

## 2023-02-03 RX ORDER — SODIUM CHLORIDE, SODIUM LACTATE, POTASSIUM CHLORIDE, CALCIUM CHLORIDE 600; 310; 30; 20 MG/100ML; MG/100ML; MG/100ML; MG/100ML
INJECTION, SOLUTION INTRAVENOUS CONTINUOUS
Status: DISCONTINUED | OUTPATIENT
Start: 2023-02-03 | End: 2023-02-03 | Stop reason: HOSPADM

## 2023-02-03 RX ORDER — LIDOCAINE HYDROCHLORIDE 10 MG/ML
INJECTION, SOLUTION EPIDURAL; INFILTRATION; INTRACAUDAL; PERINEURAL PRN
Status: DISCONTINUED | OUTPATIENT
Start: 2023-02-03 | End: 2023-02-03 | Stop reason: SDUPTHER

## 2023-02-03 RX ADMIN — LIDOCAINE HYDROCHLORIDE 50 MG: 10 INJECTION, SOLUTION EPIDURAL; INFILTRATION; INTRACAUDAL; PERINEURAL at 12:15

## 2023-02-03 RX ADMIN — SODIUM CHLORIDE, POTASSIUM CHLORIDE, SODIUM LACTATE AND CALCIUM CHLORIDE: 600; 310; 30; 20 INJECTION, SOLUTION INTRAVENOUS at 12:04

## 2023-02-03 RX ADMIN — PROPOFOL 380 MG: 10 INJECTION, EMULSION INTRAVENOUS at 12:15

## 2023-02-03 ASSESSMENT — PAIN SCALES - GENERAL: PAINLEVEL_OUTOF10: 0

## 2023-02-03 ASSESSMENT — PAIN - FUNCTIONAL ASSESSMENT: PAIN_FUNCTIONAL_ASSESSMENT: 0-10

## 2023-02-03 NOTE — ANESTHESIA PRE PROCEDURE
Department of Anesthesiology  Preprocedure Note       Name:  Daniel Martinez   Age:  35 y.o.  :  1990                                          MRN:  [id number]         Date:  2/3/2023      Surgeon: Vickie Love):  Robert Esparza MD    Procedure: Procedure(s):  EGD BIOPSY  COLONOSCOPY DIAGNOSTIC    Medications prior to admission:   Prior to Admission medications    Medication Sig Start Date End Date Taking? Authorizing Provider   diphenhydrAMINE (BENADRYL) 50 MG/ML injection Infuse 25 mg intravenously every 4 hours as needed for Itching    Historical Provider, MD   hyoscyamine (OSCIMIN) 125 MCG TBDP dispersible tablet DISSOLVE 1 TABLET UNDER THE TONGUE EVERY 6 HOURS IF NEEDED FOR CRAMPING OR DIARRHEA 23   Historical Provider, MD   CATHFLO ACTIVASE 2 MG injection  22   Historical Provider, MD   Baclofen (LYVISPAH) 10 MG PACK Take by mouth 5 mg twice daily    Historical Provider, MD   hydrOXYzine HCl (ATARAX) 10 MG tablet Take 1 tablet by mouth nightly 1/16/23 2/15/23  Jason Richard MD   Ergocalciferol (VITAMIN D) 83820 units CAPS Take 50,000 Units by mouth once a week 22   Molly Ala, APRN - CNP   promethazine (PHENERGAN) 25 MG/ML injection 25 mg every 3 hours as needed 22   Historical Provider, MD   famotidine (PEPCID) 20 MG/2ML SOLN injection Infuse 20 mg intravenously daily 22   Historical Provider, MD   dicyclomine (BENTYL) 10 MG capsule Take 10 mg by mouth in the morning and at bedtime     Historical Provider, MD       Current medications:    Current Facility-Administered Medications   Medication Dose Route Frequency Provider Last Rate Last Admin    lactated ringers IV soln infusion   IntraVENous Continuous Robert Esparza MD           Allergies: Allergies   Allergen Reactions    Iv Dye [Iodides] Shortness Of Breath, Itching and Other (See Comments)     IV 3000;  SoA, dizziness    Iv Dye [Iodides] Anaphylaxis     Iodine containing    Adhesive Tape Itching     And tegaderm  Bee Pollen Hives     Oranges-anything with orange color  Oranges-anything with orange color  Oranges-anything with orange color  Oranges-anything with orange color  Oranges-anything with orange color      Chlorhexidine      Other reaction(s): ITCHING    Fruit & Vegetable Daily [Nutritional Supplements]      Oranges-anything with orange color    Metoprolol Succinate [Metoprolol]      Excessive drowsiness/ Metoprolol tartrate-excessive drowsiness    Nsaids      Other reaction(s): GI Intolerance    Orange Itching and Other (See Comments)     Other reaction(s): Vomiting    Orange Fruit [Citrus]      And artificial    Other      Cloraprep    Reglan [Metoclopramide]      Nausea,vomiting    Tramadol Other (See Comments)     Excessive sleeping x 3 days, pt takes 1/2 a pill.  Zofran [Ondansetron Hcl] Hives    Orange Oil Nausea And Vomiting    Sulfamethoxazole-Trimethoprim Nausea And Vomiting       Problem List:    Patient Active Problem List   Diagnosis Code    Barretts esophagus K22.70    Disc herniation [id number]    Back pain M54.9    Anxiety disorder F41.9    NSAID long-term use Z79.1    Irritable bowel syndrome K58.9    Dysmenorrhea N94.6    Gastroparesis K31.84    Insomnia G47.00    Poor venous access I87.8    History of syncope Z87.898    POTS (postural orthostatic tachycardia syndrome) G90. 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 7XXA    Neuropathy G62.9    Gait abnormality R26.9       Past Medical History:        Diagnosis Date    Allergic contact dermatitis due to adhesives     Allergic contact dermatitis due to plants, except food     Anemia complicating pregnancy, second trimester     Anemia complicating pregnancy, third trimester     Anxiety and depression     Anxiety disorder     unspecified    Bacteremia     Cellulitis of abdominal wall     Chronic fatigue, unspecified     Chronic serous otitis media of left ear 9/23/2013    Dysuria     Encounter for care related to vascular access port 5/19/2022    Epigastric pain     Frequency of micturition     G tube feedings (HCC)     Gastroparesis     Dr. Nick De Jesus in [place] manages    Gastroparesis     Gastrostomy malfunction ([address]     Generalized abdominal pain     GERD (gastroesophageal reflux disease)     Heart murmur     Hiatal hernia     History of Hoyos's esophagus     History of blood transfusion     History of infection due to human papilloma virus (HPV) 5/24/2021    History of total hysterectomy 5/24/2021    Hypotension, unspecified     IBS (irritable bowel syndrome)     Insomnia, unspecified     Jejunostomy tube present (HCC)     Low back pain     Nausea with vomiting     unspecified    Neck pain     Neuropathy     Orthostatic hypotension     POTS (postural orthostatic tachycardia syndrome)     Premature birth     delivered at 24-27 weeks    Scoliosis     Syncope and collapse     Tachycardia     Tachycardia, unspecified     Toxic gastroenteritis and colitis        Past Surgical History:        Procedure Laterality Date    ABDOMEN SURGERY  2014    gastric stimulator implanted    ADENOIDECTOMY  2007    CHOLECYSTECTOMY  2016    COLONOSCOPY  2017    [facility]-  normal per pt    EAR SURGERY Left 2014    FACIAL SURGERY  2005    GASTRIC STIMULATOR IMPLANT SURGERY  11/2014    GASTROSTOMY TUBE PLACEMENT  04/07/2015    Dr Lavonne Thapa Vivien tube-Removed in [place] 1/2018    GASTROSTOMY TUBE PLACEMENT  2017    G tube    HYSTERECTOMY (CERVIX STATUS UNKNOWN)      INSERTION / REMOVAL / REPLACEMENT VENOUS ACCESS CATHETER N/A 03/25/2016    REMOVAL OF PORT AND PLACEMENT OF NEW PORT; REMOVAL OF PICC LINE  performed by Nona De Los Santos [facility] at [facility] Left 2014    KNEE SURGERY Right 2015    MANDIBLE SURGERY      double jaw    PHARYNGECTOMY      PORT SURGERY      port present 3-25-16    TONSILLECTOMY  1997    TUBAL LIGATION  2016    TUMOR REMOVAL Left     ear    TUNNELED VENOUS PORT PLACEMENT Right 05/29/2015    UPPER GASTROINTESTINAL ENDOSCOPY  04/02/2010    Dr Natalie Sanford, asymptomatic distal esophageal stricture, gastroparesis, (+) Hoyos's, no dysplasia    UPPER GASTROINTESTINAL ENDOSCOPY  09/25/2013    Dr Ronnie us, no Hoyos's    UPPER GASTROINTESTINAL ENDOSCOPY  05/2015    Dr. Marcial [facility]  5/29/2015 [facility]    Ultrasound-guided cannulation, right internal jugular vein. Right internal jugular vein single-lumen bard powerport placement.  VASCULAR SURGERY  9/8/15 [facility]    Right internal jugular vein portograms. Revision of right internal jugular vein single lumen port.  VASCULAR SURGERY  11/3/15 [facility]    Ultrasound-guided cannulation, left upper arm basilic vein. Left upper arm basilic vein PICC line placement bard dual-lumen PICC line. Superior vena cava venograms.  VASCULAR SURGERY  03/23/2017    [facility]. Left IJV port angiogram.Ultrasound guided cannulation of right basilic vein,right upper extremity PICC line placement bard power PICC,dual lumen.  VASCULAR SURGERY  04/27/2022    Ultrasound-guided cannulation right internal jugular vein. Placement of Tripp single-lumen tunneled dialysis catheter. Removal of nonfunctioning left subclavian vein Tripp tunneled dialysis catheter. Repositioning of right internal juglular vein Tripp tunneled dialylsis catheter. Perfomed by Dr. Anant Rivers. [facility] [place] at [address] [facility] [address]  11/11/2022    dual lumen Munguia catheter VI    WISDOM TOOTH EXTRACTION      WISDOM TOOTH EXTRACTION  2004       Social History:    Social History     Tobacco Use    Smoking status: Never    Smokeless tobacco: Never    Tobacco comments:     Vape    Substance Use Topics    Alcohol use: Never                                Counseling given: Not Answered  Tobacco comments: Vape       Vital Signs (Current):   Vitals:    02/03/23 1133   BP: 96/79   Pulse: (!) 119   Resp: 20   Temp: 96.9 °F (36.1 °C)   TempSrc: Temporal   SpO2: 100%   Weight: 108 lb (49 kg)   Height: 5' (1.524 m)                                              BP Readings from Last 3 Encounters:   02/03/23 96/79   01/24/23 120/79   01/16/23 102/68       NPO Status:                                                                                 BMI:   Wt Readings from Last 3 Encounters:   02/03/23 108 lb (49 kg)   01/16/23 105 lb 12.8 oz (48 kg)   12/12/22 114 lb (51.7 kg)     Body mass index is 21.09 kg/m².     CBC:   Lab Results   Component Value Date/Time    WBC 10.6 12/16/2022 02:10 AM    RBC 3.40 12/16/2022 02:10 AM    HGB 9.1 12/16/2022 02:10 AM    HCT 28.4 12/16/2022 02:10 AM    MCV 83.5 12/16/2022 02:10 AM    RDW 20.4 12/16/2022 02:10 AM     12/16/2022 02:10 AM       CMP:   Lab Results   Component Value Date/Time     12/16/2022 02:10 AM    K 4.7 12/16/2022 02:10 AM     12/16/2022 02:10 AM    CO2 22 12/16/2022 02:10 AM    BUN 4 12/16/2022 02:10 AM    CREATININE 0.5 12/16/2022 02:10 AM    GFRAA >59 09/26/2022 03:52 AM    LABGLOM >60 12/16/2022 02:10 AM    GLUCOSE 94 12/16/2022 02:10 AM    PROT 7.2 12/12/2022 12:25 PM    CALCIUM 8.0 12/16/2022 02:10 AM    BILITOT 0.4 12/12/2022 12:25 PM    ALKPHOS 95 12/12/2022 12:25 PM    AST 22 12/12/2022 12:25 PM    ALT 10 12/12/2022 12:25 PM       POC Tests: No results for input(s): POCGLU, POCNA, POCK, POCCL, POCBUN, POCHEMO, POCHCT in the last 72 hours.    Coags:   Lab Results   Component Value Date/Time    PROTIME 13.2 07/26/2022 03:00 PM    INR 1.01 07/26/2022 03:00 PM    APTT 25.5 07/26/2022 03:00 PM       HCG (If Applicable):   Lab Results   Component Value Date    PREGTESTUR Negative 03/25/2016        ABGs: No results found for: PHART, PO2ART, QRX6CVE, TJE9UVW, BEART, T0ZOUXEX     Type & Screen (If Applicable):  No results found for: LABABO, LABRH    Drug/Infectious Status (If Applicable):  No results found for: HIV, HEPCAB    COVID-19 Screening (If Applicable):   Lab Results   Component Value Date/Time    COVID19 Not Detected 12/12/2022 01:00 PM    COVID19 Not Detected 11/03/2022 04:31 AM           Anesthesia Evaluation  Patient summary reviewed and Nursing notes reviewed  Airway: Mallampati: II  TM distance: >3 FB   Neck ROM: full  Mouth opening: > = 3 FB   Dental: normal exam         Pulmonary:Negative Pulmonary ROS and normal exam                               Cardiovascular:Negative CV ROS  Exercise tolerance: good (>4 METS),            Beta Blocker:  Not on Beta Blocker         Neuro/Psych:   (+) headaches: cluster headaches, psychiatric history:            GI/Hepatic/Renal:   (+) hiatal hernia, GERD:,           Endo/Other: Negative Endo/Other ROS                    Abdominal:             Vascular: negative vascular ROS. Other Findings:           Anesthesia Plan      general and TIVA     ASA 2       Induction: intravenous. Anesthetic plan and risks discussed with patient. Plan discussed with CRNA.                     SHRUTHI Esparza - MUNA   2/3/2023

## 2023-02-03 NOTE — DISCHARGE INSTRUCTIONS
Await pathology results  Continue current meds as prescribed  I would consider daily Miralax to help with bowel movements. POST-OP ORDERS: ENDOSCOPY & COLONOSCOPY:    1. Rest today. 2. DO NOT eat or drink until wide awake; eat your usual diet today in moderate amount only. 3. DO NOT drive today. 4. Call physician if you have severe pain, vomiting, fever, rectal bleeding or black bowel movements. 5.  If a biopsy was taken or a polyp removed, you should expect to hear results in about 21 days. If you have heard nothing from your physician by then, call the office for results. 6.  Discharge home when patient awake, vitals signs stable and tolerating liquids.

## 2023-02-03 NOTE — H&P
Patient Name: Cristofer Torres  : 1990  MRN: [id number]  DATE: 23    Allergies: Allergies   Allergen Reactions    Iv Dye [Iodides] Shortness Of Breath, Itching and Other (See Comments)     IV 3000; SoA, dizziness    Iv Dye [Iodides] Anaphylaxis     Iodine containing    Adhesive Tape Itching     And tegaderm    Bee Pollen Hives     Oranges-anything with orange color  Oranges-anything with orange color  Oranges-anything with orange color  Oranges-anything with orange color  Oranges-anything with orange color      Chlorhexidine      Other reaction(s): ITCHING    Fruit & Vegetable Daily [Nutritional Supplements]      Oranges-anything with orange color    Metoprolol Succinate [Metoprolol]      Excessive drowsiness/ Metoprolol tartrate-excessive drowsiness    Nsaids      Other reaction(s): GI Intolerance    [place]son Itching and Other (See Comments)     Other reaction(s): Vomiting    Orange Fruit [Citrus]      And artificial    Other      Cloraprep    Reglan [Metoclopramide]      Nausea,vomiting    Tramadol Other (See Comments)     Excessive sleeping x 3 days, pt takes 1/2 a pill. Zofran [Ondansetron Hcl] Hives    Orange Oil Nausea And Vomiting    Sulfamethoxazole-Trimethoprim Nausea And Vomiting        ENDOSCOPY  History and Physical    Procedure:    [x] Diagnostic Colonoscopy       [] Screening Colonoscopy  [x] EGD      [] ERCP      [] EUS       [] Other    [x] Previous office notes/History and Physical reviewed from the patients chart. Please see EMR for further details of HPI.  I have examined the patient's status immediately prior to the procedure and:      Indications/HPI:    []Abdominal Pain   []Barretts  []Screening/Surveillance   []History of Polyps  []Dysphagia            [] +Cologard/DNA testing  []Abnormal Imaging              []EOE Hx              [] Family Hx of CRC/Polyps  []Anemia                            []Food Impaction       []Recent Poor Prep  []GI Bleed []Lymphadenopathy  []History of Polyps  []Change in bowel habits []Heartburn/Reflux  []Cancer- GI/Lung  []Chest Pain - Non Cardiac []Heme (+) Stool []Ulcers  []Constipation  []Hemoptysis  []Incontinence    [x]Diarrhea  []Hypoxemia  []Rectal Bleed (BRBPR)  [x]Nausea/Vomiting   [] Varices  []Crohns/Colitis  []Pancreatic Cyst   [] Cirrhosis   []Pancreatitis    []Abnormal MRCP  []Elevated LFT [] Stent Removal, Previous ERCP  []Other:     Anesthesia:   [x] MAC [] Moderate Sedation   [] General   [] None     ROS: 12 pt Review of Symptoms was negative unless mentioned above    Medications:   Prior to Admission medications    Medication Sig Start Date End Date Taking?  Authorizing Provider   diphenhydrAMINE (BENADRYL) 50 MG/ML injection Infuse 25 mg intravenously every 4 hours as needed for Itching    Historical Provider, MD   hyoscyamine (OSCIMIN) 125 MCG TBDP dispersible tablet DISSOLVE 1 TABLET UNDER THE TONGUE EVERY 6 HOURS IF NEEDED FOR CRAMPING OR DIARRHEA 1/6/23   Historical Provider, MD   CATHFLO ACTIVASE 2 MG injection  12/8/22   Historical Provider, MD   Baclofen (LYVISPAH) 10 MG PACK Take by mouth 5 mg twice daily    Historical Provider, MD   hydrOXYzine HCl (ATARAX) 10 MG tablet Take 1 tablet by mouth nightly 1/16/23 2/15/23  Christy San MD   Ergocalciferol (VITAMIN D) 52500 units CAPS Take 50,000 Units by mouth once a week 12/21/22   SHRUTHI Landeros - CNP   promethazine (PHENERGAN) 25 MG/ML injection 25 mg every 3 hours as needed 12/1/22   Historical Provider, MD   famotidine (PEPCID) 20 MG/2ML SOLN injection Infuse 20 mg intravenously daily 12/1/22   Historical Provider, MD   dicyclomine (BENTYL) 10 MG capsule Take 10 mg by mouth in the morning and at bedtime     Historical Provider, MD       Past Medical History:  Past Medical History:   Diagnosis Date    Allergic contact dermatitis due to adhesives     Allergic contact dermatitis due to plants, except food     Anemia complicating pregnancy, second trimester     Anemia complicating pregnancy, third trimester     Anxiety and depression     Anxiety disorder     unspecified    Bacteremia     Cellulitis of abdominal wall     Chronic fatigue, unspecified     Chronic serous otitis media of left ear 9/23/2013    Dysuria     Encounter for care related to vascular access port 5/19/2022    Epigastric pain     Frequency of micturition     G tube feedings (HCC)     Gastroparesis     Dr. Dianne Murray in [place] manages    Gastroparesis     Gastrostomy malfunction [facility])     Generalized abdominal pain     GERD (gastroesophageal reflux disease)     Heart murmur     Hiatal hernia     History of Hoyos's esophagus     History of blood transfusion     History of infection due to human papilloma virus (HPV) 5/24/2021    History of total hysterectomy 5/24/2021    Hypotension, unspecified     IBS (irritable bowel syndrome)     Insomnia, unspecified     Jejunostomy tube present (HCC)     Low back pain     Nausea with vomiting     unspecified    Neck pain     Neuropathy     Orthostatic hypotension     POTS (postural orthostatic tachycardia syndrome)     Premature birth     delivered at 24-27 weeks    Scoliosis     Syncope and collapse     Tachycardia     Tachycardia, unspecified     Toxic gastroenteritis and colitis        Past Surgical History:  Past Surgical History:   Procedure Laterality Date    ABDOMEN SURGERY  2014    gastric stimulator implanted    ADENOIDECTOMY  2007    CHOLECYSTECTOMY  2016    COLONOSCOPY  2017    Tosha Smallwood-  normal per pt    EAR SURGERY Left 2014    FACIAL SURGERY  2005    GASTRIC STIMULATOR IMPLANT SURGERY  11/2014    GASTROSTOMY TUBE PLACEMENT  04/07/2015    Dr Rashid G[place] tube-Removed in [place] 1/2018    GASTROSTOMY TUBE PLACEMENT  2017    G tube    HYSTERECTOMY (CERVIX STATUS UNKNOWN)      INSERTION / REMOVAL / REPLACEMENT VENOUS ACCESS CATHETER N/A 03/25/2016    REMOVAL OF PORT AND PLACEMENT OF NEW PORT; REMOVAL OF PICC LINE performed by Juan Carlos Shah MD at [address] Left 2014    KNEE SURGERY Right 2015    MANDIBLE SURGERY      double jaw    PHARYNGECTOMY      PORT SURGERY      port present 3-25-16    TONSILLECTOMY  1997    TUBAL LIGATION  2016    TUMOR REMOVAL Left     ear    TUNNELED VENOUS PORT PLACEMENT Right 05/29/2015    UPPER GASTROINTESTINAL ENDOSCOPY  04/02/2010    Dr Matthias Ji, asymptomatic distal esophageal stricture, gastroparesis, (+) Hoyos's, no dysplasia    UPPER GASTROINTESTINAL ENDOSCOPY  09/25/2013    Dr Leeanna Maxwell neg, no Hoyos's    UPPER GASTROINTESTINAL ENDOSCOPY  05/2015    Dr. Angelica Kim  5/29/2015 [facility]    Ultrasound-guided cannulation, right internal jugular vein. Right internal jugular vein single-lumen bard powerport placement. VASCULAR SURGERY  9/8/15 [facility]    Right internal jugular vein portograms. Revision of right internal jugular vein single lumen port. VASCULAR SURGERY  11/3/15 [facility]    Ultrasound-guided cannulation, left upper arm basilic vein. Left upper arm basilic vein PICC line placement bard dual-lumen PICC line. Superior vena cava venograms. VASCULAR SURGERY  03/23/2017    [facility]. Left IJV port angiogram.Ultrasound guided cannulation of right basilic vein,right upper extremity PICC line placement bard power PICC,dual lumen. VASCULAR SURGERY  04/27/2022    Ultrasound-guided cannulation right internal jugular vein. Placement of Munguia single-lumen tunneled dialysis catheter. Removal of nonfunctioning left subclavian vein Munguia tunneled dialysis catheter. Repositioning of right internal juglular vein Munguia tunneled dialylsis catheter. Perfomed by Dr. Jermaine Youssef at [address] [facility]    VASCULAR SURGERY  11/11/2022    dual lumen Munguia catheter VI    WISDOM TOOTH EXTRACTION      WISDOM TOOTH EXTRACTION  2004       Social History:  Social History     Tobacco Use    Smoking status: Never    Smokeless tobacco: Never    Tobacco comments:     Vape    Vaping Use    Vaping Use: Former    Substances: Nicotine    Devices: Refillable tank   Substance Use Topics    Alcohol use: Never    Drug use: Never       Vital Signs:   Vitals:    02/03/23 1133   BP: 96/79   Pulse: (!) 119   Resp: 20   Temp: 96.9 °F (36.1 °C)   SpO2: 100%        Physical Exam:  Cardiac:  [x]WNL  []Comments:  Pulmonary:  [x]WNL   []Comments:  Neuro/Mental Status:  [x]WNL  []Comments:  Abdominal:  [x]WNL    []Comments:  Other:   []WNL  []Comments:    Informed Consent:  The risks and benefits of the procedure have been discussed with either the patient or if they cannot consent, their representative. Assessment:  Patient examined and appropriate for planned sedation and procedure. Plan:  Proceed with planned sedation and procedure as above.          Nay Christianson MD

## 2023-02-03 NOTE — ANESTHESIA POSTPROCEDURE EVALUATION
Department of Anesthesiology  Postprocedure Note    Patient: Irene Guevara  MRN: [id number]  YOB: 1990  Date of evaluation: 2/3/2023      Procedure Summary     Date: 02/03/23 Room / Location: [address]    Anesthesia Start: 1209 Anesthesia Stop:     Procedures:       EGD BIOPSY      COLONOSCOPY DIAGNOSTIC Diagnosis:       Diarrhea, unspecified type      Nausea      (Diarrhea, unspecified type [R19.7])      (Nausea [R11.0])    Surgeons: Smita Collazo MD Responsible Provider: SHRUTHI Arnett CRNA    Anesthesia Type: general, TIVA ASA Status: 2          Anesthesia Type: No value filed.     Tori Phase I: Tori Score: 10    Tori Phase II:        Anesthesia Post Evaluation    Patient location during evaluation: bedside  Patient participation: complete - patient participated  Level of consciousness: sleepy but conscious  Pain score: 0  Airway patency: patent  Nausea & Vomiting: no nausea and no vomiting  Complications: no  Cardiovascular status: blood pressure returned to baseline  Respiratory status: acceptable, room air and spontaneous ventilation  Hydration status: euvolemic

## 2023-02-06 ENCOUNTER — HOSPITAL ENCOUNTER (OUTPATIENT)
Dept: CT IMAGING | Facility: HOSPITAL | Age: 33
Discharge: HOME | End: 2023-02-06
Admitting: NURSE PRACTITIONER
Payer: COMMERCIAL

## 2023-02-06 DIAGNOSIS — K31.84 GASTROPARESIS: ICD-10-CM

## 2023-02-06 DIAGNOSIS — M17.12 ARTHRITIS OF LEFT KNEE: ICD-10-CM

## 2023-02-06 DIAGNOSIS — M41.9 KYPHOSCOLIOSIS: ICD-10-CM

## 2023-02-06 DIAGNOSIS — M47.816 LUMBAR SPONDYLOSIS: ICD-10-CM

## 2023-02-06 DIAGNOSIS — G89.29 OTHER CHRONIC PAIN: ICD-10-CM

## 2023-02-06 DIAGNOSIS — M54.16 LUMBAR RADICULOPATHY: ICD-10-CM

## 2023-02-06 PROCEDURE — 72131 CT LUMBAR SPINE W/O DYE: CPT

## 2023-02-17 NOTE — OP NOTE
Endoscopic Procedure Note    Patient: Epifanio Johnson : 1990  Med Rec#: [id number] Acc#: [id number]     Primary Care Provider Mary Crain MD  Referring Provider: Manny Nyhan APRN    Endoscopist: Sydney Mendes MD    Date of Procedure:  2023    Procedure:   1. EGD with biopsy    Indications:   1. Chronic abdominal complaints  2. History of gastric surgery for pyloroplasty. Currently on TPN. Planned procedures next month for gastric stimulator management     Anesthesia:  Sedation was administered by anesthesia who monitored the patient during the procedure. Estimated Blood Loss: minimal    Procedure:   After reviewing the patient's chart and obtaining informed consent, the patient was placed in the left lateral decubitus position. A forward-viewing Olympus endoscope was lubricated and inserted through the mouth into the oropharynx. Under direct visualization, the upper esophagus was intubated. The scope was advanced to the level of the third portion of duodenum. Scope was slowly withdrawn with careful inspection of the mucosal surfaces. The scope was retroflexed for inspection of the gastric fundus and incisura. Findings and maneuvers are listed in impression below. The patient tolerated the procedure well. The scope was removed. There were no immediate complications. Findings:   Esophagus: abnormal: mild esophagitis noted in the distal esophagus. There is no significant hiatal hernia present. Stomach: there is evidence of previous gastric surgery. The Pylorus appears wildly patent - ? Due to previous pyloroplasty. Mild linear mucosal changes suggestive of gastritis noted -  Gastric biopsies were taken from the antrum and body to rule out Helicobacter pylori infection. Duodenum: normal      IMPRESSION:  1. Esophagitis/Gastritis - biopsied        RECOMMENDATIONS:    1. Await path results  2. Continue current meds  3. Colonoscopy today.      The results were discussed with the patient and family. A copy of the images obtained were given to the patient.      Nata Kellogg MD  2/17/2023  4:23 PM

## 2023-02-17 NOTE — OP NOTE
Patient: Karly Jacques : 1990  Med Rec#: [id number] Acc#: [id number]   Primary Care Provider Jeremy Fuller MD    Date of Procedure:  2023    Endoscopist: Jennifer Degroot MD    Referring Provider: Jeremy Fuller MD,     Operation Performed: Colonoscopy with biopsy    Indications: change in bowel habtis. Known GI motility issues. Anesthesia:  Sedation was administered by anesthesia who monitored the patient during the procedure. I met with Karly Jacques prior to procedure. We discussed the procedure itself, and I have discussed the risks of endoscopy (including-- but not limited to-- pain, discomfort, bleeding potentially requiring second endoscopic procedure and/or blood transfusion, organ perforation requiring operative repair, damage to organs near the colon, infection, aspiration, cardiopulmonary/allergic reaction), benefits, indications to endoscopy. Additionally, we discussed options other than colonoscopy. The patient expressed understanding. All questions answered. The patient decided to proceed with the procedure. Signed informed consent was placed on the chart. Blood Loss: minimal    Withdrawal time: n/a  Bowel Prep: adequate     Complications: no immediate complications    DESCRIPTION OF PROCEDURE:     A time out was performed. After written informed consent was obtained, the patient was placed in the left lateral position. The perianal area was inspected, and a digital rectal exam was performed. A rectal exam was performed: normal tone, no palpable lesions. At this point, a forward viewing Olympus colonoscope was inserted into the anus and carefully advanced to the terminal ileum. The cecum was identified by the ileocecal valve and the appendiceal orifice. The colonoscope was then slowly withdrawn with careful inspection of the mucosa in a linear and circumferential fashion. The scope was retroflexed in the rectum.  Suction was utilized during the procedure to remove as much air as possible from the bowel. The colonoscope was removed from the patient, and the procedure was terminated. Findings are listed below. Findings: The mucosa appeared normal throughout the entire examined colon and terminal ileum. Random colon biopsies obtained throughout the colon to evaluate for microscopic colitis. Retroflexion in the rectum was normal and revealed no further abnormalities         Recommendations:  1. Repeat colonoscopy: pending pathology - 10 yrs for screening  2. Await biopsy results. 3.  Follow up with [facility] motility clinic as planned. Findings and recommendations were discussed w/ the patient. A copy of the images was provided.     Armand Saenz MD  2/17/2023  4:30 PM

## 2023-04-17 ENCOUNTER — OFFICE VISIT (OUTPATIENT)
Dept: PRIMARY CARE CLINIC | Age: 33
End: 2023-04-17
Payer: MEDICAID

## 2023-04-17 VITALS
WEIGHT: 111.8 LBS | DIASTOLIC BLOOD PRESSURE: 68 MMHG | HEART RATE: 103 BPM | BODY MASS INDEX: 21.95 KG/M2 | OXYGEN SATURATION: 99 % | SYSTOLIC BLOOD PRESSURE: 96 MMHG | HEIGHT: 60 IN

## 2023-04-17 DIAGNOSIS — K31.84 GASTROPARESIS: ICD-10-CM

## 2023-04-17 DIAGNOSIS — G90.A POTS (POSTURAL ORTHOSTATIC TACHYCARDIA SYNDROME): ICD-10-CM

## 2023-04-17 DIAGNOSIS — Z78.9 ON TOTAL PARENTERAL NUTRITION (TPN): ICD-10-CM

## 2023-04-17 DIAGNOSIS — Z93.4 JEJUNOSTOMY TUBE PRESENT (HCC): ICD-10-CM

## 2023-04-17 DIAGNOSIS — G90.8 AUTOIMMUNE AUTONOMIC NEUROPATHY: ICD-10-CM

## 2023-04-17 DIAGNOSIS — Z86.19 HX OF SEPSIS: ICD-10-CM

## 2023-04-17 DIAGNOSIS — G25.81 RLS (RESTLESS LEGS SYNDROME): Primary | ICD-10-CM

## 2023-04-17 PROBLEM — T82.7XXA: Status: RESOLVED | Noted: 2022-09-23 | Resolved: 2023-04-17

## 2023-04-17 PROCEDURE — 1036F TOBACCO NON-USER: CPT | Performed by: FAMILY MEDICINE

## 2023-04-17 PROCEDURE — G8420 CALC BMI NORM PARAMETERS: HCPCS | Performed by: FAMILY MEDICINE

## 2023-04-17 PROCEDURE — G8427 DOCREV CUR MEDS BY ELIG CLIN: HCPCS | Performed by: FAMILY MEDICINE

## 2023-04-17 PROCEDURE — 99214 OFFICE O/P EST MOD 30 MIN: CPT | Performed by: FAMILY MEDICINE

## 2023-04-17 RX ORDER — PRAMIPEXOLE DIHYDROCHLORIDE 0.25 MG/1
0.25 TABLET ORAL NIGHTLY
Qty: 30 TABLET | Refills: 0 | Status: SHIPPED | OUTPATIENT
Start: 2023-04-17

## 2023-04-17 RX ORDER — ONDANSETRON 4 MG/1
TABLET, FILM COATED ORAL
COMMUNITY
Start: 2023-04-05

## 2023-04-17 SDOH — ECONOMIC STABILITY: FOOD INSECURITY: WITHIN THE PAST 12 MONTHS, YOU WORRIED THAT YOUR FOOD WOULD RUN OUT BEFORE YOU GOT MONEY TO BUY MORE.: NEVER TRUE

## 2023-04-17 SDOH — ECONOMIC STABILITY: FOOD INSECURITY: WITHIN THE PAST 12 MONTHS, THE FOOD YOU BOUGHT JUST DIDN'T LAST AND YOU DIDN'T HAVE MONEY TO GET MORE.: NEVER TRUE

## 2023-04-17 SDOH — ECONOMIC STABILITY: INCOME INSECURITY: HOW HARD IS IT FOR YOU TO PAY FOR THE VERY BASICS LIKE FOOD, HOUSING, MEDICAL CARE, AND HEATING?: NOT HARD AT ALL

## 2023-04-17 SDOH — ECONOMIC STABILITY: HOUSING INSECURITY
IN THE LAST 12 MONTHS, WAS THERE A TIME WHEN YOU DID NOT HAVE A STEADY PLACE TO SLEEP OR SLEPT IN A SHELTER (INCLUDING NOW)?: NO

## 2023-04-17 ASSESSMENT — ENCOUNTER SYMPTOMS
VOMITING: 0
DIARRHEA: 1
EYES NEGATIVE: 1
BACK PAIN: 1
NAUSEA: 0
ABDOMINAL PAIN: 1
CONSTIPATION: 0
BLOOD IN STOOL: 0
RESPIRATORY NEGATIVE: 1

## 2023-04-17 NOTE — PROGRESS NOTES
sepsis    Continue present care and management  Continue all maintenance medications  Engage in regular exercise daily -30 minutes a day as tolerated  Stay well  hydrated   Continue TPN   Keep scheduled follow-up appt with me and other providers/specialists ([facility])  Call with new concerns     Return in about 4 weeks (around 5/15/2023) for follow-up recent visit, med check. All questions were answered. Medications, including possible adverse effects, and instructions were reviewed and  understanding was confirmed. Follow-up recommendations, including when to contact or return to office (ie; if symptoms worsen or fail to improve), were discussed and acknowledged.     Electronically signed by Johnna Mg MD on 4/17/23 at 1:01 PM CDT

## 2023-05-15 ENCOUNTER — OFFICE VISIT (OUTPATIENT)
Dept: PRIMARY CARE CLINIC | Age: 33
End: 2023-05-15
Payer: MEDICAID

## 2023-05-15 VITALS
DIASTOLIC BLOOD PRESSURE: 64 MMHG | BODY MASS INDEX: 21.4 KG/M2 | WEIGHT: 109 LBS | HEART RATE: 111 BPM | RESPIRATION RATE: 18 BRPM | OXYGEN SATURATION: 99 % | TEMPERATURE: 97.6 F | SYSTOLIC BLOOD PRESSURE: 94 MMHG | HEIGHT: 60 IN

## 2023-05-15 DIAGNOSIS — G25.81 RESTLESS LEGS SYNDROME (RLS): Primary | ICD-10-CM

## 2023-05-15 DIAGNOSIS — K31.84 GASTROPARESIS: ICD-10-CM

## 2023-05-15 DIAGNOSIS — F43.9 SITUATIONAL STRESS: ICD-10-CM

## 2023-05-15 DIAGNOSIS — K58.2 IRRITABLE BOWEL SYNDROME WITH BOTH CONSTIPATION AND DIARRHEA: ICD-10-CM

## 2023-05-15 PROCEDURE — 99213 OFFICE O/P EST LOW 20 MIN: CPT | Performed by: FAMILY MEDICINE

## 2023-05-15 PROCEDURE — G8420 CALC BMI NORM PARAMETERS: HCPCS | Performed by: FAMILY MEDICINE

## 2023-05-15 PROCEDURE — G8427 DOCREV CUR MEDS BY ELIG CLIN: HCPCS | Performed by: FAMILY MEDICINE

## 2023-05-15 PROCEDURE — 1036F TOBACCO NON-USER: CPT | Performed by: FAMILY MEDICINE

## 2023-05-15 RX ORDER — ROPINIROLE 0.5 MG/1
0.5 TABLET, FILM COATED ORAL 3 TIMES DAILY
Qty: 90 TABLET | Refills: 0 | Status: ON HOLD | OUTPATIENT
Start: 2023-05-15

## 2023-05-15 RX ORDER — CITALOPRAM 10 MG/1
10 TABLET ORAL DAILY
Qty: 30 TABLET | Refills: 0 | Status: ON HOLD | OUTPATIENT
Start: 2023-05-15

## 2023-05-15 NOTE — PROGRESS NOTES
reaction(s): GI Intolerance    Chugach Itching and Other (See Comments)     Other reaction(s): Vomiting    Orange Fruit [Citrus]      And artificial    Other      Cloraprep    Reglan [Metoclopramide]      Nausea,vomiting    Tramadol Other (See Comments)     Excessive sleeping x 3 days, pt takes 1/2 a pill. Orange Oil Nausea And Vomiting    Sulfamethoxazole-Trimethoprim Nausea And Vomiting       Medications:  No current facility-administered medications for this visit. No current outpatient medications on file.      Facility-Administered Medications Ordered in Other Visits   Medication Dose Route Frequency Provider Last Rate Last Admin    sodium chloride flush 0.9 % injection 5-40 mL  5-40 mL IntraVENous 2 times per day Denise Lathe, APRN - CNP   10 mL at 05/18/23 0840    sodium chloride flush 0.9 % injection 5-40 mL  5-40 mL IntraVENous PRN Denise Lathe, APRN - CNP        0.9 % sodium chloride infusion   IntraVENous PRN Denise Lathe, APRN - CNP        enoxaparin (LOVENOX) injection 40 mg  40 mg SubCUTAneous Nightly Denise Lathe, APRN - CNP   40 mg at 05/17/23 2056    acetaminophen (TYLENOL) tablet 650 mg  650 mg Oral Q6H PRN Denise Lathe, APRN - CNP   650 mg at 05/16/23 2259    Or    acetaminophen (TYLENOL) suppository 650 mg  650 mg Rectal Q6H PRN Denise Lathe, APRN - CNP        0.9 % sodium chloride infusion   IntraVENous Continuous Denise Lathe, APRN -  mL/hr at 05/17/23 1351 New Bag at 05/17/23 1351    citalopram (CELEXA) tablet 10 mg  10 mg Oral Daily Denise Lathe, APRN - CNP   10 mg at 05/18/23 0840    dicyclomine (BENTYL) capsule 10 mg  10 mg Oral BID Denise Lathe, APRN - CNP   10 mg at 05/18/23 0840    famotidine (PEPCID) injection 20 mg  20 mg IntraVENous Daily Denise Lathe, APRN - CNP   20 mg at 05/18/23 0840    hyoscyamine (LEVSIN/SL) sublingual tablet 125 mcg  125 mcg Oral Q6H PRN Denise Lathe, APRN - CNP        rOPINIRole (REQUIP) tablet 0.5 mg no

## 2023-05-16 ENCOUNTER — HOSPITAL ENCOUNTER (INPATIENT)
Age: 33
LOS: 7 days | Discharge: HOME | DRG: 314 | End: 2023-05-23
Attending: PEDIATRICS | Admitting: STUDENT IN AN ORGANIZED HEALTH CARE EDUCATION/TRAINING PROGRAM
Payer: MEDICAID

## 2023-05-16 ENCOUNTER — APPOINTMENT (OUTPATIENT)
Dept: GENERAL RADIOLOGY | Age: 33
DRG: 314 | End: 2023-05-16
Payer: MEDICAID

## 2023-05-16 DIAGNOSIS — R00.0 TACHYCARDIA: ICD-10-CM

## 2023-05-16 DIAGNOSIS — J18.9 PNEUMONIA OF RIGHT MIDDLE LOBE DUE TO INFECTIOUS ORGANISM: ICD-10-CM

## 2023-05-16 DIAGNOSIS — A41.9 SEPSIS, DUE TO UNSPECIFIED ORGANISM, UNSPECIFIED WHETHER ACUTE ORGAN DYSFUNCTION PRESENT (HCC): Primary | ICD-10-CM

## 2023-05-16 PROBLEM — R65.10 SIRS (SYSTEMIC INFLAMMATORY RESPONSE SYNDROME) (HCC): Status: ACTIVE | Noted: 2023-05-16

## 2023-05-16 LAB
ALBUMIN SERPL-MCNC: 3.5 G/DL (ref 3.5–5.2)
ALP SERPL-CCNC: 75 U/L (ref 35–104)
ALT SERPL-CCNC: 27 U/L (ref 5–33)
ANION GAP SERPL CALCULATED.3IONS-SCNC: 13 MMOL/L (ref 7–19)
AST SERPL-CCNC: 31 U/L (ref 5–32)
B PARAP IS1001 DNA NPH QL NAA+NON-PROBE: NOT DETECTED
B PERT.PT PRMT NPH QL NAA+NON-PROBE: NOT DETECTED
BASOPHILS # BLD: 0 K/UL (ref 0–0.2)
BASOPHILS NFR BLD: 0.2 % (ref 0–1)
BILIRUB SERPL-MCNC: 0.3 MG/DL (ref 0.2–1.2)
BILIRUB UR QL STRIP: NEGATIVE
BUN SERPL-MCNC: 13 MG/DL (ref 6–20)
C PNEUM DNA NPH QL NAA+NON-PROBE: NOT DETECTED
CALCIUM SERPL-MCNC: 8.8 MG/DL (ref 8.6–10)
CHLORIDE SERPL-SCNC: 102 MMOL/L (ref 98–111)
CLARITY UR: CLEAR
CO2 SERPL-SCNC: 21 MMOL/L (ref 22–29)
COLOR UR: YELLOW
CREAT SERPL-MCNC: 0.8 MG/DL (ref 0.5–0.9)
EKG P AXIS: NORMAL DEGREES
EKG P-R INTERVAL: NORMAL MS
EKG Q-T INTERVAL: 266 MS
EKG QRS DURATION: 58 MS
EKG QTC CALCULATION (BAZETT): 393 MS
EKG T AXIS: 120 DEGREES
EOSINOPHIL # BLD: 0 K/UL (ref 0–0.6)
EOSINOPHIL NFR BLD: 0.1 % (ref 0–5)
ERYTHROCYTE [DISTWIDTH] IN BLOOD BY AUTOMATED COUNT: 12.8 % (ref 11.5–14.5)
FLUAV RNA NPH QL NAA+NON-PROBE: NOT DETECTED
FLUBV RNA NPH QL NAA+NON-PROBE: NOT DETECTED
GLUCOSE SERPL-MCNC: 113 MG/DL (ref 74–109)
GLUCOSE UR STRIP.AUTO-MCNC: NEGATIVE MG/DL
HADV DNA NPH QL NAA+NON-PROBE: NOT DETECTED
HCOV 229E RNA NPH QL NAA+NON-PROBE: NOT DETECTED
HCOV HKU1 RNA NPH QL NAA+NON-PROBE: NOT DETECTED
HCOV NL63 RNA NPH QL NAA+NON-PROBE: NOT DETECTED
HCOV OC43 RNA NPH QL NAA+NON-PROBE: NOT DETECTED
HCT VFR BLD AUTO: 31.1 % (ref 37–47)
HGB BLD-MCNC: 10.5 G/DL (ref 12–16)
HGB UR STRIP.AUTO-MCNC: NEGATIVE MG/L
HMPV RNA NPH QL NAA+NON-PROBE: NOT DETECTED
HPIV1 RNA NPH QL NAA+NON-PROBE: NOT DETECTED
HPIV2 RNA NPH QL NAA+NON-PROBE: NOT DETECTED
HPIV3 RNA NPH QL NAA+NON-PROBE: NOT DETECTED
HPIV4 RNA NPH QL NAA+NON-PROBE: NOT DETECTED
IMM GRANULOCYTES # BLD: 0.1 K/UL
KETONES UR STRIP.AUTO-MCNC: NEGATIVE MG/DL
LACTATE BLDV-SCNC: 2.1 MMOL/L (ref 0.5–1.9)
LACTATE BLDV-SCNC: 3.1 MG/DL (ref 0.5–1.9)
LACTATE BLDV-SCNC: 3.1 MMOL/L (ref 0.5–1.9)
LEUKOCYTE ESTERASE UR QL STRIP.AUTO: NEGATIVE
LYMPHOCYTES # BLD: 0.4 K/UL (ref 1.1–4.5)
LYMPHOCYTES NFR BLD: 3.1 % (ref 20–40)
M PNEUMO DNA NPH QL NAA+NON-PROBE: NOT DETECTED
MAGNESIUM SERPL-MCNC: 1.7 MG/DL (ref 1.6–2.6)
MCH RBC QN AUTO: 29 PG (ref 27–31)
MCHC RBC AUTO-ENTMCNC: 33.8 G/DL (ref 33–37)
MCV RBC AUTO: 85.9 FL (ref 81–99)
MONOCYTES # BLD: 0.5 K/UL (ref 0–0.9)
MONOCYTES NFR BLD: 3.5 % (ref 0–10)
NEUTROPHILS # BLD: 13.1 K/UL (ref 1.5–7.5)
NEUTS SEG NFR BLD: 92.6 % (ref 50–65)
NITRITE UR QL STRIP.AUTO: NEGATIVE
PH UR STRIP.AUTO: 6.5 [PH] (ref 5–8)
PLATELET # BLD AUTO: 321 K/UL (ref 130–400)
PMV BLD AUTO: 8.5 FL (ref 9.4–12.3)
POTASSIUM SERPL-SCNC: 3.8 MMOL/L (ref 3.5–5)
PROT SERPL-MCNC: 7.7 G/DL (ref 6.6–8.7)
PROT UR STRIP.AUTO-MCNC: NEGATIVE MG/DL
RBC # BLD AUTO: 3.62 M/UL (ref 4.2–5.4)
RSV RNA NPH QL NAA+NON-PROBE: NOT DETECTED
RV+EV RNA NPH QL NAA+NON-PROBE: NOT DETECTED
SARS-COV-2 RNA NPH QL NAA+NON-PROBE: NOT DETECTED
SODIUM SERPL-SCNC: 136 MMOL/L (ref 136–145)
SP GR UR STRIP.AUTO: 1.01 (ref 1–1.03)
TROPONIN T SERPL-MCNC: <0.01 NG/ML (ref 0–0.03)
TSH SERPL DL<=0.005 MIU/L-ACNC: 1.14 UIU/ML (ref 0.35–5.5)
UROBILINOGEN UR STRIP.AUTO-MCNC: 0.2 E.U./DL
WBC # BLD AUTO: 14.1 K/UL (ref 4.8–10.8)

## 2023-05-16 PROCEDURE — 96365 THER/PROPH/DIAG IV INF INIT: CPT

## 2023-05-16 PROCEDURE — 84484 ASSAY OF TROPONIN QUANT: CPT

## 2023-05-16 PROCEDURE — 83605 ASSAY OF LACTIC ACID: CPT

## 2023-05-16 PROCEDURE — 6360000002 HC RX W HCPCS

## 2023-05-16 PROCEDURE — 93010 ELECTROCARDIOGRAM REPORT: CPT | Performed by: INTERNAL MEDICINE

## 2023-05-16 PROCEDURE — 36415 COLL VENOUS BLD VENIPUNCTURE: CPT

## 2023-05-16 PROCEDURE — 2500000003 HC RX 250 WO HCPCS

## 2023-05-16 PROCEDURE — 83735 ASSAY OF MAGNESIUM: CPT

## 2023-05-16 PROCEDURE — 6370000000 HC RX 637 (ALT 250 FOR IP)

## 2023-05-16 PROCEDURE — 85025 COMPLETE CBC W/AUTO DIFF WBC: CPT

## 2023-05-16 PROCEDURE — 96375 TX/PRO/DX INJ NEW DRUG ADDON: CPT

## 2023-05-16 PROCEDURE — 0202U NFCT DS 22 TRGT SARS-COV-2: CPT

## 2023-05-16 PROCEDURE — 84443 ASSAY THYROID STIM HORMONE: CPT

## 2023-05-16 PROCEDURE — 81003 URINALYSIS AUTO W/O SCOPE: CPT

## 2023-05-16 PROCEDURE — 99285 EMERGENCY DEPT VISIT HI MDM: CPT

## 2023-05-16 PROCEDURE — 71045 X-RAY EXAM CHEST 1 VIEW: CPT

## 2023-05-16 PROCEDURE — 87040 BLOOD CULTURE FOR BACTERIA: CPT

## 2023-05-16 PROCEDURE — 87186 SC STD MICRODIL/AGAR DIL: CPT

## 2023-05-16 PROCEDURE — 6360000002 HC RX W HCPCS: Performed by: PEDIATRICS

## 2023-05-16 PROCEDURE — 87150 DNA/RNA AMPLIFIED PROBE: CPT

## 2023-05-16 PROCEDURE — 1210000000 HC MED SURG R&B

## 2023-05-16 PROCEDURE — 96367 TX/PROPH/DG ADDL SEQ IV INF: CPT

## 2023-05-16 PROCEDURE — 80053 COMPREHEN METABOLIC PANEL: CPT

## 2023-05-16 PROCEDURE — 2580000003 HC RX 258

## 2023-05-16 PROCEDURE — 93005 ELECTROCARDIOGRAM TRACING: CPT | Performed by: PEDIATRICS

## 2023-05-16 PROCEDURE — 2580000003 HC RX 258: Performed by: PEDIATRICS

## 2023-05-16 RX ORDER — ONDANSETRON 2 MG/ML
4 INJECTION INTRAMUSCULAR; INTRAVENOUS ONCE
Status: COMPLETED | OUTPATIENT
Start: 2023-05-16 | End: 2023-05-16

## 2023-05-16 RX ORDER — SODIUM CHLORIDE 9 MG/ML
INJECTION, SOLUTION INTRAVENOUS CONTINUOUS
Status: DISCONTINUED | OUTPATIENT
Start: 2023-05-16 | End: 2023-05-23 | Stop reason: HOSPADM

## 2023-05-16 RX ORDER — ACETAMINOPHEN 325 MG/1
650 TABLET ORAL EVERY 6 HOURS PRN
Status: DISCONTINUED | OUTPATIENT
Start: 2023-05-16 | End: 2023-05-23 | Stop reason: HOSPADM

## 2023-05-16 RX ORDER — HYDROMORPHONE HYDROCHLORIDE 1 MG/ML
0.5 INJECTION, SOLUTION INTRAMUSCULAR; INTRAVENOUS; SUBCUTANEOUS ONCE
Status: COMPLETED | OUTPATIENT
Start: 2023-05-16 | End: 2023-05-16

## 2023-05-16 RX ORDER — ACETAMINOPHEN 650 MG/1
650 SUPPOSITORY RECTAL EVERY 6 HOURS PRN
Status: DISCONTINUED | OUTPATIENT
Start: 2023-05-16 | End: 2023-05-23 | Stop reason: HOSPADM

## 2023-05-16 RX ORDER — SODIUM CHLORIDE 0.9 % (FLUSH) 0.9 %
5-40 SYRINGE (ML) INJECTION PRN
Status: DISCONTINUED | OUTPATIENT
Start: 2023-05-16 | End: 2023-05-22 | Stop reason: SDUPTHER

## 2023-05-16 RX ORDER — 0.9 % SODIUM CHLORIDE 0.9 %
30 INTRAVENOUS SOLUTION INTRAVENOUS ONCE
Status: COMPLETED | OUTPATIENT
Start: 2023-05-16 | End: 2023-05-16

## 2023-05-16 RX ORDER — ONDANSETRON 2 MG/ML
4 INJECTION INTRAMUSCULAR; INTRAVENOUS EVERY 6 HOURS PRN
Status: DISCONTINUED | OUTPATIENT
Start: 2023-05-16 | End: 2023-05-23 | Stop reason: HOSPADM

## 2023-05-16 RX ORDER — BACLOFEN 10 MG/1
5 TABLET ORAL 2 TIMES DAILY
Status: DISCONTINUED | OUTPATIENT
Start: 2023-05-16 | End: 2023-05-23 | Stop reason: HOSPADM

## 2023-05-16 RX ORDER — ROPINIROLE 0.5 MG/1
0.5 TABLET, FILM COATED ORAL 3 TIMES DAILY
Status: DISCONTINUED | OUTPATIENT
Start: 2023-05-16 | End: 2023-05-23 | Stop reason: HOSPADM

## 2023-05-16 RX ORDER — DICYCLOMINE HYDROCHLORIDE 10 MG/1
10 CAPSULE ORAL 2 TIMES DAILY
Status: DISCONTINUED | OUTPATIENT
Start: 2023-05-16 | End: 2023-05-23 | Stop reason: HOSPADM

## 2023-05-16 RX ORDER — SODIUM CHLORIDE 0.9 % (FLUSH) 0.9 %
5-40 SYRINGE (ML) INJECTION EVERY 12 HOURS SCHEDULED
Status: DISCONTINUED | OUTPATIENT
Start: 2023-05-16 | End: 2023-05-22 | Stop reason: SDUPTHER

## 2023-05-16 RX ORDER — SODIUM CHLORIDE 9 MG/ML
INJECTION, SOLUTION INTRAVENOUS PRN
Status: DISCONTINUED | OUTPATIENT
Start: 2023-05-16 | End: 2023-05-22 | Stop reason: SDUPTHER

## 2023-05-16 RX ORDER — ENOXAPARIN SODIUM 100 MG/ML
40 INJECTION SUBCUTANEOUS NIGHTLY
Status: DISCONTINUED | OUTPATIENT
Start: 2023-05-16 | End: 2023-05-19

## 2023-05-16 RX ORDER — CITALOPRAM 10 MG/1
10 TABLET ORAL DAILY
Status: DISCONTINUED | OUTPATIENT
Start: 2023-05-16 | End: 2023-05-23 | Stop reason: HOSPADM

## 2023-05-16 RX ORDER — FAMOTIDINE 10 MG/ML
20 INJECTION, SOLUTION INTRAVENOUS DAILY
Status: DISCONTINUED | OUTPATIENT
Start: 2023-05-16 | End: 2023-05-23 | Stop reason: HOSPADM

## 2023-05-16 RX ADMIN — ENOXAPARIN SODIUM 40 MG: 100 INJECTION SUBCUTANEOUS at 22:59

## 2023-05-16 RX ADMIN — ROPINIROLE HYDROCHLORIDE 0.5 MG: 0.5 TABLET, FILM COATED ORAL at 22:59

## 2023-05-16 RX ADMIN — SODIUM CHLORIDE 1482 ML: 9 INJECTION, SOLUTION INTRAVENOUS at 14:24

## 2023-05-16 RX ADMIN — SODIUM CHLORIDE: 9 INJECTION, SOLUTION INTRAVENOUS at 23:01

## 2023-05-16 RX ADMIN — ONDANSETRON HYDROCHLORIDE 4 MG: 2 INJECTION, SOLUTION INTRAMUSCULAR; INTRAVENOUS at 22:58

## 2023-05-16 RX ADMIN — DICYCLOMINE HYDROCHLORIDE 10 MG: 10 CAPSULE ORAL at 22:59

## 2023-05-16 RX ADMIN — FAMOTIDINE 20 MG: 10 INJECTION, SOLUTION INTRAVENOUS at 22:58

## 2023-05-16 RX ADMIN — HYDROMORPHONE HYDROCHLORIDE 0.5 MG: 1 INJECTION, SOLUTION INTRAMUSCULAR; INTRAVENOUS; SUBCUTANEOUS at 14:18

## 2023-05-16 RX ADMIN — SODIUM CHLORIDE, PRESERVATIVE FREE 10 ML: 5 INJECTION INTRAVENOUS at 23:09

## 2023-05-16 RX ADMIN — ONDANSETRON 4 MG: 2 INJECTION INTRAMUSCULAR; INTRAVENOUS at 14:18

## 2023-05-16 RX ADMIN — ACETAMINOPHEN 650 MG: 325 TABLET ORAL at 22:59

## 2023-05-16 RX ADMIN — Medication 1000 MG: at 15:53

## 2023-05-16 RX ADMIN — CITALOPRAM HYDROBROMIDE 10 MG: 10 TABLET, FILM COATED ORAL at 22:59

## 2023-05-16 RX ADMIN — CEFEPIME 1000 MG: 1 INJECTION, POWDER, FOR SOLUTION INTRAMUSCULAR; INTRAVENOUS at 14:26

## 2023-05-16 RX ADMIN — CEFEPIME 2000 MG: 2 INJECTION, POWDER, FOR SOLUTION INTRAVENOUS at 23:03

## 2023-05-16 RX ADMIN — BACLOFEN 5 MG: 10 TABLET ORAL at 22:58

## 2023-05-16 ASSESSMENT — PAIN SCALES - GENERAL
PAINLEVEL_OUTOF10: 8
PAINLEVEL_OUTOF10: 10
PAINLEVEL_OUTOF10: 5
PAINLEVEL_OUTOF10: 5

## 2023-05-16 ASSESSMENT — ENCOUNTER SYMPTOMS
RHINORRHEA: 0
ABDOMINAL PAIN: 0
NAUSEA: 1
SHORTNESS OF BREATH: 0
NAUSEA: 0
VOMITING: 0
BACK PAIN: 1
RESPIRATORY NEGATIVE: 1
DIARRHEA: 0
COUGH: 0
EYE DISCHARGE: 0

## 2023-05-16 ASSESSMENT — PAIN DESCRIPTION - LOCATION
LOCATION: HEAD
LOCATION: HEAD

## 2023-05-16 ASSESSMENT — PAIN DESCRIPTION - PAIN TYPE: TYPE: ACUTE PAIN

## 2023-05-16 ASSESSMENT — PAIN DESCRIPTION - FREQUENCY: FREQUENCY: CONTINUOUS

## 2023-05-16 ASSESSMENT — PAIN - FUNCTIONAL ASSESSMENT
PAIN_FUNCTIONAL_ASSESSMENT: PREVENTS OR INTERFERES SOME ACTIVE ACTIVITIES AND ADLS
PAIN_FUNCTIONAL_ASSESSMENT: 0-10

## 2023-05-16 ASSESSMENT — PAIN DESCRIPTION - DESCRIPTORS
DESCRIPTORS: ACHING
DESCRIPTORS: ACHING

## 2023-05-16 ASSESSMENT — PAIN DESCRIPTION - ORIENTATION: ORIENTATION: RIGHT;LEFT

## 2023-05-17 PROBLEM — R78.81 GRAM-POSITIVE COCCI BACTEREMIA: Status: ACTIVE | Noted: 2023-05-17

## 2023-05-17 PROBLEM — A41.1 SEPSIS DUE TO COAGULASE-NEGATIVE STAPHYLOCOCCAL INFECTION (HCC): Status: ACTIVE | Noted: 2023-05-16

## 2023-05-17 LAB
A BAUMANNII DNA BLD POS QL NAA+NON-PROBE: NOT DETECTED
ANION GAP SERPL CALCULATED.3IONS-SCNC: 9 MMOL/L (ref 7–19)
BASOPHILS # BLD: 0 K/UL (ref 0–0.2)
BASOPHILS NFR BLD: 0.2 % (ref 0–1)
BUN SERPL-MCNC: 9 MG/DL (ref 6–20)
C ALBICANS DNA BLD POS QL NAA+NON-PROBE: NOT DETECTED
C AURIS DNA BLD POS QL NAA+PROBE: NOT DETECTED
C GLABRATA DNA BLD POS QL NAA+NON-PROBE: NOT DETECTED
C KRUSEI DNA BLD POS QL NAA+NON-PROBE: NOT DETECTED
C PARAP DNA BLD POS QL NAA+NON-PROBE: NOT DETECTED
C TROPICLS DNA BLD POS QL NAA+NON-PROBE: NOT DETECTED
CALCIUM SERPL-MCNC: 8.3 MG/DL (ref 8.6–10)
CHLORIDE SERPL-SCNC: 103 MMOL/L (ref 98–111)
CO2 SERPL-SCNC: 21 MMOL/L (ref 22–29)
CREAT SERPL-MCNC: 0.8 MG/DL (ref 0.5–0.9)
CRYPTOCOCCUS NEOFORMANS/GATTII BY PCR: NOT DETECTED
E CLOAC COMP DNA BLD POS NAA+NON-PROBE: NOT DETECTED
E COLI DNA BLD POS QL NAA+NON-PROBE: NOT DETECTED
E FAECALIS DNA BLD POS QL NAA+PROBE: NOT DETECTED
E FAECIUM DNA BLD POS QL NAA+PROBE: NOT DETECTED
ENTEROBACT DNA BLD POS QL NAA+NON-PROBE: NOT DETECTED
ENTEROCOC DNA BLD POS QL NAA+NON-PROBE: NOT DETECTED
EOSINOPHIL # BLD: 0 K/UL (ref 0–0.6)
EOSINOPHIL NFR BLD: 0.1 % (ref 0–5)
ERYTHROCYTE [DISTWIDTH] IN BLOOD BY AUTOMATED COUNT: 13 % (ref 11.5–14.5)
GLUCOSE SERPL-MCNC: 108 MG/DL (ref 74–109)
GN BLD CULTURE PNL BLD POS NAA+PROBE: NOT DETECTED
GP B STREP DNA BLD POS QL NAA+NON-PROBE: NOT DETECTED
HCT VFR BLD AUTO: 28.1 % (ref 37–47)
HGB BLD-MCNC: 9.4 G/DL (ref 12–16)
IMM GRANULOCYTES # BLD: 0.1 K/UL
K OXYTOCA DNA BLD POS QL NAA+NON-PROBE: NOT DETECTED
K PNEUMON DNA SPEC QL NAA+PROBE: NOT DETECTED
K. AEROGENES DNA SPEC QL NAA+PROBE: NOT DETECTED
L MONOCYTOG DNA BLD POS QL NAA+NON-PROBE: NOT DETECTED
LACTATE BLDV-SCNC: 0.7 MMOL/L (ref 0.5–1.9)
LACTATE BLDV-SCNC: 2.3 MG/DL (ref 0.5–1.9)
LYMPHOCYTES # BLD: 1.5 K/UL (ref 1.1–4.5)
LYMPHOCYTES NFR BLD: 11.4 % (ref 20–40)
MCH RBC QN AUTO: 28.8 PG (ref 27–31)
MCHC RBC AUTO-ENTMCNC: 33.5 G/DL (ref 33–37)
MCV RBC AUTO: 86.2 FL (ref 81–99)
MECA BLD POS QL NAA+NON-PROBE: DETECTED
MONOCYTES # BLD: 0.5 K/UL (ref 0–0.9)
MONOCYTES NFR BLD: 4 % (ref 0–10)
N MEN DNA BLD POS QL NAA+NON-PROBE: NOT DETECTED
NEUTROPHILS # BLD: 11.2 K/UL (ref 1.5–7.5)
NEUTS SEG NFR BLD: 83.9 % (ref 50–65)
P AERUGINOSA DNA BLD POS NAA+NON-PROBE: NOT DETECTED
PLATELET # BLD AUTO: 281 K/UL (ref 130–400)
PMV BLD AUTO: 8.7 FL (ref 9.4–12.3)
POTASSIUM SERPL-SCNC: 4.2 MMOL/L (ref 3.5–5)
PROCALCITONIN: 15.5 NG/ML (ref 0–0.09)
PROTEUS SP DNA BLD POS QL NAA+NON-PROBE: NOT DETECTED
RBC # BLD AUTO: 3.26 M/UL (ref 4.2–5.4)
S AUREUS DNA BLD POS QL NAA+NON-PROBE: NOT DETECTED
S AUREUS+CONS DNA BLD POS NAA+NON-PROBE: DETECTED
S EPIDERMIDIS DNA BLD POS QL NAA+PROBE: DETECTED
S LUGDUNENSIS DNA BLD POS QL NAA+PROBE: NOT DETECTED
S MALTOPH DNA BLD POS QL NAA+PROBE: NOT DETECTED
S MARCESCENS DNA BLD POS NAA+NON-PROBE: NOT DETECTED
S PNEUM DNA BLD POS QL NAA+NON-PROBE: NOT DETECTED
S PYO DNA BLD POS QL NAA+NON-PROBE: NOT DETECTED
SALMONELLA DNA BLD POS QL NAA+PROBE: NOT DETECTED
SODIUM SERPL-SCNC: 133 MMOL/L (ref 136–145)
STREPTOCOCCUS DNA BLD POS NAA+NON-PROBE: NOT DETECTED
WBC # BLD AUTO: 13.4 K/UL (ref 4.8–10.8)

## 2023-05-17 PROCEDURE — 6360000002 HC RX W HCPCS

## 2023-05-17 PROCEDURE — 1210000000 HC MED SURG R&B

## 2023-05-17 PROCEDURE — 83605 ASSAY OF LACTIC ACID: CPT

## 2023-05-17 PROCEDURE — 6370000000 HC RX 637 (ALT 250 FOR IP)

## 2023-05-17 PROCEDURE — 36415 COLL VENOUS BLD VENIPUNCTURE: CPT

## 2023-05-17 PROCEDURE — 85025 COMPLETE CBC W/AUTO DIFF WBC: CPT

## 2023-05-17 PROCEDURE — 84145 PROCALCITONIN (PCT): CPT

## 2023-05-17 PROCEDURE — 80048 BASIC METABOLIC PNL TOTAL CA: CPT

## 2023-05-17 PROCEDURE — 2580000003 HC RX 258

## 2023-05-17 PROCEDURE — 2500000003 HC RX 250 WO HCPCS

## 2023-05-17 RX ADMIN — ONDANSETRON HYDROCHLORIDE 4 MG: 2 INJECTION, SOLUTION INTRAMUSCULAR; INTRAVENOUS at 21:01

## 2023-05-17 RX ADMIN — ROPINIROLE HYDROCHLORIDE 0.5 MG: 0.5 TABLET, FILM COATED ORAL at 20:56

## 2023-05-17 RX ADMIN — BACLOFEN 5 MG: 10 TABLET ORAL at 20:56

## 2023-05-17 RX ADMIN — BACLOFEN 5 MG: 10 TABLET ORAL at 08:13

## 2023-05-17 RX ADMIN — ROPINIROLE HYDROCHLORIDE 0.5 MG: 0.5 TABLET, FILM COATED ORAL at 13:52

## 2023-05-17 RX ADMIN — VANCOMYCIN HYDROCHLORIDE 750 MG: 750 INJECTION, POWDER, LYOPHILIZED, FOR SOLUTION INTRAVENOUS at 02:40

## 2023-05-17 RX ADMIN — ROPINIROLE HYDROCHLORIDE 0.5 MG: 0.5 TABLET, FILM COATED ORAL at 08:14

## 2023-05-17 RX ADMIN — ENOXAPARIN SODIUM 40 MG: 100 INJECTION SUBCUTANEOUS at 20:56

## 2023-05-17 RX ADMIN — DICYCLOMINE HYDROCHLORIDE 10 MG: 10 CAPSULE ORAL at 20:56

## 2023-05-17 RX ADMIN — ONDANSETRON HYDROCHLORIDE 4 MG: 2 INJECTION, SOLUTION INTRAMUSCULAR; INTRAVENOUS at 08:13

## 2023-05-17 RX ADMIN — SODIUM CHLORIDE, PRESERVATIVE FREE 10 ML: 5 INJECTION INTRAVENOUS at 08:19

## 2023-05-17 RX ADMIN — ONDANSETRON HYDROCHLORIDE 4 MG: 2 INJECTION, SOLUTION INTRAMUSCULAR; INTRAVENOUS at 14:00

## 2023-05-17 RX ADMIN — CITALOPRAM HYDROBROMIDE 10 MG: 10 TABLET, FILM COATED ORAL at 08:14

## 2023-05-17 RX ADMIN — VANCOMYCIN HYDROCHLORIDE 750 MG: 750 INJECTION, POWDER, LYOPHILIZED, FOR SOLUTION INTRAVENOUS at 13:52

## 2023-05-17 RX ADMIN — CEFEPIME 2000 MG: 2 INJECTION, POWDER, FOR SOLUTION INTRAVENOUS at 16:08

## 2023-05-17 RX ADMIN — FAMOTIDINE 20 MG: 10 INJECTION, SOLUTION INTRAVENOUS at 08:13

## 2023-05-17 RX ADMIN — CEFEPIME 2000 MG: 2 INJECTION, POWDER, FOR SOLUTION INTRAVENOUS at 05:15

## 2023-05-17 RX ADMIN — DICYCLOMINE HYDROCHLORIDE 10 MG: 10 CAPSULE ORAL at 08:14

## 2023-05-17 RX ADMIN — SODIUM CHLORIDE: 9 INJECTION, SOLUTION INTRAVENOUS at 13:51

## 2023-05-17 ASSESSMENT — PAIN - FUNCTIONAL ASSESSMENT: PAIN_FUNCTIONAL_ASSESSMENT: ACTIVITIES ARE NOT PREVENTED

## 2023-05-17 ASSESSMENT — PAIN DESCRIPTION - LOCATION: LOCATION: HEAD

## 2023-05-17 ASSESSMENT — ENCOUNTER SYMPTOMS
ABDOMINAL PAIN: 0
SHORTNESS OF BREATH: 0
NAUSEA: 0
COUGH: 0

## 2023-05-17 ASSESSMENT — PAIN SCALES - GENERAL
PAINLEVEL_OUTOF10: 5
PAINLEVEL_OUTOF10: 0

## 2023-05-17 ASSESSMENT — PAIN DESCRIPTION - DESCRIPTORS: DESCRIPTORS: THROBBING

## 2023-05-18 LAB
ANION GAP SERPL CALCULATED.3IONS-SCNC: 12 MMOL/L (ref 7–19)
BACTERIA BLD CULT ORG #2: ABNORMAL
BASOPHILS # BLD: 0 K/UL (ref 0–0.2)
BASOPHILS NFR BLD: 0.4 % (ref 0–1)
BUN SERPL-MCNC: 6 MG/DL (ref 6–20)
CALCIUM SERPL-MCNC: 8.4 MG/DL (ref 8.6–10)
CHLORIDE SERPL-SCNC: 104 MMOL/L (ref 98–111)
CO2 SERPL-SCNC: 20 MMOL/L (ref 22–29)
CREAT SERPL-MCNC: 0.8 MG/DL (ref 0.5–0.9)
EOSINOPHIL # BLD: 0.1 K/UL (ref 0–0.6)
EOSINOPHIL NFR BLD: 0.8 % (ref 0–5)
ERYTHROCYTE [DISTWIDTH] IN BLOOD BY AUTOMATED COUNT: 12.9 % (ref 11.5–14.5)
GLUCOSE SERPL-MCNC: 91 MG/DL (ref 74–109)
HCT VFR BLD AUTO: 28.3 % (ref 37–47)
HGB BLD-MCNC: 9.2 G/DL (ref 12–16)
IMM GRANULOCYTES # BLD: 0 K/UL
LYMPHOCYTES # BLD: 1.9 K/UL (ref 1.1–4.5)
LYMPHOCYTES NFR BLD: 20.6 % (ref 20–40)
MCH RBC QN AUTO: 28.7 PG (ref 27–31)
MCHC RBC AUTO-ENTMCNC: 32.5 G/DL (ref 33–37)
MCV RBC AUTO: 88.2 FL (ref 81–99)
MONOCYTES # BLD: 0.6 K/UL (ref 0–0.9)
MONOCYTES NFR BLD: 6.8 % (ref 0–10)
NEUTROPHILS # BLD: 6.4 K/UL (ref 1.5–7.5)
NEUTS SEG NFR BLD: 71 % (ref 50–65)
ORGANISM: ABNORMAL
PLATELET # BLD AUTO: 305 K/UL (ref 130–400)
PMV BLD AUTO: 9 FL (ref 9.4–12.3)
POTASSIUM SERPL-SCNC: 4.1 MMOL/L (ref 3.5–5)
RBC # BLD AUTO: 3.21 M/UL (ref 4.2–5.4)
SODIUM SERPL-SCNC: 136 MMOL/L (ref 136–145)
VANCOMYCIN TROUGH SERPL-MCNC: 9.4 UG/ML (ref 10–20)
WBC # BLD AUTO: 9.1 K/UL (ref 4.8–10.8)

## 2023-05-18 PROCEDURE — 36415 COLL VENOUS BLD VENIPUNCTURE: CPT

## 2023-05-18 PROCEDURE — 80048 BASIC METABOLIC PNL TOTAL CA: CPT

## 2023-05-18 PROCEDURE — 2580000003 HC RX 258

## 2023-05-18 PROCEDURE — 6370000000 HC RX 637 (ALT 250 FOR IP)

## 2023-05-18 PROCEDURE — 85025 COMPLETE CBC W/AUTO DIFF WBC: CPT

## 2023-05-18 PROCEDURE — 94760 N-INVAS EAR/PLS OXIMETRY 1: CPT

## 2023-05-18 PROCEDURE — 87040 BLOOD CULTURE FOR BACTERIA: CPT

## 2023-05-18 PROCEDURE — 6360000002 HC RX W HCPCS

## 2023-05-18 PROCEDURE — 80202 ASSAY OF VANCOMYCIN: CPT

## 2023-05-18 PROCEDURE — 1210000000 HC MED SURG R&B

## 2023-05-18 PROCEDURE — 2500000003 HC RX 250 WO HCPCS

## 2023-05-18 RX ORDER — SODIUM CHLORIDE 0.9 % (FLUSH) 0.9 %
5-40 SYRINGE (ML) INJECTION PRN
Status: DISCONTINUED | OUTPATIENT
Start: 2023-05-18 | End: 2023-05-23 | Stop reason: HOSPADM

## 2023-05-18 RX ORDER — SODIUM CHLORIDE 0.9 % (FLUSH) 0.9 %
5-40 SYRINGE (ML) INJECTION EVERY 12 HOURS SCHEDULED
Status: DISCONTINUED | OUTPATIENT
Start: 2023-05-18 | End: 2023-05-23 | Stop reason: HOSPADM

## 2023-05-18 RX ORDER — LIDOCAINE HYDROCHLORIDE 10 MG/ML
5 INJECTION, SOLUTION EPIDURAL; INFILTRATION; INTRACAUDAL; PERINEURAL ONCE
Status: DISCONTINUED | OUTPATIENT
Start: 2023-05-18 | End: 2023-05-23 | Stop reason: ALTCHOICE

## 2023-05-18 RX ORDER — SODIUM CHLORIDE 9 MG/ML
25 INJECTION, SOLUTION INTRAVENOUS PRN
Status: DISCONTINUED | OUTPATIENT
Start: 2023-05-18 | End: 2023-05-23 | Stop reason: HOSPADM

## 2023-05-18 RX ADMIN — VANCOMYCIN HYDROCHLORIDE 750 MG: 750 INJECTION, POWDER, LYOPHILIZED, FOR SOLUTION INTRAVENOUS at 02:36

## 2023-05-18 RX ADMIN — ROPINIROLE HYDROCHLORIDE 0.5 MG: 0.5 TABLET, FILM COATED ORAL at 19:39

## 2023-05-18 RX ADMIN — BACLOFEN 5 MG: 10 TABLET ORAL at 08:40

## 2023-05-18 RX ADMIN — CITALOPRAM HYDROBROMIDE 10 MG: 10 TABLET, FILM COATED ORAL at 08:40

## 2023-05-18 RX ADMIN — ROPINIROLE HYDROCHLORIDE 0.5 MG: 0.5 TABLET, FILM COATED ORAL at 08:41

## 2023-05-18 RX ADMIN — ROPINIROLE HYDROCHLORIDE 0.5 MG: 0.5 TABLET, FILM COATED ORAL at 14:35

## 2023-05-18 RX ADMIN — ONDANSETRON HYDROCHLORIDE 4 MG: 2 INJECTION, SOLUTION INTRAMUSCULAR; INTRAVENOUS at 14:35

## 2023-05-18 RX ADMIN — DICYCLOMINE HYDROCHLORIDE 10 MG: 10 CAPSULE ORAL at 19:39

## 2023-05-18 RX ADMIN — DICYCLOMINE HYDROCHLORIDE 10 MG: 10 CAPSULE ORAL at 08:40

## 2023-05-18 RX ADMIN — ONDANSETRON HYDROCHLORIDE 4 MG: 2 INJECTION, SOLUTION INTRAMUSCULAR; INTRAVENOUS at 04:40

## 2023-05-18 RX ADMIN — ENOXAPARIN SODIUM 40 MG: 100 INJECTION SUBCUTANEOUS at 19:38

## 2023-05-18 RX ADMIN — BACLOFEN 5 MG: 10 TABLET ORAL at 19:39

## 2023-05-18 RX ADMIN — FAMOTIDINE 20 MG: 10 INJECTION, SOLUTION INTRAVENOUS at 08:40

## 2023-05-18 RX ADMIN — SODIUM CHLORIDE, PRESERVATIVE FREE 10 ML: 5 INJECTION INTRAVENOUS at 08:40

## 2023-05-18 RX ADMIN — SODIUM CHLORIDE, PRESERVATIVE FREE 10 ML: 5 INJECTION INTRAVENOUS at 19:38

## 2023-05-18 RX ADMIN — VANCOMYCIN HYDROCHLORIDE 750 MG: 750 INJECTION, POWDER, LYOPHILIZED, FOR SOLUTION INTRAVENOUS at 15:19

## 2023-05-18 SDOH — ECONOMIC STABILITY: HOUSING INSECURITY: IN THE LAST 12 MONTHS, HOW MANY PLACES HAVE YOU LIVED?: 1

## 2023-05-18 SDOH — SOCIAL STABILITY: SOCIAL NETWORK: HOW OFTEN DO YOU GET TOGETHER WITH FRIENDS OR RELATIVES?: THREE TIMES A WEEK

## 2023-05-18 SDOH — HEALTH STABILITY: PHYSICAL HEALTH: ON AVERAGE, HOW MANY DAYS PER WEEK DO YOU ENGAGE IN MODERATE TO STRENUOUS EXERCISE (LIKE A BRISK WALK)?: 0 DAYS

## 2023-05-18 SDOH — ECONOMIC STABILITY: INCOME INSECURITY: IN THE LAST 12 MONTHS, WAS THERE A TIME WHEN YOU WERE NOT ABLE TO PAY THE MORTGAGE OR RENT ON TIME?: NO

## 2023-05-18 SDOH — HEALTH STABILITY: PHYSICAL HEALTH: ON AVERAGE, HOW MANY MINUTES DO YOU ENGAGE IN EXERCISE AT THIS LEVEL?: 0 MIN

## 2023-05-18 SDOH — ECONOMIC STABILITY: INCOME INSECURITY: HOW HARD IS IT FOR YOU TO PAY FOR THE VERY BASICS LIKE FOOD, HOUSING, MEDICAL CARE, AND HEATING?: NOT VERY HARD

## 2023-05-18 SDOH — HEALTH STABILITY: MENTAL HEALTH
STRESS IS WHEN SOMEONE FEELS TENSE, NERVOUS, ANXIOUS, OR CAN'T SLEEP AT NIGHT BECAUSE THEIR MIND IS TROUBLED. HOW STRESSED ARE YOU?: ONLY A LITTLE

## 2023-05-18 SDOH — HEALTH STABILITY: MENTAL HEALTH: HOW MANY STANDARD DRINKS CONTAINING ALCOHOL DO YOU HAVE ON A TYPICAL DAY?: PATIENT DOES NOT DRINK

## 2023-05-18 SDOH — SOCIAL STABILITY: SOCIAL INSECURITY: WITHIN THE LAST YEAR, HAVE YOU BEEN HUMILIATED OR EMOTIONALLY ABUSED IN OTHER WAYS BY YOUR PARTNER OR EX-PARTNER?: NO

## 2023-05-18 SDOH — SOCIAL STABILITY: SOCIAL INSECURITY
WITHIN THE LAST YEAR, HAVE TO BEEN RAPED OR FORCED TO HAVE ANY KIND OF SEXUAL ACTIVITY BY YOUR PARTNER OR EX-PARTNER?: NO

## 2023-05-18 SDOH — SOCIAL STABILITY: SOCIAL NETWORK
DO YOU BELONG TO ANY CLUBS OR ORGANIZATIONS SUCH AS CHURCH GROUPS UNIONS, FRATERNAL OR ATHLETIC GROUPS, OR SCHOOL GROUPS?: NO

## 2023-05-18 SDOH — SOCIAL STABILITY: SOCIAL INSECURITY: WITHIN THE LAST YEAR, HAVE YOU BEEN AFRAID OF YOUR PARTNER OR EX-PARTNER?: NO

## 2023-05-18 SDOH — SOCIAL STABILITY: SOCIAL NETWORK: ARE YOU MARRIED, WIDOWED, DIVORCED, SEPARATED, NEVER MARRIED, OR LIVING WITH A PARTNER?: LIVING WITH PARTNER

## 2023-05-18 SDOH — ECONOMIC STABILITY: FOOD INSECURITY: WITHIN THE PAST 12 MONTHS, YOU WORRIED THAT YOUR FOOD WOULD RUN OUT BEFORE YOU GOT MONEY TO BUY MORE.: NEVER TRUE

## 2023-05-18 SDOH — HEALTH STABILITY: MENTAL HEALTH: HOW OFTEN DO YOU HAVE A DRINK CONTAINING ALCOHOL?: NEVER

## 2023-05-18 SDOH — SOCIAL STABILITY: SOCIAL NETWORK
IN A TYPICAL WEEK, HOW MANY TIMES DO YOU TALK ON THE PHONE WITH FAMILY, FRIENDS, OR NEIGHBORS?: MORE THAN THREE TIMES A WEEK

## 2023-05-18 SDOH — SOCIAL STABILITY: SOCIAL INSECURITY
WITHIN THE LAST YEAR, HAVE YOU BEEN KICKED, HIT, SLAPPED, OR OTHERWISE PHYSICALLY HURT BY YOUR PARTNER OR EX-PARTNER?: NO

## 2023-05-18 SDOH — SOCIAL STABILITY: SOCIAL NETWORK: HOW OFTEN DO YOU ATTEND CHURCH OR RELIGIOUS SERVICES?: 1 TO 4 TIMES PER YEAR

## 2023-05-18 SDOH — SOCIAL STABILITY: SOCIAL NETWORK: HOW OFTEN DO YOU ATTENT MEETINGS OF THE CLUB OR ORGANIZATION YOU BELONG TO?: NEVER

## 2023-05-18 SDOH — ECONOMIC STABILITY: TRANSPORTATION INSECURITY
IN THE PAST 12 MONTHS, HAS THE LACK OF TRANSPORTATION KEPT YOU FROM MEDICAL APPOINTMENTS OR FROM GETTING MEDICATIONS?: NO

## 2023-05-18 SDOH — ECONOMIC STABILITY: TRANSPORTATION INSECURITY
IN THE PAST 12 MONTHS, HAS LACK OF TRANSPORTATION KEPT YOU FROM MEETINGS, WORK, OR FROM GETTING THINGS NEEDED FOR DAILY LIVING?: NO

## 2023-05-18 SDOH — ECONOMIC STABILITY: FOOD INSECURITY: WITHIN THE PAST 12 MONTHS, THE FOOD YOU BOUGHT JUST DIDN'T LAST AND YOU DIDN'T HAVE MONEY TO GET MORE.: NEVER TRUE

## 2023-05-18 ASSESSMENT — ENCOUNTER SYMPTOMS
CONSTIPATION: 0
EYES NEGATIVE: 1
DIARRHEA: 1
RESPIRATORY NEGATIVE: 1
COUGH: 0
ABDOMINAL PAIN: 1
ABDOMINAL PAIN: 0
BLOOD IN STOOL: 0
SHORTNESS OF BREATH: 0
BACK PAIN: 1
VOMITING: 0
NAUSEA: 0
NAUSEA: 0

## 2023-05-19 PROBLEM — E43 SEVERE MALNUTRITION (HCC): Status: ACTIVE | Noted: 2023-05-19

## 2023-05-19 LAB
ANION GAP SERPL CALCULATED.3IONS-SCNC: 13 MMOL/L (ref 7–19)
BACTERIA BLD CULT: ABNORMAL
BACTERIA BLD CULT: ABNORMAL
BASOPHILS # BLD: 0 K/UL (ref 0–0.2)
BASOPHILS NFR BLD: 0.5 % (ref 0–1)
BUN SERPL-MCNC: 7 MG/DL (ref 6–20)
CALCIUM SERPL-MCNC: 8.6 MG/DL (ref 8.6–10)
CHLORIDE SERPL-SCNC: 101 MMOL/L (ref 98–111)
CO2 SERPL-SCNC: 20 MMOL/L (ref 22–29)
CREAT SERPL-MCNC: 0.8 MG/DL (ref 0.5–0.9)
EOSINOPHIL # BLD: 0.1 K/UL (ref 0–0.6)
EOSINOPHIL NFR BLD: 0.7 % (ref 0–5)
ERYTHROCYTE [DISTWIDTH] IN BLOOD BY AUTOMATED COUNT: 12.7 % (ref 11.5–14.5)
GLUCOSE SERPL-MCNC: 87 MG/DL (ref 74–109)
HCT VFR BLD AUTO: 28.6 % (ref 37–47)
HGB BLD-MCNC: 9.4 G/DL (ref 12–16)
IMM GRANULOCYTES # BLD: 0.1 K/UL
LYMPHOCYTES # BLD: 2.2 K/UL (ref 1.1–4.5)
LYMPHOCYTES NFR BLD: 25.1 % (ref 20–40)
MCH RBC QN AUTO: 28.7 PG (ref 27–31)
MCHC RBC AUTO-ENTMCNC: 32.9 G/DL (ref 33–37)
MCV RBC AUTO: 87.5 FL (ref 81–99)
MONOCYTES # BLD: 0.6 K/UL (ref 0–0.9)
MONOCYTES NFR BLD: 7.2 % (ref 0–10)
NEUTROPHILS # BLD: 5.9 K/UL (ref 1.5–7.5)
NEUTS SEG NFR BLD: 65.8 % (ref 50–65)
ORGANISM: ABNORMAL
PLATELET # BLD AUTO: 342 K/UL (ref 130–400)
PMV BLD AUTO: 8.8 FL (ref 9.4–12.3)
POTASSIUM SERPL-SCNC: 3.7 MMOL/L (ref 3.5–5)
PROCALCITONIN: 4.59 NG/ML (ref 0–0.09)
RBC # BLD AUTO: 3.27 M/UL (ref 4.2–5.4)
SODIUM SERPL-SCNC: 134 MMOL/L (ref 136–145)
WBC # BLD AUTO: 8.9 K/UL (ref 4.8–10.8)

## 2023-05-19 PROCEDURE — 05PYX3Z REMOVAL OF INFUSION DEVICE FROM UPPER VEIN, EXTERNAL APPROACH: ICD-10-PCS | Performed by: SURGERY

## 2023-05-19 PROCEDURE — 94760 N-INVAS EAR/PLS OXIMETRY 1: CPT

## 2023-05-19 PROCEDURE — 2500000003 HC RX 250 WO HCPCS

## 2023-05-19 PROCEDURE — 36590 REMOVAL TUNNELED CV CATH: CPT | Performed by: SURGERY

## 2023-05-19 PROCEDURE — 2580000003 HC RX 258: Performed by: INTERNAL MEDICINE

## 2023-05-19 PROCEDURE — 87186 SC STD MICRODIL/AGAR DIL: CPT

## 2023-05-19 PROCEDURE — 84145 PROCALCITONIN (PCT): CPT

## 2023-05-19 PROCEDURE — 87070 CULTURE OTHR SPECIMN AEROBIC: CPT

## 2023-05-19 PROCEDURE — 2580000003 HC RX 258

## 2023-05-19 PROCEDURE — 2500000003 HC RX 250 WO HCPCS: Performed by: SURGERY

## 2023-05-19 PROCEDURE — 80048 BASIC METABOLIC PNL TOTAL CA: CPT

## 2023-05-19 PROCEDURE — 1210000000 HC MED SURG R&B

## 2023-05-19 PROCEDURE — 6360000002 HC RX W HCPCS

## 2023-05-19 PROCEDURE — 0JPT3XZ REMOVAL OF TUNNELED VASCULAR ACCESS DEVICE FROM TRUNK SUBCUTANEOUS TISSUE AND FASCIA, PERCUTANEOUS APPROACH: ICD-10-PCS | Performed by: SURGERY

## 2023-05-19 PROCEDURE — 6360000002 HC RX W HCPCS: Performed by: INTERNAL MEDICINE

## 2023-05-19 PROCEDURE — 36415 COLL VENOUS BLD VENIPUNCTURE: CPT

## 2023-05-19 PROCEDURE — 6370000000 HC RX 637 (ALT 250 FOR IP)

## 2023-05-19 PROCEDURE — 85025 COMPLETE CBC W/AUTO DIFF WBC: CPT

## 2023-05-19 PROCEDURE — 99253 IP/OBS CNSLTJ NEW/EST LOW 45: CPT | Performed by: SURGERY

## 2023-05-19 RX ORDER — ENOXAPARIN SODIUM 100 MG/ML
30 INJECTION SUBCUTANEOUS NIGHTLY
Status: DISCONTINUED | OUTPATIENT
Start: 2023-05-19 | End: 2023-05-23 | Stop reason: HOSPADM

## 2023-05-19 RX ORDER — LIDOCAINE HYDROCHLORIDE AND EPINEPHRINE 10; 10 MG/ML; UG/ML
20 INJECTION, SOLUTION INFILTRATION; PERINEURAL ONCE
Status: COMPLETED | OUTPATIENT
Start: 2023-05-19 | End: 2023-05-19

## 2023-05-19 RX ADMIN — ONDANSETRON HYDROCHLORIDE 4 MG: 2 INJECTION, SOLUTION INTRAMUSCULAR; INTRAVENOUS at 01:05

## 2023-05-19 RX ADMIN — SODIUM CHLORIDE, PRESERVATIVE FREE 10 ML: 5 INJECTION INTRAVENOUS at 08:30

## 2023-05-19 RX ADMIN — BACLOFEN 5 MG: 10 TABLET ORAL at 19:03

## 2023-05-19 RX ADMIN — SODIUM CHLORIDE, PRESERVATIVE FREE 10 ML: 5 INJECTION INTRAVENOUS at 08:47

## 2023-05-19 RX ADMIN — ROPINIROLE HYDROCHLORIDE 0.5 MG: 0.5 TABLET, FILM COATED ORAL at 14:48

## 2023-05-19 RX ADMIN — ONDANSETRON HYDROCHLORIDE 4 MG: 2 INJECTION, SOLUTION INTRAMUSCULAR; INTRAVENOUS at 14:52

## 2023-05-19 RX ADMIN — CITALOPRAM HYDROBROMIDE 10 MG: 10 TABLET, FILM COATED ORAL at 08:40

## 2023-05-19 RX ADMIN — VANCOMYCIN HYDROCHLORIDE 750 MG: 750 INJECTION, POWDER, LYOPHILIZED, FOR SOLUTION INTRAVENOUS at 01:05

## 2023-05-19 RX ADMIN — ROPINIROLE HYDROCHLORIDE 0.5 MG: 0.5 TABLET, FILM COATED ORAL at 19:03

## 2023-05-19 RX ADMIN — VANCOMYCIN HYDROCHLORIDE 750 MG: 750 INJECTION, POWDER, LYOPHILIZED, FOR SOLUTION INTRAVENOUS at 14:48

## 2023-05-19 RX ADMIN — FAMOTIDINE 20 MG: 10 INJECTION, SOLUTION INTRAVENOUS at 08:41

## 2023-05-19 RX ADMIN — SODIUM CHLORIDE, PRESERVATIVE FREE 10 ML: 5 INJECTION INTRAVENOUS at 19:03

## 2023-05-19 RX ADMIN — DICYCLOMINE HYDROCHLORIDE 10 MG: 10 CAPSULE ORAL at 08:41

## 2023-05-19 RX ADMIN — ROPINIROLE HYDROCHLORIDE 0.5 MG: 0.5 TABLET, FILM COATED ORAL at 08:40

## 2023-05-19 RX ADMIN — ENOXAPARIN SODIUM 30 MG: 100 INJECTION SUBCUTANEOUS at 19:03

## 2023-05-19 RX ADMIN — LIDOCAINE HYDROCHLORIDE,EPINEPHRINE BITARTRATE 20 ML: 10; .01 INJECTION, SOLUTION INFILTRATION; PERINEURAL at 11:54

## 2023-05-19 RX ADMIN — ONDANSETRON HYDROCHLORIDE 4 MG: 2 INJECTION, SOLUTION INTRAMUSCULAR; INTRAVENOUS at 08:41

## 2023-05-19 RX ADMIN — BACLOFEN 5 MG: 10 TABLET ORAL at 08:40

## 2023-05-19 RX ADMIN — DICYCLOMINE HYDROCHLORIDE 10 MG: 10 CAPSULE ORAL at 19:03

## 2023-05-19 ASSESSMENT — PAIN DESCRIPTION - ORIENTATION: ORIENTATION: RIGHT

## 2023-05-19 ASSESSMENT — PAIN SCALES - GENERAL
PAINLEVEL_OUTOF10: 0
PAINLEVEL_OUTOF10: 3

## 2023-05-19 ASSESSMENT — PAIN DESCRIPTION - DESCRIPTORS
DESCRIPTORS: DISCOMFORT
DESCRIPTORS: DISCOMFORT

## 2023-05-19 ASSESSMENT — PAIN DESCRIPTION - LOCATION
LOCATION: ABDOMEN
LOCATION: CHEST

## 2023-05-19 ASSESSMENT — PAIN - FUNCTIONAL ASSESSMENT
PAIN_FUNCTIONAL_ASSESSMENT: ACTIVITIES ARE NOT PREVENTED
PAIN_FUNCTIONAL_ASSESSMENT: ACTIVITIES ARE NOT PREVENTED

## 2023-05-19 ASSESSMENT — ENCOUNTER SYMPTOMS
NAUSEA: 0
COUGH: 0
ABDOMINAL PAIN: 0
SHORTNESS OF BREATH: 0

## 2023-05-20 LAB
ANION GAP SERPL CALCULATED.3IONS-SCNC: 13 MMOL/L (ref 7–19)
BASOPHILS # BLD: 0 K/UL (ref 0–0.2)
BASOPHILS NFR BLD: 0.5 % (ref 0–1)
BUN SERPL-MCNC: 8 MG/DL (ref 6–20)
CALCIUM SERPL-MCNC: 8.7 MG/DL (ref 8.6–10)
CHLORIDE SERPL-SCNC: 103 MMOL/L (ref 98–111)
CO2 SERPL-SCNC: 22 MMOL/L (ref 22–29)
CREAT SERPL-MCNC: 0.7 MG/DL (ref 0.5–0.9)
EOSINOPHIL # BLD: 0.1 K/UL (ref 0–0.6)
EOSINOPHIL NFR BLD: 0.6 % (ref 0–5)
ERYTHROCYTE [DISTWIDTH] IN BLOOD BY AUTOMATED COUNT: 12.9 % (ref 11.5–14.5)
GLUCOSE SERPL-MCNC: 102 MG/DL (ref 74–109)
HCT VFR BLD AUTO: 27.8 % (ref 37–47)
HGB BLD-MCNC: 9.3 G/DL (ref 12–16)
IMM GRANULOCYTES # BLD: 0.1 K/UL
LYMPHOCYTES # BLD: 1.9 K/UL (ref 1.1–4.5)
LYMPHOCYTES NFR BLD: 21.4 % (ref 20–40)
MCH RBC QN AUTO: 28.8 PG (ref 27–31)
MCHC RBC AUTO-ENTMCNC: 33.5 G/DL (ref 33–37)
MCV RBC AUTO: 86.1 FL (ref 81–99)
MONOCYTES # BLD: 0.6 K/UL (ref 0–0.9)
MONOCYTES NFR BLD: 6.9 % (ref 0–10)
NEUTROPHILS # BLD: 6.1 K/UL (ref 1.5–7.5)
NEUTS SEG NFR BLD: 70 % (ref 50–65)
PLATELET # BLD AUTO: 349 K/UL (ref 130–400)
PMV BLD AUTO: 8.9 FL (ref 9.4–12.3)
POTASSIUM SERPL-SCNC: 3.7 MMOL/L (ref 3.5–5)
RBC # BLD AUTO: 3.23 M/UL (ref 4.2–5.4)
SODIUM SERPL-SCNC: 138 MMOL/L (ref 136–145)
VANCOMYCIN TROUGH SERPL-MCNC: 9 UG/ML (ref 10–20)
WBC # BLD AUTO: 8.7 K/UL (ref 4.8–10.8)

## 2023-05-20 PROCEDURE — 6370000000 HC RX 637 (ALT 250 FOR IP)

## 2023-05-20 PROCEDURE — 6360000002 HC RX W HCPCS

## 2023-05-20 PROCEDURE — 2580000003 HC RX 258

## 2023-05-20 PROCEDURE — 6360000002 HC RX W HCPCS: Performed by: INTERNAL MEDICINE

## 2023-05-20 PROCEDURE — 2580000003 HC RX 258: Performed by: INTERNAL MEDICINE

## 2023-05-20 PROCEDURE — 1210000000 HC MED SURG R&B

## 2023-05-20 PROCEDURE — 80202 ASSAY OF VANCOMYCIN: CPT

## 2023-05-20 PROCEDURE — 80048 BASIC METABOLIC PNL TOTAL CA: CPT

## 2023-05-20 PROCEDURE — 2500000003 HC RX 250 WO HCPCS

## 2023-05-20 PROCEDURE — 36415 COLL VENOUS BLD VENIPUNCTURE: CPT

## 2023-05-20 PROCEDURE — 94760 N-INVAS EAR/PLS OXIMETRY 1: CPT

## 2023-05-20 PROCEDURE — 85025 COMPLETE CBC W/AUTO DIFF WBC: CPT

## 2023-05-20 RX ADMIN — ONDANSETRON HYDROCHLORIDE 4 MG: 2 INJECTION, SOLUTION INTRAMUSCULAR; INTRAVENOUS at 16:42

## 2023-05-20 RX ADMIN — DICYCLOMINE HYDROCHLORIDE 10 MG: 10 CAPSULE ORAL at 19:13

## 2023-05-20 RX ADMIN — ONDANSETRON HYDROCHLORIDE 4 MG: 2 INJECTION, SOLUTION INTRAMUSCULAR; INTRAVENOUS at 02:13

## 2023-05-20 RX ADMIN — SODIUM CHLORIDE, PRESERVATIVE FREE 10 ML: 5 INJECTION INTRAVENOUS at 09:24

## 2023-05-20 RX ADMIN — DICYCLOMINE HYDROCHLORIDE 10 MG: 10 CAPSULE ORAL at 09:24

## 2023-05-20 RX ADMIN — ONDANSETRON HYDROCHLORIDE 4 MG: 2 INJECTION, SOLUTION INTRAMUSCULAR; INTRAVENOUS at 09:36

## 2023-05-20 RX ADMIN — BACLOFEN 5 MG: 10 TABLET ORAL at 09:23

## 2023-05-20 RX ADMIN — Medication 1000 MG: at 16:37

## 2023-05-20 RX ADMIN — ROPINIROLE HYDROCHLORIDE 0.5 MG: 0.5 TABLET, FILM COATED ORAL at 09:23

## 2023-05-20 RX ADMIN — CITALOPRAM HYDROBROMIDE 10 MG: 10 TABLET, FILM COATED ORAL at 09:23

## 2023-05-20 RX ADMIN — SODIUM CHLORIDE 125 ML/HR: 9 INJECTION, SOLUTION INTRAVENOUS at 12:09

## 2023-05-20 RX ADMIN — FAMOTIDINE 20 MG: 10 INJECTION, SOLUTION INTRAVENOUS at 09:24

## 2023-05-20 RX ADMIN — VANCOMYCIN HYDROCHLORIDE 750 MG: 750 INJECTION, POWDER, LYOPHILIZED, FOR SOLUTION INTRAVENOUS at 02:10

## 2023-05-20 RX ADMIN — BACLOFEN 5 MG: 10 TABLET ORAL at 19:13

## 2023-05-20 RX ADMIN — ENOXAPARIN SODIUM 30 MG: 100 INJECTION SUBCUTANEOUS at 19:13

## 2023-05-20 RX ADMIN — ROPINIROLE HYDROCHLORIDE 0.5 MG: 0.5 TABLET, FILM COATED ORAL at 19:13

## 2023-05-20 RX ADMIN — ROPINIROLE HYDROCHLORIDE 0.5 MG: 0.5 TABLET, FILM COATED ORAL at 16:37

## 2023-05-20 ASSESSMENT — ENCOUNTER SYMPTOMS
SHORTNESS OF BREATH: 0
COUGH: 0
NAUSEA: 0
ABDOMINAL PAIN: 0

## 2023-05-20 ASSESSMENT — PAIN SCALES - GENERAL
PAINLEVEL_OUTOF10: 2
PAINLEVEL_OUTOF10: 3

## 2023-05-21 LAB
ANION GAP SERPL CALCULATED.3IONS-SCNC: 12 MMOL/L (ref 7–19)
BASOPHILS # BLD: 0.1 K/UL (ref 0–0.2)
BASOPHILS NFR BLD: 0.6 % (ref 0–1)
BUN SERPL-MCNC: 7 MG/DL (ref 6–20)
CALCIUM SERPL-MCNC: 9.1 MG/DL (ref 8.6–10)
CHLORIDE SERPL-SCNC: 101 MMOL/L (ref 98–111)
CO2 SERPL-SCNC: 24 MMOL/L (ref 22–29)
CREAT SERPL-MCNC: 0.7 MG/DL (ref 0.5–0.9)
EOSINOPHIL # BLD: 0.1 K/UL (ref 0–0.6)
EOSINOPHIL NFR BLD: 0.7 % (ref 0–5)
ERYTHROCYTE [DISTWIDTH] IN BLOOD BY AUTOMATED COUNT: 13.1 % (ref 11.5–14.5)
GLUCOSE SERPL-MCNC: 116 MG/DL (ref 74–109)
HCT VFR BLD AUTO: 28.5 % (ref 37–47)
HGB BLD-MCNC: 9.7 G/DL (ref 12–16)
IMM GRANULOCYTES # BLD: 0.1 K/UL
LYMPHOCYTES # BLD: 2 K/UL (ref 1.1–4.5)
LYMPHOCYTES NFR BLD: 23.1 % (ref 20–40)
MCH RBC QN AUTO: 29 PG (ref 27–31)
MCHC RBC AUTO-ENTMCNC: 34 G/DL (ref 33–37)
MCV RBC AUTO: 85.3 FL (ref 81–99)
MONOCYTES # BLD: 0.6 K/UL (ref 0–0.9)
MONOCYTES NFR BLD: 6.8 % (ref 0–10)
NEUTROPHILS # BLD: 5.9 K/UL (ref 1.5–7.5)
NEUTS SEG NFR BLD: 68.1 % (ref 50–65)
PLATELET # BLD AUTO: 385 K/UL (ref 130–400)
PMV BLD AUTO: 8.7 FL (ref 9.4–12.3)
POTASSIUM SERPL-SCNC: 4.1 MMOL/L (ref 3.5–5)
RBC # BLD AUTO: 3.34 M/UL (ref 4.2–5.4)
SODIUM SERPL-SCNC: 137 MMOL/L (ref 136–145)
WBC # BLD AUTO: 8.7 K/UL (ref 4.8–10.8)

## 2023-05-21 PROCEDURE — 6370000000 HC RX 637 (ALT 250 FOR IP)

## 2023-05-21 PROCEDURE — 36415 COLL VENOUS BLD VENIPUNCTURE: CPT

## 2023-05-21 PROCEDURE — 85025 COMPLETE CBC W/AUTO DIFF WBC: CPT

## 2023-05-21 PROCEDURE — 6370000000 HC RX 637 (ALT 250 FOR IP): Performed by: INTERNAL MEDICINE

## 2023-05-21 PROCEDURE — 1210000000 HC MED SURG R&B

## 2023-05-21 PROCEDURE — 6360000002 HC RX W HCPCS

## 2023-05-21 PROCEDURE — 80048 BASIC METABOLIC PNL TOTAL CA: CPT

## 2023-05-21 RX ORDER — ONDANSETRON 4 MG/1
4 TABLET, FILM COATED ORAL EVERY 8 HOURS PRN
Status: DISCONTINUED | OUTPATIENT
Start: 2023-05-21 | End: 2023-05-23 | Stop reason: HOSPADM

## 2023-05-21 RX ADMIN — DICYCLOMINE HYDROCHLORIDE 10 MG: 10 CAPSULE ORAL at 19:21

## 2023-05-21 RX ADMIN — ACETAMINOPHEN 650 MG: 325 TABLET ORAL at 08:58

## 2023-05-21 RX ADMIN — ROPINIROLE HYDROCHLORIDE 0.5 MG: 0.5 TABLET, FILM COATED ORAL at 14:49

## 2023-05-21 RX ADMIN — CITALOPRAM HYDROBROMIDE 10 MG: 10 TABLET, FILM COATED ORAL at 08:51

## 2023-05-21 RX ADMIN — ROPINIROLE HYDROCHLORIDE 0.5 MG: 0.5 TABLET, FILM COATED ORAL at 08:51

## 2023-05-21 RX ADMIN — BACLOFEN 5 MG: 10 TABLET ORAL at 19:21

## 2023-05-21 RX ADMIN — DICYCLOMINE HYDROCHLORIDE 10 MG: 10 CAPSULE ORAL at 08:51

## 2023-05-21 RX ADMIN — BACLOFEN 5 MG: 10 TABLET ORAL at 08:49

## 2023-05-21 RX ADMIN — ENOXAPARIN SODIUM 30 MG: 100 INJECTION SUBCUTANEOUS at 19:22

## 2023-05-21 RX ADMIN — ONDANSETRON HYDROCHLORIDE 4 MG: 4 TABLET, FILM COATED ORAL at 08:58

## 2023-05-21 RX ADMIN — ONDANSETRON HYDROCHLORIDE 4 MG: 4 TABLET, FILM COATED ORAL at 02:17

## 2023-05-21 RX ADMIN — ACETAMINOPHEN 650 MG: 325 TABLET ORAL at 01:59

## 2023-05-21 RX ADMIN — ROPINIROLE HYDROCHLORIDE 0.5 MG: 0.5 TABLET, FILM COATED ORAL at 19:21

## 2023-05-21 ASSESSMENT — PAIN SCALES - GENERAL
PAINLEVEL_OUTOF10: 0
PAINLEVEL_OUTOF10: 0

## 2023-05-21 ASSESSMENT — ENCOUNTER SYMPTOMS
SHORTNESS OF BREATH: 0
NAUSEA: 0
COUGH: 0
ABDOMINAL PAIN: 0

## 2023-05-21 ASSESSMENT — PAIN DESCRIPTION - DESCRIPTORS: DESCRIPTORS: ACHING

## 2023-05-21 ASSESSMENT — PAIN DESCRIPTION - LOCATION: LOCATION: ARM;HEAD

## 2023-05-21 ASSESSMENT — PAIN DESCRIPTION - ORIENTATION: ORIENTATION: LEFT

## 2023-05-22 LAB
ANION GAP SERPL CALCULATED.3IONS-SCNC: 11 MMOL/L (ref 7–19)
BACTERIA CATH TIP CULT: ABNORMAL
BASOPHILS # BLD: 0.1 K/UL (ref 0–0.2)
BASOPHILS NFR BLD: 0.6 % (ref 0–1)
BUN SERPL-MCNC: 9 MG/DL (ref 6–20)
CALCIUM SERPL-MCNC: 9.3 MG/DL (ref 8.6–10)
CHLORIDE SERPL-SCNC: 100 MMOL/L (ref 98–111)
CO2 SERPL-SCNC: 25 MMOL/L (ref 22–29)
CREAT SERPL-MCNC: 0.8 MG/DL (ref 0.5–0.9)
EOSINOPHIL # BLD: 0.1 K/UL (ref 0–0.6)
EOSINOPHIL NFR BLD: 1.1 % (ref 0–5)
ERYTHROCYTE [DISTWIDTH] IN BLOOD BY AUTOMATED COUNT: 13.2 % (ref 11.5–14.5)
GLUCOSE SERPL-MCNC: 121 MG/DL (ref 74–109)
HCT VFR BLD AUTO: 29.6 % (ref 37–47)
HGB BLD-MCNC: 9.7 G/DL (ref 12–16)
IMM GRANULOCYTES # BLD: 0.1 K/UL
LYMPHOCYTES # BLD: 2.3 K/UL (ref 1.1–4.5)
LYMPHOCYTES NFR BLD: 22.9 % (ref 20–40)
MCH RBC QN AUTO: 28.4 PG (ref 27–31)
MCHC RBC AUTO-ENTMCNC: 32.8 G/DL (ref 33–37)
MCV RBC AUTO: 86.8 FL (ref 81–99)
MONOCYTES # BLD: 0.6 K/UL (ref 0–0.9)
MONOCYTES NFR BLD: 5.9 % (ref 0–10)
NEUTROPHILS # BLD: 6.8 K/UL (ref 1.5–7.5)
NEUTS SEG NFR BLD: 68.5 % (ref 50–65)
ORGANISM: ABNORMAL
PLATELET # BLD AUTO: 452 K/UL (ref 130–400)
PMV BLD AUTO: 8.9 FL (ref 9.4–12.3)
POTASSIUM SERPL-SCNC: 3.9 MMOL/L (ref 3.5–5)
RBC # BLD AUTO: 3.41 M/UL (ref 4.2–5.4)
SODIUM SERPL-SCNC: 136 MMOL/L (ref 136–145)
WBC # BLD AUTO: 10 K/UL (ref 4.8–10.8)

## 2023-05-22 PROCEDURE — 6360000002 HC RX W HCPCS

## 2023-05-22 PROCEDURE — C1751 CATH, INF, PER/CENT/MIDLINE: HCPCS

## 2023-05-22 PROCEDURE — 6370000000 HC RX 637 (ALT 250 FOR IP)

## 2023-05-22 PROCEDURE — 76937 US GUIDE VASCULAR ACCESS: CPT

## 2023-05-22 PROCEDURE — 02HV33Z INSERTION OF INFUSION DEVICE INTO SUPERIOR VENA CAVA, PERCUTANEOUS APPROACH: ICD-10-PCS | Performed by: INTERNAL MEDICINE

## 2023-05-22 PROCEDURE — 94760 N-INVAS EAR/PLS OXIMETRY 1: CPT

## 2023-05-22 PROCEDURE — 6370000000 HC RX 637 (ALT 250 FOR IP): Performed by: INTERNAL MEDICINE

## 2023-05-22 PROCEDURE — 80048 BASIC METABOLIC PNL TOTAL CA: CPT

## 2023-05-22 PROCEDURE — 36569 INSJ PICC 5 YR+ W/O IMAGING: CPT

## 2023-05-22 PROCEDURE — 6360000002 HC RX W HCPCS: Performed by: INTERNAL MEDICINE

## 2023-05-22 PROCEDURE — 85025 COMPLETE CBC W/AUTO DIFF WBC: CPT

## 2023-05-22 PROCEDURE — 1210000000 HC MED SURG R&B

## 2023-05-22 PROCEDURE — 36415 COLL VENOUS BLD VENIPUNCTURE: CPT

## 2023-05-22 RX ADMIN — ROPINIROLE HYDROCHLORIDE 0.5 MG: 0.5 TABLET, FILM COATED ORAL at 19:47

## 2023-05-22 RX ADMIN — CITALOPRAM HYDROBROMIDE 10 MG: 10 TABLET, FILM COATED ORAL at 08:20

## 2023-05-22 RX ADMIN — Medication 1000 MG: at 16:32

## 2023-05-22 RX ADMIN — DICYCLOMINE HYDROCHLORIDE 10 MG: 10 CAPSULE ORAL at 08:20

## 2023-05-22 RX ADMIN — ENOXAPARIN SODIUM 30 MG: 100 INJECTION SUBCUTANEOUS at 19:47

## 2023-05-22 RX ADMIN — BACLOFEN 5 MG: 10 TABLET ORAL at 08:20

## 2023-05-22 RX ADMIN — ONDANSETRON HYDROCHLORIDE 4 MG: 4 TABLET, FILM COATED ORAL at 11:31

## 2023-05-22 RX ADMIN — ROPINIROLE HYDROCHLORIDE 0.5 MG: 0.5 TABLET, FILM COATED ORAL at 16:30

## 2023-05-22 RX ADMIN — ROPINIROLE HYDROCHLORIDE 0.5 MG: 0.5 TABLET, FILM COATED ORAL at 08:20

## 2023-05-22 RX ADMIN — BACLOFEN 5 MG: 10 TABLET ORAL at 19:47

## 2023-05-22 RX ADMIN — DICYCLOMINE HYDROCHLORIDE 10 MG: 10 CAPSULE ORAL at 19:47

## 2023-05-22 ASSESSMENT — ENCOUNTER SYMPTOMS
COUGH: 0
NAUSEA: 0
SHORTNESS OF BREATH: 0
ABDOMINAL PAIN: 0

## 2023-05-22 ASSESSMENT — PAIN SCALES - GENERAL: PAINLEVEL_OUTOF10: 3

## 2023-05-22 NOTE — CARE COORDINATION
ARIANNE faxed signed orders for [facility] infusion by Dr. Kika Honeycutt; also called and left pharmacistAlexis a message that it was being faxed and that the PICC line is in; will fax when note is generated;   [facility] ([facility]  [phone] on-call/weekends  Electronically signed by KENNEDI Payne on 5/22/2023 at 3:46 PM'    Pharmacist did not return call today; likely because cutoff was at 4PM; will follow-up on status in AM; ARIANNE also faxed PICC line info  Electronically signed by KENNEDI Payne on 5/22/2023 at 4:42 PM

## 2023-05-22 NOTE — CARE COORDINATION
ARIANNE placed f/u call to Manuel with [facility]; [phone]; he indicated he has her IV ABX med ready to ship; he just needs confirmation that her PICC has been placed; if he can ship today; he stated that it would be delivered to her home approx 10AM tomorrow. He also stated that her normal nurse visit is tomorrow so this would be best timing option; will call back when line is confirmed.    Electronically signed by KENNEDI Nicolas on 5/22/2023 at 11:05 AM

## 2023-05-22 NOTE — PLAN OF CARE
Problem: Discharge Planning  Goal: Discharge to home or other facility with appropriate resources  5/21/2023 2125 by Jayson Barrow RN  Outcome: Progressing  5/21/2023 1429 by Neel Harvey RN  Outcome: Progressing     Problem: Pain  Goal: Verbalizes/displays adequate comfort level or baseline comfort level  5/21/2023 2125 by Jayson Barrow RN  Outcome: Progressing  5/21/2023 1429 by Neel Harvey RN  Outcome: Progressing     Problem: Safety - Adult  Goal: Free from fall injury  5/21/2023 2125 by Jayson Barrow RN  Outcome: Progressing  5/21/2023 1429 by Neel Harvey RN  Outcome: Progressing     Problem: ABCDS Injury Assessment  Goal: Absence of physical injury  5/21/2023 2125 by Jayson Barrow RN  Outcome: Progressing  5/21/2023 1429 by Neel Harvey RN  Outcome: Progressing     Problem: Nutrition Deficit:  Goal: Optimize nutritional status  5/21/2023 2125 by Jayson Barrow RN  Outcome: Progressing  5/21/2023 1429 by Neel Harvey RN  Outcome: Progressing   Electronically signed by Jayson Barrow RN on 5/21/2023 at 9:25 PM

## 2023-05-22 NOTE — PROCEDURES
[facility] Vascular Access Team:  PICC Line Insertion Procedure Note      Patient: Nell Nam  YOB: 1990   MRN: [id number]  Room: [address] [place] [address]     Attending Physician - Anne Toribio MD  Ordering Physician - Ingris Moon MD    Diagnosis:   Tachycardia [R00.0]  SIRS (systemic inflammatory response syndrome) ([facility] [R65.10]  Pneumonia of right middle lobe due to infectious organism [J18.9]  Sepsis, due to unspecified organism, unspecified whether acute organ dysfunction present ([facility] [address] [A41.9]       Procedure(s): Insertion of a 5 Nauruan Double Lumen PICC    Indication(s):   TPN and Home IV Therapy    Date of Procedure: 5/22/2023   Start Time: 1450  End Time: 9732    Performed by: Manny Rocha RN    Anesthesia: Local Injection <=5 mL 1% Lidocaine without Epinephrine    Estimated Blood Loss (mL): Minimal    Complications: None    PICC Double Lumen 01/00/74 Right Basilic (Active)   Central Line Being Utilized Yes 05/22/23 1530   Criteria for Appropriate Use Total parenteral nutrition 05/22/23 1530   Site Assessment Clean, dry & intact 05/22/23 1530   Phlebitis Assessment No symptoms 05/22/23 1530   Infiltration Assessment 0 05/22/23 1530   External Catheter Length (cm) 0 cm 05/22/23 1530   Proximal Lumen Color/Status White;Flushed;Normal saline locked; Blood return noted; Alcohol cap applied 05/22/23 1530   Distal Lumen Color/Status Pink;Flushed;Normal saline locked; Blood return noted; Alcohol cap applied 05/22/23 1530   Alcohol Cap Used Yes 05/22/23 1530   Date of Last Dressing Change 05/22/23 05/22/23 1530   Dressing Type Transparent;Securing device 05/22/23 1530   Dressing Status New dressing applied;Clean, dry & intact 05/22/23 1530   Dressing Intervention New 05/22/23 1530         Findings:   1. Successful insertion of PICC line. 2. PICC Brochure given to patient for care instructions  3. PICC Line is ready to be used. 4. Please change tubing prior to using new PICC line.  Make sure to label, date and

## 2023-05-23 VITALS
HEIGHT: 60 IN | RESPIRATION RATE: 20 BRPM | SYSTOLIC BLOOD PRESSURE: 116 MMHG | TEMPERATURE: 97.9 F | WEIGHT: 112.2 LBS | DIASTOLIC BLOOD PRESSURE: 78 MMHG | BODY MASS INDEX: 22.03 KG/M2 | HEART RATE: 80 BPM | OXYGEN SATURATION: 96 %

## 2023-05-23 LAB
ANION GAP SERPL CALCULATED.3IONS-SCNC: 8 MMOL/L (ref 7–19)
BACTERIA BLD CULT ORG #2: NORMAL
BACTERIA BLD CULT: NORMAL
BASOPHILS # BLD: 0.1 K/UL (ref 0–0.2)
BASOPHILS NFR BLD: 0.7 % (ref 0–1)
BUN SERPL-MCNC: 9 MG/DL (ref 6–20)
CALCIUM SERPL-MCNC: 9.5 MG/DL (ref 8.6–10)
CHLORIDE SERPL-SCNC: 103 MMOL/L (ref 98–111)
CO2 SERPL-SCNC: 27 MMOL/L (ref 22–29)
CREAT SERPL-MCNC: 0.8 MG/DL (ref 0.5–0.9)
EOSINOPHIL # BLD: 0.1 K/UL (ref 0–0.6)
EOSINOPHIL NFR BLD: 1.1 % (ref 0–5)
ERYTHROCYTE [DISTWIDTH] IN BLOOD BY AUTOMATED COUNT: 13.4 % (ref 11.5–14.5)
GLUCOSE SERPL-MCNC: 96 MG/DL (ref 74–109)
HCT VFR BLD AUTO: 31.3 % (ref 37–47)
HGB BLD-MCNC: 10.2 G/DL (ref 12–16)
IMM GRANULOCYTES # BLD: 0.1 K/UL
LYMPHOCYTES # BLD: 2.6 K/UL (ref 1.1–4.5)
LYMPHOCYTES NFR BLD: 30.8 % (ref 20–40)
MCH RBC QN AUTO: 28.6 PG (ref 27–31)
MCHC RBC AUTO-ENTMCNC: 32.6 G/DL (ref 33–37)
MCV RBC AUTO: 87.7 FL (ref 81–99)
MONOCYTES # BLD: 0.7 K/UL (ref 0–0.9)
MONOCYTES NFR BLD: 7.9 % (ref 0–10)
NEUTROPHILS # BLD: 5 K/UL (ref 1.5–7.5)
NEUTS SEG NFR BLD: 58.4 % (ref 50–65)
PLATELET # BLD AUTO: 432 K/UL (ref 130–400)
PMV BLD AUTO: 8.6 FL (ref 9.4–12.3)
POTASSIUM SERPL-SCNC: 4.1 MMOL/L (ref 3.5–5)
RBC # BLD AUTO: 3.57 M/UL (ref 4.2–5.4)
SODIUM SERPL-SCNC: 138 MMOL/L (ref 136–145)
VANCOMYCIN SERPL-MCNC: 14.4 UG/ML
VANCOMYCIN TROUGH SERPL-MCNC: 22.9 UG/ML (ref 10–20)
WBC # BLD AUTO: 8.5 K/UL (ref 4.8–10.8)

## 2023-05-23 PROCEDURE — 6370000000 HC RX 637 (ALT 250 FOR IP)

## 2023-05-23 PROCEDURE — 2580000003 HC RX 258: Performed by: INTERNAL MEDICINE

## 2023-05-23 PROCEDURE — 85025 COMPLETE CBC W/AUTO DIFF WBC: CPT

## 2023-05-23 PROCEDURE — 6360000002 HC RX W HCPCS: Performed by: INTERNAL MEDICINE

## 2023-05-23 PROCEDURE — 36415 COLL VENOUS BLD VENIPUNCTURE: CPT

## 2023-05-23 PROCEDURE — 2500000003 HC RX 250 WO HCPCS

## 2023-05-23 PROCEDURE — 80202 ASSAY OF VANCOMYCIN: CPT

## 2023-05-23 PROCEDURE — 6360000002 HC RX W HCPCS

## 2023-05-23 PROCEDURE — 80048 BASIC METABOLIC PNL TOTAL CA: CPT

## 2023-05-23 PROCEDURE — 94760 N-INVAS EAR/PLS OXIMETRY 1: CPT

## 2023-05-23 RX ADMIN — Medication 1000 MG: at 05:30

## 2023-05-23 RX ADMIN — SODIUM CHLORIDE, PRESERVATIVE FREE 10 ML: 5 INJECTION INTRAVENOUS at 08:22

## 2023-05-23 RX ADMIN — ONDANSETRON HYDROCHLORIDE 4 MG: 2 INJECTION, SOLUTION INTRAMUSCULAR; INTRAVENOUS at 03:05

## 2023-05-23 RX ADMIN — BACLOFEN 5 MG: 10 TABLET ORAL at 08:21

## 2023-05-23 RX ADMIN — FAMOTIDINE 20 MG: 10 INJECTION, SOLUTION INTRAVENOUS at 08:21

## 2023-05-23 RX ADMIN — DICYCLOMINE HYDROCHLORIDE 10 MG: 10 CAPSULE ORAL at 08:25

## 2023-05-23 RX ADMIN — ROPINIROLE HYDROCHLORIDE 0.5 MG: 0.5 TABLET, FILM COATED ORAL at 13:59

## 2023-05-23 RX ADMIN — ONDANSETRON HYDROCHLORIDE 4 MG: 2 INJECTION, SOLUTION INTRAMUSCULAR; INTRAVENOUS at 08:25

## 2023-05-23 RX ADMIN — ROPINIROLE HYDROCHLORIDE 0.5 MG: 0.5 TABLET, FILM COATED ORAL at 08:21

## 2023-05-23 RX ADMIN — CITALOPRAM HYDROBROMIDE 10 MG: 10 TABLET, FILM COATED ORAL at 08:20

## 2023-05-23 NOTE — PROGRESS NOTES
[address] [facility] Pharmacokinetic Monitoring Service - Vancomycin    Consulting Provider: Fatoumata Cai /Dr. Rupert Washburn   Indication: Sepsis  Target Concentration: Goal AUC/WILFRIDO 400-600 mg*hr/L  Day of Therapy: 7  Additional Antimicrobials: none    Pertinent Laboratory Values: Wt Readings from Last 1 Encounters:   05/23/23 112 lb 3.2 oz (50.9 kg)     Temp Readings from Last 1 Encounters:   05/23/23 97.9 °F (36.6 °C)     Estimated Creatinine Clearance: 72 mL/min (based on SCr of 0.8 mg/dL). Recent Labs     05/22/23  0242 05/23/23  0256   CREATININE 0.8 0.8   WBC 10.0 8.5   Procalcitonin: 5/19=4.5     Pertinent Cultures:  Culture Date Source Results   05/19/23 Munguia Cath tip Staph epi   05/18/23 Blood X2 No growth   05/16/23 Blood X2 Staph epidermidis   05/16/23 Blood ID, Molecular MRSA; Staph epidermidis   MRSA Nasal Swab: N/A. Non-respiratory infection           Recent vancomycin administrations                     vancomycin (VANCOCIN) 1000 mg in sodium chloride 0.9% 250 mL IVPB (mg) 1,000 mg New Bag 05/23/23 0530     1,000 mg New Bag 05/22/23 1632                    Assessment:  Date/Time Current Dose Concentration Timing of Concentration (h) AUC   05/23 @1541 1000mg IV q12hr 14.4 10h 11m 563   Note: Serum concentrations collected for AUC dosing may appear elevated if collected in close proximity to the dose administered, this is not necessarily an indication of toxicity    Plan:  Current dosing regimen is therapeutic. This second level clarified the fit in the program and everything is good. Continue current dose of 1000mg IV q12hr at home as directed. No Repeat vancomycin concentration ordered. A total of 6 doses are left to be given at home. Patient was discharged.     Thank you for the consult,  SILVINO Cross [facility]  5/23/2023 6:05 PM
[address] [facility] Pharmacokinetic Monitoring Service - Vancomycin     Consulting Provider: Cielo Reeves   Indication: Sepsis  Target Concentration: Goal AUC/WILFRIDO 400-600 mg*hr/L  Day of Therapy: 7  Additional Antimicrobials: none     Pertinent Laboratory Values: Wt Readings from Last 1 Encounters:   05/23/23 112 lb 3.2 oz (50.9 kg)          Temp Readings from Last 1 Encounters:   05/23/23 97.9 °F (36.6 °C)      Estimated Creatinine Clearance: 72 mL/min (based on SCr of 0.8 mg/dL). Recent Labs     05/22/23  0242 05/23/23  0256   CREATININE 0.8 0.8   WBC 10.0 8.5      Procalcitonin: 5/19=4.5     Pertinent Cultures:  Culture Date Source Results   05/19/23 Munguia Cath tip Staph epi   05/18/23 Blood X2 No growth   05/16/23 Blood X2 Staph epidermidis   05/16/23 Blood ID, Molecular MRSA; Staph epidermidis   MRSA Nasal Swab: N/A. Non-respiratory infection     Recent vancomycin administrations                         vancomycin (VANCOCIN) 1000 mg in sodium chloride 0.9% 250 mL IVPB (mg) 1,000 mg New Bag 05/23/23 0530       1,000 mg New Bag 05/22/23 1632       1,000 mg New Bag 05/20/23 1637                          Assessment:  Date/Time Current Dose Concentration Timing of Concentration (h) AUC   [email or web address] 1000 mg IV q12hr 22.9 6h 14m 620   Note: Serum concentrations collected for AUC dosing may appear elevated if collected in close proximity to the dose administered, this is not necessarily an indication of toxicity     Plan:  Current dosing regimen is slightly supra-therapeutic at 620 AUC versus 400-600 range. Predicts trough of 17.4. Above level was drawn at 6 h from the dose as expected early discharge so trough of 17 is reasonable. Recommend Continue current dose of 1000mg IV q12hr.  Noman Michaels, floor nurse explains patient will miss tonight's dose due to home antibiotic infusion timing and it will start in am.  Repeat of vancomycin concentration was ordered prior to patient dismissal for
Daily Progress Note    Date:2023  Patient: Meredith Burrell  : 1990  [place]  CODE:Full Code No additional code details  Yulyia Torres MD    Admit Date: 2023  1:17 PM   LOS: 6 days     Chief Complaint   Patient presents with    Allergic Reaction     Pt had reaction during IVIG infusion at home. Tachycardic, febrile, headache. Pt took 1000mg Tylenol 30min prior to EMS arrival.  Temp at home 103.9         Subjective:    NAEON. No fevers or chills. She has no acute complaints. PICC line has been placed today, sent prescription for IV vancomycin at home however medication could not be arranged at home today so discharge tomorrow updated the patient    Hospital Summary: 35 y.o. female with extensive past medical history of gastroparesis with stimulator, daily TPN, anxiety, GERD, IBS, hypertension, neuropathy who presents to [facility] ED for evaluation of fever. Per ER, patient was receiving IVIG (which she has received once weekly for the last 2 years) through her port per the home health nurse when she spiked a temperature of 100.3 and chills 30 minutes after beginning her infusion. Infusion was then stopped. Patient took Tylenol with improvement in her temperature to 99.9. Of note, patient has a history of MSSA bacteremia from an infected henning catheter which was replaced in 2022. Workup in ED showed unremarkable CXR. WBC 14.2, lactic 3.1, UA unremarkable. She was given sepsis IVF bundle and started on Vancomycin and Cefepime. Admitted to hospitalist service for further management. Blood cultures returned positive for gram + cocci in clusters, PCR + for methicillin resistant staph epi. Cefepime discontinued. ID consulted.  Henning catheter is removed by Dr. Tripp Irving, the PICC line nurse Kodi Chowdary is going to evaluate for the placement of the second PICC line. Review of Systems   Constitutional:  Negative for chills and fever.    Respiratory:
Nutrition Assessment     Type and Reason for Visit: Reassess    Nutrition Recommendations/Plan:   Start TPN once PICC placed if not discharged- cyclic 5/28 for 25VJM: 50 ml/hr X 1 hr, 98 ml/hr X 14hrs, 50 ml/hr X 1 hr, then off. Following d/c resume home TPN as tolerated. Malnutrition Assessment:  Malnutrition Status: Severe malnutrition    Nutrition Assessment:  Po intake remains variable. Aware planned PICC placement tomorrow, 5/23. Recommend start TPN if pt not discharged immediately following PICC placement. 5/20 running 16 hours-- 50ml x 1 hour then increase rate to 98ml x 14 hours and then decrease rate to 50ml x 1 hour then turn off. Continue lipids 3x/ week. If d/c'd recommend continue home TPN following discharge. Home TPN:   D70 (420ml/294g), AA10% (700ml/70g) Lipids 20% (250ml) 3x weekly. Estimated Daily Nutrient Needs:  Energy (kcal):  4337-0145 kcals (25-30 kcals/kg) Weight Used for Energy Requirements: Current     Protein (g):  3-105g Weight Used for Protein Requirements: Current        Fluid (ml/day):  9510-8295 ml Method Used for Fluid Requirements: 1 ml/kcal    Nutrition Related Findings:   Hx- Gatroparesis. Has had J tube Wound Type: None    Current Nutrition Therapies:    ADULT DIET;  Regular    Anthropometric Measures:  Height: 5' (152.4 cm)  Current Body Wt: 110 lb (49.9 kg)   BMI: 21.5    Nutrition Diagnosis:   Inadequate oral intake related to acute injury/trauma, altered GI function as evidenced by intake 0-25%, intake 26-50%, weight loss, weight loss greater than or equal to 2% in 1 week, moderate muscle loss, moderate loss of subcutaneous fat    Nutrition Interventions:   Food and/or Nutrient Delivery: Continue Current Diet  Nutrition Education/Counseling: No recommendation at this time  Coordination of Nutrition Care: Continue to monitor while inpatient    Goals:  Previous Goal Met: Progressing toward Goal(s)  Goals: Meet at least 75% of estimated needs, PO intake 50% or
Per Michael Travis in pharmacy patient vancomycin dose being increased to 1000mg. She verified with Dr. Roseann Balderas and he would like future doses to be increased to 1000mg as well, including the home abx through May 26, 2023.      Electronically signed by Rima Ford RN on 5/22/2023 at 7:08 PM
Pt discharged home in private vehicle driven by friend. All personal belongings including cell phone, , and backpack of items discharged home with patient. PICC and discharge paperwork discussed and patient verbalizes understanding of medications and follow-up appointments. Confirmed with SW that IV Vanc will be delivered for outpatient infusions.     Electronically signed by Kameron Conrad RN on 5/23/2023 at 4:28 PM
PICC line placed during this hospitalization  3. Antibiotic order:  Vancomycin 750 mg IV every 12 hours through May 26, 2023  Then discontinue vancomycin  4. Laboratory work on Monday, May 22, 2023:  CMP, CBC, vancomycin level  Adjust vancomycin based on area under the curve approach with goal AUC of 400-600  5. Her PICC line will be left in place and followed with her GI motility specialist in [place] who is prescribing IVIG and home TPN for her.   6.  Follow-up with infectious diseases as needed    Fer Syed MD

## 2023-05-23 NOTE — DISCHARGE SUMMARY
bacteremia  -- SIRS 3/4 for , WBC 14.1, RR 22  -- Given sepsis IVF bundle + Vanc/Cefepime in ED  -- CXR and UA unremarkable  -- Lactic 3.1 > 0.7  -- Procal 15.5  -- Blood cultures 2/2 positive for gram + cocci in clusters; PCR + methicillin resistant staph Epi  -- Vascular consult  -- Source is Munguia catheter, removed 5/19  -- Repeat blood cultures drawn 5/18  -- ID following, appreciate recommendations  -- Continue Vancomycin though 5/26/23 with a dose of 1 g    Comorbid condition  Gastroparesis  Mood disorder  Continue home meds        Physical Exam:  Vital Signs: /78   Pulse 80   Temp 97.9 °F (36.6 °C)   Resp 20   Ht 5' (1.524 m)   Wt 112 lb 3.2 oz (50.9 kg)   LMP 01/14/2018 (Exact Date)   SpO2 96%   BMI 21.91 kg/m²   General appearance:. Alert and Cooperative   HEENT: Normocephalic. Chest: clear to auscultation bilaterally without wheezes or rhonchi. Cardiac: Normal heart tones with regular rate and rhythm, S1, S2 normal. No murmurs, gallops, or rubs auscultated. Abdomen:soft, non-tender; normal bowel sounds, no masses, no organomegaly. Extremities: No clubbing or cyanosis. No peripheral edema. Peripheral pulses palpable. Neurologic: Grossly intact. Significant Diagnostic Studies:   XR CHEST PORTABLE    Result Date: 5/16/2023  Examination: Chest x-ray, portable Clinical history: Fever Comparison: 12/13/2022 Findings: Several ovoid radiodensities project over the right medial lung base. This finding is of uncertain significance and may be external to the patient. Right-sided central venous catheter overlies the superior cavoatrial junction. No acute consolidation is definitely identified. No pleural effusion or pneumothorax is seen. Cardiac silhouette and pulmonary vasculature appear unremarkable. No acute osseous lesion is seen. Impression: Several ovoid radiodensities project over the right medial lung base. This finding is of uncertain significance and may be external to the

## 2023-05-23 NOTE — CARE COORDINATION
ARIANNE faxed updated note from Dr. Josep Fatima to [facility] pharmacist and also called to speak with him this morning; he is aware that d/c is planned for today   Electronically signed by KENNEDI Shepherd on 5/23/2023 at 9:33 AM       New typed order from [facility] pharmacist re: change in dosage; signed by Dr Darien Guzman and faxed back to [facility]; Pt can d/c today; medication to be delivered tomorrow by 10AM; spoke with [facility] and he doesn't need anything additionally re: her TPN/IVIG they have orders from another physician of the Pt's for this. [facility] ([facility])  [phone]  [phone]  [phone] on-call/weekends  Electronically signed by KENNEDI Shepherd on 5/23/2023 at 1:52 PM    Late entry 5/25/23: Micaela Lamas RN CM gave pt printed info from ARIANNE  5/23/23 re: assistance agencies and housing info.   Electronically signed by KENNEDI Shepherd on 5/25/2023 at 1:52 PM

## 2023-05-24 ENCOUNTER — TELEPHONE (OUTPATIENT)
Dept: INTERNAL MEDICINE | Age: 33
End: 2023-05-24

## 2023-05-24 NOTE — TELEPHONE ENCOUNTER
[facility]    Outreach made within 2 business days of discharge: Yes    Patient: Martin Polanco   Patient : 1990 MRN: [id number]    Reason for Admission: Fever    Discharge Date: 23      DISCHARGE DIAGNOSES WITH COURSE OF MANAGEMENT :   Principal Problem:    Sepsis due to coagulase-negative staphylococcal infection ([place] [address].)  Active Problems:    Anxiety disorder    Gastroparesis    Gram-positive cocci bacteremia    Severe malnutrition ([facility]  Resolved Problems:    * No resolved hospital problems     Spoke with: Patient    Discharge department/facility: [address] Interactive Patient Contact:  Was patient able to fill all prescriptions: Yes  Was patient instructed to bring all medications to the follow-up visit: Yes  Is patient taking all medications as directed in the discharge summary? Yes  Does patient understand their discharge instructions: Yes  Does patient have questions or concerns that need addressed prior to 7-14 day follow up office visit: no    Spoke to the patient she will have her IV antibiotics delivered with her TPN. She will have home health coming out to see her to teach her how to do the IV antibiotics and to do her lab work. She did not need anything at this time.      RECORDS IN CHART  PT    Scheduled appointment with PCP within 7-14 days    Follow Up  Future Appointments   Date Time Provider Sarai Sanchez   2023  9:30 AM Pasquale Moe MD [facility]   2023  1:45 PM Toby Dumas [facility]   2023  2:15 PM MD SAMY [place] [facility]   6/15/2023 11:15 AM Pasquale Moe MD [address]

## 2023-05-25 ENCOUNTER — HOSPITAL ENCOUNTER (EMERGENCY)
Age: 33
Discharge: HOME | End: 2023-05-25
Payer: MEDICAID

## 2023-05-25 ENCOUNTER — APPOINTMENT (OUTPATIENT)
Dept: GENERAL RADIOLOGY | Age: 33
End: 2023-05-25
Payer: MEDICAID

## 2023-05-25 VITALS
HEART RATE: 85 BPM | SYSTOLIC BLOOD PRESSURE: 105 MMHG | DIASTOLIC BLOOD PRESSURE: 73 MMHG | OXYGEN SATURATION: 93 % | TEMPERATURE: 98.8 F | RESPIRATION RATE: 14 BRPM

## 2023-05-25 DIAGNOSIS — T82.838A BLEEDING FROM PICC LINE, INITIAL ENCOUNTER (HCC): Primary | ICD-10-CM

## 2023-05-25 PROCEDURE — 71045 X-RAY EXAM CHEST 1 VIEW: CPT

## 2023-05-25 PROCEDURE — 99283 EMERGENCY DEPT VISIT LOW MDM: CPT

## 2023-05-25 ASSESSMENT — ENCOUNTER SYMPTOMS
BACK PAIN: 0
NAUSEA: 0
VOMITING: 0
ABDOMINAL PAIN: 0
SHORTNESS OF BREATH: 0

## 2023-05-25 ASSESSMENT — PAIN - FUNCTIONAL ASSESSMENT: PAIN_FUNCTIONAL_ASSESSMENT: NONE - DENIES PAIN

## 2023-05-25 NOTE — ED PROVIDER NOTES
105/73 98.8 °F (37.1 °C) -- 85 14 93 % -- --       Physical Exam  Vitals reviewed. Constitutional:       General: She is not in acute distress. Appearance: Normal appearance. She is not ill-appearing, toxic-appearing or diaphoretic. HENT:      Head: Normocephalic and atraumatic. Nose: Nose normal.      Mouth/Throat:      Mouth: Mucous membranes are moist.      Pharynx: Oropharynx is clear. Eyes:      Extraocular Movements: Extraocular movements intact. Conjunctiva/sclera: Conjunctivae normal.      Pupils: Pupils are equal, round, and reactive to light. Cardiovascular:      Rate and Rhythm: Normal rate and regular rhythm. Pulses: Normal pulses. Heart sounds: Normal heart sounds. Comments: PICC to right AC in place with scant dried blood under dressing. There is no erythema, warmth, edema or tenderness. Pulmonary:      Effort: Pulmonary effort is normal.      Breath sounds: Normal breath sounds. Abdominal:      General: Bowel sounds are normal. There is no distension. Palpations: Abdomen is soft. Tenderness: There is no abdominal tenderness. There is no right CVA tenderness, left CVA tenderness or guarding. Musculoskeletal:         General: Normal range of motion. Cervical back: Neck supple. No tenderness. Skin:     General: Skin is warm and dry. Capillary Refill: Capillary refill takes less than 2 seconds. Neurological:      General: No focal deficit present. Mental Status: She is alert and oriented to person, place, and time.        DIAGNOSTIC RESULTS     EKG: All EKG's are interpreted by the Emergency Department Physician who either signs or Co-signs this chart in the absence of a cardiologist.        RADIOLOGY:   Non-plain film images such as CT, Ultrasound and MRI are read by the radiologist. Plain radiographic images are visualized and preliminarily interpreted by the emergency physician with the below findings:        Interpretation per

## 2023-05-28 ASSESSMENT — PAIN SCALES - GENERAL: PAINLEVEL_OUTOF10: 9

## 2023-05-28 ASSESSMENT — PAIN DESCRIPTION - LOCATION: LOCATION: ARM

## 2023-05-28 ASSESSMENT — PAIN DESCRIPTION - DESCRIPTORS: DESCRIPTORS: ACHING

## 2023-05-28 ASSESSMENT — PAIN - FUNCTIONAL ASSESSMENT: PAIN_FUNCTIONAL_ASSESSMENT: 0-10

## 2023-05-28 ASSESSMENT — PAIN DESCRIPTION - ORIENTATION: ORIENTATION: RIGHT

## 2023-05-29 ENCOUNTER — HOSPITAL ENCOUNTER (EMERGENCY)
Age: 33
Discharge: HOME | DRG: 314 | End: 2023-05-29
Attending: PEDIATRICS
Payer: MEDICAID

## 2023-05-29 VITALS
TEMPERATURE: 97.5 F | OXYGEN SATURATION: 100 % | DIASTOLIC BLOOD PRESSURE: 73 MMHG | HEART RATE: 88 BPM | RESPIRATION RATE: 20 BRPM | SYSTOLIC BLOOD PRESSURE: 103 MMHG

## 2023-05-29 DIAGNOSIS — Z45.2 PRESENCE OF PERIPHERALLY INSERTED CENTRAL CATHETER: Primary | ICD-10-CM

## 2023-05-29 DIAGNOSIS — Z65.9 CONCERNED ABOUT HAVING SOCIAL PROBLEM: ICD-10-CM

## 2023-05-29 LAB
ALBUMIN SERPL-MCNC: 3.7 G/DL (ref 3.5–5.2)
ALP SERPL-CCNC: 67 U/L (ref 35–104)
ALT SERPL-CCNC: 12 U/L (ref 5–33)
ANION GAP SERPL CALCULATED.3IONS-SCNC: 10 MMOL/L (ref 7–19)
AST SERPL-CCNC: 17 U/L (ref 5–32)
BASOPHILS # BLD: 0.1 K/UL (ref 0–0.2)
BASOPHILS NFR BLD: 0.9 % (ref 0–1)
BILIRUB SERPL-MCNC: 0.3 MG/DL (ref 0.2–1.2)
BUN SERPL-MCNC: 11 MG/DL (ref 6–20)
CALCIUM SERPL-MCNC: 9.2 MG/DL (ref 8.6–10)
CHLORIDE SERPL-SCNC: 101 MMOL/L (ref 98–111)
CO2 SERPL-SCNC: 23 MMOL/L (ref 22–29)
CREAT SERPL-MCNC: 1 MG/DL (ref 0.5–0.9)
CRP SERPL HS-MCNC: 0.43 MG/DL (ref 0–0.5)
EOSINOPHIL # BLD: 0 K/UL (ref 0–0.6)
EOSINOPHIL NFR BLD: 0.3 % (ref 0–5)
ERYTHROCYTE [DISTWIDTH] IN BLOOD BY AUTOMATED COUNT: 13.9 % (ref 11.5–14.5)
ERYTHROCYTE [SEDIMENTATION RATE] IN BLOOD BY WESTERGREN METHOD: 28 MM/HR (ref 0–20)
GLUCOSE SERPL-MCNC: 96 MG/DL (ref 74–109)
HCT VFR BLD AUTO: 33.3 % (ref 37–47)
HGB BLD-MCNC: 10.2 G/DL (ref 12–16)
IMM GRANULOCYTES # BLD: 0 K/UL
LACTATE BLDV-SCNC: 1.7 MMOL/L (ref 0.5–1.9)
LYMPHOCYTES # BLD: 2.2 K/UL (ref 1.1–4.5)
LYMPHOCYTES NFR BLD: 23.2 % (ref 20–40)
MCH RBC QN AUTO: 28.4 PG (ref 27–31)
MCHC RBC AUTO-ENTMCNC: 30.6 G/DL (ref 33–37)
MCV RBC AUTO: 92.8 FL (ref 81–99)
MONOCYTES # BLD: 0.5 K/UL (ref 0–0.9)
MONOCYTES NFR BLD: 5 % (ref 0–10)
NEUTROPHILS # BLD: 6.6 K/UL (ref 1.5–7.5)
NEUTS SEG NFR BLD: 70.3 % (ref 50–65)
PLATELET # BLD AUTO: 319 K/UL (ref 130–400)
PMV BLD AUTO: 8.5 FL (ref 9.4–12.3)
POTASSIUM SERPL-SCNC: 3.8 MMOL/L (ref 3.5–5)
PROT SERPL-MCNC: 7.2 G/DL (ref 6.6–8.7)
RBC # BLD AUTO: 3.59 M/UL (ref 4.2–5.4)
REASON FOR REJECTION: NORMAL
REJECTED TEST: NORMAL
SODIUM SERPL-SCNC: 134 MMOL/L (ref 136–145)
WBC # BLD AUTO: 9.3 K/UL (ref 4.8–10.8)

## 2023-05-29 PROCEDURE — 83605 ASSAY OF LACTIC ACID: CPT

## 2023-05-29 PROCEDURE — 93005 ELECTROCARDIOGRAM TRACING: CPT | Performed by: PEDIATRICS

## 2023-05-29 PROCEDURE — 36415 COLL VENOUS BLD VENIPUNCTURE: CPT

## 2023-05-29 PROCEDURE — 80053 COMPREHEN METABOLIC PANEL: CPT

## 2023-05-29 PROCEDURE — 86140 C-REACTIVE PROTEIN: CPT

## 2023-05-29 PROCEDURE — 85025 COMPLETE CBC W/AUTO DIFF WBC: CPT

## 2023-05-29 PROCEDURE — 87040 BLOOD CULTURE FOR BACTERIA: CPT

## 2023-05-29 PROCEDURE — 85652 RBC SED RATE AUTOMATED: CPT

## 2023-05-29 PROCEDURE — 6360000002 HC RX W HCPCS: Performed by: PEDIATRICS

## 2023-05-29 RX ORDER — ONDANSETRON 2 MG/ML
4 INJECTION INTRAMUSCULAR; INTRAVENOUS ONCE
Status: COMPLETED | OUTPATIENT
Start: 2023-05-29 | End: 2023-05-29

## 2023-05-29 RX ORDER — HYDROMORPHONE HYDROCHLORIDE 1 MG/ML
0.25 INJECTION, SOLUTION INTRAMUSCULAR; INTRAVENOUS; SUBCUTANEOUS ONCE
Status: COMPLETED | OUTPATIENT
Start: 2023-05-29 | End: 2023-05-29

## 2023-05-29 RX ADMIN — HYDROMORPHONE HYDROCHLORIDE 0.25 MG: 1 INJECTION, SOLUTION INTRAMUSCULAR; INTRAVENOUS; SUBCUTANEOUS at 06:03

## 2023-05-29 RX ADMIN — ONDANSETRON 4 MG: 2 INJECTION INTRAMUSCULAR; INTRAVENOUS at 06:03
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
## 2023-05-29 NOTE — DISCHARGE INSTRUCTIONS
Return or seek medical attention with increasing redness, fever greater than 100.5, increasing or severe pain, or other concerns.
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
## 2023-05-31 ENCOUNTER — OFFICE VISIT (OUTPATIENT)
Dept: PRIMARY CARE CLINIC | Age: 33
End: 2023-05-31
Payer: MEDICAID

## 2023-05-31 ENCOUNTER — HOSPITAL ENCOUNTER (INPATIENT)
Age: 33
LOS: 5 days | Discharge: HOME | DRG: 314 | End: 2023-06-05
Attending: STUDENT IN AN ORGANIZED HEALTH CARE EDUCATION/TRAINING PROGRAM | Admitting: STUDENT IN AN ORGANIZED HEALTH CARE EDUCATION/TRAINING PROGRAM
Payer: MEDICAID

## 2023-05-31 VITALS
HEIGHT: 60 IN | BODY MASS INDEX: 20.73 KG/M2 | SYSTOLIC BLOOD PRESSURE: 110 MMHG | DIASTOLIC BLOOD PRESSURE: 82 MMHG | HEART RATE: 134 BPM | WEIGHT: 105.6 LBS | OXYGEN SATURATION: 99 % | TEMPERATURE: 97.4 F

## 2023-05-31 DIAGNOSIS — R78.81 BACTEREMIA: ICD-10-CM

## 2023-05-31 DIAGNOSIS — Z09 HOSPITAL DISCHARGE FOLLOW-UP: Primary | ICD-10-CM

## 2023-05-31 DIAGNOSIS — Z60.9 PROBLEM RELATED TO SOCIAL ENVIRONMENT: ICD-10-CM

## 2023-05-31 DIAGNOSIS — R78.81 POSITIVE BLOOD CULTURE: Primary | ICD-10-CM

## 2023-05-31 LAB
ALBUMIN SERPL-MCNC: 4 G/DL (ref 3.5–5.2)
ALP SERPL-CCNC: 73 U/L (ref 35–104)
ALT SERPL-CCNC: 13 U/L (ref 5–33)
ANION GAP SERPL CALCULATED.3IONS-SCNC: 12 MMOL/L (ref 7–19)
AST SERPL-CCNC: 20 U/L (ref 5–32)
BACTERIA BLD CULT: ABNORMAL
BACTERIA BLD CULT: ABNORMAL
BASOPHILS # BLD: 0.1 K/UL (ref 0–0.2)
BASOPHILS NFR BLD: 1.3 % (ref 0–1)
BILIRUB SERPL-MCNC: <0.2 MG/DL (ref 0.2–1.2)
BUN SERPL-MCNC: 16 MG/DL (ref 6–20)
CALCIUM SERPL-MCNC: 9.2 MG/DL (ref 8.6–10)
CHLORIDE SERPL-SCNC: 101 MMOL/L (ref 98–111)
CO2 SERPL-SCNC: 22 MMOL/L (ref 22–29)
CREAT SERPL-MCNC: 1.2 MG/DL (ref 0.5–0.9)
EOSINOPHIL # BLD: 0 K/UL (ref 0–0.6)
EOSINOPHIL NFR BLD: 0.4 % (ref 0–5)
ERYTHROCYTE [DISTWIDTH] IN BLOOD BY AUTOMATED COUNT: 13.7 % (ref 11.5–14.5)
GLUCOSE SERPL-MCNC: 86 MG/DL (ref 74–109)
HCT VFR BLD AUTO: 36.6 % (ref 37–47)
HGB BLD-MCNC: 11.6 G/DL (ref 12–16)
IMM GRANULOCYTES # BLD: 0 K/UL
LACTATE BLDV-SCNC: 1.6 MMOL/L (ref 0.5–1.9)
LYMPHOCYTES # BLD: 1.4 K/UL (ref 1.1–4.5)
LYMPHOCYTES NFR BLD: 17.6 % (ref 20–40)
MCH RBC QN AUTO: 28.5 PG (ref 27–31)
MCHC RBC AUTO-ENTMCNC: 31.7 G/DL (ref 33–37)
MCV RBC AUTO: 89.9 FL (ref 81–99)
MONOCYTES # BLD: 0.3 K/UL (ref 0–0.9)
MONOCYTES NFR BLD: 3.6 % (ref 0–10)
NEUTROPHILS # BLD: 6.3 K/UL (ref 1.5–7.5)
NEUTS SEG NFR BLD: 76.6 % (ref 50–65)
ORGANISM: ABNORMAL
PLATELET # BLD AUTO: 336 K/UL (ref 130–400)
PMV BLD AUTO: 8.7 FL (ref 9.4–12.3)
POTASSIUM SERPL-SCNC: 4.1 MMOL/L (ref 3.5–5)
PROT SERPL-MCNC: 7.8 G/DL (ref 6.6–8.7)
RBC # BLD AUTO: 4.07 M/UL (ref 4.2–5.4)
SODIUM SERPL-SCNC: 135 MMOL/L (ref 136–145)
WBC # BLD AUTO: 8.2 K/UL (ref 4.8–10.8)

## 2023-05-31 PROCEDURE — 6370000000 HC RX 637 (ALT 250 FOR IP)

## 2023-05-31 PROCEDURE — 80053 COMPREHEN METABOLIC PANEL: CPT

## 2023-05-31 PROCEDURE — 85025 COMPLETE CBC W/AUTO DIFF WBC: CPT

## 2023-05-31 PROCEDURE — 94760 N-INVAS EAR/PLS OXIMETRY 1: CPT

## 2023-05-31 PROCEDURE — 93971 EXTREMITY STUDY: CPT

## 2023-05-31 PROCEDURE — 2580000003 HC RX 258: Performed by: NURSE PRACTITIONER

## 2023-05-31 PROCEDURE — 1210000000 HC MED SURG R&B

## 2023-05-31 PROCEDURE — 87040 BLOOD CULTURE FOR BACTERIA: CPT

## 2023-05-31 PROCEDURE — 99215 OFFICE O/P EST HI 40 MIN: CPT | Performed by: FAMILY MEDICINE

## 2023-05-31 PROCEDURE — 93971 EXTREMITY STUDY: CPT | Performed by: SURGERY

## 2023-05-31 PROCEDURE — 83605 ASSAY OF LACTIC ACID: CPT

## 2023-05-31 PROCEDURE — 1111F DSCHRG MED/CURRENT MED MERGE: CPT | Performed by: FAMILY MEDICINE

## 2023-05-31 PROCEDURE — 99285 EMERGENCY DEPT VISIT HI MDM: CPT

## 2023-05-31 PROCEDURE — 2580000003 HC RX 258

## 2023-05-31 PROCEDURE — 6360000002 HC RX W HCPCS: Performed by: NURSE PRACTITIONER

## 2023-05-31 PROCEDURE — 6360000002 HC RX W HCPCS

## 2023-05-31 PROCEDURE — 36415 COLL VENOUS BLD VENIPUNCTURE: CPT

## 2023-05-31 PROCEDURE — 3E0436Z INTRODUCTION OF NUTRITIONAL SUBSTANCE INTO CENTRAL VEIN, PERCUTANEOUS APPROACH: ICD-10-PCS | Performed by: INTERNAL MEDICINE

## 2023-05-31 RX ORDER — BACLOFEN 10 MG/1
5 TABLET ORAL 2 TIMES DAILY
Status: DISCONTINUED | OUTPATIENT
Start: 2023-05-31 | End: 2023-06-05 | Stop reason: HOSPADM

## 2023-05-31 RX ORDER — DIPHENHYDRAMINE HYDROCHLORIDE 50 MG/ML
25 INJECTION INTRAMUSCULAR; INTRAVENOUS EVERY 4 HOURS PRN
Status: DISCONTINUED | OUTPATIENT
Start: 2023-05-31 | End: 2023-06-05 | Stop reason: HOSPADM

## 2023-05-31 RX ORDER — SODIUM CHLORIDE 9 MG/ML
INJECTION, SOLUTION INTRAVENOUS PRN
Status: DISCONTINUED | OUTPATIENT
Start: 2023-05-31 | End: 2023-06-05 | Stop reason: HOSPADM

## 2023-05-31 RX ORDER — FAMOTIDINE 10 MG/ML
20 INJECTION, SOLUTION INTRAVENOUS DAILY
Status: DISCONTINUED | OUTPATIENT
Start: 2023-06-01 | End: 2023-06-05 | Stop reason: HOSPADM

## 2023-05-31 RX ORDER — ROPINIROLE 0.5 MG/1
0.5 TABLET, FILM COATED ORAL 3 TIMES DAILY
Status: DISCONTINUED | OUTPATIENT
Start: 2023-05-31 | End: 2023-06-05 | Stop reason: HOSPADM

## 2023-05-31 RX ORDER — ONDANSETRON 2 MG/ML
4 INJECTION INTRAMUSCULAR; INTRAVENOUS EVERY 6 HOURS PRN
Status: DISCONTINUED | OUTPATIENT
Start: 2023-05-31 | End: 2023-06-05 | Stop reason: HOSPADM

## 2023-05-31 RX ORDER — ENOXAPARIN SODIUM 100 MG/ML
30 INJECTION SUBCUTANEOUS DAILY
Status: DISCONTINUED | OUTPATIENT
Start: 2023-05-31 | End: 2023-06-05 | Stop reason: HOSPADM

## 2023-05-31 RX ORDER — POLYETHYLENE GLYCOL 3350 17 G/17G
17 POWDER, FOR SOLUTION ORAL DAILY PRN
Status: DISCONTINUED | OUTPATIENT
Start: 2023-05-31 | End: 2023-06-05 | Stop reason: HOSPADM

## 2023-05-31 RX ORDER — SODIUM CHLORIDE 9 MG/ML
INJECTION, SOLUTION INTRAVENOUS CONTINUOUS
Status: DISCONTINUED | OUTPATIENT
Start: 2023-05-31 | End: 2023-06-05 | Stop reason: HOSPADM

## 2023-05-31 RX ORDER — SODIUM CHLORIDE 0.9 % (FLUSH) 0.9 %
5-40 SYRINGE (ML) INJECTION PRN
Status: DISCONTINUED | OUTPATIENT
Start: 2023-05-31 | End: 2023-06-05 | Stop reason: HOSPADM

## 2023-05-31 RX ORDER — SODIUM CHLORIDE 0.9 % (FLUSH) 0.9 %
5-40 SYRINGE (ML) INJECTION EVERY 12 HOURS SCHEDULED
Status: DISCONTINUED | OUTPATIENT
Start: 2023-05-31 | End: 2023-06-05 | Stop reason: HOSPADM

## 2023-05-31 RX ORDER — ACETAMINOPHEN 650 MG/1
650 SUPPOSITORY RECTAL EVERY 6 HOURS PRN
Status: DISCONTINUED | OUTPATIENT
Start: 2023-05-31 | End: 2023-06-05 | Stop reason: HOSPADM

## 2023-05-31 RX ORDER — 0.9 % SODIUM CHLORIDE 0.9 %
1000 INTRAVENOUS SOLUTION INTRAVENOUS ONCE
Status: COMPLETED | OUTPATIENT
Start: 2023-05-31 | End: 2023-05-31

## 2023-05-31 RX ORDER — CITALOPRAM 10 MG/1
10 TABLET ORAL DAILY
Status: DISCONTINUED | OUTPATIENT
Start: 2023-05-31 | End: 2023-06-05 | Stop reason: HOSPADM

## 2023-05-31 RX ORDER — DICYCLOMINE HYDROCHLORIDE 10 MG/1
10 CAPSULE ORAL 2 TIMES DAILY
Status: DISCONTINUED | OUTPATIENT
Start: 2023-05-31 | End: 2023-06-05 | Stop reason: HOSPADM

## 2023-05-31 RX ORDER — ONDANSETRON 4 MG/1
4 TABLET, ORALLY DISINTEGRATING ORAL EVERY 8 HOURS PRN
Status: DISCONTINUED | OUTPATIENT
Start: 2023-05-31 | End: 2023-06-05 | Stop reason: HOSPADM

## 2023-05-31 RX ORDER — ACETAMINOPHEN 325 MG/1
650 TABLET ORAL EVERY 6 HOURS PRN
Status: DISCONTINUED | OUTPATIENT
Start: 2023-05-31 | End: 2023-06-05 | Stop reason: HOSPADM

## 2023-05-31 RX ADMIN — DICYCLOMINE HYDROCHLORIDE 10 MG: 10 CAPSULE ORAL at 20:37

## 2023-05-31 RX ADMIN — BACLOFEN 5 MG: 10 TABLET ORAL at 20:36

## 2023-05-31 RX ADMIN — ONDANSETRON 4 MG: 4 TABLET, ORALLY DISINTEGRATING ORAL at 16:13

## 2023-05-31 RX ADMIN — ENOXAPARIN SODIUM 30 MG: 100 INJECTION SUBCUTANEOUS at 16:13

## 2023-05-31 RX ADMIN — SODIUM CHLORIDE: 9 INJECTION, SOLUTION INTRAVENOUS at 18:22

## 2023-05-31 RX ADMIN — ROPINIROLE HYDROCHLORIDE 0.5 MG: 0.5 TABLET, FILM COATED ORAL at 20:36

## 2023-05-31 RX ADMIN — SODIUM CHLORIDE 1000 ML: 9 INJECTION, SOLUTION INTRAVENOUS at 16:04

## 2023-05-31 RX ADMIN — ONDANSETRON HYDROCHLORIDE 4 MG: 2 INJECTION, SOLUTION INTRAMUSCULAR; INTRAVENOUS at 16:55

## 2023-05-31 RX ADMIN — CITALOPRAM HYDROBROMIDE 10 MG: 10 TABLET ORAL at 18:14

## 2023-05-31 RX ADMIN — ROPINIROLE HYDROCHLORIDE 0.5 MG: 0.5 TABLET, FILM COATED ORAL at 18:13

## 2023-05-31 RX ADMIN — SODIUM CHLORIDE: 9 INJECTION, SOLUTION INTRAVENOUS at 18:16

## 2023-05-31 RX ADMIN — VANCOMYCIN HYDROCHLORIDE 1250 MG: 10 INJECTION, POWDER, LYOPHILIZED, FOR SOLUTION INTRAVENOUS at 18:15

## 2023-05-31 SDOH — SOCIAL STABILITY - SOCIAL INSECURITY: PROBLEM RELATED TO SOCIAL ENVIRONMENT, UNSPECIFIED: Z60.9

## 2023-05-31 ASSESSMENT — PAIN SCALES - GENERAL
PAINLEVEL_OUTOF10: 8
PAINLEVEL_OUTOF10: 6

## 2023-05-31 ASSESSMENT — ENCOUNTER SYMPTOMS
VOMITING: 0
NAUSEA: 1

## 2023-05-31 ASSESSMENT — PAIN - FUNCTIONAL ASSESSMENT: PAIN_FUNCTIONAL_ASSESSMENT: 0-10

## 2023-05-31 ASSESSMENT — PAIN DESCRIPTION - ORIENTATION: ORIENTATION: RIGHT

## 2023-05-31 ASSESSMENT — PAIN DESCRIPTION - DESCRIPTORS: DESCRIPTORS: THROBBING

## 2023-05-31 ASSESSMENT — PAIN DESCRIPTION - LOCATION: LOCATION: ARM

## 2023-05-31 NOTE — PROGRESS NOTES
Medication Sig Dispense Refill    Ondansetron HCl (ZOFRAN IV) Infuse intravenously      rOPINIRole (REQUIP) 0.5 MG tablet Take 1 tablet by mouth 3 times daily 90 tablet 0    citalopram (CELEXA) 10 MG tablet Take 1 tablet by mouth daily 30 tablet 0    ondansetron (ZOFRAN) 4 MG tablet TAKE 1 TABLET BY MOUTH EVERY 6 (SIX) HOURS IF NEEDED FOR NAUSEA OR VOMITING. diphenhydrAMINE (BENADRYL) 50 MG/ML injection Infuse 0.5 mLs intravenously every 4 hours as needed for Itching      hyoscyamine (OSCIMIN) 125 MCG TBDP dispersible tablet DISSOLVE 1 TABLET UNDER THE TONGUE EVERY 6 HOURS IF NEEDED FOR CRAMPING OR DIARRHEA      CATHFLO ACTIVASE 2 MG injection       Baclofen (LYVISPAH) 10 MG PACK Take by mouth 5 mg twice daily      famotidine (PEPCID) 20 MG/2ML SOLN injection Infuse 2 mLs intravenously daily      dicyclomine (BENTYL) 10 MG capsule Take 1 capsule by mouth in the morning and at bedtime          Medications patient taking as of now reconciled against medications ordered at time of hospital discharge: Yes    A comprehensive review of systems was negative except for what was noted in the HPI. Objective:    /82   Pulse (!) 134   Temp 97.4 °F (36.3 °C) (Temporal)   Ht 5' (1.524 m)   Wt 105 lb 9.6 oz (47.9 kg)   LMP 01/14/2018 (Exact Date)   SpO2 99%   BMI 20.62 kg/m²         An electronic signature was used to authenticate this note.   --Brenda Piper MD

## 2023-06-01 LAB
ANION GAP SERPL CALCULATED.3IONS-SCNC: 10 MMOL/L (ref 7–19)
BASOPHILS # BLD: 0.1 K/UL (ref 0–0.2)
BASOPHILS NFR BLD: 1.1 % (ref 0–1)
BUN SERPL-MCNC: 16 MG/DL (ref 6–20)
CALCIUM SERPL-MCNC: 7.9 MG/DL (ref 8.6–10)
CHLORIDE SERPL-SCNC: 106 MMOL/L (ref 98–111)
CO2 SERPL-SCNC: 21 MMOL/L (ref 22–29)
CREAT SERPL-MCNC: 1 MG/DL (ref 0.5–0.9)
EKG P AXIS: 73 DEGREES
EKG P-R INTERVAL: 132 MS
EKG Q-T INTERVAL: 374 MS
EKG QRS DURATION: 64 MS
EKG QTC CALCULATION (BAZETT): 417 MS
EKG T AXIS: 73 DEGREES
EOSINOPHIL # BLD: 0.1 K/UL (ref 0–0.6)
EOSINOPHIL NFR BLD: 1.1 % (ref 0–5)
ERYTHROCYTE [DISTWIDTH] IN BLOOD BY AUTOMATED COUNT: 13.7 % (ref 11.5–14.5)
GLUCOSE SERPL-MCNC: 92 MG/DL (ref 74–109)
HCT VFR BLD AUTO: 29.6 % (ref 37–47)
HGB BLD-MCNC: 9.3 G/DL (ref 12–16)
IMM GRANULOCYTES # BLD: 0 K/UL
LYMPHOCYTES # BLD: 2 K/UL (ref 1.1–4.5)
LYMPHOCYTES NFR BLD: 31.5 % (ref 20–40)
MCH RBC QN AUTO: 28.2 PG (ref 27–31)
MCHC RBC AUTO-ENTMCNC: 31.4 G/DL (ref 33–37)
MCV RBC AUTO: 89.7 FL (ref 81–99)
MONOCYTES # BLD: 0.4 K/UL (ref 0–0.9)
MONOCYTES NFR BLD: 7 % (ref 0–10)
NEUTROPHILS # BLD: 3.7 K/UL (ref 1.5–7.5)
NEUTS SEG NFR BLD: 59 % (ref 50–65)
PLATELET # BLD AUTO: 246 K/UL (ref 130–400)
PMV BLD AUTO: 8.7 FL (ref 9.4–12.3)
POTASSIUM SERPL-SCNC: 4 MMOL/L (ref 3.5–5)
RBC # BLD AUTO: 3.3 M/UL (ref 4.2–5.4)
SODIUM SERPL-SCNC: 137 MMOL/L (ref 136–145)
VANCOMYCIN SERPL-MCNC: 20.6 UG/ML
WBC # BLD AUTO: 6.3 K/UL (ref 4.8–10.8)

## 2023-06-01 PROCEDURE — 6360000002 HC RX W HCPCS

## 2023-06-01 PROCEDURE — 36415 COLL VENOUS BLD VENIPUNCTURE: CPT

## 2023-06-01 PROCEDURE — 80202 ASSAY OF VANCOMYCIN: CPT

## 2023-06-01 PROCEDURE — 2580000003 HC RX 258

## 2023-06-01 PROCEDURE — 93010 ELECTROCARDIOGRAM REPORT: CPT | Performed by: INTERNAL MEDICINE

## 2023-06-01 PROCEDURE — 6370000000 HC RX 637 (ALT 250 FOR IP)

## 2023-06-01 PROCEDURE — 80048 BASIC METABOLIC PNL TOTAL CA: CPT

## 2023-06-01 PROCEDURE — 2500000003 HC RX 250 WO HCPCS

## 2023-06-01 PROCEDURE — 94760 N-INVAS EAR/PLS OXIMETRY 1: CPT

## 2023-06-01 PROCEDURE — 1210000000 HC MED SURG R&B

## 2023-06-01 PROCEDURE — 85025 COMPLETE CBC W/AUTO DIFF WBC: CPT

## 2023-06-01 RX ADMIN — DIPHENHYDRAMINE HYDROCHLORIDE 25 MG: 50 INJECTION, SOLUTION INTRAMUSCULAR; INTRAVENOUS at 15:30

## 2023-06-01 RX ADMIN — ENOXAPARIN SODIUM 30 MG: 100 INJECTION SUBCUTANEOUS at 08:47

## 2023-06-01 RX ADMIN — ROPINIROLE HYDROCHLORIDE 0.5 MG: 0.5 TABLET, FILM COATED ORAL at 21:00

## 2023-06-01 RX ADMIN — ONDANSETRON HYDROCHLORIDE 4 MG: 2 INJECTION, SOLUTION INTRAMUSCULAR; INTRAVENOUS at 08:47

## 2023-06-01 RX ADMIN — FAMOTIDINE 20 MG: 10 INJECTION, SOLUTION INTRAVENOUS at 08:47

## 2023-06-01 RX ADMIN — DICYCLOMINE HYDROCHLORIDE 10 MG: 10 CAPSULE ORAL at 08:47

## 2023-06-01 RX ADMIN — ONDANSETRON HYDROCHLORIDE 4 MG: 2 INJECTION, SOLUTION INTRAMUSCULAR; INTRAVENOUS at 15:30

## 2023-06-01 RX ADMIN — BACLOFEN 5 MG: 10 TABLET ORAL at 22:19

## 2023-06-01 RX ADMIN — DICYCLOMINE HYDROCHLORIDE 10 MG: 10 CAPSULE ORAL at 22:20

## 2023-06-01 RX ADMIN — ONDANSETRON HYDROCHLORIDE 4 MG: 2 INJECTION, SOLUTION INTRAMUSCULAR; INTRAVENOUS at 22:20

## 2023-06-01 RX ADMIN — SODIUM CHLORIDE: 9 INJECTION, SOLUTION INTRAVENOUS at 15:31

## 2023-06-01 RX ADMIN — ROPINIROLE HYDROCHLORIDE 0.5 MG: 0.5 TABLET, FILM COATED ORAL at 08:46

## 2023-06-01 RX ADMIN — SODIUM CHLORIDE, PRESERVATIVE FREE 10 ML: 5 INJECTION INTRAVENOUS at 15:31

## 2023-06-01 RX ADMIN — DIPHENHYDRAMINE HYDROCHLORIDE 25 MG: 50 INJECTION, SOLUTION INTRAMUSCULAR; INTRAVENOUS at 22:20

## 2023-06-01 RX ADMIN — CITALOPRAM HYDROBROMIDE 10 MG: 10 TABLET ORAL at 08:47

## 2023-06-01 RX ADMIN — BACLOFEN 5 MG: 10 TABLET ORAL at 08:47

## 2023-06-01 RX ADMIN — VANCOMYCIN HYDROCHLORIDE 1000 MG: 10 INJECTION, POWDER, LYOPHILIZED, FOR SOLUTION INTRAVENOUS at 15:38

## 2023-06-01 RX ADMIN — ROPINIROLE HYDROCHLORIDE 0.5 MG: 0.5 TABLET, FILM COATED ORAL at 15:30

## 2023-06-01 RX ADMIN — SODIUM CHLORIDE, PRESERVATIVE FREE 10 ML: 5 INJECTION INTRAVENOUS at 22:20

## 2023-06-01 SDOH — ECONOMIC STABILITY: FOOD INSECURITY: WITHIN THE PAST 12 MONTHS, YOU WORRIED THAT YOUR FOOD WOULD RUN OUT BEFORE YOU GOT MONEY TO BUY MORE.: NEVER TRUE

## 2023-06-01 SDOH — ECONOMIC STABILITY: FOOD INSECURITY: WITHIN THE PAST 12 MONTHS, THE FOOD YOU BOUGHT JUST DIDN'T LAST AND YOU DIDN'T HAVE MONEY TO GET MORE.: NEVER TRUE

## 2023-06-01 SDOH — HEALTH STABILITY: PHYSICAL HEALTH: ON AVERAGE, HOW MANY DAYS PER WEEK DO YOU ENGAGE IN MODERATE TO STRENUOUS EXERCISE (LIKE A BRISK WALK)?: 0 DAYS

## 2023-06-01 SDOH — HEALTH STABILITY: PHYSICAL HEALTH: ON AVERAGE, HOW MANY MINUTES DO YOU ENGAGE IN EXERCISE AT THIS LEVEL?: 0 MIN

## 2023-06-01 ASSESSMENT — SOCIAL DETERMINANTS OF HEALTH (SDOH)
HOW OFTEN DO YOU ATTEND CHURCH OR RELIGIOUS SERVICES?: 1 TO 4 TIMES PER YEAR
WITHIN THE LAST YEAR, HAVE YOU BEEN KICKED, HIT, SLAPPED, OR OTHERWISE PHYSICALLY HURT BY YOUR PARTNER OR EX-PARTNER?: NO
IN A TYPICAL WEEK, HOW MANY TIMES DO YOU TALK ON THE PHONE WITH FAMILY, FRIENDS, OR NEIGHBORS?: MORE THAN THREE TIMES A WEEK
WITHIN THE LAST YEAR, HAVE TO BEEN RAPED OR FORCED TO HAVE ANY KIND OF SEXUAL ACTIVITY BY YOUR PARTNER OR EX-PARTNER?: NO
DO YOU BELONG TO ANY CLUBS OR ORGANIZATIONS SUCH AS CHURCH GROUPS UNIONS, FRATERNAL OR ATHLETIC GROUPS, OR SCHOOL GROUPS?: NO
WITHIN THE LAST YEAR, HAVE YOU BEEN AFRAID OF YOUR PARTNER OR EX-PARTNER?: NO
HOW HARD IS IT FOR YOU TO PAY FOR THE VERY BASICS LIKE FOOD, HOUSING, MEDICAL CARE, AND HEATING?: NOT VERY HARD
HOW OFTEN DO YOU GET TOGETHER WITH FRIENDS OR RELATIVES?: THREE TIMES A WEEK
WITHIN THE LAST YEAR, HAVE YOU BEEN HUMILIATED OR EMOTIONALLY ABUSED IN OTHER WAYS BY YOUR PARTNER OR EX-PARTNER?: NO
HOW OFTEN DO YOU ATTENT MEETINGS OF THE CLUB OR ORGANIZATION YOU BELONG TO?: NEVER

## 2023-06-01 ASSESSMENT — PATIENT HEALTH QUESTIONNAIRE - PHQ9
7. TROUBLE CONCENTRATING ON THINGS, SUCH AS READING THE NEWSPAPER OR WATCHING TELEVISION: SEVERAL DAYS
4. FEELING TIRED OR HAVING LITTLE ENERGY: MORE THAN HALF THE DAYS
3. TROUBLE FALLING OR STAYING ASLEEP: SEVERAL DAYS
5. POOR APPETITE OR OVEREATING: MORE THAN HALF THE DAYS
SUM OF ALL RESPONSES TO PHQ QUESTIONS 1-9: 10
SUM OF ALL RESPONSES TO PHQ9 QUESTIONS 1 & 2: 3
2. FEELING DOWN, DEPRESSED OR HOPELESS: SEVERAL DAYS
9. THOUGHTS THAT YOU WOULD BE BETTER OFF DEAD, OR OF HURTING YOURSELF: NOT AT ALL
1. LITTLE INTEREST OR PLEASURE IN DOING THINGS: MORE THAN HALF THE DAYS
8. MOVING OR SPEAKING SO SLOWLY THAT OTHER PEOPLE COULD HAVE NOTICED. OR THE OPPOSITE, BEING SO FIGETY OR RESTLESS THAT YOU HAVE BEEN MOVING AROUND A LOT MORE THAN USUAL: NOT AT ALL
6. FEELING BAD ABOUT YOURSELF - OR THAT YOU ARE A FAILURE OR HAVE LET YOURSELF OR YOUR FAMILY DOWN: SEVERAL DAYS

## 2023-06-01 ASSESSMENT — ENCOUNTER SYMPTOMS: NAUSEA: 1

## 2023-06-02 LAB
ANION GAP SERPL CALCULATED.3IONS-SCNC: 12 MMOL/L (ref 7–19)
BASOPHILS # BLD: 0.1 K/UL (ref 0–0.2)
BASOPHILS NFR BLD: 0.7 % (ref 0–1)
BUN SERPL-MCNC: 11 MG/DL (ref 6–20)
CALCIUM SERPL-MCNC: 8.4 MG/DL (ref 8.6–10)
CHLORIDE SERPL-SCNC: 104 MMOL/L (ref 98–111)
CO2 SERPL-SCNC: 21 MMOL/L (ref 22–29)
CREAT SERPL-MCNC: 0.9 MG/DL (ref 0.5–0.9)
EOSINOPHIL # BLD: 0.1 K/UL (ref 0–0.6)
EOSINOPHIL NFR BLD: 1.2 % (ref 0–5)
ERYTHROCYTE [DISTWIDTH] IN BLOOD BY AUTOMATED COUNT: 13.5 % (ref 11.5–14.5)
GLUCOSE SERPL-MCNC: 118 MG/DL (ref 74–109)
HCT VFR BLD AUTO: 30 % (ref 37–47)
HGB BLD-MCNC: 9.8 G/DL (ref 12–16)
IMM GRANULOCYTES # BLD: 0 K/UL
LYMPHOCYTES # BLD: 1.1 K/UL (ref 1.1–4.5)
LYMPHOCYTES NFR BLD: 15.4 % (ref 20–40)
MCH RBC QN AUTO: 28.8 PG (ref 27–31)
MCHC RBC AUTO-ENTMCNC: 32.7 G/DL (ref 33–37)
MCV RBC AUTO: 88.2 FL (ref 81–99)
MONOCYTES # BLD: 0.4 K/UL (ref 0–0.9)
MONOCYTES NFR BLD: 5.2 % (ref 0–10)
NEUTROPHILS # BLD: 5.6 K/UL (ref 1.5–7.5)
NEUTS SEG NFR BLD: 77.1 % (ref 50–65)
PLATELET # BLD AUTO: 267 K/UL (ref 130–400)
PMV BLD AUTO: 8.5 FL (ref 9.4–12.3)
POTASSIUM SERPL-SCNC: 3.8 MMOL/L (ref 3.5–5)
RBC # BLD AUTO: 3.4 M/UL (ref 4.2–5.4)
SODIUM SERPL-SCNC: 137 MMOL/L (ref 136–145)
WBC # BLD AUTO: 7.3 K/UL (ref 4.8–10.8)

## 2023-06-02 PROCEDURE — 6360000002 HC RX W HCPCS

## 2023-06-02 PROCEDURE — 80048 BASIC METABOLIC PNL TOTAL CA: CPT

## 2023-06-02 PROCEDURE — 1210000000 HC MED SURG R&B

## 2023-06-02 PROCEDURE — 2500000003 HC RX 250 WO HCPCS

## 2023-06-02 PROCEDURE — 85025 COMPLETE CBC W/AUTO DIFF WBC: CPT

## 2023-06-02 PROCEDURE — 94760 N-INVAS EAR/PLS OXIMETRY 1: CPT

## 2023-06-02 PROCEDURE — 6370000000 HC RX 637 (ALT 250 FOR IP)

## 2023-06-02 PROCEDURE — 2580000003 HC RX 258

## 2023-06-02 PROCEDURE — 36415 COLL VENOUS BLD VENIPUNCTURE: CPT

## 2023-06-02 RX ADMIN — DIPHENHYDRAMINE HYDROCHLORIDE 25 MG: 50 INJECTION, SOLUTION INTRAMUSCULAR; INTRAVENOUS at 09:25

## 2023-06-02 RX ADMIN — BACLOFEN 5 MG: 10 TABLET ORAL at 09:08

## 2023-06-02 RX ADMIN — VANCOMYCIN HYDROCHLORIDE 1000 MG: 10 INJECTION, POWDER, LYOPHILIZED, FOR SOLUTION INTRAVENOUS at 15:00

## 2023-06-02 RX ADMIN — ONDANSETRON HYDROCHLORIDE 4 MG: 2 INJECTION, SOLUTION INTRAMUSCULAR; INTRAVENOUS at 16:43

## 2023-06-02 RX ADMIN — ROPINIROLE HYDROCHLORIDE 0.5 MG: 0.5 TABLET, FILM COATED ORAL at 09:09

## 2023-06-02 RX ADMIN — FAMOTIDINE 20 MG: 10 INJECTION, SOLUTION INTRAVENOUS at 09:09

## 2023-06-02 RX ADMIN — ROPINIROLE HYDROCHLORIDE 0.5 MG: 0.5 TABLET, FILM COATED ORAL at 13:53

## 2023-06-02 RX ADMIN — DICYCLOMINE HYDROCHLORIDE 10 MG: 10 CAPSULE ORAL at 09:09

## 2023-06-02 RX ADMIN — DIPHENHYDRAMINE HYDROCHLORIDE 25 MG: 50 INJECTION, SOLUTION INTRAMUSCULAR; INTRAVENOUS at 13:52

## 2023-06-02 RX ADMIN — DICYCLOMINE HYDROCHLORIDE 10 MG: 10 CAPSULE ORAL at 21:18

## 2023-06-02 RX ADMIN — ONDANSETRON HYDROCHLORIDE 4 MG: 2 INJECTION, SOLUTION INTRAMUSCULAR; INTRAVENOUS at 09:26

## 2023-06-02 RX ADMIN — ENOXAPARIN SODIUM 30 MG: 100 INJECTION SUBCUTANEOUS at 09:09

## 2023-06-02 RX ADMIN — BACLOFEN 5 MG: 10 TABLET ORAL at 21:19

## 2023-06-02 RX ADMIN — CITALOPRAM HYDROBROMIDE 10 MG: 10 TABLET ORAL at 09:08

## 2023-06-02 RX ADMIN — ROPINIROLE HYDROCHLORIDE 0.5 MG: 0.5 TABLET, FILM COATED ORAL at 21:18

## 2023-06-02 RX ADMIN — DIPHENHYDRAMINE HYDROCHLORIDE 25 MG: 50 INJECTION, SOLUTION INTRAMUSCULAR; INTRAVENOUS at 21:36

## 2023-06-02 RX ADMIN — SODIUM CHLORIDE, PRESERVATIVE FREE 10 ML: 5 INJECTION INTRAVENOUS at 21:19

## 2023-06-02 RX ADMIN — SODIUM CHLORIDE, PRESERVATIVE FREE 10 ML: 5 INJECTION INTRAVENOUS at 09:09

## 2023-06-02 ASSESSMENT — PAIN SCALES - GENERAL: PAINLEVEL_OUTOF10: 0

## 2023-06-03 LAB
ANION GAP SERPL CALCULATED.3IONS-SCNC: 11 MMOL/L (ref 7–19)
BACTERIA BLD CULT ORG #2: NORMAL
BASOPHILS # BLD: 0.1 K/UL (ref 0–0.2)
BASOPHILS NFR BLD: 1.3 % (ref 0–1)
BUN SERPL-MCNC: 10 MG/DL (ref 6–20)
CALCIUM SERPL-MCNC: 9.5 MG/DL (ref 8.6–10)
CHLORIDE SERPL-SCNC: 102 MMOL/L (ref 98–111)
CO2 SERPL-SCNC: 24 MMOL/L (ref 22–29)
CREAT SERPL-MCNC: 0.9 MG/DL (ref 0.5–0.9)
EOSINOPHIL # BLD: 0.1 K/UL (ref 0–0.6)
EOSINOPHIL NFR BLD: 1.9 % (ref 0–5)
ERYTHROCYTE [DISTWIDTH] IN BLOOD BY AUTOMATED COUNT: 13.3 % (ref 11.5–14.5)
GLUCOSE SERPL-MCNC: 108 MG/DL (ref 74–109)
HCT VFR BLD AUTO: 34 % (ref 37–47)
HGB BLD-MCNC: 10.8 G/DL (ref 12–16)
IMM GRANULOCYTES # BLD: 0 K/UL
LYMPHOCYTES # BLD: 1.9 K/UL (ref 1.1–4.5)
LYMPHOCYTES NFR BLD: 27.6 % (ref 20–40)
MCH RBC QN AUTO: 28.3 PG (ref 27–31)
MCHC RBC AUTO-ENTMCNC: 31.8 G/DL (ref 33–37)
MCV RBC AUTO: 89.2 FL (ref 81–99)
MONOCYTES # BLD: 0.5 K/UL (ref 0–0.9)
MONOCYTES NFR BLD: 7.1 % (ref 0–10)
NEUTROPHILS # BLD: 4.2 K/UL (ref 1.5–7.5)
NEUTS SEG NFR BLD: 61.8 % (ref 50–65)
PLATELET # BLD AUTO: 301 K/UL (ref 130–400)
PMV BLD AUTO: 8.9 FL (ref 9.4–12.3)
POTASSIUM SERPL-SCNC: 4.1 MMOL/L (ref 3.5–5)
RBC # BLD AUTO: 3.81 M/UL (ref 4.2–5.4)
SODIUM SERPL-SCNC: 137 MMOL/L (ref 136–145)
WBC # BLD AUTO: 6.9 K/UL (ref 4.8–10.8)

## 2023-06-03 PROCEDURE — 36415 COLL VENOUS BLD VENIPUNCTURE: CPT

## 2023-06-03 PROCEDURE — 2580000003 HC RX 258

## 2023-06-03 PROCEDURE — 6360000002 HC RX W HCPCS

## 2023-06-03 PROCEDURE — 1210000000 HC MED SURG R&B

## 2023-06-03 PROCEDURE — 6370000000 HC RX 637 (ALT 250 FOR IP)

## 2023-06-03 PROCEDURE — 80048 BASIC METABOLIC PNL TOTAL CA: CPT

## 2023-06-03 PROCEDURE — 2500000003 HC RX 250 WO HCPCS

## 2023-06-03 PROCEDURE — 85025 COMPLETE CBC W/AUTO DIFF WBC: CPT

## 2023-06-03 PROCEDURE — 94760 N-INVAS EAR/PLS OXIMETRY 1: CPT

## 2023-06-03 RX ADMIN — DIPHENHYDRAMINE HYDROCHLORIDE 25 MG: 50 INJECTION, SOLUTION INTRAMUSCULAR; INTRAVENOUS at 18:38

## 2023-06-03 RX ADMIN — ROPINIROLE HYDROCHLORIDE 0.5 MG: 0.5 TABLET, FILM COATED ORAL at 20:34

## 2023-06-03 RX ADMIN — ROPINIROLE HYDROCHLORIDE 0.5 MG: 0.5 TABLET, FILM COATED ORAL at 08:45

## 2023-06-03 RX ADMIN — ASCORBIC ACID, VITAMIN A PALMITATE, CHOLECALCIFEROL, THIAMINE HYDROCHLORIDE, RIBOFLAVIN-5 PHOSPHATE SODIUM, PYRIDOXINE HYDROCHLORIDE, NIACINAMIDE, DEXPANTHENOL, ALPHA-TOCOPHEROL ACETATE, VITAMIN K1, FOLIC ACID, BIOTIN, CYANOCOBALAMIN: 200; 3300; 200; 6; 3.6; 6; 40; 15; 10; 150; 600; 60; 5 INJECTION, SOLUTION INTRAVENOUS at 18:27

## 2023-06-03 RX ADMIN — ONDANSETRON HYDROCHLORIDE 4 MG: 2 INJECTION, SOLUTION INTRAMUSCULAR; INTRAVENOUS at 02:28

## 2023-06-03 RX ADMIN — ONDANSETRON HYDROCHLORIDE 4 MG: 2 INJECTION, SOLUTION INTRAMUSCULAR; INTRAVENOUS at 15:17

## 2023-06-03 RX ADMIN — DICYCLOMINE HYDROCHLORIDE 10 MG: 10 CAPSULE ORAL at 20:34

## 2023-06-03 RX ADMIN — SODIUM CHLORIDE, PRESERVATIVE FREE 10 ML: 5 INJECTION INTRAVENOUS at 20:35

## 2023-06-03 RX ADMIN — SODIUM CHLORIDE, PRESERVATIVE FREE 10 ML: 5 INJECTION INTRAVENOUS at 08:46

## 2023-06-03 RX ADMIN — ONDANSETRON HYDROCHLORIDE 4 MG: 2 INJECTION, SOLUTION INTRAMUSCULAR; INTRAVENOUS at 08:45

## 2023-06-03 RX ADMIN — DIPHENHYDRAMINE HYDROCHLORIDE 25 MG: 50 INJECTION, SOLUTION INTRAMUSCULAR; INTRAVENOUS at 02:28

## 2023-06-03 RX ADMIN — DIPHENHYDRAMINE HYDROCHLORIDE 25 MG: 50 INJECTION, SOLUTION INTRAMUSCULAR; INTRAVENOUS at 20:33

## 2023-06-03 RX ADMIN — I.V. FAT EMULSION 250 ML: 20 EMULSION INTRAVENOUS at 18:27

## 2023-06-03 RX ADMIN — ENOXAPARIN SODIUM 30 MG: 100 INJECTION SUBCUTANEOUS at 08:45

## 2023-06-03 RX ADMIN — FAMOTIDINE 20 MG: 10 INJECTION, SOLUTION INTRAVENOUS at 08:45

## 2023-06-03 RX ADMIN — DICYCLOMINE HYDROCHLORIDE 10 MG: 10 CAPSULE ORAL at 08:45

## 2023-06-03 RX ADMIN — ROPINIROLE HYDROCHLORIDE 0.5 MG: 0.5 TABLET, FILM COATED ORAL at 13:30

## 2023-06-03 RX ADMIN — CITALOPRAM HYDROBROMIDE 10 MG: 10 TABLET ORAL at 08:45

## 2023-06-03 RX ADMIN — ONDANSETRON HYDROCHLORIDE 4 MG: 2 INJECTION, SOLUTION INTRAMUSCULAR; INTRAVENOUS at 20:35

## 2023-06-03 RX ADMIN — VANCOMYCIN HYDROCHLORIDE 1000 MG: 10 INJECTION, POWDER, LYOPHILIZED, FOR SOLUTION INTRAVENOUS at 09:28

## 2023-06-03 RX ADMIN — BACLOFEN 5 MG: 10 TABLET ORAL at 08:45

## 2023-06-03 RX ADMIN — DIPHENHYDRAMINE HYDROCHLORIDE 25 MG: 50 INJECTION, SOLUTION INTRAMUSCULAR; INTRAVENOUS at 08:45

## 2023-06-03 RX ADMIN — DIPHENHYDRAMINE HYDROCHLORIDE 25 MG: 50 INJECTION, SOLUTION INTRAMUSCULAR; INTRAVENOUS at 13:39

## 2023-06-03 RX ADMIN — BACLOFEN 5 MG: 10 TABLET ORAL at 20:34

## 2023-06-03 ASSESSMENT — PAIN DESCRIPTION - LOCATION: LOCATION: ABDOMEN;OTHER (COMMENT)

## 2023-06-03 ASSESSMENT — PAIN SCALES - GENERAL: PAINLEVEL_OUTOF10: 6

## 2023-06-04 LAB — VANCOMYCIN TROUGH SERPL-MCNC: 8.5 UG/ML (ref 10–20)

## 2023-06-04 PROCEDURE — 6360000002 HC RX W HCPCS

## 2023-06-04 PROCEDURE — 2580000003 HC RX 258

## 2023-06-04 PROCEDURE — 1210000000 HC MED SURG R&B

## 2023-06-04 PROCEDURE — 80202 ASSAY OF VANCOMYCIN: CPT

## 2023-06-04 PROCEDURE — 6370000000 HC RX 637 (ALT 250 FOR IP)

## 2023-06-04 PROCEDURE — 2500000003 HC RX 250 WO HCPCS

## 2023-06-04 PROCEDURE — 2500000003 HC RX 250 WO HCPCS: Performed by: INTERNAL MEDICINE

## 2023-06-04 PROCEDURE — 36592 COLLECT BLOOD FROM PICC: CPT

## 2023-06-04 RX ADMIN — ROPINIROLE HYDROCHLORIDE 0.5 MG: 0.5 TABLET, FILM COATED ORAL at 08:44

## 2023-06-04 RX ADMIN — ROPINIROLE HYDROCHLORIDE 0.5 MG: 0.5 TABLET, FILM COATED ORAL at 21:20

## 2023-06-04 RX ADMIN — ONDANSETRON HYDROCHLORIDE 4 MG: 2 INJECTION, SOLUTION INTRAMUSCULAR; INTRAVENOUS at 02:22

## 2023-06-04 RX ADMIN — DIPHENHYDRAMINE HYDROCHLORIDE 25 MG: 50 INJECTION, SOLUTION INTRAMUSCULAR; INTRAVENOUS at 02:22

## 2023-06-04 RX ADMIN — SODIUM CHLORIDE: 9 INJECTION, SOLUTION INTRAVENOUS at 02:24

## 2023-06-04 RX ADMIN — CITALOPRAM HYDROBROMIDE 10 MG: 10 TABLET ORAL at 08:44

## 2023-06-04 RX ADMIN — ASCORBIC ACID, VITAMIN A PALMITATE, CHOLECALCIFEROL, THIAMINE HYDROCHLORIDE, RIBOFLAVIN-5 PHOSPHATE SODIUM, PYRIDOXINE HYDROCHLORIDE, NIACINAMIDE, DEXPANTHENOL, ALPHA-TOCOPHEROL ACETATE, VITAMIN K1, FOLIC ACID, BIOTIN, CYANOCOBALAMIN: 200; 3300; 200; 6; 3.6; 6; 40; 15; 10; 150; 600; 60; 5 INJECTION, SOLUTION INTRAVENOUS at 18:10

## 2023-06-04 RX ADMIN — ENOXAPARIN SODIUM 30 MG: 100 INJECTION SUBCUTANEOUS at 08:43

## 2023-06-04 RX ADMIN — ONDANSETRON HYDROCHLORIDE 4 MG: 2 INJECTION, SOLUTION INTRAMUSCULAR; INTRAVENOUS at 14:34

## 2023-06-04 RX ADMIN — ONDANSETRON HYDROCHLORIDE 4 MG: 2 INJECTION, SOLUTION INTRAMUSCULAR; INTRAVENOUS at 21:19

## 2023-06-04 RX ADMIN — DICYCLOMINE HYDROCHLORIDE 10 MG: 10 CAPSULE ORAL at 21:19

## 2023-06-04 RX ADMIN — DICYCLOMINE HYDROCHLORIDE 10 MG: 10 CAPSULE ORAL at 08:44

## 2023-06-04 RX ADMIN — SODIUM CHLORIDE, PRESERVATIVE FREE 10 ML: 5 INJECTION INTRAVENOUS at 08:45

## 2023-06-04 RX ADMIN — ROPINIROLE HYDROCHLORIDE 0.5 MG: 0.5 TABLET, FILM COATED ORAL at 14:33

## 2023-06-04 RX ADMIN — FAMOTIDINE 20 MG: 10 INJECTION, SOLUTION INTRAVENOUS at 08:44

## 2023-06-04 RX ADMIN — DIPHENHYDRAMINE HYDROCHLORIDE 25 MG: 50 INJECTION, SOLUTION INTRAMUSCULAR; INTRAVENOUS at 21:19

## 2023-06-04 RX ADMIN — BACLOFEN 5 MG: 10 TABLET ORAL at 08:44

## 2023-06-04 RX ADMIN — VANCOMYCIN HYDROCHLORIDE 750 MG: 750 INJECTION, POWDER, LYOPHILIZED, FOR SOLUTION INTRAVENOUS at 15:36

## 2023-06-04 RX ADMIN — SODIUM CHLORIDE, PRESERVATIVE FREE 10 ML: 5 INJECTION INTRAVENOUS at 21:00

## 2023-06-04 RX ADMIN — VANCOMYCIN HYDROCHLORIDE 1000 MG: 10 INJECTION, POWDER, LYOPHILIZED, FOR SOLUTION INTRAVENOUS at 02:26

## 2023-06-04 RX ADMIN — ONDANSETRON HYDROCHLORIDE 4 MG: 2 INJECTION, SOLUTION INTRAMUSCULAR; INTRAVENOUS at 08:55

## 2023-06-04 RX ADMIN — DIPHENHYDRAMINE HYDROCHLORIDE 25 MG: 50 INJECTION, SOLUTION INTRAMUSCULAR; INTRAVENOUS at 14:34

## 2023-06-04 RX ADMIN — BACLOFEN 5 MG: 10 TABLET ORAL at 21:19

## 2023-06-04 RX ADMIN — DIPHENHYDRAMINE HYDROCHLORIDE 25 MG: 50 INJECTION, SOLUTION INTRAMUSCULAR; INTRAVENOUS at 08:55

## 2023-06-04 ASSESSMENT — PAIN DESCRIPTION - LOCATION: LOCATION: GENERALIZED

## 2023-06-04 ASSESSMENT — PAIN SCALES - GENERAL: PAINLEVEL_OUTOF10: 4

## 2023-06-05 VITALS
BODY MASS INDEX: 21.37 KG/M2 | HEART RATE: 74 BPM | TEMPERATURE: 97.7 F | WEIGHT: 113.19 LBS | OXYGEN SATURATION: 99 % | SYSTOLIC BLOOD PRESSURE: 106 MMHG | RESPIRATION RATE: 20 BRPM | DIASTOLIC BLOOD PRESSURE: 58 MMHG | HEIGHT: 61 IN

## 2023-06-05 LAB
BACTERIA BLD CULT ORG #2: NORMAL
BACTERIA BLD CULT: NORMAL
VANCOMYCIN TROUGH SERPL-MCNC: 12.4 UG/ML (ref 10–20)

## 2023-06-05 PROCEDURE — 36415 COLL VENOUS BLD VENIPUNCTURE: CPT

## 2023-06-05 PROCEDURE — 2580000003 HC RX 258

## 2023-06-05 PROCEDURE — 94760 N-INVAS EAR/PLS OXIMETRY 1: CPT

## 2023-06-05 PROCEDURE — 2500000003 HC RX 250 WO HCPCS

## 2023-06-05 PROCEDURE — 80202 ASSAY OF VANCOMYCIN: CPT

## 2023-06-05 PROCEDURE — 6360000002 HC RX W HCPCS

## 2023-06-05 PROCEDURE — 6370000000 HC RX 637 (ALT 250 FOR IP)

## 2023-06-05 RX ADMIN — BACLOFEN 5 MG: 10 TABLET ORAL at 08:22

## 2023-06-05 RX ADMIN — ONDANSETRON HYDROCHLORIDE 4 MG: 2 INJECTION, SOLUTION INTRAMUSCULAR; INTRAVENOUS at 14:39

## 2023-06-05 RX ADMIN — ONDANSETRON HYDROCHLORIDE 4 MG: 2 INJECTION, SOLUTION INTRAMUSCULAR; INTRAVENOUS at 08:22

## 2023-06-05 RX ADMIN — FAMOTIDINE 20 MG: 10 INJECTION, SOLUTION INTRAVENOUS at 08:22

## 2023-06-05 RX ADMIN — DICYCLOMINE HYDROCHLORIDE 10 MG: 10 CAPSULE ORAL at 08:23

## 2023-06-05 RX ADMIN — DIPHENHYDRAMINE HYDROCHLORIDE 25 MG: 50 INJECTION, SOLUTION INTRAMUSCULAR; INTRAVENOUS at 08:22

## 2023-06-05 RX ADMIN — VANCOMYCIN HYDROCHLORIDE 750 MG: 750 INJECTION, POWDER, LYOPHILIZED, FOR SOLUTION INTRAVENOUS at 01:55

## 2023-06-05 RX ADMIN — CITALOPRAM HYDROBROMIDE 10 MG: 10 TABLET ORAL at 08:23

## 2023-06-05 RX ADMIN — ROPINIROLE HYDROCHLORIDE 0.5 MG: 0.5 TABLET, FILM COATED ORAL at 08:23

## 2023-06-05 RX ADMIN — ROPINIROLE HYDROCHLORIDE 0.5 MG: 0.5 TABLET, FILM COATED ORAL at 14:39

## 2023-06-05 RX ADMIN — DIPHENHYDRAMINE HYDROCHLORIDE 25 MG: 50 INJECTION, SOLUTION INTRAMUSCULAR; INTRAVENOUS at 14:39

## 2023-06-05 RX ADMIN — ENOXAPARIN SODIUM 30 MG: 100 INJECTION SUBCUTANEOUS at 08:23

## 2023-06-05 NOTE — DISCHARGE SUMMARY
Resp 20   Ht 5' 1\" (1.549 m)   Wt 113 lb 3 oz (51.3 kg)   LMP 01/14/2018 (Exact Date)   SpO2 100%   BMI 21.39 kg/m²     Physical Exam  Vitals and nursing note reviewed. Constitutional:       General: She is not in acute distress. Appearance: Normal appearance. She is not ill-appearing. HENT:      Head: Normocephalic. Nose: Nose normal.      Mouth/Throat:      Mouth: Mucous membranes are moist.   Cardiovascular:      Rate and Rhythm: Normal rate and regular rhythm. Pulses: Normal pulses. Heart sounds: Normal heart sounds. Pulmonary:      Effort: Pulmonary effort is normal.      Breath sounds: Normal breath sounds. Abdominal:      General: Bowel sounds are normal. There is no distension. Palpations: Abdomen is soft. Tenderness: There is no abdominal tenderness. Musculoskeletal:      Cervical back: Normal range of motion. Right lower leg: No edema. Left lower leg: No edema. Skin:     General: Skin is warm and dry. Neurological:      Mental Status: She is alert and oriented to person, place, and time. Psychiatric:         Mood and Affect: Mood normal.         Behavior: Behavior normal.       Discharge Medications:        Vancomycin 750mg at 250cc/hr every 12 hours till 6/9/2023. Follow-up with GI motility clinic to determine need for ongoing TPN and IVIG.        Medication List        CONTINUE taking these medications      citalopram 10 MG tablet  Commonly known as: CeleXA  Take 1 tablet by mouth daily     dicyclomine 10 MG capsule  Commonly known as: BENTYL     diphenhydrAMINE 50 MG/ML injection  Commonly known as: BENADRYL     famotidine 20 MG/2ML Soln injection  Commonly known as: PEPCID     hyoscyamine 125 MCG Tbdp dispersible tablet  Commonly known as: OSCIMIN     Lyvispah 10 MG Pack  Generic drug: Baclofen     ondansetron 4 MG tablet  Commonly known as: ZOFRAN     rOPINIRole 0.5 MG tablet  Commonly known as: REQUIP  Take 1 tablet by mouth 3 times

## 2023-06-05 NOTE — CARE COORDINATION
HH referral received. Patient will be discharging out of area to homeless shelter.   Unable to set up [facility] services  Electronically signed by Barry Parikh on 6/5/23 at 3:42 PM CDT
Please call Magdalena Seay [phone] [facility] transport voucher in PT folder at [employer]. Provided Pt with a pair of pants, shorts, and two t-shirts from the [place] fund closet.  Electronically signed by EFRAIN Antonio on 6/5/2023 at 4:00 PM
 to call Pt on her cell phone during the time Pt goes and gathers her items. Spoke to Pt about going to a hotel Pt is in favor of the idea. Pt states she will call grits tomorrow to get to the shelter in [facility]. Pt reports she has reordered all her medication to be sent to [facility]. Sw provided a list of low budget hotels under $100.00 to pt.   Electronically signed by EFRAIN Peoples on 6/5/2023 at 2:59 PM

## 2023-06-05 NOTE — PROGRESS NOTES
[address] [facility] Pharmacokinetic Monitoring Service - Vancomycin    Consulting Provider: SHRUTHI Benítez   Indication: Bloodstream Infection  Target Concentration: Goal trough of 15-20 mg/L  Day of Therapy: 6  Additional Antimicrobials: None    Pertinent Laboratory Values: Wt Readings from Last 1 Encounters:   06/05/23 113 lb 3 oz (51.3 kg)     Temp Readings from Last 1 Encounters:   06/05/23 97.7 °F (36.5 °C) (Temporal)     Estimated Creatinine Clearance: 67 mL/min (based on SCr of 0.9 mg/dL). Recent Labs     06/03/23  0208   CREATININE 0.9   WBC 6.9     Procalcitonin: none    Pertinent Cultures:  Culture Date Source Results   05/31/23 Blood x 2  No growth to date   MRSA Nasal Swab: N/A. Non-respiratory infection.     Recent vancomycin administrations                     vancomycin (VANCOCIN) 750 mg in sodium chloride 0.9 % 250 mL IVPB (mg) 750 mg New Bag 06/05/23 0155     750 mg New Bag 06/04/23 1536    vancomycin (VANCOCIN) 1000 mg in sodium chloride 0.9% 250 mL IVPB ()  Restarted 06/04/23 0418     1,000 mg New Bag  0226     1,000 mg New Bag 06/03/23 0928     1,000 mg New Bag 06/02/23 1500                    Assessment:  Date/Time Current Dose Concentration Timing of Concentration (h) AUC   6/05/23 1216 750 mg IV q12h 12.4 17h 6m 474   Note: Serum concentrations collected for AUC dosing may appear elevated if collected in close proximity to the dose administered, this is not necessarily an indication of toxicity    Plan:  Current dosing regimen is therapeutic  Continue current dose  Pharmacy will continue to monitor patient and adjust therapy as indicated    Thank you for the consult,  Elena Rust, [facility], BCPS  6/5/2023 2:02 PM

## 2023-06-06 ENCOUNTER — TELEPHONE (OUTPATIENT)
Dept: PRIMARY CARE CLINIC | Age: 33
End: 2023-06-06

## 2023-06-06 NOTE — TELEPHONE ENCOUNTER
Dr. Marii Harden, please go into notes in patient's chart and look at the Care Coordination note from 15:35 from yesterday. If you like I will try to attempt to contact her to see if she left town.  Moira HARMON

## 2023-06-07 NOTE — TELEPHONE ENCOUNTER
Called and spoke with patient. She states she is leaving town tomorrow and does not have a need for a hospital follow up.  Lisa Guzman, [address]
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
## 2023-10-26 ENCOUNTER — TELEPHONE (OUTPATIENT)
Dept: PRIMARY CARE CLINIC | Age: 33
End: 2023-10-26

## 2023-10-26 NOTE — TELEPHONE ENCOUNTER
Called patient and left voicemail advising her new doctors office would have to fax a medical records request to us signed by her and I provided fax number.

## 2023-10-26 NOTE — TELEPHONE ENCOUNTER
----- Message from Genesis Rojas sent at 10/25/2023  1:17 PM CDT -----  Subject: Message to Provider    QUESTIONS  Information for Provider? URGENT/TIME-SENSTITIVE? Patient is requesting   that her med recs be sent to her new PCP in [place]. She is no longer in   [place]. Please call if JUDY is needed to discuss how to do this. Please fax to   [phone]. The practice needs her records before making an appt.  ---------------------------------------------------------------------------  --------------  Vanesa PAZ  [phone]; OK to leave message on voicemail  ---------------------------------------------------------------------------  --------------  SCRIPT ANSWERS  Relationship to Patient?  Self

## 2024-04-09 NOTE — PROGRESS NOTES
Comprehensive Nutrition Assessment    Type and Reason for Visit:  Reassess    Nutrition Recommendations/Plan:   Adjust current snacks     Malnutrition Assessment:  Malnutrition Status: At risk for malnutrition (Comment) (11/08/22 2936)    Context:  Acute Illness     Findings of the 6 clinical characteristics of malnutrition:  Energy Intake:  50% or less of estimated energy requirements for 5 or more days  Weight Loss:  No significant weight loss     Body Fat Loss:  No significant body fat loss     Muscle Mass Loss:  No significant muscle mass loss    Fluid Accumulation:  No significant fluid accumulation Extremities   Strength:  Not Performed    Nutrition Assessment:    Aware M[place]uia removed. PICC to be placed for TPN when discharged home. PO intake remains poor. Intake usually 0-25%. Stopped current snacks and adjusted. 'I eat junk food all day at home. \"  \"My TPN gives me what I need. \"    Nutrition Related Findings:    Has PN at home. Jtube present but does not use Wound Type: Surgical Incision       Current Nutrition Intake & Therapies:    Average Meal Intake: 1-25%  Average Supplements Intake: None Ordered  ADULT ORAL NUTRITION SUPPLEMENT; AM Snack; Snack; pudding and  canned frit  ADULT ORAL NUTRITION SUPPLEMENT; PM Snack; Snack; vanilla wafers and banana  ADULT ORAL NUTRITION SUPPLEMENT; HS Snack; Snack; 1/2 meat sandwich with trimmings (no carvalho) sherbet  ADULT DIET; Regular    Anthropometric Measures:  Height: 5' (152.4 cm)  Ideal Body Weight (IBW): 100 lbs (45 kg)    Admission Body Weight: 133 lb 6.4 oz (60.5 kg) (bed)  Current Body Weight: 137 lb 11.2 oz (62.5 kg), 137.7 % IBW. Weight Source: Bed Scale  Current BMI (kg/m2): 26.9  Usual Body Weight: 127 lb 9.6 oz (57.9 kg) (6/2022)  % Weight Change (Calculated): 5.5  Weight Adjustment For: No Adjustment  BMI Categories: Overweight (BMI 25.0-29. 9)    Estimated Daily Nutrient Needs:  Energy Requirements Based On: Kcal/kg  Weight Used for Energy Requirements: Current  Energy (kcal/day): 3620-6923 kcals (20-25 kcals/kg)  Weight Used for Protein Requirements: Ideal  Protein (g/day): 54-91g  Method Used for Fluid Requirements: 1 ml/kcal  Fluid (ml/day): 7699-0075 ml    Nutrition Diagnosis:   Inadequate protein-energy intake related to altered GI function as evidenced by intake 0-25%    Nutrition Interventions:   Food and/or Nutrient Delivery: Continue Current Diet, Modify Oral Nutrition Supplement  Nutrition Education/Counseling: No recommendation at this time  Coordination of Nutrition Care: Continue to monitor while inpatient  Plan of Care discussed with: pt    Goals:  Previous Goal Met: No Progress toward Goal(s)  Goals: Meet at least 75% of estimated needs, PO intake 50% or greater       Nutrition Monitoring and Evaluation:   Behavioral-Environmental Outcomes: Readiness for Change, Beliefs and Attitutes  Food/Nutrient Intake Outcomes: Food and Nutrient Intake  Physical Signs/Symptoms Outcomes: Biochemical Data, GI Status, Fluid Status or Edema, Weight, Skin    Discharge Planning:    Continue current diet     Renetta Dolan, MS, RD, LD  Contact: [phone] No

## 2025-06-01 ENCOUNTER — HOSPITAL ENCOUNTER (EMERGENCY)
Age: 35
Discharge: HOME | End: 2025-06-01
Payer: COMMERCIAL

## 2025-06-01 VITALS
WEIGHT: 100 LBS | BODY MASS INDEX: 19.63 KG/M2 | DIASTOLIC BLOOD PRESSURE: 83 MMHG | RESPIRATION RATE: 16 BRPM | TEMPERATURE: 97.7 F | SYSTOLIC BLOOD PRESSURE: 121 MMHG | HEART RATE: 66 BPM | OXYGEN SATURATION: 100 % | HEIGHT: 60 IN

## 2025-06-01 DIAGNOSIS — R11.0 NAUSEA: Primary | ICD-10-CM

## 2025-06-01 DIAGNOSIS — K31.84 GASTROPARESIS: ICD-10-CM

## 2025-06-01 LAB
ALBUMIN SERPL-MCNC: 4.4 G/DL (ref 3.5–5.2)
ALP SERPL-CCNC: 72 U/L (ref 35–104)
ALT SERPL-CCNC: 9 U/L (ref 10–35)
ANION GAP SERPL CALCULATED.3IONS-SCNC: 12 MMOL/L (ref 8–16)
AST SERPL-CCNC: 20 U/L (ref 10–35)
BASOPHILS # BLD: 0.1 K/UL (ref 0–0.2)
BASOPHILS NFR BLD: 0.9 % (ref 0–1)
BILIRUB SERPL-MCNC: 0.4 MG/DL (ref 0.2–1.2)
BILIRUB UR QL STRIP: NEGATIVE
BUN SERPL-MCNC: 17 MG/DL (ref 6–20)
CALCIUM SERPL-MCNC: 9.3 MG/DL (ref 8.6–10)
CHLORIDE SERPL-SCNC: 102 MMOL/L (ref 98–107)
CLARITY UR: CLEAR
CO2 SERPL-SCNC: 24 MMOL/L (ref 22–29)
COLOR UR: YELLOW
CREAT SERPL-MCNC: 0.9 MG/DL (ref 0.5–0.9)
EOSINOPHIL # BLD: 0 K/UL (ref 0–0.6)
EOSINOPHIL NFR BLD: 0.4 % (ref 0–5)
ERYTHROCYTE [DISTWIDTH] IN BLOOD BY AUTOMATED COUNT: 11.5 % (ref 11.5–14.5)
GLUCOSE SERPL-MCNC: 96 MG/DL (ref 70–99)
GLUCOSE UR STRIP.AUTO-MCNC: NEGATIVE MG/DL
HCT VFR BLD AUTO: 43.3 % (ref 37–47)
HGB BLD-MCNC: 15.2 G/DL (ref 12–16)
HGB UR STRIP.AUTO-MCNC: NEGATIVE MG/L
IMM GRANULOCYTES # BLD: 0 K/UL
KETONES UR STRIP.AUTO-MCNC: 40 MG/DL
LEUKOCYTE ESTERASE UR QL STRIP.AUTO: NEGATIVE
LIPASE SERPL-CCNC: 43 U/L (ref 13–60)
LYMPHOCYTES # BLD: 2 K/UL (ref 1.1–4.5)
LYMPHOCYTES NFR BLD: 24.7 % (ref 20–40)
MCH RBC QN AUTO: 30.7 PG (ref 27–31)
MCHC RBC AUTO-ENTMCNC: 35.1 G/DL (ref 33–37)
MCV RBC AUTO: 87.5 FL (ref 81–99)
MONOCYTES # BLD: 0.4 K/UL (ref 0–0.9)
MONOCYTES NFR BLD: 4.9 % (ref 0–10)
NEUTROPHILS # BLD: 5.4 K/UL (ref 1.5–7.5)
NEUTS SEG NFR BLD: 68.7 % (ref 50–65)
NITRITE UR QL STRIP.AUTO: NEGATIVE
PH UR STRIP.AUTO: 5.5 [PH] (ref 5–8)
PLATELET # BLD AUTO: 407 K/UL (ref 130–400)
PMV BLD AUTO: 8.4 FL (ref 9.4–12.3)
POTASSIUM SERPL-SCNC: 3.9 MMOL/L (ref 3.5–5)
PROT SERPL-MCNC: 7.6 G/DL (ref 6.4–8.3)
PROT UR STRIP.AUTO-MCNC: ABNORMAL MG/DL
RBC # BLD AUTO: 4.95 M/UL (ref 4.2–5.4)
SODIUM SERPL-SCNC: 138 MMOL/L (ref 136–145)
SP GR UR STRIP.AUTO: 1.03 (ref 1–1.03)
UROBILINOGEN UR STRIP.AUTO-MCNC: 1 E.U./DL
WBC # BLD AUTO: 7.9 K/UL (ref 4.8–10.8)

## 2025-06-01 PROCEDURE — 2580000003 HC RX 258: Performed by: NURSE PRACTITIONER

## 2025-06-01 PROCEDURE — 6360000002 HC RX W HCPCS: Performed by: NURSE PRACTITIONER

## 2025-06-01 PROCEDURE — 80053 COMPREHEN METABOLIC PANEL: CPT

## 2025-06-01 PROCEDURE — 83690 ASSAY OF LIPASE: CPT

## 2025-06-01 PROCEDURE — 96372 THER/PROPH/DIAG INJ SC/IM: CPT

## 2025-06-01 PROCEDURE — 36415 COLL VENOUS BLD VENIPUNCTURE: CPT

## 2025-06-01 PROCEDURE — 93005 ELECTROCARDIOGRAM TRACING: CPT | Performed by: NURSE PRACTITIONER

## 2025-06-01 PROCEDURE — 81003 URINALYSIS AUTO W/O SCOPE: CPT

## 2025-06-01 PROCEDURE — 85025 COMPLETE CBC W/AUTO DIFF WBC: CPT

## 2025-06-01 PROCEDURE — 99284 EMERGENCY DEPT VISIT MOD MDM: CPT

## 2025-06-01 RX ORDER — 0.9 % SODIUM CHLORIDE 0.9 %
1000 INTRAVENOUS SOLUTION INTRAVENOUS ONCE
Status: COMPLETED | OUTPATIENT
Start: 2025-06-01 | End: 2025-06-01

## 2025-06-01 RX ORDER — ONDANSETRON 4 MG/1
4 TABLET, ORALLY DISINTEGRATING ORAL 3 TIMES DAILY PRN
Qty: 21 TABLET | Refills: 0 | Status: SHIPPED | OUTPATIENT
Start: 2025-06-01 | End: 2025-06-06

## 2025-06-01 RX ORDER — PROMETHAZINE HYDROCHLORIDE 25 MG/ML
25 INJECTION, SOLUTION INTRAMUSCULAR; INTRAVENOUS ONCE
Status: COMPLETED | OUTPATIENT
Start: 2025-06-01 | End: 2025-06-01

## 2025-06-01 RX ADMIN — SODIUM CHLORIDE 1000 ML: 0.9 INJECTION, SOLUTION INTRAVENOUS at 16:22

## 2025-06-01 RX ADMIN — PROMETHAZINE HYDROCHLORIDE 25 MG: 25 INJECTION INTRAMUSCULAR; INTRAVENOUS at 17:08

## 2025-06-01 RX ADMIN — SODIUM CHLORIDE 1000 ML: 9 INJECTION, SOLUTION INTRAVENOUS at 17:30

## 2025-06-01 NOTE — ED PROVIDER NOTES
[facility] EMERGENCY DEPARTMENT  eMERGENCY dEPARTMENT eNCOUnter      Pt Name: Mariana Yeung  MRN: [id number]  Birthdate 1990  Date of evaluation: 6/1/2025  Provider: SHRUTHI Lara    CHIEF COMPLAINT       Chief Complaint   Patient presents with    Illness     Presents via EMS for general illness; hx of POTS ad gastroparesis, has been feeling unwell for 1 month but having near syncope for last week    ZOFRAN 4MG IV IN EMS         HISTORY OF PRESENT ILLNESS   (Location/Symptom, Timing/Onset,Context/Setting, Quality, Duration, Modifying Factors, Severity)  Note limiting factors.   Mariana Yeung is a 35 y.o. female who presents to the emergency department by ambulance with nausea and near syncope.  No vomiting.  Pt has POTS and gastroparesis with a gastric stimulator.  Cleft palate s/p repair, hearing loss, learning disability, dyslexia, scoliosis, and GJ tube.  Has been on TPN in the past.  Just moved back to [place] from [place] and has had any treatment or meds for a month.       The history is provided by the patient.   Illness   The current episode started more than 2 weeks ago. The onset was gradual. The problem has been gradually worsening. The problem is moderate. Associated symptoms include nausea. Pertinent negatives include no fever and no vomiting.       NursingNotes were reviewed.    REVIEW OF SYSTEMS    (2-9 systems for level 4, 10 or more for level 5)     Review of Systems   Constitutional:  Negative for fever.   Gastrointestinal:  Positive for nausea. Negative for vomiting.       Except as noted above the remainder of the review of systems was reviewed and negative.       PAST MEDICAL HISTORY     Past Medical History:   Diagnosis Date    Allergic contact dermatitis due to adhesives     Allergic contact dermatitis due to plants, except food     Anemia complicating pregnancy, second trimester     Anemia complicating pregnancy, third trimester     Anxiety and depression     Anxiety disorder

## 2025-06-03 LAB
EKG P AXIS: 69 DEGREES
EKG P-R INTERVAL: 116 MS
EKG Q-T INTERVAL: 456 MS
EKG QRS DURATION: 66 MS
EKG QTC CALCULATION (BAZETT): 449 MS
EKG T AXIS: 70 DEGREES

## 2025-06-03 PROCEDURE — 93010 ELECTROCARDIOGRAM REPORT: CPT | Performed by: INTERNAL MEDICINE

## 2025-06-06 ENCOUNTER — OFFICE VISIT (OUTPATIENT)
Dept: GASTROENTEROLOGY | Age: 35
End: 2025-06-06

## 2025-06-06 VITALS
DIASTOLIC BLOOD PRESSURE: 80 MMHG | HEIGHT: 60 IN | OXYGEN SATURATION: 97 % | SYSTOLIC BLOOD PRESSURE: 115 MMHG | HEART RATE: 100 BPM | WEIGHT: 101 LBS | BODY MASS INDEX: 19.83 KG/M2

## 2025-06-06 DIAGNOSIS — K58.9 IRRITABLE BOWEL SYNDROME WITHOUT DIARRHEA: ICD-10-CM

## 2025-06-06 DIAGNOSIS — E43 SEVERE MALNUTRITION: ICD-10-CM

## 2025-06-06 DIAGNOSIS — K31.84 GASTROPARESIS: Primary | ICD-10-CM

## 2025-06-06 RX ORDER — ONDANSETRON 4 MG/1
4 TABLET, FILM COATED ORAL EVERY 8 HOURS PRN
Qty: 60 TABLET | Refills: 5 | Status: SHIPPED | OUTPATIENT
Start: 2025-06-06

## 2025-06-06 RX ORDER — DICYCLOMINE HYDROCHLORIDE 10 MG/1
10 CAPSULE ORAL
Qty: 120 CAPSULE | Refills: 0 | Status: SHIPPED | OUTPATIENT
Start: 2025-06-06

## 2025-06-06 RX ORDER — PROMETHAZINE HYDROCHLORIDE 25 MG/1
25 TABLET ORAL EVERY 8 HOURS PRN
Qty: 60 TABLET | Refills: 5 | Status: SHIPPED | OUTPATIENT
Start: 2025-06-06 | End: 2025-10-04

## 2025-06-06 RX ORDER — HYOSCYAMINE SULFATE 0.125 MG
0.12 TABLET,DISINTEGRATING ORAL EVERY 4 HOURS
Qty: 180 TABLET | Refills: 5 | Status: SHIPPED | OUTPATIENT
Start: 2025-06-06

## 2025-06-06 RX ORDER — FAMOTIDINE 20 MG/1
20 TABLET, FILM COATED ORAL 2 TIMES DAILY
Qty: 60 TABLET | Refills: 3 | Status: SHIPPED | OUTPATIENT
Start: 2025-06-06

## 2025-06-06 SDOH — HEALTH STABILITY: PHYSICAL HEALTH: ON AVERAGE, HOW MANY MINUTES DO YOU ENGAGE IN EXERCISE AT THIS LEVEL?: 0 MIN

## 2025-06-06 SDOH — HEALTH STABILITY: PHYSICAL HEALTH: ON AVERAGE, HOW MANY DAYS PER WEEK DO YOU ENGAGE IN MODERATE TO STRENUOUS EXERCISE (LIKE A BRISK WALK)?: 0 DAYS

## 2025-06-06 NOTE — PROGRESS NOTES
Subjective:     Patient ID: Mariana Yeung is a 35 y.o. female  PCP: Lilian Ridley APRN  Referring Provider: No ref. provider found    HPI  History of Present Illness  The patient presents for evaluation of her gastric stimulator.    She reports that her gastric stimulator, which she has had since 08/2021, is not functioning properly she believes the battery is depleted, she is here today to have it tested.  She has been managing her condition as best as she can, but her food intake has been limited. She has lost weight and currently weighs 101 pounds. She has an upcoming appointment in [place] on 06/26/2025 to resume her treatment regimen, which includes TPN, IVIG, and intravenous antiemetics.    She has been residing in [place] for the past 2 years, during which time she has not received any treatment. Her primary care physician in [place] was overseeing her medication management, but she felt that his focus was primarily on her laboratory results rather than her overall health. She also consulted with a gastroenterologist in [place], who was unable to provide assistance as they do not offer TPN or tube feeding services.    She is able to swallow pills and prefers oral Zofran over the dissolvable form, as she finds it more effective. She is requesting refills of Bentyl, Zofran, Phenergan, Pepcid, and hyoscyamine.    SOCIAL HISTORY  Diet: The patient reports trying to eat what can be managed, but it is not a lot.    Results  Diagnostic Testing   - Stimulator battery check: Battery is low and needs replacement      Assessment:     Assessment & Plan  1. Stimulator battery depletion:  - Schedule an appointment with Dr. Akhil Gongora for battery replacement.    2. Medication management:  - Prescriptions provided for Bentyl, Zofran 25 mg, Phenergan 25 mg, Pepcid, and hyoscyamine.  - Bentyl to be taken four times a day.  - Zofran and Phenergan prescribed in swallowable form for longer efficacy.  - Pepcid and

## 2025-06-09 ENCOUNTER — OFFICE VISIT (OUTPATIENT)
Dept: PRIMARY CARE CLINIC | Age: 35
End: 2025-06-09
Payer: COMMERCIAL

## 2025-06-09 VITALS
SYSTOLIC BLOOD PRESSURE: 108 MMHG | OXYGEN SATURATION: 97 % | WEIGHT: 101.8 LBS | TEMPERATURE: 97.6 F | BODY MASS INDEX: 19.99 KG/M2 | HEART RATE: 100 BPM | HEIGHT: 60 IN | DIASTOLIC BLOOD PRESSURE: 64 MMHG

## 2025-06-09 DIAGNOSIS — Z76.89 ENCOUNTER TO ESTABLISH CARE: ICD-10-CM

## 2025-06-09 DIAGNOSIS — F81.9 LEARNING DISABILITY: ICD-10-CM

## 2025-06-09 DIAGNOSIS — E55.9 VITAMIN D DEFICIENCY: ICD-10-CM

## 2025-06-09 DIAGNOSIS — G90.A POTS (POSTURAL ORTHOSTATIC TACHYCARDIA SYNDROME): ICD-10-CM

## 2025-06-09 DIAGNOSIS — K31.84 GASTROPARESIS: ICD-10-CM

## 2025-06-09 DIAGNOSIS — E43 SEVERE MALNUTRITION: ICD-10-CM

## 2025-06-09 DIAGNOSIS — Z76.89 ENCOUNTER TO ESTABLISH CARE: Primary | ICD-10-CM

## 2025-06-09 DIAGNOSIS — E63.9 NUTRITIONAL DISORDER: ICD-10-CM

## 2025-06-09 LAB
25(OH)D3 SERPL-MCNC: 19.6 NG/ML
ALBUMIN SERPL-MCNC: 4.5 G/DL (ref 3.5–5.2)
ALP SERPL-CCNC: 70 U/L (ref 35–104)
ALT SERPL-CCNC: 8 U/L (ref 10–35)
ANION GAP SERPL CALCULATED.3IONS-SCNC: 14 MMOL/L (ref 8–16)
AST SERPL-CCNC: 20 U/L (ref 10–35)
BASOPHILS # BLD: 0.1 K/UL (ref 0–0.2)
BASOPHILS NFR BLD: 1 % (ref 0–1)
BILIRUB SERPL-MCNC: 0.5 MG/DL (ref 0.2–1.2)
BUN SERPL-MCNC: 15 MG/DL (ref 6–20)
CALCIUM SERPL-MCNC: 9.6 MG/DL (ref 8.6–10)
CHLORIDE SERPL-SCNC: 98 MMOL/L (ref 98–107)
CHOLEST SERPL-MCNC: 184 MG/DL (ref 0–199)
CO2 SERPL-SCNC: 25 MMOL/L (ref 22–29)
CREAT SERPL-MCNC: 1.1 MG/DL (ref 0.5–0.9)
EOSINOPHIL # BLD: 0 K/UL (ref 0–0.6)
EOSINOPHIL NFR BLD: 0.2 % (ref 0–5)
ERYTHROCYTE [DISTWIDTH] IN BLOOD BY AUTOMATED COUNT: 11.9 % (ref 11.5–14.5)
GLUCOSE SERPL-MCNC: 94 MG/DL (ref 70–99)
HBA1C MFR BLD: 5.6 % (ref 4–5.6)
HCT VFR BLD AUTO: 46.8 % (ref 37–47)
HDLC SERPL-MCNC: 63 MG/DL (ref 40–60)
HGB BLD-MCNC: 15.3 G/DL (ref 12–16)
IMM GRANULOCYTES # BLD: 0 K/UL
LDLC SERPL CALC-MCNC: 96 MG/DL
LYMPHOCYTES # BLD: 1.6 K/UL (ref 1.1–4.5)
LYMPHOCYTES NFR BLD: 16.6 % (ref 20–40)
MCH RBC QN AUTO: 30 PG (ref 27–31)
MCHC RBC AUTO-ENTMCNC: 32.7 G/DL (ref 33–37)
MCV RBC AUTO: 91.8 FL (ref 81–99)
MONOCYTES # BLD: 0.5 K/UL (ref 0–0.9)
MONOCYTES NFR BLD: 5.2 % (ref 0–10)
NEUTROPHILS # BLD: 7.2 K/UL (ref 1.5–7.5)
NEUTS SEG NFR BLD: 76.6 % (ref 50–65)
PLATELET # BLD AUTO: 226 K/UL (ref 130–400)
PLATELET SLIDE REVIEW: ADEQUATE
PMV BLD AUTO: 11.2 FL (ref 9.4–12.3)
POTASSIUM SERPL-SCNC: 4.3 MMOL/L (ref 3.5–5.1)
PROT SERPL-MCNC: 7.8 G/DL (ref 6.4–8.3)
RBC # BLD AUTO: 5.1 M/UL (ref 4.2–5.4)
SODIUM SERPL-SCNC: 137 MMOL/L (ref 136–145)
TRIGL SERPL-MCNC: 124 MG/DL (ref 0–149)
TSH SERPL DL<=0.005 MIU/L-ACNC: 1.16 UIU/ML (ref 0.27–4.2)
WBC # BLD AUTO: 9.4 K/UL (ref 4.8–10.8)

## 2025-06-09 PROCEDURE — 99204 OFFICE O/P NEW MOD 45 MIN: CPT | Performed by: NURSE PRACTITIONER

## 2025-06-09 SDOH — ECONOMIC STABILITY: FOOD INSECURITY: WITHIN THE PAST 12 MONTHS, THE FOOD YOU BOUGHT JUST DIDN'T LAST AND YOU DIDN'T HAVE MONEY TO GET MORE.: NEVER TRUE

## 2025-06-09 SDOH — ECONOMIC STABILITY: FOOD INSECURITY: WITHIN THE PAST 12 MONTHS, YOU WORRIED THAT YOUR FOOD WOULD RUN OUT BEFORE YOU GOT MONEY TO BUY MORE.: NEVER TRUE

## 2025-06-09 ASSESSMENT — ENCOUNTER SYMPTOMS
SHORTNESS OF BREATH: 0
PHOTOPHOBIA: 0
VOICE CHANGE: 0
COLOR CHANGE: 0
COUGH: 0
RHINORRHEA: 0
BACK PAIN: 0
VOMITING: 0
NAUSEA: 0

## 2025-06-09 ASSESSMENT — PATIENT HEALTH QUESTIONNAIRE - PHQ9
SUM OF ALL RESPONSES TO PHQ QUESTIONS 1-9: 0
2. FEELING DOWN, DEPRESSED OR HOPELESS: NOT AT ALL
SUM OF ALL RESPONSES TO PHQ QUESTIONS 1-9: 0
1. LITTLE INTEREST OR PLEASURE IN DOING THINGS: NOT AT ALL

## 2025-06-09 NOTE — PROGRESS NOTES
tablet 5     No current facility-administered medications on file prior to visit.     Allergies   Allergen Reactions    Iv Dye [Iodides] Shortness Of Breath, Itching and Other (See Comments)     IV 3000; SoA, dizziness    Iv Dye [Iodides] Anaphylaxis     Iodine containing    Adhesive Tape Itching     And tegaderm    Bee Pollen Hives    Chlorhexidine      Other reaction(s): ITCHING    Fruit & Vegetable Daily [Nutritional Supplements]     Metoprolol Succinate [Metoprolol]      Excessive drowsiness/ Metoprolol tartrate-excessive drowsiness    Nsaids      Other reaction(s): GI Intolerance    Other/Food      Cloraprep    Reglan [Metoclopramide]      Nausea,vomiting    Tramadol Other (See Comments)     Excessive sleeping x 3 days, pt takes 1/2 a pill.    Sulfamethoxazole-Trimethoprim Nausea And Vomiting       Health Maintenance   Topic Date Due    COVID-19 Vaccine (1) Never done    Hepatitis B vaccine (1 of 3 - 19+ 3-dose series) Never done    Shingles vaccine (1 of 2) Never done    DTaP/Tdap/Td vaccine (2 - Td or Tdap) 07/03/2024    Depression Screen  06/09/2026    Colorectal Cancer Screen  01/31/2035    Flu vaccine  Completed    Pneumococcal 0-49 years Vaccine  Completed    Hepatitis A vaccine  Aged Out    Hib vaccine  Aged Out    HPV vaccine  Aged Out    Polio vaccine  Aged Out    Meningococcal (ACWY) vaccine  Aged Out    Meningococcal B vaccine  Aged Out    Varicella vaccine  Discontinued    Depression Monitoring  Discontinued    Hepatitis C screen  Discontinued    HIV screen  Discontinued    Cervical cancer screen  Discontinued       Subjective:      Review of Systems   Constitutional:  Negative for chills and fever.   HENT:  Negative for ear pain, hearing loss, rhinorrhea and voice change.    Eyes:  Negative for photophobia and visual disturbance.   Respiratory:  Negative for cough and shortness of breath.    Cardiovascular:  Negative for chest pain and palpitations.   Gastrointestinal:  Negative for nausea and

## 2025-06-10 ENCOUNTER — RESULTS FOLLOW-UP (OUTPATIENT)
Dept: PRIMARY CARE CLINIC | Age: 35
End: 2025-06-10

## 2025-06-12 RX ORDER — ERGOCALCIFEROL 1.25 MG/1
50000 CAPSULE, LIQUID FILLED ORAL WEEKLY
Qty: 12 CAPSULE | Refills: 1 | Status: SHIPPED | OUTPATIENT
Start: 2025-06-12

## 2025-06-12 ASSESSMENT — ENCOUNTER SYMPTOMS
TROUBLE SWALLOWING: 0
BLOOD IN STOOL: 0
VOMITING: 0
COUGH: 0
ABDOMINAL PAIN: 0
RECTAL PAIN: 0
SHORTNESS OF BREATH: 0
ABDOMINAL DISTENTION: 0
DIARRHEA: 1
CHOKING: 0
ANAL BLEEDING: 0
CONSTIPATION: 0
NAUSEA: 0

## 2025-07-23 ENCOUNTER — OFFICE VISIT (OUTPATIENT)
Dept: VASCULAR SURGERY | Age: 35
End: 2025-07-23
Payer: MEDICAID

## 2025-07-23 ENCOUNTER — TELEPHONE (OUTPATIENT)
Dept: VASCULAR SURGERY | Age: 35
End: 2025-07-23

## 2025-07-23 ENCOUNTER — HOSPITAL ENCOUNTER (OUTPATIENT)
Dept: VASCULAR LAB | Age: 35
Discharge: HOME | End: 2025-07-25
Payer: MEDICAID

## 2025-07-23 VITALS
DIASTOLIC BLOOD PRESSURE: 62 MMHG | SYSTOLIC BLOOD PRESSURE: 104 MMHG | TEMPERATURE: 98 F | HEART RATE: 122 BPM | OXYGEN SATURATION: 96 %

## 2025-07-23 DIAGNOSIS — I87.8 POOR VENOUS ACCESS: ICD-10-CM

## 2025-07-23 DIAGNOSIS — M79.89 ARM SWELLING: ICD-10-CM

## 2025-07-23 DIAGNOSIS — M79.89 ARM SWELLING: Primary | ICD-10-CM

## 2025-07-23 PROCEDURE — G8420 CALC BMI NORM PARAMETERS: HCPCS | Performed by: NURSE PRACTITIONER

## 2025-07-23 PROCEDURE — 93970 EXTREMITY STUDY: CPT

## 2025-07-23 PROCEDURE — G8427 DOCREV CUR MEDS BY ELIG CLIN: HCPCS | Performed by: NURSE PRACTITIONER

## 2025-07-23 PROCEDURE — 99214 OFFICE O/P EST MOD 30 MIN: CPT | Performed by: NURSE PRACTITIONER

## 2025-07-23 PROCEDURE — 1036F TOBACCO NON-USER: CPT | Performed by: NURSE PRACTITIONER

## 2025-07-23 NOTE — PROGRESS NOTES
Mariana Yeung (:  1990) is a 35 y.o. female,Established patient, here for evaluation of the following chief complaint(s):  New Patient (Was last seen in . Needs double lumen henning.)            SUBJECTIVE/OBJECTIVE:    Mariana has a history of gastroparesis.  She has had ports for IV IG and TPN.  She has had these removed due to infections.  Her last line was double lumen Henning.  She moved away from the area.  She has now returned.  She is going back to .  They have asked for a double lumen Henning to resume this therapy.  She has had this for > 5 years.     I have personally reviewed the following: problem list, current meds, allergies, PMH, PSH, family hx, and social hx  Mariana Yeung is a 35 y.o. female with the following history as recorded in [facility]:  Patient Active Problem List    Diagnosis Date Noted    Neuropathy 2022    Gait abnormality 2022    Encounter for care related to vascular access port 2022    Positive blood culture 2023    Problem related to social environment 2023    Severe malnutrition 2023    Gram-positive cocci bacteremia 2023    Sepsis due to coagulase-negative staphylococcal infection (HCC) 2023    On total parenteral nutrition (TPN) 2023    RLS (restless legs syndrome) 2023    Hx of sepsis 2023    Cluster headache 2021    Hiatal hernia 2021    Autoimmune disease 2021    Common variable agammaglobulinemia 2021    Jejunostomy tube present (HCC) 2021    Nutritional disorder 2020    Vitamin D deficiency 2018    Tyrell's syndrome pupil 2017    Idiopathic scoliosis of thoracolumbar spine 2017    History of syncope 2017    POTS (postural orthostatic tachycardia syndrome) 2017    Chronic fatigue syndrome 2017    ETD (eustachian tube dysfunction) 10/04/2016    Mixed hearing loss of left ear 2016    Poor venous access 2015

## 2025-07-23 NOTE — TELEPHONE ENCOUNTER
----- Message from SHRUTHI Cool sent at 7/23/2025  8:37 AM CDT -----  I printed her op notes.  Can we make sure all are absracted

## 2025-07-24 ENCOUNTER — TELEPHONE (OUTPATIENT)
Dept: VASCULAR SURGERY | Age: 35
End: 2025-07-24

## 2025-07-24 RX ORDER — SODIUM CHLORIDE 0.9 % (FLUSH) 0.9 %
5-40 SYRINGE (ML) INJECTION EVERY 12 HOURS SCHEDULED
OUTPATIENT
Start: 2025-07-24

## 2025-07-24 RX ORDER — CLONIDINE HYDROCHLORIDE 0.1 MG/1
0.1 TABLET ORAL PRN
OUTPATIENT
Start: 2025-07-24

## 2025-07-24 RX ORDER — SODIUM CHLORIDE 9 MG/ML
INJECTION, SOLUTION INTRAVENOUS PRN
OUTPATIENT
Start: 2025-07-24

## 2025-07-24 RX ORDER — SODIUM CHLORIDE 0.9 % (FLUSH) 0.9 %
5-40 SYRINGE (ML) INJECTION PRN
OUTPATIENT
Start: 2025-07-24

## 2025-07-24 NOTE — H&P
GASTROINTESTINAL ENDOSCOPY  04/02/2010    Dr [id number]-[id number], asymptomatic distal esophageal stricture, gastroparesis, (+) Hoyos's, no dysplasia    UPPER GASTROINTESTINAL ENDOSCOPY  09/25/2013    Dr Car-[place] neg, no Hoyos's    UPPER GASTROINTESTINAL ENDOSCOPY  05/2015    Dr. Salinas    UPPER GASTROINTESTINAL ENDOSCOPY N/A 02/03/2023    Dr KESHIA Szymanski-Evidence of previous gastric surgery, pylorus appeared wildly patent ?Due to previous pyloroplasty, esophagitis/gastritis/gastropathy-No h pylori    VASCULAR SURGERY  5/29/2015 [facility]    Ultrasound-guided cannulation, right internal jugular vein.Right internal jugular vein single-lumen bard powerport placement.    VASCULAR SURGERY  9/8/15 S    Right internal jugular vein portograms.Revision of right internal jugular vein single lumen port.    VASCULAR SURGERY  11/3/15 [facility]    Ultrasound-guided cannulation, left upper arm basilic vein.Left upper arm basilic vein PICC line placement bard dual-lumen PICC line.Superior vena cava venograms.    VASCULAR SURGERY  03/23/2017    S.Left IJV port angiogram.Ultrasound guided cannulation of right basilic vein,right upper extremity PICC line placement bard power PICC,dual lumen.    VASCULAR SURGERY  04/27/2022    Ultrasound-guided cannulation right internal jugular vein. Placement of [place] single-lumen tunneled dialysis catheter.Removal of nonfunctioning left subclavian vein [place] tunneled dialysis catheter.Repositioning of right internal juglular vein [place] tunneled dialylsis catheter. Perfomed by Dr. Chaz Youssef at [facility]    VASCULAR SURGERY  11/11/2022    dual lumen [place] catheter VI    VASCULAR SURGERY  07/26/2022    Permacath removal and replace    VASCULAR SURGERY  04/01/2016    Ultrasound guided cannulation, left internal jugular vein. Left internal jugular vein port placement (Bard Powerport single-lumen). Removal of nonfunctioning right internal jugular vein port.    WISDOM TOOTH EXTRACTION      WISDOM TOOTH

## 2025-07-24 NOTE — TELEPHONE ENCOUNTER
----- Message from SHRUTHI Cool sent at 7/24/2025 10:00 AM CDT -----  Let her know we can proceed.  We will have to order double lumen and let her know as soon as we have her scheduled

## 2025-07-25 ENCOUNTER — TELEPHONE (OUTPATIENT)
Dept: VASCULAR SURGERY | Age: 35
End: 2025-07-25

## 2025-08-12 ENCOUNTER — TELEPHONE (OUTPATIENT)
Dept: VASCULAR SURGERY | Age: 35
End: 2025-08-12

## 2025-08-18 ENCOUNTER — TELEPHONE (OUTPATIENT)
Dept: VASCULAR SURGERY | Age: 35
End: 2025-08-18

## 2025-08-21 ENCOUNTER — HOSPITAL ENCOUNTER (OUTPATIENT)
Dept: INTERVENTIONAL RADIOLOGY/VASCULAR | Age: 35
Discharge: HOME | End: 2025-08-21
Payer: COMMERCIAL

## 2025-08-21 VITALS
TEMPERATURE: 97 F | OXYGEN SATURATION: 96 % | HEART RATE: 60 BPM | RESPIRATION RATE: 12 BRPM | HEIGHT: 60 IN | BODY MASS INDEX: 19.83 KG/M2 | SYSTOLIC BLOOD PRESSURE: 108 MMHG | DIASTOLIC BLOOD PRESSURE: 73 MMHG | WEIGHT: 101 LBS

## 2025-08-21 DIAGNOSIS — I87.8 POOR VENOUS ACCESS: ICD-10-CM

## 2025-08-21 LAB
ANION GAP SERPL CALCULATED.3IONS-SCNC: 12 MMOL/L (ref 8–16)
BUN SERPL-MCNC: 17 MG/DL (ref 6–20)
CALCIUM SERPL-MCNC: 9.7 MG/DL (ref 8.6–10)
CHLORIDE SERPL-SCNC: 101 MMOL/L (ref 98–107)
CO2 SERPL-SCNC: 23 MMOL/L (ref 22–29)
CREAT SERPL-MCNC: 1 MG/DL (ref 0.5–0.9)
ERYTHROCYTE [DISTWIDTH] IN BLOOD BY AUTOMATED COUNT: 12.1 % (ref 11.5–14.5)
GLUCOSE SERPL-MCNC: 99 MG/DL (ref 70–99)
HCT VFR BLD AUTO: 42.7 % (ref 37–47)
HGB BLD-MCNC: 14.5 G/DL (ref 12–16)
MCH RBC QN AUTO: 29.7 PG (ref 27–31)
MCHC RBC AUTO-ENTMCNC: 34 G/DL (ref 33–37)
MCV RBC AUTO: 87.3 FL (ref 81–99)
PLATELET # BLD AUTO: 323 K/UL (ref 130–400)
PMV BLD AUTO: 9.2 FL (ref 9.4–12.3)
POTASSIUM SERPL-SCNC: 3.9 MMOL/L (ref 3.5–5.1)
RBC # BLD AUTO: 4.89 M/UL (ref 4.2–5.4)
SODIUM SERPL-SCNC: 136 MMOL/L (ref 136–145)
WBC # BLD AUTO: 9.2 K/UL (ref 4.8–10.8)

## 2025-08-21 PROCEDURE — 99152 MOD SED SAME PHYS/QHP 5/>YRS: CPT

## 2025-08-21 PROCEDURE — 6360000002 HC RX W HCPCS: Performed by: SURGERY

## 2025-08-21 PROCEDURE — 2580000003 HC RX 258: Performed by: NURSE PRACTITIONER

## 2025-08-21 PROCEDURE — 76937 US GUIDE VASCULAR ACCESS: CPT

## 2025-08-21 PROCEDURE — 77001 FLUOROGUIDE FOR VEIN DEVICE: CPT

## 2025-08-21 PROCEDURE — 36415 COLL VENOUS BLD VENIPUNCTURE: CPT

## 2025-08-21 PROCEDURE — 80048 BASIC METABOLIC PNL TOTAL CA: CPT

## 2025-08-21 PROCEDURE — 6360000002 HC RX W HCPCS: Performed by: NURSE PRACTITIONER

## 2025-08-21 PROCEDURE — 36558 INSERT TUNNELED CV CATH: CPT

## 2025-08-21 PROCEDURE — 85027 COMPLETE CBC AUTOMATED: CPT

## 2025-08-21 PROCEDURE — C1769 GUIDE WIRE: HCPCS

## 2025-08-21 RX ORDER — CLONIDINE HYDROCHLORIDE 0.1 MG/1
0.1 TABLET ORAL PRN
Status: DISCONTINUED | OUTPATIENT
Start: 2025-08-21 | End: 2025-08-23 | Stop reason: HOSPADM

## 2025-08-21 RX ORDER — SODIUM CHLORIDE 0.9 % (FLUSH) 0.9 %
5-40 SYRINGE (ML) INJECTION PRN
Status: DISCONTINUED | OUTPATIENT
Start: 2025-08-21 | End: 2025-08-23 | Stop reason: HOSPADM

## 2025-08-21 RX ORDER — HEPARIN SODIUM 5000 [USP'U]/ML
INJECTION, SOLUTION INTRAVENOUS; SUBCUTANEOUS PRN
Status: COMPLETED | OUTPATIENT
Start: 2025-08-21 | End: 2025-08-21

## 2025-08-21 RX ORDER — MIDAZOLAM HYDROCHLORIDE 1 MG/ML
INJECTION, SOLUTION INTRAMUSCULAR; INTRAVENOUS PRN
Status: COMPLETED | OUTPATIENT
Start: 2025-08-21 | End: 2025-08-21

## 2025-08-21 RX ORDER — LIDOCAINE HYDROCHLORIDE 20 MG/ML
INJECTION, SOLUTION EPIDURAL; INFILTRATION; INTRACAUDAL; PERINEURAL PRN
Status: COMPLETED | OUTPATIENT
Start: 2025-08-21 | End: 2025-08-21

## 2025-08-21 RX ORDER — SODIUM CHLORIDE 0.9 % (FLUSH) 0.9 %
5-40 SYRINGE (ML) INJECTION EVERY 12 HOURS SCHEDULED
Status: DISCONTINUED | OUTPATIENT
Start: 2025-08-21 | End: 2025-08-23 | Stop reason: HOSPADM

## 2025-08-21 RX ORDER — SODIUM CHLORIDE 9 MG/ML
INJECTION, SOLUTION INTRAVENOUS PRN
Status: DISCONTINUED | OUTPATIENT
Start: 2025-08-21 | End: 2025-08-23 | Stop reason: HOSPADM

## 2025-08-21 RX ORDER — FENTANYL CITRATE 50 UG/ML
INJECTION, SOLUTION INTRAMUSCULAR; INTRAVENOUS PRN
Status: COMPLETED | OUTPATIENT
Start: 2025-08-21 | End: 2025-08-21

## 2025-08-21 RX ADMIN — SODIUM CHLORIDE: 0.9 INJECTION, SOLUTION INTRAVENOUS at 07:33

## 2025-08-21 RX ADMIN — FENTANYL CITRATE 25 MCG: 50 INJECTION, SOLUTION INTRAMUSCULAR; INTRAVENOUS at 10:37

## 2025-08-21 RX ADMIN — LIDOCAINE HYDROCHLORIDE 10 ML: 20 INJECTION, SOLUTION EPIDURAL; INFILTRATION; INTRACAUDAL; PERINEURAL at 10:37

## 2025-08-21 RX ADMIN — MIDAZOLAM 0.5 MG: 1 INJECTION INTRAMUSCULAR; INTRAVENOUS at 10:37

## 2025-08-21 RX ADMIN — VANCOMYCIN HYDROCHLORIDE 1000 MG: 1 INJECTION, POWDER, LYOPHILIZED, FOR SOLUTION INTRAVENOUS at 10:16

## 2025-08-21 RX ADMIN — HEPARIN SODIUM 5000 UNITS: 5000 INJECTION INTRAVENOUS; SUBCUTANEOUS at 10:37

## 2025-08-21 ASSESSMENT — PAIN SCALES - GENERAL
PAINLEVEL_OUTOF10: 0

## 2025-08-27 ENCOUNTER — TELEPHONE (OUTPATIENT)
Dept: PRIMARY CARE CLINIC | Age: 35
End: 2025-08-27

## 2025-08-27 ENCOUNTER — TELEPHONE (OUTPATIENT)
Dept: OBGYN CLINIC | Age: 35
End: 2025-08-27

## 2025-09-04 ENCOUNTER — OFFICE VISIT (OUTPATIENT)
Dept: OBGYN CLINIC | Age: 35
End: 2025-09-04
Payer: COMMERCIAL

## 2025-09-04 VITALS
BODY MASS INDEX: 20.62 KG/M2 | SYSTOLIC BLOOD PRESSURE: 117 MMHG | WEIGHT: 105 LBS | HEIGHT: 60 IN | DIASTOLIC BLOOD PRESSURE: 80 MMHG | HEART RATE: 74 BPM

## 2025-09-04 DIAGNOSIS — N89.8 VAGINAL DISCHARGE: ICD-10-CM

## 2025-09-04 DIAGNOSIS — Z01.419 WELL WOMAN EXAM WITH ROUTINE GYNECOLOGICAL EXAM: Primary | ICD-10-CM

## 2025-09-04 DIAGNOSIS — Z12.72 SCREENING FOR VAGINAL CANCER: ICD-10-CM

## 2025-09-04 LAB — HPV16+18+H RISK 12 DNA SPEC-IMP: NORMAL

## 2025-09-04 PROCEDURE — 99395 PREV VISIT EST AGE 18-39: CPT | Performed by: OBSTETRICS & GYNECOLOGY

## 2025-09-04 RX ORDER — METRONIDAZOLE 500 MG/1
500 TABLET ORAL 2 TIMES DAILY
Qty: 14 TABLET | Refills: 0 | Status: SHIPPED | OUTPATIENT
Start: 2025-09-04 | End: 2025-09-11

## 2025-09-04 RX ORDER — FLUCONAZOLE 150 MG/1
150 TABLET ORAL
Qty: 2 TABLET | Refills: 0 | Status: SHIPPED | OUTPATIENT
Start: 2025-09-04 | End: 2025-09-10

## 2025-09-04 RX ORDER — CLOTRIMAZOLE AND BETAMETHASONE DIPROPIONATE 10; .64 MG/G; MG/G
CREAM TOPICAL
Qty: 45 G | Refills: 1 | Status: SHIPPED | OUTPATIENT
Start: 2025-09-04

## 2025-09-04 ASSESSMENT — ENCOUNTER SYMPTOMS
BACK PAIN: 1
CONSTIPATION: 1
NAUSEA: 1

## (undated) DEVICE — INTEGRA® MICRO ENT BLADE,DOWNCUTTING BLADE, ANGLED SHAFT: Brand: INTEGRA®

## (undated) DEVICE — TOWEL,OR,DSP,ST,BLUE,STD,4/PK,20PK/CS: Brand: MEDLINE

## (undated) DEVICE — SURGICAL SUCTION CONNECTING TUBE WITH MALE CONNECTOR AND SUCTION CLAMP, 2 FT. LONG (.6 M), 5 MM I.D.: Brand: CONMED

## (undated) DEVICE — ENDO KIT,LOURDES HOSPITAL: Brand: MEDLINE INDUSTRIES, INC.

## (undated) DEVICE — GLV SURG BIOGEL M LTX PF 7 1/2

## (undated) DEVICE — TUBING, SUCTION, 1/4" X 12', STRAIGHT: Brand: MEDLINE

## (undated) DEVICE — FORCEPS BX L L240CM DIA2.4MM RAD JAW 4 HOT FOR POLYP DISP

## (undated) DEVICE — STERILE COTTON BALLS LARGE 5/P: Brand: MEDLINE